# Patient Record
Sex: FEMALE | Race: WHITE | NOT HISPANIC OR LATINO | Employment: OTHER | URBAN - METROPOLITAN AREA
[De-identification: names, ages, dates, MRNs, and addresses within clinical notes are randomized per-mention and may not be internally consistent; named-entity substitution may affect disease eponyms.]

---

## 2017-01-09 ENCOUNTER — ALLSCRIPTS OFFICE VISIT (OUTPATIENT)
Dept: OTHER | Facility: OTHER | Age: 82
End: 2017-01-09

## 2017-01-09 DIAGNOSIS — F03.90 DEMENTIA WITHOUT BEHAVIORAL DISTURBANCE (HCC): ICD-10-CM

## 2017-01-09 DIAGNOSIS — E11.9 TYPE 2 DIABETES MELLITUS WITHOUT COMPLICATIONS (HCC): ICD-10-CM

## 2017-01-09 DIAGNOSIS — E78.00 PURE HYPERCHOLESTEROLEMIA: ICD-10-CM

## 2017-01-09 DIAGNOSIS — I10 ESSENTIAL (PRIMARY) HYPERTENSION: ICD-10-CM

## 2017-01-09 DIAGNOSIS — I70.0 ATHEROSCLEROSIS OF AORTA (HCC): ICD-10-CM

## 2017-01-16 ENCOUNTER — APPOINTMENT (OUTPATIENT)
Dept: LAB | Age: 82
End: 2017-01-16
Payer: MEDICARE

## 2017-01-16 DIAGNOSIS — I70.0 ATHEROSCLEROSIS OF AORTA (HCC): ICD-10-CM

## 2017-01-16 DIAGNOSIS — E11.9 TYPE 2 DIABETES MELLITUS WITHOUT COMPLICATIONS (HCC): ICD-10-CM

## 2017-01-16 DIAGNOSIS — F03.90 DEMENTIA WITHOUT BEHAVIORAL DISTURBANCE (HCC): ICD-10-CM

## 2017-01-16 DIAGNOSIS — I10 ESSENTIAL (PRIMARY) HYPERTENSION: ICD-10-CM

## 2017-01-16 DIAGNOSIS — E78.00 PURE HYPERCHOLESTEROLEMIA: ICD-10-CM

## 2017-01-16 LAB
ALBUMIN SERPL BCP-MCNC: 3.8 G/DL (ref 3.5–5)
ALP SERPL-CCNC: 80 U/L (ref 46–116)
ALT SERPL W P-5'-P-CCNC: 20 U/L (ref 12–78)
ANION GAP SERPL CALCULATED.3IONS-SCNC: 9 MMOL/L (ref 4–13)
AST SERPL W P-5'-P-CCNC: 16 U/L (ref 5–45)
BASOPHILS # BLD AUTO: 0.06 THOUSANDS/ΜL (ref 0–0.1)
BASOPHILS NFR BLD AUTO: 1 % (ref 0–1)
BILIRUB SERPL-MCNC: 0.58 MG/DL (ref 0.2–1)
BUN SERPL-MCNC: 20 MG/DL (ref 5–25)
CALCIUM SERPL-MCNC: 9.3 MG/DL (ref 8.3–10.1)
CHLORIDE SERPL-SCNC: 98 MMOL/L (ref 100–108)
CHOLEST SERPL-MCNC: 162 MG/DL (ref 50–200)
CO2 SERPL-SCNC: 28 MMOL/L (ref 21–32)
CREAT SERPL-MCNC: 1.17 MG/DL (ref 0.6–1.3)
EOSINOPHIL # BLD AUTO: 0.16 THOUSAND/ΜL (ref 0–0.61)
EOSINOPHIL NFR BLD AUTO: 1 % (ref 0–6)
ERYTHROCYTE [DISTWIDTH] IN BLOOD BY AUTOMATED COUNT: 13.1 % (ref 11.6–15.1)
EST. AVERAGE GLUCOSE BLD GHB EST-MCNC: 174 MG/DL
GFR SERPL CREATININE-BSD FRML MDRD: 43.2 ML/MIN/1.73SQ M
GLUCOSE SERPL-MCNC: 187 MG/DL (ref 65–140)
HBA1C MFR BLD: 7.7 % (ref 4.2–6.3)
HCT VFR BLD AUTO: 34.6 % (ref 34.8–46.1)
HDLC SERPL-MCNC: 51 MG/DL (ref 40–60)
HGB BLD-MCNC: 11.9 G/DL (ref 11.5–15.4)
LDLC SERPL CALC-MCNC: 84 MG/DL (ref 0–100)
LYMPHOCYTES # BLD AUTO: 2.17 THOUSANDS/ΜL (ref 0.6–4.47)
LYMPHOCYTES NFR BLD AUTO: 20 % (ref 14–44)
MCH RBC QN AUTO: 30.4 PG (ref 26.8–34.3)
MCHC RBC AUTO-ENTMCNC: 34.4 G/DL (ref 31.4–37.4)
MCV RBC AUTO: 89 FL (ref 82–98)
MONOCYTES # BLD AUTO: 0.91 THOUSAND/ΜL (ref 0.17–1.22)
MONOCYTES NFR BLD AUTO: 8 % (ref 4–12)
NEUTROPHILS # BLD AUTO: 7.76 THOUSANDS/ΜL (ref 1.85–7.62)
NEUTS SEG NFR BLD AUTO: 70 % (ref 43–75)
NRBC BLD AUTO-RTO: 0 /100 WBCS
PLATELET # BLD AUTO: 315 THOUSANDS/UL (ref 149–390)
PMV BLD AUTO: 10.5 FL (ref 8.9–12.7)
POTASSIUM SERPL-SCNC: 3.1 MMOL/L (ref 3.5–5.3)
PROT SERPL-MCNC: 8 G/DL (ref 6.4–8.2)
RBC # BLD AUTO: 3.91 MILLION/UL (ref 3.81–5.12)
SODIUM SERPL-SCNC: 135 MMOL/L (ref 136–145)
TRIGL SERPL-MCNC: 137 MG/DL
WBC # BLD AUTO: 11.12 THOUSAND/UL (ref 4.31–10.16)

## 2017-01-16 PROCEDURE — 36415 COLL VENOUS BLD VENIPUNCTURE: CPT

## 2017-01-16 PROCEDURE — 85025 COMPLETE CBC W/AUTO DIFF WBC: CPT

## 2017-01-16 PROCEDURE — 83036 HEMOGLOBIN GLYCOSYLATED A1C: CPT

## 2017-01-16 PROCEDURE — 80061 LIPID PANEL: CPT

## 2017-01-16 PROCEDURE — 80053 COMPREHEN METABOLIC PANEL: CPT

## 2017-01-23 ENCOUNTER — HOSPITAL ENCOUNTER (OUTPATIENT)
Dept: NON INVASIVE DIAGNOSTICS | Facility: CLINIC | Age: 82
Discharge: HOME | End: 2017-01-23
Payer: MEDICARE

## 2017-01-23 DIAGNOSIS — I70.0 ATHEROSCLEROSIS OF AORTA (HCC): ICD-10-CM

## 2017-01-23 PROCEDURE — 93306 TTE W/DOPPLER COMPLETE: CPT

## 2017-01-30 ENCOUNTER — APPOINTMENT (OUTPATIENT)
Dept: LAB | Age: 82
End: 2017-01-30
Payer: MEDICARE

## 2017-01-30 ENCOUNTER — TRANSCRIBE ORDERS (OUTPATIENT)
Dept: LAB | Age: 82
End: 2017-01-30

## 2017-01-30 DIAGNOSIS — I10 ESSENTIAL (PRIMARY) HYPERTENSION: ICD-10-CM

## 2017-01-30 LAB
ANION GAP SERPL CALCULATED.3IONS-SCNC: 11 MMOL/L (ref 4–13)
BUN SERPL-MCNC: 24 MG/DL (ref 5–25)
CALCIUM SERPL-MCNC: 9.4 MG/DL (ref 8.3–10.1)
CHLORIDE SERPL-SCNC: 96 MMOL/L (ref 100–108)
CO2 SERPL-SCNC: 28 MMOL/L (ref 21–32)
CREAT SERPL-MCNC: 1.37 MG/DL (ref 0.6–1.3)
GFR SERPL CREATININE-BSD FRML MDRD: 36 ML/MIN/1.73SQ M
GLUCOSE SERPL-MCNC: 179 MG/DL (ref 65–140)
POTASSIUM SERPL-SCNC: 3.5 MMOL/L (ref 3.5–5.3)
SODIUM SERPL-SCNC: 135 MMOL/L (ref 136–145)

## 2017-01-30 PROCEDURE — 36415 COLL VENOUS BLD VENIPUNCTURE: CPT

## 2017-01-30 PROCEDURE — 80048 BASIC METABOLIC PNL TOTAL CA: CPT

## 2017-02-20 ENCOUNTER — ALLSCRIPTS OFFICE VISIT (OUTPATIENT)
Dept: OTHER | Facility: OTHER | Age: 82
End: 2017-02-20

## 2017-02-20 DIAGNOSIS — E11.9 TYPE 2 DIABETES MELLITUS WITHOUT COMPLICATIONS (HCC): ICD-10-CM

## 2017-02-20 DIAGNOSIS — N17.9 ACUTE KIDNEY FAILURE (HCC): ICD-10-CM

## 2017-02-20 DIAGNOSIS — E78.00 PURE HYPERCHOLESTEROLEMIA: ICD-10-CM

## 2017-02-20 DIAGNOSIS — I10 ESSENTIAL (PRIMARY) HYPERTENSION: ICD-10-CM

## 2017-06-05 ENCOUNTER — TRANSCRIBE ORDERS (OUTPATIENT)
Dept: LAB | Age: 82
End: 2017-06-05

## 2017-06-05 ENCOUNTER — APPOINTMENT (OUTPATIENT)
Dept: LAB | Age: 82
End: 2017-06-05
Payer: MEDICARE

## 2017-06-05 DIAGNOSIS — E11.9 TYPE 2 DIABETES MELLITUS WITHOUT COMPLICATIONS (HCC): ICD-10-CM

## 2017-06-05 DIAGNOSIS — E78.00 PURE HYPERCHOLESTEROLEMIA: ICD-10-CM

## 2017-06-05 DIAGNOSIS — I10 ESSENTIAL (PRIMARY) HYPERTENSION: ICD-10-CM

## 2017-06-05 DIAGNOSIS — N17.9 ACUTE KIDNEY FAILURE (HCC): ICD-10-CM

## 2017-06-05 LAB
ALBUMIN SERPL BCP-MCNC: 3.8 G/DL (ref 3.5–5)
ALP SERPL-CCNC: 88 U/L (ref 46–116)
ALT SERPL W P-5'-P-CCNC: 22 U/L (ref 12–78)
ANION GAP SERPL CALCULATED.3IONS-SCNC: 9 MMOL/L (ref 4–13)
AST SERPL W P-5'-P-CCNC: 21 U/L (ref 5–45)
BASOPHILS # BLD MANUAL: 0.13 THOUSAND/UL (ref 0–0.1)
BASOPHILS NFR MAR MANUAL: 1 % (ref 0–1)
BILIRUB SERPL-MCNC: 0.73 MG/DL (ref 0.2–1)
BUN SERPL-MCNC: 24 MG/DL (ref 5–25)
CALCIUM SERPL-MCNC: 9.4 MG/DL (ref 8.3–10.1)
CHLORIDE SERPL-SCNC: 96 MMOL/L (ref 100–108)
CO2 SERPL-SCNC: 28 MMOL/L (ref 21–32)
CREAT SERPL-MCNC: 1.39 MG/DL (ref 0.6–1.3)
EOSINOPHIL # BLD MANUAL: 0.13 THOUSAND/UL (ref 0–0.4)
EOSINOPHIL NFR BLD MANUAL: 1 % (ref 0–6)
ERYTHROCYTE [DISTWIDTH] IN BLOOD BY AUTOMATED COUNT: 13.3 % (ref 11.6–15.1)
EST. AVERAGE GLUCOSE BLD GHB EST-MCNC: 194 MG/DL
GFR SERPL CREATININE-BSD FRML MDRD: 35.4 ML/MIN/1.73SQ M
GLUCOSE P FAST SERPL-MCNC: 208 MG/DL (ref 65–99)
HBA1C MFR BLD: 8.4 % (ref 4.2–6.3)
HCT VFR BLD AUTO: 36.8 % (ref 34.8–46.1)
HGB BLD-MCNC: 12.4 G/DL (ref 11.5–15.4)
LYMPHOCYTES # BLD AUTO: 1.82 THOUSAND/UL (ref 0.6–4.47)
LYMPHOCYTES # BLD AUTO: 14 % (ref 14–44)
MCH RBC QN AUTO: 29.7 PG (ref 26.8–34.3)
MCHC RBC AUTO-ENTMCNC: 33.7 G/DL (ref 31.4–37.4)
MCV RBC AUTO: 88 FL (ref 82–98)
MONOCYTES # BLD AUTO: 0.65 THOUSAND/UL (ref 0–1.22)
MONOCYTES NFR BLD: 5 % (ref 4–12)
NEUTROPHILS # BLD MANUAL: 9.77 THOUSAND/UL (ref 1.85–7.62)
NEUTS SEG NFR BLD AUTO: 75 % (ref 43–75)
NRBC BLD AUTO-RTO: 0 /100 WBCS
PLATELET # BLD AUTO: 337 THOUSANDS/UL (ref 149–390)
PLATELET BLD QL SMEAR: ADEQUATE
PMV BLD AUTO: 11 FL (ref 8.9–12.7)
POTASSIUM SERPL-SCNC: 3.7 MMOL/L (ref 3.5–5.3)
PROT SERPL-MCNC: 8.4 G/DL (ref 6.4–8.2)
RBC # BLD AUTO: 4.17 MILLION/UL (ref 3.81–5.12)
SODIUM SERPL-SCNC: 133 MMOL/L (ref 136–145)
VARIANT LYMPHS # BLD AUTO: 4 %
WBC # BLD AUTO: 13.02 THOUSAND/UL (ref 4.31–10.16)

## 2017-06-05 PROCEDURE — 85007 BL SMEAR W/DIFF WBC COUNT: CPT

## 2017-06-05 PROCEDURE — 85027 COMPLETE CBC AUTOMATED: CPT

## 2017-06-05 PROCEDURE — 80053 COMPREHEN METABOLIC PANEL: CPT

## 2017-06-05 PROCEDURE — 83036 HEMOGLOBIN GLYCOSYLATED A1C: CPT

## 2017-06-05 PROCEDURE — 36415 COLL VENOUS BLD VENIPUNCTURE: CPT

## 2017-06-06 ENCOUNTER — GENERIC CONVERSION - ENCOUNTER (OUTPATIENT)
Dept: OTHER | Facility: OTHER | Age: 82
End: 2017-06-06

## 2017-06-12 ENCOUNTER — ALLSCRIPTS OFFICE VISIT (OUTPATIENT)
Dept: OTHER | Facility: OTHER | Age: 82
End: 2017-06-12

## 2017-06-12 DIAGNOSIS — E87.1 HYPO-OSMOLALITY AND HYPONATREMIA: ICD-10-CM

## 2017-06-12 DIAGNOSIS — E87.6 HYPOKALEMIA: ICD-10-CM

## 2017-06-12 DIAGNOSIS — D72.829 ELEVATED WHITE BLOOD CELL COUNT: ICD-10-CM

## 2017-06-19 ENCOUNTER — APPOINTMENT (OUTPATIENT)
Dept: LAB | Age: 82
End: 2017-06-19
Payer: MEDICARE

## 2017-06-19 ENCOUNTER — TRANSCRIBE ORDERS (OUTPATIENT)
Dept: LAB | Age: 82
End: 2017-06-19

## 2017-06-19 DIAGNOSIS — E87.1 HYPOSMOLALITY AND/OR HYPONATREMIA: Primary | ICD-10-CM

## 2017-06-19 DIAGNOSIS — D72.829 LEUKOCYTOSIS, UNSPECIFIED TYPE: ICD-10-CM

## 2017-06-19 LAB
ANION GAP SERPL CALCULATED.3IONS-SCNC: 9 MMOL/L (ref 4–13)
BASOPHILS # BLD AUTO: 0.06 THOUSANDS/ΜL (ref 0–0.1)
BASOPHILS NFR BLD AUTO: 1 % (ref 0–1)
BILIRUB UR QL STRIP: NEGATIVE
BUN SERPL-MCNC: 20 MG/DL (ref 5–25)
CALCIUM SERPL-MCNC: 9.1 MG/DL (ref 8.3–10.1)
CHLORIDE SERPL-SCNC: 96 MMOL/L (ref 100–108)
CLARITY UR: CLEAR
CO2 SERPL-SCNC: 28 MMOL/L (ref 21–32)
COLOR UR: YELLOW
CREAT SERPL-MCNC: 1.25 MG/DL (ref 0.6–1.3)
EOSINOPHIL # BLD AUTO: 0.15 THOUSAND/ΜL (ref 0–0.61)
EOSINOPHIL NFR BLD AUTO: 1 % (ref 0–6)
ERYTHROCYTE [DISTWIDTH] IN BLOOD BY AUTOMATED COUNT: 13.2 % (ref 11.6–15.1)
GFR SERPL CREATININE-BSD FRML MDRD: 40 ML/MIN/1.73SQ M
GLUCOSE SERPL-MCNC: 243 MG/DL (ref 65–140)
GLUCOSE UR STRIP-MCNC: NEGATIVE MG/DL
HCT VFR BLD AUTO: 34.5 % (ref 34.8–46.1)
HGB BLD-MCNC: 11.8 G/DL (ref 11.5–15.4)
HGB UR QL STRIP.AUTO: NEGATIVE
KETONES UR STRIP-MCNC: NEGATIVE MG/DL
LEUKOCYTE ESTERASE UR QL STRIP: NEGATIVE
LYMPHOCYTES # BLD AUTO: 2.55 THOUSANDS/ΜL (ref 0.6–4.47)
LYMPHOCYTES NFR BLD AUTO: 23 % (ref 14–44)
MCH RBC QN AUTO: 30.3 PG (ref 26.8–34.3)
MCHC RBC AUTO-ENTMCNC: 34.2 G/DL (ref 31.4–37.4)
MCV RBC AUTO: 89 FL (ref 82–98)
MONOCYTES # BLD AUTO: 1.05 THOUSAND/ΜL (ref 0.17–1.22)
MONOCYTES NFR BLD AUTO: 9 % (ref 4–12)
NEUTROPHILS # BLD AUTO: 7.37 THOUSANDS/ΜL (ref 1.85–7.62)
NEUTS SEG NFR BLD AUTO: 66 % (ref 43–75)
NITRITE UR QL STRIP: NEGATIVE
NRBC BLD AUTO-RTO: 0 /100 WBCS
PH UR STRIP.AUTO: 7 [PH] (ref 4.5–8)
PLATELET # BLD AUTO: 332 THOUSANDS/UL (ref 149–390)
PMV BLD AUTO: 10.9 FL (ref 8.9–12.7)
POTASSIUM SERPL-SCNC: 3.1 MMOL/L (ref 3.5–5.3)
PROT UR STRIP-MCNC: NEGATIVE MG/DL
RBC # BLD AUTO: 3.9 MILLION/UL (ref 3.81–5.12)
SODIUM SERPL-SCNC: 133 MMOL/L (ref 136–145)
SP GR UR STRIP.AUTO: 1.01 (ref 1–1.03)
UROBILINOGEN UR QL STRIP.AUTO: 0.2 E.U./DL
WBC # BLD AUTO: 11.23 THOUSAND/UL (ref 4.31–10.16)

## 2017-06-19 PROCEDURE — 81003 URINALYSIS AUTO W/O SCOPE: CPT

## 2017-06-19 PROCEDURE — 85025 COMPLETE CBC W/AUTO DIFF WBC: CPT

## 2017-06-19 PROCEDURE — 36415 COLL VENOUS BLD VENIPUNCTURE: CPT

## 2017-06-19 PROCEDURE — 80048 BASIC METABOLIC PNL TOTAL CA: CPT

## 2017-07-03 ENCOUNTER — APPOINTMENT (OUTPATIENT)
Dept: LAB | Age: 82
End: 2017-07-03
Payer: MEDICARE

## 2017-07-03 ENCOUNTER — TRANSCRIBE ORDERS (OUTPATIENT)
Dept: LAB | Age: 82
End: 2017-07-03

## 2017-07-03 DIAGNOSIS — E87.6 HYPOKALEMIA: ICD-10-CM

## 2017-07-03 LAB
ANION GAP SERPL CALCULATED.3IONS-SCNC: 11 MMOL/L (ref 4–13)
BUN SERPL-MCNC: 20 MG/DL (ref 5–25)
CALCIUM SERPL-MCNC: 9.5 MG/DL (ref 8.3–10.1)
CHLORIDE SERPL-SCNC: 97 MMOL/L (ref 100–108)
CO2 SERPL-SCNC: 26 MMOL/L (ref 21–32)
CREAT SERPL-MCNC: 1.15 MG/DL (ref 0.6–1.3)
GFR SERPL CREATININE-BSD FRML MDRD: 44 ML/MIN/1.73SQ M
GLUCOSE SERPL-MCNC: 248 MG/DL (ref 65–140)
MAGNESIUM SERPL-MCNC: 1.8 MG/DL (ref 1.6–2.6)
POTASSIUM SERPL-SCNC: 3.4 MMOL/L (ref 3.5–5.3)
SODIUM SERPL-SCNC: 134 MMOL/L (ref 136–145)

## 2017-07-03 PROCEDURE — 83735 ASSAY OF MAGNESIUM: CPT

## 2017-07-03 PROCEDURE — 36415 COLL VENOUS BLD VENIPUNCTURE: CPT

## 2017-07-03 PROCEDURE — 80048 BASIC METABOLIC PNL TOTAL CA: CPT

## 2017-07-17 ENCOUNTER — ALLSCRIPTS OFFICE VISIT (OUTPATIENT)
Dept: OTHER | Facility: OTHER | Age: 82
End: 2017-07-17

## 2017-07-27 DIAGNOSIS — E87.6 HYPOKALEMIA: ICD-10-CM

## 2017-07-27 DIAGNOSIS — N18.30 CHRONIC KIDNEY DISEASE, STAGE III (MODERATE) (HCC): ICD-10-CM

## 2017-07-27 DIAGNOSIS — I10 ESSENTIAL (PRIMARY) HYPERTENSION: ICD-10-CM

## 2017-07-31 ENCOUNTER — APPOINTMENT (OUTPATIENT)
Dept: LAB | Age: 82
End: 2017-07-31
Payer: MEDICARE

## 2017-07-31 DIAGNOSIS — N18.30 CHRONIC KIDNEY DISEASE, STAGE III (MODERATE) (HCC): ICD-10-CM

## 2017-07-31 DIAGNOSIS — I10 ESSENTIAL (PRIMARY) HYPERTENSION: ICD-10-CM

## 2017-07-31 LAB — POTASSIUM SERPL-SCNC: 3.1 MMOL/L (ref 3.5–5.3)

## 2017-07-31 PROCEDURE — 36415 COLL VENOUS BLD VENIPUNCTURE: CPT

## 2017-07-31 PROCEDURE — 84132 ASSAY OF SERUM POTASSIUM: CPT

## 2017-08-07 ENCOUNTER — TRANSCRIBE ORDERS (OUTPATIENT)
Dept: LAB | Age: 82
End: 2017-08-07

## 2017-08-07 ENCOUNTER — APPOINTMENT (OUTPATIENT)
Dept: LAB | Age: 82
End: 2017-08-07
Payer: MEDICARE

## 2017-08-07 DIAGNOSIS — E87.6 HYPOKALEMIA: ICD-10-CM

## 2017-08-07 LAB — POTASSIUM SERPL-SCNC: 3.2 MMOL/L (ref 3.5–5.3)

## 2017-08-07 PROCEDURE — 36415 COLL VENOUS BLD VENIPUNCTURE: CPT

## 2017-08-07 PROCEDURE — 84132 ASSAY OF SERUM POTASSIUM: CPT

## 2017-10-04 ENCOUNTER — APPOINTMENT (OUTPATIENT)
Dept: LAB | Facility: HOSPITAL | Age: 82
End: 2017-10-04
Payer: MEDICARE

## 2017-10-04 ENCOUNTER — GENERIC CONVERSION - ENCOUNTER (OUTPATIENT)
Dept: OTHER | Facility: OTHER | Age: 82
End: 2017-10-04

## 2017-10-04 DIAGNOSIS — R60.0 LOCALIZED EDEMA: ICD-10-CM

## 2017-10-04 LAB
ANION GAP SERPL CALCULATED.3IONS-SCNC: 9 MMOL/L (ref 4–13)
BUN SERPL-MCNC: 18 MG/DL (ref 5–25)
CALCIUM SERPL-MCNC: 9.5 MG/DL (ref 8.3–10.1)
CHLORIDE SERPL-SCNC: 97 MMOL/L (ref 100–108)
CO2 SERPL-SCNC: 31 MMOL/L (ref 21–32)
CREAT SERPL-MCNC: 1.2 MG/DL (ref 0.6–1.3)
GFR SERPL CREATININE-BSD FRML MDRD: 39 ML/MIN/1.73SQ M
GLUCOSE P FAST SERPL-MCNC: 185 MG/DL (ref 65–99)
MAGNESIUM SERPL-MCNC: 1.9 MG/DL (ref 1.6–2.6)
POTASSIUM SERPL-SCNC: 4.3 MMOL/L (ref 3.5–5.3)
SODIUM SERPL-SCNC: 137 MMOL/L (ref 136–145)

## 2017-10-04 PROCEDURE — 36415 COLL VENOUS BLD VENIPUNCTURE: CPT

## 2017-10-04 PROCEDURE — 83735 ASSAY OF MAGNESIUM: CPT

## 2017-10-04 PROCEDURE — 80048 BASIC METABOLIC PNL TOTAL CA: CPT

## 2017-10-23 ENCOUNTER — TRANSCRIBE ORDERS (OUTPATIENT)
Dept: LAB | Age: 82
End: 2017-10-23

## 2017-10-23 ENCOUNTER — APPOINTMENT (OUTPATIENT)
Dept: LAB | Age: 82
End: 2017-10-23
Payer: MEDICARE

## 2017-10-23 DIAGNOSIS — E11.9 TYPE 2 DIABETES MELLITUS WITHOUT COMPLICATIONS (HCC): ICD-10-CM

## 2017-10-23 DIAGNOSIS — I10 ESSENTIAL (PRIMARY) HYPERTENSION: ICD-10-CM

## 2017-10-23 DIAGNOSIS — D72.829 ELEVATED WHITE BLOOD CELL COUNT: ICD-10-CM

## 2017-10-23 DIAGNOSIS — F03.90 DEMENTIA WITHOUT BEHAVIORAL DISTURBANCE (HCC): ICD-10-CM

## 2017-10-23 DIAGNOSIS — N18.30 CHRONIC KIDNEY DISEASE, STAGE III (MODERATE) (HCC): ICD-10-CM

## 2017-10-23 DIAGNOSIS — E78.00 PURE HYPERCHOLESTEROLEMIA: ICD-10-CM

## 2017-10-23 PROCEDURE — 36415 COLL VENOUS BLD VENIPUNCTURE: CPT

## 2017-10-24 ENCOUNTER — TRANSCRIBE ORDERS (OUTPATIENT)
Dept: LAB | Age: 82
End: 2017-10-24

## 2017-10-24 ENCOUNTER — APPOINTMENT (OUTPATIENT)
Dept: LAB | Age: 82
End: 2017-10-24
Payer: MEDICARE

## 2017-10-24 DIAGNOSIS — D72.829 LEUKOCYTOSIS, UNSPECIFIED TYPE: ICD-10-CM

## 2017-10-24 DIAGNOSIS — N18.30 CHRONIC KIDNEY DISEASE, STAGE III (MODERATE) (HCC): ICD-10-CM

## 2017-10-24 DIAGNOSIS — E11.9 DIABETES MELLITUS WITHOUT COMPLICATION (HCC): ICD-10-CM

## 2017-10-24 DIAGNOSIS — I10 ESSENTIAL HYPERTENSION, MALIGNANT: Primary | ICD-10-CM

## 2017-10-24 LAB
ALBUMIN SERPL BCP-MCNC: 3.5 G/DL (ref 3.5–5)
ALP SERPL-CCNC: 81 U/L (ref 46–116)
ALT SERPL W P-5'-P-CCNC: 25 U/L (ref 12–78)
ANION GAP SERPL CALCULATED.3IONS-SCNC: 7 MMOL/L (ref 4–13)
AST SERPL W P-5'-P-CCNC: 18 U/L (ref 5–45)
BASOPHILS # BLD AUTO: 0.08 THOUSANDS/ΜL (ref 0–0.1)
BASOPHILS NFR BLD AUTO: 1 % (ref 0–1)
BILIRUB SERPL-MCNC: 0.61 MG/DL (ref 0.2–1)
BUN SERPL-MCNC: 14 MG/DL (ref 5–25)
CALCIUM SERPL-MCNC: 8.8 MG/DL (ref 8.3–10.1)
CHLORIDE SERPL-SCNC: 96 MMOL/L (ref 100–108)
CO2 SERPL-SCNC: 28 MMOL/L (ref 21–32)
CREAT SERPL-MCNC: 1.05 MG/DL (ref 0.6–1.3)
EOSINOPHIL # BLD AUTO: 0.23 THOUSAND/ΜL (ref 0–0.61)
EOSINOPHIL NFR BLD AUTO: 2 % (ref 0–6)
ERYTHROCYTE [DISTWIDTH] IN BLOOD BY AUTOMATED COUNT: 13.7 % (ref 11.6–15.1)
GFR SERPL CREATININE-BSD FRML MDRD: 46 ML/MIN/1.73SQ M
GLUCOSE P FAST SERPL-MCNC: 212 MG/DL (ref 65–99)
HCT VFR BLD AUTO: 35.7 % (ref 34.8–46.1)
HGB BLD-MCNC: 12.1 G/DL (ref 11.5–15.4)
LYMPHOCYTES # BLD AUTO: 3.1 THOUSANDS/ΜL (ref 0.6–4.47)
LYMPHOCYTES NFR BLD AUTO: 26 % (ref 14–44)
MCH RBC QN AUTO: 30 PG (ref 26.8–34.3)
MCHC RBC AUTO-ENTMCNC: 33.9 G/DL (ref 31.4–37.4)
MCV RBC AUTO: 88 FL (ref 82–98)
MONOCYTES # BLD AUTO: 0.86 THOUSAND/ΜL (ref 0.17–1.22)
MONOCYTES NFR BLD AUTO: 7 % (ref 4–12)
NEUTROPHILS # BLD AUTO: 7.66 THOUSANDS/ΜL (ref 1.85–7.62)
NEUTS SEG NFR BLD AUTO: 64 % (ref 43–75)
NRBC BLD AUTO-RTO: 0 /100 WBCS
PLATELET # BLD AUTO: 311 THOUSANDS/UL (ref 149–390)
PMV BLD AUTO: 11.1 FL (ref 8.9–12.7)
POTASSIUM SERPL-SCNC: 3.7 MMOL/L (ref 3.5–5.3)
PROT SERPL-MCNC: 7.8 G/DL (ref 6.4–8.2)
RBC # BLD AUTO: 4.04 MILLION/UL (ref 3.81–5.12)
SODIUM SERPL-SCNC: 131 MMOL/L (ref 136–145)
WBC # BLD AUTO: 12 THOUSAND/UL (ref 4.31–10.16)

## 2017-10-24 PROCEDURE — 36415 COLL VENOUS BLD VENIPUNCTURE: CPT

## 2017-10-24 PROCEDURE — 83036 HEMOGLOBIN GLYCOSYLATED A1C: CPT

## 2017-10-24 PROCEDURE — 80053 COMPREHEN METABOLIC PANEL: CPT

## 2017-10-24 PROCEDURE — 85025 COMPLETE CBC W/AUTO DIFF WBC: CPT

## 2017-10-25 LAB
EST. AVERAGE GLUCOSE BLD GHB EST-MCNC: 209 MG/DL
HBA1C MFR BLD: 8.9 % (ref 4.2–6.3)

## 2017-10-30 ENCOUNTER — ALLSCRIPTS OFFICE VISIT (OUTPATIENT)
Dept: OTHER | Facility: OTHER | Age: 82
End: 2017-10-30

## 2017-10-31 NOTE — PROGRESS NOTES
Assessment  1  CKD (chronic kidney disease), stage III (585 3) (N18 3)   2  Type 2 diabetes mellitus (250 00) (E11 9)   3  Benign essential hypertension (401 1) (I10)   4  Hypercholesterolemia (272 0) (E78 00)   5  Senile dementia, uncomplicated (922 1) (Z12 43)   6  Leukocytosis (288 60) (D72 829)    Plan  Benign essential hypertension    · Furosemide 40 MG Oral Tablet; TAKE 1 TABLET DAILY   · Losartan Potassium 25 MG Oral Tablet; TAKE 1 TABLET DAILY   · Simvastatin 40 MG Oral Tablet; TAKE 1 TABLET DAILY AS DIRECTED  Benign essential hypertension, CKD (chronic kidney disease), stage III, Hypercholesterolemia,  Leukocytosis, Senile dementia, uncomplicated, Type 2 diabetes mellitus    · (1) BASIC METABOLIC PROFILE; Status:Active; Requested for:30Oct2017;    · (1) CBC/ PLT (NO DIFF); Status:Active; Requested for:30Oct2017;    · (1) HEMOGLOBIN A1C; Status:Active; Requested for:30Oct2017;    · (1) LIPID PANEL FASTING W DIRECT LDL REFLEX; Status:Active; Requested for:30Oct2017;    · (1) TSH; Status:Active; Requested for:30Oct2017;    · Follow-up visit in 4 Months Evaluation and Treatment  Follow-up  Status: Hold For - Scheduling   Requested for: 28QWR4387  PMH: Varicose veins of leg with edema, unspecified laterality    · MetOLazone 5 MG Oral Tablet; TAKE 1 TABLET EVERY DAY  Senile dementia, uncomplicated    · Donepezil HCl - 10 MG Oral Tablet; TAKE 1 TABLET DAILY  Type 2 diabetes mellitus    · From  GlipiZIDE XL 2 5 MG Oral Tablet Extended Release 24 Hour 1 tablet before breakfast  and dinner To GlipiZIDE XL 5 MG Oral Tablet Extended Release 24 Hour TAKE 1 TABLET BY MOUTH  EVERY AM BEFORE BREAKFAST   · Januvia 50 MG Oral Tablet; Take 1 tablet daily    Discussion/Summary  Discussion Summary:   See med list glipizide 5 mg daily     Counseling Documentation With Imm: The patient was counseled regarding diagnostic results,-- instructions for management,-- risk factor reductions,-- prognosis,-- patient and family education,-- impressions  Chief Complaint  Chief Complaint Free Text Note Form: 3 month F/U for HTN, CKD, hypercholesterolemia, senile dementia, diabetes  lab work 10/24/17refills needed  History of Present Illness  Diabetes Type II (Follow-Up): The patient states she has been doing poorly with her Type II Diabetes control since the last visit  Comorbid Illnesses: hypertension-- and-- hyperlipidemia  She has no known diabetic complications  Symptoms: The patient is currently asymptomatic  Home monitoring: The patient is not checking blood sugars at home  Medications: the patient is adherent with her medication regimen  -- She denies medication side effects  Review of Systems  Complete-Female:   Constitutional: No fever, no chills, feels well, no tiredness, no recent weight gain or weight loss  Eyes: No complaints of eye pain, no red eyes, no eyesight problems, no discharge, no dry eyes, no itching of eyes  ENT: no complaints of earache, no loss of hearing, no nose bleeds, no nasal discharge, no sore throat, no hoarseness  Cardiovascular: lower extremity edema  Respiratory: No complaints of shortness of breath, no wheezing, no cough, no SOB on exertion, no orthopnea, no PND  Musculoskeletal: joint stiffness  Active Problems  1  Advance directive in chart (V49 89) (Z78 9)   2  Anemia (285 9) (D64 9)   3  Benign essential hypertension (401 1) (I10)   4  Bilateral lower extremity edema (782 3) (R60 0)   5  CKD (chronic kidney disease), stage III (585 3) (N18 3)   6  Closed displaced fracture of olecranon process of right ulna with intra-articular extension, initial   encounter (813 01) (S52 031A)   7  Gallstone (574 20) (K80 20)   8  Hypercholesterolemia (272 0) (E78 00)   9  Hypokalemia (276 8) (E87 6)   10  Hyponatremia (276 1) (E87 1)   11  Leukocytosis (288 60) (D72 829)   12  Moderate aortic stenosis (424 1) (I35 0)   13  Osteopenia (733 90) (M85 80)   14  Pancreatic cyst (577 2) (K86 2)   15  Senile dementia, uncomplicated (858 9) (C29 92)   16  Type 2 diabetes mellitus (250 00) (E11 9)    Past Medical History  1  History of Acute bronchitis, unspecified organism (466 0) (J20 9)   2  History of Arm contusion (923 9) (S40 029A)   3  History of Bilateral edema of lower extremity (782 3) (R60 0)   4  History of Gait abnormality (781 2) (R26 9)   5  History of Hip pain, acute, right (719 45) (M25 551)   6  History of acute dermatitis (V13 3) (Z87 2)   7  History of fall (V15 88) (Z91 81)   8  History of hypokalemia (V12 29) (Z86 39)   9  History of low back pain (V13 59) (Z87 39)   10  History of Injury, foot (959 7) (S99 929A)   11  History of Joint pain, knee (719 46) (M25 569)   12  History of Other ascites (789 59) (R18 8)   13  History of Screening for genitourinary condition (V81 6) (Z13 89)   14  History of Varicose veins of leg with edema, unspecified laterality (454 8) (H66 947)  Active Problems And Past Medical History Reviewed: The active problems and past medical history were reviewed and updated today  Surgical History  1  History of Breast Surgery   2  History of Elbow Surgery  Surgical History Reviewed: The surgical history was reviewed and updated today  Family History  Mother    1  No pertinent family history  Father    2  No pertinent family history  Family History Reviewed: The family history was reviewed and updated today  Social History   · Advance directive in chart (V48 89) (Z78 9)   · Marital History - Currently    · Never A Smoker   · No alcohol use   · No illicit drug use   · Occupation:  Social History Reviewed: The social history was reviewed and updated today  Current Meds   1  Donepezil HCl - 10 MG Oral Tablet; TAKE 1 TABLET DAILY  Requested for: 12Jun2017; Last   Rx:12Jun2017 Ordered   2  Furosemide 40 MG Oral Tablet; TAKE 1 TABLET DAILY; Therapy: 78MDC1509 to (Evaluate:16Qii3090)  Requested for: 12Jun2017; Last Rx:12Jun2017   Ordered   3  GlipiZIDE XL 2 5 MG Oral Tablet Extended Release 24 Hour; 1 tablet before breakfast and dinner; Therapy: 52VCB9120 to (Demetrice Young)  Requested for: 12Jun2017; Last Rx:12Jun2017   Ordered   4  Januvia 50 MG Oral Tablet; Take 1 tablet daily; Therapy: 30EFV2125 to (Evaluate:11Aug2017)  Requested for: 21YKB3198; Last Rx:12Jun2017   Ordered   5  Losartan Potassium 25 MG Oral Tablet; TAKE 1 TABLET DAILY; Therapy: 38Zkj3633 to (Evaluate:11Nov2017)  Requested for: 53Pxl3531; Last Rx:12Sep2017   Ordered   6  MetOLazone 5 MG Oral Tablet; TAKE 1 TABLET EVERY DAY; Therapy: 87DIE6357 to (Evaluate:43Zyp7649)  Requested for: 12Jun2017; Last Rx:12Jun2017   Ordered   7  Potassium Chloride ER 20 MEQ Oral Tablet Extended Release; take 2 tablet twice daily; Therapy: 99Vbn4406 to (Evaluate:13Jan2018)  Requested for: 84YPT3562; Last Rx:26Nsv8504   Ordered   8  Simvastatin 40 MG Oral Tablet; TAKE 1 TABLET DAILY AS DIRECTED  Requested for: 12Jun2017;   Last Rx:12Jun2017 Ordered  Medication List Reviewed: The medication list was reviewed and updated today  Allergies  1  No Known Drug Allergies  2  No Known Environmental Allergies   3  No Known Food Allergies    Vitals  Vital Signs    Recorded: 20IBQ6859 11:42AM   Temperature 98 3 F, Tympanic   Heart Rate 82   Systolic 102, LUE, Sitting   Diastolic 60, LUE, Sitting   BP CUFF SIZE Large   Weight 199 lb 9 6 oz   BMI Calculated 37 71   BSA Calculated 1 89   O2 Saturation 95, RA     Physical Exam    Constitutional   General appearance: Abnormal   overweight  Eyes   Conjunctiva and lids: No swelling, erythema or discharge  Ears, Nose, Mouth, and Throat   Otoscopic examination: Tympanic membranes translucent with normal light reflex  Canals patent without erythema  Oropharynx: Normal with no erythema, edema, exudate or lesions  Pulmonary   Auscultation of lungs: Clear to auscultation  Cardiovascular   Palpation of heart: Normal PMI, no thrills      Auscultation of heart: Abnormal   The heart rate was normal  A grade 2 systolic murmur was heard at the LLSB  Examination of extremities for edema and/or varicosities: Abnormal   bilateral ankle 1+ pitting edema  Abdomen   Abdomen: Non-tender, no masses  Liver and spleen: No hepatomegaly or splenomegaly  Musculoskeletal   Gait and station: Normal     Neurologic   Cranial nerves: Cranial nerves 2-12 intact  Psychiatric   Orientation to person, place, and time: Abnormal   Mental Status: disoriented to place,-- disoriented to time,-- decreased attention span-- and-- inadequate knowledge of current events, but-- oriented to person,-- intact visual attention-- and-- no difficulty naming common objects  Health Management  History of Screening for genitourinary condition   *VB - Urinary Incontinence Screen (Dx Z13 89 Screen for UI); every 1 year; Last 60XZJ7011; Next  Due: 58RUC5769; Overdue  Type 2 diabetes mellitus   *VB - Eye Exam; every 2 years; Last 31TDP3771; Next Due: 29RDX8028; Active  *VB - Foot Exam; every 1 year; Last 08UFL9798; Next Due: 01Mxw6225; Active    Signatures   Electronically signed by :  Vladimir Wylie MD; Oct 30 2017 12:14PM EST                       (Author)

## 2018-01-10 NOTE — MISCELLANEOUS
Message   Recorded as Task   Date: 06/05/2017 02:44 PM, Created By: Shawn Francois   Task Name: Follow Up   Assigned To: Osbaldo Leung   Regarding Patient: Jim Ornelas, Status: Active   Comment:    Ramone Can - 05 Jun 2017 2:44 PM     TASK CREATED  Patient with slight increased WBC on recent labs  Could we please contact patient's daughter and see if patient is haing any s/sx of infection, otherwise keep [facility] follow up in 1 week as scheduled  Susannah Reed - 05 Jun 2017 2:46 PM     TASK REASSIGNED: Previously Assigned To [facility],Team   Maricruz Cerda - 06 Jun 2017 12:30 PM     TASK EDITED  Spoke with the daughter  Jannie Crystal does not have any signs of infection  No cough or UTI symptoms  She will keep her appointment to review blood work results on Monday 6/12/17  Active Problems    1  Advance directive in chart (V49 89) (Z78 9)   2  SALTY (acute kidney injury) (584 9) (N17 9)   3  Anemia (285 9) (D64 9)   4  Benign essential hypertension (401 1) (I10)   5  Closed displaced fracture of olecranon process of right ulna with intra-articular extension,   initial encounter (813 01) (S52 031A)   6  Gallstone (574 20) (K80 20)   7  Hypercholesterolemia (272 0) (E78 00)   8  Leukocytosis (288 60) (D72 829)   9  Moderate aortic stenosis (424 1) (I35 0)   10  Osteopenia (733 90) (M85 80)   11  Pancreatic cyst (577 2) (K86 2)   12  Senile dementia, uncomplicated (380 9) (C00 69)   13  Type 2 diabetes mellitus (250 00) (E11 9)    Current Meds   1  Donepezil HCl - 10 MG Oral Tablet; TAKE 1 TABLET DAILY  Requested for: 13Jun2016;   Last Rx:13Jun2016 Ordered   2  Furosemide 40 MG Oral Tablet; TAKE 1 TABLET DAILY; Therapy: 86AOL8895 to (Evaluate:11Jun2017)  Requested for: 18Zur5551; Last   Rx:82Mzu7869 Ordered   3  GlipiZIDE XL 2 5 MG Oral Tablet Extended Release 24 Hour; 1 tablet before breakfast   and dinner;    Therapy: 41IXD6795 to (467 95 767)  Requested for: 06BOZ5977; Last GS:92WKU0730 Ordered   4  Januvia 25 MG Oral Tablet; TAKE 1 TABLET DAILY; Therapy: 01EKC4157 to (Evaluate:18Jun2017)  Requested for: 24Apr2017; Last   Rx:24Apr2017 Ordered   5  Losartan Potassium 100 MG Oral Tablet; TAKE 1 TABLET DAILY; Therapy: 43Hdi2404 to (Evaluate:08Jun2017)  Requested for: 24DPZ1484; Last   EM:70MQF2947; Status: ACTIVE - Renewal Denied Ordered   6  MetOLazone 5 MG Oral Tablet; TAKE 1 TABLET EVERY DAY; Therapy: 93ADF0948 to (Evaluate:18Nov2017)  Requested for: 94GWG5329; Last   Rx:43Egq8160 Ordered   7  Potassium Chloride ER 20 MEQ Oral Tablet Extended Release; take 1 tablet twice a day; Therapy: 64Ioa1555 to (Evaluate:12Nov2017)  Requested for: 43ZWG9177; Last   Rx:17Twz9332 Ordered   8  Simvastatin 40 MG Oral Tablet; TAKE 1 TABLET DAILY AS DIRECTED  Requested for:   30YKD0687; Last Rx:03Apr2017 Ordered    Allergies    1  No Known Drug Allergies    2  No Known Environmental Allergies   3  No Known Food Allergies  Denied    4   ACE Inhibitors    Signatures   Electronically signed by : Rocio Conn RN; Jun 6 2017 12:30PM EST                       (Author)

## 2018-01-13 VITALS
OXYGEN SATURATION: 95 % | DIASTOLIC BLOOD PRESSURE: 60 MMHG | HEART RATE: 82 BPM | TEMPERATURE: 98.3 F | WEIGHT: 199.6 LBS | SYSTOLIC BLOOD PRESSURE: 118 MMHG | BODY MASS INDEX: 37.71 KG/M2

## 2018-01-14 VITALS
HEART RATE: 87 BPM | WEIGHT: 200.38 LBS | TEMPERATURE: 98.1 F | BODY MASS INDEX: 39.34 KG/M2 | DIASTOLIC BLOOD PRESSURE: 60 MMHG | OXYGEN SATURATION: 96 % | SYSTOLIC BLOOD PRESSURE: 132 MMHG | HEIGHT: 60 IN

## 2018-01-14 VITALS
DIASTOLIC BLOOD PRESSURE: 82 MMHG | OXYGEN SATURATION: 98 % | HEART RATE: 84 BPM | SYSTOLIC BLOOD PRESSURE: 142 MMHG | HEIGHT: 60 IN | TEMPERATURE: 98.2 F | WEIGHT: 197.38 LBS | BODY MASS INDEX: 38.75 KG/M2

## 2018-01-14 VITALS
HEIGHT: 60 IN | HEART RATE: 97 BPM | SYSTOLIC BLOOD PRESSURE: 98 MMHG | DIASTOLIC BLOOD PRESSURE: 56 MMHG | OXYGEN SATURATION: 97 % | WEIGHT: 198.38 LBS | BODY MASS INDEX: 38.95 KG/M2 | TEMPERATURE: 98.5 F

## 2018-01-14 VITALS
BODY MASS INDEX: 38.95 KG/M2 | WEIGHT: 198.38 LBS | TEMPERATURE: 97.5 F | OXYGEN SATURATION: 98 % | SYSTOLIC BLOOD PRESSURE: 112 MMHG | HEART RATE: 82 BPM | DIASTOLIC BLOOD PRESSURE: 58 MMHG | HEIGHT: 60 IN

## 2018-01-14 NOTE — PROGRESS NOTES
Assessment    1  Closed displaced fracture of olecranon process of right ulna with intra-articular extension,   initial encounter (813 01) (F87 718A)    Plan  Closed displaced fracture of olecranon process of right ulna with intra-articular extension,  initial encounter    · Follow-up Visit in 4 Weeks Evaluation and Treatment  Follow-up  Status: Hold For -  Scheduling  Requested for: 16JVF5203    Discussion/Summary    Cor is doing well  I reviewed the x-rays which showe well aligned fracture  The fracture line still visible  She has significant stiffness  At this point I discontinued the splint  She may progress to passive range of motion stretching  I will see her back in 4 weeks' time for reevaluation and repeat x-rays of the right elbow  Chief Complaint    1  Elbow Pain  Status post ORIF for right elbow olecranon and lateral condyle fracture on 12/9/15      History of Present Illness  HPI: Jarod Tang returns for evaluation of her right elbow  She denies any pain  She has a significant elbow stiffness  Her hip is much better  She is participating in therapy with active and active-assisted range of motion  She is still wearing a brace and it is set at 20 degrees  of extension  Review of Systems    Constitutional: No fever, no chills, feels well, no tiredness, no recent weight gain or loss  Eyes: No complaints of eyesight problems, no red eyes  ENT: no loss of hearing, no nosebleeds, no sore throat  Cardiovascular: No complaints of chest pain, no palpitations, no leg claudication or lower extremity edema  Respiratory: no compliants of shortness of breath, no wheezing, no cough  Gastrointestinal: no complaints of abdominal pain, no constipation, no nausea or diarrhea, no vomiting, no bloody stools  Genitourinary: no complaints of dysuria, no incontinence  Musculoskeletal: as noted in HPI  Integumentary: no complaints of skin rash or lesion, no itching or dry skin, no skin wounds     Neurological: no complaints of headache, no confusion, no numbness or tingling, no dizziness  Endocrine: No complaints of muscle weakness, no feelings of weakness, no frequent urination, no excessive thirst    Psychiatric: No suicidal thoughts, no anxiety, no feelings of depression  Active Problems    1  Benign essential hypertension (401 1) (I10)   2  Bilateral edema of lower extremity (782 3) (R60 0)   3  Closed displaced fracture of olecranon process of right ulna with intra-articular extension,   initial encounter (813 01) (S52 031A)   4  Gait abnormality (781 2) (R26 9)   5  Hip pain, acute, right (719 45) (M25 551)   6  Hypercholesterolemia (272 0) (E78 0)   7  Hypokalemia (276 8) (E87 6)   8  Osteopenia (733 90) (M85 80)   9  Other ascites (789 59) (R18 8)   10  Senile dementia, uncomplicated (336 3) (C55 03)   11  Type 2 diabetes mellitus (250 00) (E11 9)    Current Meds   1  Donepezil HCl - 10 MG Oral Tablet; TAKE 1 TABLET DAILY  Requested for: 89OKM4887;   Last :84QHD7676 Ordered   2  Furosemide 40 MG Oral Tablet; TAKE 1 TABLET DAILY; Therapy: 42HHN9704 to (Brandon Gallardo)  Requested for: 00KSG7758; Last   Rx:03Nov2015 Ordered   3  GlipiZIDE XL 2 5 MG Oral Tablet Extended Release 24 Hour; 1 tablet before breakfast   and dinner; Therapy: 76OLS6179 to (Kevin Morgan)  Requested for: 64TKN2377; Last   Rx:07Jan2016 Ordered   4  Losartan Potassium-HCTZ 100-25 MG Oral Tablet; TAKE 1 TABLET DAILY  Requested   for: 63VFB0207; Last Rx:65Xbm2650 Ordered   5  Metolazone 5 MG Oral Tablet; TAKE 1 TABLET EVERY DAY; Therapy: 48UIH8737 to (Last Rx:03Nov2015)  Requested for: 13COB9223 Ordered   6  Potassium Chloride ER 20 MEQ Oral Tablet Extended Release; take 1 tablet twice a day; Therapy: 70Qnn2200 to (Mercedes Abdi)  Requested for: 20YDW9846; Last   Rx:20Iyo4503 Ordered   7  Simvastatin 40 MG Oral Tablet; TAKE 1 TABLET DAILY AS DIRECTED  Requested for:   30AAG7398; Last FA:13WAL6912 Ordered   8  Tradjenta 5 MG Oral Tablet; TAKE 1 TABLET DAILY; Therapy: 98JDY4253 to (Eliana Spurr)  Requested for: 44MHV0393; Last   Rx:38Zvd2903 Ordered    Allergies  Denied    1  ACE Inhibitors    Vitals  Signs [Data Includes: Current Encounter]    Heart Rate: 94  Systolic: 296  Diastolic: 68   Height: 5 ft 0 5 in  Weight: 185 lb   BMI Calculated: 35 54  BSA Calculated: 1 82    Physical Exam      General: No acute distress, age-appropriate  Neck: Supple, trachea midline  HEENT: Normocephalic atraumatic, mucous membranes are moist, sclera are nonicteric  Cardiovascular: No discernable arrhythmia  Respiratory: Breathing is even and unlabored, no stridor or audible wheezing  Psychiatric: Awake alert and oriented x3, normal mood and affect  Right elbow: The incision is well-healed  There is no swelling, erythema, ecchymosis  Active elbow extension is to 90 degrees  and active extension is 60 degrees   Active supination is 5 degrees  short of neutral   Active pronation is 45 degrees   There is no crepitus with range of motion  She is some mild edema in the hand and digits  She is able to make a full composite fist   Sensation is intact in all digits  Results/Data  I personally reviewed the films/images/results in the office today  My interpretation follows  X-ray Review X-rays of the right elbow reveal intact hardware without loosening  The olecranon fracture line is still visible  There is mild bridging callus        Future Appointments    Date/Time Provider Specialty Site   03/02/2016 10:30 AM Jose Stewart MD Internal Medicine [facility]     Signatures   Electronically signed by : EVE Chung ; Jan 19 2016  2:02PM EST                       (Author)

## 2018-01-22 ENCOUNTER — APPOINTMENT (OUTPATIENT)
Dept: LAB | Age: 83
End: 2018-01-22
Payer: MEDICARE

## 2018-01-22 ENCOUNTER — TRANSCRIBE ORDERS (OUTPATIENT)
Dept: LAB | Age: 83
End: 2018-01-22

## 2018-01-22 VITALS
TEMPERATURE: 98.3 F | HEIGHT: 61 IN | WEIGHT: 198.25 LBS | HEART RATE: 79 BPM | DIASTOLIC BLOOD PRESSURE: 72 MMHG | SYSTOLIC BLOOD PRESSURE: 122 MMHG | BODY MASS INDEX: 37.43 KG/M2 | OXYGEN SATURATION: 97 %

## 2018-01-22 DIAGNOSIS — N18.30 CHRONIC KIDNEY DISEASE, STAGE III (MODERATE) (HCC): ICD-10-CM

## 2018-01-22 DIAGNOSIS — E78.00 PURE HYPERCHOLESTEROLEMIA: ICD-10-CM

## 2018-01-22 DIAGNOSIS — E11.9 TYPE 2 DIABETES MELLITUS WITHOUT COMPLICATIONS (HCC): ICD-10-CM

## 2018-01-22 DIAGNOSIS — F03.90 DEMENTIA WITHOUT BEHAVIORAL DISTURBANCE (HCC): ICD-10-CM

## 2018-01-22 DIAGNOSIS — I10 ESSENTIAL (PRIMARY) HYPERTENSION: ICD-10-CM

## 2018-01-22 DIAGNOSIS — D72.829 ELEVATED WHITE BLOOD CELL COUNT: ICD-10-CM

## 2018-01-22 LAB
ANION GAP SERPL CALCULATED.3IONS-SCNC: 10 MMOL/L (ref 4–13)
BUN SERPL-MCNC: 16 MG/DL (ref 5–25)
CALCIUM SERPL-MCNC: 9 MG/DL (ref 8.3–10.1)
CHLORIDE SERPL-SCNC: 96 MMOL/L (ref 100–108)
CHOLEST SERPL-MCNC: 154 MG/DL (ref 50–200)
CO2 SERPL-SCNC: 27 MMOL/L (ref 21–32)
CREAT SERPL-MCNC: 1.15 MG/DL (ref 0.6–1.3)
ERYTHROCYTE [DISTWIDTH] IN BLOOD BY AUTOMATED COUNT: 13.3 % (ref 11.6–15.1)
EST. AVERAGE GLUCOSE BLD GHB EST-MCNC: 240 MG/DL
GFR SERPL CREATININE-BSD FRML MDRD: 41 ML/MIN/1.73SQ M
GLUCOSE P FAST SERPL-MCNC: 227 MG/DL (ref 65–99)
HBA1C MFR BLD: 10 % (ref 4.2–6.3)
HCT VFR BLD AUTO: 36.6 % (ref 34.8–46.1)
HDLC SERPL-MCNC: 47 MG/DL (ref 40–60)
HGB BLD-MCNC: 12.8 G/DL (ref 11.5–15.4)
LDLC SERPL CALC-MCNC: 79 MG/DL (ref 0–100)
MCH RBC QN AUTO: 30.6 PG (ref 26.8–34.3)
MCHC RBC AUTO-ENTMCNC: 35 G/DL (ref 31.4–37.4)
MCV RBC AUTO: 88 FL (ref 82–98)
PLATELET # BLD AUTO: 335 THOUSANDS/UL (ref 149–390)
PMV BLD AUTO: 11.1 FL (ref 8.9–12.7)
POTASSIUM SERPL-SCNC: 3.1 MMOL/L (ref 3.5–5.3)
RBC # BLD AUTO: 4.18 MILLION/UL (ref 3.81–5.12)
SODIUM SERPL-SCNC: 133 MMOL/L (ref 136–145)
TRIGL SERPL-MCNC: 142 MG/DL
TSH SERPL DL<=0.05 MIU/L-ACNC: 2.62 UIU/ML (ref 0.36–3.74)
WBC # BLD AUTO: 14.78 THOUSAND/UL (ref 4.31–10.16)

## 2018-01-22 PROCEDURE — 83036 HEMOGLOBIN GLYCOSYLATED A1C: CPT

## 2018-01-22 PROCEDURE — 85027 COMPLETE CBC AUTOMATED: CPT

## 2018-01-22 PROCEDURE — 36415 COLL VENOUS BLD VENIPUNCTURE: CPT

## 2018-01-22 PROCEDURE — 84443 ASSAY THYROID STIM HORMONE: CPT

## 2018-01-22 PROCEDURE — 80048 BASIC METABOLIC PNL TOTAL CA: CPT

## 2018-01-22 PROCEDURE — 80061 LIPID PANEL: CPT

## 2018-01-29 ENCOUNTER — OFFICE VISIT (OUTPATIENT)
Dept: INTERNAL MEDICINE CLINIC | Facility: CLINIC | Age: 83
End: 2018-01-29
Payer: MEDICARE

## 2018-01-29 VITALS
SYSTOLIC BLOOD PRESSURE: 120 MMHG | DIASTOLIC BLOOD PRESSURE: 64 MMHG | HEART RATE: 86 BPM | TEMPERATURE: 97.6 F | OXYGEN SATURATION: 96 % | WEIGHT: 195.6 LBS | BODY MASS INDEX: 36.93 KG/M2 | HEIGHT: 61 IN

## 2018-01-29 DIAGNOSIS — I35.0 MODERATE AORTIC STENOSIS: ICD-10-CM

## 2018-01-29 DIAGNOSIS — F03.90 UNCOMPLICATED SENILE DEMENTIA (HCC): ICD-10-CM

## 2018-01-29 DIAGNOSIS — I10 BENIGN ESSENTIAL HYPERTENSION: ICD-10-CM

## 2018-01-29 DIAGNOSIS — E87.6 HYPOKALEMIA: ICD-10-CM

## 2018-01-29 DIAGNOSIS — E87.1 HYPONATREMIA: ICD-10-CM

## 2018-01-29 DIAGNOSIS — D72.829 LEUKOCYTOSIS, UNSPECIFIED TYPE: ICD-10-CM

## 2018-01-29 DIAGNOSIS — E11.8 TYPE 2 DIABETES MELLITUS WITH COMPLICATION, WITHOUT LONG-TERM CURRENT USE OF INSULIN (HCC): Primary | ICD-10-CM

## 2018-01-29 DIAGNOSIS — N18.30 CKD (CHRONIC KIDNEY DISEASE), STAGE III (HCC): ICD-10-CM

## 2018-01-29 PROBLEM — R60.0 BILATERAL LOWER EXTREMITY EDEMA: Status: ACTIVE | Noted: 2017-10-04

## 2018-01-29 PROCEDURE — 99214 OFFICE O/P EST MOD 30 MIN: CPT | Performed by: PHYSICIAN ASSISTANT

## 2018-01-29 RX ORDER — DONEPEZIL HYDROCHLORIDE 10 MG/1
1 TABLET, FILM COATED ORAL DAILY
COMMUNITY
End: 2018-10-08 | Stop reason: SDUPTHER

## 2018-01-29 RX ORDER — POTASSIUM CHLORIDE 1500 MG/1
2 TABLET, FILM COATED, EXTENDED RELEASE ORAL 2 TIMES DAILY
COMMUNITY
Start: 2014-08-27 | End: 2018-04-17 | Stop reason: SDUPTHER

## 2018-01-29 RX ORDER — METOLAZONE 5 MG/1
1 TABLET ORAL DAILY
COMMUNITY
Start: 2013-12-03 | End: 2018-05-14 | Stop reason: SDUPTHER

## 2018-01-29 RX ORDER — GLIPIZIDE 5 MG/1
1 TABLET, FILM COATED, EXTENDED RELEASE ORAL 2 TIMES DAILY
COMMUNITY
Start: 2015-12-04 | End: 2018-03-12 | Stop reason: SDUPTHER

## 2018-01-29 RX ORDER — FUROSEMIDE 40 MG/1
1 TABLET ORAL DAILY
COMMUNITY
Start: 2013-12-11 | End: 2018-06-06 | Stop reason: SDUPTHER

## 2018-01-29 RX ORDER — SIMVASTATIN 40 MG
1 TABLET ORAL DAILY
COMMUNITY
End: 2018-07-09 | Stop reason: SDUPTHER

## 2018-01-29 RX ORDER — LOSARTAN POTASSIUM 25 MG/1
1 TABLET ORAL DAILY
COMMUNITY
Start: 2017-02-20 | End: 2018-05-14 | Stop reason: SDUPTHER

## 2018-01-29 NOTE — ASSESSMENT & PLAN NOTE
BS noted  A1C increased from last visit  Taking [place] and [place] along with glipizide  Her glipizide was increased in last 6 months  Encouraged DM diet  Discussed metformin which family is not interested in at this time due to risks of side effects  Discussed risks of poorly controleld BS with patient and daughter  Hold on metformin, due to GFR  Will continue to follow  If a1c not at goal at follow up may need to consider oral vs injectable agent  Injectable would be difficult with underlying dementia

## 2018-01-29 NOTE — PROGRESS NOTES
Assessment/Plan:    Leukocytosis  Will follow CBC  Hyponatremia  Advised her to go for repeat BMP in 1 month time  Advised she avoid sugary drinks, juices and teas which are affecting BS  Type 2 diabetes mellitus (HCC)  BS noted  A1C increased from last visit  Taking [place] and [place] along with glipizide  Her glipizide was increased in last 6 months  Encouraged DM diet  Discussed metformin which family is not interested in at this time due to risks of side effects  Discussed risks of poorly controleld BS with patient and daughter  Will continue to follow  If a1c not at goal at follow up may need to consider oral vs injectable agent  Injectable would be difficult with underlying dementia  Hypokalemia  K noted  Likely secondary to diuretics  Increase K supplement to 40mEq (two tablets) by mouth three times daily  Will check BMP with mag  Moderate aortic stenosis  Will continue to follow  Discussed red flag sx which need immediate eval and treat  Discussed follow echo vs cardio  At this time family and patient not interested in any evaluation or intervention  Monitor fluid status  Benign essential hypertension  BP stable at this time  Will follow  Uncomplicated senile dementia  Stable on Aricept, family is supportive  In supportive living situation  Patient is active  Diagnoses and all orders for this visit:    Type 2 diabetes mellitus with complication, without long-term current use of insulin (HCC)    Benign essential hypertension    Moderate aortic stenosis    Hypokalemia    Hyponatremia    Uncomplicated senile dementia    CKD (chronic kidney disease), stage III    Leukocytosis, unspecified type    Other orders  -     furosemide (LASIX) 40 mg tablet; Take 1 tablet by mouth daily  -     glipiZIDE (GLUCOTROL XL) 5 mg 24 hr tablet; Take 1 tablet by mouth 2 (two) times a day  -     donepezil (ARICEPT) 10 mg tablet;  Take 1 tablet by mouth daily  -     sitaGLIPtin (JANUVIA) 50 mg tablet; Take 1 tablet by mouth daily  -     losartan (COZAAR) 25 mg tablet; Take 1 tablet by mouth daily  -     metolazone (ZAROXOLYN) 5 mg tablet; Take 1 tablet by mouth daily  -     Potassium Chloride ER 20 MEQ TBCR; Take 2 tablets by mouth 2 (two) times a day  -     simvastatin (ZOCOR) 40 mg tablet; Take 1 tablet by mouth daily          Subjective:      Patient ID: Ronnell Jerry is a 80 y o  female  79 yo female for follow-up  She gets out of her apartment 2-3 times per week  Very active at her complex  Notes she goes out to eat  Notes memory is her only difficulty  Patient has been acting at her baseline  Does still struggle with memory difficulties  Taking K supplement  On metalazone with lasix  Voids often, leg sweling has been good  BS control is variable  Patient is not always compliant with meds or diet  Active at home  No compalints or concerns per patient  Daughter and family are supportive and check in on patient  Hypertension   This is a chronic problem  The current episode started more than 1 year ago  The problem is unchanged  Pertinent negatives include no anxiety, blurred vision, chest pain, headaches, palpitations or shortness of breath  There are no associated agents to hypertension  Risk factors for coronary artery disease include diabetes mellitus and dyslipidemia  Past treatments include angiotensin blockers  The current treatment provides mild improvement  Compliance problems: memory difficultites  There is no history of angina, kidney disease, CAD/MI or CVA  Diabetes   She presents for her follow-up (has someone who stops by home several times throughout the day ) diabetic visit  She has type 2 diabetes mellitus  Pertinent negatives for hypoglycemia include no confusion, dizziness or headaches  Associated symptoms include fatigue  Pertinent negatives for diabetes include no blurred vision and no chest pain   Pertinent negatives for hypoglycemia complications include no blackouts and no hospitalization  Symptoms are stable  Pertinent negatives for diabetic complications include no CVA  Risk factors for coronary artery disease include diabetes mellitus, dyslipidemia and hypertension  Current diabetic treatment includes diet and oral agent (monotherapy)  Her weight is stable  Diabetic current diet: Does not follow DM diet  She has not had a previous visit with a dietitian  She rarely participates in exercise  There is no change in her home blood glucose trend  An ACE inhibitor/angiotensin II receptor blocker is being taken  She does not see a podiatrist       The following portions of the patient's history were reviewed and updated as appropriate: allergies, current medications, past family history, past medical history, past social history, past surgical history and problem list     Review of Systems   Constitutional: Positive for fatigue  Eyes: Negative for blurred vision  Respiratory: Negative for chest tightness and shortness of breath  Unchanged chronic exertional SOB  Cardiovascular: Negative for chest pain and palpitations  Gastrointestinal: Negative for abdominal pain, diarrhea, nausea and vomiting  Genitourinary: Negative for dysuria  Skin:        No breaks in skin on patient's feet   Neurological: Negative for dizziness, syncope and headaches  Psychiatric/Behavioral: Negative for confusion  Objective:  /64 (BP Location: Left arm, Patient Position: Sitting, Cuff Size: Large)   Pulse 86   Temp 97 6 °F (36 4 °C) (Oral)   Ht 5' 0 5" (1 537 m)   Wt 88 7 kg (195 lb 9 6 oz)   SpO2 96%   BMI 37 57 kg/m²      Physical Exam   Constitutional: She appears well-developed and well-nourished  No distress  HENT:   Head: Normocephalic and atraumatic  Mouth/Throat: No oropharyngeal exudate  Eyes: Pupils are equal, round, and reactive to light  No scleral icterus  Cardiovascular: Normal rate and regular rhythm      Murmur (systolic ejection murmur 4/6 best over RUSB) heard  Pulmonary/Chest: Effort normal and breath sounds normal  No respiratory distress  Abdominal: Soft  Bowel sounds are normal  There is no tenderness  There is no guarding  Musculoskeletal: She exhibits edema (chronic 2+ symmetric LE edema  No calf TTP )  Lymphadenopathy:     She has no cervical adenopathy  Neurological: She is alert  Normal response to simple questions  Poor short term memory  Skin: Skin is warm  No rash (No rash on feet  No ulcers  Monifilament negative   +1 symmetric palpable DP pulses ) noted  She is not diaphoretic  Psychiatric: She has a normal mood and affect  Her behavior is normal  Thought content normal    Nursing note and vitals reviewed

## 2018-01-29 NOTE — ASSESSMENT & PLAN NOTE
Advised her to go for repeat BMP in 1 month time  Advised she avoid sugary drinks, juices and teas which are affecting BS

## 2018-01-29 NOTE — ASSESSMENT & PLAN NOTE
Will continue to follow  Discussed red flag sx which need immediate eval and treat  Discussed follow echo vs cardio  At this time family and patient not interested in any evaluation or intervention  Monitor fluid status

## 2018-01-29 NOTE — ASSESSMENT & PLAN NOTE
BS noted  A1C increased from last visit  Taking [place] along with glipizide  Her glipizide was increased in last 6 months  Encouraged DM diet  Discussed metformin which family is not interested in at this time due to risks of side effects  Discussed risks of poorly controleld BS with patient and daughter  Will continue to follow  If a1c not at goal at follow up may need to consider oral vs injectable agent  Injectable would be difficult with underlying dementia

## 2018-01-29 NOTE — ASSESSMENT & PLAN NOTE
K noted  Likely secondary to diuretics  Increase K supplement to 40mEq (two tablets) by mouth three times daily  Will check BMP with mag

## 2018-01-29 NOTE — PATIENT INSTRUCTIONS
Leukocytosis  Will follow CBC  Hyponatremia  Advised her to go for repeat BMP in 1 month time  Advised she avoid sugary drinks, juices and teas which are affecting BS  Type 2 diabetes mellitus (HCC)  BS noted  A1C increased from last visit  Taking [place] and [place] along with glipizide  Her glipizide was increased in last 6 months  Encouraged DM diet  Discussed metformin which family is not interested in at this time due to risks of side effects  Discussed risks of poorly controleld BS with patient and daughter  Will continue to follow  If a1c not at goal at follow up may need to consider oral vs injectable agent  Injectable would be difficult with underlying dementia  Hypokalemia  K noted  Likely secondary to diuretics  Increase K supplement to 40mEq (two tablets) by mouth three times daily  Will check BMP with mag  Moderate aortic stenosis  Will continue to follow  Discussed red flag sx which need immediate eval and treat  Discussed follow echo vs cardio  At this time family and patient not interested in any evaluation or intervention  Monitor fluid status  Benign essential hypertension  BP stable at this time  Will follow  Uncomplicated senile dementia  Stable on Aricept, family is supportive  In supportive living situation  Patient is active

## 2018-03-12 DIAGNOSIS — E11.8 TYPE 2 DIABETES MELLITUS WITH COMPLICATION, WITHOUT LONG-TERM CURRENT USE OF INSULIN (HCC): Primary | ICD-10-CM

## 2018-03-12 RX ORDER — GLIPIZIDE 10 MG/1
10 TABLET, FILM COATED, EXTENDED RELEASE ORAL DAILY
Qty: 90 TABLET | Refills: 1 | Status: SHIPPED | OUTPATIENT
Start: 2018-03-12 | End: 2018-09-13 | Stop reason: SDUPTHER

## 2018-04-16 ENCOUNTER — LAB (OUTPATIENT)
Dept: LAB | Age: 83
End: 2018-04-16
Payer: MEDICARE

## 2018-04-16 ENCOUNTER — TRANSCRIBE ORDERS (OUTPATIENT)
Dept: LAB | Age: 83
End: 2018-04-16

## 2018-04-16 DIAGNOSIS — E11.8 TYPE 2 DIABETES MELLITUS WITH COMPLICATION, WITHOUT LONG-TERM CURRENT USE OF INSULIN (HCC): ICD-10-CM

## 2018-04-16 DIAGNOSIS — I10 BENIGN ESSENTIAL HYPERTENSION: ICD-10-CM

## 2018-04-16 LAB
ANION GAP SERPL CALCULATED.3IONS-SCNC: 9 MMOL/L (ref 4–13)
BUN SERPL-MCNC: 15 MG/DL (ref 5–25)
CALCIUM SERPL-MCNC: 9.2 MG/DL
CHLORIDE SERPL-SCNC: 95 MMOL/L (ref 100–108)
CO2 SERPL-SCNC: 27 MMOL/L (ref 21–32)
CREAT SERPL-MCNC: 1.08 MG/DL (ref 0.6–1.3)
EST. AVERAGE GLUCOSE BLD GHB EST-MCNC: 249 MG/DL
GFR SERPL CREATININE-BSD FRML MDRD: 44 ML/MIN/1.73SQ M
GLUCOSE P FAST SERPL-MCNC: 244 MG/DL (ref 65–99)
HBA1C MFR BLD: 10.3 % (ref 4.2–6.3)
MAGNESIUM SERPL-MCNC: 1.7 MG/DL (ref 1.6–2.6)
POTASSIUM SERPL-SCNC: 2.8 MMOL/L (ref 3.5–5.3)
SODIUM SERPL-SCNC: 131 MMOL/L (ref 136–145)

## 2018-04-16 PROCEDURE — 80048 BASIC METABOLIC PNL TOTAL CA: CPT

## 2018-04-16 PROCEDURE — 83036 HEMOGLOBIN GLYCOSYLATED A1C: CPT

## 2018-04-16 PROCEDURE — 36415 COLL VENOUS BLD VENIPUNCTURE: CPT

## 2018-04-16 PROCEDURE — 83735 ASSAY OF MAGNESIUM: CPT

## 2018-04-17 ENCOUNTER — TELEPHONE (OUTPATIENT)
Dept: INTERNAL MEDICINE CLINIC | Facility: CLINIC | Age: 83
End: 2018-04-17

## 2018-04-17 DIAGNOSIS — E87.6 HYPOKALEMIA: Primary | ICD-10-CM

## 2018-04-17 DIAGNOSIS — N18.30 CKD (CHRONIC KIDNEY DISEASE), STAGE III (HCC): ICD-10-CM

## 2018-04-17 RX ORDER — POTASSIUM CHLORIDE 1500 MG/1
3 TABLET, FILM COATED, EXTENDED RELEASE ORAL 3 TIMES DAILY
Qty: 90 TABLET | Refills: 0 | Status: SHIPPED | OUTPATIENT
Start: 2018-04-17 | End: 2018-04-23 | Stop reason: SDUPTHER

## 2018-04-17 NOTE — PROGRESS NOTES
D/w Dr Mehul Winters, patient is taking KCl 40 mEq TID, not on current mag supplement  Daughter notes patient is trying to take Kcl as directed but does miss doses (hx of dementia)  Discussed importance of compliance with this medication and risks of low K with patient  Will increase KCl to 60 mEq TID and start mag oxide 400 mg by mouth once daily  Repeat BMP on  4/20/18 to monitor K  Discussed red flag sx and ER precautions which need immediate eval and treat  Discussed changing Kcl to powder or liquid which daughter notes patient will not be able to take  Will s/o on this lab and follow repeat lab as ordered  Will discuss at follow up possible addition of prandin  To meds at [facility] follow up

## 2018-04-23 ENCOUNTER — LAB (OUTPATIENT)
Dept: LAB | Age: 83
End: 2018-04-23
Payer: MEDICARE

## 2018-04-23 ENCOUNTER — OFFICE VISIT (OUTPATIENT)
Dept: INTERNAL MEDICINE CLINIC | Age: 83
End: 2018-04-23

## 2018-04-23 ENCOUNTER — OFFICE VISIT (OUTPATIENT)
Dept: INTERNAL MEDICINE CLINIC | Age: 83
End: 2018-04-23
Payer: MEDICARE

## 2018-04-23 VITALS
DIASTOLIC BLOOD PRESSURE: 72 MMHG | WEIGHT: 190.8 LBS | TEMPERATURE: 98.1 F | HEART RATE: 81 BPM | SYSTOLIC BLOOD PRESSURE: 116 MMHG | HEIGHT: 60 IN | BODY MASS INDEX: 37.46 KG/M2 | OXYGEN SATURATION: 98 %

## 2018-04-23 VITALS
SYSTOLIC BLOOD PRESSURE: 116 MMHG | DIASTOLIC BLOOD PRESSURE: 72 MMHG | HEART RATE: 81 BPM | TEMPERATURE: 98.1 F | OXYGEN SATURATION: 98 %

## 2018-04-23 DIAGNOSIS — Z13.89 SCREENING FOR GENITOURINARY CONDITION: ICD-10-CM

## 2018-04-23 DIAGNOSIS — N18.30 CKD (CHRONIC KIDNEY DISEASE), STAGE III (HCC): ICD-10-CM

## 2018-04-23 DIAGNOSIS — E87.6 HYPOKALEMIA: ICD-10-CM

## 2018-04-23 DIAGNOSIS — Z00.00 MEDICARE ANNUAL WELLNESS VISIT, SUBSEQUENT: Primary | ICD-10-CM

## 2018-04-23 DIAGNOSIS — F03.90 UNCOMPLICATED SENILE DEMENTIA (HCC): ICD-10-CM

## 2018-04-23 DIAGNOSIS — E11.8 TYPE 2 DIABETES MELLITUS WITH COMPLICATION, WITHOUT LONG-TERM CURRENT USE OF INSULIN (HCC): Primary | ICD-10-CM

## 2018-04-23 DIAGNOSIS — Z13.31 SCREENING FOR DEPRESSION: ICD-10-CM

## 2018-04-23 DIAGNOSIS — I10 BENIGN ESSENTIAL HYPERTENSION: ICD-10-CM

## 2018-04-23 DIAGNOSIS — I35.0 MODERATE AORTIC STENOSIS: ICD-10-CM

## 2018-04-23 DIAGNOSIS — Z13.89 SCREENING FOR NEUROLOGICAL CONDITION: ICD-10-CM

## 2018-04-23 LAB
ANION GAP SERPL CALCULATED.3IONS-SCNC: 11 MMOL/L (ref 4–13)
BUN SERPL-MCNC: 15 MG/DL (ref 5–25)
CALCIUM SERPL-MCNC: 9.8 MG/DL (ref 8.3–10.1)
CHLORIDE SERPL-SCNC: 97 MMOL/L (ref 100–108)
CO2 SERPL-SCNC: 28 MMOL/L (ref 21–32)
CREAT SERPL-MCNC: 1.29 MG/DL (ref 0.6–1.3)
GFR SERPL CREATININE-BSD FRML MDRD: 36 ML/MIN/1.73SQ M
GLUCOSE SERPL-MCNC: 267 MG/DL (ref 65–140)
POTASSIUM SERPL-SCNC: 4 MMOL/L (ref 3.5–5.3)
SODIUM SERPL-SCNC: 136 MMOL/L (ref 136–145)

## 2018-04-23 PROCEDURE — 99214 OFFICE O/P EST MOD 30 MIN: CPT | Performed by: PHYSICIAN ASSISTANT

## 2018-04-23 PROCEDURE — 80048 BASIC METABOLIC PNL TOTAL CA: CPT

## 2018-04-23 PROCEDURE — 93000 ELECTROCARDIOGRAM COMPLETE: CPT | Performed by: PHYSICIAN ASSISTANT

## 2018-04-23 PROCEDURE — G0439 PPPS, SUBSEQ VISIT: HCPCS | Performed by: PHYSICIAN ASSISTANT

## 2018-04-23 PROCEDURE — 36415 COLL VENOUS BLD VENIPUNCTURE: CPT

## 2018-04-23 RX ORDER — POTASSIUM CHLORIDE 1500 MG/1
3 TABLET, FILM COATED, EXTENDED RELEASE ORAL 3 TIMES DAILY
Qty: 270 TABLET | Refills: 1 | Status: SHIPPED | OUTPATIENT
Start: 2018-04-23 | End: 2018-04-26 | Stop reason: SDUPTHER

## 2018-04-23 NOTE — ASSESSMENT & PLAN NOTE
Patient here for subsequent AWV  Family present and supportive  Additional same-day visit done today for labs review and EKG  Patient has not had recent falls  Patient does suffer from dementia and family is supportive as well as home nursing who comes to the home  She currently is living in an independent living situation but has caregivers who check in on her regularly and prefer her home foods  She does have a power of  and a living will  Discussed vaccinations which patient refuses  Discussed further evaluation for patient's known aortic stenosis an elevated white count which caregiver and patient are not interested in at this time

## 2018-04-23 NOTE — ASSESSMENT & PLAN NOTE
Patient has somewhat recent echocardiogram   At this time without symptoms  Discussed cardiac evaluation which patient and daughter are not interested in at this time

## 2018-04-23 NOTE — PROGRESS NOTES
[address]   Medicare Annual Wellness Visit  Patient ID: Jose Miguel Balderas    : 1923  Age/Gender: 80 y o  female     DATE: 2018      Assessment/Plan:    Medicare annual wellness visit, subsequent   Patient here for subsequent AWV  Family present and supportive  Additional same-day visit done today for labs review and EKG  Patient has not had recent falls  Patient does suffer from dementia and family is supportive as well as home nursing who comes to the home  She currently is living in an independent living situation but has caregivers who check in on her regularly and prefer her home foods  She does have a power of  and a living will  Discussed vaccinations which patient refuses  Discussed further evaluation for patient's known aortic stenosis an elevated white count which caregiver and patient are not interested in at this time  Diagnoses and all orders for this visit:    Medicare annual wellness visit, subsequent    Screening for depression    Screening for genitourinary condition    Screening for neurological condition    Hypokalemia  -     magnesium oxide (MAG-OX) 400 mg; Take 1 tablet (400 mg total) by mouth daily    CKD (chronic kidney disease), stage III  -     magnesium oxide (MAG-OX) 400 mg; Take 1 tablet (400 mg total) by mouth daily          Subjective:     Jose Miguel Balderas is a 80 y o  female who presents to the office on 2018 for Subsequent Wellness Visit      Last Medicare wellness visit information was reviewed, patient was interviewed , no changes since last AWV: no    Last Medicare wellness visit information was reviewed, patient interviewed and updates made to the record today: yes    AWV Clinical     ISAR:   Previous hospitalizations?:  No       Once in a Lifetime Medicare Screening:   EKG performed?:  No        Medicare Screening Tests and Risk Assessment:   AAA Risk Assessment    Osteoporosis Risk Assessment     Female:   Yes   Age over 48:  Yes    HIV Risk Assessment        Drug and Alcohol Use:   Tobacco use    Cigarettes:  never smoker    Tobacco use duration    Tobacco Cessation Readiness    Alcohol use    Alcohol use:  never    Alcohol Treatment Readiness   Illicit Drug Use    Drug use:  never        Diet & Exercise:   Diet   What is your diet?:  No Added Salt, Limited junk food   How many servings a day of the following:   Fruits and Vegetables:  5 or more Meat:  1-2   Whole Grains:  2 Simple Carbs:  0   Dairy:  1 Soda:  0   Coffee:  1 Tea:  0   Exercise    Do you currently exercise?:  currently not exercising       Cognitive Impairment Screening:   Depression screening preformed:  Yes     PHQ-9 Depression scale score:  5   Depression screening results:  mild to moderate symptoms   Cognitive Impairment Screening    Do you have difficulty learning or retaining new information?:  Yes Do you have difficulty handling new tasks?:  Yes   Do you have difficulty with reasoning?:  No Do you have difficulty with spatial ability and orientation?:  Yes   Do you have difficulty with language?:  No Do you have difficulty with behavior?:  No       Functional Ability/Level of Safety:   Hearing    Hearing difficulties:  No Bilateral:  normal   Hearing aid:  No    Hearing Impairment Assessment    Current Activities    Status:  no driving, unlimited social activities   Help needed with the folllowing:     Transportation:  Yes   Shopping:  Yes Preparing Meals:  Yes   Doing Housework:  Yes Doing Laundry:  Yes   Managing Medications:  Yes Managing Money:  Yes   ADL    Fall Risk   Have you fallen in the last 12 months?:  No    Do you have any chronic conditions that may contribute to a fall?:  Diabetes    Injury History   Polypharmacy:  Yes     Antihypertensive Use:  Yes   Cogitive Impairment:  Yes     Urinary Incontinence:  Yes       Home Safety:   Are there hazards in your environment?:  No   Home Safety Risk Factors Unfamilar with surroundings:  No Uneven floors:  No   Stairs or handrail saftey risk:  No Loose rugs:  No   Household clutter:  No Poor household lighting:  No   No grab bars in bathroom:  No Further evaluation needed:  No       Advanced Directives:   Advanced Directives     Durable POA for healthcare:  Yes   Patient's End of Life Decisions        Urinary Incontinence:   Do you have urinary incontinence?:  Yes Do you have incomplete emptying?:  No   Do you urinate frequently?:  No Do you have urinary urgency?:  No   Do you have urinary hesitancy?:  No Do you have dysuria (painful and/or difficult urination)?:  No   Do you have nocturia (waking up to urinate)?:  Yes Do you strain when urinating (have to push to urinate)?:  No   Do you have a weak stream when urinating?:  No Do you have intermittent streaming when urinating?:  No   Do you dribble urine after finishing?:  No    Do you have vaginal pressure?:  No Do you have vaginal dryness?:  No       Glaucoma:              Patient here for annual Wellness visit  Here with her daughter  No complaints or concerns at this time  Patient has been taking potassium as directed  The following portions of the patient's history were reviewed and updated as appropriate: allergies, current medications, past family history, past medical history, past social history, past surgical history and problem list     Pt reports overall health:  Feels "good"  Last Physical: about 1 year ago    Healthy Diet:  Patient does ear regularly, does not snack often  Routine Exercise:  No formal exercise program, does walk and is active    Goes to family throughout the week  Weight Concerns:  Denies, follows with swelling    Problems with vision:  Hx of cataract removal  Last Eye Exam:  2016    Problems with Hearing:  Denies    Routine Dental Exams:  Has dentures    Smoking History:  Denies  ETOH Use:  Social, some time ago, nothing recent  Illegal Drug Use:  Denies  Caffeine Use:  Coffee 1x day    Reproductive Health:    Vaginal Bleeding/Discharge:  Denies    Last Mammo: Refuses  History of abnormal Mammo: Denies  Self Breast Exams:  No    Last Colonoscopy:  Unknown per daughter  History of abnormal Colonoscopy:  Unknown  Family History of Colon CA:  Denies    Last Dexa:  Refuses  Last Labs:  Last week  EKG: Unsure  AAA Screen:  Unsure    Falls in last year:  No falls  Does not use walker or cane at home  Uses it very seldom  Pt feels she does need it  Hospitalization in last year:  No prior  Living Will:  Has on file per daughter  [place] is POA per Namrata Domingo  All children help with patient and her follow up visits  Depression Screening:  PHQ-9 Depression Screening    PHQ-9:    Frequency of the following problems over the past two weeks:              Review of Systems   Constitutional: Negative for fever  Respiratory: Positive for cough  Negative for shortness of breath and wheezing  Cardiovascular: Negative for chest pain and palpitations  Gastrointestinal: Negative for abdominal pain, diarrhea, nausea and vomiting  Genitourinary: Negative for dysuria  Neurological: Negative for dizziness, syncope, weakness and light-headedness           Patient Active Problem List   Diagnosis    Anemia    Benign essential hypertension    CKD (chronic kidney disease), stage III    Bilateral lower extremity edema    Gallstone    Hypercholesterolemia    Hypokalemia    Hyponatremia    Leukocytosis    Moderate aortic stenosis    Osteopenia    Pancreatic cyst    Uncomplicated senile dementia    Type 2 diabetes mellitus ([facility] [address] )    Medicare annual wellness visit, subsequent    Screening for depression    Screening for genitourinary condition    Screening for neurological condition       Past Medical History:   Diagnosis Date    Bilateral edema of lower extremity     Fall     Gait abnormality     Hypokalemia     Other ascites     Varicose veins of leg with edema        Past Surgical History:   Procedure Laterality Date    BREAST SURGERY      ELBOW SURGERY           Current Outpatient Prescriptions:     donepezil (ARICEPT) 10 mg tablet, Take 1 tablet by mouth daily, Disp: , Rfl:     furosemide (LASIX) 40 mg tablet, Take 1 tablet by mouth daily, Disp: , Rfl:     glipiZIDE (GLUCOTROL XL) 10 mg 24 hr tablet, Take 1 tablet (10 mg total) by mouth daily, Disp: 90 tablet, Rfl: 1    losartan (COZAAR) 25 mg tablet, Take 1 tablet by mouth daily, Disp: , Rfl:     metolazone (ZAROXOLYN) 5 mg tablet, Take 1 tablet by mouth daily, Disp: , Rfl:     simvastatin (ZOCOR) 40 mg tablet, Take 1 tablet by mouth daily, Disp: , Rfl:     sitaGLIPtin (JANUVIA) 50 mg tablet, Take 1 tablet by mouth daily, Disp: , Rfl:     magnesium oxide (MAG-OX) 400 mg, Take 1 tablet (400 mg total) by mouth daily, Disp: 30 each, Rfl: 0    Potassium Chloride ER 20 MEQ TBCR, Take 3 tablets (60 mEq total) by mouth 3 (three) times a day, Disp: 270 tablet, Rfl: 1    No Known Allergies    Social History     Social History    Marital status: /Civil Union     Spouse name: N/A    Number of children: N/A    Years of education: N/A     Occupational History    retired      Social History Main Topics    Smoking status: Never Smoker    Smokeless tobacco: Never Used    Alcohol use No    Drug use: No    Sexual activity: Not Asked     Other Topics Concern    None     Social History Narrative    Advance directive in chart       Family History   Problem Relation Age of Onset    Dementia Sister        Health Maintenance   Topic Date Due    GLAUCOMA SCREENING 67+ YR  11/22/1990    OPHTHALMOLOGY EXAM  05/11/2017    URINE MICROALBUMIN  08/29/2017    INFLUENZA VACCINE  04/23/2019 (Originally 9/1/2017)    PNEUMOCOCCAL POLYSACCHARIDE VACCINE AGE 65 AND OVER  04/23/2019 (Originally 11/22/1988)    Diabetic Foot Exam  06/12/2018    Fall Risk  04/23/2019    Depression Screening PHQ-9  04/23/2019    Urinary Incontinence Screening 04/23/2019    DTaP,Tdap,and Td Vaccines (2 - Td) 11/27/2025     Immunization History   Administered Date(s) Administered    Tdap 11/27/2015        Objective:  Vitals:    04/23/18 1130   BP: 116/72   BP Location: Left arm   Patient Position: Sitting   Cuff Size: Large   Pulse: 81   Temp: 98 1 °F (36 7 °C)   TempSrc: Tympanic   SpO2: 98%   Weight: 86 5 kg (190 lb 12 8 oz)   Height: 4' 11 69" (1 516 m)     Wt Readings from Last 3 Encounters:   04/23/18 86 5 kg (190 lb 12 8 oz)   01/29/18 88 7 kg (195 lb 9 6 oz)   10/30/17 90 5 kg (199 lb 9 6 oz)     Body mass index is 37 66 kg/m²  No exam data present       Physical Exam   Constitutional: She appears well-developed and well-nourished  No distress  Alert pleasant cooperative seated in room in no acute distress  Daughter present  HENT:   Head: Normocephalic and atraumatic  Mouth/Throat: Oropharynx is clear and moist  No oropharyngeal exudate  Eyes: Conjunctivae are normal  Right eye exhibits no discharge  Left eye exhibits no discharge  No scleral icterus  Neck: Neck supple  Cardiovascular: Normal rate and regular rhythm  Murmur (  3/6 right upper sternal border systolic murmur) heard  Pulmonary/Chest: Effort normal and breath sounds normal  No respiratory distress  She has no wheezes  Clear to auscultation throughout  No crackles no rhonchi no wheeze  Bilateral bases clear  Abdominal: Soft  Bowel sounds are normal  There is no tenderness  There is no guarding  Positive bowel sounds  Soft  Nontender   Musculoskeletal: She exhibits edema ( trace lower extremity edema  No calf tenderness to palpation  Symmetric)  Lymphadenopathy:     She has no cervical adenopathy  Neurological: She is alert  Alert to person place  No gross focal neurologic deficits on exam   Impaired memory  Skin: Skin is warm and dry  She is not diaphoretic  Psychiatric: She has a normal mood and affect   Her behavior is normal  Judgment and thought content normal    Nursing note and vitals reviewed            Future Appointments  Date Time Provider Jen Foyi   7/23/2018 10:30 AM Nigel Vincent PA-C [facility] [address] [facility]IMARY [facility]ARE [place]    Patient Care Team:  Baron Estrellita MD as PCP - MD Eh Spears MD Brendia Atta, PA-C

## 2018-04-23 NOTE — PROGRESS NOTES
Diabetic Foot Exam    Patient's shoes and socks removed  Right Foot/Ankle   Right Foot Inspection  Skin Exam: skin normal, skin intact and dry skin no warmth, no callus, no erythema, no maceration, no abnormal color, no pre-ulcer, no ulcer and no callus                            Sensory       Monofilament testing: intact  Vascular    The right DP pulse is 1+  Left Foot/Ankle  Left Foot Inspection  Skin Exam: skin normal, skin intact and dry skinno warmth, no erythema, no maceration, normal color, no pre-ulcer, no ulcer and no callus                                         Sensory       Monofilament: intact  Vascular    The left DP pulse is 1+  Assign Risk Category:  No deformity present; No loss of protective sensation;  No weak pulses       Risk: 0

## 2018-04-23 NOTE — PROGRESS NOTES
[facility]  Standard Office Visit  Patient ID: Barbra Cee    : 1923  Age/Gender: 80 y o  female     DATE: 2018      Assessment/Plan:    No problem-specific Assessment & Plan notes found for this encounter  Diagnoses and all orders for this visit:    Type 2 diabetes mellitus with complication, without long-term current use of insulin (HCC)  -     Comprehensive metabolic panel; Future  -     HEMOGLOBIN A1C W/ EAG ESTIMATION; Future  -     CBC and differential; Future    Hypokalemia  -     Potassium Chloride ER 20 MEQ TBCR; Take 3 tablets (60 mEq total) by mouth 3 (three) times a day  -     POCT ECG  -     Basic metabolic panel; Future    CKD (chronic kidney disease), stage III  -     Potassium Chloride ER 20 MEQ TBCR; Take 3 tablets (60 mEq total) by mouth 3 (three) times a day  -     Comprehensive metabolic panel; Future  -     HEMOGLOBIN A1C W/ EAG ESTIMATION; Future  -     CBC and differential; Future    Benign essential hypertension    Moderate aortic stenosis    Uncomplicated senile dementia          Subjective:   Chief Complaint   Patient presents with    Follow-up     pt  presents for follow up for DM II, HTN    Review Labs 18   pt  would like Med refill         Barbra Cee is a 80 y o  female who presents to the office on 2018 for     HPI    The following portions of the patient's history were reviewed and updated as appropriate: allergies, current medications, past family history, past medical history, past social history, past surgical history and problem list     Review of Systems      Patient Active Problem List   Diagnosis    Anemia    Benign essential hypertension    CKD (chronic kidney disease), stage III    Bilateral lower extremity edema    Gallstone    Hypercholesterolemia    Hypokalemia    Hyponatremia    Leukocytosis    Moderate aortic stenosis    Osteopenia    Pancreatic cyst    Uncomplicated senile dementia    Type 2 diabetes mellitus ([facility] [address] )    Medicare annual wellness visit, subsequent    Screening for depression    Screening for genitourinary condition    Screening for neurological condition       Past Medical History:   Diagnosis Date    Bilateral edema of lower extremity     Fall     Gait abnormality     Hypokalemia     Other ascites     Varicose veins of leg with edema        Past Surgical History:   Procedure Laterality Date    BREAST SURGERY      ELBOW SURGERY           Current Outpatient Prescriptions:     donepezil (ARICEPT) 10 mg tablet, Take 1 tablet by mouth daily, Disp: , Rfl:     furosemide (LASIX) 40 mg tablet, Take 1 tablet by mouth daily, Disp: , Rfl:     glipiZIDE (GLUCOTROL XL) 10 mg 24 hr tablet, Take 1 tablet (10 mg total) by mouth daily, Disp: 90 tablet, Rfl: 1    losartan (COZAAR) 25 mg tablet, Take 1 tablet by mouth daily, Disp: , Rfl:     magnesium oxide (MAG-OX) 400 mg, Take 1 tablet (400 mg total) by mouth daily, Disp: 30 each, Rfl: 0    metolazone (ZAROXOLYN) 5 mg tablet, Take 1 tablet by mouth daily, Disp: , Rfl:     Potassium Chloride ER 20 MEQ TBCR, Take 3 tablets (60 mEq total) by mouth 3 (three) times a day, Disp: 270 tablet, Rfl: 1    simvastatin (ZOCOR) 40 mg tablet, Take 1 tablet by mouth daily, Disp: , Rfl:     sitaGLIPtin (JANUVIA) 50 mg tablet, Take 1 tablet by mouth daily, Disp: , Rfl:     No Known Allergies    Social History     Social History    Marital status: /Civil Union     Spouse name: N/A    Number of children: N/A    Years of education: N/A     Occupational History    retired      Social History Main Topics    Smoking status: Never Smoker    Smokeless tobacco: Never Used    Alcohol use No    Drug use: No    Sexual activity: Not on file     Other Topics Concern    Not on file     Social History Narrative    Advance directive in chart       Family History   Problem Relation Age of Onset    Dementia Sister        PHQ-9 Depression Screening    PHQ-9:    Frequency of the following problems over the past two weeks:       Little interest or pleasure in doing things:  0 - not at all  Feeling down, depressed, or hopeless:  0 - not at all  PHQ-2 Score:  0         Health Maintenance   Topic Date Due    GLAUCOMA SCREENING 67+ YR  11/22/1990    OPHTHALMOLOGY EXAM  05/11/2017    URINE MICROALBUMIN  08/29/2017    INFLUENZA VACCINE  04/23/2019 (Originally 9/1/2017)    PNEUMOCOCCAL POLYSACCHARIDE VACCINE AGE 65 AND OVER  04/23/2019 (Originally 11/22/1988)    Diabetic Foot Exam  06/12/2018    Fall Risk  04/23/2019    Depression Screening PHQ-9  04/23/2019    Urinary Incontinence Screening  04/23/2019    DTaP,Tdap,and Td Vaccines (2 - Td) 11/27/2025       Immunization History   Administered Date(s) Administered    Tdap 11/27/2015        Objective:  Vitals:    04/23/18 1127   BP: 116/72   BP Location: Left arm   Patient Position: Sitting   Cuff Size: Standard   Pulse: 81   Temp: 98 1 °F (36 7 °C)   TempSrc: Tympanic   SpO2: 98%     Wt Readings from Last 3 Encounters:   04/23/18 86 5 kg (190 lb 12 8 oz)   01/29/18 88 7 kg (195 lb 9 6 oz)   10/30/17 90 5 kg (199 lb 9 6 oz)     There is no height or weight on file to calculate BMI  No exam data present       Physical Exam        No future appointments      [facility]    Patient Care Team:  Kurt Coles MD as PCP - MD Antonietta Watkins MD Joyce Righter, PA-C

## 2018-04-23 NOTE — ASSESSMENT & PLAN NOTE
Stable at this time on donepezil  Patient's family is very supportive,  She also has home nursing aides    Continue with close monitoring and report any change

## 2018-04-23 NOTE — ASSESSMENT & PLAN NOTE
Hypokalemia on last lab set  EKG done today in the office  Abnormal showing left bundle-branch block  Discussed with Dr Yary Dennis as he shows little change from November 2015  No further workup at this time  Go for labs today to follow up potassium level now that potassium repletion has been increased  Start magnesium supplement 1 tablet daily    Discussed red flag symptoms and ER precautions which need immediate evaluation and treatment

## 2018-04-23 NOTE — PROGRESS NOTES
[facility] [place]  Standard Office Visit  Patient ID: Lurdes Mike    : 1923  Age/Gender: 80 y o  female     DATE: 2018      Assessment/Plan:    Type 2 diabetes mellitus ([facility] [address] )   A1c not at goal   Patient taking glipizide 10 mg daily along with Januvia 50 mg daily  Each of these are renally dosed  Discussed adding on a 3rd agent with patient's daughter and patient  Both patient and daughter refused at this time  Discussed risk of poorly controlled blood sugar with patient and daughter  Will follow up closely with repeat lab studies  Benign essential hypertension    Blood pressure controlled at this time medications  No dose change  Moderate aortic stenosis    Patient has somewhat recent echocardiogram   At this time without symptoms  Discussed cardiac evaluation which patient and daughter are not interested in at this time  Uncomplicated senile dementia    Stable at this time on donepezil  Patient's family is very supportive,  She also has home nursing aides  Continue with close monitoring and report any change    CKD (chronic kidney disease), stage III   Stable on recent labs    Hypokalemia   Hypokalemia on last lab set  EKG done today in the office  Abnormal showing left bundle-branch block  Discussed with Dr Ludwig Prieto as he shows little change from 2015  No further workup at this time  Go for labs today to follow up potassium level now that potassium repletion has been increased  Start magnesium supplement 1 tablet daily  Discussed red flag symptoms and ER precautions which need immediate evaluation and treatment       Diagnoses and all orders for this visit:    Type 2 diabetes mellitus with complication, without long-term current use of insulin ([facility] [address] )  -     Comprehensive metabolic panel; Future  -     HEMOGLOBIN A1C W/ EAG ESTIMATION;  Future  -     CBC and differential; Future    Hypokalemia  -     Potassium Chloride ER 20 MEQ TBCR; Take 3 tablets (60 mEq total) by mouth 3 (three) times a day  -     POCT ECG  -     Basic metabolic panel; Future    CKD (chronic kidney disease), stage III  -     Potassium Chloride ER 20 MEQ TBCR; Take 3 tablets (60 mEq total) by mouth 3 (three) times a day  -     Comprehensive metabolic panel; Future  -     HEMOGLOBIN A1C W/ EAG ESTIMATION; Future  -     CBC and differential; Future    Benign essential hypertension    Moderate aortic stenosis    Uncomplicated senile dementia          Subjective:   Chief Complaint   Patient presents with    Follow-up     pt  presents for follow up for DM II, HTN  Review Labs 4/17/18   pt  would like Med refill         Amberly Manning is a 80 y o  female who presents to the office on 4/23/2018 for      80year-old male for wellness check  She is also here for follow-up and chronic condition management  She recently had left toes  Her daughter has made adjustments to her potassium schedule  She has not yet started magnesium supplementation  She is taking all other medications as directed  She is not interested in any other additional medications or adjustments to her  Diabetic medications at this time  last EKG was a few years ago  Patient without any chest pain or palpitations  She does have cough no shortness of breath  She had previous echocardiogram   Denies lightheadedness denies dizziness      Diabetes   She presents for her follow-up diabetic visit  She has type 2 diabetes mellitus  Her disease course has been stable  Pertinent negatives for hypoglycemia include no dizziness or headaches  Pertinent negatives for diabetes include no chest pain  Risk factors for coronary artery disease include diabetes mellitus  Current diabetic treatment includes oral agent (dual therapy)  Her weight is stable  Diabetic current diet: Does not follow standard diet  She never participates in exercise   Home blood sugar record trend: Does not monitor her blood sugar   Does drink juice and eat candy per daughter  Family is trying to be more launch full of her diet  An ACE inhibitor/angiotensin II receptor blocker is being taken  She sees a podiatrist   Hypertension   This is a chronic problem  The current episode started more than 1 year ago  The problem is controlled  Associated symptoms include peripheral edema ( unchanged)  Pertinent negatives include no chest pain, headaches or palpitations  The following portions of the patient's history were reviewed and updated as appropriate: allergies, current medications, past family history, past medical history, past social history, past surgical history and problem list     Review of Systems   Respiratory: Negative for cough and wheezing  Cardiovascular: Negative for chest pain and palpitations  Gastrointestinal: Negative for abdominal pain, diarrhea, nausea and vomiting  Genitourinary: Negative for dysuria  Neurological: Negative for dizziness, syncope and headaches           Patient Active Problem List   Diagnosis    Anemia    Benign essential hypertension    CKD (chronic kidney disease), stage III    Bilateral lower extremity edema    Gallstone    Hypercholesterolemia    Hypokalemia    Hyponatremia    Leukocytosis    Moderate aortic stenosis    Osteopenia    Pancreatic cyst    Uncomplicated senile dementia    [facility]    Medicare annual wellness visit, subsequent    Screening for depression    Screening for genitourinary condition    Screening for neurological condition       Past Medical History:   Diagnosis Date    Bilateral edema of lower extremity     Fall     Gait abnormality     Hypokalemia     Other ascites     Varicose veins of leg with edema        Past Surgical History:   Procedure Laterality Date    BREAST SURGERY      ELBOW SURGERY           Current Outpatient Prescriptions:     donepezil (ARICEPT) 10 mg tablet, Take 1 tablet by mouth daily, Disp: , Rfl:     furosemide (LASIX) 40 mg tablet, Take 1 tablet by mouth daily, Disp: , Rfl:     glipiZIDE (GLUCOTROL XL) 10 mg 24 hr tablet, Take 1 tablet (10 mg total) by mouth daily, Disp: 90 tablet, Rfl: 1    losartan (COZAAR) 25 mg tablet, Take 1 tablet by mouth daily, Disp: , Rfl:     magnesium oxide (MAG-OX) 400 mg, Take 1 tablet (400 mg total) by mouth daily, Disp: 30 each, Rfl: 0    metolazone (ZAROXOLYN) 5 mg tablet, Take 1 tablet by mouth daily, Disp: , Rfl:     Potassium Chloride ER 20 MEQ TBCR, Take 3 tablets (60 mEq total) by mouth 3 (three) times a day, Disp: 270 tablet, Rfl: 1    simvastatin (ZOCOR) 40 mg tablet, Take 1 tablet by mouth daily, Disp: , Rfl:     sitaGLIPtin (JANUVIA) 50 mg tablet, Take 1 tablet by mouth daily, Disp: , Rfl:     No Known Allergies    Social History     Social History    Marital status: /Civil Union     Spouse name: N/A    Number of children: N/A    Years of education: N/A     Occupational History    retired      Social History Main Topics    Smoking status: Never Smoker    Smokeless tobacco: Never Used    Alcohol use No    Drug use: No    Sexual activity: Not on file     Other Topics Concern    Not on file     Social History Narrative    Advance directive in chart       Family History   Problem Relation Age of Onset    Dementia Sister        PHQ-9 Depression Screening    PHQ-9:    Frequency of the following problems over the past two weeks:       Little interest or pleasure in doing things:  0 - not at all  Feeling down, depressed, or hopeless:  0 - not at all  PHQ-2 Score:  0         Health Maintenance   Topic Date Due    GLAUCOMA SCREENING 67+ YR  11/22/1990    OPHTHALMOLOGY EXAM  05/11/2017    URINE MICROALBUMIN  08/29/2017    INFLUENZA VACCINE  04/23/2019 (Originally 9/1/2017)    PNEUMOCOCCAL POLYSACCHARIDE VACCINE AGE 65 AND OVER  04/23/2019 (Originally 11/22/1988)    Diabetic Foot Exam  06/12/2018    Fall Risk  04/23/2019    Depression Screening PHQ-9  04/23/2019    Urinary Incontinence Screening  04/23/2019    DTaP,Tdap,and Td Vaccines (2 - Td) 11/27/2025       Immunization History   Administered Date(s) Administered    Tdap 11/27/2015        Objective:  Vitals:    04/23/18 1127   BP: 116/72   BP Location: Left arm   Patient Position: Sitting   Cuff Size: Standard   Pulse: 81   Temp: 98 1 °F (36 7 °C)   TempSrc: Tympanic   SpO2: 98%     Wt Readings from Last 3 Encounters:   04/23/18 86 5 kg (190 lb 12 8 oz)   01/29/18 88 7 kg (195 lb 9 6 oz)   10/30/17 90 5 kg (199 lb 9 6 oz)     There is no height or weight on file to calculate BMI  No exam data present       Physical Exam   Constitutional: She is oriented to person, place, and time  She appears well-developed and well-nourished  No distress  Alert 80year-old female seated in room in no acute distress   HENT:   Head: Normocephalic and atraumatic  Right Ear: External ear normal    Left Ear: External ear normal    Mouth/Throat: Oropharynx is clear and moist  No oropharyngeal exudate  Eyes: Conjunctivae are normal  Right eye exhibits no discharge  Left eye exhibits no discharge  No scleral icterus  Neck: Neck supple  Cardiovascular: Normal rate and regular rhythm  Murmur ( 3/6 Systolic murmur right upper sternal border) heard  Pulmonary/Chest: Effort normal and breath sounds normal  No respiratory distress  She has no wheezes  Clear to auscultation throughout  No crackles no rhonchi no wheeze   Abdominal: Soft  Bowel sounds are normal  There is no tenderness  There is no guarding  Musculoskeletal: She exhibits edema ( trace symmetric lower extremity edema  No calf erythema  No calf tenderness to palpation)  Neurological: She is alert and oriented to person, place, and time  No gross focal neurologic deficits  Impaired memory  Daughter helps with history   Skin: Skin is warm and dry  No rash noted  She is not diaphoretic     Psychiatric: She has a normal mood and affect  Her behavior is normal  Judgment and thought content normal    Nursing note and vitals reviewed            Future Appointments  Date Time Provider Jen Decker   7/23/2018 10:30 AM Casey price PA-C [address] [place] [address] [facility] [place]    Patient Care Team:  Sherlyn Diana MD as PCP - MD Maurice Jarquin MD [place], PA-C

## 2018-04-23 NOTE — ASSESSMENT & PLAN NOTE
A1c not at goal   Patient taking glipizide 10 mg daily along with Januvia 50 mg daily  Each of these are renally dosed  Discussed adding on a 3rd agent with patient's daughter and patient  Both patient and daughter refused at this time  Discussed risk of poorly controlled blood sugar with patient and daughter  Will follow up closely with repeat lab studies

## 2018-04-25 ENCOUNTER — TELEPHONE (OUTPATIENT)
Dept: INTERNAL MEDICINE CLINIC | Facility: CLINIC | Age: 83
End: 2018-04-25

## 2018-04-25 NOTE — TELEPHONE ENCOUNTER
Tried to return call to patient's daughter, however there was no answer me she had to leave message and no one had picked up the phone  Patient's potassium level is normal at 4 0 according to her most recent lab work

## 2018-04-25 NOTE — TELEPHONE ENCOUNTER
Patients daughter calling about the bloodwork she had done on 4/23/18 to check her potassium  She would like to know if the levels are normal or not

## 2018-04-26 ENCOUNTER — TELEPHONE (OUTPATIENT)
Dept: INTERNAL MEDICINE CLINIC | Facility: CLINIC | Age: 83
End: 2018-04-26

## 2018-04-26 DIAGNOSIS — E87.6 HYPOKALEMIA: ICD-10-CM

## 2018-04-26 DIAGNOSIS — N18.30 CKD (CHRONIC KIDNEY DISEASE), STAGE III (HCC): ICD-10-CM

## 2018-04-26 RX ORDER — POTASSIUM CHLORIDE 1500 MG/1
2 TABLET, FILM COATED, EXTENDED RELEASE ORAL 3 TIMES DAILY
Qty: 270 TABLET | Refills: 0 | Status: SHIPPED | OUTPATIENT
Start: 2018-04-26 | End: 2018-07-09 | Stop reason: SDUPTHER

## 2018-04-26 NOTE — PROGRESS NOTES
Called to discuss with ISIAH LOZADA [facility]  D/w Dr Franck Garrett  [facility] requesting return call to [facility]  Will reduce K to 40mEq TID from 60mEq TID as levels improved to prevent hyperkalemia  Will await her return call to discuss these instructions

## 2018-04-26 NOTE — TELEPHONE ENCOUNTER
Spoke with delonte NUNEZ improved  Will Decrease K to 40mEq TID (Down from 60mEq TID)  Will s/o on lab  Daughter aware

## 2018-05-14 DIAGNOSIS — I83.899 VARICOSE VEINS OF LOWER EXTREMITY WITH EDEMA, UNSPECIFIED LATERALITY: ICD-10-CM

## 2018-05-14 DIAGNOSIS — N18.30 CKD (CHRONIC KIDNEY DISEASE), STAGE III (HCC): ICD-10-CM

## 2018-05-14 DIAGNOSIS — E87.6 HYPOKALEMIA: ICD-10-CM

## 2018-05-14 DIAGNOSIS — I10 HYPERTENSION, UNSPECIFIED TYPE: Primary | ICD-10-CM

## 2018-05-14 RX ORDER — METOLAZONE 5 MG/1
5 TABLET ORAL DAILY
Qty: 90 TABLET | Refills: 1 | Status: SHIPPED | OUTPATIENT
Start: 2018-05-14 | End: 2018-11-13 | Stop reason: SDUPTHER

## 2018-05-14 RX ORDER — LOSARTAN POTASSIUM 25 MG/1
25 TABLET ORAL DAILY
Qty: 90 TABLET | Refills: 1 | Status: SHIPPED | OUTPATIENT
Start: 2018-05-14 | End: 2018-11-19 | Stop reason: SDUPTHER

## 2018-06-06 DIAGNOSIS — I10 ESSENTIAL HYPERTENSION, BENIGN: Primary | ICD-10-CM

## 2018-06-06 RX ORDER — FUROSEMIDE 40 MG/1
40 TABLET ORAL DAILY
Qty: 90 TABLET | Refills: 1 | Status: SHIPPED | OUTPATIENT
Start: 2018-06-06 | End: 2018-11-29 | Stop reason: SDUPTHER

## 2018-06-07 ENCOUNTER — HOSPITAL ENCOUNTER (EMERGENCY)
Facility: HOSPITAL | Age: 83
Discharge: HOME | End: 2018-06-08
Attending: EMERGENCY MEDICINE | Admitting: EMERGENCY MEDICINE
Payer: MEDICARE

## 2018-06-07 DIAGNOSIS — R42 DIZZINESS: Primary | ICD-10-CM

## 2018-06-07 DIAGNOSIS — R19.7 DIARRHEA: ICD-10-CM

## 2018-06-07 PROCEDURE — 93005 ELECTROCARDIOGRAM TRACING: CPT

## 2018-06-08 ENCOUNTER — APPOINTMENT (EMERGENCY)
Dept: RADIOLOGY | Facility: HOSPITAL | Age: 83
End: 2018-06-08
Payer: MEDICARE

## 2018-06-08 VITALS
DIASTOLIC BLOOD PRESSURE: 66 MMHG | TEMPERATURE: 97.7 F | RESPIRATION RATE: 20 BRPM | WEIGHT: 190.7 LBS | OXYGEN SATURATION: 98 % | HEART RATE: 81 BPM | SYSTOLIC BLOOD PRESSURE: 132 MMHG | BODY MASS INDEX: 37.64 KG/M2

## 2018-06-08 LAB
ALBUMIN SERPL BCP-MCNC: 3.5 G/DL (ref 3.5–5)
ALP SERPL-CCNC: 94 U/L (ref 46–116)
ALT SERPL W P-5'-P-CCNC: 33 U/L (ref 12–78)
ANION GAP SERPL CALCULATED.3IONS-SCNC: 9 MMOL/L (ref 4–13)
AST SERPL W P-5'-P-CCNC: 28 U/L (ref 5–45)
ATRIAL RATE: 74 BPM
BACTERIA UR QL AUTO: ABNORMAL /HPF
BASOPHILS # BLD AUTO: 0.06 THOUSANDS/ΜL (ref 0–0.1)
BASOPHILS NFR BLD AUTO: 1 % (ref 0–1)
BILIRUB SERPL-MCNC: 0.34 MG/DL (ref 0.2–1)
BILIRUB UR QL STRIP: NEGATIVE
BUN SERPL-MCNC: 19 MG/DL (ref 5–25)
CALCIUM SERPL-MCNC: 9.2 MG/DL (ref 8.3–10.1)
CHLORIDE SERPL-SCNC: 98 MMOL/L (ref 100–108)
CLARITY UR: CLEAR
CO2 SERPL-SCNC: 27 MMOL/L (ref 21–32)
COLOR UR: YELLOW
COLOR, POC: NORMAL
CREAT SERPL-MCNC: 1.17 MG/DL (ref 0.6–1.3)
EOSINOPHIL # BLD AUTO: 0.19 THOUSAND/ΜL (ref 0–0.61)
EOSINOPHIL NFR BLD AUTO: 2 % (ref 0–6)
ERYTHROCYTE [DISTWIDTH] IN BLOOD BY AUTOMATED COUNT: 12.9 % (ref 11.6–15.1)
GFR SERPL CREATININE-BSD FRML MDRD: 40 ML/MIN/1.73SQ M
GLUCOSE SERPL-MCNC: 200 MG/DL (ref 65–140)
GLUCOSE UR STRIP-MCNC: NEGATIVE MG/DL
HCT VFR BLD AUTO: 34.9 % (ref 34.8–46.1)
HGB BLD-MCNC: 11.6 G/DL (ref 11.5–15.4)
HGB UR QL STRIP.AUTO: ABNORMAL
HYALINE CASTS #/AREA URNS LPF: ABNORMAL /LPF
IMM GRANULOCYTES # BLD AUTO: 0.08 THOUSAND/UL (ref 0–0.2)
IMM GRANULOCYTES NFR BLD AUTO: 1 % (ref 0–2)
KETONES UR STRIP-MCNC: NEGATIVE MG/DL
LEUKOCYTE ESTERASE UR QL STRIP: NEGATIVE
LIPASE SERPL-CCNC: 174 U/L (ref 73–393)
LYMPHOCYTES # BLD AUTO: 2.56 THOUSANDS/ΜL (ref 0.6–4.47)
LYMPHOCYTES NFR BLD AUTO: 22 % (ref 14–44)
MCH RBC QN AUTO: 30 PG (ref 26.8–34.3)
MCHC RBC AUTO-ENTMCNC: 33.2 G/DL (ref 31.4–37.4)
MCV RBC AUTO: 90 FL (ref 82–98)
MONOCYTES # BLD AUTO: 1.1 THOUSAND/ΜL (ref 0.17–1.22)
MONOCYTES NFR BLD AUTO: 9 % (ref 4–12)
NEUTROPHILS # BLD AUTO: 7.82 THOUSANDS/ΜL (ref 1.85–7.62)
NEUTS SEG NFR BLD AUTO: 65 % (ref 43–75)
NITRITE UR QL STRIP: NEGATIVE
NON-SQ EPI CELLS URNS QL MICRO: ABNORMAL /HPF
NRBC BLD AUTO-RTO: 0 /100 WBCS
P AXIS: -32 DEGREES
PH UR STRIP.AUTO: 7 [PH] (ref 4.5–8)
PLATELET # BLD AUTO: 275 THOUSANDS/UL (ref 149–390)
PMV BLD AUTO: 10.7 FL (ref 8.9–12.7)
POTASSIUM SERPL-SCNC: 3.7 MMOL/L (ref 3.5–5.3)
PR INTERVAL: 170 MS
PROT SERPL-MCNC: 7.6 G/DL (ref 6.4–8.2)
PROT UR STRIP-MCNC: NEGATIVE MG/DL
QRS AXIS: -15 DEGREES
QRSD INTERVAL: 132 MS
QT INTERVAL: 450 MS
QTC INTERVAL: 499 MS
RBC # BLD AUTO: 3.87 MILLION/UL (ref 3.81–5.12)
RBC #/AREA URNS AUTO: ABNORMAL /HPF
SODIUM SERPL-SCNC: 134 MMOL/L (ref 136–145)
SP GR UR STRIP.AUTO: 1.01 (ref 1–1.03)
T WAVE AXIS: 103 DEGREES
TROPONIN I SERPL-MCNC: <0.02 NG/ML
UROBILINOGEN UR QL STRIP.AUTO: 0.2 E.U./DL
VENTRICULAR RATE: 74 BPM
WBC # BLD AUTO: 11.81 THOUSAND/UL (ref 4.31–10.16)
WBC #/AREA URNS AUTO: ABNORMAL /HPF

## 2018-06-08 PROCEDURE — 71046 X-RAY EXAM CHEST 2 VIEWS: CPT

## 2018-06-08 PROCEDURE — 36415 COLL VENOUS BLD VENIPUNCTURE: CPT | Performed by: EMERGENCY MEDICINE

## 2018-06-08 PROCEDURE — 99285 EMERGENCY DEPT VISIT HI MDM: CPT

## 2018-06-08 PROCEDURE — 81001 URINALYSIS AUTO W/SCOPE: CPT

## 2018-06-08 PROCEDURE — 80053 COMPREHEN METABOLIC PANEL: CPT | Performed by: EMERGENCY MEDICINE

## 2018-06-08 PROCEDURE — 74177 CT ABD & PELVIS W/CONTRAST: CPT

## 2018-06-08 PROCEDURE — 85025 COMPLETE CBC W/AUTO DIFF WBC: CPT | Performed by: EMERGENCY MEDICINE

## 2018-06-08 PROCEDURE — 84484 ASSAY OF TROPONIN QUANT: CPT | Performed by: EMERGENCY MEDICINE

## 2018-06-08 PROCEDURE — 93010 ELECTROCARDIOGRAM REPORT: CPT | Performed by: INTERNAL MEDICINE

## 2018-06-08 PROCEDURE — 83690 ASSAY OF LIPASE: CPT | Performed by: EMERGENCY MEDICINE

## 2018-06-08 RX ADMIN — IOHEXOL 100 ML: 350 INJECTION, SOLUTION INTRAVENOUS at 01:31

## 2018-06-08 NOTE — DISCHARGE INSTRUCTIONS
Acute Diarrhea   WHAT YOU NEED TO KNOW:   Acute diarrhea starts quickly and lasts a short time, usually 1 to 3 days  It can last up to 2 weeks  You may not be able to control your diarrhea  Acute diarrhea usually stops on its own  DISCHARGE INSTRUCTIONS:   Return to the emergency department if:   · You feel confused  · Your heartbeat is faster than normal      · Your eyes look deeply sunken, or you have no tears when you cry  · You urinate less than usual, or your urine is dark yellow  · You have blood or mucus in your stools  · You have severe abdominal pain  · You are unable to drink any liquids  Contact your healthcare provider if:   · Your symptoms do not get better with treatment  · You have a fever higher than 101 3°F (38 5°C)  · You have trouble eating and drinking because you are vomiting  · You are thirsty or have a dry mouth  · Your diarrhea does not get better in 7 days  · You have questions or concerns about your condition or care  Follow up with your healthcare provider as directed:  Write down your questions so you remember to ask them during your visits  Medicines:  · Diarrhea medicine  is an over-the-counter medicine that helps slow or stop your diarrhea  If you take other medicines, talk to your healthcare provider before you take diarrhea medicine  · Antibiotics  may be given to help treat an infection caused by bacteria  · Antiparasitics  may be given to treat an infection caused by parasites  · Take your medicine as directed  Contact your healthcare provider if you think your medicine is not helping or if you have side effects  Tell him of her if you are allergic to any medicine  Keep a list of the medicines, vitamins, and herbs you take  Include the amounts, and when and why you take them  Bring the list or the pill bottles to follow-up visits  Carry your medicine list with you in case of an emergency    Self-care:   · Drink liquids as directed  Liquids will help prevent dehydration caused by diarrhea  Ask your healthcare provider how much liquid to drink each day and which liquids are best for you  You may need to drink an oral rehydration solution (ORS)  An ORS has the right amounts of water, salts, and sugar you need to replace body fluids  You can buy an ORS at most grocery stores and pharmacies  · Eat foods that are easy to digest   Examples include rice, lentils, cereal, bananas, potatoes, and bread  It also includes some fruits (bananas, melon), well-cooked vegetables, and lean meats  Avoid foods high in fiber, fat, and sugar  Also avoid caffeine, alcohol, dairy, and red meat until your diarrhea is gone  Prevent acute diarrhea:   · Wash your hands often  Use soap and water  Wash your hands before you eat or prepare food  Also wash your hands after you use the bathroom  Use an alcohol-based hand gel when soap and water are not available  · Keep bathroom surfaces clean  This helps prevent the spread of germs that cause acute diarrhea  · Wash fruits and vegetables well before you eat them  This can help remove germs that cause diarrhea  If possible, remove the skin from fruits and vegetables, or cook them well before you eat them  · Cook meat as directed  ¨ Cook ground meat  to 160°F      ¨ Cook ground poultry, whole poultry, or cuts of poultry  to at least 165°F  Remove the meat from heat  Let it stand for 3 minutes before you eat it  ¨ Cook whole cuts of meat other than poultry  to at least 145°F  Remove the meat from heat  Let it stand for 3 minutes before you eat it  · Wash dishes that have touched raw meat with hot water and soap  This includes cutting boards, utensils, dishes, and serving containers  · Place raw or cooked meat in the refrigerator as soon as possible  Bacteria can grow in meat that is left at room temperature too long  · Do not eat raw or undercooked oysters, clams, or mussels  These foods may be contaminated and cause infection  · Drink filtered or treated water only when you travel  Do not put ice in your drinks  Drink bottled water whenever possible  © 2017 [employer] Information is for End User's use only and may not be sold, redistributed or otherwise used for commercial purposes  All illustrations and images included in CareNotes® are the copyrighted property of [employer]  or [employer]  The above information is an  only  It is not intended as medical advice for individual conditions or treatments  Talk to your doctor, nurse or pharmacist before following any medical regimen to see if it is safe and effective for you  Dizziness   WHAT YOU NEED TO KNOW:   Dizziness is a feeling of being off balance or unsteady  Common causes of dizziness are an inner ear fluid imbalance or a lack of oxygen in your blood  Dizziness may be acute (lasts 3 days or less) or chronic (lasts longer than 3 days)  You may have dizzy spells that last from seconds to a few hours  DISCHARGE INSTRUCTIONS:   Return to the emergency department if:   · You have a headache and a stiff neck  · You have shaking chills and a fever  · You vomit over and over with no relief  · Your vomit or bowel movements are red or black  · You have pain in your chest, back, or abdomen  · You have numbness, especially in your face, arms, or legs  · You have trouble moving your arms or legs  · You are confused  Contact your healthcare provider if:   · You have a fever  · Your symptoms do not get better with treatment  · You have questions or concerns about your condition or care  Manage your symptoms:   · Do not drive  or operate heavy machinery when you are dizzy  · Get up slowly  from sitting or lying down  · Drink plenty of liquids  Liquids help prevent dehydration   Ask how much liquid to drink each day and which liquids are best for you   Follow up with your healthcare provider as directed:  Write down your questions so you remember to ask them during your visits  © 2017 [employer] Information is for End User's use only and may not be sold, redistributed or otherwise used for commercial purposes  All illustrations and images included in CareNotes® are the copyrighted property of [employer]  or [employer]  The above information is an  only  It is not intended as medical advice for individual conditions or treatments  Talk to your doctor, nurse or pharmacist before following any medical regimen to see if it is safe and effective for you

## 2018-06-08 NOTE — ED ATTENDING ATTESTATION
I, [address], DO, saw and evaluated the patient  I have discussed the patient with the resident/non-physician practitioner and agree with the resident's/non-physician practitioner's findings, Plan of Care, and MDM as documented in the resident's/non-physician practitioner's note, except where noted  All available labs and Radiology studies were reviewed  At this point I agree with the current assessment done in the Emergency Department  I have conducted an independent evaluation of this patient a history and physical is as follows:    80-year-old female presents for evaluation of dizziness  Patient has history of dementia and is a very poor historian  Daughter states patient called her sister tonight and stated she did not feel well  Reportedly stated she was dizzy and unwell but could not describe any other symptoms  Currently patient denies all complaints  On exam-no acute distress, heart regular, lungs clear, abdomen soft with mild tenderness in the right upper quadrant    Plan-CT abdomen, cardiac and abdominal workup and reassess    Critical Care Time  CritCare Time    Procedures

## 2018-06-08 NOTE — ED PROVIDER NOTES
History  Chief Complaint   Patient presents with    Dizziness     pt started with dizziness earlier this evening  denies chest pain, sob  pt also denies dizziness at this time  HPI   77-year-old female with history of dementia, moderate aortic stenosis , HLD, HTN, and hypokalemia presents to the emergency department evaluation of dizziness  Per daughter, patient was with another family member and was given acting well all day  However, around 11:00 p m , she called family and said that she did not feel well that she felt dizzy  History is otherwise limited due to patient's dementia  On presentation, she states I was dizzy, but now I am not   Family states that patient has never complained of similar symptoms in the past  She denies any recent fevers, chills, chest pain, shortness of breath, abdominal pain, nausea, vomiting, change in urinary/bowel function, lower extremity swelling, or complaints other than stated above  Prior to Admission Medications   Prescriptions Last Dose Informant Patient Reported? Taking?    Potassium Chloride ER 20 MEQ TBCR Unknown at Unknown time  No No   Sig: Take 2 tablets (40 mEq total) by mouth 3 (three) times a day   donepezil (ARICEPT) 10 mg tablet Unknown at Unknown time Child Yes No   Sig: Take 1 tablet by mouth daily   furosemide (LASIX) 40 mg tablet Unknown at Unknown time  No No   Sig: Take 1 tablet (40 mg total) by mouth daily   glipiZIDE (GLUCOTROL XL) 10 mg 24 hr tablet Unknown at Unknown time  No No   Sig: Take 1 tablet (10 mg total) by mouth daily   losartan (COZAAR) 25 mg tablet Unknown at Unknown time  No No   Sig: Take 1 tablet (25 mg total) by mouth daily   magnesium oxide (MAG-OX) 400 mg Unknown at Unknown time  No No   Sig: Take 1 tablet (400 mg total) by mouth daily   metolazone (ZAROXOLYN) 5 mg tablet Unknown at Unknown time  No No   Sig: Take 1 tablet (5 mg total) by mouth daily   simvastatin (ZOCOR) 40 mg tablet Unknown at Unknown time Child Yes No Sig: Take 1 tablet by mouth daily   sitaGLIPtin (JANUVIA) 50 mg tablet Unknown at Unknown time Child Yes No   Sig: Take 1 tablet by mouth daily      Facility-Administered Medications: None     Past Medical History:   Diagnosis Date    Aortic stenosis     Bilateral edema of lower extremity     Dementia     Fall     Gait abnormality     Hyperlipidemia     Hypertension     Hypokalemia     Other ascites     Varicose veins of leg with edema        Past Surgical History:   Procedure Laterality Date    BREAST SURGERY      ELBOW SURGERY         Family History   Problem Relation Age of Onset    Dementia Sister      I have reviewed and agree with the history as documented  Social History   Substance Use Topics    Smoking status: Never Smoker    Smokeless tobacco: Never Used    Alcohol use No        Review of Systems   Unable to perform ROS: Dementia       Physical Exam  ED Triage Vitals [06/07/18 2321]   Temperature Pulse Respirations Blood Pressure SpO2   97 7 °F (36 5 °C) 81 20 141/93 98 %      Temp Source Heart Rate Source Patient Position - Orthostatic VS BP Location FiO2 (%)   Tympanic Monitor Sitting Right arm --      Pain Score       No Pain           Orthostatic Vital Signs  Vitals:    06/07/18 2321 06/08/18 0130   BP: 141/93 132/66   Pulse: 81    Patient Position - Orthostatic VS: Sitting Lying       Physical Exam   Constitutional: She is oriented to person, place, and time  She appears well-nourished  No distress  HENT:   Head: Normocephalic and atraumatic  Eyes: EOM are normal    Neck: Normal range of motion  Neck supple  Cardiovascular: Normal rate and regular rhythm  Pulmonary/Chest: Effort normal and breath sounds normal  No respiratory distress  Abdominal: Soft  She exhibits no distension  There is tenderness in the right upper quadrant  Musculoskeletal: Normal range of motion  Neurological: She is alert and oriented to person, place, and time  Skin: Skin is warm and dry  She is not diaphoretic  Psychiatric: She has a normal mood and affect  Her behavior is normal    Nursing note and vitals reviewed         ED Medications  Medications   iohexol (OMNIPAQUE) 350 MG/ML injection (MULTI-DOSE) 100 mL (100 mL Intravenous Given 6/8/18 0131)       Diagnostic Studies  Results Reviewed     Procedure Component Value Units Date/Time    Urine Microscopic [[id number]]  (Abnormal) Collected:  06/08/18 0126    Lab Status:  Final result Specimen:  Urine from Urine, Other Updated:  06/08/18 0133     RBC, UA 2-4 (A) /hpf      WBC, UA None Seen /hpf      Epithelial Cells None Seen /hpf      Bacteria, UA Occasional /hpf      Hyaline Casts, UA None Seen /lpf     POCT urinalysis dipstick [[id number]]  (Normal) Resulted:  06/08/18 0120    Lab Status:  Final result Specimen:  Urine from Urine, Other Updated:  06/08/18 0120     Color, UA see results    ED Urine Macroscopic [[id number]]  (Abnormal) Collected:  06/08/18 0126    Lab Status:  Final result Specimen:  Urine Updated:  06/08/18 0120     Color, UA Yellow     Clarity, UA Clear     pH, UA 7 0     Leukocytes, UA Negative     Nitrite, UA Negative     Protein, UA Negative mg/dl      Glucose, UA Negative mg/dl      Ketones, UA Negative mg/dl      Urobilinogen, UA 0 2 E U /dl      Bilirubin, UA Negative     Blood, UA Trace (A)     Specific Rush, UA 1 015    Narrative:       CLINITEK RESULT    Comprehensive metabolic panel [[id number]]  (Abnormal) Collected:  06/08/18 0001    Lab Status:  Final result Specimen:  Blood from Arm, Left Updated:  06/08/18 0029     Sodium 134 (L) mmol/L      Potassium 3 7 mmol/L      Chloride 98 (L) mmol/L      CO2 27 mmol/L      Anion Gap 9 mmol/L      BUN 19 mg/dL      Creatinine 1 17 mg/dL      Glucose 200 (H) mg/dL      Calcium 9 2 mg/dL      AST 28 U/L      ALT 33 U/L      Alkaline Phosphatase 94 U/L      Total Protein 7 6 g/dL      Albumin 3 5 g/dL      Total Bilirubin 0 34 mg/dL      eGFR 40 ml/min/1 73sq m     Narrative: National Kidney Disease Education Program recommendations are as follows:  GFR calculation is accurate only with a steady state creatinine  Chronic Kidney disease less than 60 ml/min/1 73 sq  meters  Kidney failure less than 15 ml/min/1 73 sq  meters  Lipase [[id number]]  (Normal) Collected:  06/08/18 0001    Lab Status:  Final result Specimen:  Blood from Arm, Left Updated:  06/08/18 0029     Lipase 174 u/L     Troponin I [[id number]]  (Normal) Collected:  06/08/18 0001    Lab Status:  Final result Specimen:  Blood from Arm, Left Updated:  06/08/18 0029     Troponin I <0 02 ng/mL     Narrative:         [employer] Chemistry analyzer 99% cutoff is > 0 04 ng/mL in network labs    o cTnI 99% cutoff is useful only when applied to patients in the clinical setting of myocardial ischemia  o cTnI 99% cutoff should be interpreted in the context of clinical history, ECG findings and possibly cardiac imaging to establish correct diagnosis  o cTnI 99% cutoff may be suggestive but clearly not indicative of a coronary event without the clinical setting of myocardial ischemia      CBC and differential [[id number]]  (Abnormal) Collected:  06/08/18 0001    Lab Status:  Final result Specimen:  Blood from Arm, Left Updated:  06/08/18 0012     WBC 11 81 (H) Thousand/uL      RBC 3 87 Million/uL      Hemoglobin 11 6 g/dL      Hematocrit 34 9 %      MCV 90 fL      MCH 30 0 pg      MCHC 33 2 g/dL      RDW 12 9 %      MPV 10 7 fL      Platelets 559 Thousands/uL      nRBC 0 /100 WBCs      Neutrophils Relative 65 %      Immat GRANS % 1 %      Lymphocytes Relative 22 %      Monocytes Relative 9 %      Eosinophils Relative 2 %      Basophils Relative 1 %      Neutrophils Absolute 7 82 (H) Thousands/µL      Immature Grans Absolute 0 08 Thousand/uL      Lymphocytes Absolute 2 56 Thousands/µL      Monocytes Absolute 1 10 Thousand/µL      Eosinophils Absolute 0 19 Thousand/µL      Basophils Absolute 0 06 Thousands/µL                  XR chest 2 views Final Result by Cristiane Riojas MD (06/08 0831)      Mild bilateral lower lobe subsegmental atelectasis  Workstation performed: [id number]         CT abdomen pelvis w contrast   Final Result by Toney Martinez DO (06/08 0147)         1  Thickening of the descending colon wall with minimal stranding clinical correlation for colitis is recommended  Follow-up with gastroenterology is recommended  2   Cholelithiasis   3  Nodular airspace opacities in the right upper lobe which may represent infectious versus inflammatory causes  Follow-up to resolution is recommended            Workstation performed: [id number]               Procedures  Procedures  [place] attempted  Patient denies dizziness, laughs that the exam is funny  EKG: NSR @ 74 BPM, LBBB    Phone Consults  ED Phone Contact    ED Course  ED Course as of Jun 09 0300 Fri Jun 08, 2018   0157 Patient feeling well, aware of all labs and imaging findings  Discussed dispo with patient and daughter, both agree with plan for discharge with return precautions  0159 Patient did have one episode of diarrhea in the ED, but states that this is not uncommon  Will follow up in ED or with PCP if symptoms worsen  MDM  Number of Diagnoses or Management Options  Diarrhea:   Dizziness:   Diagnosis management comments: 77-year-old female with reported short-lived episode of dizziness, malaise  History limited due to dementia  Patient denies any complaints on exam   Noted to have right upper quadrant tenderness  Will obtain cardiac/belly labs, CT abdomen pelvis, and observe in the emergency department for recurrent symptoms while awaiting results  Disposition pending      CritCare Time    Disposition  Final diagnoses:   Dizziness   Diarrhea     Time reflects when diagnosis was documented in both MDM as applicable and the Disposition within this note     Time User Action Codes Description Comment    6/8/2018  1:59 AM Tigre Friedman Add [R42] Dizziness     6/8/2018  2:04 AM Tigre Friedman Add [R19 7] Diarrhea       ED Disposition     ED Disposition Condition Comment    Discharge  Colleen Harper discharge to home/self care  Condition at discharge: Good        Follow-up Information     Follow up With Specialties Details Why Contact Info Additional Information    Uriah Fuentes MD Internal Medicine In 3 days As needed [address]        [address] [facility] [facility] Emergency Department Emergency Medicine  If symptoms worsen [address]  [phone]  ED, [address] [place] [address], [id number]          Discharge Medication List as of 6/8/2018  2:05 AM      CONTINUE these medications which have NOT CHANGED    Details   donepezil (ARICEPT) 10 mg tablet Take 1 tablet by mouth daily, Historical Med      furosemide (LASIX) 40 mg tablet Take 1 tablet (40 mg total) by mouth daily, Starting Wed 6/6/2018, Normal      glipiZIDE (GLUCOTROL XL) 10 mg 24 hr tablet Take 1 tablet (10 mg total) by mouth daily, Starting Mon 3/12/2018, Normal      losartan (COZAAR) 25 mg tablet Take 1 tablet (25 mg total) by mouth daily, Starting Mon 5/14/2018, Normal      magnesium oxide (MAG-OX) 400 mg Take 1 tablet (400 mg total) by mouth daily, Starting Mon 5/14/2018, Normal      metolazone (ZAROXOLYN) 5 mg tablet Take 1 tablet (5 mg total) by mouth daily, Starting Mon 5/14/2018, Normal      Potassium Chloride ER 20 MEQ TBCR Take 2 tablets (40 mEq total) by mouth 3 (three) times a day, Starting Thu 4/26/2018, Normal      simvastatin (ZOCOR) 40 mg tablet Take 1 tablet by mouth daily, Historical Med      sitaGLIPtin (JANUVIA) 50 mg tablet Take 1 tablet by mouth daily, Starting Mon 6/13/2016, Historical Med           No discharge procedures on file  ED Provider  Attending physically available and evaluated Colleen Harper I managed the patient along with the ED Attending      Electronically Signed by         Richard Del Rosario MD  06/09/18 5215

## 2018-06-15 DIAGNOSIS — E11.9 DIABETES MELLITUS TYPE 2, NONINSULIN DEPENDENT (HCC): Primary | ICD-10-CM

## 2018-07-09 ENCOUNTER — TELEPHONE (OUTPATIENT)
Dept: INTERNAL MEDICINE CLINIC | Facility: CLINIC | Age: 83
End: 2018-07-09

## 2018-07-09 DIAGNOSIS — E78.00 HYPERCHOLESTEROLEMIA: Primary | ICD-10-CM

## 2018-07-09 DIAGNOSIS — N18.30 CKD (CHRONIC KIDNEY DISEASE), STAGE III (HCC): ICD-10-CM

## 2018-07-09 DIAGNOSIS — E87.6 HYPOKALEMIA: ICD-10-CM

## 2018-07-09 RX ORDER — SIMVASTATIN 40 MG
40 TABLET ORAL DAILY
Qty: 90 TABLET | Refills: 1 | Status: SHIPPED | OUTPATIENT
Start: 2018-07-09 | End: 2019-01-03 | Stop reason: SDUPTHER

## 2018-07-09 RX ORDER — POTASSIUM CHLORIDE 1500 MG/1
2 TABLET, FILM COATED, EXTENDED RELEASE ORAL 3 TIMES DAILY
Qty: 540 TABLET | Refills: 0 | Status: SHIPPED | OUTPATIENT
Start: 2018-07-09 | End: 2018-09-10 | Stop reason: SDUPTHER

## 2018-07-16 ENCOUNTER — LAB (OUTPATIENT)
Dept: LAB | Age: 83
End: 2018-07-16
Payer: MEDICARE

## 2018-07-16 DIAGNOSIS — N18.30 CKD (CHRONIC KIDNEY DISEASE), STAGE III (HCC): ICD-10-CM

## 2018-07-16 DIAGNOSIS — E11.8 TYPE 2 DIABETES MELLITUS WITH COMPLICATION, WITHOUT LONG-TERM CURRENT USE OF INSULIN (HCC): ICD-10-CM

## 2018-07-16 LAB
ALBUMIN SERPL BCP-MCNC: 3.6 G/DL (ref 3.5–5)
ALP SERPL-CCNC: 94 U/L (ref 46–116)
ALT SERPL W P-5'-P-CCNC: 30 U/L (ref 12–78)
ANION GAP SERPL CALCULATED.3IONS-SCNC: 7 MMOL/L (ref 4–13)
AST SERPL W P-5'-P-CCNC: 25 U/L (ref 5–45)
BASOPHILS # BLD AUTO: 0.08 THOUSANDS/ΜL (ref 0–0.1)
BASOPHILS NFR BLD AUTO: 1 % (ref 0–1)
BILIRUB SERPL-MCNC: 0.58 MG/DL (ref 0.2–1)
BUN SERPL-MCNC: 15 MG/DL (ref 5–25)
CALCIUM SERPL-MCNC: 9.2 MG/DL (ref 8.3–10.1)
CHLORIDE SERPL-SCNC: 98 MMOL/L (ref 100–108)
CO2 SERPL-SCNC: 27 MMOL/L (ref 21–32)
CREAT SERPL-MCNC: 1.15 MG/DL (ref 0.6–1.3)
EOSINOPHIL # BLD AUTO: 0.17 THOUSAND/ΜL (ref 0–0.61)
EOSINOPHIL NFR BLD AUTO: 1 % (ref 0–6)
ERYTHROCYTE [DISTWIDTH] IN BLOOD BY AUTOMATED COUNT: 12.7 % (ref 11.6–15.1)
EST. AVERAGE GLUCOSE BLD GHB EST-MCNC: 226 MG/DL
GFR SERPL CREATININE-BSD FRML MDRD: 41 ML/MIN/1.73SQ M
GLUCOSE P FAST SERPL-MCNC: 233 MG/DL (ref 65–99)
HBA1C MFR BLD: 9.5 % (ref 4.2–6.3)
HCT VFR BLD AUTO: 37.5 % (ref 34.8–46.1)
HGB BLD-MCNC: 12.4 G/DL (ref 11.5–15.4)
IMM GRANULOCYTES # BLD AUTO: 0.12 THOUSAND/UL (ref 0–0.2)
IMM GRANULOCYTES NFR BLD AUTO: 1 % (ref 0–2)
LYMPHOCYTES # BLD AUTO: 2.74 THOUSANDS/ΜL (ref 0.6–4.47)
LYMPHOCYTES NFR BLD AUTO: 23 % (ref 14–44)
MCH RBC QN AUTO: 29.8 PG (ref 26.8–34.3)
MCHC RBC AUTO-ENTMCNC: 33.1 G/DL (ref 31.4–37.4)
MCV RBC AUTO: 90 FL (ref 82–98)
MONOCYTES # BLD AUTO: 0.98 THOUSAND/ΜL (ref 0.17–1.22)
MONOCYTES NFR BLD AUTO: 8 % (ref 4–12)
NEUTROPHILS # BLD AUTO: 7.71 THOUSANDS/ΜL (ref 1.85–7.62)
NEUTS SEG NFR BLD AUTO: 66 % (ref 43–75)
NRBC BLD AUTO-RTO: 0 /100 WBCS
PLATELET # BLD AUTO: 297 THOUSANDS/UL (ref 149–390)
PMV BLD AUTO: 11.2 FL (ref 8.9–12.7)
POTASSIUM SERPL-SCNC: 4.3 MMOL/L (ref 3.5–5.3)
PROT SERPL-MCNC: 7.8 G/DL (ref 6.4–8.2)
RBC # BLD AUTO: 4.16 MILLION/UL (ref 3.81–5.12)
SODIUM SERPL-SCNC: 132 MMOL/L (ref 136–145)
WBC # BLD AUTO: 11.8 THOUSAND/UL (ref 4.31–10.16)

## 2018-07-16 PROCEDURE — 36415 COLL VENOUS BLD VENIPUNCTURE: CPT

## 2018-07-16 PROCEDURE — 83036 HEMOGLOBIN GLYCOSYLATED A1C: CPT

## 2018-07-16 PROCEDURE — 80053 COMPREHEN METABOLIC PANEL: CPT

## 2018-07-16 PROCEDURE — 85025 COMPLETE CBC W/AUTO DIFF WBC: CPT

## 2018-07-20 NOTE — PROGRESS NOTES
Maninder Lizarraga I wanted to forward these to you as an FYI I see this next appointment is with you  Call me if any questions    Thanks Maryann Snyder

## 2018-07-23 ENCOUNTER — OFFICE VISIT (OUTPATIENT)
Dept: INTERNAL MEDICINE CLINIC | Facility: CLINIC | Age: 83
End: 2018-07-23
Payer: MEDICARE

## 2018-07-23 VITALS
WEIGHT: 190.8 LBS | SYSTOLIC BLOOD PRESSURE: 128 MMHG | DIASTOLIC BLOOD PRESSURE: 60 MMHG | OXYGEN SATURATION: 98 % | BODY MASS INDEX: 36.02 KG/M2 | HEART RATE: 80 BPM | RESPIRATION RATE: 20 BRPM | TEMPERATURE: 98 F | HEIGHT: 61 IN

## 2018-07-23 DIAGNOSIS — E87.1 HYPONATREMIA: Primary | ICD-10-CM

## 2018-07-23 DIAGNOSIS — R60.0 BILATERAL LOWER EXTREMITY EDEMA: ICD-10-CM

## 2018-07-23 DIAGNOSIS — E87.6 HYPOKALEMIA: ICD-10-CM

## 2018-07-23 DIAGNOSIS — F03.90 UNCOMPLICATED SENILE DEMENTIA (HCC): ICD-10-CM

## 2018-07-23 DIAGNOSIS — N18.30 CKD (CHRONIC KIDNEY DISEASE), STAGE III (HCC): ICD-10-CM

## 2018-07-23 DIAGNOSIS — E11.8 TYPE 2 DIABETES MELLITUS WITH COMPLICATION, WITHOUT LONG-TERM CURRENT USE OF INSULIN (HCC): ICD-10-CM

## 2018-07-23 DIAGNOSIS — I10 BENIGN ESSENTIAL HYPERTENSION: ICD-10-CM

## 2018-07-23 PROCEDURE — 99215 OFFICE O/P EST HI 40 MIN: CPT | Performed by: NURSE PRACTITIONER

## 2018-07-23 NOTE — PATIENT INSTRUCTIONS
Start an 1800 ml fluid restriction daily  That is about 3 and a half 16 oz water bottles per day of fluid  This includes juices, milk, etc      Limit fruits throughout day, chocolate milk should be limited, as well  Also discussed having someone present for pill taking to ensure that they are being taken    Discuss with pharmacist about pills being taken all in morning, but it appears that they can be taken together  Limit sweets and buy healthy snacks at grocery store    Get labs rechecked in 2 weeks    Follow up in 3 weeks

## 2018-07-23 NOTE — PROGRESS NOTES
Assessment/Plan:    No problem-specific Assessment & Plan notes found for this encounter  Diagnoses and all orders for this visit:    Hyponatremia  -     Basic metabolic panel; Future    Type 2 diabetes mellitus with complication, without long-term current use of insulin (HCC)    Benign essential hypertension    Uncomplicated senile dementia    CKD (chronic kidney disease), stage III    Hypokalemia    Bilateral lower extremity edema      Discussed with patient and her daughter, at length, about restriction and rechecking BMP in 2 weeks regarding sodium, follow up in 3 weeks for close follow-up  Discussed with daughter about patient's mental limitations due to dementia and how memory works both short and long term  It was explained to the patient's daughter that if the goal is to keep the patient at home, then additional measures to ensure that she is taking her medications, other than just a call in the afternoon, as this is proving to be ineffective, need to be taken  Also, discussed buying healthier snack options for better control of diabetes, given that they have chosen not to add additional DM agents to regimen  Memory care units/assisted living was discussed as a potential option, but the patient's daughter does not want to consider that at this time  Shamar Gaitan, Care Coordinator, was contacted to reach out to the patient/ family to discuss additional resources  Subjective:      Patient ID: Bree Gregory is a 80 y o  female  Patient presents for a 3 week follow-up on diabetes mellitus and hypertension, as well as to review labs  She drinks about 12 cups of water per day, in addition to other beverages  She does not remember to take her pills in the afternoon, per her daughter, who is present with her at this appointment   She has home health aids who come in the morning, who remind her to take her medications in the morning, but she has much difficulty in the afternoons remembering to take her pills, even with phone calls from family daily at that time  Her daughter reports that she often finds the patient's afternoon medications in her robe pockets  The patient's daughter also reports that the patient eats lots of fruits, drinks chocolate milk, and other carbohydrate-rich snacks "all day long"  When asked who does the grocery shopping, the patient's daughter reports that she does for the patient  The patient's daughter also reports that she does not want to have the patient "go into a home" and believes that the patient's memory loss is "selective"  She (the patient's daughter) also made a statement that the patient  Is "    A brat  She always has been  She does what she wants, when she wants to  We all are  But we've told her a million times that if she doesn't take her pills then you're going to put her in a home" (indicating me, the provider)  She indicates that the patient wont "do needles or any new pills", indicating that they have enough trouble getting the patient to take the medications she already has prescribed  The following portions of the patient's history were reviewed and updated as appropriate: allergies, current medications, past family history, past medical history, past social history, past surgical history and problem list     Review of Systems   Constitutional: Negative for chills, fatigue and fever  HENT: Negative for trouble swallowing  Eyes: Negative for pain  Respiratory: Positive for shortness of breath (on exertion only)  Cardiovascular: Positive for leg swelling (per HPI)  Negative for chest pain  Gastrointestinal: Negative for abdominal pain, constipation, diarrhea, nausea and vomiting  Genitourinary: Positive for frequency (due to diuretics)  Musculoskeletal: Positive for gait problem (refuses to use cane or walker, per patient's daughter)  Skin: Negative for rash     Neurological: Positive for dizziness (intermittently per patient and daughter)  Negative for headaches  Psychiatric/Behavioral: Negative for sleep disturbance           Past Medical History:   Diagnosis Date    Aortic stenosis     Bilateral edema of lower extremity     Dementia     Fall     Gait abnormality     Hyperlipidemia     Hypertension     Hypokalemia     Other ascites     Varicose veins of leg with edema          Current Outpatient Prescriptions:     donepezil (ARICEPT) 10 mg tablet, Take 1 tablet by mouth daily, Disp: , Rfl:     furosemide (LASIX) 40 mg tablet, Take 1 tablet (40 mg total) by mouth daily, Disp: 90 tablet, Rfl: 1    glipiZIDE (GLUCOTROL XL) 10 mg 24 hr tablet, Take 1 tablet (10 mg total) by mouth daily, Disp: 90 tablet, Rfl: 1    losartan (COZAAR) 25 mg tablet, Take 1 tablet (25 mg total) by mouth daily, Disp: 90 tablet, Rfl: 1    magnesium oxide (MAG-OX) 400 mg, Take 1 tablet (400 mg total) by mouth daily, Disp: 90 each, Rfl: 0    metolazone (ZAROXOLYN) 5 mg tablet, Take 1 tablet (5 mg total) by mouth daily, Disp: 90 tablet, Rfl: 1    Potassium Chloride ER 20 MEQ TBCR, Take 2 tablets (40 mEq total) by mouth 3 (three) times a day, Disp: 540 tablet, Rfl: 0    simvastatin (ZOCOR) 40 mg tablet, Take 1 tablet (40 mg total) by mouth daily, Disp: 90 tablet, Rfl: 1    sitaGLIPtin (JANUVIA) 50 mg tablet, Take 1 tablet (50 mg total) by mouth daily, Disp: 90 tablet, Rfl: 0    No Known Allergies    Social History   Past Surgical History:   Procedure Laterality Date    BREAST SURGERY      ELBOW SURGERY       Family History   Problem Relation Age of Onset    Dementia Sister        Objective:  /60 (BP Location: Left arm, Patient Position: Sitting, Cuff Size: Large)   Pulse 80   Temp 98 °F (36 7 °C) (Oral)   Resp 20   Ht 5' 1" (1 549 m) Comment: with shoes on  Wt 86 5 kg (190 lb 12 8 oz) Comment: with shoes on  SpO2 98% Comment: room air  BMI 36 05 kg/m²      Recent Results (from the past 504 hour(s))   Comprehensive metabolic panel Collection Time: 07/16/18 10:18 AM   Result Value Ref Range    Sodium 132 (L) 136 - 145 mmol/L    Potassium 4 3 3 5 - 5 3 mmol/L    Chloride 98 (L) 100 - 108 mmol/L    CO2 27 21 - 32 mmol/L    Anion Gap 7 4 - 13 mmol/L    BUN 15 5 - 25 mg/dL    Creatinine 1 15 0 60 - 1 30 mg/dL    Glucose, Fasting 233 (H) 65 - 99 mg/dL    Calcium 9 2 8 3 - 10 1 mg/dL    AST 25 5 - 45 U/L    ALT 30 12 - 78 U/L    Alkaline Phosphatase 94 46 - 116 U/L    Total Protein 7 8 6 4 - 8 2 g/dL    Albumin 3 6 3 5 - 5 0 g/dL    Total Bilirubin 0 58 0 20 - 1 00 mg/dL    eGFR 41 ml/min/1 73sq m   HEMOGLOBIN A1C W/ EAG ESTIMATION    Collection Time: 07/16/18 10:18 AM   Result Value Ref Range    Hemoglobin A1C 9 5 (H) 4 2 - 6 3 %     mg/dl   CBC and differential    Collection Time: 07/16/18 10:18 AM   Result Value Ref Range    WBC 11 80 (H) 4 31 - 10 16 Thousand/uL    RBC 4 16 3 81 - 5 12 Million/uL    Hemoglobin 12 4 11 5 - 15 4 g/dL    Hematocrit 37 5 34 8 - 46 1 %    MCV 90 82 - 98 fL    MCH 29 8 26 8 - 34 3 pg    MCHC 33 1 31 4 - 37 4 g/dL    RDW 12 7 11 6 - 15 1 %    MPV 11 2 8 9 - 12 7 fL    Platelets 597 123 - 621 Thousands/uL    nRBC 0 /100 WBCs    Neutrophils Relative 66 43 - 75 %    Immat GRANS % 1 0 - 2 %    Lymphocytes Relative 23 14 - 44 %    Monocytes Relative 8 4 - 12 %    Eosinophils Relative 1 0 - 6 %    Basophils Relative 1 0 - 1 %    Neutrophils Absolute 7 71 (H) 1 85 - 7 62 Thousands/µL    Immature Grans Absolute 0 12 0 00 - 0 20 Thousand/uL    Lymphocytes Absolute 2 74 0 60 - 4 47 Thousands/µL    Monocytes Absolute 0 98 0 17 - 1 22 Thousand/µL    Eosinophils Absolute 0 17 0 00 - 0 61 Thousand/µL    Basophils Absolute 0 08 0 00 - 0 10 Thousands/µL            Physical Exam   HENT:   Head: Normocephalic and atraumatic  Eyes: Pupils are equal, round, and reactive to light  Scleral icterus is present  Neck: Normal range of motion  Neck supple  No thyromegaly present  Cardiovascular: Normal rate and regular rhythm  Murmur heard  Abdominal: Soft  Bowel sounds are normal  She exhibits no distension  Musculoskeletal: Normal range of motion  She exhibits edema (Trace to +1 gabi LE edema )  Lymphadenopathy:     She has no cervical adenopathy  Neurological: She is alert  GCS eye subscore is 4  GCS verbal subscore is 5  GCS motor subscore is 6  Skin: Skin is warm and dry  Psychiatric: Her speech is delayed  She is withdrawn  Cognition and memory are impaired

## 2018-07-26 ENCOUNTER — PATIENT OUTREACH (OUTPATIENT)
Dept: INTERNAL MEDICINE CLINIC | Age: 83
End: 2018-07-26

## 2018-07-26 NOTE — PROGRESS NOTES
Outpatient Care Management Note: Referred by [facility]  Called both pt and her daughter Hodan Kay @ [phone]  Left messages on both phones regarding outreaching them to possibly recommend help in the home for pt to better manage in home environment

## 2018-07-26 NOTE — PROGRESS NOTES
Outpatient Care Management Note:Spoke with pt's daughter, Delvis Brock  Pt lives in [facility] in [facility]  She has 3 daughters and a son who check on her Monday through Thursday  Each child takes pt for a day to their home from morning until the evening  On the day that son has her, Светлана Boyer comes to get pt ready for the day and makes sure that she takes her meds  On Friday, Sat and Sunday, Geoff comes in again and will assist with morning routine, med taking  Now a neighbor in the [facility], who offered to help, will check in on her Fri, sat and sunday evenings for dinner and med taking  Daughter states that pt does well overall, but does like to eat mindless at times, especially when watching tv  She has a cane and a walker but refuses to use  Daughter discussed meds and intake  Discussed food choices and she had questions regarding HA1C  Offered to send info on Diabetes and food choices and she is agreeable to this  Will follow up with daughter after pt's next appt to be sure there are no new needs  Denies any needs at this time    My contact info given

## 2018-08-06 ENCOUNTER — APPOINTMENT (OUTPATIENT)
Dept: LAB | Age: 83
End: 2018-08-06
Payer: MEDICARE

## 2018-08-06 DIAGNOSIS — E87.1 HYPONATREMIA: ICD-10-CM

## 2018-08-06 LAB
ANION GAP SERPL CALCULATED.3IONS-SCNC: 7 MMOL/L (ref 4–13)
BUN SERPL-MCNC: 16 MG/DL (ref 5–25)
CALCIUM SERPL-MCNC: 9.1 MG/DL (ref 8.3–10.1)
CHLORIDE SERPL-SCNC: 98 MMOL/L (ref 100–108)
CO2 SERPL-SCNC: 29 MMOL/L (ref 21–32)
CREAT SERPL-MCNC: 1.12 MG/DL (ref 0.6–1.3)
GFR SERPL CREATININE-BSD FRML MDRD: 42 ML/MIN/1.73SQ M
GLUCOSE P FAST SERPL-MCNC: 189 MG/DL (ref 65–99)
POTASSIUM SERPL-SCNC: 3.4 MMOL/L (ref 3.5–5.3)
SODIUM SERPL-SCNC: 134 MMOL/L (ref 136–145)

## 2018-08-06 PROCEDURE — 36415 COLL VENOUS BLD VENIPUNCTURE: CPT

## 2018-08-06 PROCEDURE — 80048 BASIC METABOLIC PNL TOTAL CA: CPT

## 2018-08-16 ENCOUNTER — APPOINTMENT (OUTPATIENT)
Dept: LAB | Age: 83
End: 2018-08-16
Payer: MEDICARE

## 2018-08-16 DIAGNOSIS — N18.30 CKD (CHRONIC KIDNEY DISEASE), STAGE III (HCC): ICD-10-CM

## 2018-08-16 DIAGNOSIS — E87.6 HYPOKALEMIA: ICD-10-CM

## 2018-08-16 LAB
ANION GAP SERPL CALCULATED.3IONS-SCNC: 8 MMOL/L (ref 4–13)
BUN SERPL-MCNC: 20 MG/DL (ref 5–25)
CALCIUM SERPL-MCNC: 9.1 MG/DL (ref 8.3–10.1)
CHLORIDE SERPL-SCNC: 101 MMOL/L (ref 100–108)
CO2 SERPL-SCNC: 26 MMOL/L (ref 21–32)
CREAT SERPL-MCNC: 1.18 MG/DL (ref 0.6–1.3)
GFR SERPL CREATININE-BSD FRML MDRD: 40 ML/MIN/1.73SQ M
GLUCOSE P FAST SERPL-MCNC: 193 MG/DL (ref 65–99)
POTASSIUM SERPL-SCNC: 4.1 MMOL/L (ref 3.5–5.3)
SODIUM SERPL-SCNC: 135 MMOL/L (ref 136–145)

## 2018-08-16 PROCEDURE — 80048 BASIC METABOLIC PNL TOTAL CA: CPT

## 2018-08-16 PROCEDURE — 36415 COLL VENOUS BLD VENIPUNCTURE: CPT

## 2018-08-20 ENCOUNTER — OFFICE VISIT (OUTPATIENT)
Dept: INTERNAL MEDICINE CLINIC | Age: 83
End: 2018-08-20
Payer: MEDICARE

## 2018-08-20 VITALS
HEART RATE: 82 BPM | WEIGHT: 188.6 LBS | DIASTOLIC BLOOD PRESSURE: 62 MMHG | OXYGEN SATURATION: 97 % | BODY MASS INDEX: 37.03 KG/M2 | TEMPERATURE: 97.9 F | SYSTOLIC BLOOD PRESSURE: 112 MMHG | HEIGHT: 60 IN

## 2018-08-20 DIAGNOSIS — E11.9 DIABETES MELLITUS TYPE 2, NONINSULIN DEPENDENT (HCC): ICD-10-CM

## 2018-08-20 DIAGNOSIS — N18.30 CKD (CHRONIC KIDNEY DISEASE), STAGE III (HCC): ICD-10-CM

## 2018-08-20 DIAGNOSIS — E11.8 TYPE 2 DIABETES MELLITUS WITH COMPLICATION, WITHOUT LONG-TERM CURRENT USE OF INSULIN (HCC): Primary | ICD-10-CM

## 2018-08-20 DIAGNOSIS — I10 BENIGN ESSENTIAL HYPERTENSION: ICD-10-CM

## 2018-08-20 DIAGNOSIS — E87.6 HYPOKALEMIA: ICD-10-CM

## 2018-08-20 PROCEDURE — 99214 OFFICE O/P EST MOD 30 MIN: CPT | Performed by: INTERNAL MEDICINE

## 2018-08-20 NOTE — PROGRESS NOTES
Assessment/Plan:  1  Type 2 diabetes mellitus   will increase Januvia 100 mg daily  She will continue glipizide XL 10 mg once a day  Again advised to ovoid donuts or any other form of concern treated sweets  2  Essential hypertension   blood pressure is well controlled will continue on losartan 25 mg daily     3  Hypokalemia   recent potassium is 4 1  Advised to take KCl 20 mEq 2 tablets twice a day and will repeat electrolytes in a couple of weeks  Diagnoses and all orders for this visit:    Type 2 diabetes mellitus with complication, without long-term current use of insulin (HCC)    Hypokalemia  -     magnesium oxide (MAG-OX) 400 mg; Take 1 tablet (400 mg total) by mouth daily  -     Basic metabolic panel; Future    CKD (chronic kidney disease), stage III  -     magnesium oxide (MAG-OX) 400 mg; Take 1 tablet (400 mg total) by mouth daily  -     Basic metabolic panel; Future  -     Hemoglobin A1C; Future  -     CBC; Future    Benign essential hypertension    Diabetes mellitus type 2, noninsulin dependent (HCC)  -     sitaGLIPtin (JANUVIA) 100 mg tablet; Take 1 tablet (100 mg total) by mouth daily  -     Lipid Panel with Direct LDL reflex; Future          Subjective:          Patient ID: Kayla Reyes is a 80 y o  female  Diabetes   She presents for her follow-up diabetic visit  She has type 2 diabetes mellitus  Her disease course has been improving  There are no hypoglycemic associated symptoms  Pertinent negatives for hypoglycemia include no dizziness, headaches or nervousness/anxiousness  There are no diabetic associated symptoms  Pertinent negatives for diabetes include no chest pain, no fatigue, no polyuria and no weakness  There are no hypoglycemic complications  Pertinent negatives for diabetic complications include no autonomic neuropathy  Risk factors for coronary artery disease include diabetes mellitus and dyslipidemia  Current diabetic treatment includes oral agent (dual therapy)   She is compliant with treatment most of the time  She never participates in exercise  An ACE inhibitor/angiotensin II receptor blocker is being taken  She does not see a podiatrist Eye exam is current  The following portions of the patient's history were reviewed and updated as appropriate: allergies, current medications, past family history, past medical history, past social history, past surgical history and problem list     Review of Systems   Constitutional: Negative for fatigue and fever  HENT: Negative for congestion, ear discharge, ear pain, postnasal drip, sinus pressure, sore throat, tinnitus and trouble swallowing  Eyes: Negative for discharge, itching and visual disturbance  Respiratory: Negative for cough and shortness of breath  Cardiovascular: Negative for chest pain and palpitations  Gastrointestinal: Negative for abdominal pain, diarrhea, nausea and vomiting  Endocrine: Negative for cold intolerance and polyuria  Genitourinary: Negative for difficulty urinating, dysuria and urgency  Musculoskeletal: Negative for arthralgias and neck pain  Skin: Negative for rash  Allergic/Immunologic: Negative for environmental allergies  Neurological: Negative for dizziness, weakness and headaches  Psychiatric/Behavioral: Negative for agitation  The patient is not nervous/anxious            Past Medical History:   Diagnosis Date    Aortic stenosis     Bilateral edema of lower extremity     Dementia     Fall     Gait abnormality     Hyperlipidemia     Hypertension     Hypokalemia     Other ascites     Varicose veins of leg with edema          Current Outpatient Prescriptions:     donepezil (ARICEPT) 10 mg tablet, Take 1 tablet by mouth daily, Disp: , Rfl:     furosemide (LASIX) 40 mg tablet, Take 1 tablet (40 mg total) by mouth daily, Disp: 90 tablet, Rfl: 1    glipiZIDE (GLUCOTROL XL) 10 mg 24 hr tablet, Take 1 tablet (10 mg total) by mouth daily, Disp: 90 tablet, Rfl: 1   losartan (COZAAR) 25 mg tablet, Take 1 tablet (25 mg total) by mouth daily, Disp: 90 tablet, Rfl: 1    magnesium oxide (MAG-OX) 400 mg, Take 1 tablet (400 mg total) by mouth daily, Disp: 90 tablet, Rfl: 1    metolazone (ZAROXOLYN) 5 mg tablet, Take 1 tablet (5 mg total) by mouth daily, Disp: 90 tablet, Rfl: 1    Potassium Chloride ER 20 MEQ TBCR, Take 2 tablets (40 mEq total) by mouth 3 (three) times a day (Patient taking differently: Take 3 tablets by mouth 3 (three) times a day  ), Disp: 540 tablet, Rfl: 0    simvastatin (ZOCOR) 40 mg tablet, Take 1 tablet (40 mg total) by mouth daily, Disp: 90 tablet, Rfl: 1    sitaGLIPtin (JANUVIA) 100 mg tablet, Take 1 tablet (100 mg total) by mouth daily, Disp: 90 tablet, Rfl: 2    No Known Allergies    Social History   Past Surgical History:   Procedure Laterality Date    BREAST SURGERY      ELBOW SURGERY       Family History   Problem Relation Age of Onset    Dementia Sister        Objective:  /62 (BP Location: Left arm, Patient Position: Sitting, Cuff Size: Standard)   Pulse 82   Temp 97 9 °F (36 6 °C) (Tympanic)   Ht 4' 11 69" (1 516 m)   Wt 85 5 kg (188 lb 9 6 oz)   SpO2 97%   BMI 37 22 kg/m²   Body mass index is 37 22 kg/m²  Physical Exam   Constitutional: She appears well-developed  HENT:   Head: Normocephalic  Right Ear: External ear normal    Left Ear: External ear normal    Mouth/Throat: Oropharynx is clear and moist    Eyes: Pupils are equal, round, and reactive to light  No scleral icterus  Neck: Normal range of motion  Neck supple  No tracheal deviation present  No thyromegaly present  Cardiovascular: Normal rate, regular rhythm and normal heart sounds  No murmur heard  Pulmonary/Chest: Effort normal and breath sounds normal  No respiratory distress  She exhibits no tenderness  Abdominal: Soft  Bowel sounds are normal  She exhibits no mass  There is no tenderness  Musculoskeletal: Normal range of motion  She exhibits edema  Lymphadenopathy:     She has no cervical adenopathy  Neurological: She is alert  No cranial nerve deficit  Skin: Skin is warm  Psychiatric: She has a normal mood and affect

## 2018-09-10 DIAGNOSIS — N18.30 CKD (CHRONIC KIDNEY DISEASE), STAGE III (HCC): ICD-10-CM

## 2018-09-10 DIAGNOSIS — E87.6 HYPOKALEMIA: ICD-10-CM

## 2018-09-10 RX ORDER — POTASSIUM CHLORIDE 1500 MG/1
2 TABLET, FILM COATED, EXTENDED RELEASE ORAL 3 TIMES DAILY
Qty: 540 TABLET | Refills: 1 | Status: SHIPPED | OUTPATIENT
Start: 2018-09-10 | End: 2018-11-20 | Stop reason: SDUPTHER

## 2018-09-13 DIAGNOSIS — E11.8 TYPE 2 DIABETES MELLITUS WITH COMPLICATION, WITHOUT LONG-TERM CURRENT USE OF INSULIN (HCC): ICD-10-CM

## 2018-09-13 RX ORDER — GLIPIZIDE 10 MG/1
10 TABLET, FILM COATED, EXTENDED RELEASE ORAL DAILY
Qty: 90 TABLET | Refills: 0 | Status: SHIPPED | OUTPATIENT
Start: 2018-09-13 | End: 2018-12-10 | Stop reason: SDUPTHER

## 2018-09-17 ENCOUNTER — APPOINTMENT (OUTPATIENT)
Dept: LAB | Age: 83
End: 2018-09-17
Payer: MEDICARE

## 2018-09-17 DIAGNOSIS — N18.30 CKD (CHRONIC KIDNEY DISEASE), STAGE III (HCC): ICD-10-CM

## 2018-09-17 LAB
ANION GAP SERPL CALCULATED.3IONS-SCNC: 9 MMOL/L (ref 4–13)
BUN SERPL-MCNC: 24 MG/DL (ref 5–25)
CALCIUM SERPL-MCNC: 9.2 MG/DL (ref 8.3–10.1)
CHLORIDE SERPL-SCNC: 96 MMOL/L (ref 100–108)
CO2 SERPL-SCNC: 27 MMOL/L (ref 21–32)
CREAT SERPL-MCNC: 1.19 MG/DL (ref 0.6–1.3)
GFR SERPL CREATININE-BSD FRML MDRD: 39 ML/MIN/1.73SQ M
GLUCOSE P FAST SERPL-MCNC: 209 MG/DL (ref 65–99)
POTASSIUM SERPL-SCNC: 3 MMOL/L (ref 3.5–5.3)
SODIUM SERPL-SCNC: 132 MMOL/L (ref 136–145)

## 2018-09-17 PROCEDURE — 36415 COLL VENOUS BLD VENIPUNCTURE: CPT

## 2018-09-17 PROCEDURE — 80048 BASIC METABOLIC PNL TOTAL CA: CPT

## 2018-09-20 ENCOUNTER — PATIENT OUTREACH (OUTPATIENT)
Dept: INTERNAL MEDICINE CLINIC | Age: 83
End: 2018-09-20

## 2018-09-20 NOTE — PROGRESS NOTES
Outpatient Care Management Note: Spoke with pt's daughter and pt is doing well  She has not had any recent health issues and [place] her mom over the weekend days  Daughter has changed some meal choices around for her mom on the days that she has her- to more diabetic friendly ones   She declines the need for future outreach at this time  Has my information saved in her phone  Pt had labs done 3 days ago and Potassium dose has been adjusted accordingly because it had been lowered and now increased again due to low result  Due for labs again before her follow up in December

## 2018-10-08 DIAGNOSIS — F03.90 SENILE DEMENTIA WITHOUT BEHAVIORAL DISTURBANCE (HCC): Primary | ICD-10-CM

## 2018-10-08 RX ORDER — DONEPEZIL HYDROCHLORIDE 10 MG/1
10 TABLET, FILM COATED ORAL DAILY
Qty: 90 TABLET | Refills: 0 | Status: SHIPPED | OUTPATIENT
Start: 2018-10-08 | End: 2018-12-27 | Stop reason: SDUPTHER

## 2018-10-22 LAB
LEFT EYE DIABETIC RETINOPATHY: NORMAL
RIGHT EYE DIABETIC RETINOPATHY: NORMAL

## 2018-11-13 DIAGNOSIS — I83.899 VARICOSE VEINS OF LOWER EXTREMITY WITH EDEMA, UNSPECIFIED LATERALITY: ICD-10-CM

## 2018-11-13 RX ORDER — METOLAZONE 5 MG/1
5 TABLET ORAL DAILY
Qty: 90 TABLET | Refills: 1 | Status: SHIPPED | OUTPATIENT
Start: 2018-11-13 | End: 2019-05-16 | Stop reason: SDUPTHER

## 2018-11-19 ENCOUNTER — TELEPHONE (OUTPATIENT)
Dept: INTERNAL MEDICINE CLINIC | Facility: CLINIC | Age: 83
End: 2018-11-19

## 2018-11-19 DIAGNOSIS — I10 HYPERTENSION, UNSPECIFIED TYPE: ICD-10-CM

## 2018-11-19 RX ORDER — LOSARTAN POTASSIUM 25 MG/1
25 TABLET ORAL DAILY
Qty: 90 TABLET | Refills: 1 | Status: SHIPPED | OUTPATIENT
Start: 2018-11-19 | End: 2019-05-16 | Stop reason: SDUPTHER

## 2018-11-19 NOTE — TELEPHONE ENCOUNTER
Pt has been taking 3 tablets TID  Another corrected updated task has been sent to Dr Alicia Hatfield

## 2018-11-19 NOTE — TELEPHONE ENCOUNTER
Felicitas Tanner called and wanted a refill of Potassium 20 MEQ  Felicitas Tanner stated pt is taking 3 tablets BID, but her chart and your last note documents 2 tablets BID  Please advise which dose patient should be taking

## 2018-11-19 NOTE — TELEPHONE ENCOUNTER
Dr Mac Ulloa,    Pt has been taking three tablets TID and it is actually in the chart 2 tablets TID  Please advise  Is it okay to refill take 3 20 MEQ tablets TID as patient has been taking  Sorry for confusion

## 2018-11-20 DIAGNOSIS — E87.6 HYPOKALEMIA: ICD-10-CM

## 2018-11-20 DIAGNOSIS — N18.30 CKD (CHRONIC KIDNEY DISEASE), STAGE III (HCC): ICD-10-CM

## 2018-11-20 RX ORDER — POTASSIUM CHLORIDE 1500 MG/1
3 TABLET, FILM COATED, EXTENDED RELEASE ORAL 3 TIMES DAILY
Qty: 810 TABLET | Refills: 0 | Status: SHIPPED | OUTPATIENT
Start: 2018-11-20 | End: 2019-02-25 | Stop reason: SDUPTHER

## 2018-11-20 NOTE — TELEPHONE ENCOUNTER
Dr Paulino Connolly,     Please advise  Pt is taking 3 tablets TID  This is what she is requesting to renew

## 2018-11-26 ENCOUNTER — APPOINTMENT (OUTPATIENT)
Dept: LAB | Age: 83
End: 2018-11-26
Payer: MEDICARE

## 2018-11-26 DIAGNOSIS — N18.30 CKD (CHRONIC KIDNEY DISEASE), STAGE III (HCC): ICD-10-CM

## 2018-11-26 DIAGNOSIS — E87.6 HYPOKALEMIA: ICD-10-CM

## 2018-11-26 DIAGNOSIS — E11.9 DIABETES MELLITUS TYPE 2, NONINSULIN DEPENDENT (HCC): ICD-10-CM

## 2018-11-26 LAB
ANION GAP SERPL CALCULATED.3IONS-SCNC: 8 MMOL/L (ref 4–13)
BUN SERPL-MCNC: 21 MG/DL (ref 5–25)
CALCIUM SERPL-MCNC: 10.1 MG/DL (ref 8.3–10.1)
CHLORIDE SERPL-SCNC: 96 MMOL/L (ref 100–108)
CHOLEST SERPL-MCNC: 174 MG/DL (ref 50–200)
CO2 SERPL-SCNC: 27 MMOL/L (ref 21–32)
CREAT SERPL-MCNC: 1.19 MG/DL (ref 0.6–1.3)
ERYTHROCYTE [DISTWIDTH] IN BLOOD BY AUTOMATED COUNT: 12.8 % (ref 11.6–15.1)
EST. AVERAGE GLUCOSE BLD GHB EST-MCNC: 214 MG/DL
GFR SERPL CREATININE-BSD FRML MDRD: 39 ML/MIN/1.73SQ M
GLUCOSE P FAST SERPL-MCNC: 262 MG/DL (ref 65–99)
HBA1C MFR BLD: 9.1 % (ref 4.2–6.3)
HCT VFR BLD AUTO: 38.8 % (ref 34.8–46.1)
HDLC SERPL-MCNC: 47 MG/DL (ref 40–60)
HGB BLD-MCNC: 12.8 G/DL (ref 11.5–15.4)
LDLC SERPL CALC-MCNC: 95 MG/DL (ref 0–100)
MCH RBC QN AUTO: 29.6 PG (ref 26.8–34.3)
MCHC RBC AUTO-ENTMCNC: 33 G/DL (ref 31.4–37.4)
MCV RBC AUTO: 90 FL (ref 82–98)
PLATELET # BLD AUTO: 266 THOUSANDS/UL (ref 149–390)
PMV BLD AUTO: 11.8 FL (ref 8.9–12.7)
POTASSIUM SERPL-SCNC: 3.5 MMOL/L (ref 3.5–5.3)
RBC # BLD AUTO: 4.32 MILLION/UL (ref 3.81–5.12)
SODIUM SERPL-SCNC: 131 MMOL/L (ref 136–145)
TRIGL SERPL-MCNC: 160 MG/DL
WBC # BLD AUTO: 12.71 THOUSAND/UL (ref 4.31–10.16)

## 2018-11-26 PROCEDURE — 83036 HEMOGLOBIN GLYCOSYLATED A1C: CPT

## 2018-11-26 PROCEDURE — 85027 COMPLETE CBC AUTOMATED: CPT

## 2018-11-26 PROCEDURE — 36415 COLL VENOUS BLD VENIPUNCTURE: CPT

## 2018-11-26 PROCEDURE — 80048 BASIC METABOLIC PNL TOTAL CA: CPT

## 2018-11-26 PROCEDURE — 80061 LIPID PANEL: CPT

## 2018-11-29 DIAGNOSIS — I10 ESSENTIAL HYPERTENSION, BENIGN: ICD-10-CM

## 2018-11-29 RX ORDER — FUROSEMIDE 40 MG/1
40 TABLET ORAL DAILY
Qty: 90 TABLET | Refills: 1 | Status: SHIPPED | OUTPATIENT
Start: 2018-11-29 | End: 2019-05-29 | Stop reason: SDUPTHER

## 2018-12-03 ENCOUNTER — OFFICE VISIT (OUTPATIENT)
Dept: INTERNAL MEDICINE CLINIC | Age: 83
End: 2018-12-03
Payer: MEDICARE

## 2018-12-03 VITALS
HEIGHT: 60 IN | BODY MASS INDEX: 37.5 KG/M2 | WEIGHT: 191 LBS | OXYGEN SATURATION: 98 % | TEMPERATURE: 97.5 F | HEART RATE: 79 BPM | DIASTOLIC BLOOD PRESSURE: 58 MMHG | SYSTOLIC BLOOD PRESSURE: 120 MMHG

## 2018-12-03 DIAGNOSIS — E11.8 TYPE 2 DIABETES MELLITUS WITH COMPLICATION, WITHOUT LONG-TERM CURRENT USE OF INSULIN (HCC): Primary | ICD-10-CM

## 2018-12-03 DIAGNOSIS — I10 BENIGN ESSENTIAL HYPERTENSION: ICD-10-CM

## 2018-12-03 DIAGNOSIS — E87.1 HYPONATREMIA: ICD-10-CM

## 2018-12-03 PROCEDURE — 99214 OFFICE O/P EST MOD 30 MIN: CPT | Performed by: INTERNAL MEDICINE

## 2018-12-03 NOTE — PROGRESS NOTES
Assessment/Plan:    2  Uncontrolled type 2 diabetes mellitus  Hemoglobin A1c still higher but better than previous 1  Will add metformin 500 mg p o  B i d  Along with Januvia and glipizide  2  Hypernatremia  Advised to use little bit more salt  Will check serum osmolality along with the electrolytes  3  Essential hypertension  Blood pressure is well controlled on present regimen  Diagnoses and all orders for this visit:    Type 2 diabetes mellitus with complication, without long-term current use of insulin (HCC)  -     metFORMIN (GLUCOPHAGE) 500 mg tablet; Take 1 tablet (500 mg total) by mouth 2 (two) times a day with meals  -     Basic metabolic panel; Future  -     CBC; Future  -     Hemoglobin A1C; Future  -     Lipid Panel with Direct LDL reflex; Future    Benign essential hypertension    Hyponatremia  -     Osmolality; Future          Subjective:          Patient ID: Alfred Spicer is a 80 y o  female  Diabetes   She presents for her follow-up diabetic visit  She has type 2 diabetes mellitus  Hypoglycemia symptoms include confusion  Pertinent negatives for hypoglycemia include no dizziness, headaches or nervousness/anxiousness  Pertinent negatives for diabetes include no blurred vision, no chest pain, no fatigue, no polyuria, no visual change and no weakness  There are no hypoglycemic complications  Symptoms are improving  Diabetic complications include nephropathy  Risk factors for coronary artery disease include diabetes mellitus, dyslipidemia and hypertension  She is following a diabetic diet  When asked about meal planning, she reported none  She has not had a previous visit with a dietitian  She never participates in exercise  Her home blood glucose trend is decreasing steadily  An ACE inhibitor/angiotensin II receptor blocker is being taken  She does not see a podiatrist Eye exam is current         The following portions of the patient's history were reviewed and updated as appropriate: allergies, current medications, past family history, past medical history, past social history, past surgical history and problem list     Review of Systems   Constitutional: Negative for fatigue and fever  HENT: Negative for congestion, ear discharge, ear pain, postnasal drip, sinus pressure, sore throat, tinnitus and trouble swallowing  Eyes: Negative for blurred vision, discharge, itching and visual disturbance  Respiratory: Negative for cough and shortness of breath  Cardiovascular: Negative for chest pain and palpitations  Gastrointestinal: Negative for abdominal pain, diarrhea, nausea and vomiting  Endocrine: Negative for cold intolerance and polyuria  Genitourinary: Negative for difficulty urinating, dysuria and urgency  Musculoskeletal: Negative for arthralgias and neck pain  Skin: Negative for rash  Allergic/Immunologic: Negative for environmental allergies  Neurological: Negative for dizziness, weakness and headaches  Psychiatric/Behavioral: Positive for confusion  The patient is not nervous/anxious            Past Medical History:   Diagnosis Date    Aortic stenosis     Bilateral edema of lower extremity     Dementia     Fall     Gait abnormality     Hyperlipidemia     Hypertension     Hypokalemia     Other ascites     Varicose veins of leg with edema          Current Outpatient Prescriptions:     donepezil (ARICEPT) 10 mg tablet, Take 1 tablet (10 mg total) by mouth daily, Disp: 90 tablet, Rfl: 0    furosemide (LASIX) 40 mg tablet, Take 1 tablet (40 mg total) by mouth daily, Disp: 90 tablet, Rfl: 1    glipiZIDE (GLUCOTROL XL) 10 mg 24 hr tablet, Take 1 tablet (10 mg total) by mouth daily, Disp: 90 tablet, Rfl: 0    losartan (COZAAR) 25 mg tablet, Take 1 tablet (25 mg total) by mouth daily, Disp: 90 tablet, Rfl: 1    magnesium oxide (MAG-OX) 400 mg, Take 1 tablet (400 mg total) by mouth daily, Disp: 90 tablet, Rfl: 1    metolazone (ZAROXOLYN) 5 mg tablet, Take 1 tablet (5 mg total) by mouth daily, Disp: 90 tablet, Rfl: 1    Potassium Chloride ER 20 MEQ TBCR, Take 3 tablets (60 mEq total) by mouth 3 (three) times a day, Disp: 810 tablet, Rfl: 0    simvastatin (ZOCOR) 40 mg tablet, Take 1 tablet (40 mg total) by mouth daily, Disp: 90 tablet, Rfl: 1    sitaGLIPtin (JANUVIA) 100 mg tablet, Take 1 tablet (100 mg total) by mouth daily, Disp: 90 tablet, Rfl: 2    metFORMIN (GLUCOPHAGE) 500 mg tablet, Take 1 tablet (500 mg total) by mouth 2 (two) times a day with meals, Disp: 180 tablet, Rfl: 2    No Known Allergies    Social History   Past Surgical History:   Procedure Laterality Date    BREAST SURGERY      ELBOW SURGERY       Family History   Problem Relation Age of Onset    Dementia Sister        Objective:  /58 (BP Location: Left arm, Patient Position: Sitting, Cuff Size: Large)   Pulse 79   Temp 97 5 °F (36 4 °C) (Tympanic)   Ht 4' 11 69" (1 516 m)   Wt 86 6 kg (191 lb)   SpO2 98%   BMI 37 69 kg/m²   Body mass index is 37 69 kg/m²  Physical Exam   Constitutional: She appears well-developed  HENT:   Head: Normocephalic  Right Ear: External ear normal    Left Ear: External ear normal    Mouth/Throat: Oropharynx is clear and moist  No oropharyngeal exudate  Eyes: Pupils are equal, round, and reactive to light  No scleral icterus  Neck: Normal range of motion  Neck supple  No tracheal deviation present  No thyromegaly present  Cardiovascular: Normal rate, regular rhythm and normal heart sounds  Pulmonary/Chest: Effort normal and breath sounds normal  No respiratory distress  She exhibits no tenderness  Abdominal: Soft  Bowel sounds are normal  She exhibits no mass  There is no tenderness  Musculoskeletal: Normal range of motion  She exhibits edema  Lymphadenopathy:     She has no cervical adenopathy  Neurological: She is alert  No cranial nerve deficit  Skin: Skin is warm  Psychiatric: She has a normal mood and affect

## 2018-12-10 DIAGNOSIS — E11.8 TYPE 2 DIABETES MELLITUS WITH COMPLICATION, WITHOUT LONG-TERM CURRENT USE OF INSULIN (HCC): ICD-10-CM

## 2018-12-10 RX ORDER — GLIPIZIDE 10 MG/1
10 TABLET, FILM COATED, EXTENDED RELEASE ORAL DAILY
Qty: 90 TABLET | Refills: 1 | Status: SHIPPED | OUTPATIENT
Start: 2018-12-10 | End: 2019-06-17 | Stop reason: SDUPTHER

## 2018-12-27 DIAGNOSIS — F03.90 SENILE DEMENTIA WITHOUT BEHAVIORAL DISTURBANCE (HCC): ICD-10-CM

## 2018-12-27 RX ORDER — DONEPEZIL HYDROCHLORIDE 10 MG/1
10 TABLET, FILM COATED ORAL DAILY
Qty: 90 TABLET | Refills: 1 | Status: SHIPPED | OUTPATIENT
Start: 2018-12-27 | End: 2019-07-01 | Stop reason: SDUPTHER

## 2018-12-31 ENCOUNTER — TELEPHONE (OUTPATIENT)
Dept: INTERNAL MEDICINE CLINIC | Age: 83
End: 2018-12-31

## 2018-12-31 DIAGNOSIS — E11.9 TYPE 2 DIABETES MELLITUS WITHOUT COMPLICATION, WITHOUT LONG-TERM CURRENT USE OF INSULIN (HCC): Primary | ICD-10-CM

## 2018-12-31 RX ORDER — REPAGLINIDE 2 MG/1
2 TABLET ORAL
Qty: 270 TABLET | Refills: 1 | Status: SHIPPED | OUTPATIENT
Start: 2018-12-31 | End: 2019-07-01 | Stop reason: SDUPTHER

## 2018-12-31 NOTE — TELEPHONE ENCOUNTER
Patient's daughter Vidal Lee is calling to let you know that the Metformin that she is taking is giving her severe Diarrhea  She can't take the medication because of it  She had to stop it because of it  She gets it when she tried to do the once a day and also twice a day  She stopped it over the weekend and she has been doing good  She has been doing this off and on for over two weeks  Please call her back to let her know what she should be doing now with this   Not sure if you want to give her something else in place of this or hold off on it      Please call her back at the mobile number

## 2019-01-03 DIAGNOSIS — E78.00 HYPERCHOLESTEROLEMIA: ICD-10-CM

## 2019-01-03 RX ORDER — SIMVASTATIN 40 MG
40 TABLET ORAL DAILY
Qty: 90 TABLET | Refills: 1 | Status: SHIPPED | OUTPATIENT
Start: 2019-01-03 | End: 2019-08-01 | Stop reason: SDUPTHER

## 2019-01-28 DIAGNOSIS — N18.30 CKD (CHRONIC KIDNEY DISEASE), STAGE III (HCC): ICD-10-CM

## 2019-01-28 DIAGNOSIS — E87.6 HYPOKALEMIA: ICD-10-CM

## 2019-02-25 DIAGNOSIS — E87.6 HYPOKALEMIA: ICD-10-CM

## 2019-02-25 DIAGNOSIS — N18.30 CKD (CHRONIC KIDNEY DISEASE), STAGE III (HCC): ICD-10-CM

## 2019-02-25 RX ORDER — POTASSIUM CHLORIDE 1500 MG/1
3 TABLET, FILM COATED, EXTENDED RELEASE ORAL 3 TIMES DAILY
Qty: 810 TABLET | Refills: 1 | Status: SHIPPED | OUTPATIENT
Start: 2019-02-25 | End: 2019-09-03 | Stop reason: SDUPTHER

## 2019-02-28 DIAGNOSIS — E11.9 DIABETES MELLITUS TYPE 2, NONINSULIN DEPENDENT (HCC): ICD-10-CM

## 2019-03-25 ENCOUNTER — APPOINTMENT (OUTPATIENT)
Dept: LAB | Age: 84
End: 2019-03-25
Payer: MEDICARE

## 2019-03-25 DIAGNOSIS — E11.8 TYPE 2 DIABETES MELLITUS WITH COMPLICATION, WITHOUT LONG-TERM CURRENT USE OF INSULIN (HCC): ICD-10-CM

## 2019-03-25 DIAGNOSIS — E87.1 HYPONATREMIA: ICD-10-CM

## 2019-03-25 LAB
ANION GAP SERPL CALCULATED.3IONS-SCNC: 7 MMOL/L (ref 4–13)
BUN SERPL-MCNC: 18 MG/DL (ref 5–25)
CALCIUM SERPL-MCNC: 9.2 MG/DL (ref 8.3–10.1)
CHLORIDE SERPL-SCNC: 98 MMOL/L (ref 100–108)
CHOLEST SERPL-MCNC: 167 MG/DL (ref 50–200)
CO2 SERPL-SCNC: 28 MMOL/L (ref 21–32)
CREAT SERPL-MCNC: 1.27 MG/DL (ref 0.6–1.3)
ERYTHROCYTE [DISTWIDTH] IN BLOOD BY AUTOMATED COUNT: 12.8 % (ref 11.6–15.1)
EST. AVERAGE GLUCOSE BLD GHB EST-MCNC: 212 MG/DL
GFR SERPL CREATININE-BSD FRML MDRD: 36 ML/MIN/1.73SQ M
GLUCOSE SERPL-MCNC: 225 MG/DL (ref 65–140)
HBA1C MFR BLD: 9 % (ref 4.2–6.3)
HCT VFR BLD AUTO: 39.2 % (ref 34.8–46.1)
HDLC SERPL-MCNC: 50 MG/DL (ref 40–60)
HGB BLD-MCNC: 12.8 G/DL (ref 11.5–15.4)
LDLC SERPL CALC-MCNC: 87 MG/DL (ref 0–100)
MCH RBC QN AUTO: 29.8 PG (ref 26.8–34.3)
MCHC RBC AUTO-ENTMCNC: 32.7 G/DL (ref 31.4–37.4)
MCV RBC AUTO: 91 FL (ref 82–98)
OSMOLALITY UR/SERPL-RTO: 296 MMOL/KG (ref 282–298)
PLATELET # BLD AUTO: 279 THOUSANDS/UL (ref 149–390)
PMV BLD AUTO: 11.6 FL (ref 8.9–12.7)
POTASSIUM SERPL-SCNC: 3.3 MMOL/L (ref 3.5–5.3)
RBC # BLD AUTO: 4.3 MILLION/UL (ref 3.81–5.12)
SODIUM SERPL-SCNC: 133 MMOL/L (ref 136–145)
TRIGL SERPL-MCNC: 148 MG/DL
WBC # BLD AUTO: 11.53 THOUSAND/UL (ref 4.31–10.16)

## 2019-03-25 PROCEDURE — 36415 COLL VENOUS BLD VENIPUNCTURE: CPT

## 2019-03-25 PROCEDURE — 83036 HEMOGLOBIN GLYCOSYLATED A1C: CPT

## 2019-03-25 PROCEDURE — 80061 LIPID PANEL: CPT

## 2019-03-25 PROCEDURE — 85027 COMPLETE CBC AUTOMATED: CPT

## 2019-03-25 PROCEDURE — 83930 ASSAY OF BLOOD OSMOLALITY: CPT

## 2019-03-25 PROCEDURE — 80048 BASIC METABOLIC PNL TOTAL CA: CPT

## 2019-04-01 ENCOUNTER — OFFICE VISIT (OUTPATIENT)
Dept: INTERNAL MEDICINE CLINIC | Age: 84
End: 2019-04-01
Payer: MEDICARE

## 2019-04-01 VITALS
SYSTOLIC BLOOD PRESSURE: 108 MMHG | BODY MASS INDEX: 37.85 KG/M2 | TEMPERATURE: 97.6 F | OXYGEN SATURATION: 96 % | HEART RATE: 73 BPM | WEIGHT: 191.8 LBS | DIASTOLIC BLOOD PRESSURE: 64 MMHG

## 2019-04-01 DIAGNOSIS — I10 BENIGN ESSENTIAL HYPERTENSION: ICD-10-CM

## 2019-04-01 DIAGNOSIS — I35.0 MODERATE AORTIC STENOSIS: ICD-10-CM

## 2019-04-01 DIAGNOSIS — E11.8 TYPE 2 DIABETES MELLITUS WITH COMPLICATION, WITHOUT LONG-TERM CURRENT USE OF INSULIN (HCC): ICD-10-CM

## 2019-04-01 DIAGNOSIS — E87.6 HYPOKALEMIA: ICD-10-CM

## 2019-04-01 DIAGNOSIS — F03.90 UNCOMPLICATED SENILE DEMENTIA (HCC): ICD-10-CM

## 2019-04-01 DIAGNOSIS — I70.0 ATHEROSCLEROSIS OF AORTA (HCC): ICD-10-CM

## 2019-04-01 DIAGNOSIS — N18.30 CKD (CHRONIC KIDNEY DISEASE), STAGE III (HCC): Primary | ICD-10-CM

## 2019-04-01 PROCEDURE — 99214 OFFICE O/P EST MOD 30 MIN: CPT | Performed by: INTERNAL MEDICINE

## 2019-05-06 ENCOUNTER — OFFICE VISIT (OUTPATIENT)
Dept: INTERNAL MEDICINE CLINIC | Age: 84
End: 2019-05-06
Payer: MEDICARE

## 2019-05-06 VITALS
OXYGEN SATURATION: 96 % | BODY MASS INDEX: 38.12 KG/M2 | SYSTOLIC BLOOD PRESSURE: 110 MMHG | DIASTOLIC BLOOD PRESSURE: 60 MMHG | HEART RATE: 77 BPM | WEIGHT: 193.2 LBS

## 2019-05-06 DIAGNOSIS — F03.90 UNCOMPLICATED SENILE DEMENTIA (HCC): ICD-10-CM

## 2019-05-06 DIAGNOSIS — Z00.00 MEDICARE ANNUAL WELLNESS VISIT, SUBSEQUENT: ICD-10-CM

## 2019-05-06 DIAGNOSIS — E11.8 TYPE 2 DIABETES MELLITUS WITH COMPLICATION, WITHOUT LONG-TERM CURRENT USE OF INSULIN (HCC): Primary | ICD-10-CM

## 2019-05-06 DIAGNOSIS — I35.0 MODERATE AORTIC STENOSIS: ICD-10-CM

## 2019-05-06 DIAGNOSIS — N18.30 CKD (CHRONIC KIDNEY DISEASE), STAGE III (HCC): ICD-10-CM

## 2019-05-06 DIAGNOSIS — I10 BENIGN ESSENTIAL HYPERTENSION: ICD-10-CM

## 2019-05-06 PROCEDURE — 99214 OFFICE O/P EST MOD 30 MIN: CPT | Performed by: INTERNAL MEDICINE

## 2019-05-06 PROCEDURE — G0439 PPPS, SUBSEQ VISIT: HCPCS | Performed by: INTERNAL MEDICINE

## 2019-05-16 DIAGNOSIS — I83.899 VARICOSE VEINS OF LOWER EXTREMITY WITH EDEMA, UNSPECIFIED LATERALITY: ICD-10-CM

## 2019-05-16 DIAGNOSIS — I10 HYPERTENSION, UNSPECIFIED TYPE: ICD-10-CM

## 2019-05-16 RX ORDER — LOSARTAN POTASSIUM 25 MG/1
25 TABLET ORAL DAILY
Qty: 90 TABLET | Refills: 1 | Status: SHIPPED | OUTPATIENT
Start: 2019-05-16 | End: 2019-11-11 | Stop reason: SDUPTHER

## 2019-05-16 RX ORDER — METOLAZONE 5 MG/1
5 TABLET ORAL DAILY
Qty: 90 TABLET | Refills: 1 | Status: SHIPPED | OUTPATIENT
Start: 2019-05-16 | End: 2019-11-06 | Stop reason: SDUPTHER

## 2019-05-29 DIAGNOSIS — I10 ESSENTIAL HYPERTENSION, BENIGN: ICD-10-CM

## 2019-05-29 RX ORDER — FUROSEMIDE 40 MG/1
40 TABLET ORAL DAILY
Qty: 90 TABLET | Refills: 1 | Status: SHIPPED | OUTPATIENT
Start: 2019-05-29 | End: 2019-12-03 | Stop reason: SDUPTHER

## 2019-07-01 DIAGNOSIS — F03.90 SENILE DEMENTIA WITHOUT BEHAVIORAL DISTURBANCE (HCC): ICD-10-CM

## 2019-07-01 DIAGNOSIS — E11.9 TYPE 2 DIABETES MELLITUS WITHOUT COMPLICATION, WITHOUT LONG-TERM CURRENT USE OF INSULIN (HCC): ICD-10-CM

## 2019-07-01 RX ORDER — REPAGLINIDE 2 MG/1
2 TABLET ORAL
Qty: 270 TABLET | Refills: 1 | Status: SHIPPED | OUTPATIENT
Start: 2019-07-01 | End: 2019-12-30 | Stop reason: SDUPTHER

## 2019-07-01 RX ORDER — DONEPEZIL HYDROCHLORIDE 10 MG/1
10 TABLET, FILM COATED ORAL DAILY
Qty: 90 TABLET | Refills: 1 | Status: SHIPPED | OUTPATIENT
Start: 2019-07-01 | End: 2019-12-30 | Stop reason: SDUPTHER

## 2019-07-10 ENCOUNTER — HOSPITAL ENCOUNTER (EMERGENCY)
Facility: HOSPITAL | Age: 84
Discharge: HOME | End: 2019-07-10
Attending: SURGERY | Admitting: SURGERY
Payer: MEDICARE

## 2019-07-10 ENCOUNTER — APPOINTMENT (EMERGENCY)
Dept: RADIOLOGY | Facility: HOSPITAL | Age: 84
End: 2019-07-10
Payer: MEDICARE

## 2019-07-10 VITALS
RESPIRATION RATE: 16 BRPM | OXYGEN SATURATION: 95 % | DIASTOLIC BLOOD PRESSURE: 60 MMHG | SYSTOLIC BLOOD PRESSURE: 128 MMHG | TEMPERATURE: 98 F | WEIGHT: 199.3 LBS | HEART RATE: 77 BPM

## 2019-07-10 LAB
ABO GROUP BLD: NORMAL
ANION GAP SERPL CALCULATED.3IONS-SCNC: 9 MMOL/L (ref 4–13)
BASE EXCESS BLDA CALC-SCNC: 10 MMOL/L (ref -2–3)
BASOPHILS # BLD AUTO: 0.07 THOUSANDS/ΜL (ref 0–0.1)
BASOPHILS NFR BLD AUTO: 1 % (ref 0–1)
BLD GP AB SCN SERPL QL: NEGATIVE
BUN SERPL-MCNC: 21 MG/DL (ref 5–25)
CA-I BLD-SCNC: 1.11 MMOL/L (ref 1.12–1.32)
CALCIUM SERPL-MCNC: 9.8 MG/DL (ref 8.3–10.1)
CHLORIDE SERPL-SCNC: 97 MMOL/L (ref 100–108)
CK SERPL-CCNC: 102 U/L (ref 26–192)
CO2 SERPL-SCNC: 31 MMOL/L (ref 21–32)
CREAT SERPL-MCNC: 1.16 MG/DL (ref 0.6–1.3)
EOSINOPHIL # BLD AUTO: 0.15 THOUSAND/ΜL (ref 0–0.61)
EOSINOPHIL NFR BLD AUTO: 1 % (ref 0–6)
ERYTHROCYTE [DISTWIDTH] IN BLOOD BY AUTOMATED COUNT: 12.9 % (ref 11.6–15.1)
GFR SERPL CREATININE-BSD FRML MDRD: 40 ML/MIN/1.73SQ M
GLUCOSE SERPL-MCNC: 260 MG/DL (ref 65–140)
GLUCOSE SERPL-MCNC: 264 MG/DL (ref 65–140)
HCO3 BLDA-SCNC: 35 MMOL/L (ref 24–30)
HCT VFR BLD AUTO: 38.4 % (ref 34.8–46.1)
HCT VFR BLD CALC: 38 % (ref 34.8–46.1)
HGB BLD-MCNC: 12.9 G/DL (ref 11.5–15.4)
HGB BLDA-MCNC: 12.9 G/DL (ref 11.5–15.4)
IMM GRANULOCYTES # BLD AUTO: 0.08 THOUSAND/UL (ref 0–0.2)
IMM GRANULOCYTES NFR BLD AUTO: 1 % (ref 0–2)
LYMPHOCYTES # BLD AUTO: 3.17 THOUSANDS/ΜL (ref 0.6–4.47)
LYMPHOCYTES NFR BLD AUTO: 29 % (ref 14–44)
MCH RBC QN AUTO: 30.1 PG (ref 26.8–34.3)
MCHC RBC AUTO-ENTMCNC: 33.6 G/DL (ref 31.4–37.4)
MCV RBC AUTO: 90 FL (ref 82–98)
MONOCYTES # BLD AUTO: 0.9 THOUSAND/ΜL (ref 0.17–1.22)
MONOCYTES NFR BLD AUTO: 8 % (ref 4–12)
NEUTROPHILS # BLD AUTO: 6.71 THOUSANDS/ΜL (ref 1.85–7.62)
NEUTS SEG NFR BLD AUTO: 60 % (ref 43–75)
NRBC BLD AUTO-RTO: 0 /100 WBCS
PCO2 BLD: 36 MMOL/L (ref 21–32)
PCO2 BLD: 47.6 MM HG (ref 42–50)
PH BLD: 7.47 [PH] (ref 7.3–7.4)
PLATELET # BLD AUTO: 261 THOUSANDS/UL (ref 149–390)
PMV BLD AUTO: 11 FL (ref 8.9–12.7)
PO2 BLD: 19 MM HG (ref 35–45)
POTASSIUM BLD-SCNC: 4.8 MMOL/L (ref 3.5–5.3)
POTASSIUM SERPL-SCNC: 3 MMOL/L (ref 3.5–5.3)
RBC # BLD AUTO: 4.29 MILLION/UL (ref 3.81–5.12)
RH BLD: POSITIVE
SAO2 % BLD FROM PO2: 32 % (ref 95–98)
SODIUM BLD-SCNC: 136 MMOL/L (ref 136–145)
SODIUM SERPL-SCNC: 137 MMOL/L (ref 136–145)
SPECIMEN EXPIRATION DATE: NORMAL
SPECIMEN SOURCE: ABNORMAL
WBC # BLD AUTO: 11.08 THOUSAND/UL (ref 4.31–10.16)

## 2019-07-10 PROCEDURE — 71260 CT THORAX DX C+: CPT

## 2019-07-10 PROCEDURE — 86900 BLOOD TYPING SEROLOGIC ABO: CPT | Performed by: EMERGENCY MEDICINE

## 2019-07-10 PROCEDURE — 85014 HEMATOCRIT: CPT

## 2019-07-10 PROCEDURE — 84295 ASSAY OF SERUM SODIUM: CPT

## 2019-07-10 PROCEDURE — 74177 CT ABD & PELVIS W/CONTRAST: CPT

## 2019-07-10 PROCEDURE — 93005 ELECTROCARDIOGRAM TRACING: CPT

## 2019-07-10 PROCEDURE — 99282 EMERGENCY DEPT VISIT SF MDM: CPT | Performed by: SURGERY

## 2019-07-10 PROCEDURE — 82550 ASSAY OF CK (CPK): CPT | Performed by: EMERGENCY MEDICINE

## 2019-07-10 PROCEDURE — 86850 RBC ANTIBODY SCREEN: CPT | Performed by: EMERGENCY MEDICINE

## 2019-07-10 PROCEDURE — 80048 BASIC METABOLIC PNL TOTAL CA: CPT | Performed by: EMERGENCY MEDICINE

## 2019-07-10 PROCEDURE — 82947 ASSAY GLUCOSE BLOOD QUANT: CPT

## 2019-07-10 PROCEDURE — 82803 BLOOD GASES ANY COMBINATION: CPT

## 2019-07-10 PROCEDURE — 82330 ASSAY OF CALCIUM: CPT

## 2019-07-10 PROCEDURE — 84132 ASSAY OF SERUM POTASSIUM: CPT

## 2019-07-10 PROCEDURE — 36415 COLL VENOUS BLD VENIPUNCTURE: CPT | Performed by: EMERGENCY MEDICINE

## 2019-07-10 PROCEDURE — 70450 CT HEAD/BRAIN W/O DYE: CPT

## 2019-07-10 PROCEDURE — 86901 BLOOD TYPING SEROLOGIC RH(D): CPT | Performed by: EMERGENCY MEDICINE

## 2019-07-10 PROCEDURE — 99285 EMERGENCY DEPT VISIT HI MDM: CPT

## 2019-07-10 PROCEDURE — 85025 COMPLETE CBC W/AUTO DIFF WBC: CPT | Performed by: EMERGENCY MEDICINE

## 2019-07-10 PROCEDURE — 99283 EMERGENCY DEPT VISIT LOW MDM: CPT | Performed by: EMERGENCY MEDICINE

## 2019-07-10 PROCEDURE — 72125 CT NECK SPINE W/O DYE: CPT

## 2019-07-10 RX ORDER — LOSARTAN POTASSIUM 25 MG/1
25 TABLET ORAL DAILY
COMMUNITY
End: 2019-11-11

## 2019-07-10 RX ORDER — GLIPIZIDE 10 MG/1
10 TABLET, FILM COATED, EXTENDED RELEASE ORAL DAILY
COMMUNITY
End: 2019-12-09 | Stop reason: SDUPTHER

## 2019-07-10 RX ORDER — REPAGLINIDE 2 MG/1
2 TABLET ORAL
COMMUNITY
End: 2019-12-09 | Stop reason: SDUPTHER

## 2019-07-10 RX ORDER — DONEPEZIL HYDROCHLORIDE 10 MG/1
10 TABLET, FILM COATED ORAL
COMMUNITY
End: 2019-12-09 | Stop reason: SDUPTHER

## 2019-07-10 RX ORDER — SIMVASTATIN 40 MG
40 TABLET ORAL
COMMUNITY
End: 2019-12-09 | Stop reason: ALTCHOICE

## 2019-07-10 RX ORDER — POTASSIUM CHLORIDE 20 MEQ/1
20 TABLET, EXTENDED RELEASE ORAL 2 TIMES DAILY
COMMUNITY
End: 2019-08-27 | Stop reason: SDUPTHER

## 2019-07-10 RX ORDER — FUROSEMIDE 40 MG/1
40 TABLET ORAL 2 TIMES DAILY
COMMUNITY
End: 2019-12-09 | Stop reason: DRUGHIGH

## 2019-07-10 RX ORDER — METOLAZONE 5 MG/1
5 TABLET ORAL DAILY
COMMUNITY
End: 2019-12-09 | Stop reason: SDUPTHER

## 2019-07-10 RX ADMIN — IOHEXOL 100 ML: 350 INJECTION, SOLUTION INTRAVENOUS at 18:32

## 2019-07-10 NOTE — INCIDENTAL FINDINGS
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

## 2019-07-10 NOTE — H&P
H&P Exam - Trauma   Paola Rosen 80 y o  female MRN: [id number]  Unit/Bed#: TR 02 Encounter: [id number]    Assessment/Plan    Trauma Alert: Level B  Model of Arrival: Ambulance  Trauma Team: Attending Catheline Babinski, Residents Chelsie Bernardo and Fellow [facility]  Consultants: None    Trauma Active Problems:   S/p fall with unknown down time    Trauma Plan:   [facility] in ED  Discharge home w/ Daughter's      Chief Complaint: Fall    History of Present Illness   HPI:  Paola Rosen is a 80 y o  female who presents as a level B trauma alert  She was at home alone and had an unwitnessed fall  Does not remember the incident and unknown down time  Was found by neighbor and brought in via ambulance  Mechanism:Fall    Review of Systems   Unable to perform ROS: Dementia   Constitutional: Negative  HENT: Negative  Eyes: Negative  Respiratory: Negative  Cardiovascular: Negative  Gastrointestinal: Negative  Endocrine: Negative  Genitourinary: Negative  Musculoskeletal: Negative  Skin: Negative  Allergic/Immunologic: Negative  Neurological: Negative  Hematological: Negative  Psychiatric/Behavioral:        Dementia       Historical Information   History is unobtainable from the patient due to n/a  Efforts to obtain history included the following sources: family member    No past medical history on file  No past surgical history on file  Social History   Social History     Substance and Sexual Activity   Alcohol Use Not on file     Social History     Substance and Sexual Activity   Drug Use Not on file     Social History     Tobacco Use   Smoking Status Not on file       There is no immunization history on file for this patient    Last Tetanus: unknown  Family History: Non-contributory  Unable to obtain/limited by n/a      Meds/Allergies   all current active meds have been reviewed    Allergies not on file      PHYSICAL EXAM    PE limited by: n/a    Objective   Vitals:   First set: Temperature: 98 °F (36 7 °C) (07/10/19 1810)  Pulse: 78 (07/10/19 1810)  Respirations: 16 (07/10/19 1810)  Blood Pressure: 121/68 (07/10/19 1810)    Primary Survey:   (A) Airway: patent  (B) Breathing: breath sounds bilaterally  (C) Circulation: Pulses:   normal, pedal  2/4 and femoral  2/4  (D) Disabliity:  GCS Total:  14, Eye Opening:   Spontaneous = 4, Motor Response: Obeys commands = 6 and Verbal Response:  Confused = 4  (E) Expose:  Completed    Secondary Survey: (Click on Physical Exam tab above)  Physical Exam   Constitutional: She appears well-developed and well-nourished  HENT:   Head: Normocephalic  Left forehead abrasion  Bridge of nose abrasion   Eyes: Pupils are equal, round, and reactive to light  EOM are normal    Neck: Normal range of motion  No tracheal deviation present  Cervical collar cleared in ED   Cardiovascular: Normal rate and regular rhythm  Pulmonary/Chest: Effort normal and breath sounds normal  No respiratory distress  Abdominal: Soft  She exhibits no distension  There is no tenderness  Genitourinary:   Genitourinary Comments: deferred   Musculoskeletal: Normal range of motion  She exhibits no tenderness  Neurological: She is alert  No sensory deficit  Patient oriented to time or place but oriented to self   Skin: Skin is warm  Psychiatric:   demented       Invasive Devices     None                 Lab Results:   Results: I have personally reviewed pertinent reports     and I have personally reviewed pertinent films in PACS, BMP/CMP:   Lab Results   Component Value Date    SODIUM 137 07/10/2019    K 3 0 (L) 07/10/2019    CL 97 (L) 07/10/2019    CO2 31 07/10/2019    CO2 36 (H) 07/10/2019    BUN 21 07/10/2019    CREATININE 1 16 07/10/2019    GLUCOSE 264 (H) 07/10/2019    CALCIUM 9 8 07/10/2019    EGFR 40 07/10/2019    and CBC:   Lab Results   Component Value Date    WBC 11 08 (H) 07/10/2019    HGB 12 9 07/10/2019    HCT 38 4 07/10/2019    MCV 90 07/10/2019     07/10/2019    MCH 30 1 07/10/2019    [facility] 33 6 07/10/2019    RDW 12 9 07/10/2019    MPV 11 0 07/10/2019    NRBC 0 07/10/2019     Imaging/EKG Studies: Results: I have personally reviewed pertinent reports     and I have personally reviewed pertinent films in PACS, FAST: negative, CT Scan Head: negative, CT Scan C-Spine: negative, CT Chest: negative, CT Scan Abdomen/Pelvis: pancreatic cyst  Other Studies:     Code Status: No Order  Advance Directive and Living Will:      Power of :    POLST:

## 2019-07-10 NOTE — PROGRESS NOTES
Cervical Collar Clearance: The patient had a CT scan of the cervical spine demonstrating no acute fractures or traumatic malalignment  On exam, the patient had no sharp midline tenderness nor paresthesias in the upper extremities  The patient had full range of motion (was then able to flex, extend, and rotate head side to side) without pain  There were no distracting injuries and the patient was not intoxicated  The patients cervical collar was cleared radiologically and clinically      Lyn Mejia MD  7/10/2019  7:51 PM

## 2019-07-10 NOTE — ED PROVIDER NOTES
Emergency Department Airway Evaluation and Management Form    History  Obtained from: EMS  Patient has no allergy information on record  No chief complaint on file  HPI     79 yo female BIBA from assisted living(?) s/p unwitnessed fall  Pt not able to recall events  No thinners  Hx DM    No past medical history on file  No past surgical history on file  No family history on file  Social History     Tobacco Use    Smoking status: Not on file   Substance Use Topics    Alcohol use: Not on file    Drug use: Not on file     I have reviewed and agree with the history as documented  Review of Systems     none    Physical Exam  There were no vitals taken for this visit  Physical Exam     No oral trauma  No stridor  Lungs CTAB  [id number]  Abrasion to forehead      ED Medications  Medications - No data to display    Intubation  Procedures    Notes      Final Diagnosis  Final diagnoses:   None       ED Provider  Electronically Signed by     Sherron Shanks DO  07/10/19 9713

## 2019-07-11 LAB
ATRIAL RATE: 72 BPM
P AXIS: 72 DEGREES
QRS AXIS: -52 DEGREES
QRSD INTERVAL: 148 MS
QT INTERVAL: 460 MS
QTC INTERVAL: 517 MS
T WAVE AXIS: 117 DEGREES
VENTRICULAR RATE: 76 BPM

## 2019-07-11 PROCEDURE — 93010 ELECTROCARDIOGRAM REPORT: CPT | Performed by: INTERNAL MEDICINE

## 2019-07-29 ENCOUNTER — APPOINTMENT (OUTPATIENT)
Dept: LAB | Age: 84
End: 2019-07-29
Payer: MEDICARE

## 2019-07-29 DIAGNOSIS — N18.30 CKD (CHRONIC KIDNEY DISEASE), STAGE III (HCC): ICD-10-CM

## 2019-07-29 DIAGNOSIS — E11.8 TYPE 2 DIABETES MELLITUS WITH COMPLICATION, WITHOUT LONG-TERM CURRENT USE OF INSULIN (HCC): ICD-10-CM

## 2019-07-29 LAB
ANION GAP SERPL CALCULATED.3IONS-SCNC: 7 MMOL/L (ref 4–13)
BUN SERPL-MCNC: 19 MG/DL (ref 5–25)
CALCIUM SERPL-MCNC: 9.8 MG/DL (ref 8.3–10.1)
CHLORIDE SERPL-SCNC: 96 MMOL/L (ref 100–108)
CO2 SERPL-SCNC: 31 MMOL/L (ref 21–32)
CREAT SERPL-MCNC: 1.15 MG/DL (ref 0.6–1.3)
ERYTHROCYTE [DISTWIDTH] IN BLOOD BY AUTOMATED COUNT: 12.9 % (ref 11.6–15.1)
EST. AVERAGE GLUCOSE BLD GHB EST-MCNC: 217 MG/DL
GFR SERPL CREATININE-BSD FRML MDRD: 41 ML/MIN/1.73SQ M
GLUCOSE P FAST SERPL-MCNC: 250 MG/DL (ref 65–99)
HBA1C MFR BLD: 9.2 % (ref 4.2–6.3)
HCT VFR BLD AUTO: 39.4 % (ref 34.8–46.1)
HGB BLD-MCNC: 13.2 G/DL (ref 11.5–15.4)
MCH RBC QN AUTO: 29.7 PG (ref 26.8–34.3)
MCHC RBC AUTO-ENTMCNC: 33.5 G/DL (ref 31.4–37.4)
MCV RBC AUTO: 89 FL (ref 82–98)
PLATELET # BLD AUTO: 284 THOUSANDS/UL (ref 149–390)
PMV BLD AUTO: 11.7 FL (ref 8.9–12.7)
POTASSIUM SERPL-SCNC: 3.6 MMOL/L (ref 3.5–5.3)
RBC # BLD AUTO: 4.45 MILLION/UL (ref 3.81–5.12)
SODIUM SERPL-SCNC: 134 MMOL/L (ref 136–145)
WBC # BLD AUTO: 10.67 THOUSAND/UL (ref 4.31–10.16)

## 2019-07-29 PROCEDURE — 36415 COLL VENOUS BLD VENIPUNCTURE: CPT

## 2019-07-29 PROCEDURE — 85027 COMPLETE CBC AUTOMATED: CPT

## 2019-07-29 PROCEDURE — 80048 BASIC METABOLIC PNL TOTAL CA: CPT

## 2019-07-29 PROCEDURE — 83036 HEMOGLOBIN GLYCOSYLATED A1C: CPT

## 2019-08-01 DIAGNOSIS — E78.00 HYPERCHOLESTEROLEMIA: ICD-10-CM

## 2019-08-01 RX ORDER — SIMVASTATIN 40 MG
40 TABLET ORAL DAILY
Qty: 90 TABLET | Refills: 1 | Status: SHIPPED | OUTPATIENT
Start: 2019-08-01 | End: 2019-12-09 | Stop reason: ALTCHOICE

## 2019-08-01 NOTE — TELEPHONE ENCOUNTER
MED:Simvastatin 40mg tablet   SUPPLY: [employer]  PHARMACY: [facility] on [address] PHONE #: [phone]  LAST OV: 5/6/2019  UPCOMING OV: 8/5/2019

## 2019-08-05 ENCOUNTER — OFFICE VISIT (OUTPATIENT)
Dept: INTERNAL MEDICINE CLINIC | Age: 84
End: 2019-08-05
Payer: MEDICARE

## 2019-08-05 VITALS
OXYGEN SATURATION: 96 % | HEART RATE: 81 BPM | DIASTOLIC BLOOD PRESSURE: 58 MMHG | WEIGHT: 188.2 LBS | BODY MASS INDEX: 37.94 KG/M2 | HEIGHT: 59 IN | SYSTOLIC BLOOD PRESSURE: 122 MMHG | TEMPERATURE: 97.8 F

## 2019-08-05 DIAGNOSIS — E11.9 DIABETES MELLITUS TYPE 2, NONINSULIN DEPENDENT (HCC): ICD-10-CM

## 2019-08-05 DIAGNOSIS — N18.30 CKD (CHRONIC KIDNEY DISEASE), STAGE III (HCC): ICD-10-CM

## 2019-08-05 DIAGNOSIS — F03.90 UNCOMPLICATED SENILE DEMENTIA (HCC): ICD-10-CM

## 2019-08-05 DIAGNOSIS — I10 BENIGN ESSENTIAL HYPERTENSION: ICD-10-CM

## 2019-08-05 DIAGNOSIS — E11.8 TYPE 2 DIABETES MELLITUS WITH COMPLICATION, WITHOUT LONG-TERM CURRENT USE OF INSULIN (HCC): Primary | ICD-10-CM

## 2019-08-05 PROCEDURE — 99214 OFFICE O/P EST MOD 30 MIN: CPT | Performed by: INTERNAL MEDICINE

## 2019-08-05 NOTE — PROGRESS NOTES
Assessment/Plan:      1  Uncontrolled type 2 diabetes mellitus  Hemoglobin A1c is 9 2 which is slightly higher than previous 1  She is on 3 oral antidiabetic medicine  He is a next step is insulin but patient and patient's daughters not interested at this time  Will continue with present regimen with better diet control  2  Essential hypertension  Blood pressure is well controlled on present regimen    3  Dementia  Stable  Will continue with present dose of Aricept            Diagnoses and all orders for this visit:    Type 2 diabetes mellitus with complication, without long-term current use of insulin ([facility] [address] )  -     Basic metabolic panel; Future  -     CBC; Future  -     Hemoglobin A1C; Future    Diabetes mellitus type 2, noninsulin dependent (HCC)  -     sitaGLIPtin (JANUVIA) 100 mg tablet; Take 1 tablet (100 mg total) by mouth daily    Benign essential hypertension    CKD (chronic kidney disease), stage III ([facility] [address] )    Uncomplicated senile dementia          Subjective:          Patient ID: Bernice Anderson is a 80 y o  female  HPI    The following portions of the patient's history were reviewed and updated as appropriate: allergies, current medications, past family history, past medical history, past social history, past surgical history and problem list     Review of Systems   Constitutional: Negative for fatigue and fever  HENT: Negative for congestion, ear discharge, ear pain, postnasal drip, sinus pressure, sore throat, tinnitus and trouble swallowing  Eyes: Negative for discharge, itching and visual disturbance  Respiratory: Negative for cough and shortness of breath  Cardiovascular: Negative for chest pain and palpitations  Gastrointestinal: Negative for abdominal pain, diarrhea, nausea and vomiting  Endocrine: Negative for cold intolerance and polyuria  Genitourinary: Negative for difficulty urinating, dysuria and urgency  Musculoskeletal: Positive for arthralgias   Negative for neck pain    Skin: Negative for rash  Allergic/Immunologic: Negative for environmental allergies  Neurological: Negative for dizziness, weakness and headaches  Psychiatric/Behavioral: Negative for agitation  The patient is not nervous/anxious            Past Medical History:   Diagnosis Date    Aortic stenosis     Bilateral edema of lower extremity     Dementia     Fall     Gait abnormality     Hyperlipidemia     Hypertension     Hypokalemia     Other ascites     Varicose veins of leg with edema          Current Outpatient Medications:     donepezil (ARICEPT) 10 mg tablet, Take 1 tablet (10 mg total) by mouth daily, Disp: 90 tablet, Rfl: 1    furosemide (LASIX) 40 mg tablet, Take 1 tablet (40 mg total) by mouth daily, Disp: 90 tablet, Rfl: 1    glipiZIDE (GLUCOTROL XL) 10 mg 24 hr tablet, Take 1 tablet (10 mg total) by mouth daily, Disp: 90 tablet, Rfl: 1    losartan (COZAAR) 25 mg tablet, Take 1 tablet (25 mg total) by mouth daily, Disp: 90 tablet, Rfl: 1    magnesium oxide (MAG-OX) 400 mg, Take 1 tablet (400 mg total) by mouth daily, Disp: 90 tablet, Rfl: 1    metolazone (ZAROXOLYN) 5 mg tablet, Take 1 tablet (5 mg total) by mouth daily, Disp: 90 tablet, Rfl: 1    Potassium Chloride ER 20 MEQ TBCR, Take 3 tablets (60 mEq total) by mouth 3 (three) times a day, Disp: 810 tablet, Rfl: 1    repaglinide (PRANDIN) 2 mg tablet, Take 1 tablet (2 mg total) by mouth 3 (three) times a day before meals, Disp: 270 tablet, Rfl: 1    simvastatin (ZOCOR) 40 mg tablet, Take 40 mg by mouth daily at bedtime, Disp: , Rfl:     sitaGLIPtin (JANUVIA) 100 mg tablet, Take 1 tablet (100 mg total) by mouth daily, Disp: 90 tablet, Rfl: 1    donepezil (ARICEPT) 10 mg tablet, Take 10 mg by mouth daily at bedtime, Disp: , Rfl:     furosemide (LASIX) 40 mg tablet, Take 40 mg by mouth 2 (two) times a day, Disp: , Rfl:     glipiZIDE (GLUCOTROL XL) 10 mg 24 hr tablet, Take 10 mg by mouth daily, Disp: , Rfl:     losartan (COZAAR) 25 mg tablet, Take 25 mg by mouth daily, Disp: , Rfl:     magnesium oxide (MAG-OX) 400 mg, Take 400 mg by mouth 2 (two) times a day, Disp: , Rfl:     metolazone (ZAROXOLYN) 5 mg tablet, Take 5 mg by mouth daily, Disp: , Rfl:     potassium chloride (K-DUR,KLOR-CON) 20 mEq tablet, Take 20 mEq by mouth 2 (two) times a day, Disp: , Rfl:     repaglinide (PRANDIN) 2 mg tablet, Take 2 mg by mouth 3 (three) times a day before meals, Disp: , Rfl:     simvastatin (ZOCOR) 40 mg tablet, Take 1 tablet (40 mg total) by mouth daily (Patient not taking: Reported on 8/5/2019), Disp: 90 tablet, Rfl: 1    sitaGLIPtin (JANUVIA) 100 mg tablet, Take 100 mg by mouth daily, Disp: , Rfl:     Allergies   Allergen Reactions    Acetaminophen        Social History   Past Surgical History:   Procedure Laterality Date    BREAST SURGERY      ELBOW SURGERY       Family History   Problem Relation Age of Onset    Dementia Sister        Objective:  /58 (BP Location: Left arm, Patient Position: Sitting, Cuff Size: Standard)   Pulse 81   Temp 97 8 °F (36 6 °C) (Tympanic)   Ht 4' 11 45" (1 51 m) Comment: shoes off  Wt 85 4 kg (188 lb 3 2 oz) Comment: shoes off  SpO2 96%   BMI 37 44 kg/m²   Body mass index is 37 44 kg/m²  Physical Exam   Constitutional: She appears well-developed  HENT:   Head: Normocephalic  Right Ear: External ear normal    Left Ear: External ear normal    Mouth/Throat: Oropharynx is clear and moist    Eyes: Pupils are equal, round, and reactive to light  No scleral icterus  Neck: Normal range of motion  Neck supple  No tracheal deviation present  No thyromegaly present  Cardiovascular: Normal rate, regular rhythm and normal heart sounds  Pulmonary/Chest: Effort normal and breath sounds normal  No respiratory distress  She exhibits no tenderness  Abdominal: Soft  Bowel sounds are normal  She exhibits no mass  There is no tenderness  Musculoskeletal: Normal range of motion   She exhibits edema    Trace bilateral lower extremity edema present   Lymphadenopathy:     She has no cervical adenopathy  Neurological: She is alert  No cranial nerve deficit  Skin: Skin is warm  Psychiatric: She has a normal mood and affect

## 2019-08-15 DIAGNOSIS — E87.6 HYPOKALEMIA: ICD-10-CM

## 2019-08-15 DIAGNOSIS — N18.30 CKD (CHRONIC KIDNEY DISEASE), STAGE III (HCC): ICD-10-CM

## 2019-08-15 NOTE — TELEPHONE ENCOUNTER
Please confirm dose  Per chart and last OV note it says pt is taking 1 tablet twice a day    Thanks!

## 2019-08-15 NOTE — TELEPHONE ENCOUNTER
MED: Magnesium 400mg  SUPPLY: 90Day  PHARMACY: [id number] PHONE #: [phone]  LAST OV: 8/5/2019  UPCOMING OV: 12/9/2019

## 2019-08-15 NOTE — TELEPHONE ENCOUNTER
I did confirm dose prior to sending refill request  Per daughter, patient was never taking the medication twice a day  Note there are a few medications on her med list that are duplicate as well  This will have to all be updated/confirmed in her next OV  Thank you!

## 2019-08-27 DIAGNOSIS — N18.30 CKD (CHRONIC KIDNEY DISEASE), STAGE III (HCC): ICD-10-CM

## 2019-08-27 DIAGNOSIS — E87.6 HYPOKALEMIA: Primary | ICD-10-CM

## 2019-08-27 NOTE — TELEPHONE ENCOUNTER
MED: Potassium Chloride   SUPPLY: 90Day  PHARMACY: [facility]  PATIENT PHONE #: [phone]  LAST OV: 8/5/2019  UPCOMING OV: 12/9/2019

## 2019-08-28 RX ORDER — POTASSIUM CHLORIDE 20 MEQ/1
20 TABLET, EXTENDED RELEASE ORAL 2 TIMES DAILY
Qty: 90 TABLET | Refills: 1 | Status: SHIPPED | OUTPATIENT
Start: 2019-08-28 | End: 2019-12-09 | Stop reason: DRUGHIGH

## 2019-08-28 NOTE — TELEPHONE ENCOUNTER
Can we clarify dosing    Dr Ladonna Mejias last note 08/2019 K+ is recorded as:    Potassium Chloride ER 20 MEQ TBCR, Take 3 tablets (60 mEq total) by mouth 3 (three) times a day

## 2019-09-03 RX ORDER — POTASSIUM CHLORIDE 1500 MG/1
3 TABLET, FILM COATED, EXTENDED RELEASE ORAL 3 TIMES DAILY
Qty: 810 TABLET | Refills: 1 | Status: SHIPPED | OUTPATIENT
Start: 2019-09-03 | End: 2020-02-24 | Stop reason: SDUPTHER

## 2019-09-03 NOTE — TELEPHONE ENCOUNTER
Please refill with correct directions  Pt should be taking 3 tabs 3 times per day  Dr Dandre Jaime is not in office

## 2019-11-06 DIAGNOSIS — I83.899 VARICOSE VEINS OF LOWER EXTREMITY WITH EDEMA, UNSPECIFIED LATERALITY: ICD-10-CM

## 2019-11-07 RX ORDER — METOLAZONE 5 MG/1
5 TABLET ORAL DAILY
Qty: 90 TABLET | Refills: 1 | Status: SHIPPED | OUTPATIENT
Start: 2019-11-07 | End: 2020-05-15 | Stop reason: SDUPTHER

## 2019-11-11 DIAGNOSIS — I10 HYPERTENSION, UNSPECIFIED TYPE: ICD-10-CM

## 2019-11-11 RX ORDER — LOSARTAN POTASSIUM 25 MG/1
25 TABLET ORAL DAILY
Qty: 90 TABLET | Refills: 1 | Status: SHIPPED | OUTPATIENT
Start: 2019-11-11 | End: 2020-05-11 | Stop reason: SDUPTHER

## 2019-11-25 ENCOUNTER — APPOINTMENT (OUTPATIENT)
Dept: LAB | Age: 84
End: 2019-11-25
Payer: MEDICARE

## 2019-11-25 DIAGNOSIS — E11.8 TYPE 2 DIABETES MELLITUS WITH COMPLICATION, WITHOUT LONG-TERM CURRENT USE OF INSULIN (HCC): ICD-10-CM

## 2019-11-25 LAB
ALBUMIN SERPL BCP-MCNC: 4.1 G/DL (ref 3.5–5)
ANION GAP SERPL CALCULATED.3IONS-SCNC: 7 MMOL/L (ref 4–13)
BUN SERPL-MCNC: 18 MG/DL (ref 5–25)
CALCIUM ALBUM COR SERPL-MCNC: 10.1 MG/DL (ref 8.3–10.1)
CALCIUM SERPL-MCNC: 10.2 MG/DL (ref 8.3–10.1)
CALCIUM SERPL-MCNC: 10.2 MG/DL (ref 8.3–10.1)
CHLORIDE SERPL-SCNC: 95 MMOL/L (ref 100–108)
CO2 SERPL-SCNC: 30 MMOL/L (ref 21–32)
CREAT SERPL-MCNC: 1.19 MG/DL (ref 0.6–1.3)
ERYTHROCYTE [DISTWIDTH] IN BLOOD BY AUTOMATED COUNT: 13.1 % (ref 11.6–15.1)
EST. AVERAGE GLUCOSE BLD GHB EST-MCNC: 217 MG/DL
GFR SERPL CREATININE-BSD FRML MDRD: 39 ML/MIN/1.73SQ M
GLUCOSE P FAST SERPL-MCNC: 231 MG/DL (ref 65–99)
HBA1C MFR BLD: 9.2 % (ref 4.2–6.3)
HCT VFR BLD AUTO: 40 % (ref 34.8–46.1)
HGB BLD-MCNC: 13.1 G/DL (ref 11.5–15.4)
MCH RBC QN AUTO: 29.6 PG (ref 26.8–34.3)
MCHC RBC AUTO-ENTMCNC: 32.8 G/DL (ref 31.4–37.4)
MCV RBC AUTO: 91 FL (ref 82–98)
PLATELET # BLD AUTO: 278 THOUSANDS/UL (ref 149–390)
PMV BLD AUTO: 11.9 FL (ref 8.9–12.7)
POTASSIUM SERPL-SCNC: 3.2 MMOL/L (ref 3.5–5.3)
RBC # BLD AUTO: 4.42 MILLION/UL (ref 3.81–5.12)
SODIUM SERPL-SCNC: 132 MMOL/L (ref 136–145)
WBC # BLD AUTO: 12.15 THOUSAND/UL (ref 4.31–10.16)

## 2019-11-25 PROCEDURE — 80048 BASIC METABOLIC PNL TOTAL CA: CPT

## 2019-11-25 PROCEDURE — 83036 HEMOGLOBIN GLYCOSYLATED A1C: CPT

## 2019-11-25 PROCEDURE — 82040 ASSAY OF SERUM ALBUMIN: CPT

## 2019-11-25 PROCEDURE — 36415 COLL VENOUS BLD VENIPUNCTURE: CPT

## 2019-11-25 PROCEDURE — 85027 COMPLETE CBC AUTOMATED: CPT

## 2019-12-03 DIAGNOSIS — I10 ESSENTIAL HYPERTENSION, BENIGN: ICD-10-CM

## 2019-12-03 DIAGNOSIS — E11.8 TYPE 2 DIABETES MELLITUS WITH COMPLICATION, WITHOUT LONG-TERM CURRENT USE OF INSULIN (HCC): ICD-10-CM

## 2019-12-03 RX ORDER — FUROSEMIDE 40 MG/1
40 TABLET ORAL DAILY
Qty: 90 TABLET | Refills: 1 | Status: SHIPPED | OUTPATIENT
Start: 2019-12-03 | End: 2020-05-26 | Stop reason: SDUPTHER

## 2019-12-03 RX ORDER — GLIPIZIDE 10 MG/1
10 TABLET, FILM COATED, EXTENDED RELEASE ORAL DAILY
Qty: 90 TABLET | Refills: 1 | Status: SHIPPED | OUTPATIENT
Start: 2019-12-03 | End: 2020-06-04 | Stop reason: SDUPTHER

## 2019-12-03 NOTE — TELEPHONE ENCOUNTER
MED: Furosemide 40mg   SUPPLY: 90Day  PHARMACY: [facility] [address]   PATIENT PHONE #: [phone]  LAST OV: 8/5/2019  UPCOMING OV: 12/9/2019

## 2019-12-09 ENCOUNTER — OFFICE VISIT (OUTPATIENT)
Dept: INTERNAL MEDICINE CLINIC | Age: 84
End: 2019-12-09
Payer: MEDICARE

## 2019-12-09 VITALS
SYSTOLIC BLOOD PRESSURE: 112 MMHG | OXYGEN SATURATION: 97 % | TEMPERATURE: 97.5 F | WEIGHT: 187.2 LBS | HEIGHT: 59 IN | HEART RATE: 82 BPM | BODY MASS INDEX: 37.74 KG/M2 | DIASTOLIC BLOOD PRESSURE: 72 MMHG

## 2019-12-09 DIAGNOSIS — N18.30 CKD (CHRONIC KIDNEY DISEASE), STAGE III (HCC): ICD-10-CM

## 2019-12-09 DIAGNOSIS — E11.8 TYPE 2 DIABETES MELLITUS WITH COMPLICATION, WITHOUT LONG-TERM CURRENT USE OF INSULIN (HCC): Primary | ICD-10-CM

## 2019-12-09 DIAGNOSIS — I10 BENIGN ESSENTIAL HYPERTENSION: ICD-10-CM

## 2019-12-09 DIAGNOSIS — E87.6 HYPOKALEMIA: ICD-10-CM

## 2019-12-09 DIAGNOSIS — E78.00 HYPERCHOLESTEROLEMIA: ICD-10-CM

## 2019-12-09 DIAGNOSIS — I35.0 MODERATE AORTIC STENOSIS: ICD-10-CM

## 2019-12-09 DIAGNOSIS — F03.90 UNCOMPLICATED SENILE DEMENTIA (HCC): ICD-10-CM

## 2019-12-09 DIAGNOSIS — E87.1 HYPONATREMIA: ICD-10-CM

## 2019-12-09 DIAGNOSIS — E11.9 TYPE 2 DIABETES MELLITUS WITHOUT COMPLICATION, WITHOUT LONG-TERM CURRENT USE OF INSULIN (HCC): ICD-10-CM

## 2019-12-09 LAB
CREAT UR-MCNC: 16.4 MG/DL
MICROALBUMIN UR-MCNC: 11.4 MG/L (ref 0–20)
MICROALBUMIN/CREAT 24H UR: 70 MG/G CREATININE (ref 0–30)

## 2019-12-09 PROCEDURE — 99214 OFFICE O/P EST MOD 30 MIN: CPT | Performed by: INTERNAL MEDICINE

## 2019-12-09 PROCEDURE — 82570 ASSAY OF URINE CREATININE: CPT | Performed by: INTERNAL MEDICINE

## 2019-12-09 PROCEDURE — 82043 UR ALBUMIN QUANTITATIVE: CPT | Performed by: INTERNAL MEDICINE

## 2019-12-09 NOTE — PROGRESS NOTES
Assessment/Plan:    1  Uncontrolled type 2 diabetes mellitus  Her hemoglobin A1c is still more than 9  Treatment option discussed with the daughter  Both daughters decline to add on any other medicine  Will continue with present regimen    2  Hypokalemia  Recent potassium is 3 2  She is already on 20 mg 3 times a day  Advised her to give her some extra if she can tolerate  3  Essential hypertension  Blood pressure is well controlled on present regimen  4  Senile dementia  Stable  Will continue with present dose of Aricept       Diagnoses and all orders for this visit:    Type 2 diabetes mellitus with complication, without long-term current use of insulin ([address] )  -     Microalbumin / creatinine urine ratio    Type 2 diabetes mellitus without complication, without long-term current use of insulin ([facility])  -     Basic metabolic panel; Future  -     CBC; Future  -     Hemoglobin A1C; Future    Benign essential hypertension    Moderate aortic stenosis    Uncomplicated senile dementia (HCC)    CKD (chronic kidney disease), stage III ([facility])    Hyponatremia    Hypokalemia    Hypercholesterolemia               Subjective:          Patient ID: Trang Pino is a 80 y o  female  Patient is here for regular follow-up  Does have blood work done last week  She is here with her 2 daughters  Patient denied any new complaints  The following portions of the patient's history were reviewed and updated as appropriate: allergies, current medications, past family history, past medical history, past social history, past surgical history and problem list     Review of Systems   Constitutional: Negative for fatigue and fever  HENT: Negative for congestion, ear discharge, ear pain, postnasal drip, sinus pressure, sore throat, tinnitus and trouble swallowing  Eyes: Negative for discharge, itching and visual disturbance  Respiratory: Negative for cough and shortness of breath      Cardiovascular: Negative for chest pain and palpitations  Gastrointestinal: Negative for abdominal pain, diarrhea, nausea and vomiting  Endocrine: Negative for cold intolerance and polyuria  Genitourinary: Negative for difficulty urinating, dysuria and urgency  Musculoskeletal: Negative for arthralgias and neck pain  Skin: Negative for rash  Allergic/Immunologic: Negative for environmental allergies  Neurological: Negative for dizziness, weakness and headaches  Psychiatric/Behavioral: Positive for confusion  Negative for agitation and behavioral problems  The patient is not nervous/anxious            Past Medical History:   Diagnosis Date    Aortic stenosis     Bilateral edema of lower extremity     Dementia ([facility] [address] )     Fall     Gait abnormality     Hyperlipidemia     Hypertension     Hypokalemia     Other ascites     Varicose veins of leg with edema          Current Outpatient Medications:     donepezil (ARICEPT) 10 mg tablet, Take 1 tablet (10 mg total) by mouth daily, Disp: 90 tablet, Rfl: 1    furosemide (LASIX) 40 mg tablet, Take 1 tablet (40 mg total) by mouth daily, Disp: 90 tablet, Rfl: 1    glipiZIDE (GLUCOTROL XL) 10 mg 24 hr tablet, Take 1 tablet (10 mg total) by mouth daily, Disp: 90 tablet, Rfl: 1    losartan (COZAAR) 25 mg tablet, Take 1 tablet (25 mg total) by mouth daily, Disp: 90 tablet, Rfl: 1    magnesium oxide (MAG-OX) 400 mg, Take 1 tablet (400 mg total) by mouth daily, Disp: 90 tablet, Rfl: 1    metolazone (ZAROXOLYN) 5 mg tablet, Take 1 tablet (5 mg total) by mouth daily, Disp: 90 tablet, Rfl: 1    Potassium Chloride ER 20 MEQ TBCR, Take 3 tablets (60 mEq total) by mouth 3 (three) times a day, Disp: 810 tablet, Rfl: 1    repaglinide (PRANDIN) 2 mg tablet, Take 1 tablet (2 mg total) by mouth 3 (three) times a day before meals, Disp: 270 tablet, Rfl: 1    sitaGLIPtin (JANUVIA) 100 mg tablet, Take 1 tablet (100 mg total) by mouth daily, Disp: 90 tablet, Rfl: 1    No Known Allergies    Social History   Past Surgical History:   Procedure Laterality Date    BREAST SURGERY      ELBOW SURGERY       Family History   Problem Relation Age of Onset    Dementia Sister        Objective:  /72 (BP Location: Left arm, Patient Position: Sitting, Cuff Size: Standard)   Pulse 82   Temp 97 5 °F (36 4 °C) (Tympanic)   Ht 4' 11 45" (1 51 m)   Wt 84 9 kg (187 lb 3 2 oz)   SpO2 97%   BMI 37 24 kg/m²   Body mass index is 37 24 kg/m²  Physical Exam   Constitutional: She appears well-developed  HENT:   Head: Normocephalic  Right Ear: External ear normal    Left Ear: External ear normal    Mouth/Throat: Oropharynx is clear and moist    Eyes: Pupils are equal, round, and reactive to light  No scleral icterus  Neck: Normal range of motion  Neck supple  No tracheal deviation present  No thyromegaly present  Cardiovascular: Normal rate and regular rhythm  Murmur heard  Pulmonary/Chest: Effort normal and breath sounds normal  No respiratory distress  She exhibits no tenderness  Abdominal: Soft  Bowel sounds are normal  She exhibits no mass  There is no tenderness  Musculoskeletal: Normal range of motion  She exhibits edema  Lymphadenopathy:     She has no cervical adenopathy  Neurological: She is alert  No cranial nerve deficit  Skin: Skin is warm  Psychiatric: She has a normal mood and affect

## 2019-12-30 DIAGNOSIS — F03.90 SENILE DEMENTIA WITHOUT BEHAVIORAL DISTURBANCE (HCC): ICD-10-CM

## 2019-12-30 DIAGNOSIS — E11.9 TYPE 2 DIABETES MELLITUS WITHOUT COMPLICATION, WITHOUT LONG-TERM CURRENT USE OF INSULIN (HCC): ICD-10-CM

## 2019-12-30 RX ORDER — DONEPEZIL HYDROCHLORIDE 10 MG/1
10 TABLET, FILM COATED ORAL DAILY
Qty: 90 TABLET | Refills: 1 | Status: SHIPPED | OUTPATIENT
Start: 2019-12-30 | End: 2020-06-29 | Stop reason: SDUPTHER

## 2019-12-30 RX ORDER — REPAGLINIDE 2 MG/1
2 TABLET ORAL
Qty: 270 TABLET | Refills: 1 | Status: SHIPPED | OUTPATIENT
Start: 2019-12-30 | End: 2020-06-25 | Stop reason: SDUPTHER

## 2020-02-06 DIAGNOSIS — N18.30 CKD (CHRONIC KIDNEY DISEASE), STAGE III (HCC): ICD-10-CM

## 2020-02-06 DIAGNOSIS — E87.6 HYPOKALEMIA: ICD-10-CM

## 2020-02-20 DIAGNOSIS — E11.9 DIABETES MELLITUS TYPE 2, NONINSULIN DEPENDENT (HCC): ICD-10-CM

## 2020-02-24 DIAGNOSIS — N18.30 CKD (CHRONIC KIDNEY DISEASE), STAGE III (HCC): ICD-10-CM

## 2020-02-24 DIAGNOSIS — E87.6 HYPOKALEMIA: ICD-10-CM

## 2020-02-24 RX ORDER — POTASSIUM CHLORIDE 1500 MG/1
3 TABLET, FILM COATED, EXTENDED RELEASE ORAL 3 TIMES DAILY
Qty: 810 TABLET | Refills: 1 | Status: SHIPPED | OUTPATIENT
Start: 2020-02-24 | End: 2020-08-21 | Stop reason: SDUPTHER

## 2020-03-17 ENCOUNTER — TELEPHONE (OUTPATIENT)
Dept: OTHER | Facility: OTHER | Age: 85
End: 2020-03-17

## 2020-03-17 ENCOUNTER — HOSPITAL ENCOUNTER (EMERGENCY)
Facility: HOSPITAL | Age: 85
Discharge: HOME | End: 2020-03-17
Attending: EMERGENCY MEDICINE | Admitting: EMERGENCY MEDICINE
Payer: MEDICARE

## 2020-03-17 ENCOUNTER — APPOINTMENT (EMERGENCY)
Dept: RADIOLOGY | Facility: HOSPITAL | Age: 85
End: 2020-03-17
Payer: MEDICARE

## 2020-03-17 VITALS
BODY MASS INDEX: 37.2 KG/M2 | DIASTOLIC BLOOD PRESSURE: 56 MMHG | OXYGEN SATURATION: 96 % | HEART RATE: 66 BPM | WEIGHT: 187 LBS | TEMPERATURE: 98.3 F | RESPIRATION RATE: 18 BRPM | SYSTOLIC BLOOD PRESSURE: 115 MMHG

## 2020-03-17 DIAGNOSIS — S51.012A SKIN TEAR OF ELBOW WITHOUT COMPLICATION, LEFT, INITIAL ENCOUNTER: ICD-10-CM
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
## 2020-03-17 DIAGNOSIS — S42.215A UNSPECIFIED NONDISPLACED FRACTURE OF SURGICAL NECK OF LEFT HUMERUS, INITIAL ENCOUNTER FOR CLOSED FRACTURE: Primary | ICD-10-CM

## 2020-03-17 LAB
ANION GAP SERPL CALCULATED.3IONS-SCNC: 6 MMOL/L (ref 4–13)
ATRIAL RATE: 70 BPM
BACTERIA UR QL AUTO: NORMAL /HPF
BASOPHILS # BLD AUTO: 0.08 THOUSANDS/ΜL (ref 0–0.1)
BASOPHILS NFR BLD AUTO: 1 % (ref 0–1)
BILIRUB UR QL STRIP: NEGATIVE
BUN SERPL-MCNC: 17 MG/DL (ref 5–25)
CALCIUM SERPL-MCNC: 8.7 MG/DL (ref 8.3–10.1)
CHLORIDE SERPL-SCNC: 98 MMOL/L (ref 100–108)
CLARITY UR: CLEAR
CO2 SERPL-SCNC: 26 MMOL/L (ref 21–32)
COLOR UR: YELLOW
CREAT SERPL-MCNC: 1.2 MG/DL (ref 0.6–1.3)
EOSINOPHIL # BLD AUTO: 0.05 THOUSAND/ΜL (ref 0–0.61)
EOSINOPHIL NFR BLD AUTO: 0 % (ref 0–6)
ERYTHROCYTE [DISTWIDTH] IN BLOOD BY AUTOMATED COUNT: 12.7 % (ref 11.6–15.1)
GFR SERPL CREATININE-BSD FRML MDRD: 38 ML/MIN/1.73SQ M
GLUCOSE SERPL-MCNC: 298 MG/DL (ref 65–140)
GLUCOSE UR STRIP-MCNC: ABNORMAL MG/DL
HCT VFR BLD AUTO: 35.5 % (ref 34.8–46.1)
HGB BLD-MCNC: 12.2 G/DL (ref 11.5–15.4)
HGB UR QL STRIP.AUTO: ABNORMAL
IMM GRANULOCYTES # BLD AUTO: 0.14 THOUSAND/UL (ref 0–0.2)
IMM GRANULOCYTES NFR BLD AUTO: 1 % (ref 0–2)
KETONES UR STRIP-MCNC: ABNORMAL MG/DL
LEUKOCYTE ESTERASE UR QL STRIP: NEGATIVE
LYMPHOCYTES # BLD AUTO: 1.49 THOUSANDS/ΜL (ref 0.6–4.47)
LYMPHOCYTES NFR BLD AUTO: 9 % (ref 14–44)
MCH RBC QN AUTO: 30.5 PG (ref 26.8–34.3)
MCHC RBC AUTO-ENTMCNC: 34.4 G/DL (ref 31.4–37.4)
MCV RBC AUTO: 89 FL (ref 82–98)
MONOCYTES # BLD AUTO: 1.02 THOUSAND/ΜL (ref 0.17–1.22)
MONOCYTES NFR BLD AUTO: 6 % (ref 4–12)
NEUTROPHILS # BLD AUTO: 13.7 THOUSANDS/ΜL (ref 1.85–7.62)
NEUTS SEG NFR BLD AUTO: 83 % (ref 43–75)
NITRITE UR QL STRIP: NEGATIVE
NON-SQ EPI CELLS URNS QL MICRO: NORMAL /HPF
NRBC BLD AUTO-RTO: 0 /100 WBCS
P AXIS: 44 DEGREES
PH UR STRIP.AUTO: 6.5 [PH]
PLATELET # BLD AUTO: 256 THOUSANDS/UL (ref 149–390)
PMV BLD AUTO: 11.2 FL (ref 8.9–12.7)
POTASSIUM SERPL-SCNC: 4.8 MMOL/L (ref 3.5–5.3)
PR INTERVAL: 216 MS
PROT UR STRIP-MCNC: ABNORMAL MG/DL
QRS AXIS: -23 DEGREES
QRSD INTERVAL: 144 MS
QT INTERVAL: 440 MS
QTC INTERVAL: 475 MS
RBC # BLD AUTO: 4 MILLION/UL (ref 3.81–5.12)
RBC #/AREA URNS AUTO: NORMAL /HPF
SODIUM SERPL-SCNC: 130 MMOL/L (ref 136–145)
SP GR UR STRIP.AUTO: 1.01 (ref 1–1.03)
T WAVE AXIS: 147 DEGREES
TROPONIN I SERPL-MCNC: <0.02 NG/ML
UROBILINOGEN UR QL STRIP.AUTO: 0.2 E.U./DL
VENTRICULAR RATE: 70 BPM
WBC # BLD AUTO: 16.48 THOUSAND/UL (ref 4.31–10.16)
WBC #/AREA URNS AUTO: NORMAL /HPF

## 2020-03-17 PROCEDURE — 80048 BASIC METABOLIC PNL TOTAL CA: CPT | Performed by: INTERNAL MEDICINE

## 2020-03-17 PROCEDURE — 85025 COMPLETE CBC W/AUTO DIFF WBC: CPT | Performed by: INTERNAL MEDICINE

## 2020-03-17 PROCEDURE — 93005 ELECTROCARDIOGRAM TRACING: CPT

## 2020-03-17 PROCEDURE — 99284 EMERGENCY DEPT VISIT MOD MDM: CPT

## 2020-03-17 PROCEDURE — 81001 URINALYSIS AUTO W/SCOPE: CPT | Performed by: INTERNAL MEDICINE

## 2020-03-17 PROCEDURE — 73030 X-RAY EXAM OF SHOULDER: CPT

## 2020-03-17 PROCEDURE — 36415 COLL VENOUS BLD VENIPUNCTURE: CPT | Performed by: INTERNAL MEDICINE

## 2020-03-17 PROCEDURE — 84484 ASSAY OF TROPONIN QUANT: CPT | Performed by: INTERNAL MEDICINE

## 2020-03-17 PROCEDURE — 99285 EMERGENCY DEPT VISIT HI MDM: CPT | Performed by: EMERGENCY MEDICINE

## 2020-03-17 PROCEDURE — 93010 ELECTROCARDIOGRAM REPORT: CPT | Performed by: INTERNAL MEDICINE

## 2020-03-17 RX ORDER — HYDROCODONE BITARTRATE AND ACETAMINOPHEN 5; 325 MG/1; MG/1
1 TABLET ORAL EVERY 6 HOURS PRN
Qty: 11 TABLET | Refills: 0 | Status: SHIPPED | OUTPATIENT
Start: 2020-03-17 | End: 2020-04-28

## 2020-03-17 RX ORDER — LIDOCAINE 50 MG/G
1 PATCH TOPICAL ONCE
Status: DISCONTINUED | OUTPATIENT
Start: 2020-03-17 | End: 2020-03-17 | Stop reason: HOSPADM

## 2020-03-17 RX ORDER — SIMVASTATIN 40 MG
40 TABLET ORAL
COMMUNITY
End: 2020-06-15 | Stop reason: ALTCHOICE

## 2020-03-17 RX ORDER — FENTANYL CITRATE 50 UG/ML
25 INJECTION, SOLUTION INTRAMUSCULAR; INTRAVENOUS ONCE
Status: COMPLETED | OUTPATIENT
Start: 2020-03-17 | End: 2020-03-17

## 2020-03-17 RX ORDER — HYDROCODONE BITARTRATE AND ACETAMINOPHEN 5; 325 MG/1; MG/1
1 TABLET ORAL ONCE
Status: COMPLETED | OUTPATIENT
Start: 2020-03-17 | End: 2020-03-17

## 2020-03-17 RX ADMIN — HYDROCODONE BITARTRATE AND ACETAMINOPHEN 1 TABLET: 5; 325 TABLET ORAL at 11:10

## 2020-03-17 RX ADMIN — LIDOCAINE 1 PATCH: 50 PATCH TOPICAL at 10:53

## 2020-03-17 RX ADMIN — FENTANYL CITRATE 25 MCG: 50 INJECTION, SOLUTION INTRAMUSCULAR; INTRAVENOUS at 08:58

## 2020-03-17 NOTE — TELEPHONE ENCOUNTER
Pt went to ED RE a broken left arm  Erinn Goncalves would like to schedule an appointment in either [place] or [place] for a referral to a place she can stay for possible care

## 2020-03-17 NOTE — SOCIAL WORK
CM received call from ED nurse requesting assistance with scheduling [facility] transport for pt who is medically stable for discharge  CM spoke with pt's daughter who confirmed pt's address as [address] [place] [address] [address] [facility] the [facility]  Pt's daughter aware and agreeable to out of pocket cost associated with [facility] transport  [facility] to transport pt at 1:45 PM  All aware

## 2020-03-17 NOTE — ED NOTES
Mesfin Monday will transport patient back to her apartment via wheelchair at 1:45 PM     King Vargas RN  03/17/20 1125

## 2020-03-17 NOTE — DISCHARGE INSTRUCTIONS
Your seen in the emergency department for  a fall that resulted in a nondisplaced fracture of the left humerus, which is the large bone in your arm  Please wear  your spling  Take Vicodin for pain  Can use ice packs or heating pads  If you find that Lidoderm patches help, please continue to use those  Please follow-up with orthopedics in outpatient office in 5-7 days or sooner

## 2020-03-17 NOTE — ED ATTENDING ATTESTATION
3/17/2020  IZacarias DO, saw and evaluated the patient  I have discussed the patient with the resident/non-physician practitioner and agree with the resident's/non-physician practitioner's findings, Plan of Care, and MDM as documented in the resident's/non-physician practitioner's note, except where noted  All available labs and Radiology studies were reviewed  I was present for key portions of any procedure(s) performed by the resident/non-physician practitioner and I was immediately available to provide assistance  At this point I agree with the current assessment done in the Emergency Department  I have conducted an independent evaluation of this patient a history and physical is as follows:    ED Course       Emergency Department Note- Felicity Velez 80 y o  female MRN: [id number]    Unit/Bed#: ED 29 Encounter: [id number]    Felicity Velez is a 80 y o  female who presents with   Chief Complaint   Patient presents with    Fall     EMS reports that the patient fell at home while going to the bathroom, no LOC reported, no thinners, reports small laceration on left arm and left arm pain  Patient unable to describe what happened  History of Present Illness   HPI:  Felicity Velez is a 80 y o  female who presents with left shoulder pain status post fall  Was getting up to the bathroom and fell  Neighbors heard the fall and called EMS  No loss of consciousness  No evidence of head trauma  ROS is otherwise unremarkable      Historical Information   Past Medical History:   Diagnosis Date    Aortic stenosis     Bilateral edema of lower extremity     Dementia ([address] )     Fall     Gait abnormality     Hyperlipidemia     Hypertension     Hypokalemia     Other ascites     Varicose veins of leg with edema      Past Surgical History:   Procedure Laterality Date    BREAST SURGERY      ELBOW SURGERY       Social History   Social History     Substance and Sexual Activity   Alcohol Use No Social History     Substance and Sexual Activity   Drug Use No     Social History     Tobacco Use   Smoking Status Never Smoker   Smokeless Tobacco Never Used       Family History:   Meds/Allergies     No Known Allergies    Objective   First Vitals:   Blood Pressure: (!) 171/70 (20 0809)  Pulse: 65 (20 0809)  Temperature: 98 3 °F (36 8 °C) (20 0809)  Temp Source: Oral (20 08)  Respirations: 16 (20 08)  Weight - Scale: 84 8 kg (187 lb) (20 0809)  SpO2: 100 % (20 08)    Current Vitals:   Blood Pressure: 116/53 (20 0930)  Pulse: 60 (20)  Temperature: 98 3 °F (36 8 °C) (20 08)  Temp Source: Oral (20 08)  Respirations: 18 (20 09)  Weight - Scale: 84 8 kg (187 lb) (20 08)  SpO2: 95 % (20 0930)    No intake or output data in the 24 hours ending 20 0952    Invasive Devices     Peripheral Intravenous Line            Peripheral IV 20 Right Forearm less than 1 day                      Medical Decision Makin  X-ray shows a left humeral neck fracture, nondisplaced  Placed in a sling  Labs reviewed  Offered admission for pain control but patient states pain is controlled will likely discharge home  Follow up with Orthopedic surgery, primary care physician, return if worse      Recent Results (from the past 36 hour(s))   ECG 12 lead    Collection Time: 20  8:14 AM   Result Value Ref Range    Ventricular Rate 70 BPM    Atrial Rate 70 BPM    RI Interval 216 ms    QRSD Interval 144 ms    QT Interval 440 ms    QTC Interval 475 ms    P [place] 44 degrees    QRS Axis -23 degrees    T Wave Axis 147 degrees   Troponin I    Collection Time: 20  8:59 AM   Result Value Ref Range    Troponin I <0 02 <=0 04 ng/mL   CBC and differential    Collection Time: 20  8:59 AM   Result Value Ref Range    WBC 16 48 (H) 4 31 - 10 16 Thousand/uL    RBC 4 00 3 81 - 5 12 Million/uL    Hemoglobin 12 2 11 5 - 15 4 g/dL Hematocrit 35 5 34 8 - 46 1 %    MCV 89 82 - 98 fL    MCH 30 5 26 8 - 34 3 pg    MCHC 34 4 31 4 - 37 4 g/dL    RDW 12 7 11 6 - 15 1 %    MPV 11 2 8 9 - 12 7 fL    Platelets 969 105 - 641 Thousands/uL    nRBC 0 /100 WBCs    Neutrophils Relative 83 (H) 43 - 75 %    Immat GRANS % 1 0 - 2 %    Lymphocytes Relative 9 (L) 14 - 44 %    Monocytes Relative 6 4 - 12 %    Eosinophils Relative 0 0 - 6 %    Basophils Relative 1 0 - 1 %    Neutrophils Absolute 13 70 (H) 1 85 - 7 62 Thousands/µL    Immature Grans Absolute 0 14 0 00 - 0 20 Thousand/uL    Lymphocytes Absolute 1 49 0 60 - 4 47 Thousands/µL    Monocytes Absolute 1 02 0 17 - 1 22 Thousand/µL    Eosinophils Absolute 0 05 0 00 - 0 61 Thousand/µL    Basophils Absolute 0 08 0 00 - 0 10 Thousands/µL     XR shoulder 2+ views LEFT    (Results Pending)         Portions of the record may have been created with voice recognition software  Occasional wrong word or "sound a like" substitutions may have occurred due to the inherent limitations of voice recognition software            Critical Care Time  Procedures

## 2020-03-17 NOTE — ED PROVIDER NOTES
History  Chief Complaint   Patient presents with    Fall     EMS reports that the patient fell at home while going to the bathroom, no LOC reported, no thinners, reports small laceration on left arm and left arm pain  Patient unable to describe what happened  HPI   80-year-old female presents with left shoulder pain status post post fall  She lives at a senior living facility, when she was up getting to the bathroom today and fell  Her neighbor below her heard the thump and came up stairs  No loss of consciousness or head trauma  The neighbor reports she was alert and had no changes in her alertness or personality  EMS was then called and brought her to the hospital in stable condition  The patient has dementia and therefore a poor historian  Her daughter is in the room, she is a good historian and is up-to-date with her mother there is health conditions and living situation  She states her mother is at her baseline and notices no mental status changes  Patient does answer questions, she is redirectable  She is not alert to the place or time  Prior to Admission Medications   Prescriptions Last Dose Informant Patient Reported? Taking?    Potassium Chloride ER 20 MEQ TBCR 3/16/2020 at Unknown time  No Yes   Sig: Take 3 tablets (60 mEq total) by mouth 3 (three) times a day   donepezil (ARICEPT) 10 mg tablet 3/16/2020 at Unknown time  No Yes   Sig: Take 1 tablet (10 mg total) by mouth daily   furosemide (LASIX) 40 mg tablet 3/16/2020 at Unknown time  No Yes   Sig: Take 1 tablet (40 mg total) by mouth daily   glipiZIDE (GLUCOTROL XL) 10 mg 24 hr tablet 3/16/2020 at Unknown time  No Yes   Sig: Take 1 tablet (10 mg total) by mouth daily   losartan (COZAAR) 25 mg tablet 3/16/2020 at Unknown time  No Yes   Sig: Take 1 tablet (25 mg total) by mouth daily   magnesium oxide (MAG-OX) 400 mg 3/16/2020 at Unknown time  No Yes   Sig: Take 1 tablet (400 mg total) by mouth daily   metolazone (ZAROXOLYN) 5 mg tablet 3/16/2020 at Unknown time  No Yes   Sig: Take 1 tablet (5 mg total) by mouth daily   repaglinide (PRANDIN) 2 mg tablet 3/16/2020 at Unknown time  No Yes   Sig: Take 1 tablet (2 mg total) by mouth 3 (three) times a day before meals   simvastatin (ZOCOR) 40 mg tablet 3/16/2020 at Unknown time  Yes Yes   Sig: Take 40 mg by mouth daily at bedtime   sitaGLIPtin (JANUVIA) 100 mg tablet 3/16/2020 at Unknown time  No Yes   Sig: Take 1 tablet (100 mg total) by mouth daily      Facility-Administered Medications: None       Past Medical History:   Diagnosis Date    Aortic stenosis     Bilateral edema of lower extremity     Dementia ([address] )     Fall     Gait abnormality     Hyperlipidemia     Hypertension     Hypokalemia     Other ascites     Varicose veins of leg with edema        Past Surgical History:   Procedure Laterality Date    BREAST SURGERY      ELBOW SURGERY         Family History   Problem Relation Age of Onset    Dementia Sister      I have reviewed and agree with the history as documented      E-Cigarette/Vaping    E-Cigarette Use Never User      E-Cigarette/Vaping Substances     Social History     Tobacco Use    Smoking status: Never Smoker    Smokeless tobacco: Never Used   Substance Use Topics    Alcohol use: No    Drug use: No        Review of Systems   Unable to perform ROS: Dementia       Physical Exam  ED Triage Vitals   Temperature Pulse Respirations Blood Pressure SpO2   03/17/20 0809 03/17/20 0809 03/17/20 0809 03/17/20 0809 03/17/20 0809   98 3 °F (36 8 °C) 65 16 (!) 171/70 100 %      Temp Source Heart Rate Source Patient Position - Orthostatic VS BP Location FiO2 (%)   03/17/20 0809 03/17/20 0809 03/17/20 0809 03/17/20 0809 --   Oral Monitor Sitting Right arm       Pain Score       03/17/20 0858       6             Orthostatic Vital Signs  Vitals:    03/17/20 0914 03/17/20 0921 03/17/20 0930 03/17/20 1030   BP:  130/60 116/53 115/56   Pulse: 71 65 60 66   Patient Position - Orthostatic VS:   Sitting Lying       Physical Exam   PHYSICAL EXAM  Constitutional:  Well developed, well nourished, no acute distress, non-toxic appearance    HEENT:  Conjunctiva normal  Oropharynx moist  Respiratory:  No respiratory distress, normal breath sounds  Cardiovascular:  Normal rate, normal rhythm, no murmurs  GI:  Soft, nondistended, normal bowel sounds, nontender  :  No costovertebral angle tenderness   Musculoskeletal:  No edema, no tenderness, no deformities  Integument:  Well hydrated, no rash   Lymphatic:  No lymphadenopathy noted   Neurologic:  Alert to person, not place or time,  normal motor function, normal sensory function, no focal deficits noted   Psychiatric:  Speech and behavior appropriate       ED Medications  Medications   lidocaine (LIDODERM) 5 % patch 1 patch (1 patch Topical Medication Applied 3/17/20 1053)   HYDROcodone-acetaminophen (NORCO) 5-325 mg per tablet 1 tablet (has no administration in time range)   fentanyl citrate (PF) 100 MCG/2ML 25 mcg (25 mcg Intravenous Given 3/17/20 0858)       Diagnostic Studies  Results Reviewed     Procedure Component Value Units Date/Time    UA w Reflex to Microscopic w Reflex to Culture [[id number]]  (Abnormal) Collected:  03/17/20 0925    Lab Status:  Final result Specimen:  Urine, Straight Cath Updated:  03/17/20 1002     Color, UA Yellow     Clarity, UA Clear     Specific Gravity, UA 1 014     pH, UA 6 5     Leukocytes, UA Negative     Nitrite, UA Negative     Protein, UA Trace mg/dl      Glucose,  (1/4%) mg/dl      Ketones, UA Trace mg/dl      Urobilinogen, UA 0 2 E U /dl      Bilirubin, UA Negative     Blood, UA Trace    Urine Microscopic [[id number]] Collected:  03/17/20 0925    Lab Status:   In process Specimen:  Urine, Straight Cath Updated:  03/17/20 7712    Basic metabolic panel [[id number]]  (Abnormal) Collected:  03/17/20 0859    Lab Status:  Final result Specimen:  Blood from Arm, Right Updated:  03/17/20 0956     Sodium 130 mmol/L Potassium 4 8 mmol/L      Chloride 98 mmol/L      CO2 26 mmol/L      ANION GAP 6 mmol/L      BUN 17 mg/dL      Creatinine 1 20 mg/dL      Glucose 298 mg/dL      Calcium 8 7 mg/dL      eGFR 38 ml/min/1 73sq m     Narrative:       [place] guidelines for Chronic Kidney Disease (CKD):     Stage 1 with normal or high GFR (GFR > 90 mL/min/1 73 square meters)    Stage 2 Mild CKD (GFR = 60-89 mL/min/1 73 square meters)    Stage 3A Moderate CKD (GFR = 45-59 mL/min/1 73 square meters)    Stage 3B Moderate CKD (GFR = 30-44 mL/min/1 73 square meters)    Stage 4 Severe CKD (GFR = 15-29 mL/min/1 73 square meters)    Stage 5 End Stage CKD (GFR <15 mL/min/1 73 square meters)  Note: GFR calculation is accurate only with a steady state creatinine    Troponin I [[id number]]  (Normal) Collected:  03/17/20 0859    Lab Status:  Final result Specimen:  Blood from Arm, Right Updated:  03/17/20 0929     Troponin I <0 02 ng/mL     CBC and differential [[id number]]  (Abnormal) Collected:  03/17/20 0859    Lab Status:  Final result Specimen:  Blood from Arm, Right Updated:  03/17/20 0917     WBC 16 48 Thousand/uL      RBC 4 00 Million/uL      Hemoglobin 12 2 g/dL      Hematocrit 35 5 %      MCV 89 fL      MCH 30 5 pg      MCHC 34 4 g/dL      RDW 12 7 %      MPV 11 2 fL      Platelets 989 Thousands/uL      nRBC 0 /100 WBCs      Neutrophils Relative 83 %      Immat GRANS % 1 %      Lymphocytes Relative 9 %      Monocytes Relative 6 %      Eosinophils Relative 0 %      Basophils Relative 1 %      Neutrophils Absolute 13 70 Thousands/µL      Immature Grans Absolute 0 14 Thousand/uL      Lymphocytes Absolute 1 49 Thousands/µL      Monocytes Absolute 1 02 Thousand/µL      Eosinophils Absolute 0 05 Thousand/µL      Basophils Absolute 0 08 Thousands/µL                  XR shoulder 2+ views LEFT   Final Result by Chely Ventura MD (03/17 2894)      Acute proximal humeral neck fracture without dislocation        The study was marked in EPIC for immediate notification  Workstation performed: [id number]               Procedures  Procedures      ED Course           Identification of Seniors at Risk      Most Recent Value   (ISAR) Identification of Seniors at Risk   Before the illness or injury that brought you to the Emergency, did you need someone to help you on a regular basis? 0 Filed at: 03/17/2020 5871   In the last 24 hours, have you needed more help than usual?  0 Filed at: 03/17/2020 0141   Have you been hospitalized for one or more nights during the past 6 months? 0 Filed at: 03/17/2020 0460   In general, do you see well?  0 Filed at: 03/17/2020 3826   In general, do you have serious problems with your memory? 1 Filed at: 03/17/2020 1338   Do you take more than three different medications every day? 1 Filed at: 03/17/2020 7818   ISAR Score  2 Filed at: 03/17/2020 6548                            MDM  Number of Diagnoses or Management Options  Fall, initial encounter:   Skin tear of elbow without complication, left, initial encounter:   Unspecified nondisplaced fracture of surgical neck of left humerus, initial encounter for closed fracture:   Diagnosis management comments: 80year-old with left humeral neck fracture, nondisplaced status post fall  She also has a small skin tear on her left elbow  Sling, Vicodin, Lidoderm and ortho follow-up for fracture  Wound cleaning a and wound dressing for skin tear  Patient was also noted to have hyponatremia of 130, previous labs showed hyponatremia of 132  This appears chronic and no treatment is necessary  Follow-up with primary care physician  Diagnosis and treatment was diagnosed with patient's daughter and written instructions were provided         Amount and/or Complexity of Data Reviewed  Clinical lab tests: ordered and reviewed  Tests in the radiology section of CPT®: ordered and reviewed  Discussion of test results with the performing providers: yes  Review and summarize past medical records: yes  Discuss the patient with other providers: yes    Risk of Complications, Morbidity, and/or Mortality  Presenting problems: moderate  Diagnostic procedures: moderate  Management options: low    Patient Progress  Patient progress: improved        Disposition  Final diagnoses:   Unspecified nondisplaced fracture of surgical neck of left humerus, initial encounter for closed fracture   Fall, initial encounter   Skin tear of elbow without complication, left, initial encounter     Time reflects when diagnosis was documented in both MDM as applicable and the Disposition within this note     Time User Action Codes Description Comment    3/17/2020 10:23 AM [facility] [place] Add Eda Males Unspecified nondisplaced fracture of surgical neck of left humerus, initial encounter for closed fracture     3/17/2020 10:42 AM [facility] [place] Add [R73 9] Hyperglycemia     3/17/2020 10:42 AM [facility] [place] Add Ezequiel Renteria  LYLF] Fall, initial encounter     3/17/2020 10:42 AM [facility] [place] Add [O66 576I] Skin tear of elbow without complication, left, initial encounter     3/17/2020 10:42 AM [facility] [place] Remove [R73 9] Hyperglycemia       ED Disposition     ED Disposition Condition Date/Time Comment    Discharge Stable Tue Mar 17, 2020 10:42 AM Angela Armas discharge to home/self care              Follow-up Information     Follow up With Specialties Details Why Contact Info Additional Information    Rosalie Anthony MD Internal Medicine   [address] [place] [address]  [phone]       [address] [place] [address] Orthopedic Surgery Schedule an appointment as soon as possible for a visit  Follow up for humerus neck fracture [address] [id number]  [phone] [address] [place] [address], [address]  [facility]          Patient's Medications   Discharge Prescriptions HYDROCODONE-ACETAMINOPHEN (NORCO) 5-325 MG PER TABLET    Take 1 tablet by mouth every 6 (six) hours as needed for pain for up to 11 dosesMax Daily Amount: 4 tablets       Start Date: 3/17/2020 End Date: --       Order Dose: 1 tablet       Quantity: 11 tablet    Refills: 0     No discharge procedures on file  PDMP Review     None           ED Provider  Attending physically available and evaluated Francisco Brown I managed the patient along with the ED Attending      Electronically Signed by         Shavon León MD  03/17/20 6575

## 2020-03-17 NOTE — ED NOTES
Case management contacted: patient's family confirmed that the patient lives at [facility], not [facility]   Transport to be arranged accordingly     Juan Ni RN  03/17/20 9484

## 2020-03-17 NOTE — ED NOTES
Case management contacted to arrange transport for the patient back to [address] [place] [address], [address] [place] [address]  03/17/20 9196

## 2020-03-18 ENCOUNTER — OFFICE VISIT (OUTPATIENT)
Dept: INTERNAL MEDICINE CLINIC | Age: 85
End: 2020-03-18
Payer: MEDICARE

## 2020-03-18 VITALS
OXYGEN SATURATION: 97 % | HEART RATE: 76 BPM | SYSTOLIC BLOOD PRESSURE: 124 MMHG | DIASTOLIC BLOOD PRESSURE: 66 MMHG | TEMPERATURE: 98.8 F

## 2020-03-18 DIAGNOSIS — F03.90 UNCOMPLICATED SENILE DEMENTIA (HCC): ICD-10-CM

## 2020-03-18 DIAGNOSIS — R26.2 AMBULATORY DYSFUNCTION: ICD-10-CM

## 2020-03-18 DIAGNOSIS — S42.292D OTHER CLOSED DISPLACED FRACTURE OF PROXIMAL END OF LEFT HUMERUS WITH ROUTINE HEALING, SUBSEQUENT ENCOUNTER: Primary | ICD-10-CM

## 2020-03-18 DIAGNOSIS — N18.30 CKD (CHRONIC KIDNEY DISEASE), STAGE III (HCC): ICD-10-CM

## 2020-03-18 PROBLEM — S42.202D CLOSED FRACTURE OF PROXIMAL END OF LEFT HUMERUS WITH ROUTINE HEALING: Status: ACTIVE | Noted: 2020-03-18

## 2020-03-18 PROCEDURE — 99215 OFFICE O/P EST HI 40 MIN: CPT | Performed by: INTERNAL MEDICINE

## 2020-03-18 PROCEDURE — 3078F DIAST BP <80 MM HG: CPT | Performed by: INTERNAL MEDICINE

## 2020-03-18 PROCEDURE — 1036F TOBACCO NON-USER: CPT | Performed by: INTERNAL MEDICINE

## 2020-03-18 PROCEDURE — 1160F RVW MEDS BY RX/DR IN RCRD: CPT | Performed by: INTERNAL MEDICINE

## 2020-03-18 PROCEDURE — 3074F SYST BP LT 130 MM HG: CPT | Performed by: INTERNAL MEDICINE

## 2020-03-18 PROCEDURE — 3066F NEPHROPATHY DOC TX: CPT | Performed by: INTERNAL MEDICINE

## 2020-03-18 NOTE — PROGRESS NOTES
Assessment/Plan:     Diagnoses and all orders for this visit:    Other closed displaced fracture of proximal end of left humerus with routine healing, subsequent encounter  -     Ambulatory Referral to  Place [facility]    Uncomplicated senile dementia ([facility] )  -     Ambulatory Referral to [address] [place] [address] Amilcar Charles    CKD (chronic kidney disease), stage III ([facility] [address] )  -     Ambulatory Referral to [address] [place] [address] Amilcar Charles    Ambulatory dysfunction      Wheelchairs order for the patient for use at home, patient has a ambulatory dysfunction, with fracture of the shoulder and dementia and high risk for the fall       Subjective:   Chief Complaint   Patient presents with    Follow-up     ER f/u- Sharon Robles at home on 3/17/2020 and broke her Left arm and still having hip pain        Patient ID: Obdulia Sommer is a 80 y o  female  HPI  This is a very pleasant 80 years young lady who lives in an apartment helped by the home care help for and also by her 2 daughters was living independently with a very limited ambulation fell yesterday was seen in the emergency room proximal humeral fracture unable to ambulate also incontinent of the urine patient was discharged home the family is very much concerned that she is not able to do anything by herself and the will need more help than what is available right now certainly she will significantly benefit from the short-term rehab but mostly subacute rehab  I discussed with the acute rehab in subacute rehab subacute rehab at [facility] care will review the case and will get back to me patient also has a history of diabetes mellitus    The following portions of the patient's history were reviewed and updated as appropriate: allergies, current medications, past family history, past medical history, past social history, past surgical history and problem list     Review of Systems   Constitutional: Positive for fatigue  Negative for chills     HENT: Negative for congestion, ear pain, hearing loss, postnasal drip, sinus pressure, sore throat and voice change  Eyes: Negative for pain, discharge and visual disturbance  Respiratory: Negative for cough, chest tightness and shortness of breath  Cardiovascular: Negative for chest pain, palpitations and leg swelling  Gastrointestinal: Negative for abdominal pain, blood in stool, diarrhea, nausea and rectal pain  Genitourinary: Negative for difficulty urinating, dysuria and urgency  Musculoskeletal: Negative for arthralgias and joint swelling  Left arm pain after the proximal humeral fracture   Skin: Negative for rash  Allergic/Immunologic: Negative for environmental allergies and food allergies  Neurological: Negative for dizziness, tremors, weakness, numbness and headaches  Difficulty in ambulation unable to stand and walk shuffling gait  Patient is drowsy and sleepy   Hematological: Negative for adenopathy  Psychiatric/Behavioral: Negative for behavioral problems and hallucinations           Past Medical History:   Diagnosis Date    Aortic stenosis     Bilateral edema of lower extremity     Dementia ([facility] )     Fall     Gait abnormality     Hyperlipidemia     Hypertension     Hypokalemia     Other ascites     Varicose veins of leg with edema          Current Outpatient Medications:     donepezil (ARICEPT) 10 mg tablet, Take 1 tablet (10 mg total) by mouth daily, Disp: 90 tablet, Rfl: 1    furosemide (LASIX) 40 mg tablet, Take 1 tablet (40 mg total) by mouth daily, Disp: 90 tablet, Rfl: 1    glipiZIDE (GLUCOTROL XL) 10 mg 24 hr tablet, Take 1 tablet (10 mg total) by mouth daily, Disp: 90 tablet, Rfl: 1    HYDROcodone-acetaminophen (NORCO) 5-325 mg per tablet, Take 1 tablet by mouth every 6 (six) hours as needed for pain for up to 11 dosesMax Daily Amount: 4 tablets, Disp: 11 tablet, Rfl: 0    losartan (COZAAR) 25 mg tablet, Take 1 tablet (25 mg total) by mouth daily, Disp: 90 tablet, Rfl: 1    magnesium oxide (MAG-OX) 400 mg, Take 1 tablet (400 mg total) by mouth daily, Disp: 90 tablet, Rfl: 1    metolazone (ZAROXOLYN) 5 mg tablet, Take 1 tablet (5 mg total) by mouth daily, Disp: 90 tablet, Rfl: 1    Potassium Chloride ER 20 MEQ TBCR, Take 3 tablets (60 mEq total) by mouth 3 (three) times a day, Disp: 810 tablet, Rfl: 1    repaglinide (PRANDIN) 2 mg tablet, Take 1 tablet (2 mg total) by mouth 3 (three) times a day before meals, Disp: 270 tablet, Rfl: 1    simvastatin (ZOCOR) 40 mg tablet, Take 40 mg by mouth daily at bedtime, Disp: , Rfl:     sitaGLIPtin (JANUVIA) 100 mg tablet, Take 1 tablet (100 mg total) by mouth daily, Disp: 90 tablet, Rfl: 0  No current facility-administered medications for this visit  No Known Allergies    Social History   Past Surgical History:   Procedure Laterality Date    BREAST SURGERY      ELBOW SURGERY       Family History   Problem Relation Age of Onset    Dementia Sister        Objective:  /66 (BP Location: Right arm, Patient Position: Sitting, Cuff Size: Large)   Pulse 76   Temp 98 8 °F (37 1 °C) (Tympanic)   SpO2 97%        Physical Exam   Constitutional: She is oriented to person, place, and time  She appears well-developed and well-nourished  Drowsy and sleepy   HENT:   Right Ear: External ear normal    Mouth/Throat: Oropharynx is clear and moist    Eyes: Pupils are equal, round, and reactive to light  Conjunctivae and EOM are normal    Neck: Normal range of motion  No JVD present  No thyromegaly present  Cardiovascular: Normal rate, regular rhythm, normal heart sounds and intact distal pulses  Pulmonary/Chest: Breath sounds normal    Abdominal: Soft  Bowel sounds are normal    Musculoskeletal: Normal range of motion  She exhibits edema  Left arm is in the brace and sling after the fracture was evaluated in the emergency room I reviewed the x-ray which shows nondisplaced proximal humeral fracture   Lymphadenopathy:     She has no cervical adenopathy     Neurological: She is alert and oriented to person, place, and time  She has normal reflexes  Drowsy  Lower extremity weakness shuffling gait unable to ambulate unable to get out of the wheelchair without help unable to ambulate without walker   Skin: Skin is dry  Psychiatric: She has a normal mood and affect   Her behavior is normal  Judgment and thought content normal

## 2020-03-19 ENCOUNTER — TELEPHONE (OUTPATIENT)
Dept: INTERNAL MEDICINE CLINIC | Age: 85
End: 2020-03-19

## 2020-03-19 ENCOUNTER — TELEPHONE (OUTPATIENT)
Dept: INTERNAL MEDICINE CLINIC | Facility: CLINIC | Age: 85
End: 2020-03-19

## 2020-03-19 DIAGNOSIS — S42.292D OTHER CLOSED DISPLACED FRACTURE OF PROXIMAL END OF LEFT HUMERUS WITH ROUTINE HEALING, SUBSEQUENT ENCOUNTER: ICD-10-CM

## 2020-03-19 DIAGNOSIS — R26.2 AMBULATORY DYSFUNCTION: Primary | ICD-10-CM

## 2020-03-19 NOTE — TELEPHONE ENCOUNTER
Home care called to make aware they will be able to physical therapy a couple times a week but not skilled nursing  They do not do ADL's they were questioning [facility]  PT is on a limited basis

## 2020-03-19 NOTE — TELEPHONE ENCOUNTER
Patients daughter asked if we could put in a script for the patient to get a wheelchair  If so, can we please send the script to [address]  [facility]  Fax number is [phone]  They asked if height and weight can be on the script along with it stating they need a Standard wheelchair along with the providers NPI number and signature

## 2020-03-23 NOTE — TELEPHONE ENCOUNTER
Gloria from [address] called again  Seeking verbal order from physician to  for patient  Also, inquiring if patient has weight bearing and range of motion precautions for left shoulder  Lastly, requesting [place] call back to verify patient's medication list   Gloria's contact number is [phone]

## 2020-03-24 ENCOUNTER — OFFICE VISIT (OUTPATIENT)
Dept: OBGYN CLINIC | Facility: CLINIC | Age: 85
End: 2020-03-24
Payer: MEDICARE

## 2020-03-24 DIAGNOSIS — S42.202A CLOSED FRACTURE OF PROXIMAL END OF LEFT HUMERUS, UNSPECIFIED FRACTURE MORPHOLOGY, INITIAL ENCOUNTER: Primary | ICD-10-CM

## 2020-03-24 PROCEDURE — 3066F NEPHROPATHY DOC TX: CPT | Performed by: ORTHOPAEDIC SURGERY

## 2020-03-24 PROCEDURE — 99203 OFFICE O/P NEW LOW 30 MIN: CPT | Performed by: ORTHOPAEDIC SURGERY

## 2020-03-24 PROCEDURE — 1160F RVW MEDS BY RX/DR IN RCRD: CPT | Performed by: ORTHOPAEDIC SURGERY

## 2020-03-24 PROCEDURE — 1036F TOBACCO NON-USER: CPT | Performed by: ORTHOPAEDIC SURGERY

## 2020-03-24 PROCEDURE — 3078F DIAST BP <80 MM HG: CPT | Performed by: ORTHOPAEDIC SURGERY

## 2020-03-24 PROCEDURE — 3074F SYST BP LT 130 MM HG: CPT | Performed by: ORTHOPAEDIC SURGERY

## 2020-03-24 NOTE — PROGRESS NOTES
Patient Name:  Rian Mckenna  MRN:  [id number]    Assessment & Plan     Left proximal humerus fracture 3/17/20  1  Continue nonweightbearing left upper extremity in sling for a total four weeks from injury date  2  Continue home physical therapy  Gentle passive range of motion of the shoulder  Elbow, wrist, digital and  exercises tolerated  3  Follow-up in 4-5 weeks for repeat evaluation with repeat x-rays of the left shoulder  Chief Complaint     Left shoulder injury    History of the Present Illness     iRan Mckenna is a 80 y o  female who reports to the office today for evaluation of her left shoulder  Patient sustained a mechanical fall onto her left shoulder on 3/17/20  She reported to the emergency department that evening where x-rays were performed revealing a proximal wrist fracture  She was placed in a sling  Currently her pain is well controlled  She takes Tylenol for pain  Pain is worse with any movement  She has been performing in-home physical therapy for her hips in states the in-home physical therapist is ready to work on her shoulder  No fevers or chills  Physical Exam     There were no vitals taken for this visit  Left shoulder: Tenderness to palpation proximal humerus  Shoulder range of motion and strength are limited by pain  No gross instability  Eyes: No scleral icterus  Neck: Supple  Lungs: Normal respiratory effort  Cardiovascular: Capillary refill is less than 2 seconds  Skin: Intact with with extensive ecchymosis in the left shoulder and arm  Neurologic: Sensation intact to light touch in the left upper extremity  Psychiatric: Mood and affect are appropriate  Data Review     I have personally reviewed pertinent films in PACS, and my interpretation follows:    X-rays left shoulder 3/17/20 reveals a nondisplaced proximal humerus fracture        Past Medical History:   Diagnosis Date    Aortic stenosis     Bilateral edema of lower extremity     Dementia ([facility] [address] )  Fall     Gait abnormality     Hyperlipidemia     Hypertension     Hypokalemia     Other ascites     Varicose veins of leg with edema        Past Surgical History:   Procedure Laterality Date    BREAST SURGERY      ELBOW SURGERY         No Known Allergies    Current Outpatient Medications on File Prior to Visit   Medication Sig Dispense Refill    donepezil (ARICEPT) 10 mg tablet Take 1 tablet (10 mg total) by mouth daily 90 tablet 1    furosemide (LASIX) 40 mg tablet Take 1 tablet (40 mg total) by mouth daily 90 tablet 1    glipiZIDE (GLUCOTROL XL) 10 mg 24 hr tablet Take 1 tablet (10 mg total) by mouth daily 90 tablet 1    HYDROcodone-acetaminophen (NORCO) 5-325 mg per tablet Take 1 tablet by mouth every 6 (six) hours as needed for pain for up to 11 dosesMax Daily Amount: 4 tablets 11 tablet 0    losartan (COZAAR) 25 mg tablet Take 1 tablet (25 mg total) by mouth daily 90 tablet 1    magnesium oxide (MAG-OX) 400 mg Take 1 tablet (400 mg total) by mouth daily 90 tablet 1    metolazone (ZAROXOLYN) 5 mg tablet Take 1 tablet (5 mg total) by mouth daily 90 tablet 1    Potassium Chloride ER 20 MEQ TBCR Take 3 tablets (60 mEq total) by mouth 3 (three) times a day 810 tablet 1    repaglinide (PRANDIN) 2 mg tablet Take 1 tablet (2 mg total) by mouth 3 (three) times a day before meals 270 tablet 1    simvastatin (ZOCOR) 40 mg tablet Take 40 mg by mouth daily at bedtime      sitaGLIPtin (JANUVIA) 100 mg tablet Take 1 tablet (100 mg total) by mouth daily 90 tablet 0     No current facility-administered medications on file prior to visit  Social History     Tobacco Use    Smoking status: Never Smoker    Smokeless tobacco: Never Used   Substance Use Topics    Alcohol use: No    Drug use: No       Family History   Problem Relation Age of Onset    Dementia Sister        Review of Systems     As stated in the HPI  All other systems were reviewed and are negative        Fracture / Dislocation Treatment  Date/Time: 3/24/2020 11:08 AM  Performed by: Kiley Boston MD  Authorized by: Kiley Boston MD     Injury location:  Shoulder  Location details:  Left shoulder  Injury type:  Fracture        Scribe Attestation    I,:   Yeny Lloyd PA-C am acting as a scribe while in the presence of the attending physician :        I,:   Kiley Boston MD personally performed the services described in this documentation    as scribed in my presence :

## 2020-03-24 NOTE — TELEPHONE ENCOUNTER
Message left for Gloria to call the office  Dr Alton Mckinney is in the [facility] and I will assist him in getting the information that is needed

## 2020-03-30 ENCOUNTER — TELEPHONE (OUTPATIENT)
Dept: INTERNAL MEDICINE CLINIC | Age: 85
End: 2020-03-30

## 2020-03-30 NOTE — TELEPHONE ENCOUNTER
Pt daughter calling concerned that she is not able to get the patients wheel chair from [facility]  I just faxed over the patient order to [phone] medical

## 2020-03-31 ENCOUNTER — TELEPHONE (OUTPATIENT)
Dept: INTERNAL MEDICINE CLINIC | Age: 85
End: 2020-03-31

## 2020-03-31 NOTE — TELEPHONE ENCOUNTER
Dr [facility] called they want the office note's that state that you are ordering a wheel chair and the reasons why you are ordering faxed to [facility] at [phone]  I did look back on your note on 3/18 and see the reason being proximal humeral fracture but I see no mention in the note that you planned to order a wheel chair so I did not want to send that note

## 2020-03-31 NOTE — TELEPHONE ENCOUNTER
Received a call from Cassidy Cai at [facility] and she stated that in order for medicare to pay for the patients wheelchair you ordered, your office visit note must state that the wheelchair has been ordered and it needs to indicate that its going to be used for "home use " If we can please indicate that in your last OV note, and then resend the Note to Cassidy Cai at [phone] Thank you!

## 2020-04-07 ENCOUNTER — DOCUMENTATION (OUTPATIENT)
Dept: INTERNAL MEDICINE CLINIC | Facility: CLINIC | Age: 85
End: 2020-04-07

## 2020-04-07 RX ORDER — ACETAMINOPHEN 325 MG/1
650 TABLET ORAL EVERY 6 HOURS PRN
COMMUNITY

## 2020-04-13 ENCOUNTER — TELEMEDICINE (OUTPATIENT)
Dept: INTERNAL MEDICINE CLINIC | Age: 85
End: 2020-04-13
Payer: MEDICARE

## 2020-04-13 VITALS — BODY MASS INDEX: 39.92 KG/M2 | HEIGHT: 59 IN | WEIGHT: 198 LBS

## 2020-04-13 DIAGNOSIS — E11.9 TYPE 2 DIABETES MELLITUS WITHOUT COMPLICATION, WITHOUT LONG-TERM CURRENT USE OF INSULIN (HCC): Primary | ICD-10-CM

## 2020-04-13 DIAGNOSIS — S42.292D OTHER CLOSED DISPLACED FRACTURE OF PROXIMAL END OF LEFT HUMERUS WITH ROUTINE HEALING, SUBSEQUENT ENCOUNTER: ICD-10-CM

## 2020-04-13 DIAGNOSIS — F03.90 UNCOMPLICATED SENILE DEMENTIA (HCC): ICD-10-CM

## 2020-04-13 DIAGNOSIS — N18.30 CKD (CHRONIC KIDNEY DISEASE), STAGE III (HCC): ICD-10-CM

## 2020-04-13 DIAGNOSIS — I10 BENIGN ESSENTIAL HYPERTENSION: ICD-10-CM

## 2020-04-13 PROCEDURE — 99442 PR PHYS/QHP TELEPHONE EVALUATION 11-20 MIN: CPT | Performed by: INTERNAL MEDICINE

## 2020-04-14 ENCOUNTER — TELEPHONE (OUTPATIENT)
Dept: OBGYN CLINIC | Facility: CLINIC | Age: 85
End: 2020-04-14

## 2020-04-28 ENCOUNTER — OFFICE VISIT (OUTPATIENT)
Dept: OBGYN CLINIC | Facility: CLINIC | Age: 85
End: 2020-04-28
Payer: MEDICARE

## 2020-04-28 ENCOUNTER — APPOINTMENT (OUTPATIENT)
Dept: RADIOLOGY | Facility: AMBULARY SURGERY CENTER | Age: 85
End: 2020-04-28
Attending: ORTHOPAEDIC SURGERY
Payer: MEDICARE

## 2020-04-28 VITALS
HEIGHT: 60 IN | BODY MASS INDEX: 39.32 KG/M2 | DIASTOLIC BLOOD PRESSURE: 85 MMHG | SYSTOLIC BLOOD PRESSURE: 146 MMHG | HEART RATE: 103 BPM

## 2020-04-28 DIAGNOSIS — M25.512 ACUTE PAIN OF LEFT SHOULDER: Primary | ICD-10-CM

## 2020-04-28 DIAGNOSIS — M25.512 ACUTE PAIN OF LEFT SHOULDER: ICD-10-CM

## 2020-04-28 PROCEDURE — 99213 OFFICE O/P EST LOW 20 MIN: CPT | Performed by: ORTHOPAEDIC SURGERY

## 2020-04-28 PROCEDURE — 3077F SYST BP >= 140 MM HG: CPT | Performed by: ORTHOPAEDIC SURGERY

## 2020-04-28 PROCEDURE — 73030 X-RAY EXAM OF SHOULDER: CPT

## 2020-04-28 PROCEDURE — 3079F DIAST BP 80-89 MM HG: CPT | Performed by: ORTHOPAEDIC SURGERY

## 2020-04-28 PROCEDURE — 3066F NEPHROPATHY DOC TX: CPT | Performed by: ORTHOPAEDIC SURGERY

## 2020-04-30 ENCOUNTER — TELEPHONE (OUTPATIENT)
Dept: OBGYN CLINIC | Facility: HOSPITAL | Age: 85
End: 2020-04-30

## 2020-04-30 DIAGNOSIS — S42.202A CLOSED FRACTURE OF PROXIMAL END OF LEFT HUMERUS, UNSPECIFIED FRACTURE MORPHOLOGY, INITIAL ENCOUNTER: Primary | ICD-10-CM

## 2020-05-01 ENCOUNTER — EVALUATION (OUTPATIENT)
Dept: PHYSICAL THERAPY | Age: 85
End: 2020-05-01
Payer: MEDICARE

## 2020-05-01 DIAGNOSIS — S42.202D CLOSED FRACTURE OF PROXIMAL END OF LEFT HUMERUS WITH ROUTINE HEALING, UNSPECIFIED FRACTURE MORPHOLOGY, SUBSEQUENT ENCOUNTER: Primary | ICD-10-CM

## 2020-05-01 PROCEDURE — 97110 THERAPEUTIC EXERCISES: CPT | Performed by: PHYSICAL THERAPIST

## 2020-05-01 PROCEDURE — 97161 PT EVAL LOW COMPLEX 20 MIN: CPT | Performed by: PHYSICAL THERAPIST

## 2020-05-01 PROCEDURE — 97140 MANUAL THERAPY 1/> REGIONS: CPT | Performed by: PHYSICAL THERAPIST

## 2020-05-04 ENCOUNTER — OFFICE VISIT (OUTPATIENT)
Dept: PHYSICAL THERAPY | Age: 85
End: 2020-05-04
Payer: MEDICARE

## 2020-05-04 DIAGNOSIS — S42.202D CLOSED FRACTURE OF PROXIMAL END OF LEFT HUMERUS WITH ROUTINE HEALING, UNSPECIFIED FRACTURE MORPHOLOGY, SUBSEQUENT ENCOUNTER: Primary | ICD-10-CM

## 2020-05-04 PROCEDURE — 97140 MANUAL THERAPY 1/> REGIONS: CPT | Performed by: PHYSICAL THERAPIST

## 2020-05-04 PROCEDURE — 97112 NEUROMUSCULAR REEDUCATION: CPT | Performed by: PHYSICAL THERAPIST

## 2020-05-04 PROCEDURE — 97110 THERAPEUTIC EXERCISES: CPT | Performed by: PHYSICAL THERAPIST

## 2020-05-07 ENCOUNTER — OFFICE VISIT (OUTPATIENT)
Dept: PHYSICAL THERAPY | Age: 85
End: 2020-05-07
Payer: MEDICARE

## 2020-05-07 DIAGNOSIS — S42.202D CLOSED FRACTURE OF PROXIMAL END OF LEFT HUMERUS WITH ROUTINE HEALING, UNSPECIFIED FRACTURE MORPHOLOGY, SUBSEQUENT ENCOUNTER: Primary | ICD-10-CM

## 2020-05-07 PROCEDURE — 97112 NEUROMUSCULAR REEDUCATION: CPT | Performed by: PHYSICAL THERAPIST

## 2020-05-07 PROCEDURE — 97110 THERAPEUTIC EXERCISES: CPT | Performed by: PHYSICAL THERAPIST

## 2020-05-07 PROCEDURE — 97140 MANUAL THERAPY 1/> REGIONS: CPT | Performed by: PHYSICAL THERAPIST

## 2020-05-08 ENCOUNTER — OFFICE VISIT (OUTPATIENT)
Dept: PHYSICAL THERAPY | Age: 85
End: 2020-05-08
Payer: MEDICARE

## 2020-05-08 DIAGNOSIS — S42.202D CLOSED FRACTURE OF PROXIMAL END OF LEFT HUMERUS WITH ROUTINE HEALING, UNSPECIFIED FRACTURE MORPHOLOGY, SUBSEQUENT ENCOUNTER: Primary | ICD-10-CM

## 2020-05-08 PROCEDURE — 97140 MANUAL THERAPY 1/> REGIONS: CPT | Performed by: PHYSICAL THERAPIST

## 2020-05-08 PROCEDURE — 97110 THERAPEUTIC EXERCISES: CPT | Performed by: PHYSICAL THERAPIST

## 2020-05-11 ENCOUNTER — OFFICE VISIT (OUTPATIENT)
Dept: PHYSICAL THERAPY | Age: 85
End: 2020-05-11
Payer: MEDICARE

## 2020-05-11 DIAGNOSIS — I10 HYPERTENSION, UNSPECIFIED TYPE: ICD-10-CM

## 2020-05-11 DIAGNOSIS — S42.202D CLOSED FRACTURE OF PROXIMAL END OF LEFT HUMERUS WITH ROUTINE HEALING, UNSPECIFIED FRACTURE MORPHOLOGY, SUBSEQUENT ENCOUNTER: Primary | ICD-10-CM

## 2020-05-11 PROCEDURE — 97140 MANUAL THERAPY 1/> REGIONS: CPT | Performed by: PHYSICAL THERAPIST

## 2020-05-11 PROCEDURE — 97110 THERAPEUTIC EXERCISES: CPT | Performed by: PHYSICAL THERAPIST

## 2020-05-11 RX ORDER — LOSARTAN POTASSIUM 25 MG/1
25 TABLET ORAL DAILY
Qty: 90 TABLET | Refills: 1 | Status: SHIPPED | OUTPATIENT
Start: 2020-05-11 | End: 2020-06-15 | Stop reason: HOSPADM

## 2020-05-14 ENCOUNTER — OFFICE VISIT (OUTPATIENT)
Dept: PHYSICAL THERAPY | Age: 85
End: 2020-05-14
Payer: MEDICARE

## 2020-05-14 DIAGNOSIS — S42.202D CLOSED FRACTURE OF PROXIMAL END OF LEFT HUMERUS WITH ROUTINE HEALING, UNSPECIFIED FRACTURE MORPHOLOGY, SUBSEQUENT ENCOUNTER: Primary | ICD-10-CM

## 2020-05-14 PROCEDURE — 97140 MANUAL THERAPY 1/> REGIONS: CPT | Performed by: PHYSICAL THERAPIST

## 2020-05-14 PROCEDURE — 97110 THERAPEUTIC EXERCISES: CPT | Performed by: PHYSICAL THERAPIST

## 2020-05-15 ENCOUNTER — TELEPHONE (OUTPATIENT)
Dept: INTERNAL MEDICINE CLINIC | Facility: CLINIC | Age: 85
End: 2020-05-15

## 2020-05-15 ENCOUNTER — OFFICE VISIT (OUTPATIENT)
Dept: PHYSICAL THERAPY | Age: 85
End: 2020-05-15
Payer: MEDICARE

## 2020-05-15 ENCOUNTER — APPOINTMENT (OUTPATIENT)
Dept: PHYSICAL THERAPY | Age: 85
End: 2020-05-15
Payer: MEDICARE

## 2020-05-15 DIAGNOSIS — I83.899 VARICOSE VEINS OF LOWER EXTREMITY WITH EDEMA, UNSPECIFIED LATERALITY: ICD-10-CM

## 2020-05-15 DIAGNOSIS — S42.202D CLOSED FRACTURE OF PROXIMAL END OF LEFT HUMERUS WITH ROUTINE HEALING, UNSPECIFIED FRACTURE MORPHOLOGY, SUBSEQUENT ENCOUNTER: Primary | ICD-10-CM

## 2020-05-15 PROCEDURE — 97110 THERAPEUTIC EXERCISES: CPT | Performed by: PHYSICAL THERAPIST

## 2020-05-15 PROCEDURE — 97140 MANUAL THERAPY 1/> REGIONS: CPT | Performed by: PHYSICAL THERAPIST

## 2020-05-15 RX ORDER — METOLAZONE 5 MG/1
5 TABLET ORAL DAILY
Qty: 90 TABLET | Refills: 1 | Status: CANCELLED | OUTPATIENT
Start: 2020-05-15

## 2020-05-15 RX ORDER — METOLAZONE 5 MG/1
5 TABLET ORAL DAILY
Qty: 90 TABLET | Refills: 1 | Status: SHIPPED | OUTPATIENT
Start: 2020-05-15 | End: 2020-11-13 | Stop reason: SDUPTHER

## 2020-05-15 RX ORDER — METOLAZONE 5 MG/1
TABLET ORAL
Qty: 90 TABLET | Refills: 1 | OUTPATIENT
Start: 2020-05-15

## 2020-05-18 ENCOUNTER — OFFICE VISIT (OUTPATIENT)
Dept: PHYSICAL THERAPY | Age: 85
End: 2020-05-18
Payer: MEDICARE

## 2020-05-18 DIAGNOSIS — S42.202D CLOSED FRACTURE OF PROXIMAL END OF LEFT HUMERUS WITH ROUTINE HEALING, UNSPECIFIED FRACTURE MORPHOLOGY, SUBSEQUENT ENCOUNTER: Primary | ICD-10-CM

## 2020-05-18 PROCEDURE — 97110 THERAPEUTIC EXERCISES: CPT | Performed by: PHYSICAL THERAPIST

## 2020-05-18 PROCEDURE — 97140 MANUAL THERAPY 1/> REGIONS: CPT | Performed by: PHYSICAL THERAPIST

## 2020-05-21 ENCOUNTER — OFFICE VISIT (OUTPATIENT)
Dept: PHYSICAL THERAPY | Age: 85
End: 2020-05-21
Payer: MEDICARE

## 2020-05-21 DIAGNOSIS — S42.202D CLOSED FRACTURE OF PROXIMAL END OF LEFT HUMERUS WITH ROUTINE HEALING, UNSPECIFIED FRACTURE MORPHOLOGY, SUBSEQUENT ENCOUNTER: Primary | ICD-10-CM

## 2020-05-21 PROCEDURE — 97140 MANUAL THERAPY 1/> REGIONS: CPT

## 2020-05-21 PROCEDURE — 97110 THERAPEUTIC EXERCISES: CPT

## 2020-05-22 ENCOUNTER — EVALUATION (OUTPATIENT)
Dept: PHYSICAL THERAPY | Age: 85
End: 2020-05-22
Payer: MEDICARE

## 2020-05-22 DIAGNOSIS — S42.202D CLOSED FRACTURE OF PROXIMAL END OF LEFT HUMERUS WITH ROUTINE HEALING, UNSPECIFIED FRACTURE MORPHOLOGY, SUBSEQUENT ENCOUNTER: Primary | ICD-10-CM

## 2020-05-22 PROCEDURE — 97140 MANUAL THERAPY 1/> REGIONS: CPT | Performed by: PHYSICAL THERAPIST

## 2020-05-22 PROCEDURE — 97110 THERAPEUTIC EXERCISES: CPT | Performed by: PHYSICAL THERAPIST

## 2020-05-26 ENCOUNTER — OFFICE VISIT (OUTPATIENT)
Dept: PHYSICAL THERAPY | Age: 85
End: 2020-05-26
Payer: MEDICARE

## 2020-05-26 DIAGNOSIS — S42.202D CLOSED FRACTURE OF PROXIMAL END OF LEFT HUMERUS WITH ROUTINE HEALING, UNSPECIFIED FRACTURE MORPHOLOGY, SUBSEQUENT ENCOUNTER: Primary | ICD-10-CM

## 2020-05-26 DIAGNOSIS — I10 ESSENTIAL HYPERTENSION, BENIGN: ICD-10-CM

## 2020-05-26 DIAGNOSIS — E11.9 DIABETES MELLITUS TYPE 2, NONINSULIN DEPENDENT (HCC): ICD-10-CM

## 2020-05-26 PROCEDURE — 97140 MANUAL THERAPY 1/> REGIONS: CPT | Performed by: PHYSICAL THERAPIST

## 2020-05-26 PROCEDURE — 97110 THERAPEUTIC EXERCISES: CPT | Performed by: PHYSICAL THERAPIST

## 2020-05-26 RX ORDER — FUROSEMIDE 40 MG/1
40 TABLET ORAL DAILY
Qty: 90 TABLET | Refills: 1 | Status: SHIPPED | OUTPATIENT
Start: 2020-05-26 | End: 2020-11-16 | Stop reason: SDUPTHER

## 2020-05-28 ENCOUNTER — OFFICE VISIT (OUTPATIENT)
Dept: PHYSICAL THERAPY | Age: 85
End: 2020-05-28
Payer: MEDICARE

## 2020-05-28 DIAGNOSIS — S42.202D CLOSED FRACTURE OF PROXIMAL END OF LEFT HUMERUS WITH ROUTINE HEALING, UNSPECIFIED FRACTURE MORPHOLOGY, SUBSEQUENT ENCOUNTER: Primary | ICD-10-CM

## 2020-05-28 PROCEDURE — 97140 MANUAL THERAPY 1/> REGIONS: CPT | Performed by: PHYSICAL THERAPIST

## 2020-05-28 PROCEDURE — 97110 THERAPEUTIC EXERCISES: CPT | Performed by: PHYSICAL THERAPIST

## 2020-06-02 ENCOUNTER — OFFICE VISIT (OUTPATIENT)
Dept: PHYSICAL THERAPY | Age: 85
End: 2020-06-02
Payer: MEDICARE

## 2020-06-02 DIAGNOSIS — S42.202D CLOSED FRACTURE OF PROXIMAL END OF LEFT HUMERUS WITH ROUTINE HEALING, UNSPECIFIED FRACTURE MORPHOLOGY, SUBSEQUENT ENCOUNTER: Primary | ICD-10-CM

## 2020-06-02 PROCEDURE — 97140 MANUAL THERAPY 1/> REGIONS: CPT | Performed by: PHYSICAL THERAPIST

## 2020-06-02 PROCEDURE — 97110 THERAPEUTIC EXERCISES: CPT | Performed by: PHYSICAL THERAPIST

## 2020-06-04 ENCOUNTER — OFFICE VISIT (OUTPATIENT)
Dept: PHYSICAL THERAPY | Age: 85
End: 2020-06-04
Payer: MEDICARE

## 2020-06-04 DIAGNOSIS — S42.202D CLOSED FRACTURE OF PROXIMAL END OF LEFT HUMERUS WITH ROUTINE HEALING, UNSPECIFIED FRACTURE MORPHOLOGY, SUBSEQUENT ENCOUNTER: Primary | ICD-10-CM

## 2020-06-04 DIAGNOSIS — E11.8 TYPE 2 DIABETES MELLITUS WITH COMPLICATION, WITHOUT LONG-TERM CURRENT USE OF INSULIN (HCC): ICD-10-CM

## 2020-06-04 PROCEDURE — 97112 NEUROMUSCULAR REEDUCATION: CPT | Performed by: PHYSICAL THERAPIST

## 2020-06-04 PROCEDURE — 97140 MANUAL THERAPY 1/> REGIONS: CPT | Performed by: PHYSICAL THERAPIST

## 2020-06-04 RX ORDER — GLIPIZIDE 10 MG/1
10 TABLET, FILM COATED, EXTENDED RELEASE ORAL DAILY
Qty: 90 TABLET | Refills: 1 | Status: SHIPPED | OUTPATIENT
Start: 2020-06-04 | End: 2020-12-02 | Stop reason: SDUPTHER

## 2020-06-08 ENCOUNTER — APPOINTMENT (OUTPATIENT)
Dept: LAB | Age: 85
End: 2020-06-08
Payer: MEDICARE

## 2020-06-08 DIAGNOSIS — E11.9 TYPE 2 DIABETES MELLITUS WITHOUT COMPLICATION, WITHOUT LONG-TERM CURRENT USE OF INSULIN (HCC): ICD-10-CM

## 2020-06-08 LAB
ANION GAP SERPL CALCULATED.3IONS-SCNC: 8 MMOL/L (ref 4–13)
BUN SERPL-MCNC: 16 MG/DL (ref 5–25)
CALCIUM SERPL-MCNC: 9.7 MG/DL (ref 8.3–10.1)
CHLORIDE SERPL-SCNC: 100 MMOL/L (ref 100–108)
CO2 SERPL-SCNC: 25 MMOL/L (ref 21–32)
CREAT SERPL-MCNC: 1.02 MG/DL (ref 0.6–1.3)
ERYTHROCYTE [DISTWIDTH] IN BLOOD BY AUTOMATED COUNT: 13.3 % (ref 11.6–15.1)
EST. AVERAGE GLUCOSE BLD GHB EST-MCNC: 186 MG/DL
GFR SERPL CREATININE-BSD FRML MDRD: 47 ML/MIN/1.73SQ M
GLUCOSE P FAST SERPL-MCNC: 189 MG/DL (ref 65–99)
HBA1C MFR BLD: 8.1 %
HCT VFR BLD AUTO: 38.7 % (ref 34.8–46.1)
HGB BLD-MCNC: 13 G/DL (ref 11.5–15.4)
MCH RBC QN AUTO: 30 PG (ref 26.8–34.3)
MCHC RBC AUTO-ENTMCNC: 33.6 G/DL (ref 31.4–37.4)
MCV RBC AUTO: 89 FL (ref 82–98)
PLATELET # BLD AUTO: 264 THOUSANDS/UL (ref 149–390)
PMV BLD AUTO: 12.3 FL (ref 8.9–12.7)
POTASSIUM SERPL-SCNC: 3.3 MMOL/L (ref 3.5–5.3)
RBC # BLD AUTO: 4.34 MILLION/UL (ref 3.81–5.12)
SODIUM SERPL-SCNC: 133 MMOL/L (ref 136–145)
WBC # BLD AUTO: 11.84 THOUSAND/UL (ref 4.31–10.16)

## 2020-06-08 PROCEDURE — 85027 COMPLETE CBC AUTOMATED: CPT

## 2020-06-08 PROCEDURE — 83036 HEMOGLOBIN GLYCOSYLATED A1C: CPT

## 2020-06-08 PROCEDURE — 80048 BASIC METABOLIC PNL TOTAL CA: CPT

## 2020-06-08 PROCEDURE — 36415 COLL VENOUS BLD VENIPUNCTURE: CPT

## 2020-06-09 ENCOUNTER — OFFICE VISIT (OUTPATIENT)
Dept: PHYSICAL THERAPY | Age: 85
End: 2020-06-09
Payer: MEDICARE

## 2020-06-09 ENCOUNTER — OFFICE VISIT (OUTPATIENT)
Dept: OBGYN CLINIC | Facility: CLINIC | Age: 85
End: 2020-06-09
Payer: MEDICARE

## 2020-06-09 ENCOUNTER — APPOINTMENT (OUTPATIENT)
Dept: RADIOLOGY | Facility: AMBULARY SURGERY CENTER | Age: 85
End: 2020-06-09
Attending: ORTHOPAEDIC SURGERY
Payer: MEDICARE

## 2020-06-09 VITALS
HEIGHT: 60 IN | DIASTOLIC BLOOD PRESSURE: 76 MMHG | BODY MASS INDEX: 39.32 KG/M2 | HEART RATE: 94 BPM | SYSTOLIC BLOOD PRESSURE: 124 MMHG

## 2020-06-09 DIAGNOSIS — S42.202D CLOSED FRACTURE OF PROXIMAL END OF LEFT HUMERUS WITH ROUTINE HEALING, UNSPECIFIED FRACTURE MORPHOLOGY, SUBSEQUENT ENCOUNTER: ICD-10-CM

## 2020-06-09 DIAGNOSIS — S42.202D CLOSED FRACTURE OF PROXIMAL END OF LEFT HUMERUS WITH ROUTINE HEALING, UNSPECIFIED FRACTURE MORPHOLOGY, SUBSEQUENT ENCOUNTER: Primary | ICD-10-CM

## 2020-06-09 PROCEDURE — 97112 NEUROMUSCULAR REEDUCATION: CPT | Performed by: PHYSICAL THERAPIST

## 2020-06-09 PROCEDURE — 3074F SYST BP LT 130 MM HG: CPT | Performed by: ORTHOPAEDIC SURGERY

## 2020-06-09 PROCEDURE — 3066F NEPHROPATHY DOC TX: CPT | Performed by: ORTHOPAEDIC SURGERY

## 2020-06-09 PROCEDURE — 3052F HG A1C>EQUAL 8.0%<EQUAL 9.0%: CPT | Performed by: ORTHOPAEDIC SURGERY

## 2020-06-09 PROCEDURE — 1160F RVW MEDS BY RX/DR IN RCRD: CPT | Performed by: ORTHOPAEDIC SURGERY

## 2020-06-09 PROCEDURE — 99213 OFFICE O/P EST LOW 20 MIN: CPT | Performed by: ORTHOPAEDIC SURGERY

## 2020-06-09 PROCEDURE — 1036F TOBACCO NON-USER: CPT | Performed by: ORTHOPAEDIC SURGERY

## 2020-06-09 PROCEDURE — 97140 MANUAL THERAPY 1/> REGIONS: CPT | Performed by: PHYSICAL THERAPIST

## 2020-06-09 PROCEDURE — 73030 X-RAY EXAM OF SHOULDER: CPT

## 2020-06-09 PROCEDURE — 3078F DIAST BP <80 MM HG: CPT | Performed by: ORTHOPAEDIC SURGERY

## 2020-06-11 ENCOUNTER — OFFICE VISIT (OUTPATIENT)
Dept: PHYSICAL THERAPY | Age: 85
End: 2020-06-11
Payer: MEDICARE

## 2020-06-11 DIAGNOSIS — S42.202D CLOSED FRACTURE OF PROXIMAL END OF LEFT HUMERUS WITH ROUTINE HEALING, UNSPECIFIED FRACTURE MORPHOLOGY, SUBSEQUENT ENCOUNTER: Primary | ICD-10-CM

## 2020-06-11 PROCEDURE — 97140 MANUAL THERAPY 1/> REGIONS: CPT | Performed by: PHYSICAL THERAPIST

## 2020-06-11 PROCEDURE — 97110 THERAPEUTIC EXERCISES: CPT | Performed by: PHYSICAL THERAPIST

## 2020-06-15 ENCOUNTER — OFFICE VISIT (OUTPATIENT)
Dept: INTERNAL MEDICINE CLINIC | Facility: CLINIC | Age: 85
End: 2020-06-15
Payer: MEDICARE

## 2020-06-15 VITALS
BODY MASS INDEX: 34.2 KG/M2 | OXYGEN SATURATION: 99 % | HEART RATE: 74 BPM | DIASTOLIC BLOOD PRESSURE: 58 MMHG | HEIGHT: 60 IN | WEIGHT: 174.2 LBS | SYSTOLIC BLOOD PRESSURE: 90 MMHG | TEMPERATURE: 96.9 F

## 2020-06-15 DIAGNOSIS — E78.00 HYPERCHOLESTEROLEMIA: ICD-10-CM

## 2020-06-15 DIAGNOSIS — N18.30 CKD (CHRONIC KIDNEY DISEASE), STAGE III (HCC): ICD-10-CM

## 2020-06-15 DIAGNOSIS — R26.2 AMBULATORY DYSFUNCTION: ICD-10-CM

## 2020-06-15 DIAGNOSIS — E87.1 HYPONATREMIA: ICD-10-CM

## 2020-06-15 DIAGNOSIS — I10 BENIGN ESSENTIAL HYPERTENSION: ICD-10-CM

## 2020-06-15 DIAGNOSIS — Z00.00 MEDICARE ANNUAL WELLNESS VISIT, SUBSEQUENT: ICD-10-CM

## 2020-06-15 DIAGNOSIS — I35.0 MODERATE AORTIC STENOSIS: ICD-10-CM

## 2020-06-15 DIAGNOSIS — E87.6 HYPOKALEMIA: ICD-10-CM

## 2020-06-15 DIAGNOSIS — E11.22 TYPE 2 DIABETES MELLITUS WITH CHRONIC KIDNEY DISEASE, WITHOUT LONG-TERM CURRENT USE OF INSULIN, UNSPECIFIED CKD STAGE (HCC): Primary | ICD-10-CM

## 2020-06-15 PROCEDURE — G0439 PPPS, SUBSEQ VISIT: HCPCS | Performed by: INTERNAL MEDICINE

## 2020-06-15 PROCEDURE — 3008F BODY MASS INDEX DOCD: CPT | Performed by: INTERNAL MEDICINE

## 2020-06-15 PROCEDURE — 99214 OFFICE O/P EST MOD 30 MIN: CPT | Performed by: INTERNAL MEDICINE

## 2020-06-15 PROCEDURE — 1036F TOBACCO NON-USER: CPT | Performed by: INTERNAL MEDICINE

## 2020-06-15 PROCEDURE — 1125F AMNT PAIN NOTED PAIN PRSNT: CPT | Performed by: INTERNAL MEDICINE

## 2020-06-15 PROCEDURE — 1160F RVW MEDS BY RX/DR IN RCRD: CPT | Performed by: INTERNAL MEDICINE

## 2020-06-15 PROCEDURE — 3052F HG A1C>EQUAL 8.0%<EQUAL 9.0%: CPT | Performed by: INTERNAL MEDICINE

## 2020-06-15 PROCEDURE — 3074F SYST BP LT 130 MM HG: CPT | Performed by: INTERNAL MEDICINE

## 2020-06-15 PROCEDURE — 3066F NEPHROPATHY DOC TX: CPT | Performed by: INTERNAL MEDICINE

## 2020-06-15 PROCEDURE — 1170F FXNL STATUS ASSESSED: CPT | Performed by: INTERNAL MEDICINE

## 2020-06-15 PROCEDURE — 3078F DIAST BP <80 MM HG: CPT | Performed by: INTERNAL MEDICINE

## 2020-06-16 ENCOUNTER — OFFICE VISIT (OUTPATIENT)
Dept: PHYSICAL THERAPY | Age: 85
End: 2020-06-16
Payer: MEDICARE

## 2020-06-16 DIAGNOSIS — S42.202D CLOSED FRACTURE OF PROXIMAL END OF LEFT HUMERUS WITH ROUTINE HEALING, UNSPECIFIED FRACTURE MORPHOLOGY, SUBSEQUENT ENCOUNTER: Primary | ICD-10-CM

## 2020-06-16 PROCEDURE — 97110 THERAPEUTIC EXERCISES: CPT | Performed by: PHYSICAL THERAPIST

## 2020-06-16 PROCEDURE — 97140 MANUAL THERAPY 1/> REGIONS: CPT | Performed by: PHYSICAL THERAPIST

## 2020-06-18 ENCOUNTER — OFFICE VISIT (OUTPATIENT)
Dept: PHYSICAL THERAPY | Age: 85
End: 2020-06-18
Payer: MEDICARE

## 2020-06-18 DIAGNOSIS — S42.202D CLOSED FRACTURE OF PROXIMAL END OF LEFT HUMERUS WITH ROUTINE HEALING, UNSPECIFIED FRACTURE MORPHOLOGY, SUBSEQUENT ENCOUNTER: Primary | ICD-10-CM

## 2020-06-18 PROCEDURE — 97110 THERAPEUTIC EXERCISES: CPT | Performed by: PHYSICAL THERAPIST

## 2020-06-18 PROCEDURE — 97140 MANUAL THERAPY 1/> REGIONS: CPT | Performed by: PHYSICAL THERAPIST

## 2020-06-23 ENCOUNTER — OFFICE VISIT (OUTPATIENT)
Dept: PHYSICAL THERAPY | Age: 85
End: 2020-06-23
Payer: MEDICARE

## 2020-06-23 DIAGNOSIS — S42.202D CLOSED FRACTURE OF PROXIMAL END OF LEFT HUMERUS WITH ROUTINE HEALING, UNSPECIFIED FRACTURE MORPHOLOGY, SUBSEQUENT ENCOUNTER: Primary | ICD-10-CM

## 2020-06-23 PROCEDURE — 97110 THERAPEUTIC EXERCISES: CPT | Performed by: PHYSICAL THERAPIST

## 2020-06-23 PROCEDURE — 97140 MANUAL THERAPY 1/> REGIONS: CPT | Performed by: PHYSICAL THERAPIST

## 2020-06-25 ENCOUNTER — OFFICE VISIT (OUTPATIENT)
Dept: PHYSICAL THERAPY | Age: 85
End: 2020-06-25
Payer: MEDICARE

## 2020-06-25 DIAGNOSIS — S42.202D CLOSED FRACTURE OF PROXIMAL END OF LEFT HUMERUS WITH ROUTINE HEALING, UNSPECIFIED FRACTURE MORPHOLOGY, SUBSEQUENT ENCOUNTER: Primary | ICD-10-CM

## 2020-06-25 DIAGNOSIS — E11.9 TYPE 2 DIABETES MELLITUS WITHOUT COMPLICATION, WITHOUT LONG-TERM CURRENT USE OF INSULIN (HCC): ICD-10-CM

## 2020-06-25 PROCEDURE — 97110 THERAPEUTIC EXERCISES: CPT

## 2020-06-25 PROCEDURE — 97140 MANUAL THERAPY 1/> REGIONS: CPT

## 2020-06-25 RX ORDER — REPAGLINIDE 2 MG/1
2 TABLET ORAL
Qty: 270 TABLET | Refills: 1 | Status: SHIPPED | OUTPATIENT
Start: 2020-06-25 | End: 2020-12-21 | Stop reason: SDUPTHER

## 2020-06-29 DIAGNOSIS — F03.90 SENILE DEMENTIA WITHOUT BEHAVIORAL DISTURBANCE (HCC): ICD-10-CM

## 2020-06-29 RX ORDER — DONEPEZIL HYDROCHLORIDE 10 MG/1
10 TABLET, FILM COATED ORAL DAILY
Qty: 90 TABLET | Refills: 1 | Status: SHIPPED | OUTPATIENT
Start: 2020-06-29 | End: 2021-01-15 | Stop reason: SDUPTHER

## 2020-06-30 ENCOUNTER — OFFICE VISIT (OUTPATIENT)
Dept: PHYSICAL THERAPY | Age: 85
End: 2020-06-30
Payer: MEDICARE

## 2020-06-30 DIAGNOSIS — S42.202D CLOSED FRACTURE OF PROXIMAL END OF LEFT HUMERUS WITH ROUTINE HEALING, UNSPECIFIED FRACTURE MORPHOLOGY, SUBSEQUENT ENCOUNTER: Primary | ICD-10-CM

## 2020-06-30 PROCEDURE — 97140 MANUAL THERAPY 1/> REGIONS: CPT | Performed by: PHYSICAL THERAPIST

## 2020-06-30 PROCEDURE — 97110 THERAPEUTIC EXERCISES: CPT | Performed by: PHYSICAL THERAPIST

## 2020-07-02 ENCOUNTER — OFFICE VISIT (OUTPATIENT)
Dept: PHYSICAL THERAPY | Age: 85
End: 2020-07-02
Payer: MEDICARE

## 2020-07-02 DIAGNOSIS — S42.202D CLOSED FRACTURE OF PROXIMAL END OF LEFT HUMERUS WITH ROUTINE HEALING, UNSPECIFIED FRACTURE MORPHOLOGY, SUBSEQUENT ENCOUNTER: Primary | ICD-10-CM

## 2020-07-02 PROCEDURE — 97140 MANUAL THERAPY 1/> REGIONS: CPT

## 2020-07-02 PROCEDURE — 97110 THERAPEUTIC EXERCISES: CPT

## 2020-07-02 NOTE — PROGRESS NOTES
Daily Note     Today's date: 2020  Patient name: Sanya Pablo  : 1923  MRN: [id number]  Referring provider: Amaya May  Dx:   Encounter Diagnosis     ICD-10-CM    1  Closed fracture of proximal end of left humerus with routine healing, unspecified fracture morphology, subsequent encounter S4D                   Subjective:  Pt reports L arm is feeling better, pt reports no sleep interruptions       Objective: See treatment diary below      Assessment: gradually progressing functional ex to work on combing/washing hair/personal hygiene     Plan: Continue per plan of care  Progress treatment as tolerated         Precautions: fx 3/17/2020, Type 2 DM, dementia, fall risk    Manuals 20   Rhythmic stab At 90/90 ER In sitting At 90/90 ER In sitting At 90/90 sitting   VK At 90/90 sitting   FB At 90/90 sitting   VK   GH mobs        MWM upward rotation/scaption 2*10 2*10  2*10    Behind head rhythmic stab 3*10   3*10    ER MFR  3' 3' np    Neuro Re-Ed        Overhead reaching        Ther Ex        Wall slide  3*10- added to HEP      Finger ladder        shld ER 3*10 RTB with assist 3*10 RTB with assist Manual ER isometric 2x10x5" Manual ER isometric 2x10x5" Manual ER isometric 2x10x5"   Stand abd  3*10, 1#       rows 3*10 GTB 3*10 GTB gtb 3x10 3*10 GTB gtb 3x10   UBE 3'/3' 3'/3' 3'/3' 2x3' rev  3'/3'   Seated shld flexion 3*10 1#, scaption 3*10, 1# scap 1# 3x10 scaption 1# 3x10 scaption 1# 3x10   Face pulls     ytb 2x10   Low rows    3*10 RTB rtb 3x10                   Ther Activity

## 2020-07-07 ENCOUNTER — OFFICE VISIT (OUTPATIENT)
Dept: PHYSICAL THERAPY | Age: 85
End: 2020-07-07
Payer: MEDICARE

## 2020-07-07 DIAGNOSIS — S42.202D CLOSED FRACTURE OF PROXIMAL END OF LEFT HUMERUS WITH ROUTINE HEALING, UNSPECIFIED FRACTURE MORPHOLOGY, SUBSEQUENT ENCOUNTER: Primary | ICD-10-CM

## 2020-07-07 PROCEDURE — 97140 MANUAL THERAPY 1/> REGIONS: CPT

## 2020-07-07 PROCEDURE — 97110 THERAPEUTIC EXERCISES: CPT

## 2020-07-07 NOTE — PROGRESS NOTES
Daily Note     Today's date: 2020  Patient name: Palomo Lange  : 1923  MRN: [id number]  Referring provider: Simon Ellis  Dx:   Encounter Diagnosis     ICD-10-CM    1  Closed fracture of proximal end of left humerus with routine healing, unspecified fracture morphology, subsequent encounter S4                   Subjective: pt reports she feels better, starting to use L arm more for ADL's but still sore at times      Objective: See treatment diary below      Assessment: improved ex ramin noted today vs last treatment, manual/verbal cueing at times for ex technique      Plan: Continue per plan of care  Progress treatment as tolerated         Precautions: fx 3/17/2020, Type 2 DM, dementia, fall risk    Manuals 20   Rhythmic stab At 90/90 ER In sitting At 90/90 ER In sitting At 90/90 sitting   VK At 90/90 sitting   FB At 90/90 sitting   VK At 90/90 sitting VK   GH mobs         MWM upward rotation/scaption 2*10 2*10  2*10     Behind head rhythmic stab 3*10   3*10     ER MFR  3' 3' np     Neuro Re-Ed         Overhead reaching         Ther Ex         Wall slide  3*10- added to HEP       Finger ladder         shld ER 3*10 RTB with assist 3*10 RTB with assist Manual ER isometric 2x10x5" Manual ER isometric 2x10x5" Manual ER isometric 2x10x5" 2x10x5"   Stand abd  3*10, 1#        rows 3*10 GTB 3*10 GTB gtb 3x10 3*10 GTB gtb 3x10 rtb 3x10   UBE 3'/3' 3'/3' 3'/3' 2x3' rev  3'/3' 3'/3'   Seated shld flexion 3*10 1#, scaption 3*10, 1# scap 1# 3x10 scaption 1# 3x10 scaption 1# 3x10 1# 3x10   Face pulls     ytb 2x10 ytb 2x10   Low rows    3*10 RTB rtb 3x10 rtb 3x10                     Ther Activity

## 2020-07-09 ENCOUNTER — OFFICE VISIT (OUTPATIENT)
Dept: PHYSICAL THERAPY | Age: 85
End: 2020-07-09
Payer: MEDICARE

## 2020-07-09 DIAGNOSIS — S42.202D CLOSED FRACTURE OF PROXIMAL END OF LEFT HUMERUS WITH ROUTINE HEALING, UNSPECIFIED FRACTURE MORPHOLOGY, SUBSEQUENT ENCOUNTER: Primary | ICD-10-CM

## 2020-07-09 PROCEDURE — 97110 THERAPEUTIC EXERCISES: CPT

## 2020-07-09 PROCEDURE — 97140 MANUAL THERAPY 1/> REGIONS: CPT

## 2020-07-09 NOTE — PROGRESS NOTES
Daily Note     Today's date: 2020  Patient name: iMchael Vazquez  : 1923  MRN: [id number]  Referring provider: Clara July  Dx:   Encounter Diagnosis     ICD-10-CM    1  Closed fracture of proximal end of left humerus with routine healing, unspecified fracture morphology, subsequent encounter S4                   Subjective: pt reports she's improved with her L shoulder since beginning therapy, still having difficulty combing hair with L arm but not as difficult , also reports less difficulty reaching into cabinet for cup      Objective: See treatment diary below      Assessment: Tolerated treatment well, with min-mod fatigue noted, HEP reviewed with pt/daughter      Plan: DC PT to HEP as per PT POC     Precautions: fx 3/17/2020, Type 2 DM, dementia, fall risk    Manuals 20   Rhythmic stab At 90/90 ER In sitting At 90/90 sitting   VK At 90/90 sitting   FB At 90/90 sitting   VK At 90/90 sitting VK At 90/90 sitting VK   GH mobs         MWM upward rotation/scaption 2*10  2*10      Behind head rhythmic stab   3*10      ER MFR 3' 3' np      Neuro Re-Ed         Overhead reaching         Ther Ex         Wall slide 3*10- added to HEP        Finger ladder         shld ER 3*10 RTB with assist Manual ER isometric 2x10x5" Manual ER isometric 2x10x5" Manual ER isometric 2x10x5" 2x10x5" 2x10x5"   Stand abd          rows 3*10 GTB gtb 3x10 3*10 GTB gtb 3x10 rtb 3x10 rtb 3x10   UBE 3'/3' 3'/3' 2x3' rev  3'/3' 3'/3' np   Seated shld flexion 3*10, 1# scap 1# 3x10 scaption 1# 3x10 scaption 1# 3x10 1# 3x10 1# 3x10   Face pulls    ytb 2x10 ytb 2x10 ytb 2x10   Low rows   3*10 RTB rtb 3x10 rtb 3x10 rtb 3x10                     Ther Activity

## 2020-08-03 DIAGNOSIS — N18.30 CKD (CHRONIC KIDNEY DISEASE), STAGE III (HCC): ICD-10-CM

## 2020-08-03 DIAGNOSIS — E87.6 HYPOKALEMIA: ICD-10-CM

## 2020-08-21 DIAGNOSIS — N18.30 CKD (CHRONIC KIDNEY DISEASE), STAGE III (HCC): ICD-10-CM

## 2020-08-21 DIAGNOSIS — E11.9 DIABETES MELLITUS TYPE 2, NONINSULIN DEPENDENT (HCC): ICD-10-CM

## 2020-08-21 DIAGNOSIS — E87.6 HYPOKALEMIA: ICD-10-CM

## 2020-08-21 RX ORDER — POTASSIUM CHLORIDE 1500 MG/1
3 TABLET, FILM COATED, EXTENDED RELEASE ORAL 3 TIMES DAILY
Qty: 810 TABLET | Refills: 1 | Status: SHIPPED | OUTPATIENT
Start: 2020-08-21 | End: 2021-02-23 | Stop reason: SDUPTHER

## 2020-09-28 ENCOUNTER — APPOINTMENT (OUTPATIENT)
Dept: LAB | Age: 85
End: 2020-09-28
Payer: MEDICARE

## 2020-09-28 DIAGNOSIS — E11.22 TYPE 2 DIABETES MELLITUS WITH CHRONIC KIDNEY DISEASE, WITHOUT LONG-TERM CURRENT USE OF INSULIN, UNSPECIFIED CKD STAGE (HCC): ICD-10-CM

## 2020-09-28 DIAGNOSIS — E87.6 HYPOKALEMIA: ICD-10-CM

## 2020-09-28 DIAGNOSIS — N18.30 CKD (CHRONIC KIDNEY DISEASE), STAGE III (HCC): ICD-10-CM

## 2020-09-28 LAB
ANION GAP SERPL CALCULATED.3IONS-SCNC: 6 MMOL/L (ref 4–13)
BUN SERPL-MCNC: 20 MG/DL (ref 5–25)
CALCIUM SERPL-MCNC: 9.3 MG/DL (ref 8.3–10.1)
CHLORIDE SERPL-SCNC: 101 MMOL/L (ref 100–108)
CO2 SERPL-SCNC: 28 MMOL/L (ref 21–32)
CREAT SERPL-MCNC: 1.12 MG/DL (ref 0.6–1.3)
ERYTHROCYTE [DISTWIDTH] IN BLOOD BY AUTOMATED COUNT: 13.2 % (ref 11.6–15.1)
EST. AVERAGE GLUCOSE BLD GHB EST-MCNC: 186 MG/DL
GFR SERPL CREATININE-BSD FRML MDRD: 42 ML/MIN/1.73SQ M
GLUCOSE P FAST SERPL-MCNC: 188 MG/DL (ref 65–99)
HBA1C MFR BLD: 8.1 %
HCT VFR BLD AUTO: 38.5 % (ref 34.8–46.1)
HGB BLD-MCNC: 12.7 G/DL (ref 11.5–15.4)
MCH RBC QN AUTO: 29.7 PG (ref 26.8–34.3)
MCHC RBC AUTO-ENTMCNC: 33 G/DL (ref 31.4–37.4)
MCV RBC AUTO: 90 FL (ref 82–98)
PLATELET # BLD AUTO: 248 THOUSANDS/UL (ref 149–390)
PMV BLD AUTO: 11.9 FL (ref 8.9–12.7)
POTASSIUM SERPL-SCNC: 3.6 MMOL/L (ref 3.5–5.3)
RBC # BLD AUTO: 4.27 MILLION/UL (ref 3.81–5.12)
SODIUM SERPL-SCNC: 135 MMOL/L (ref 136–145)
WBC # BLD AUTO: 9.88 THOUSAND/UL (ref 4.31–10.16)

## 2020-09-28 PROCEDURE — 83036 HEMOGLOBIN GLYCOSYLATED A1C: CPT

## 2020-09-28 PROCEDURE — 80048 BASIC METABOLIC PNL TOTAL CA: CPT

## 2020-09-28 PROCEDURE — 36415 COLL VENOUS BLD VENIPUNCTURE: CPT

## 2020-09-28 PROCEDURE — 85027 COMPLETE CBC AUTOMATED: CPT

## 2020-10-05 ENCOUNTER — OFFICE VISIT (OUTPATIENT)
Dept: INTERNAL MEDICINE CLINIC | Age: 85
End: 2020-10-05
Payer: MEDICARE

## 2020-10-05 VITALS
TEMPERATURE: 97.2 F | BODY MASS INDEX: 32.98 KG/M2 | OXYGEN SATURATION: 98 % | HEIGHT: 60 IN | HEART RATE: 75 BPM | WEIGHT: 168 LBS | SYSTOLIC BLOOD PRESSURE: 120 MMHG | DIASTOLIC BLOOD PRESSURE: 62 MMHG

## 2020-10-05 DIAGNOSIS — I10 BENIGN ESSENTIAL HYPERTENSION: Primary | ICD-10-CM

## 2020-10-05 DIAGNOSIS — F03.90 UNCOMPLICATED SENILE DEMENTIA (HCC): ICD-10-CM

## 2020-10-05 DIAGNOSIS — E11.22 TYPE 2 DIABETES MELLITUS WITH CHRONIC KIDNEY DISEASE, WITHOUT LONG-TERM CURRENT USE OF INSULIN, UNSPECIFIED CKD STAGE (HCC): ICD-10-CM

## 2020-10-05 DIAGNOSIS — I35.0 MODERATE AORTIC STENOSIS: ICD-10-CM

## 2020-10-05 DIAGNOSIS — R26.2 AMBULATORY DYSFUNCTION: ICD-10-CM

## 2020-10-05 DIAGNOSIS — R60.0 BILATERAL LOWER EXTREMITY EDEMA: ICD-10-CM

## 2020-10-05 PROCEDURE — 99214 OFFICE O/P EST MOD 30 MIN: CPT | Performed by: INTERNAL MEDICINE

## 2020-10-19 ENCOUNTER — IMMUNIZATIONS (OUTPATIENT)
Dept: INTERNAL MEDICINE CLINIC | Age: 85
End: 2020-10-19
Payer: MEDICARE

## 2020-10-19 DIAGNOSIS — Z23 NEED FOR INFLUENZA VACCINATION: Primary | ICD-10-CM

## 2020-10-19 PROCEDURE — 90662 IIV NO PRSV INCREASED AG IM: CPT

## 2020-10-19 PROCEDURE — G0008 ADMIN INFLUENZA VIRUS VAC: HCPCS

## 2020-11-13 DIAGNOSIS — I83.899 VARICOSE VEINS OF LOWER EXTREMITY WITH EDEMA, UNSPECIFIED LATERALITY: ICD-10-CM

## 2020-11-13 RX ORDER — METOLAZONE 5 MG/1
5 TABLET ORAL DAILY
Qty: 90 TABLET | Refills: 1 | Status: SHIPPED | OUTPATIENT
Start: 2020-11-13 | End: 2021-04-30 | Stop reason: SDUPTHER

## 2020-11-16 DIAGNOSIS — I10 ESSENTIAL HYPERTENSION, BENIGN: ICD-10-CM

## 2020-11-16 RX ORDER — FUROSEMIDE 40 MG/1
40 TABLET ORAL DAILY
Qty: 90 TABLET | Refills: 1 | Status: SHIPPED | OUTPATIENT
Start: 2020-11-16 | End: 2021-04-30 | Stop reason: SDUPTHER

## 2020-12-02 DIAGNOSIS — E11.8 TYPE 2 DIABETES MELLITUS WITH COMPLICATION, WITHOUT LONG-TERM CURRENT USE OF INSULIN (HCC): ICD-10-CM

## 2020-12-02 RX ORDER — GLIPIZIDE 10 MG/1
10 TABLET, FILM COATED, EXTENDED RELEASE ORAL DAILY
Qty: 90 TABLET | Refills: 1 | Status: SHIPPED | OUTPATIENT
Start: 2020-12-02 | End: 2021-06-03 | Stop reason: SDUPTHER

## 2020-12-21 DIAGNOSIS — E11.9 TYPE 2 DIABETES MELLITUS WITHOUT COMPLICATION, WITHOUT LONG-TERM CURRENT USE OF INSULIN (HCC): ICD-10-CM

## 2020-12-21 RX ORDER — REPAGLINIDE 2 MG/1
2 TABLET ORAL
Qty: 270 TABLET | Refills: 1 | Status: SHIPPED | OUTPATIENT
Start: 2020-12-21 | End: 2021-07-02 | Stop reason: SDUPTHER

## 2021-01-15 DIAGNOSIS — F03.90 SENILE DEMENTIA WITHOUT BEHAVIORAL DISTURBANCE (HCC): ICD-10-CM

## 2021-01-15 RX ORDER — DONEPEZIL HYDROCHLORIDE 10 MG/1
10 TABLET, FILM COATED ORAL DAILY
Qty: 90 TABLET | Refills: 0 | Status: SHIPPED | OUTPATIENT
Start: 2021-01-15 | End: 2021-04-07 | Stop reason: SDUPTHER

## 2021-02-03 DIAGNOSIS — E87.6 HYPOKALEMIA: ICD-10-CM

## 2021-02-03 DIAGNOSIS — N18.30 CKD (CHRONIC KIDNEY DISEASE), STAGE III (HCC): ICD-10-CM

## 2021-02-05 ENCOUNTER — LAB (OUTPATIENT)
Dept: LAB | Age: 86
End: 2021-02-05
Payer: MEDICARE

## 2021-02-05 DIAGNOSIS — E11.22 TYPE 2 DIABETES MELLITUS WITH CHRONIC KIDNEY DISEASE, WITHOUT LONG-TERM CURRENT USE OF INSULIN, UNSPECIFIED CKD STAGE (HCC): ICD-10-CM

## 2021-02-05 DIAGNOSIS — I10 BENIGN ESSENTIAL HYPERTENSION: ICD-10-CM

## 2021-02-05 LAB
ALBUMIN SERPL BCP-MCNC: 3.7 G/DL (ref 3.5–5)
ALP SERPL-CCNC: 93 U/L (ref 46–116)
ALT SERPL W P-5'-P-CCNC: 23 U/L (ref 12–78)
ANION GAP SERPL CALCULATED.3IONS-SCNC: 6 MMOL/L (ref 4–13)
AST SERPL W P-5'-P-CCNC: 30 U/L (ref 5–45)
BILIRUB SERPL-MCNC: 0.73 MG/DL (ref 0.2–1)
BUN SERPL-MCNC: 18 MG/DL (ref 5–25)
CALCIUM SERPL-MCNC: 9.6 MG/DL (ref 8.3–10.1)
CHLORIDE SERPL-SCNC: 99 MMOL/L (ref 100–108)
CO2 SERPL-SCNC: 31 MMOL/L (ref 21–32)
CREAT SERPL-MCNC: 1.16 MG/DL (ref 0.6–1.3)
ERYTHROCYTE [DISTWIDTH] IN BLOOD BY AUTOMATED COUNT: 13.2 % (ref 11.6–15.1)
EST. AVERAGE GLUCOSE BLD GHB EST-MCNC: 192 MG/DL
GFR SERPL CREATININE-BSD FRML MDRD: 40 ML/MIN/1.73SQ M
GLUCOSE P FAST SERPL-MCNC: 183 MG/DL (ref 65–99)
HBA1C MFR BLD: 8.3 %
HCT VFR BLD AUTO: 40.2 % (ref 34.8–46.1)
HGB BLD-MCNC: 13.4 G/DL (ref 11.5–15.4)
MCH RBC QN AUTO: 30.1 PG (ref 26.8–34.3)
MCHC RBC AUTO-ENTMCNC: 33.3 G/DL (ref 31.4–37.4)
MCV RBC AUTO: 90 FL (ref 82–98)
PLATELET # BLD AUTO: 277 THOUSANDS/UL (ref 149–390)
PMV BLD AUTO: 11.6 FL (ref 8.9–12.7)
POTASSIUM SERPL-SCNC: 4 MMOL/L (ref 3.5–5.3)
PROT SERPL-MCNC: 7.5 G/DL (ref 6.4–8.2)
RBC # BLD AUTO: 4.45 MILLION/UL (ref 3.81–5.12)
SODIUM SERPL-SCNC: 136 MMOL/L (ref 136–145)
WBC # BLD AUTO: 11.88 THOUSAND/UL (ref 4.31–10.16)

## 2021-02-05 PROCEDURE — 36415 COLL VENOUS BLD VENIPUNCTURE: CPT

## 2021-02-05 PROCEDURE — 85027 COMPLETE CBC AUTOMATED: CPT

## 2021-02-05 PROCEDURE — 80053 COMPREHEN METABOLIC PANEL: CPT

## 2021-02-05 PROCEDURE — 83036 HEMOGLOBIN GLYCOSYLATED A1C: CPT

## 2021-02-08 ENCOUNTER — OFFICE VISIT (OUTPATIENT)
Dept: INTERNAL MEDICINE CLINIC | Age: 86
End: 2021-02-08
Payer: MEDICARE

## 2021-02-08 VITALS
OXYGEN SATURATION: 98 % | HEIGHT: 60 IN | BODY MASS INDEX: 34 KG/M2 | HEART RATE: 81 BPM | SYSTOLIC BLOOD PRESSURE: 108 MMHG | WEIGHT: 173.2 LBS | TEMPERATURE: 97.2 F | DIASTOLIC BLOOD PRESSURE: 60 MMHG

## 2021-02-08 DIAGNOSIS — E11.22 TYPE 2 DIABETES MELLITUS WITH CHRONIC KIDNEY DISEASE, WITHOUT LONG-TERM CURRENT USE OF INSULIN, UNSPECIFIED CKD STAGE (HCC): Primary | ICD-10-CM

## 2021-02-08 DIAGNOSIS — F03.90 UNCOMPLICATED SENILE DEMENTIA (HCC): ICD-10-CM

## 2021-02-08 DIAGNOSIS — D72.829 LEUKOCYTOSIS, UNSPECIFIED TYPE: ICD-10-CM

## 2021-02-08 DIAGNOSIS — I10 BENIGN ESSENTIAL HYPERTENSION: ICD-10-CM

## 2021-02-08 LAB
CREAT UR-MCNC: 38 MG/DL
MICROALBUMIN UR-MCNC: 18.9 MG/L (ref 0–20)
MICROALBUMIN/CREAT 24H UR: 50 MG/G CREATININE (ref 0–30)

## 2021-02-08 PROCEDURE — 82570 ASSAY OF URINE CREATININE: CPT | Performed by: INTERNAL MEDICINE

## 2021-02-08 PROCEDURE — 82043 UR ALBUMIN QUANTITATIVE: CPT | Performed by: INTERNAL MEDICINE

## 2021-02-08 PROCEDURE — 99214 OFFICE O/P EST MOD 30 MIN: CPT | Performed by: INTERNAL MEDICINE

## 2021-02-08 NOTE — PROGRESS NOTES
Assessment/Plan:  1  Type 2 diabetes mellitus  Hemoglobin A1c is 8 3 which is slightly higher than previous  Will continue with present regimen all in with better diet control and weight loss    2  Alzheimer's disease/dementia  S stable  Will continue with present dose of Aricept 10 mg daily    3  Osteopenia  Continue with calcium and vitamin-D supplementation    4  CKD stage 3  Renal function is stable  Will continue to monitorBMI Counseling: Body mass index is 33 34 kg/m²  The BMI is above normal  Nutrition recommendations include decreasing portion sizes, encouraging healthy choices of fruits and vegetables, decreasing fast food intake, consuming healthier snacks and limiting drinks that contain sugar  Exercise recommendations include vigorous physical activity 75 minutes/week and exercising 3-5 times per week  No pharmacotherapy was ordered  Diagnoses and all orders for this visit:    Type 2 diabetes mellitus with chronic kidney disease, without long-term current use of insulin, unspecified CKD stage (HCC)  -     Microalbumin / creatinine urine ratio  -     Ambulatory referral to Ophthalmology; Future  -     CBC; Future  -     Hemoglobin A1C; Future    Benign essential hypertension  -     CBC; Future  -     Basic metabolic panel; Future    Uncomplicated senile dementia ([facility] [address] )    Leukocytosis, unspecified type          Subjective:      Patient ID: Ailyn Coates is a 80 y o  female  Patient presents for regular visit with follow up of blood work  No new complaints  The following portions of the patient's history were reviewed and updated as appropriate: allergies, current medications, past family history, past medical history, past social history, past surgical history and problem list       Review of Systems   Constitutional: Negative for fatigue and fever  HENT: Negative for congestion, ear discharge, ear pain, postnasal drip, sinus pressure, sore throat, tinnitus and trouble swallowing  Eyes: Negative for discharge, itching and visual disturbance  Respiratory: Negative for cough and shortness of breath  Cardiovascular: Negative for chest pain and palpitations  Gastrointestinal: Positive for constipation  Negative for abdominal pain, diarrhea, nausea and vomiting  Endocrine: Negative for cold intolerance and polyuria  Genitourinary: Negative for difficulty urinating, dysuria and urgency  Musculoskeletal: Negative for arthralgias and neck pain  Skin: Negative for rash  Allergic/Immunologic: Negative for environmental allergies  Neurological: Negative for dizziness, weakness and headaches  Psychiatric/Behavioral: Negative for agitation  The patient is not nervous/anxious  Objective:      /60 (BP Location: Left arm, Patient Position: Sitting, Cuff Size: Standard)   Pulse 81   Temp (!) 97 2 °F (36 2 °C) (Temporal)   Ht 5' 0 43" (1 535 m) Comment: shoes on  Wt 78 6 kg (173 lb 3 2 oz) Comment: shoes on  SpO2 98%   BMI 33 34 kg/m²          Physical Exam  Constitutional:       Appearance: She is well-developed  HENT:      Head: Normocephalic  Right Ear: External ear normal       Left Ear: External ear normal    Eyes:      General: No scleral icterus  Pupils: Pupils are equal, round, and reactive to light  Neck:      Musculoskeletal: Normal range of motion and neck supple  Thyroid: No thyromegaly  Trachea: No tracheal deviation  Cardiovascular:      Rate and Rhythm: Normal rate and regular rhythm  Heart sounds: Normal heart sounds  Pulmonary:      Effort: Pulmonary effort is normal  No respiratory distress  Breath sounds: Normal breath sounds  Chest:      Chest wall: No tenderness  Abdominal:      General: Bowel sounds are normal       Palpations: Abdomen is soft  There is no mass  Tenderness: There is no abdominal tenderness  Musculoskeletal: Normal range of motion  Right lower leg: Edema present        Left lower leg: Edema present  Lymphadenopathy:      Cervical: No cervical adenopathy  Skin:     General: Skin is warm  Neurological:      Mental Status: She is alert and oriented to person, place, and time  Cranial Nerves: No cranial nerve deficit  Psychiatric:         Mood and Affect: Mood normal          Behavior: Behavior normal          Thought Content:  Thought content normal          Judgment: Judgment normal  Adrianne Bergman(Attending)

## 2021-02-10 DIAGNOSIS — E11.9 DIABETES MELLITUS TYPE 2, NONINSULIN DEPENDENT (HCC): ICD-10-CM

## 2021-02-19 DIAGNOSIS — N18.30 CKD (CHRONIC KIDNEY DISEASE), STAGE III (HCC): ICD-10-CM

## 2021-02-19 DIAGNOSIS — E87.6 HYPOKALEMIA: ICD-10-CM

## 2021-02-19 RX ORDER — POTASSIUM CHLORIDE 1500 MG/1
3 TABLET, FILM COATED, EXTENDED RELEASE ORAL 3 TIMES DAILY
Qty: 810 TABLET | Refills: 1 | Status: CANCELLED | OUTPATIENT
Start: 2021-02-19

## 2021-02-22 ENCOUNTER — IMMUNIZATIONS (OUTPATIENT)
Dept: FAMILY MEDICINE CLINIC | Facility: HOSPITAL | Age: 86
End: 2021-02-22

## 2021-02-22 DIAGNOSIS — Z23 ENCOUNTER FOR IMMUNIZATION: Primary | ICD-10-CM

## 2021-02-22 PROCEDURE — 0001A SARS-COV-2 / COVID-19 MRNA VACCINE (PFIZER-BIONTECH) 30 MCG: CPT

## 2021-02-22 PROCEDURE — 91300 SARS-COV-2 / COVID-19 MRNA VACCINE (PFIZER-BIONTECH) 30 MCG: CPT

## 2021-02-22 RX ORDER — POTASSIUM CHLORIDE 1500 MG/1
TABLET, FILM COATED, EXTENDED RELEASE ORAL
Qty: 810 TABLET | Refills: 1 | OUTPATIENT
Start: 2021-02-22

## 2021-02-23 ENCOUNTER — TELEPHONE (OUTPATIENT)
Dept: INTERNAL MEDICINE CLINIC | Age: 86
End: 2021-02-23

## 2021-02-23 DIAGNOSIS — E87.6 HYPOKALEMIA: ICD-10-CM

## 2021-02-23 DIAGNOSIS — N18.30 CKD (CHRONIC KIDNEY DISEASE), STAGE III (HCC): ICD-10-CM

## 2021-02-23 RX ORDER — POTASSIUM CHLORIDE 1500 MG/1
3 TABLET, FILM COATED, EXTENDED RELEASE ORAL 3 TIMES DAILY
Qty: 810 TABLET | Refills: 1 | Status: ON HOLD | OUTPATIENT
Start: 2021-02-23 | End: 2022-02-07 | Stop reason: SDUPTHER

## 2021-02-23 NOTE — TELEPHONE ENCOUNTER
Kolby Enriquez called for [employer]  She needs a refill for the Potassium Chloride ER 20 MEQ TBCR 3 times daily  Patient using [address] on file

## 2021-03-14 ENCOUNTER — IMMUNIZATIONS (OUTPATIENT)
Dept: FAMILY MEDICINE CLINIC | Facility: HOSPITAL | Age: 86
End: 2021-03-14

## 2021-03-14 DIAGNOSIS — Z23 ENCOUNTER FOR IMMUNIZATION: Primary | ICD-10-CM

## 2021-03-14 PROCEDURE — 0002A SARS-COV-2 / COVID-19 MRNA VACCINE (PFIZER-BIONTECH) 30 MCG: CPT

## 2021-03-14 PROCEDURE — 91300 SARS-COV-2 / COVID-19 MRNA VACCINE (PFIZER-BIONTECH) 30 MCG: CPT

## 2021-04-07 DIAGNOSIS — F03.90 SENILE DEMENTIA WITHOUT BEHAVIORAL DISTURBANCE (HCC): ICD-10-CM

## 2021-04-07 RX ORDER — DONEPEZIL HYDROCHLORIDE 10 MG/1
10 TABLET, FILM COATED ORAL DAILY
Qty: 90 TABLET | Refills: 1 | Status: SHIPPED | OUTPATIENT
Start: 2021-04-07 | End: 2021-08-02 | Stop reason: SDUPTHER

## 2021-04-24 ENCOUNTER — HOSPITAL ENCOUNTER (INPATIENT)
Facility: HOSPITAL | Age: 86
LOS: 1 days | Discharge: HOME | DRG: 313 | End: 2021-04-25
Attending: EMERGENCY MEDICINE | Admitting: INTERNAL MEDICINE
Payer: MEDICARE

## 2021-04-24 ENCOUNTER — APPOINTMENT (EMERGENCY)
Dept: RADIOLOGY | Facility: HOSPITAL | Age: 86
DRG: 313 | End: 2021-04-24
Payer: MEDICARE

## 2021-04-24 DIAGNOSIS — M79.18 MUSCULOSKELETAL PAIN: ICD-10-CM

## 2021-04-24 DIAGNOSIS — R07.9 CHEST PAIN: ICD-10-CM

## 2021-04-24 DIAGNOSIS — R09.89 CHOKING EPISODE: Primary | ICD-10-CM

## 2021-04-24 LAB
ALBUMIN SERPL BCP-MCNC: 3.3 G/DL (ref 3.5–5)
ALP SERPL-CCNC: 91 U/L (ref 46–116)
ALT SERPL W P-5'-P-CCNC: 23 U/L (ref 12–78)
ANION GAP SERPL CALCULATED.3IONS-SCNC: 5 MMOL/L (ref 4–13)
AST SERPL W P-5'-P-CCNC: 25 U/L (ref 5–45)
BASOPHILS # BLD AUTO: 0.07 THOUSANDS/ΜL (ref 0–0.1)
BASOPHILS NFR BLD AUTO: 1 % (ref 0–1)
BILIRUB SERPL-MCNC: 0.41 MG/DL (ref 0.2–1)
BUN SERPL-MCNC: 24 MG/DL (ref 5–25)
CALCIUM ALBUM COR SERPL-MCNC: 9.3 MG/DL (ref 8.3–10.1)
CALCIUM SERPL-MCNC: 8.7 MG/DL (ref 8.3–10.1)
CHLORIDE SERPL-SCNC: 100 MMOL/L (ref 100–108)
CO2 SERPL-SCNC: 30 MMOL/L (ref 21–32)
CREAT SERPL-MCNC: 1.43 MG/DL (ref 0.6–1.3)
EOSINOPHIL # BLD AUTO: 0.1 THOUSAND/ΜL (ref 0–0.61)
EOSINOPHIL NFR BLD AUTO: 1 % (ref 0–6)
ERYTHROCYTE [DISTWIDTH] IN BLOOD BY AUTOMATED COUNT: 12.9 % (ref 11.6–15.1)
GFR SERPL CREATININE-BSD FRML MDRD: 31 ML/MIN/1.73SQ M
GLUCOSE SERPL-MCNC: 251 MG/DL (ref 65–140)
HCT VFR BLD AUTO: 37 % (ref 34.8–46.1)
HGB BLD-MCNC: 12.4 G/DL (ref 11.5–15.4)
IMM GRANULOCYTES # BLD AUTO: 0.08 THOUSAND/UL (ref 0–0.2)
IMM GRANULOCYTES NFR BLD AUTO: 1 % (ref 0–2)
LYMPHOCYTES # BLD AUTO: 2.09 THOUSANDS/ΜL (ref 0.6–4.47)
LYMPHOCYTES NFR BLD AUTO: 18 % (ref 14–44)
MCH RBC QN AUTO: 29.7 PG (ref 26.8–34.3)
MCHC RBC AUTO-ENTMCNC: 33.5 G/DL (ref 31.4–37.4)
MCV RBC AUTO: 89 FL (ref 82–98)
MONOCYTES # BLD AUTO: 0.88 THOUSAND/ΜL (ref 0.17–1.22)
MONOCYTES NFR BLD AUTO: 8 % (ref 4–12)
NEUTROPHILS # BLD AUTO: 8.21 THOUSANDS/ΜL (ref 1.85–7.62)
NEUTS SEG NFR BLD AUTO: 71 % (ref 43–75)
NRBC BLD AUTO-RTO: 0 /100 WBCS
PLATELET # BLD AUTO: 259 THOUSANDS/UL (ref 149–390)
PMV BLD AUTO: 10.7 FL (ref 8.9–12.7)
POTASSIUM SERPL-SCNC: 4.1 MMOL/L (ref 3.5–5.3)
PROT SERPL-MCNC: 7.6 G/DL (ref 6.4–8.2)
RBC # BLD AUTO: 4.18 MILLION/UL (ref 3.81–5.12)
SODIUM SERPL-SCNC: 135 MMOL/L (ref 136–145)
TROPONIN I SERPL-MCNC: <0.02 NG/ML
WBC # BLD AUTO: 11.43 THOUSAND/UL (ref 4.31–10.16)

## 2021-04-24 PROCEDURE — 99285 EMERGENCY DEPT VISIT HI MDM: CPT

## 2021-04-24 PROCEDURE — G1004 CDSM NDSC: HCPCS

## 2021-04-24 PROCEDURE — 80053 COMPREHEN METABOLIC PANEL: CPT | Performed by: EMERGENCY MEDICINE

## 2021-04-24 PROCEDURE — 99285 EMERGENCY DEPT VISIT HI MDM: CPT | Performed by: EMERGENCY MEDICINE

## 2021-04-24 PROCEDURE — 85025 COMPLETE CBC W/AUTO DIFF WBC: CPT | Performed by: EMERGENCY MEDICINE

## 2021-04-24 PROCEDURE — 84484 ASSAY OF TROPONIN QUANT: CPT | Performed by: EMERGENCY MEDICINE

## 2021-04-24 PROCEDURE — 93005 ELECTROCARDIOGRAM TRACING: CPT

## 2021-04-24 PROCEDURE — 1123F ACP DISCUSS/DSCN MKR DOCD: CPT | Performed by: EMERGENCY MEDICINE

## 2021-04-24 PROCEDURE — 36415 COLL VENOUS BLD VENIPUNCTURE: CPT | Performed by: EMERGENCY MEDICINE

## 2021-04-24 PROCEDURE — 71250 CT THORAX DX C-: CPT

## 2021-04-24 RX ORDER — NITROGLYCERIN 0.4 MG/1
0.4 TABLET SUBLINGUAL ONCE
Status: DISCONTINUED | OUTPATIENT
Start: 2021-04-24 | End: 2021-04-24

## 2021-04-24 NOTE — ED ATTENDING ATTESTATION
4/24/2021  I, Jon Ramirez MD, saw and evaluated the patient  I have discussed the patient with the resident/non-physician practitioner and agree with the resident's/non-physician practitioner's findings, Plan of Care, and MDM as documented in the resident's/non-physician practitioner's note, except where noted  All available labs and Radiology studies were reviewed  I was present for key portions of any procedure(s) performed by the resident/non-physician practitioner and I was immediately available to provide assistance  At this point I agree with the current assessment done in the Emergency Department  I have conducted an independent evaluation of this patient a history and physical is as follows:  Pt choked on sausage and daughter did heimlich   Sausage came out then pt started with chest pain  Better after asa and nitro Still with pressure no diaphoresis or radiation PE: alert nad heart reg lungs clear abd soft nontender anterior chest wall tender to palpation MDM; will do cardiac bryan and ct of chest   ED Course         Critical Care Time  Procedures

## 2021-04-25 ENCOUNTER — APPOINTMENT (OUTPATIENT)
Dept: NON INVASIVE DIAGNOSTICS | Facility: HOSPITAL | Age: 86
DRG: 313 | End: 2021-04-25
Payer: MEDICARE

## 2021-04-25 VITALS
OXYGEN SATURATION: 95 % | DIASTOLIC BLOOD PRESSURE: 69 MMHG | TEMPERATURE: 97.1 F | HEART RATE: 81 BPM | RESPIRATION RATE: 16 BRPM | SYSTOLIC BLOOD PRESSURE: 148 MMHG

## 2021-04-25 PROBLEM — R55 SYNCOPE: Status: ACTIVE | Noted: 2021-04-25

## 2021-04-25 PROBLEM — N17.9 AKI (ACUTE KIDNEY INJURY) (HCC): Status: ACTIVE | Noted: 2021-04-25

## 2021-04-25 PROBLEM — I10 HTN (HYPERTENSION): Status: ACTIVE | Noted: 2021-04-25

## 2021-04-25 PROBLEM — R07.9 CHEST PAIN: Status: ACTIVE | Noted: 2021-04-25

## 2021-04-25 LAB
ANION GAP SERPL CALCULATED.3IONS-SCNC: 7 MMOL/L (ref 4–13)
ATRIAL RATE: 67 BPM
ATRIAL RATE: 68 BPM
BUN SERPL-MCNC: 24 MG/DL (ref 5–25)
CALCIUM SERPL-MCNC: 9.1 MG/DL (ref 8.3–10.1)
CHLORIDE SERPL-SCNC: 103 MMOL/L (ref 100–108)
CO2 SERPL-SCNC: 25 MMOL/L (ref 21–32)
CREAT SERPL-MCNC: 1.11 MG/DL (ref 0.6–1.3)
ERYTHROCYTE [DISTWIDTH] IN BLOOD BY AUTOMATED COUNT: 13.1 % (ref 11.6–15.1)
GFR SERPL CREATININE-BSD FRML MDRD: 42 ML/MIN/1.73SQ M
GLUCOSE SERPL-MCNC: 179 MG/DL (ref 65–140)
GLUCOSE SERPL-MCNC: 187 MG/DL (ref 65–140)
GLUCOSE SERPL-MCNC: 210 MG/DL (ref 65–140)
GLUCOSE SERPL-MCNC: 241 MG/DL (ref 65–140)
HCT VFR BLD AUTO: 36 % (ref 34.8–46.1)
HGB BLD-MCNC: 12.3 G/DL (ref 11.5–15.4)
MCH RBC QN AUTO: 29.9 PG (ref 26.8–34.3)
MCHC RBC AUTO-ENTMCNC: 34.2 G/DL (ref 31.4–37.4)
MCV RBC AUTO: 88 FL (ref 82–98)
P AXIS: 12 DEGREES
P AXIS: 22 DEGREES
PLATELET # BLD AUTO: 252 THOUSANDS/UL (ref 149–390)
PMV BLD AUTO: 10.6 FL (ref 8.9–12.7)
POTASSIUM SERPL-SCNC: 3 MMOL/L (ref 3.5–5.3)
PR INTERVAL: 194 MS
PR INTERVAL: 240 MS
QRS AXIS: -25 DEGREES
QRS AXIS: -28 DEGREES
QRSD INTERVAL: 138 MS
QRSD INTERVAL: 146 MS
QT INTERVAL: 462 MS
QT INTERVAL: 470 MS
QTC INTERVAL: 491 MS
QTC INTERVAL: 496 MS
RBC # BLD AUTO: 4.11 MILLION/UL (ref 3.81–5.12)
SODIUM SERPL-SCNC: 135 MMOL/L (ref 136–145)
T WAVE AXIS: 140 DEGREES
T WAVE AXIS: 149 DEGREES
TROPONIN I SERPL-MCNC: <0.02 NG/ML
TROPONIN I SERPL-MCNC: <0.02 NG/ML
VENTRICULAR RATE: 67 BPM
VENTRICULAR RATE: 68 BPM
WBC # BLD AUTO: 12.57 THOUSAND/UL (ref 4.31–10.16)

## 2021-04-25 PROCEDURE — 93005 ELECTROCARDIOGRAM TRACING: CPT

## 2021-04-25 PROCEDURE — C8929 TTE W OR WO FOL WCON,DOPPLER: HCPCS

## 2021-04-25 PROCEDURE — 99221 1ST HOSP IP/OBS SF/LOW 40: CPT | Performed by: INTERNAL MEDICINE

## 2021-04-25 PROCEDURE — 93010 ELECTROCARDIOGRAM REPORT: CPT | Performed by: INTERNAL MEDICINE

## 2021-04-25 PROCEDURE — 84484 ASSAY OF TROPONIN QUANT: CPT | Performed by: INTERNAL MEDICINE

## 2021-04-25 PROCEDURE — 82948 REAGENT STRIP/BLOOD GLUCOSE: CPT

## 2021-04-25 PROCEDURE — NC001 PR NO CHARGE: Performed by: INTERNAL MEDICINE

## 2021-04-25 PROCEDURE — 92610 EVALUATE SWALLOWING FUNCTION: CPT

## 2021-04-25 PROCEDURE — 85027 COMPLETE CBC AUTOMATED: CPT | Performed by: INTERNAL MEDICINE

## 2021-04-25 PROCEDURE — 80048 BASIC METABOLIC PNL TOTAL CA: CPT | Performed by: INTERNAL MEDICINE

## 2021-04-25 RX ORDER — HEPARIN SODIUM 5000 [USP'U]/ML
5000 INJECTION, SOLUTION INTRAVENOUS; SUBCUTANEOUS EVERY 8 HOURS SCHEDULED
Status: DISCONTINUED | OUTPATIENT
Start: 2021-04-25 | End: 2021-04-25 | Stop reason: HOSPADM

## 2021-04-25 RX ORDER — METOLAZONE 5 MG/1
5 TABLET ORAL DAILY
Status: DISCONTINUED | OUTPATIENT
Start: 2021-04-25 | End: 2021-04-25 | Stop reason: HOSPADM

## 2021-04-25 RX ORDER — ACETAMINOPHEN 325 MG/1
650 TABLET ORAL EVERY 6 HOURS PRN
Status: DISCONTINUED | OUTPATIENT
Start: 2021-04-25 | End: 2021-04-25 | Stop reason: HOSPADM

## 2021-04-25 RX ORDER — FUROSEMIDE 40 MG/1
40 TABLET ORAL DAILY
Status: DISCONTINUED | OUTPATIENT
Start: 2021-04-25 | End: 2021-04-25 | Stop reason: HOSPADM

## 2021-04-25 RX ORDER — DONEPEZIL HYDROCHLORIDE 10 MG/1
10 TABLET, FILM COATED ORAL DAILY
Status: DISCONTINUED | OUTPATIENT
Start: 2021-04-25 | End: 2021-04-25 | Stop reason: HOSPADM

## 2021-04-25 RX ORDER — POTASSIUM CHLORIDE 20 MEQ/1
60 TABLET, EXTENDED RELEASE ORAL 3 TIMES DAILY
Status: DISCONTINUED | OUTPATIENT
Start: 2021-04-25 | End: 2021-04-25 | Stop reason: HOSPADM

## 2021-04-25 RX ADMIN — ACETAMINOPHEN 650 MG: 325 TABLET ORAL at 03:59

## 2021-04-25 RX ADMIN — POTASSIUM CHLORIDE 60 MEQ: 1500 TABLET, EXTENDED RELEASE ORAL at 09:09

## 2021-04-25 RX ADMIN — HEPARIN SODIUM 5000 UNITS: 5000 INJECTION INTRAVENOUS; SUBCUTANEOUS at 05:47

## 2021-04-25 RX ADMIN — PERFLUTREN 0.6 ML/MIN: 6.52 INJECTION, SUSPENSION INTRAVENOUS at 17:39

## 2021-04-25 RX ADMIN — HEPARIN SODIUM 5000 UNITS: 5000 INJECTION INTRAVENOUS; SUBCUTANEOUS at 14:07

## 2021-04-25 RX ADMIN — DONEPEZIL HYDROCHLORIDE 10 MG: 10 TABLET ORAL at 09:09

## 2021-04-25 RX ADMIN — INSULIN LISPRO 2 UNITS: 100 INJECTION, SOLUTION INTRAVENOUS; SUBCUTANEOUS at 11:35

## 2021-04-25 RX ADMIN — INSULIN LISPRO 1 UNITS: 100 INJECTION, SOLUTION INTRAVENOUS; SUBCUTANEOUS at 09:05

## 2021-04-25 RX ADMIN — FUROSEMIDE 40 MG: 40 TABLET ORAL at 09:09

## 2021-04-25 RX ADMIN — INSULIN LISPRO 3 UNITS: 100 INJECTION, SOLUTION INTRAVENOUS; SUBCUTANEOUS at 17:34

## 2021-04-25 RX ADMIN — POTASSIUM CHLORIDE 60 MEQ: 1500 TABLET, EXTENDED RELEASE ORAL at 17:29

## 2021-04-25 RX ADMIN — METOLAZONE 5 MG: 5 TABLET ORAL at 09:09

## 2021-04-25 NOTE — SPEECH THERAPY NOTE
Speech Language/Pathology  Speech/Language Pathology  Assessment    Patient Name: Simin Davison  PCPKZ'Q Date: 4/25/2021     Problem List  Principal Problem:    Chest pain  Active Problems:    CKD (chronic kidney disease), stage III (HCC)    Uncomplicated senile dementia (HCC)    Type 2 diabetes mellitus with diabetic chronic kidney disease ([facility] [address] )    Syncope    HTN (hypertension)    SALTY (acute kidney injury) ([facility] [address] )    Past Medical History  Past Medical History:   Diagnosis Date    Aortic stenosis     Bilateral edema of lower extremity     Dementia ([facility] [address] )     Fall     Gait abnormality     Hyperlipidemia     Hypertension     Hypokalemia     Other ascites     Varicose veins of leg with edema      Past Surgical History  Past Surgical History:   Procedure Laterality Date    BREAST SURGERY      ELBOW SURGERY       History of Present Illness per chart:    81 yo F with medical history of diabetes, hypertension, dementia who presented to the ED after developing chest pain after receiving the Heimlich maneuver  Patient's daughter Marcial Henry at bedside reports that they were eating sausage sandwich is  Patient started coughing, her eyes appear to roll back, and she became unresponsive, starting to slowly fall to the side of the chair she was sitting in  Susa Crews provided two thrusts from behind, and during the 2nd trust, a piece of sausage and patient's dentures came out of her mouth  Patient became more responsive, but while seated at table in the first few minutes appeared to be dozing off, which is uncharacteristic for her  Marcial Crews reports that approximately 5-7 minutes later, patient had bilateral upper extremity gross tremors, which she additionally does not have a history of  The patient then began to report chest pain and EMS was called  On route to the hospital, patient received aspirin and nitroglycerin which provided some relief    In the ED, vital signs were stable, initial troponin was negative, EKG showed no change from prior and no acute ischemic changes  Patient was admitted to monitor overnight on medicine service      On my evaluation, patient was oriented to person and to setting of hospital but could not name hospital or year  She could not recall the events of today and per her daughter Arlene Aguirre at bedside, patient cannot recall what happened 5 minutes earlier  Patient continues to report some mild midline chest pain, worse with breathing in and tender to palpation  Per Arlene Aguirre, patient may have had a mini heart attack many years ago but she is not sure if she had a cardiac catheterization  Arlene Aguirre reports that she is patient's POA and that patient has a living will  Special Studies:   CT head- 4/24/21  IMPRESSION:  No acute osseous abnormality  Bibasilar dependent atelectasis versus scarring  Small hiatal hernia and partially visualized pancreatic cyst        Bedside Swallow Evaluation:    Summary:  Pt presents w/ functional oral and pharyngeal swallowing stages  Upon SLP arrival for assessment, patient was consuming her lunch which consisted of meatball and penne marinara, broccoli, ice cream, and soda  Pt reported that she had finished the penne/meatballs with no difficulty  Pt was attempting to eat a very large piece of broccoli in which she was having trouble biting off  SLP provided education to cut her food into bite size/smaller pieces  Pt reports that she sometimes gets lazy and does not feel like cutting up her food  SLP presented patient with a regular solid cookie and ice cream  Pt with adequate mastication, manipulation, formation, and transfer  Prompt swallow initiation with no overt s/s of aspiration  She drank thin liquids (soda, water) with adequate control and no overt s/s of aspiration  SLP reviewed and discussed aspiration precautions and compensatory strategies with patient  Pt expressed comprehension  Recommendations of regular and thin liquids   SLP to follow up x1 to ensure tolerance to meal and use of strategies  Recommendations:  Diet: Regular   Liquid: Thin   Meds: whole as tolerated  Supervision: Assist/supervision throughout meal   Positioning:Upright  Strategies: Pt to take PO/Meds only when fully alert and upright  Slow rate, small sips, alternate between liquids and solids, cut food into bite size pieces   Oral care: To be completed 4x per day   Aspiration precautions  Reflux precautions    Therapy Prognosis: Good   Prognosis considerations: medical status, cognitive status  Frequency:  F/u x1 to ensure tolerance of current diet     Goal(s):  Pt will tolerate least restrictive diet w/out s/s aspiration or oral/pharyngeal difficulties  Reason for consult:  R/o aspiration  Determine safest and least restrictive diet  Choking episode     Precautions:  None     Current diet:  Regular and thin     Premorbid diet[de-identified]  Regular and thin     Previous VBS:  None in the records     O2 requirement:  RA    Voice/Speech:  Clear and intelligible     Social:  Pt reported that she lives a lone   Has support from her family     Follows commands:  WFL                         Cognitive Status:  Grossly WFL, pt did report she occasionally forgets things   Oriented to self, place, birthday   Not oriented to the date     Oral [facility] exam:  Dentition: upper and lower dentures   Labial strength and ROM: WFL  Lingual strength and ROM: WFL  Mandibular strength and ROM: WFL  Velum: unable to visualize   Secretion management: WFL  Volitional cough: WFL  Volitional swallow: WFL  Oral care     Items administered:  Puree, hard solid, thin liquids,  Liquids were taken by straw    Oral stage:  WFL  Lip closure: [facility]  Mastication:WFL (pt noted to take larger bites, and reports she gets lazy and doesn't want to cut up the food)  Bolus formation: WFL  Bolus control: WFL  Transfer: Timely   Oral residue: none   Pocketing: none     Pharyngeal stage:  WFL  Swallow promptness: Prompt   Laryngeal rise: palpated and judged to be [facility]   Wet voice: no   Throat clear: no   Cough: no   Secondary swallows: no   Audible swallows: no   No overt s/s aspiration    Esophageal stage:  No s/s reported    Aspiration precautions posted    Results d/w:  Pt, nursing

## 2021-04-25 NOTE — ED PROVIDER NOTES
History  Chief Complaint   Patient presents with    Choking     Pt choked on some sausage,pt given heimlich maneuver  Developed chest pain  26-year-old female with h/o DM, HTN, HLD, AS, and dementia presenting for central chest pain after choking on a piece of sausage today  Patient's daughter stating that she looked like she was struggling to breath, turned "blue" in the face and then she performed the heimlich maneuver with retrieval of a piece of sausage and resolution of the patient's choking  Afterwards, the patient then started to complain of central chest pressure  She doesn't usually complain of chest pain so this concerned her daughter who is concerned that she may have injured her in some way  Patient to get full-dose aspirin and nitro 0 4 which she reports improved her pain  She has no significant SOB currently  Pressure does seem somewhat exertional in nature  She reports no positional changes to it  Has no associated diaphoresis, n/v/d  Initially felt like something was "stuck" in her throat but that feeling has now gone away  ROS otherwise neg as below  History provided by:  Patient   used: No        Prior to Admission Medications   Prescriptions Last Dose Informant Patient Reported? Taking?    Potassium Chloride ER 20 MEQ TBCR   No No   Sig: Take 3 tablets (60 mEq total) by mouth 3 (three) times a day   acetaminophen (TYLENOL) 325 mg tablet  Other (Specify) Yes No   Sig: Take 650 mg by mouth every 6 (six) hours as needed for mild pain   donepezil (ARICEPT) 10 mg tablet 4/25/2021 at Unknown time  No Yes   Sig: Take 1 tablet (10 mg total) by mouth daily   furosemide (LASIX) 40 mg tablet 4/25/2021 at Unknown time  No Yes   Sig: Take 1 tablet (40 mg total) by mouth daily   glipiZIDE (GLUCOTROL XL) 10 mg 24 hr tablet   No No   Sig: Take 1 tablet (10 mg total) by mouth daily   magnesium oxide (MAG-OX) 400 mg   No No   Sig: Take 1 tablet (400 mg total) by mouth daily metolazone (ZAROXOLYN) 5 mg tablet   No No   Sig: Take 1 tablet (5 mg total) by mouth daily   repaglinide (PRANDIN) 2 mg tablet   No No   Sig: Take 1 tablet (2 mg total) by mouth 3 (three) times a day before meals   sitaGLIPtin (JANUVIA) 100 mg tablet   No No   Sig: Take 1 tablet (100 mg total) by mouth daily      Facility-Administered Medications: None       Past Medical History:   Diagnosis Date    Aortic stenosis     Bilateral edema of lower extremity     Dementia ([address] )     Fall     Gait abnormality     Hyperlipidemia     Hypertension     Hypokalemia     Other ascites     Varicose veins of leg with edema        Past Surgical History:   Procedure Laterality Date    BREAST SURGERY      ELBOW SURGERY         Family History   Problem Relation Age of Onset    Dementia Sister      I have reviewed and agree with the history as documented  E-Cigarette/Vaping    E-Cigarette Use Never User      E-Cigarette/Vaping Substances     Social History     Tobacco Use    Smoking status: Never Smoker    Smokeless tobacco: Never Used   Substance Use Topics    Alcohol use: No     Frequency: Never     Drinks per session: Patient refused     Binge frequency: Never    Drug use: No        Review of Systems   Constitutional: Negative for chills, fatigue and fever  HENT: Negative for ear pain and sore throat  Eyes: Negative for pain and visual disturbance  Respiratory: Negative for cough and shortness of breath  Cardiovascular: Positive for chest pain  Negative for palpitations  Gastrointestinal: Negative for abdominal pain, diarrhea, nausea and vomiting  Genitourinary: Negative for dysuria and hematuria  Musculoskeletal: Negative for arthralgias and back pain  Skin: Negative for color change and rash  Neurological: Negative for dizziness, seizures, syncope and light-headedness  Psychiatric/Behavioral: Negative for confusion  The patient is not nervous/anxious      All other systems reviewed and are negative  Physical Exam  ED Triage Vitals   Temperature Pulse Respirations Blood Pressure SpO2   04/24/21 1903 04/24/21 1903 04/24/21 1903 04/24/21 1903 04/24/21 1903   98 4 °F (36 9 °C) 69 20 149/70 99 %      Temp Source Heart Rate Source Patient Position - Orthostatic VS BP Location FiO2 (%)   04/24/21 1903 04/24/21 1903 04/24/21 1903 04/24/21 1903 --   Oral Monitor Lying Right arm       Pain Score       04/25/21 0339       No Pain             Orthostatic Vital Signs  Vitals:    04/25/21 0003 04/25/21 0735 04/25/21 1122 04/25/21 1546   BP: 135/65 122/64 119/64 148/69   Pulse: 71  73 81   Patient Position - Orthostatic VS:    Sitting       Physical Exam  Vitals signs and nursing note reviewed  Constitutional:       General: She is not in acute distress  Appearance: Normal appearance  She is well-developed  She is not ill-appearing or diaphoretic  HENT:      Head: Normocephalic and atraumatic  Right Ear: External ear normal       Left Ear: External ear normal       Nose: Nose normal       Mouth/Throat:      Mouth: Mucous membranes are moist       Pharynx: Oropharynx is clear  Eyes:      Conjunctiva/sclera: Conjunctivae normal    Neck:      Musculoskeletal: Neck supple  Cardiovascular:      Rate and Rhythm: Normal rate and regular rhythm  Heart sounds: No murmur  Pulmonary:      Effort: Pulmonary effort is normal  No respiratory distress  Breath sounds: Normal breath sounds  No wheezing or rales  Abdominal:      General: There is no distension  Palpations: Abdomen is soft  Tenderness: There is no abdominal tenderness  Musculoskeletal: Normal range of motion  Skin:     General: Skin is warm and dry  Neurological:      General: No focal deficit present  Mental Status: She is alert     Psychiatric:         Mood and Affect: Mood normal          ED Medications  Medications   perflutren lipid microsphere (DEFINITY) injection (0 6 mL/min Intravenous Given 4/25/21 1739)       Diagnostic Studies  Results Reviewed     Procedure Component Value Units Date/Time    Comprehensive metabolic panel [[id number]]  (Abnormal) Collected: 04/24/21 2027    Lab Status: Final result Specimen: Blood from Arm, Left Updated: 04/24/21 2054     Sodium 135 mmol/L      Potassium 4 1 mmol/L      Chloride 100 mmol/L      CO2 30 mmol/L      ANION GAP 5 mmol/L      BUN 24 mg/dL      Creatinine 1 43 mg/dL      Glucose 251 mg/dL      Calcium 8 7 mg/dL      Corrected Calcium 9 3 mg/dL      AST 25 U/L      ALT 23 U/L      Alkaline Phosphatase 91 U/L      Total Protein 7 6 g/dL      Albumin 3 3 g/dL      Total Bilirubin 0 41 mg/dL      eGFR 31 ml/min/1 73sq m     Narrative:      National Kidney Disease Foundation guidelines for Chronic Kidney Disease (CKD):     Stage 1 with normal or high GFR (GFR > 90 mL/min/1 73 square meters)    Stage 2 Mild CKD (GFR = 60-89 mL/min/1 73 square meters)    Stage 3A Moderate CKD (GFR = 45-59 mL/min/1 73 square meters)    Stage 3B Moderate CKD (GFR = 30-44 mL/min/1 73 square meters)    Stage 4 Severe CKD (GFR = 15-29 mL/min/1 73 square meters)    Stage 5 End Stage CKD (GFR <15 mL/min/1 73 square meters)  Note: GFR calculation is accurate only with a steady state creatinine    Troponin I [[id number]]  (Normal) Collected: 04/24/21 2027    Lab Status: Final result Specimen: Blood from Arm, Left Updated: 04/24/21 2054     Troponin I <0 02 ng/mL     CBC and differential [[id number]]  (Abnormal) Collected: 04/24/21 2027    Lab Status: Final result Specimen: Blood from Arm, Left Updated: 04/24/21 2035     WBC 11 43 Thousand/uL      RBC 4 18 Million/uL      Hemoglobin 12 4 g/dL      Hematocrit 37 0 %      MCV 89 fL      MCH 29 7 pg      MCHC 33 5 g/dL      RDW 12 9 %      MPV 10 7 fL      Platelets 973 Thousands/uL      nRBC 0 /100 WBCs      Neutrophils Relative 71 %      Immat GRANS % 1 %      Lymphocytes Relative 18 %      Monocytes Relative 8 %      Eosinophils Relative 1 % Basophils Relative 1 %      Neutrophils Absolute 8 21 Thousands/µL      Immature Grans Absolute 0 08 Thousand/uL      Lymphocytes Absolute 2 09 Thousands/µL      Monocytes Absolute 0 88 Thousand/µL      Eosinophils Absolute 0 10 Thousand/µL      Basophils Absolute 0 07 Thousands/µL                  CT chest without contrast   Final Result by Jessica You MD (04/24 2030)      No acute osseous abnormality  Bibasilar dependent atelectasis versus scarring  Small hiatal hernia and partially visualized pancreatic cyst          Workstation performed: [id number]               Procedures  ECG 12 Lead Documentation Only    Date/Time: 4/24/2021 10:29 PM  Performed by: Carroll Gordon MD  Authorized by: Carroll Gordon MD     Indications / Diagnosis:  CP  Patient location:  ED  Previous ECG:     Previous ECG:  Compared to current    Similarity:  No change  Interpretation:     Interpretation: abnormal    Rate:     ECG rate:  68    ECG rate assessment: normal    Rhythm:     Rhythm: sinus rhythm    Ectopy:     Ectopy: none    QRS:     QRS axis:  Left  Conduction:     Conduction: abnormal      Abnormal conduction: complete LBBB    ST segments:     ST segments:  Non-specific    Depression:  V5 and V6  Comments:      NSR, LBBB unchanged from prior          ED Course  ED Course as of Apr 26 1526   Sat Apr 24, 2021   2103 Creatinine(!): 1 43   2225 Texted SOD                HEART Risk Score      Most Recent Value   Heart Score Risk Calculator   History  1 Filed at: 04/24/2021 2233   ECG  1 Filed at: 04/24/2021 2233   Age  2 Filed at: 04/24/2021 2233   Risk Factors  2 Filed at: 04/24/2021 2233   Troponin  0 Filed at: 04/24/2021 2233   HEART Score  6 Filed at: 04/24/2021 2233        Identification of Seniors at Risk      Most Recent Value   (ISAR) Identification of Seniors at Risk   Before the illness or injury that brought you to the Emergency, did you need someone to help you on a regular basis?   0 Filed at: 04/24/2021 1905 In the last 24 hours, have you needed more help than usual?  0 Filed at: 04/24/2021 1905   Have you been hospitalized for one or more nights during the past 6 months?  --   In general, do you see well?  0 Filed at: 04/24/2021 1905   In general, do you have serious problems with your memory? 0 Filed at: 04/24/2021 1905   Do you take more than three different medications every day?  --   ISAR Score  0 Filed at: 04/24/2021 1905                              MDM  Number of Diagnoses or Management Options  Chest pain:   Choking episode:   Diagnosis management comments: 49-year-old female presenting for evaluation of chest pressure after a choking episode today  Patient to receive the SPENSER PORT CICI maneuver  Does have some tenderness to palpation on physical exam the chest wall  CT chest without acute fractures  ECG shows left bundle branch block, troponin negative  Heart score of 6  Will admit for cardiac evaluation given significant stressors today  Patient admitted in stable condition  Disposition  Final diagnoses:   Choking episode   Chest pain     Time reflects when diagnosis was documented in both MDM as applicable and the Disposition within this note     Time User Action Codes Description Comment    4/24/2021 11:04 PM Mercedes Venice Add [R09 89] Choking episode     4/24/2021 11:04 PM Shane Cordero Add [R07 9] Chest pain     4/25/2021  4:23 PM Laurel Herr Add [M79 18] Musculoskeletal pain       ED Disposition     ED Disposition Condition Date/Time Comment    Admit Stable Sat Apr 24, 2021 11:04 PM Case was discussed with admitting resident and the patient's admission status was agreed to be Admission Status: observation status to the service of Dr Carlos Renee   Follow-up Information     Follow up With Specialties Details Why Pat Hinojosa MD Internal Medicine Follow up Someone from the office will call you to schedule a follow-up appointment within 1-2 week after discharge    If you do not receive a phone call please call the above number and make an appointment in 1-2 weeks  [address] [place] [address]  [phone]            Discharge Medication List as of 4/25/2021  4:37 PM      START taking these medications    Details   Diclofenac Sodium (VOLTAREN) 1 % Apply 2 g topically 4 (four) times a day, Starting Sun 4/25/2021, Normal         CONTINUE these medications which have NOT CHANGED    Details   donepezil (ARICEPT) 10 mg tablet Take 1 tablet (10 mg total) by mouth daily, Starting Wed 4/7/2021, Normal      furosemide (LASIX) 40 mg tablet Take 1 tablet (40 mg total) by mouth daily, Starting Mon 11/16/2020, Normal      acetaminophen (TYLENOL) 325 mg tablet Take 650 mg by mouth every 6 (six) hours as needed for mild pain, Historical Med      glipiZIDE (GLUCOTROL XL) 10 mg 24 hr tablet Take 1 tablet (10 mg total) by mouth daily, Starting Wed 12/2/2020, Normal      magnesium oxide (MAG-OX) 400 mg Take 1 tablet (400 mg total) by mouth daily, Starting Wed 2/3/2021, Normal      metolazone (ZAROXOLYN) 5 mg tablet Take 1 tablet (5 mg total) by mouth daily, Starting Fri 11/13/2020, Normal      Potassium Chloride ER 20 MEQ TBCR Take 3 tablets (60 mEq total) by mouth 3 (three) times a day, Starting Tue 2/23/2021, Normal      repaglinide (PRANDIN) 2 mg tablet Take 1 tablet (2 mg total) by mouth 3 (three) times a day before meals, Starting Mon 12/21/2020, Normal      sitaGLIPtin (JANUVIA) 100 mg tablet Take 1 tablet (100 mg total) by mouth daily, Starting Wed 2/10/2021, Normal           Outpatient Discharge Orders   Discharge Diet     Activity as tolerated       PDMP Review     None           ED Provider  Attending physically available and evaluated Elkin Alaniz I managed the patient along with the ED Attending      Electronically Signed by         Daren Tucker MD  04/26/21 4540

## 2021-04-25 NOTE — DISCHARGE SUMMARY
INTERNAL MEDICINE RESIDENCY DISCHARGE SUMMARY     Phoenix Benson   80 y o  female  MRN: [id number]  Room/Bed: /-[address] [place] [address] MED SURG 5   Encounter: [id number]    Principal Problem:    Chest pain  Active Problems:    CKD (chronic kidney disease), stage III (HCC)    Uncomplicated senile dementia (HCC)    Type 2 diabetes mellitus with diabetic chronic kidney disease ([facility] [address] )    Syncope    HTN (hypertension)    SALTY (acute kidney injury) ([facility] [facility] )      [address]     HPI completed by Dr Denita Tai   " 81 yo F with medical history of diabetes, hypertension, dementia who presented to the ED after developing chest pain after receiving the Heimlich maneuver  Patient's daughter Tana Lugo at bedside reports that they were eating sausage sandwich is  Patient started coughing, her eyes appear to roll back, and she became unresponsive, starting to slowly fall to the side of the chair she was sitting in  Tana Dines provided two thrusts from behind, and during the 2nd trust, a piece of sausage and patient's dentures came out of her mouth  Patient became more responsive, but while seated at table in the first few minutes appeared to be dozing off, which is uncharacteristic for her  Tana Dines reports that approximately 5-7 minutes later, patient had bilateral upper extremity gross tremors, which she additionally does not have a history of  The patient then began to report chest pain and EMS was called  On route to the hospital, patient received aspirin and nitroglycerin which provided some relief  In the ED, vital signs were stable, initial troponin was negative, EKG showed no change from prior and no acute ischemic changes  Patient was admitted to monitor overnight on medicine service      On my evaluation, patient was oriented to person and to setting of hospital but could not name hospital or year    She could not recall the events of today and per her daughter Marshall Suarez at bedside, patient cannot recall what happened 5 minutes earlier  Patient continues to report some mild midline chest pain, worse with breathing in and tender to palpation  Per Marshall Suarez, patient may have had a mini heart attack many years ago but she is not sure if she had a cardiac catheterization  Marshall Suarez reports that she is patient's POA and that patient has a living will "    Patient was admitted for monitoring  Troponins were negative x3 and no events were noted on telemetry  She received echocardiogram the read of which was pending at the time of discharge  She had mild SALTY on admission which resolved with oral hydration  Speech/language pathology evaluated the patient and recommended regular diet, taking food with slow rate, small sips, alternate between liquids and solids and to cut food into bite size pieces  This was communicated to the patient's daughter  Patient was deemed medically stable for discharge in the afternoon of 4/25  She will follow-up with PCP in 1-2 weeks  At that time, echo results will be discussed with the patient and her daughter  Daughter was informed and was agreeable, all questions and concerns addressed to satisfaction  Voltaren gel was sent to the patient's pharmacy for symptomatic management  Vitals:    04/25/21 1546   BP: 148/69   Pulse: 81   Resp:    Temp: (!) 97 1 °F (36 2 °C)   SpO2: 95%     Physical Exam  Vitals signs and nursing note reviewed  Constitutional:       General: She is not in acute distress  Appearance: She is well-developed  She is not ill-appearing or toxic-appearing  HENT:      Head: Normocephalic and atraumatic  Eyes:      Conjunctiva/sclera: Conjunctivae normal    Neck:      Musculoskeletal: Neck supple  Cardiovascular:      Rate and Rhythm: Normal rate and regular rhythm  Heart sounds: No murmur  Comments: Tender chest wall  Pulmonary:      Effort: Pulmonary effort is normal  No respiratory distress        Breath sounds: Normal breath sounds  Abdominal:      Palpations: Abdomen is soft  Tenderness: There is no abdominal tenderness  Skin:     General: Skin is warm and dry  Neurological:      Mental Status: She is alert  Comments: Pleasantly confused         DISCHARGE INFORMATION     PCP at Discharge: Elroy Bennett MD    Admitting Provider: Elroy Bennett MD  Admission Date: 4/24/2021    Discharge Provider: Elroy Bennett MD  Discharge Date: 4/25/2021    Discharge Disposition: Home/Self Care  Discharge Condition: good  Discharge with Lines: no    Discharge Diet: regular diet  Activity Restrictions: as tolerated   Test Results Pending at Discharge: Echocardiogram     Discharge Diagnoses:  Principal Problem:    Chest pain  Active Problems:    CKD (chronic kidney disease), stage III ([place] [address] )    Uncomplicated senile dementia ([place] [address] )    Type 2 diabetes mellitus with diabetic chronic kidney disease ([place] [address] )    Syncope    HTN (hypertension)    SALTY (acute kidney injury) ([place] [address] )  Resolved Problems:    * No resolved hospital problems  *      Consulting Providers:  None     Diagnostic & Therapeutic Procedures Performed:  Ct Chest Without Contrast    Result Date: 4/24/2021  Impression: No acute osseous abnormality  Bibasilar dependent atelectasis versus scarring   Small hiatal hernia and partially visualized pancreatic cyst  Workstation performed: [id number]       Code Status: Level 3 - DNAR and DNI  Advance Directive & Living Will: Received  Power of :    POLST:      Medications:  Current Discharge Medication List        Current Discharge Medication List      START taking these medications    Details   Diclofenac Sodium (VOLTAREN) 1 % Apply 2 g topically 4 (four) times a day  Qty: 150 g, Refills: 1    Associated Diagnoses: Musculoskeletal pain           Current Discharge Medication List      CONTINUE these medications which have NOT CHANGED    Details   donepezil (ARICEPT) 10 mg tablet Take 1 tablet (10 mg total) by mouth daily  Qty: 90 tablet, Refills: 1    Associated Diagnoses: Senile dementia without behavioral disturbance ([facility])      furosemide (LASIX) 40 mg tablet Take 1 tablet (40 mg total) by mouth daily  Qty: 90 tablet, Refills: 1    Associated Diagnoses: Essential hypertension, benign      acetaminophen (TYLENOL) 325 mg tablet Take 650 mg by mouth every 6 (six) hours as needed for mild pain      glipiZIDE (GLUCOTROL XL) 10 mg 24 hr tablet Take 1 tablet (10 mg total) by mouth daily  Qty: 90 tablet, Refills: 1    Associated Diagnoses: Type 2 diabetes mellitus with complication, without long-term current use of insulin ([facility])      magnesium oxide (MAG-OX) 400 mg Take 1 tablet (400 mg total) by mouth daily  Qty: 90 tablet, Refills: 1    Associated Diagnoses: Hypokalemia; CKD (chronic kidney disease), stage III ([facility])      metolazone (ZAROXOLYN) 5 mg tablet Take 1 tablet (5 mg total) by mouth daily  Qty: 90 tablet, Refills: 1    Associated Diagnoses: 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, unspecified laterality      Potassium Chloride ER 20 MEQ TBCR Take 3 tablets (60 mEq total) by mouth 3 (three) times a day  Qty: 810 tablet, Refills: 1    Associated Diagnoses: Hypokalemia; CKD (chronic kidney disease), stage III ([facility])      repaglinide (PRANDIN) 2 mg tablet Take 1 tablet (2 mg total) by mouth 3 (three) times a day before meals  Qty: 270 tablet, Refills: 1    Associated Diagnoses: Type 2 diabetes mellitus without complication, without long-term current use of insulin ([facility])      sitaGLIPtin (JANUVIA) 100 mg tablet Take 1 tablet (100 mg total) by mouth daily  Qty: 90 tablet, Refills: 1    Associated Diagnoses: Diabetes mellitus type 2, noninsulin dependent ([facility])             Allergies:  No Known Allergies    FOLLOW-UP     PCP Outpatient Follow-up:  yes      Follow up: Brien Rutledge MD   Location:  [facility] primary care  Follow up within next:  1-2 weeks    Consulting Providers Follow-up:  none required     Active Issues Requiring Follow-up:   Diabetes, hypertension, aortic stenosis, CKD    Discharge Statement:   I spent 30 minutes minutes discharging the patient  This time was spent on the day of discharge  I had direct contact with the patient on the day of discharge  Additional documentation is required if more than 30 minutes were spent on discharge  Portions of the record may have been created with voice recognition software  Occasional wrong word or "sound a like" substitutions may have occurred due to the inherent limitations of voice recognition software    Read the chart carefully and recognize, using context, where substitutions have occurred     ==  Jose M He MD  [address]  Internal Medicine Resident PGY-2

## 2021-04-25 NOTE — ASSESSMENT & PLAN NOTE
Lab Results   Component Value Date    HGBA1C 8 3 (H) 02/05/2021       No results for input(s): POCGLU in the last 72 hours  Blood Sugar Average: Last 72 hrs:      On oral medications at home  Continue with correctional coverage and adjust as needed

## 2021-04-25 NOTE — ASSESSMENT & PLAN NOTE
Suspect secondary to choking episode  Pt's daughter reports after episode, pt continued to appear like she were dosing off and about to lose consciousness  Monitor on telemetry for cardiogenic causes of syncope  Repeat echocardiogram ordered given echo in 2017 showed moderate AS, can consider repeating echo as an outpatient

## 2021-04-25 NOTE — DISCHARGE INSTR - DIET
Regular diet and thin liquids   Strategies: upright positioning, slow rate, small bites/sips, cut food into bite size/small pieces

## 2021-04-25 NOTE — ASSESSMENT & PLAN NOTE
Admitted for ACS rule out  Low suspicion given troponin negative x2  No EKG changes suspicious for ischemia  Worse with deep breath, very tender to palpation  Continue to monitor on tele, will check third troponin

## 2021-04-25 NOTE — H&P
INTERNAL MEDICINE RESIDENCY ADMISSION H&P     Name: Elkin Alaniz   Age & Sex: 80 y o  female   MRN: [id number]  Unit/Bed#: -01   Encounter: [id number]  Primary Care Provider: Elroy Bennett MD    Code Status: Level 3 - DNAR and DNI  Admission Status: OBSERVATION  Disposition: Patient requires Med/Surg with Telemetry    Admit to team: SOD Team C     ASSESSMENT/PLAN     Principal Problem:    Chest pain  Active Problems:    CKD (chronic kidney disease), stage III (HCC)    Uncomplicated senile dementia ([facility] [address] )    Type 2 diabetes mellitus with diabetic chronic kidney disease ([facility] )    Syncope    HTN (hypertension)    SALTY (acute kidney injury) ([facility] )      * Chest pain  Assessment & Plan  Admitted for ACS rule out  Low suspicion given troponin negative x2  No EKG changes suspicious for ischemia  Worse with deep breath, very tender to palpation  Continue to monitor on tele, will check third troponin  SALTY (acute kidney injury) ([facility] )  Assessment & Plan  Noted on admission BMP  Will encourage hydration orally and monitor on AM BMP    HTN (hypertension)  Assessment & Plan  Continue furosemide 40 and metolazone 5mg daily, which pt takes at home  Pt's daughter is unsure if pt has a diagnosis of congestive heart failure  Continue potassium supplementation, reported by pt's daughter as 60meq TID  Pt's daughter had medication list with losartan listed as current med, consider clarifying with PCP or pharmacy in AM     Syncope  Assessment & Plan  Suspect secondary to choking episode  Pt's daughter reports after episode, pt continued to appear like she were dosing off and about to lose consciousness  Monitor on telemetry for cardiogenic causes of syncope  Repeat echocardiogram ordered given echo in 2017 showed moderate AS, can consider repeating echo as an outpatient      Type 2 diabetes mellitus with diabetic chronic kidney disease [facility])  Assessment & Plan  Lab Results   Component Value Date    HGBA1C 8 3 (H) 02/05/2021 No results for input(s): POCGLU in the last 72 hours  Blood Sugar Average: Last 72 hrs: On oral medications at home  Continue with correctional coverage and adjust as needed    Uncomplicated senile dementia [facility])  Assessment & Plan  continue aricept  Oriented x1-2 at baseline  CKD (chronic kidney disease), stage III [facility])  Assessment & Plan  Lab Results   Component Value Date    EGFR 42 04/25/2021    EGFR 31 04/24/2021    EGFR 40 02/05/2021    CREATININE 1 11 04/25/2021    CREATININE 1 43 (H) 04/24/2021    CREATININE 1 16 02/05/2021     Monitor on BMP    VTE Pharmacologic Prophylaxis: Heparin  VTE Mechanical Prophylaxis: sequential compression device    CHIEF COMPLAINT     Chief Complaint   Patient presents with    Choking     Pt choked on some sausage,pt given heimlich maneuver  Developed chest pain  HISTORY OF PRESENT ILLNESS     81 yo F with medical history of diabetes, hypertension, dementia who presented to the ED after developing chest pain after receiving the Heimlich maneuver  Patient's daughter Ariadna Hutson at bedside reports that they were eating sausage sandwich is  Patient started coughing, her eyes appear to roll back, and she became unresponsive, starting to slowly fall to the side of the chair she was sitting in  Ariadna Hutson provided two thrusts from behind, and during the 2nd trust, a piece of sausage and patient's dentures came out of her mouth  Patient became more responsive, but while seated at table in the first few minutes appeared to be dozing off, which is uncharacteristic for her  Ariadna Hutson reports that approximately 5-7 minutes later, patient had bilateral upper extremity gross tremors, which she additionally does not have a history of  The patient then began to report chest pain and EMS was called  On route to the hospital, patient received aspirin and nitroglycerin which provided some relief    In the ED, vital signs were stable, initial troponin was negative, EKG showed no change from prior and no acute ischemic changes  Patient was admitted to monitor overnight on medicine service  On my evaluation, patient was oriented to person and to setting of hospital but could not name hospital or year  She could not recall the events of today and per her daughter Emily Constantino at bedside, patient cannot recall what happened 5 minutes earlier  Patient continues to report some mild midline chest pain, worse with breathing in and tender to palpation  Per Emily Constantino, patient may have had a mini heart attack many years ago but she is not sure if she had a cardiac catheterization  Emily Constantino reports that she is patient's POA and that patient has a living will  REVIEW OF SYSTEMS     Review of Systems   Constitutional: Negative for chills, fatigue and fever  HENT: Negative for rhinorrhea and sore throat  Respiratory: Negative for choking, chest tightness, shortness of breath, wheezing and stridor  Cardiovascular: Positive for chest pain  Negative for palpitations and leg swelling  Gastrointestinal: Negative for abdominal pain, blood in stool, constipation, diarrhea, nausea and vomiting  Genitourinary: Negative for hematuria  Neurological: Negative for dizziness and light-headedness  OBJECTIVE     Vitals:    21 1903 21 2121 21 0003   BP: 149/70 127/59 135/65   BP Location: Right arm Right arm    Pulse: 69 65 71   Resp: 20 16    Temp: 98 4 °F (36 9 °C)  97 5 °F (36 4 °C)   TempSrc: Oral     SpO2: 99% 97% 95%      Temperature:   Temp (24hrs), Av °F (36 7 °C), Min:97 5 °F (36 4 °C), Max:98 4 °F (36 9 °C)    Temperature: 97 5 °F (36 4 °C)  Intake & Output:  I/O       701 -  0700 701 -  0700          Unmeasured Urine Occurrence  1 x        Weights: There is no height or weight on file to calculate BMI  Weight (last 2 days)     None        Physical Exam  Constitutional:       Appearance: She is well-developed     HENT:      Head: Normocephalic and atraumatic  Eyes:      General:         Right eye: No discharge  Left eye: No discharge  Neck:      Musculoskeletal: Neck supple  Cardiovascular:      Rate and Rhythm: Normal rate and regular rhythm  Heart sounds: Murmur present  Systolic murmur present with a grade of 2/6  No friction rub  No gallop  Pulmonary:      Effort: Pulmonary effort is normal  No respiratory distress  Breath sounds: Normal breath sounds  No stridor  No wheezing or rales  Chest:      Chest wall: No tenderness  Abdominal:      General: There is no distension  Palpations: Abdomen is soft  Tenderness: There is no abdominal tenderness  There is no guarding  Musculoskeletal:         General: No tenderness or deformity  Skin:     General: Skin is warm and dry  Neurological:      Mental Status: She is alert and oriented to person, place, and time  Psychiatric:         Behavior: Behavior normal        PAST MEDICAL HISTORY     Past Medical History:   Diagnosis Date    Aortic stenosis     Bilateral edema of lower extremity     Dementia (HCC)     Fall     Gait abnormality     Hyperlipidemia     Hypertension     Hypokalemia     Other ascites     Varicose veins of leg with edema      PAST SURGICAL HISTORY     Past Surgical History:   Procedure Laterality Date    BREAST SURGERY      ELBOW SURGERY       SOCIAL & FAMILY HISTORY     Social History     Substance and Sexual Activity   Alcohol Use No    Frequency: Never    Drinks per session: Patient refused    Binge frequency: Never     Substance and Sexual Activity   Alcohol Use No        Substance and Sexual Activity   Drug Use No     Social History     Tobacco Use   Smoking Status Never Smoker   Smokeless Tobacco Never Used     Family History   Problem Relation Age of Onset    Dementia Sister      LABORATORY DATA     Labs: I have personally reviewed pertinent reports      Results from last 7 days   Lab Units 04/25/21  6816 04/24/21 2027   WBC Thousand/uL 12 57* 11 43*   HEMOGLOBIN g/dL 12 3 12 4   HEMATOCRIT % 36 0 37 0   PLATELETS Thousands/uL 252 259   NEUTROS PCT %  --  71   MONOS PCT %  --  8      Results from last 7 days   Lab Units 04/25/21  0357 04/24/21 2027   POTASSIUM mmol/L 3 0* 4 1   CHLORIDE mmol/L 103 100   CO2 mmol/L 25 30   BUN mg/dL 24 24   CREATININE mg/dL 1 11 1 43*   CALCIUM mg/dL 9 1 8 7   ALK PHOS U/L  --  91   ALT U/L  --  23   AST U/L  --  25                      Results from last 7 days   Lab Units 04/25/21  0357 04/25/21  0051 04/24/21 2027   TROPONIN I ng/mL <0 02 <0 02 <0 02     Micro:  No results found for: Chip Collar, WOUNDCULT, SPUTUMCULTUR  IMAGING & DIAGNOSTIC TESTS     Imaging: I have personally reviewed pertinent reports  Ct Chest Without Contrast    Result Date: 4/24/2021  Impression: No acute osseous abnormality  Bibasilar dependent atelectasis versus scarring  Small hiatal hernia and partially visualized pancreatic cyst  Workstation performed: [id number]     EKG, Pathology, and Other Studies: I have personally reviewed pertinent reports  ALLERGIES   No Known Allergies  MEDICATIONS PRIOR TO ARRIVAL     Prior to Admission medications    Medication Sig Start Date End Date Taking? Authorizing Provider   donepezil (ARICEPT) 10 mg tablet Take 1 tablet (10 mg total) by mouth daily 4/7/21  Yes AME Khan   furosemide (LASIX) 40 mg tablet Take 1 tablet (40 mg total) by mouth daily 11/16/20  Yes AME Khan   acetaminophen (TYLENOL) 325 mg tablet Take 650 mg by mouth every 6 (six) hours as needed for mild pain    Historical Provider, MD   glipiZIDE (GLUCOTROL XL) 10 mg 24 hr tablet Take 1 tablet (10 mg total) by mouth daily 12/2/20   Danilo Oliveira MD   magnesium oxide (MAG-OX) 400 mg Take 1 tablet (400 mg total) by mouth daily 2/3/21   AME Crabtree   metolazone (ZAROXOLYN) 5 mg tablet Take 1 tablet (5 mg total) by mouth daily 11/13/20 AME Chou   Potassium Chloride ER 20 MEQ TBCR Take 3 tablets (60 mEq total) by mouth 3 (three) times a day 2/23/21   AME Huff   repaglinide (PRANDIN) 2 mg tablet Take 1 tablet (2 mg total) by mouth 3 (three) times a day before meals 12/21/20   Kurt Conte MD   sitaGLIPtin (JANUVIA) 100 mg tablet Take 1 tablet (100 mg total) by mouth daily 2/10/21   Pranay López MD     MEDICATIONS ADMINISTERED IN LAST 24 HOURS     Medication Administration - last 24 hours from 04/24/2021 0513 to 04/25/2021 0513       Date/Time Order Dose Route Action Action by     04/24/2021 2032 nitroglycerin (NITROSTAT) SL tablet 0 4 mg 0 4 mg Sublingual Not Given Zan Roblero RN     04/25/2021 0359 acetaminophen (TYLENOL) tablet 650 mg 650 mg Oral Given Dk Blas RN        CURRENT MEDICATIONS     Current Facility-Administered Medications   Medication Dose Route Frequency Provider Last Rate    acetaminophen  650 mg Oral Q6H PRN Yumiko Chamakkala, DO      donepezil  10 mg Oral Daily Yumiko Chamakkala, DO      furosemide  40 mg Oral Daily Yumiko Chamakkala, DO      heparin (porcine)  5,000 Units Subcutaneous Q8H [facility] Yumiko Chamakkala, DO      insulin lispro  1-5 Units Subcutaneous HS Yumiko Chamakkala, DO      insulin lispro  1-6 Units Subcutaneous TID AC Yumiko Chamakkala, DO      metolazone  5 mg Oral Daily Yumiko Chamakkala, DO      potassium chloride  60 mEq Oral TID Yumiko Chamakkala, DO          acetaminophen, 650 mg, Q6H PRN      Admission Time  I spent 30 minutes admitting the patient  This involved direct patient contact where I performed a full history and physical, reviewing previous records, and reviewing laboratory and other diagnostic studies  Portions of the record may have been created with voice recognition software  Occasional wrong word or "sound a like" substitutions may have occurred due to the inherent limitations of voice recognition software    Read the chart carefully and recognize, using context, where substitutions have occurred     ==  Shruthi Peguero DO  [address]  Internal Medicine Residency PGY-3

## 2021-04-25 NOTE — DISCHARGE INSTRUCTIONS
You can continue your regular diet  Assistant/supervision is needed throughout the meal     You should be positioned upright while eating  Eat at slow rate, small sips, alternate between liquids and solids  Food should be cut into bite size pieces  Take your medications only when fully alert and upright

## 2021-04-25 NOTE — ASSESSMENT & PLAN NOTE
Continue furosemide 40 and metolazone 5mg daily, which pt takes at home    Pt's daughter is unsure if pt has a diagnosis of congestive heart failure  Continue potassium supplementation, reported by pt's daughter as 60meq TID  Pt's daughter had medication list with losartan listed as current med, consider clarifying with PCP or pharmacy in AM

## 2021-04-25 NOTE — ASSESSMENT & PLAN NOTE
Lab Results   Component Value Date    EGFR 42 04/25/2021    EGFR 31 04/24/2021    EGFR 40 02/05/2021    CREATININE 1 11 04/25/2021    CREATININE 1 43 (H) 04/24/2021    CREATININE 1 16 02/05/2021     Monitor on BMP

## 2021-04-26 LAB
ATRIAL RATE: 66 BPM
ATRIAL RATE: 76 BPM
P AXIS: -11 DEGREES
PR INTERVAL: 216 MS
PR INTERVAL: 232 MS
QRS AXIS: -26 DEGREES
QRS AXIS: -27 DEGREES
QRSD INTERVAL: 146 MS
QRSD INTERVAL: 146 MS
QT INTERVAL: 444 MS
QT INTERVAL: 492 MS
QTC INTERVAL: 499 MS
QTC INTERVAL: 515 MS
T WAVE AXIS: 150 DEGREES
T WAVE AXIS: 150 DEGREES
VENTRICULAR RATE: 66 BPM
VENTRICULAR RATE: 76 BPM

## 2021-04-26 PROCEDURE — 93306 TTE W/DOPPLER COMPLETE: CPT | Performed by: INTERNAL MEDICINE

## 2021-04-26 PROCEDURE — 93010 ELECTROCARDIOGRAM REPORT: CPT | Performed by: INTERNAL MEDICINE

## 2021-04-27 ENCOUNTER — TRANSITIONAL CARE MANAGEMENT (OUTPATIENT)
Dept: INTERNAL MEDICINE CLINIC | Age: 86
End: 2021-04-27

## 2021-04-27 ENCOUNTER — APPOINTMENT (OUTPATIENT)
Dept: LAB | Age: 86
End: 2021-04-27
Payer: MEDICARE

## 2021-04-27 DIAGNOSIS — E11.22 TYPE 2 DIABETES MELLITUS WITH CHRONIC KIDNEY DISEASE, WITHOUT LONG-TERM CURRENT USE OF INSULIN, UNSPECIFIED CKD STAGE (HCC): ICD-10-CM

## 2021-04-27 DIAGNOSIS — I10 BENIGN ESSENTIAL HYPERTENSION: ICD-10-CM

## 2021-04-27 LAB
ANION GAP SERPL CALCULATED.3IONS-SCNC: 11 MMOL/L (ref 4–13)
BUN SERPL-MCNC: 28 MG/DL (ref 5–25)
CALCIUM SERPL-MCNC: 10 MG/DL (ref 8.3–10.1)
CHLORIDE SERPL-SCNC: 99 MMOL/L (ref 100–108)
CO2 SERPL-SCNC: 25 MMOL/L (ref 21–32)
CREAT SERPL-MCNC: 1.32 MG/DL (ref 0.6–1.3)
ERYTHROCYTE [DISTWIDTH] IN BLOOD BY AUTOMATED COUNT: 13.2 % (ref 11.6–15.1)
EST. AVERAGE GLUCOSE BLD GHB EST-MCNC: 200 MG/DL
GFR SERPL CREATININE-BSD FRML MDRD: 34 ML/MIN/1.73SQ M
GLUCOSE P FAST SERPL-MCNC: 224 MG/DL (ref 65–99)
HBA1C MFR BLD: 8.6 %
HCT VFR BLD AUTO: 40.1 % (ref 34.8–46.1)
HGB BLD-MCNC: 13.3 G/DL (ref 11.5–15.4)
MCH RBC QN AUTO: 30.3 PG (ref 26.8–34.3)
MCHC RBC AUTO-ENTMCNC: 33.2 G/DL (ref 31.4–37.4)
MCV RBC AUTO: 91 FL (ref 82–98)
PLATELET # BLD AUTO: 279 THOUSANDS/UL (ref 149–390)
PMV BLD AUTO: 11.5 FL (ref 8.9–12.7)
POTASSIUM SERPL-SCNC: 3.4 MMOL/L (ref 3.5–5.3)
RBC # BLD AUTO: 4.39 MILLION/UL (ref 3.81–5.12)
SODIUM SERPL-SCNC: 135 MMOL/L (ref 136–145)
WBC # BLD AUTO: 11.51 THOUSAND/UL (ref 4.31–10.16)

## 2021-04-27 PROCEDURE — 85027 COMPLETE CBC AUTOMATED: CPT

## 2021-04-27 PROCEDURE — 80048 BASIC METABOLIC PNL TOTAL CA: CPT

## 2021-04-27 PROCEDURE — 36415 COLL VENOUS BLD VENIPUNCTURE: CPT

## 2021-04-27 PROCEDURE — 83036 HEMOGLOBIN GLYCOSYLATED A1C: CPT

## 2021-04-29 ENCOUNTER — TELEPHONE (OUTPATIENT)
Dept: INTERNAL MEDICINE CLINIC | Facility: CLINIC | Age: 86
End: 2021-04-29

## 2021-04-29 NOTE — TELEPHONE ENCOUNTER
FYI-Patients daughter calling about someone reaching out to them for a TCM  Patient daughter says her mother has an appt with Dr Raina Herring on [phone], and they will be there for this appt

## 2021-04-29 NOTE — PHYSICIAN ADVISOR
Current patient class: Inpatient  The patient is currently on Hospital Day: 2 at [facility]      The patient was admitted to the hospital  on 4/25/21 at 1540 for the following diagnosis:  Chest pain [R07 9]  Choking episode [R09 89]  Choking [T17 308A]     After review of the relevant documentation, labs, vital signs and test results, this is a PROVIDER LIABLE case  In this particular case the patient was admitted to the hospital as an inpatient  The patient however failed to satisfy the 2 midnight benchmark and closer scrutiny of the case was warranted  After review of the patient presentation and relevant labs the patient was most appropriate for observation or outpatient class at the time of admission  Given that this patient has already been discharged prior to this review they become a provider liable case  Rationale is as follows: The patient was hospitalized for 24 hours, one midnight  She is 80years old and had a choking episode and Heimlich maneuver was performed which expelled the food  She was having chest pain and recommendation was made to monitor the patient overnight in the hospital   Anticipated length of stay of more than two midnights was not documented  The patient was monitored as planned and did well, able to be discharged  Her chest pain was attributed to the tenderness over the costochondral junction  Observation class would have been appropriate    The patients vitals on arrival were   ED Triage Vitals   Temperature Pulse Respirations Blood Pressure SpO2   04/24/21 1903 04/24/21 1903 04/24/21 1903 04/24/21 1903 04/24/21 1903   98 4 °F (36 9 °C) 69 20 149/70 99 %      Temp Source Heart Rate Source Patient Position - Orthostatic VS BP Location FiO2 (%)   04/24/21 1903 04/24/21 1903 04/24/21 1903 04/24/21 1903 --   Oral Monitor Lying Right arm       Pain Score       04/25/21 0339       No Pain           Past Medical History:   Diagnosis Date    Aortic stenosis     Bilateral edema of lower extremity     Dementia ([facility] [address] [facility]     Fall     Gait abnormality     Hyperlipidemia     Hypertension     Hypokalemia     Other ascites     Varicose veins of leg with edema      Past Surgical History:   Procedure Laterality Date    BREAST SURGERY      ELBOW SURGERY             Consults have been placed to:   None    Vitals:    04/25/21 0003 04/25/21 0735 04/25/21 1122 04/25/21 1546   BP: 135/65 122/64 119/64 148/69   BP Location:    Right arm   Pulse: 71  73 81   Resp:       Temp: 97 5 °F (36 4 °C) 98 5 °F (36 9 °C)  (!) 97 1 °F (36 2 °C)   TempSrc:    Oral   SpO2: 95%  95% 95%       Most recent labs:    Recent Labs     04/27/21  1042   WBC 11 51*   HGB 13 3   HCT 40 1      K 3 4*   CALCIUM 10 0   BUN 28*   CREATININE 1 32*       Scheduled Meds:  Continuous Infusions:No current facility-administered medications for this encounter  PRN Meds:      Surgical procedures (if appropriate):

## 2021-04-30 DIAGNOSIS — I10 ESSENTIAL HYPERTENSION, BENIGN: ICD-10-CM

## 2021-04-30 DIAGNOSIS — I83.899 VARICOSE VEINS OF LOWER EXTREMITY WITH EDEMA, UNSPECIFIED LATERALITY: ICD-10-CM

## 2021-04-30 RX ORDER — METOLAZONE 5 MG/1
5 TABLET ORAL DAILY
Qty: 90 TABLET | Refills: 1 | Status: SHIPPED | OUTPATIENT
Start: 2021-04-30 | End: 2021-08-02 | Stop reason: SDUPTHER

## 2021-04-30 RX ORDER — FUROSEMIDE 40 MG/1
40 TABLET ORAL DAILY
Qty: 90 TABLET | Refills: 1 | Status: SHIPPED | OUTPATIENT
Start: 2021-04-30 | End: 2021-08-02 | Stop reason: SDUPTHER

## 2021-05-03 ENCOUNTER — OFFICE VISIT (OUTPATIENT)
Dept: INTERNAL MEDICINE CLINIC | Age: 86
End: 2021-05-03
Payer: MEDICARE

## 2021-05-03 VITALS
TEMPERATURE: 97.3 F | OXYGEN SATURATION: 98 % | DIASTOLIC BLOOD PRESSURE: 50 MMHG | HEART RATE: 82 BPM | WEIGHT: 172 LBS | HEIGHT: 60 IN | SYSTOLIC BLOOD PRESSURE: 112 MMHG | BODY MASS INDEX: 33.77 KG/M2

## 2021-05-03 DIAGNOSIS — E11.22 TYPE 2 DIABETES MELLITUS WITH CHRONIC KIDNEY DISEASE, WITHOUT LONG-TERM CURRENT USE OF INSULIN, UNSPECIFIED CKD STAGE (HCC): Primary | ICD-10-CM

## 2021-05-03 DIAGNOSIS — E87.6 HYPOKALEMIA: ICD-10-CM

## 2021-05-03 DIAGNOSIS — I10 BENIGN ESSENTIAL HYPERTENSION: ICD-10-CM

## 2021-05-03 DIAGNOSIS — F03.90 UNCOMPLICATED SENILE DEMENTIA (HCC): ICD-10-CM

## 2021-05-03 DIAGNOSIS — I35.0 MODERATE AORTIC STENOSIS: ICD-10-CM

## 2021-05-03 DIAGNOSIS — N18.32 STAGE 3B CHRONIC KIDNEY DISEASE (HCC): ICD-10-CM

## 2021-05-03 DIAGNOSIS — I10 ESSENTIAL HYPERTENSION: ICD-10-CM

## 2021-05-03 DIAGNOSIS — E87.1 HYPONATREMIA: ICD-10-CM

## 2021-05-03 DIAGNOSIS — I70.0 ATHEROSCLEROSIS OF AORTA (HCC): ICD-10-CM

## 2021-05-03 PROCEDURE — 99495 TRANSJ CARE MGMT MOD F2F 14D: CPT | Performed by: INTERNAL MEDICINE

## 2021-05-03 NOTE — PROGRESS NOTES
Assessment/Plan:    1  Type 2 diabetes mellitus  Hemoglobin A1c is 8 6  Will continue with present regimen  Patient's family is not interested to change to insulin therapy  2  CKD stage 3  Renal function is relatively stable will continue to monitor  3  Hypokalemia  Presently she is taking 3 tablet of KCl 20 mEq 3 times a day  Advised her to take 4 3 times a day for couple of days and then go back to 3 3 times a day    4  Dementia  Will continue with Aricept  5  Moderate to severe aortic stenosis  Treatment option discussed with patient and daughter in the office  Patient's daughter is reluctant to pursue any further treatment  Will continue to monitor  Diagnoses and all orders for this visit:    Type 2 diabetes mellitus with chronic kidney disease, without long-term current use of insulin, unspecified CKD stage (HCC)  -     Hemoglobin A1C; Future    Benign essential hypertension  -     CBC; Future    Moderate aortic stenosis    Essential hypertension    Uncomplicated senile dementia (HCC)    Stage 3b chronic kidney disease ([facility] [address] )  -     Basic metabolic panel; Future    Hyponatremia    Hypokalemia    Atherosclerosis of aorta (HCC)          Subjective:          Patient ID: Sienna Gilmore is a 80 y o  female  TCM Call (since 4/2/2021)     Date and time call was made  4/27/2021 10:04 AM    Hospital care reviewed  Records reviewed    Patient was hospitialized at  [facility]        Date of Admission  04/24/21    Date of discharge  04/26/21    Diagnosis  choking episode, chest pain    Disposition  Home    Were the patients medications reviewed and updated  Yes    Current Symptoms  None      TCM Call (since 4/2/2021)     Post hospital issues  Reduced activity    Should patient be enrolled in anticoag monitoring? No    Scheduled for follow up?   Yes    Did you obtain your prescribed medications  Yes    Do you need help managing your prescriptions or medications  No    Is transportation to your appointment needed  No    I have advised the patient to call PCP with any new or worsening symptoms  Candelario Mascorro RN          Is hospital follow-up  She was admitted to [facility] with near-syncope after eating some sausage  In the hospital she also found to have costochondritis cardiac work workup was negative  She also underwent echocardiogram for aortic stenosis today shows moderate to severe aortic stenosis  The following portions of the patient's history were reviewed and updated as appropriate: allergies, current medications, past family history, past medical history, past social history, past surgical history and problem list   Falls Plan of Care: balance, strength, and gait training instructions were provided       Review of Systems   Constitutional: Positive for fatigue  Negative for fever  HENT: Negative for congestion, ear discharge, ear pain, postnasal drip, sinus pressure, sore throat, tinnitus and trouble swallowing  Eyes: Negative for discharge, itching and visual disturbance  Respiratory: Negative for cough and shortness of breath  Cardiovascular: Negative for chest pain and palpitations  Gastrointestinal: Negative for abdominal pain, diarrhea, nausea and vomiting  Endocrine: Negative for cold intolerance and polyuria  Genitourinary: Negative for difficulty urinating, dysuria and urgency  Musculoskeletal: Positive for gait problem  Negative for arthralgias and neck pain  Skin: Negative for rash  Allergic/Immunologic: Negative for environmental allergies  Neurological: Negative for dizziness, weakness and headaches  Psychiatric/Behavioral: Negative for agitation and behavioral problems  The patient is not nervous/anxious            Past Medical History:   Diagnosis Date    Aortic stenosis     Bilateral edema of lower extremity     Dementia (HCC)     Fall     Gait abnormality     Hyperlipidemia     Hypertension     Hypokalemia     Other ascites     Varicose veins of leg with edema          Current Outpatient Medications:     acetaminophen (TYLENOL) 325 mg tablet, Take 650 mg by mouth every 6 (six) hours as needed for mild pain, Disp: , Rfl:     Diclofenac Sodium (VOLTAREN) 1 %, Apply 2 g topically 4 (four) times a day, Disp: 150 g, Rfl: 1    donepezil (ARICEPT) 10 mg tablet, Take 1 tablet (10 mg total) by mouth daily, Disp: 90 tablet, Rfl: 1    furosemide (LASIX) 40 mg tablet, Take 1 tablet (40 mg total) by mouth daily, Disp: 90 tablet, Rfl: 1    glipiZIDE (GLUCOTROL XL) 10 mg 24 hr tablet, Take 1 tablet (10 mg total) by mouth daily, Disp: 90 tablet, Rfl: 1    magnesium oxide (MAG-OX) 400 mg, Take 1 tablet (400 mg total) by mouth daily, Disp: 90 tablet, Rfl: 1    metolazone (ZAROXOLYN) 5 mg tablet, Take 1 tablet (5 mg total) by mouth daily, Disp: 90 tablet, Rfl: 1    Potassium Chloride ER 20 MEQ TBCR, Take 3 tablets (60 mEq total) by mouth 3 (three) times a day, Disp: 810 tablet, Rfl: 1    repaglinide (PRANDIN) 2 mg tablet, Take 1 tablet (2 mg total) by mouth 3 (three) times a day before meals, Disp: 270 tablet, Rfl: 1    sitaGLIPtin (JANUVIA) 100 mg tablet, Take 1 tablet (100 mg total) by mouth daily, Disp: 90 tablet, Rfl: 1    No Known Allergies    Social History   Past Surgical History:   Procedure Laterality Date    BREAST SURGERY      ELBOW SURGERY       Family History   Problem Relation Age of Onset    Dementia Sister        Objective:  /50 (BP Location: Left arm, Patient Position: Sitting, Cuff Size: Large)   Pulse 82   Temp (!) 97 3 °F (36 3 °C) (Temporal)   Ht 4' 11 5" (1 511 m)   Wt 78 kg (172 lb)   SpO2 98%   BMI 34 16 kg/m²   Body mass index is 34 16 kg/m²  Physical Exam  Constitutional:       Appearance: She is well-developed  HENT:      Head: Normocephalic  Right Ear: External ear normal       Left Ear: External ear normal       Mouth/Throat:      Pharynx: No posterior oropharyngeal erythema  Eyes:      General: No scleral icterus  Pupils: Pupils are equal, round, and reactive to light  Neck:      Musculoskeletal: Normal range of motion and neck supple  Thyroid: No thyromegaly  Trachea: No tracheal deviation  Cardiovascular:      Rate and Rhythm: Normal rate and regular rhythm  Heart sounds: Murmur present  Pulmonary:      Effort: Pulmonary effort is normal  No respiratory distress  Breath sounds: Normal breath sounds  Chest:      Chest wall: No tenderness  Abdominal:      General: Bowel sounds are normal       Palpations: Abdomen is soft  There is no mass  Tenderness: There is no abdominal tenderness  Musculoskeletal: Normal range of motion  Right lower leg: Edema present  Left lower leg: Edema present  Comments: Trace bilateral lower extremity edema present   Lymphadenopathy:      Cervical: No cervical adenopathy  Skin:     General: Skin is warm  Neurological:      Mental Status: She is alert  She is disoriented  Cranial Nerves: No cranial nerve deficit     Psychiatric:         Mood and Affect: Mood normal          Behavior: Behavior normal

## 2021-05-03 NOTE — PROGRESS NOTES
Diabetic Foot Exam    Patient's shoes and socks removed  Right Foot/Ankle   Right Foot Inspection  Skin Exam: skin normal and skin intact no dry skin, no warmth, no callus, no erythema, no maceration, no abnormal color, no pre-ulcer, no ulcer and no callus                            Sensory       Monofilament testing: intact  Vascular    The right DP pulse is 2+  The right PT pulse is 2+  Left Foot/Ankle  Left Foot Inspection  Skin Exam: skin normal and skin intactno dry skin, no warmth, no erythema, no maceration, normal color, no pre-ulcer, no ulcer and no callus                                         Sensory       Monofilament: intact  Vascular    The left DP pulse is 2+  The left PT pulse is 2+  Assign Risk Category:  No deformity present; No loss of protective sensation;  No weak pulses       Risk: 0

## 2021-05-10 ENCOUNTER — TELEPHONE (OUTPATIENT)
Dept: INTERNAL MEDICINE CLINIC | Age: 86
End: 2021-05-10

## 2021-05-10 NOTE — TELEPHONE ENCOUNTER
I gave it to [employer] after signing it to completed  Maritza Cheatham please complete and give a call to daughter

## 2021-05-10 NOTE — TELEPHONE ENCOUNTER
Lyric Saxon called for her mother Anthony Kessler  She stated that on 05/03 she left ppwk with you to complete when she was in the room with you during her visit  She had left it with a sticky note on it  I checked her file and did not see anything scanned in  She just wanted to know if it was complete  Please advise

## 2021-05-17 ENCOUNTER — LAB REQUISITION (OUTPATIENT)
Dept: LAB | Facility: HOSPITAL | Age: 86
End: 2021-05-17
Payer: MEDICARE

## 2021-05-17 DIAGNOSIS — Z11.59 ENCOUNTER FOR SCREENING FOR OTHER VIRAL DISEASES: ICD-10-CM

## 2021-05-17 DIAGNOSIS — Z03.818 ENCOUNTER FOR OBSERVATION FOR SUSPECTED EXPOSURE TO OTHER BIOLOGICAL AGENTS RULED OUT: ICD-10-CM

## 2021-05-17 PROCEDURE — U0003 INFECTIOUS AGENT DETECTION BY NUCLEIC ACID (DNA OR RNA); SEVERE ACUTE RESPIRATORY SYNDROME CORONAVIRUS 2 (SARS-COV-2) (CORONAVIRUS DISEASE [COVID-19]), AMPLIFIED PROBE TECHNIQUE, MAKING USE OF HIGH THROUGHPUT TECHNOLOGIES AS DESCRIBED BY CMS-2020-01-R: HCPCS | Performed by: FAMILY MEDICINE

## 2021-05-17 PROCEDURE — U0005 INFEC AGEN DETEC AMPLI PROBE: HCPCS | Performed by: FAMILY MEDICINE

## 2021-05-18 LAB — SARS-COV-2 RNA RESP QL NAA+PROBE: NEGATIVE

## 2021-06-01 DIAGNOSIS — M79.18 MUSCULOSKELETAL PAIN: ICD-10-CM

## 2021-06-01 NOTE — TELEPHONE ENCOUNTER
Received a request from [address] [place] [address] in regards to this patient and they would need a refill on the following medication    Diclofenac Sodium 1% gel  Qty 100 gm    Apply 2 g top to affected area 4 x a day at   9a-1p-5p-9p    This is how it has to go over to the pharmacy as    Bud Ugarte

## 2021-06-03 DIAGNOSIS — E11.8 TYPE 2 DIABETES MELLITUS WITH COMPLICATION, WITHOUT LONG-TERM CURRENT USE OF INSULIN (HCC): ICD-10-CM

## 2021-06-03 RX ORDER — GLIPIZIDE 10 MG/1
10 TABLET, FILM COATED, EXTENDED RELEASE ORAL DAILY
Qty: 90 TABLET | Refills: 1 | Status: SHIPPED | OUTPATIENT
Start: 2021-06-03 | End: 2021-08-02 | Stop reason: SDUPTHER

## 2021-07-02 DIAGNOSIS — E11.9 TYPE 2 DIABETES MELLITUS WITHOUT COMPLICATION, WITHOUT LONG-TERM CURRENT USE OF INSULIN (HCC): ICD-10-CM

## 2021-07-02 RX ORDER — REPAGLINIDE 2 MG/1
2 TABLET ORAL
Qty: 270 TABLET | Refills: 1 | Status: SHIPPED | OUTPATIENT
Start: 2021-07-02

## 2021-07-02 NOTE — TELEPHONE ENCOUNTER
Patient called and stated that she needs refills of Prandin 2mg tablet  Please send to [address] in [place]

## 2021-08-02 DIAGNOSIS — I10 ESSENTIAL HYPERTENSION, BENIGN: ICD-10-CM

## 2021-08-02 DIAGNOSIS — N18.30 CKD (CHRONIC KIDNEY DISEASE), STAGE III (HCC): ICD-10-CM

## 2021-08-02 DIAGNOSIS — E87.6 HYPOKALEMIA: ICD-10-CM

## 2021-08-02 DIAGNOSIS — I83.899 VARICOSE VEINS OF LOWER EXTREMITY WITH EDEMA, UNSPECIFIED LATERALITY: ICD-10-CM

## 2021-08-02 DIAGNOSIS — E11.9 DIABETES MELLITUS TYPE 2, NONINSULIN DEPENDENT (HCC): ICD-10-CM

## 2021-08-02 DIAGNOSIS — E11.8 TYPE 2 DIABETES MELLITUS WITH COMPLICATION, WITHOUT LONG-TERM CURRENT USE OF INSULIN (HCC): ICD-10-CM

## 2021-08-02 DIAGNOSIS — F03.90 SENILE DEMENTIA WITHOUT BEHAVIORAL DISTURBANCE (HCC): ICD-10-CM

## 2021-08-02 NOTE — TELEPHONE ENCOUNTER
Last O/V: 5/3/2021  Next O/V: 9/20/2021    Medication requested:   Requested Prescriptions     Pending Prescriptions Disp Refills    donepezil (ARICEPT) 10 mg tablet 90 tablet 1     Sig: Take 1 tablet (10 mg total) by mouth daily    glipiZIDE (GLUCOTROL XL) 10 mg 24 hr tablet 90 tablet 1     Sig: Take 1 tablet (10 mg total) by mouth daily    sitaGLIPtin (JANUVIA) 100 mg tablet 90 tablet 1     Sig: Take 1 tablet (100 mg total) by mouth daily    metolazone (ZAROXOLYN) 5 mg tablet 90 tablet 1     Sig: Take 1 tablet (5 mg total) by mouth daily    furosemide (LASIX) 40 mg tablet 90 tablet 1     Sig: Take 1 tablet (40 mg total) by mouth daily    magnesium oxide (MAG-OX) 400 mg 90 tablet 1     Sig: Take 1 tablet (400 mg total) by mouth daily          Pharmacy:   [facility]

## 2021-08-03 RX ORDER — METOLAZONE 5 MG/1
5 TABLET ORAL DAILY
Qty: 90 TABLET | Refills: 1 | Status: SHIPPED | OUTPATIENT
Start: 2021-08-03 | End: 2022-02-07 | Stop reason: HOSPADM

## 2021-08-03 RX ORDER — GLIPIZIDE 10 MG/1
10 TABLET, FILM COATED, EXTENDED RELEASE ORAL DAILY
Qty: 90 TABLET | Refills: 1 | Status: SHIPPED | OUTPATIENT
Start: 2021-08-03 | End: 2022-01-04 | Stop reason: HOSPADM

## 2021-08-03 RX ORDER — DONEPEZIL HYDROCHLORIDE 10 MG/1
10 TABLET, FILM COATED ORAL DAILY
Qty: 90 TABLET | Refills: 1 | Status: SHIPPED | OUTPATIENT
Start: 2021-08-03

## 2021-08-03 RX ORDER — FUROSEMIDE 40 MG/1
40 TABLET ORAL DAILY
Qty: 90 TABLET | Refills: 1 | Status: SHIPPED | OUTPATIENT
Start: 2021-08-03 | End: 2022-02-07 | Stop reason: HOSPADM

## 2021-12-28 ENCOUNTER — HOSPITAL ENCOUNTER (INPATIENT)
Facility: HOSPITAL | Age: 86
LOS: 7 days | Discharge: HOME HEALTH | DRG: 637 | End: 2022-01-04
Attending: EMERGENCY MEDICINE | Admitting: INTERNAL MEDICINE
Payer: MEDICARE

## 2021-12-28 DIAGNOSIS — F03.90 UNCOMPLICATED SENILE DEMENTIA (HCC): ICD-10-CM

## 2021-12-28 DIAGNOSIS — N17.9 AKI (ACUTE KIDNEY INJURY) (HCC): ICD-10-CM

## 2021-12-28 DIAGNOSIS — N18.32 STAGE 3B CHRONIC KIDNEY DISEASE (HCC): ICD-10-CM

## 2021-12-28 DIAGNOSIS — E87.6 HYPOKALEMIA: Primary | ICD-10-CM

## 2021-12-28 DIAGNOSIS — E11.22 TYPE 2 DIABETES MELLITUS WITH CHRONIC KIDNEY DISEASE, WITHOUT LONG-TERM CURRENT USE OF INSULIN, UNSPECIFIED CKD STAGE (HCC): ICD-10-CM

## 2021-12-28 DIAGNOSIS — R73.9 HYPERGLYCEMIA: ICD-10-CM

## 2021-12-28 DIAGNOSIS — R94.31 ABNORMAL FINDING ON EKG: ICD-10-CM

## 2021-12-28 PROBLEM — Z91.89 AT RISK FOR DELIRIUM: Status: ACTIVE | Noted: 2021-12-28

## 2021-12-28 PROBLEM — E11.00 HYPEROSMOLAR HYPERGLYCEMIC STATE (HHS) (HCC): Status: ACTIVE | Noted: 2021-12-28

## 2021-12-28 PROBLEM — E11.65 HYPEROSMOLAR HYPERGLYCEMIC STATE (HHS) (HCC): Status: ACTIVE | Noted: 2021-12-28

## 2021-12-28 PROBLEM — I44.0 FIRST DEGREE AV BLOCK: Status: ACTIVE | Noted: 2021-12-28

## 2021-12-28 PROBLEM — R32 URINARY INCONTINENCE: Status: ACTIVE | Noted: 2021-12-28

## 2021-12-28 PROBLEM — N18.9 ACUTE-ON-CHRONIC KIDNEY INJURY (HCC): Status: ACTIVE | Noted: 2021-12-28

## 2021-12-28 LAB
ALBUMIN SERPL BCP-MCNC: 3.3 G/DL (ref 3.5–5)
ALP SERPL-CCNC: 156 U/L (ref 46–116)
ALT SERPL W P-5'-P-CCNC: 28 U/L (ref 12–78)
ANION GAP SERPL CALCULATED.3IONS-SCNC: 11 MMOL/L (ref 4–13)
ANION GAP SERPL CALCULATED.3IONS-SCNC: 9 MMOL/L (ref 4–13)
AST SERPL W P-5'-P-CCNC: 69 U/L (ref 5–45)
ATRIAL RATE: 125 BPM
ATRIAL RATE: 94 BPM
BACTERIA UR QL AUTO: ABNORMAL /HPF
BASOPHILS # BLD AUTO: 0.08 THOUSANDS/ΜL (ref 0–0.1)
BASOPHILS NFR BLD AUTO: 1 % (ref 0–1)
BETA-HYDROXYBUTYRATE: 0.6 MMOL/L
BILIRUB SERPL-MCNC: 0.73 MG/DL (ref 0.2–1)
BILIRUB UR QL STRIP: NEGATIVE
BILIRUB UR QL STRIP: NEGATIVE
BUN SERPL-MCNC: 29 MG/DL (ref 5–25)
BUN SERPL-MCNC: 33 MG/DL (ref 5–25)
CALCIUM ALBUM COR SERPL-MCNC: 10.7 MG/DL (ref 8.3–10.1)
CALCIUM SERPL-MCNC: 10.1 MG/DL (ref 8.3–10.1)
CALCIUM SERPL-MCNC: 9.2 MG/DL (ref 8.3–10.1)
CHLORIDE SERPL-SCNC: 80 MMOL/L (ref 100–108)
CHLORIDE SERPL-SCNC: 86 MMOL/L (ref 100–108)
CLARITY UR: CLEAR
CLARITY UR: CLEAR
CO2 SERPL-SCNC: 30 MMOL/L (ref 21–32)
CO2 SERPL-SCNC: 32 MMOL/L (ref 21–32)
COLOR UR: YELLOW
COLOR UR: YELLOW
CREAT SERPL-MCNC: 1.62 MG/DL (ref 0.6–1.3)
CREAT SERPL-MCNC: 1.8 MG/DL (ref 0.6–1.3)
EOSINOPHIL # BLD AUTO: 0.03 THOUSAND/ΜL (ref 0–0.61)
EOSINOPHIL NFR BLD AUTO: 0 % (ref 0–6)
ERYTHROCYTE [DISTWIDTH] IN BLOOD BY AUTOMATED COUNT: 12.9 % (ref 11.6–15.1)
EST. AVERAGE GLUCOSE BLD GHB EST-MCNC: 326 MG/DL
GFR SERPL CREATININE-BSD FRML MDRD: 23 ML/MIN/1.73SQ M
GFR SERPL CREATININE-BSD FRML MDRD: 26 ML/MIN/1.73SQ M
GLUCOSE SERPL-MCNC: 583 MG/DL (ref 65–140)
GLUCOSE SERPL-MCNC: 655 MG/DL (ref 65–140)
GLUCOSE SERPL-MCNC: >500 MG/DL (ref 65–140)
GLUCOSE UR STRIP-MCNC: ABNORMAL MG/DL
GLUCOSE UR STRIP-MCNC: ABNORMAL MG/DL
HBA1C MFR BLD: 13 %
HCT VFR BLD AUTO: 38.8 % (ref 34.8–46.1)
HGB BLD-MCNC: 14.5 G/DL (ref 11.5–15.4)
HGB UR QL STRIP.AUTO: ABNORMAL
HGB UR QL STRIP.AUTO: ABNORMAL
HYALINE CASTS #/AREA URNS LPF: ABNORMAL /LPF
IMM GRANULOCYTES # BLD AUTO: 0.15 THOUSAND/UL (ref 0–0.2)
IMM GRANULOCYTES NFR BLD AUTO: 1 % (ref 0–2)
KETONES UR STRIP-MCNC: NEGATIVE MG/DL
KETONES UR STRIP-MCNC: NEGATIVE MG/DL
LEUKOCYTE ESTERASE UR QL STRIP: ABNORMAL
LEUKOCYTE ESTERASE UR QL STRIP: ABNORMAL
LYMPHOCYTES # BLD AUTO: 2.84 THOUSANDS/ΜL (ref 0.6–4.47)
LYMPHOCYTES NFR BLD AUTO: 22 % (ref 14–44)
MAGNESIUM SERPL-MCNC: 2.4 MG/DL (ref 1.6–2.6)
MCH RBC QN AUTO: 30 PG (ref 26.8–34.3)
MCHC RBC AUTO-ENTMCNC: 37.4 G/DL (ref 31.4–37.4)
MCV RBC AUTO: 80 FL (ref 82–98)
MONOCYTES # BLD AUTO: 1.13 THOUSAND/ΜL (ref 0.17–1.22)
MONOCYTES NFR BLD AUTO: 9 % (ref 4–12)
NEUTROPHILS # BLD AUTO: 8.56 THOUSANDS/ΜL (ref 1.85–7.62)
NEUTS SEG NFR BLD AUTO: 67 % (ref 43–75)
NITRITE UR QL STRIP: NEGATIVE
NITRITE UR QL STRIP: NEGATIVE
NON-SQ EPI CELLS URNS QL MICRO: ABNORMAL /HPF
NRBC BLD AUTO-RTO: 0 /100 WBCS
PH UR STRIP.AUTO: 7.5 [PH]
PH UR STRIP.AUTO: 7.5 [PH] (ref 4.5–8)
PLATELET # BLD AUTO: 286 THOUSANDS/UL (ref 149–390)
PMV BLD AUTO: 11.1 FL (ref 8.9–12.7)
POTASSIUM SERPL-SCNC: 2.4 MMOL/L (ref 3.5–5.3)
POTASSIUM SERPL-SCNC: 2.4 MMOL/L (ref 3.5–5.3)
POTASSIUM SERPL-SCNC: 3.5 MMOL/L (ref 3.5–5.3)
PROT SERPL-MCNC: 8.4 G/DL (ref 6.4–8.2)
PROT UR STRIP-MCNC: NEGATIVE MG/DL
PROT UR STRIP-MCNC: NEGATIVE MG/DL
QRS AXIS: 5 DEGREES
QRS AXIS: 9 DEGREES
QRSD INTERVAL: 170 MS
QRSD INTERVAL: 172 MS
QT INTERVAL: 484 MS
QT INTERVAL: 534 MS
QTC INTERVAL: 529 MS
QTC INTERVAL: 580 MS
RBC # BLD AUTO: 4.84 MILLION/UL (ref 3.81–5.12)
RBC #/AREA URNS AUTO: ABNORMAL /HPF
SODIUM SERPL-SCNC: 121 MMOL/L (ref 136–145)
SODIUM SERPL-SCNC: 127 MMOL/L (ref 136–145)
SP GR UR STRIP.AUTO: 1.01 (ref 1–1.03)
SP GR UR STRIP.AUTO: 1.01 (ref 1–1.03)
T WAVE AXIS: 181 DEGREES
T WAVE AXIS: 189 DEGREES
UROBILINOGEN UR QL STRIP.AUTO: 0.2 E.U./DL
UROBILINOGEN UR QL STRIP.AUTO: 0.2 E.U./DL
VENTRICULAR RATE: 71 BPM
VENTRICULAR RATE: 72 BPM
WBC # BLD AUTO: 12.79 THOUSAND/UL (ref 4.31–10.16)
WBC #/AREA URNS AUTO: ABNORMAL /HPF

## 2021-12-28 PROCEDURE — 81001 URINALYSIS AUTO W/SCOPE: CPT | Performed by: STUDENT IN AN ORGANIZED HEALTH CARE EDUCATION/TRAINING PROGRAM

## 2021-12-28 PROCEDURE — 82010 KETONE BODYS QUAN: CPT | Performed by: EMERGENCY MEDICINE

## 2021-12-28 PROCEDURE — 82948 REAGENT STRIP/BLOOD GLUCOSE: CPT

## 2021-12-28 PROCEDURE — 99291 CRITICAL CARE FIRST HOUR: CPT | Performed by: EMERGENCY MEDICINE

## 2021-12-28 PROCEDURE — 83036 HEMOGLOBIN GLYCOSYLATED A1C: CPT | Performed by: STUDENT IN AN ORGANIZED HEALTH CARE EDUCATION/TRAINING PROGRAM

## 2021-12-28 PROCEDURE — 36415 COLL VENOUS BLD VENIPUNCTURE: CPT | Performed by: EMERGENCY MEDICINE

## 2021-12-28 PROCEDURE — 85025 COMPLETE CBC W/AUTO DIFF WBC: CPT | Performed by: EMERGENCY MEDICINE

## 2021-12-28 PROCEDURE — 83735 ASSAY OF MAGNESIUM: CPT | Performed by: EMERGENCY MEDICINE

## 2021-12-28 PROCEDURE — 96367 TX/PROPH/DG ADDL SEQ IV INF: CPT

## 2021-12-28 PROCEDURE — 93010 ELECTROCARDIOGRAM REPORT: CPT | Performed by: INTERNAL MEDICINE

## 2021-12-28 PROCEDURE — 96365 THER/PROPH/DIAG IV INF INIT: CPT

## 2021-12-28 PROCEDURE — 84132 ASSAY OF SERUM POTASSIUM: CPT | Performed by: EMERGENCY MEDICINE

## 2021-12-28 PROCEDURE — 96368 THER/DIAG CONCURRENT INF: CPT

## 2021-12-28 PROCEDURE — 99285 EMERGENCY DEPT VISIT HI MDM: CPT

## 2021-12-28 PROCEDURE — 93005 ELECTROCARDIOGRAM TRACING: CPT

## 2021-12-28 PROCEDURE — 80053 COMPREHEN METABOLIC PANEL: CPT | Performed by: EMERGENCY MEDICINE

## 2021-12-28 PROCEDURE — 80048 BASIC METABOLIC PNL TOTAL CA: CPT | Performed by: STUDENT IN AN ORGANIZED HEALTH CARE EDUCATION/TRAINING PROGRAM

## 2021-12-28 PROCEDURE — NC001 PR NO CHARGE: Performed by: INTERNAL MEDICINE

## 2021-12-28 RX ORDER — POTASSIUM CHLORIDE 20 MEQ/1
60 TABLET, EXTENDED RELEASE ORAL ONCE
Status: DISCONTINUED | OUTPATIENT
Start: 2021-12-28 | End: 2021-12-28

## 2021-12-28 RX ORDER — AMOXICILLIN 250 MG
1 CAPSULE ORAL
Status: DISCONTINUED | OUTPATIENT
Start: 2021-12-28 | End: 2022-01-04 | Stop reason: HOSPADM

## 2021-12-28 RX ORDER — ONDANSETRON 2 MG/ML
4 INJECTION INTRAMUSCULAR; INTRAVENOUS EVERY 6 HOURS PRN
Status: DISCONTINUED | OUTPATIENT
Start: 2021-12-28 | End: 2022-01-04 | Stop reason: HOSPADM

## 2021-12-28 RX ORDER — POTASSIUM CHLORIDE AND SODIUM CHLORIDE 900; 300 MG/100ML; MG/100ML
100 INJECTION, SOLUTION INTRAVENOUS CONTINUOUS
Status: DISCONTINUED | OUTPATIENT
Start: 2021-12-28 | End: 2021-12-29

## 2021-12-28 RX ORDER — POTASSIUM CHLORIDE 20MEQ/15ML
60 LIQUID (ML) ORAL ONCE
Status: COMPLETED | OUTPATIENT
Start: 2021-12-28 | End: 2021-12-28

## 2021-12-28 RX ORDER — POTASSIUM CHLORIDE 14.9 MG/ML
20 INJECTION INTRAVENOUS ONCE
Status: COMPLETED | OUTPATIENT
Start: 2021-12-28 | End: 2021-12-28

## 2021-12-28 RX ORDER — HEPARIN SODIUM 5000 [USP'U]/ML
5000 INJECTION, SOLUTION INTRAVENOUS; SUBCUTANEOUS EVERY 8 HOURS SCHEDULED
Status: DISCONTINUED | OUTPATIENT
Start: 2021-12-28 | End: 2022-01-04 | Stop reason: HOSPADM

## 2021-12-28 RX ORDER — POLYETHYLENE GLYCOL 3350 17 G/17G
17 POWDER, FOR SOLUTION ORAL DAILY PRN
Status: DISCONTINUED | OUTPATIENT
Start: 2021-12-28 | End: 2022-01-04 | Stop reason: HOSPADM

## 2021-12-28 RX ORDER — MAGNESIUM SULFATE 1 G/100ML
1 INJECTION INTRAVENOUS ONCE
Status: COMPLETED | OUTPATIENT
Start: 2021-12-28 | End: 2021-12-28

## 2021-12-28 RX ORDER — SODIUM CHLORIDE, SODIUM GLUCONATE, SODIUM ACETATE, POTASSIUM CHLORIDE, MAGNESIUM CHLORIDE, SODIUM PHOSPHATE, DIBASIC, AND POTASSIUM PHOSPHATE .53; .5; .37; .037; .03; .012; .00082 G/100ML; G/100ML; G/100ML; G/100ML; G/100ML; G/100ML; G/100ML
1000 INJECTION, SOLUTION INTRAVENOUS ONCE
Status: COMPLETED | OUTPATIENT
Start: 2021-12-28 | End: 2021-12-28

## 2021-12-28 RX ADMIN — SODIUM CHLORIDE, SODIUM GLUCONATE, SODIUM ACETATE, POTASSIUM CHLORIDE, MAGNESIUM CHLORIDE, SODIUM PHOSPHATE, DIBASIC, AND POTASSIUM PHOSPHATE 1000 ML: .53; .5; .37; .037; .03; .012; .00082 INJECTION, SOLUTION INTRAVENOUS at 13:52

## 2021-12-28 RX ADMIN — POTASSIUM CHLORIDE 20 MEQ: 14.9 INJECTION, SOLUTION INTRAVENOUS at 19:38

## 2021-12-28 RX ADMIN — POTASSIUM CHLORIDE 20 MEQ: 14.9 INJECTION, SOLUTION INTRAVENOUS at 16:06

## 2021-12-28 RX ADMIN — POTASSIUM CHLORIDE 60 MEQ: 20 SOLUTION ORAL at 17:12

## 2021-12-28 RX ADMIN — MAGNESIUM SULFATE HEPTAHYDRATE 1 G: 1 INJECTION, SOLUTION INTRAVENOUS at 14:10

## 2021-12-28 RX ADMIN — POTASSIUM CHLORIDE AND SODIUM CHLORIDE 100 ML/HR: 900; 300 INJECTION, SOLUTION INTRAVENOUS at 21:36

## 2021-12-28 RX ADMIN — HEPARIN SODIUM 5000 UNITS: 5000 INJECTION INTRAVENOUS; SUBCUTANEOUS at 21:36

## 2021-12-28 NOTE — PROGRESS NOTES
Critical care evaluation note    History obtained from review of chart and discussion with hospitalist and ER physician  Patient is a 55-year-old woman presented to the emergency room after undergoing routine outpatient lab work  Labs were significant for blood sugar of 584 and potassium of 2 4  Further workup in the emergency room showed   Creatinine 1 8, blood glucose 655, anion gap 9, beta hydroxybutyrate 0 6, wbc 12 7  EKG showed first-degree AV block with PVCs  And left bundle-branch block  Vital signs were normal in the emergency room  On exam patient appears comfortable  She is sitting up in bed  She is oriented to person  She knows that she is in the hospital but was unable to name the correct emergency room  She was not able to supply any other medical history  She had no complaints  She appeared somewhat lethargic and would fall asleep but  No significant distress  Cardiac examination is regular rate and rhythm lungs were clear to auscultation abdomen is soft and nontender lower extremities showed significant edema with some erythema    Discussed plan of care with on-call hospitalist   Patient without current critical care needs at this time  She will be admitted to the hospital for further evaluation of altered mental status, hypokalemia and hyperglycemia  Critical care team will be available if the patient requires further ICU intervention      Ailyn Coleman MD

## 2021-12-28 NOTE — ED ATTENDING ATTESTATION
Final Diagnosis:  1  Hypokalemia    2  Hyperglycemia    3  Abnormal finding on EKG    4  Type 2 diabetes mellitus with chronic kidney disease, without long-term current use of insulin, unspecified CKD stage (HCC)    5  Stage 3b chronic kidney disease ([facility] )    6  Uncomplicated senile dementia [facility])      ED Course as of 12/29/21 0926   Tue Dec 28, 2021   1341 POC Glucose(!!): >500   1554 Glucose, Random(!!): 655   1554 Potassium: 3 5  Moderately hemolyzed so discussed with resident to begin the K+ since I have high suspicion that she is far lower  She does have B-HB (likely mild DKA component) but I think her K+ is too low for me to start insulin  1555 Sodium(!): 121  Hilier Corrected to 134 which is likely okay  Hermelinda Ortega MD, saw and evaluated the patient  All available labs and X-rays were ordered by me or the resident and have been reviewed by myself  I discussed the patient with the resident / non-physician and agree with the resident's / non-physician practitioner's findings and plan as documented in the resident's / non-physician practicitioner's note, except where noted  At this point, I agree with the current assessment done in the ED  I was present during key portions of all procedures performed unless otherwise stated  Chief Complaint   Patient presents with    Hyperglycemia - no symptoms     pt is known diabetic, had blood work done today and BS was 584, [employer] called EMS  pt has no complaints per EMS "was found next to a bowl of candy in her room"    Low Potassium     blood work done today potassium of 2 4     This is a 80 y o  female presenting for evaluation of abnormal blood work as an outpatient  She has no complaints  No f/ch/n/v/cp/sob  +dementia, found with a bowl of candy by EMS  This is her baseline behavior  +diabetes       PMH:   has a past medical history of Aortic stenosis, Bilateral edema of lower extremity, Dementia ([facility] ), Diabetes ([facility] ), Fall, Gait abnormality, Hyperlipidemia, Hypertension, Hypokalemia, Other ascites, and Varicose veins of leg with edema  PSH:   has a past surgical history that includes Breast surgery and Elbow surgery  Social:  Social History     Substance and Sexual Activity   Alcohol Use No     Social History     Tobacco Use   Smoking Status Never Smoker   Smokeless Tobacco Never Used     Social History     Substance and Sexual Activity   Drug Use No     PE:  Vitals:    12/29/21 0100 12/29/21 0300 12/29/21 0500 12/29/21 0700   BP: 121/54 113/68 119/61 116/60   Pulse: 66 78 70 66   Resp: 18  18    Temp:       TempSrc:       SpO2: 96% 95% 96%    General: VS reviewed  Appears like elderly, cachectic female  Sleeping until I woke her up  Speaking normally in full sentences  Head: Normocephalic, atraumatic, nontender  Eyes: PERRL, EOM-I  No diplopia  No hyphema  No subconjunctival hemorrhages  Symmetrical lids  ENT: Atraumatic external nose and ears  Mildly dry MM  No malocclusion  No stridor  Normal phonation  No drooling  Normal swallowing  Neck: Symmetric, trachea midline  No JVD  CV: RRR  +S1/S2  No murmurs or gallops  Peripheral pulses +2 throughout  No chest wall tenderness  Lungs:   Unlabored No retractions  CTAB, lungs sounds equal bilateral    No tachypnea  Abd: +BS, soft, NT/ND    MSK:   FROM   Back:   No rashes  Skin: Dry, intact  Neuro: AAOx3, GCS 15, CN II-XII grossly intact  Motor grossly intact  Psychiatric/Behavioral: Appropriate mood and affect   Exam: deferred  A:  - Hyperglycemia  P:  - Labs  - Fluids  - DKA r/o  - I did a series of EKGs in the room but all of them have artifact  There's some weird rhythm going on  It looks like sinus in some areas but then will have an irregular component, with no definite P-waves  Non of it is tachycardic  - 13 point ROS was performed and all are normal unless stated in the history above  - Nursing note reviewed  Vitals reviewed     - Orders placed by myself and/or advanced practitioner / resident     - Previous chart was reviewed  - No language barrier    - History obtained from patient  - There are no limitations to the history obtained  - Critical care time: 33 minutes  - Critical care time was exclusive of seperately bilable procedures and treating other patients as well as teaching time     - Critical care was necessary to treat or prevent imminent or life-threatening deterioration of the following condition: hypokalemia / EKG abnormality   - Critical care time was spent personally by me on the following activities as well as the above as per the ED course and rest of chart: blood draw for specimens, obtaining history from patient / surrogate, developement of a treatment plan, discussions with consultants, evaluation of patient's response to the treatment, examination of the patient, ordering/performing treatements and interventions, re-evaluation of the patient's condition, review of old charts, ordering/reviewing laboratory studies, ordering/reviewing of radiographic studies    Code Status: Level 3 - DNAR and DNI  Advance Directive and Living Will: Yes    Power of :    POLST:      Medications   ondansetron (ZOFRAN) injection 4 mg (has no administration in time range)   heparin (porcine) subcutaneous injection 5,000 Units (5,000 Units Subcutaneous Given 12/29/21 0530)   sodium chloride 0 9 % with KCl 40 mEq/L infusion (premix) (100 mL/hr Intravenous New Bag 12/28/21 2136)   senna-docusate sodium (SENOKOT S) 8 6-50 mg per tablet 1 tablet (0 tablets Oral Hold 12/28/21 2125)   polyethylene glycol (MIRALAX) packet 17 g (has no administration in time range)   potassium chloride 20 mEq IVPB (premix) (has no administration in time range)   multi-electrolyte (ISOLYTE-S PH 7 4) bolus 1,000 mL (0 mL Intravenous Stopped 12/28/21 1514)   magnesium sulfate IVPB (premix) SOLN 1 g (0 g Intravenous Stopped 12/28/21 1510)   potassium chloride 20 mEq IVPB (premix) (0 mEq Intravenous Stopped 12/28/21 2138)     Followed by   potassium chloride 20 mEq IVPB (premix) (0 mEq Intravenous Stopped 12/28/21 1806)   potassium chloride oral solution 60 mEq (60 mEq Oral Given 12/28/21 1712)   potassium chloride oral solution 40 mEq (40 mEq Oral Given 12/29/21 0205)     And   potassium chloride oral solution 40 mEq (40 mEq Oral Given 12/29/21 0530)     No orders to display     Orders Placed This Encounter   Procedures    COVID/FLU/RSV    CBC and differential    Comprehensive metabolic panel    Beta Hydroxybutyrate    Magnesium    Potassium    Basic metabolic panel    Platelet count    Urinalysis with microscopic    Urine Microscopic    BMP Q8 hours X 3 (Hyponatremia monitoring)    Hemoglobin A1C w/ EAG Estimation    Urinalysis with microscopic    Sodium, urine, random    Creatinine, urine, random    Chloride, urine, random    Potassium, urine, random    Osmolality, urine    Vitamin B12    CBC and differential    Diet NPO; Sips with meds    Insert peripheral IV    Urine dip analyzer    Nursing communication Continue IV as ordered     [facility] [place]ero Notify admitting physician    Notify admitting physician on arrival    24 Hour Telemetry Monitoring    Vital Signs per unit routine    Daily weights    I/O    Insert peripheral IV    Maintain IV access    Apply SCD or Foot pumps    Level 3 - DNAR (Do Not Attempt Resuscitation) and DNI (Do Not Intubate)    Inpatient consult to Endocrinology    Inpatient consult to Nephrology    Inpatient consult to Case Management    Inpatient consult to Gerontology for BE/AL Campuses    OT eval and treat    PT eval and treat    EKG RESULTS    ECG 12 lead    ECG 12 lead    Inpatient Admission    Update level of care     Labs Reviewed   CBC AND DIFFERENTIAL - Abnormal       Result Value Ref Range Status    WBC 12 79 (*) 4 31 - 10 16 Thousand/uL Final    RBC 4 84  3 81 - 5 12 Million/uL Final    Hemoglobin 14 5  11 5 - 15 4 g/dL Final    Hematocrit 38 8  34 8 - 46 1 % Final    MCV 80 (*) 82 - 98 fL Final    MCH 30 0  26 8 - 34 3 pg Final    MCHC 37 4  31 4 - 37 4 g/dL Final    RDW 12 9  11 6 - 15 1 % Final    MPV 11 1  8 9 - 12 7 fL Final    Platelets 717  302 - 390 Thousands/uL Final    nRBC 0  /100 WBCs Final    Neutrophils Relative 67  43 - 75 % Final    Immat GRANS % 1  0 - 2 % Final    Lymphocytes Relative 22  14 - 44 % Final    Monocytes Relative 9  4 - 12 % Final    Eosinophils Relative 0  0 - 6 % Final    Basophils Relative 1  0 - 1 % Final    Neutrophils Absolute 8 56 (*) 1 85 - 7 62 Thousands/µL Final    Immature Grans Absolute 0 15  0 00 - 0 20 Thousand/uL Final    Lymphocytes Absolute 2 84  0 60 - 4 47 Thousands/µL Final    Monocytes Absolute 1 13  0 17 - 1 22 Thousand/µL Final    Eosinophils Absolute 0 03  0 00 - 0 61 Thousand/µL Final    Basophils Absolute 0 08  0 00 - 0 10 Thousands/µL Final   COMPREHENSIVE METABOLIC PANEL - Abnormal    Sodium 121 (*) 136 - 145 mmol/L Final    Potassium 3 5  3 5 - 5 3 mmol/L Final    Comment: Moderately Hemolyzed; Results May be Affected    Chloride 80 (*) 100 - 108 mmol/L Final    CO2 32  21 - 32 mmol/L Final    ANION GAP 9  4 - 13 mmol/L Final    BUN 33 (*) 5 - 25 mg/dL Final    Creatinine 1 80 (*) 0 60 - 1 30 mg/dL Final    Comment: Standardized to IDMS reference method    Glucose 655 (*) 65 - 140 mg/dL Final    Comment: If the patient is fasting, the ADA then defines impaired fasting glucose as > 100 mg/dL and diabetes as > or equal to 123 mg/dL  Specimen collection should occur prior to Sulfasalazine administration due to the potential for falsely depressed results  Specimen collection should occur prior to Sulfapyridine administration due to the potential for falsely elevated results  Calcium 10 1  8 3 - 10 1 mg/dL Final    Corrected Calcium 10 7 (*) 8 3 - 10 1 mg/dL Final    AST 69 (*) 5 - 45 U/L Final    Comment: Moderately Hemolyzed;  Results May be Affected  Specimen collection should occur prior to Sulfasalazine administration due to the potential for falsely depressed results  ALT 28  12 - 78 U/L Final    Comment: Specimen collection should occur prior to Sulfasalazine and/or Sulfapyridine administration due to the potential for falsely depressed results  Alkaline Phosphatase 156 (*) 46 - 116 U/L Final    Total Protein 8 4 (*) 6 4 - 8 2 g/dL Final    Albumin 3 3 (*) 3 5 - 5 0 g/dL Final    Total Bilirubin 0 73  0 20 - 1 00 mg/dL Final    Comment: Use of this assay is not recommended for patients undergoing treatment with eltrombopag due to the potential for falsely elevated results  eGFR 23  ml/min/1 73sq m Final    Narrative:     National [employer] guidelines for Chronic Kidney Disease (CKD):     Stage 1 with normal or high GFR (GFR > 90 mL/min/1 73 square meters)    Stage 2 Mild CKD (GFR = 60-89 mL/min/1 73 square meters)    Stage 3A Moderate CKD (GFR = 45-59 mL/min/1 73 square meters)    Stage 3B Moderate CKD (GFR = 30-44 mL/min/1 73 square meters)    Stage 4 Severe CKD (GFR = 15-29 mL/min/1 73 square meters)    Stage 5 End Stage CKD (GFR <15 mL/min/1 73 square meters)  Note: GFR calculation is accurate only with a steady state creatinine   BETA HYDROXYBUTYRATE - Abnormal    BETA-HYDROXYBUTYRATE 0 6 (*) <0 6 mmol/L Final   POTASSIUM - Abnormal    Potassium 2 4 (*) 3 5 - 5 3 mmol/L Final   BASIC METABOLIC PANEL - Abnormal    Sodium 127 (*) 136 - 145 mmol/L Final    Potassium 2 4 (*) 3 5 - 5 3 mmol/L Final    Chloride 86 (*) 100 - 108 mmol/L Final    CO2 30  21 - 32 mmol/L Final    ANION GAP 11  4 - 13 mmol/L Final    BUN 29 (*) 5 - 25 mg/dL Final    Creatinine 1 62 (*) 0 60 - 1 30 mg/dL Final    Comment: Standardized to IDMS reference method    Glucose 583 (*) 65 - 140 mg/dL Final    Comment: If the patient is fasting, the ADA then defines impaired fasting glucose as > 100 mg/dL and diabetes as > or equal to 123 mg/dL    Specimen collection should occur prior to Sulfasalazine administration due to the potential for falsely depressed results  Specimen collection should occur prior to Sulfapyridine administration due to the potential for falsely elevated results  Calcium 9 2  8 3 - 10 1 mg/dL Final    eGFR 26  ml/min/1 73sq m Final    Narrative:     [place] guidelines for Chronic Kidney Disease (CKD):     Stage 1 with normal or high GFR (GFR > 90 mL/min/1 73 square meters)    Stage 2 Mild CKD (GFR = 60-89 mL/min/1 73 square meters)    Stage 3A Moderate CKD (GFR = 45-59 mL/min/1 73 square meters)    Stage 3B Moderate CKD (GFR = 30-44 mL/min/1 73 square meters)    Stage 4 Severe CKD (GFR = 15-29 mL/min/1 73 square meters)    Stage 5 End Stage CKD (GFR <15 mL/min/1 73 square meters)  Note: GFR calculation is accurate only with a steady state creatinine   URINALYSIS WITH MICROSCOPIC - Abnormal    Clarity, UA Clear   Final    Color, UA Yellow   Final    Specific [place], UA 1 014  1 003 - 1 030 Final    pH, UA 7 5  4 5, 5 0, 5 5, 6 0, 6 5, 7 0, 7 5, 8 0 Final    Glucose, UA >=1000 (1%) (*) Negative mg/dl Final    Ketones, UA Negative  Negative mg/dl Final    Blood, UA Trace (*) Negative Final    Protein, UA Negative  Negative mg/dl Final    Nitrite, UA Negative  Negative Final    Bilirubin, UA Negative  Negative Final    Urobilinogen, UA 0 2  0 2, 1 0 E U /dl E U /dl Final    Leukocytes, UA Trace (*) Negative Final    WBC, UA 2-4  None Seen, 2-4, 5-60 /hpf Final    RBC, UA None Seen  None Seen, 2-4 /hpf Final    Hyaline Casts, UA None Seen  None Seen /lpf Final    Bacteria, UA Occasional  None Seen, Occasional /hpf Final    Epithelial Cells None Seen  None Seen, Occasional /hpf Final   HEMOGLOBIN A1C - Abnormal    Hemoglobin A1C 13 0 (*) Normal 3 8-5 6%; PreDiabetic 5 7-6 4%;  Diabetic >=6 5%; Glycemic control for adults with diabetes <7 0% % Final      mg/dl Final   BASIC METABOLIC PANEL - Abnormal    Sodium 130 (*) 136 - 145 mmol/L Final    Potassium 2 1 (*) 3 5 - 5 3 mmol/L Final    Chloride 90 (*) 100 - 108 mmol/L Final    CO2 32  21 - 32 mmol/L Final    ANION GAP 8  4 - 13 mmol/L Final    BUN 25  5 - 25 mg/dL Final    Creatinine 1 47 (*) 0 60 - 1 30 mg/dL Final    Comment: Standardized to IDMS reference method    Glucose 424 (*) 65 - 140 mg/dL Final    Comment: If the patient is fasting, the ADA then defines impaired fasting glucose as > 100 mg/dL and diabetes as > or equal to 123 mg/dL  Specimen collection should occur prior to Sulfasalazine administration due to the potential for falsely depressed results  Specimen collection should occur prior to Sulfapyridine administration due to the potential for falsely elevated results  Calcium 9 3  8 3 - 10 1 mg/dL Final    eGFR 29  ml/min/1 73sq m Final    Narrative:     [place] guidelines for Chronic Kidney Disease (CKD):     Stage 1 with normal or high GFR (GFR > 90 mL/min/1 73 square meters)    Stage 2 Mild CKD (GFR = 60-89 mL/min/1 73 square meters)    Stage 3A Moderate CKD (GFR = 45-59 mL/min/1 73 square meters)    Stage 3B Moderate CKD (GFR = 30-44 mL/min/1 73 square meters)    Stage 4 Severe CKD (GFR = 15-29 mL/min/1 73 square meters)    Stage 5 End Stage CKD (GFR <15 mL/min/1 73 square meters)  Note: GFR calculation is accurate only with a steady state creatinine   BASIC METABOLIC PANEL - Abnormal    Sodium 135 (*) 136 - 145 mmol/L Final    Potassium 2 6 (*) 3 5 - 5 3 mmol/L Final    Chloride 99 (*) 100 - 108 mmol/L Final    CO2 28  21 - 32 mmol/L Final    ANION GAP 8  4 - 13 mmol/L Final    BUN 21  5 - 25 mg/dL Final    Creatinine 1 19  0 60 - 1 30 mg/dL Final    Comment: Standardized to IDMS reference method    Glucose 318 (*) 65 - 140 mg/dL Final    Comment: If the patient is fasting, the ADA then defines impaired fasting glucose as > 100 mg/dL and diabetes as > or equal to 123 mg/dL    Specimen collection should occur prior to Sulfasalazine administration due to the potential for falsely depressed results  Specimen collection should occur prior to Sulfapyridine administration due to the potential for falsely elevated results      Calcium 8 9  8 3 - 10 1 mg/dL Final    eGFR 38  ml/min/1 73sq m Final    Narrative:     [place] guidelines for Chronic Kidney Disease (CKD):     Stage 1 with normal or high GFR (GFR > 90 mL/min/1 73 square meters)    Stage 2 Mild CKD (GFR = 60-89 mL/min/1 73 square meters)    Stage 3A Moderate CKD (GFR = 45-59 mL/min/1 73 square meters)    Stage 3B Moderate CKD (GFR = 30-44 mL/min/1 73 square meters)    Stage 4 Severe CKD (GFR = 15-29 mL/min/1 73 square meters)    Stage 5 End Stage CKD (GFR <15 mL/min/1 73 square meters)  Note: GFR calculation is accurate only with a steady state creatinine   CBC AND DIFFERENTIAL - Abnormal    WBC 10 50 (*) 4 31 - 10 16 Thousand/uL Final    RBC 4 62  3 81 - 5 12 Million/uL Final    Hemoglobin 13 7  11 5 - 15 4 g/dL Final    Hematocrit 39 1  34 8 - 46 1 % Final    MCV 85  82 - 98 fL Final    MCH 29 7  26 8 - 34 3 pg Final    MCHC 35 0  31 4 - 37 4 g/dL Final    RDW 13 0  11 6 - 15 1 % Final    MPV 10 8  8 9 - 12 7 fL Final    Platelets 158  263 - 390 Thousands/uL Final    nRBC 0  /100 WBCs Final    Neutrophils Relative 65  43 - 75 % Final    Immat GRANS % 1  0 - 2 % Final    Lymphocytes Relative 23  14 - 44 % Final    Monocytes Relative 9  4 - 12 % Final    Eosinophils Relative 1  0 - 6 % Final    Basophils Relative 1  0 - 1 % Final    Neutrophils Absolute 6 91  1 85 - 7 62 Thousands/µL Final    Immature Grans Absolute 0 09  0 00 - 0 20 Thousand/uL Final    Lymphocytes Absolute 2 46  0 60 - 4 47 Thousands/µL Final    Monocytes Absolute 0 90  0 17 - 1 22 Thousand/µL Final    Eosinophils Absolute 0 08  0 00 - 0 61 Thousand/µL Final    Basophils Absolute 0 06  0 00 - 0 10 Thousands/µL Final   POCT GLUCOSE - Abnormal    POC Glucose >500 (*) 65 - 140 mg/dl Final    Comment: CRITICAL VALUE NOTED   URINE MACROSCOPIC, POC - Abnormal    Color, UA Yellow   Final    Clarity, UA Clear   Final    pH, UA 7 5  4 5 - 8 0 Final    Leukocytes, UA Trace (*) Negative Final    Nitrite, UA Negative  Negative Final    Protein, UA Negative  Negative mg/dl Final    Glucose,  (1/2%) (*) Negative mg/dl Final    Ketones, UA Negative  Negative mg/dl Final    Urobilinogen, UA 0 2  0 2, 1 0 E U /dl E U /dl Final    Bilirubin, UA Negative  Negative Final    Blood, UA Trace (*) Negative Final    Specific [place], UA 1 015  1 003 - 1 030 Final    Narrative:     CLINITEK RESULT   POCT GLUCOSE - Abnormal    POC Glucose 366 (*) 65 - 140 mg/dl Final   MAGNESIUM - Normal    Magnesium 2 4  1 6 - 2 6 mg/dL Final    Comment: Moderately Hemolyzed;  Results May be Affected   COVID19, INFLUENZA A/B, RSV PCR, [facility]   URINE MICROSCOPIC   PLATELET COUNT   URINALYSIS WITH MICROSCOPIC   SODIUM, URINE, RANDOM   CREATININE, URINE, RANDOM   CHLORIDE, URINE, RANDOM   POTASSIUM, URINE, RANDOM   OSMOLALITY, URINE   VITAMIN B12     Time reflects when diagnosis was documented in both MDM as applicable and the Disposition within this note     Time User Action Codes Description Comment    12/28/2021  4:19 PM Johnna Donis Add [E87 6] Hypokalemia     12/28/2021  4:19 PM Johnna Donis Add [R73 9] Hyperglycemia     12/28/2021  4:19 PM Johnna Donis Add [R94 31] Abnormal finding on EKG     12/28/2021  5:47 PM Katharina Showers Add [E11 22] Type 2 diabetes mellitus with chronic kidney disease, without long-term current use of insulin, unspecified CKD stage ([facility] [address] )     12/28/2021  5:47 PM Katharina Showers Add [N18 32] Stage 3b chronic kidney disease ([facility] [address] )     12/28/2021  7:15 PM Katharina Showers Add [Z55 22] Uncomplicated senile dementia [facility])       ED Disposition     ED Disposition Condition Date/Time Comment    Admit Stable Tue Dec 28, 2021  5:27 PM Case was discussed with SOD and the patient's admission status was agreed to be Admission Status: inpatient status to the service of Dr Kami Vega  Follow-up Information    None       Patient's Medications   Discharge Prescriptions    No medications on file     No discharge procedures on file  Prior to Admission Medications   Prescriptions Last Dose Informant Patient Reported? Taking? Diclofenac Sodium (VOLTAREN) 1 % More than a month at Unknown time  No No   Sig: Apply 2 g topically to affected area 4 times daily at 9a-1p-5p-9p   Potassium Chloride ER 20 MEQ TBCR 12/28/2021 at Unknown time  No Yes   Sig: Take 3 tablets (60 mEq total) by mouth 3 (three) times a day   acetaminophen (TYLENOL) 325 mg tablet More than a month at Unknown time Other (Specify) Yes No   Sig: Take 650 mg by mouth every 6 (six) hours as needed for mild pain   donepezil (ARICEPT) 10 mg tablet 12/28/2021 at Unknown time  No Yes   Sig: Take 1 tablet (10 mg total) by mouth daily   furosemide (LASIX) 40 mg tablet 12/28/2021 at Unknown time  No Yes   Sig: Take 1 tablet (40 mg total) by mouth daily   glipiZIDE (GLUCOTROL XL) 10 mg 24 hr tablet 12/28/2021 at Unknown time  No Yes   Sig: Take 1 tablet (10 mg total) by mouth daily   magnesium oxide (MAG-OX) 400 mg 12/28/2021 at Unknown time  No Yes   Sig: Take 1 tablet (400 mg total) by mouth daily   metolazone (ZAROXOLYN) 5 mg tablet 12/28/2021 at Unknown time  No Yes   Sig: Take 1 tablet (5 mg total) by mouth daily   repaglinide (PRANDIN) 2 mg tablet 12/28/2021 at Unknown time  No Yes   Sig: Take 1 tablet (2 mg total) by mouth 3 (three) times a day before meals   sitaGLIPtin (JANUVIA) 100 mg tablet 12/28/2021 at Unknown time  No Yes   Sig: Take 1 tablet (100 mg total) by mouth daily      Facility-Administered Medications: None       Portions of the record may have been created with voice recognition software  Occasional wrong word or "sound a like" substitutions may have occurred due to the inherent limitations of voice recognition software   Read the chart carefully and recognize, using context, where substitutions have occurred      Electronically signed by:  Cayetano Connolly

## 2021-12-28 NOTE — ED PROVIDER NOTES
History  Chief Complaint   Patient presents with    Hyperglycemia - no symptoms     pt is known diabetic, had blood work done today and BS was 584, [facility] called EMS  pt has no complaints per EMS "was found next to a bowl of candy in her room"    Low Potassium     blood work done today potassium of 2 4     Pt is a 81yo F who presents for hyperglycemia and hypokalemia  Per EMS, patient had routine blood work done today that found her to be hyperglycemic and hypokalemic  Patient was found by EMS with large bowl of candy  Patient has no complaints at this time  Patient denies any pain, nausea, vomiting, shortness of breath  Patient states she feels at her baseline  Patient does have history of dementia but is oriented to person and place and appears to be answering questions appropriately  Patient states she takes her medications regularly  Prior to Admission Medications   Prescriptions Last Dose Informant Patient Reported? Taking?    Diclofenac Sodium (VOLTAREN) 1 % More than a month at Unknown time  No No   Sig: Apply 2 g topically to affected area 4 times daily at 9a-1p-5p-9p   Potassium Chloride ER 20 MEQ TBCR 12/28/2021 at Unknown time  No Yes   Sig: Take 3 tablets (60 mEq total) by mouth 3 (three) times a day   acetaminophen (TYLENOL) 325 mg tablet More than a month at Unknown time Other (Specify) Yes No   Sig: Take 650 mg by mouth every 6 (six) hours as needed for mild pain   donepezil (ARICEPT) 10 mg tablet 12/28/2021 at Unknown time  No Yes   Sig: Take 1 tablet (10 mg total) by mouth daily   furosemide (LASIX) 40 mg tablet 12/28/2021 at Unknown time  No Yes   Sig: Take 1 tablet (40 mg total) by mouth daily   glipiZIDE (GLUCOTROL XL) 10 mg 24 hr tablet 12/28/2021 at Unknown time  No Yes   Sig: Take 1 tablet (10 mg total) by mouth daily   magnesium oxide (MAG-OX) 400 mg 12/28/2021 at Unknown time  No Yes   Sig: Take 1 tablet (400 mg total) by mouth daily   metolazone (ZAROXOLYN) 5 mg tablet 12/28/2021 at Unknown time  No Yes   Sig: Take 1 tablet (5 mg total) by mouth daily   repaglinide (PRANDIN) 2 mg tablet 12/28/2021 at Unknown time  No Yes   Sig: Take 1 tablet (2 mg total) by mouth 3 (three) times a day before meals   sitaGLIPtin (JANUVIA) 100 mg tablet 12/28/2021 at Unknown time  No Yes   Sig: Take 1 tablet (100 mg total) by mouth daily      Facility-Administered Medications: None       Past Medical History:   Diagnosis Date    Aortic stenosis     Bilateral edema of lower extremity     D[facility]     Diabetes [facility] [facility]     Fall     Gait abnormality     Hyperlipidemia     Hypertension     Hypokalemia     Other ascites     Varicose veins of leg with edema        Past Surgical History:   Procedure Laterality Date    BREAST SURGERY      ELBOW SURGERY         Family History   Problem Relation Age of Onset    Dementia Sister      I have reviewed and agree with the history as documented  E-Cigarette/Vaping    E-Cigarette Use Never User      E-Cigarette/Vaping Substances     Social History     Tobacco Use    Smoking status: Never Smoker    Smokeless tobacco: Never Used   Vaping Use    Vaping Use: Never used   Substance Use Topics    Alcohol use: No    Drug use: No        Review of Systems   Twelve point review of systems negative, per patient  Physical Exam  ED Triage Vitals [12/28/21 1332]   Temperature Pulse Respirations Blood Pressure SpO2   97 8 °F (36 6 °C) 68 18 (!) 136/47 98 %      Temp Source Heart Rate Source Patient Position - Orthostatic VS BP Location FiO2 (%)   Oral Monitor Lying Left arm --      Pain Score       --             Orthostatic Vital Signs  Vitals:    12/28/21 1332 12/28/21 1645 12/28/21 1752   BP: (!) 136/47 106/60 113/71   Pulse: 68 60 77   Patient Position - Orthostatic VS: Lying  Lying       Physical Exam  Vitals reviewed  Constitutional:       General: She is not in acute distress  Appearance: Normal appearance  She is well-developed   She is not toxic-appearing or diaphoretic  HENT:      Head: Normocephalic and atraumatic  Right Ear: External ear normal       Left Ear: External ear normal       Nose: Nose normal       Mouth/Throat:      Mouth: Mucous membranes are dry  Pharynx: Oropharynx is clear  Eyes:      Extraocular Movements: Extraocular movements intact  Pupils: Pupils are equal, round, and reactive to light  Cardiovascular:      Rate and Rhythm: Normal rate  Heart sounds: Normal heart sounds  No murmur heard  Pulmonary:      Effort: Pulmonary effort is normal  No respiratory distress  Breath sounds: Normal breath sounds  Abdominal:      General: There is no distension  Palpations: Abdomen is soft  Tenderness: There is no abdominal tenderness  Musculoskeletal:         General: Normal range of motion  Cervical back: Neck supple  Right lower leg: Edema (1+ pitting to mid-calf; symmetric) present  Left lower leg: Edema (1+ pitting to mid-calf; symmetric) present  Skin:     General: Skin is warm and dry  Capillary Refill: Capillary refill takes less than 2 seconds  Coloration: Skin is not pale  Neurological:      Mental Status: She is alert  Comments: Oriented to person and place but not to time  Pt following commands and moving all extremities  No focal deficits noted  Psychiatric:         Speech: Speech normal          Behavior: Behavior is cooperative           ED Medications  Medications   potassium chloride 20 mEq IVPB (premix) (20 mEq Intravenous New Bag 12/28/21 1938)     Followed by   potassium chloride 20 mEq IVPB (premix) (0 mEq Intravenous Stopped 12/28/21 1806)   ondansetron (ZOFRAN) injection 4 mg (has no administration in time range)   heparin (porcine) subcutaneous injection 5,000 Units (has no administration in time range)   sodium chloride 0 9 % with KCl 40 mEq/L infusion (premix) (has no administration in time range)   multi-electrolyte (ISOLYTE-S PH 7 4) bolus 1,000 mL (0 mL Intravenous Stopped 12/28/21 1514)   magnesium sulfate IVPB (premix) SOLN 1 g (0 g Intravenous Stopped 12/28/21 1510)   potassium chloride oral solution 60 mEq (60 mEq Oral Given 12/28/21 1712)       Diagnostic Studies  Results Reviewed     Procedure Component Value Units Date/Time    Basic metabolic panel [[id number]]  (Abnormal) Collected: 12/28/21 1759    Lab Status: Final result Specimen: Blood from Arm, Left Updated: 12/28/21 1916     Sodium 127 mmol/L      Potassium 2 4 mmol/L      Chloride 86 mmol/L      CO2 30 mmol/L      ANION GAP 11 mmol/L      BUN 29 mg/dL      Creatinine 1 62 mg/dL      Glucose 583 mg/dL      Calcium 9 2 mg/dL      eGFR 26 ml/min/1 73sq m     Narrative:      [place] guidelines for Chronic Kidney Disease (CKD):     Stage 1 with normal or high GFR (GFR > 90 mL/min/1 73 square meters)    Stage 2 Mild CKD (GFR = 60-89 mL/min/1 73 square meters)    Stage 3A Moderate CKD (GFR = 45-59 mL/min/1 73 square meters)    Stage 3B Moderate CKD (GFR = 30-44 mL/min/1 73 square meters)    Stage 4 Severe CKD (GFR = 15-29 mL/min/1 73 square meters)    Stage 5 End Stage CKD (GFR <15 mL/min/1 73 square meters)  Note: GFR calculation is accurate only with a steady state creatinine    Hemoglobin A1C w/ EAG Estimation [[id number]]     Lab Status: No result Specimen: Blood     Platelet count [[id number]]     Lab Status: No result Specimen: Blood     Urinalysis with microscopic [[id number]]  (Abnormal) Collected: 12/28/21 1800    Lab Status: Final result Specimen: Urine, Clean Catch Updated: 12/28/21 1837     Clarity, UA Clear     Color, UA Yellow     Specific Gravity, UA 1 014     pH, UA 7 5     Glucose, UA >=1000 (1%) mg/dl      Ketones, UA Negative mg/dl      Blood, UA Trace     Protein, UA Negative mg/dl      Nitrite, UA Negative     Bilirubin, UA Negative     Urobilinogen, UA 0 2 E U /dl      Leukocytes, UA Trace     WBC, UA 2-4 /hpf RBC, UA None Seen /hpf      Hyaline Casts, UA None Seen /lpf      Bacteria, UA Occasional /hpf      Epithelial Cells None Seen /hpf     Urine Macroscopic, POC [[id number]]  (Abnormal) Collected: 12/28/21 1804    Lab Status: Final result Specimen: Urine Updated: 12/28/21 1806     Color, UA Yellow     Clarity, UA Clear     pH, UA 7 5     Leukocytes, UA Trace     Nitrite, UA Negative     Protein, UA Negative mg/dl      Glucose,  (1/2%) mg/dl      Ketones, UA Negative mg/dl      Urobilinogen, UA 0 2 E U /dl      Bilirubin, UA Negative     Blood, UA Trace     Specific [place], UA 1 015    Narrative:      CLINITEK RESULT    Urine Microscopic [[id number]] Collected: 12/28/21 1804    Lab Status: No result Specimen: Urine, Clean Catch     Potassium [[id number]]  (Abnormal) Collected: 12/28/21 1527    Lab Status: Final result Specimen: Blood from Arm, Left Updated: 12/28/21 1600     Potassium 2 4 mmol/L     Comprehensive metabolic panel [[id number]]  (Abnormal) Collected: 12/28/21 1347    Lab Status: Final result Specimen: Blood from Arm, Right Updated: 12/28/21 1502     Sodium 121 mmol/L      Potassium 3 5 mmol/L      Chloride 80 mmol/L      CO2 32 mmol/L      ANION GAP 9 mmol/L      BUN 33 mg/dL      Creatinine 1 80 mg/dL      Glucose 655 mg/dL      Calcium 10 1 mg/dL      Corrected Calcium 10 7 mg/dL      AST 69 U/L      ALT 28 U/L      Alkaline Phosphatase 156 U/L      Total Protein 8 4 g/dL      Albumin 3 3 g/dL      Total Bilirubin 0 73 mg/dL      eGFR 23 ml/min/1 73sq m     Narrative:      National Kid[employer] Disease Foundation guidelines for Chronic Kidney Disease (CKD):     Stage 1 with normal or high GFR (GFR > 90 mL/min/1 73 square meters)    Stage 2 Mild CKD (GFR = 60-89 mL/min/1 73 square meters)    Stage 3A Moderate CKD (GFR = 45-59 mL/min/1 73 square meters)    Stage 3B Moderate CKD (GFR = 30-44 mL/min/1 73 square meters)    Stage 4 Severe CKD (GFR = 15-29 mL/min/1 73 square meters)    Stage 5 End Stage CKD (GFR <15 mL/min/1 73 square meters)  Note: GFR calculation is accurate only with a steady state creatinine    Magnesium [[id number]]  (Normal) Collected: 12/28/21 1347    Lab Status: Final result Specimen: Blood from Arm, Right Updated: 12/28/21 1458     Magnesium 2 4 mg/dL     CBC and differential [[id number]]  (Abnormal) Collected: 12/28/21 1347    Lab Status: Final result Specimen: Blood from Arm, Right Updated: 12/28/21 1410     WBC 12 79 Thousand/uL      RBC 4 84 Million/uL      Hemoglobin 14 5 g/dL      Hematocrit 38 8 %      MCV 80 fL      MCH 30 0 pg      MCHC 37 4 g/dL      RDW 12 9 %      MPV 11 1 fL      Platelets 830 Thousands/uL      nRBC 0 /100 WBCs      Neutrophils Relative 67 %      Immat GRANS % 1 %      Lymphocytes Relative 22 %      Monocytes Relative 9 %      Eosinophils Relative 0 %      Basophils Relative 1 %      Neutrophils Absolute 8 56 Thousands/µL      Immature Grans Absolute 0 15 Thousand/uL      Lymphocytes Absolute 2 84 Thousands/µL      Monocytes Absolute 1 13 Thousand/µL      Eosinophils Absolute 0 03 Thousand/µL      Basophils Absolute 0 08 Thousands/µL     Beta Hydroxybutyrate [[id number]]  (Abnormal) Collected: 12/28/21 1347    Lab Status: Final result Specimen: Blood from Arm, Right Updated: 12/28/21 1409     BETA-HYDROXYBUTYRATE 0 6 mmol/L     Fingerstick Glucose (POCT) [[id number]]  (Abnormal) Collected: 12/28/21 1336    Lab Status: Final result Updated: 12/28/21 1338     POC Glucose >500 mg/dl                  No orders to display         Procedures  Procedures      ED Course  ED Course as of 12/28/21 1947   Tue Dec 28, 2021   1340 POC Glucose(!!): >500  Elevated   Consistent with CC     1404 Procedure Note: EKG  Date/Time: 12/28/21 2:04 PM   Interpreted by: Cheryl Pisano MD  Indications / Diagnosis: Electrolyte abnormalities  ECG reviewed by me, the ED Physician: yes   The EKG demonstrates:  Rhythm: irregular rhythm  Intervals: prolonged QTc  Axis: normal axis  QRS/Blocks: widened QRS with LBBB  ST Changes: No acute ST Changes, no STD/YOANA  Difficult to interpret due to baseline  Afib possibly replacing normal sinus rhythm  Will plan for repeat following repletion  1411 WBC(!): 12 79  Slight leukocytosis  Non-diagnostic  1411 CBC and differential(!)  Reviewed and without actionable derangement  1411 BETA-HYDROXYBUTYRATE(!): 0 6  Slight elevation  Awaiting CMP  1458 Magnesium: 2 4  WNL   1503 Sodium(!): 121  Corrects to 130     1504 Potassium: 3 5  Moderately hemolyzed  Will plan for initial repletion but will repeat lab    1504 Creatinine(!): 1 80  Elevated from baseline  Fluids already in progress  1504 Anion Gap: 9  No evidence of gap making DKA less likely  1601 Potassium(!!): 2 4  Correction in process  1612 Pt updated on results and plan for admission  Pt agreeable  56 SOD contacted for admission  1704 SOD requesting pt run past CC due to hyperglycemia with ketones present  CC contacted  26 CC stated okay for medicine admission as hemodynamically stable  SBIRT 20yo+      Most Recent Value   SBIRT (22 yo +)    In order to provide better care to our patients, we are screening all of our patients for alcohol and drug use  Would it be okay to ask you these screening questions? Unable to answer at this time Filed at: 12/28/2021 1355                MDM  Number of Diagnoses or Management Options  Abnormal finding on EKG  Hyperglycemia  Hypokalemia  Diagnosis management comments: Pt is a 81yo F who presents with hyperglycemia and hypokalemia without complaints  Exam pertinent for dry mucous membranes and symmetric bilateral lower extremity edema  Differential diagnosis to include but not limited to hyperglycemia, DKA, HHS, electrolyte abnormality  Will plan for repeat labs prior to repletion  Patient's fingerstick greater than 500  Will plan for DKA labs as well including beta hydroxybutyrate and UA    Patient asymptomatic at this time and not requiring any symptomatic treatment  Labwork showed slight elevation in beta hydroxybutyrate with hyperglycemia of 655 without anion gap  Sodium low however corrects secondary to hyperglycemia  Initial potassium 3 5, however moderately hemolyzed making this artificially elevated  Will plan for repeat but will begin IV repletion at this time  Repeat 2 4 and thus oral repletion also added  Due to patient's prolonged EKG intervals as well as significant hypokalemia, will plan for admission  Discussed with patient was agreeable to plan  Plan to admit pt to SOD  Pt discussed with admitting team and admission orders placed  Pt admitted without incident  Critical care also contacted and agreeable for admission to Medicine             Amount and/or Complexity of Data Reviewed  Clinical lab tests: ordered and reviewed  Discussion of test results with the performing providers: yes        Disposition  Final diagnoses:   Hypokalemia   Hyperglycemia   Abnormal finding on EKG     Time reflects when diagnosis was documented in both MDM as applicable and the Disposition within this note     Time User Action Codes Description Comment    12/28/2021  4:19 PM Aldona Bullion Add [E87 6] Hypokalemia     12/28/2021  4:19 PM Rohit Bontodd [R73 9] Hyperglycemia     12/28/2021  4:19 PM Aldona Bullion Add [R94 31] Abnormal finding on EKG     12/28/2021  5:47 PM Megan Allis Add [E11 22] Type 2 diabetes mellitus with chronic kidney disease, without long-term current use of insulin, unspecified CKD [facility]     12/28/2021  5:47 PM Megan Allis Add [N18 32] Stage 3b chronic kidney disease ([facility] [address] )     12/28/2021  7:15 PM Megan Allis Add [C67 52] Uncomplicated senile dementia [facility])       ED Disposition     ED Disposition Condition Date/Time Comment    Admit Stable Tue Dec 28, 2021  5:27 PM Case was discussed with SOD and the patient's admission status was agreed to be Admission Status: inpatient status to the service of Dr Zeus Naylor  Follow-up Information    None         Patient's Medications   Discharge Prescriptions    No medications on file     No discharge procedures on file  PDMP Review     None           ED Provider  Attending physically available and evaluated Wicho Bahena  CHRISTI managed the patient along with the ED Attending      Electronically Signed by         Essie Jorge MD  12/28/21 0209

## 2021-12-29 LAB
ANION GAP SERPL CALCULATED.3IONS-SCNC: 5 MMOL/L (ref 4–13)
ANION GAP SERPL CALCULATED.3IONS-SCNC: 7 MMOL/L (ref 4–13)
ANION GAP SERPL CALCULATED.3IONS-SCNC: 8 MMOL/L (ref 4–13)
ANION GAP SERPL CALCULATED.3IONS-SCNC: 8 MMOL/L (ref 4–13)
BACTERIA UR QL AUTO: ABNORMAL /HPF
BASOPHILS # BLD AUTO: 0.06 THOUSANDS/ΜL (ref 0–0.1)
BASOPHILS NFR BLD AUTO: 1 % (ref 0–1)
BILIRUB UR QL STRIP: NEGATIVE
BUN SERPL-MCNC: 18 MG/DL (ref 5–25)
BUN SERPL-MCNC: 19 MG/DL (ref 5–25)
BUN SERPL-MCNC: 21 MG/DL (ref 5–25)
BUN SERPL-MCNC: 25 MG/DL (ref 5–25)
CALCIUM SERPL-MCNC: 8.9 MG/DL (ref 8.3–10.1)
CALCIUM SERPL-MCNC: 9 MG/DL (ref 8.3–10.1)
CALCIUM SERPL-MCNC: 9.1 MG/DL (ref 8.3–10.1)
CALCIUM SERPL-MCNC: 9.3 MG/DL (ref 8.3–10.1)
CHLORIDE SERPL-SCNC: 100 MMOL/L (ref 100–108)
CHLORIDE SERPL-SCNC: 100 MMOL/L (ref 100–108)
CHLORIDE SERPL-SCNC: 90 MMOL/L (ref 100–108)
CHLORIDE SERPL-SCNC: 99 MMOL/L (ref 100–108)
CHLORIDE UR-SCNC: 84 MMOL/L
CLARITY UR: ABNORMAL
CO2 SERPL-SCNC: 27 MMOL/L (ref 21–32)
CO2 SERPL-SCNC: 28 MMOL/L (ref 21–32)
CO2 SERPL-SCNC: 29 MMOL/L (ref 21–32)
CO2 SERPL-SCNC: 32 MMOL/L (ref 21–32)
COLOR UR: YELLOW
CREAT SERPL-MCNC: 1.12 MG/DL (ref 0.6–1.3)
CREAT SERPL-MCNC: 1.18 MG/DL (ref 0.6–1.3)
CREAT SERPL-MCNC: 1.19 MG/DL (ref 0.6–1.3)
CREAT SERPL-MCNC: 1.47 MG/DL (ref 0.6–1.3)
CREAT UR-MCNC: 19.2 MG/DL
EOSINOPHIL # BLD AUTO: 0.08 THOUSAND/ΜL (ref 0–0.61)
EOSINOPHIL NFR BLD AUTO: 1 % (ref 0–6)
ERYTHROCYTE [DISTWIDTH] IN BLOOD BY AUTOMATED COUNT: 13 % (ref 11.6–15.1)
FLUAV RNA RESP QL NAA+PROBE: NEGATIVE
FLUBV RNA RESP QL NAA+PROBE: NEGATIVE
GFR SERPL CREATININE-BSD FRML MDRD: 29 ML/MIN/1.73SQ M
GFR SERPL CREATININE-BSD FRML MDRD: 38 ML/MIN/1.73SQ M
GFR SERPL CREATININE-BSD FRML MDRD: 38 ML/MIN/1.73SQ M
GFR SERPL CREATININE-BSD FRML MDRD: 40 ML/MIN/1.73SQ M
GLUCOSE SERPL-MCNC: 318 MG/DL (ref 65–140)
GLUCOSE SERPL-MCNC: 329 MG/DL (ref 65–140)
GLUCOSE SERPL-MCNC: 359 MG/DL (ref 65–140)
GLUCOSE SERPL-MCNC: 364 MG/DL (ref 65–140)
GLUCOSE SERPL-MCNC: 366 MG/DL (ref 65–140)
GLUCOSE SERPL-MCNC: 382 MG/DL (ref 65–140)
GLUCOSE SERPL-MCNC: 424 MG/DL (ref 65–140)
GLUCOSE SERPL-MCNC: >500 MG/DL (ref 65–140)
GLUCOSE UR STRIP-MCNC: ABNORMAL MG/DL
HCT VFR BLD AUTO: 39.1 % (ref 34.8–46.1)
HGB BLD-MCNC: 13.7 G/DL (ref 11.5–15.4)
HGB UR QL STRIP.AUTO: NEGATIVE
HYALINE CASTS #/AREA URNS LPF: ABNORMAL /LPF
IMM GRANULOCYTES # BLD AUTO: 0.09 THOUSAND/UL (ref 0–0.2)
IMM GRANULOCYTES NFR BLD AUTO: 1 % (ref 0–2)
KETONES UR STRIP-MCNC: NEGATIVE MG/DL
LEUKOCYTE ESTERASE UR QL STRIP: ABNORMAL
LYMPHOCYTES # BLD AUTO: 2.46 THOUSANDS/ΜL (ref 0.6–4.47)
LYMPHOCYTES NFR BLD AUTO: 23 % (ref 14–44)
MAGNESIUM SERPL-MCNC: 2.3 MG/DL (ref 1.6–2.6)
MCH RBC QN AUTO: 29.7 PG (ref 26.8–34.3)
MCHC RBC AUTO-ENTMCNC: 35 G/DL (ref 31.4–37.4)
MCV RBC AUTO: 85 FL (ref 82–98)
MONOCYTES # BLD AUTO: 0.9 THOUSAND/ΜL (ref 0.17–1.22)
MONOCYTES NFR BLD AUTO: 9 % (ref 4–12)
NEUTROPHILS # BLD AUTO: 6.91 THOUSANDS/ΜL (ref 1.85–7.62)
NEUTS SEG NFR BLD AUTO: 65 % (ref 43–75)
NITRITE UR QL STRIP: NEGATIVE
NON-SQ EPI CELLS URNS QL MICRO: ABNORMAL /HPF
NRBC BLD AUTO-RTO: 0 /100 WBCS
OSMOLALITY UR: 403 MMOL/KG
PH UR STRIP.AUTO: 7.5 [PH]
PLATELET # BLD AUTO: 248 THOUSANDS/UL (ref 149–390)
PMV BLD AUTO: 10.8 FL (ref 8.9–12.7)
POTASSIUM SERPL-SCNC: 2.1 MMOL/L (ref 3.5–5.3)
POTASSIUM SERPL-SCNC: 2.6 MMOL/L (ref 3.5–5.3)
POTASSIUM SERPL-SCNC: 2.8 MMOL/L (ref 3.5–5.3)
POTASSIUM SERPL-SCNC: 3 MMOL/L (ref 3.5–5.3)
POTASSIUM UR-SCNC: 45.8 MMOL/L
PROT UR STRIP-MCNC: ABNORMAL MG/DL
RBC # BLD AUTO: 4.62 MILLION/UL (ref 3.81–5.12)
RBC #/AREA URNS AUTO: ABNORMAL /HPF
RSV RNA RESP QL NAA+PROBE: NEGATIVE
SARS-COV-2 RNA RESP QL NAA+PROBE: NEGATIVE
SODIUM 24H UR-SCNC: 58 MOL/L
SODIUM SERPL-SCNC: 130 MMOL/L (ref 136–145)
SODIUM SERPL-SCNC: 134 MMOL/L (ref 136–145)
SODIUM SERPL-SCNC: 134 MMOL/L (ref 136–145)
SODIUM SERPL-SCNC: 135 MMOL/L (ref 136–145)
SP GR UR STRIP.AUTO: 1.01 (ref 1–1.03)
UROBILINOGEN UR QL STRIP.AUTO: 0.2 E.U./DL
VIT B12 SERPL-MCNC: 573 PG/ML (ref 100–900)
WBC # BLD AUTO: 10.5 THOUSAND/UL (ref 4.31–10.16)
WBC #/AREA URNS AUTO: ABNORMAL /HPF

## 2021-12-29 PROCEDURE — 85025 COMPLETE CBC W/AUTO DIFF WBC: CPT | Performed by: STUDENT IN AN ORGANIZED HEALTH CARE EDUCATION/TRAINING PROGRAM

## 2021-12-29 PROCEDURE — 82607 VITAMIN B-12: CPT | Performed by: STUDENT IN AN ORGANIZED HEALTH CARE EDUCATION/TRAINING PROGRAM

## 2021-12-29 PROCEDURE — 82948 REAGENT STRIP/BLOOD GLUCOSE: CPT

## 2021-12-29 PROCEDURE — 0241U HB NFCT DS VIR RESP RNA 4 TRGT: CPT | Performed by: STUDENT IN AN ORGANIZED HEALTH CARE EDUCATION/TRAINING PROGRAM

## 2021-12-29 PROCEDURE — 82570 ASSAY OF URINE CREATININE: CPT | Performed by: NURSE PRACTITIONER

## 2021-12-29 PROCEDURE — NC001 PR NO CHARGE: Performed by: INTERNAL MEDICINE

## 2021-12-29 PROCEDURE — 83735 ASSAY OF MAGNESIUM: CPT | Performed by: STUDENT IN AN ORGANIZED HEALTH CARE EDUCATION/TRAINING PROGRAM

## 2021-12-29 PROCEDURE — 84133 ASSAY OF URINE POTASSIUM: CPT | Performed by: NURSE PRACTITIONER

## 2021-12-29 PROCEDURE — 80048 BASIC METABOLIC PNL TOTAL CA: CPT | Performed by: STUDENT IN AN ORGANIZED HEALTH CARE EDUCATION/TRAINING PROGRAM

## 2021-12-29 PROCEDURE — 83935 ASSAY OF URINE OSMOLALITY: CPT | Performed by: NURSE PRACTITIONER

## 2021-12-29 PROCEDURE — 99223 1ST HOSP IP/OBS HIGH 75: CPT | Performed by: INTERNAL MEDICINE

## 2021-12-29 PROCEDURE — 84300 ASSAY OF URINE SODIUM: CPT | Performed by: NURSE PRACTITIONER

## 2021-12-29 PROCEDURE — 99222 1ST HOSP IP/OBS MODERATE 55: CPT | Performed by: STUDENT IN AN ORGANIZED HEALTH CARE EDUCATION/TRAINING PROGRAM

## 2021-12-29 PROCEDURE — 36415 COLL VENOUS BLD VENIPUNCTURE: CPT | Performed by: STUDENT IN AN ORGANIZED HEALTH CARE EDUCATION/TRAINING PROGRAM

## 2021-12-29 PROCEDURE — 81001 URINALYSIS AUTO W/SCOPE: CPT | Performed by: NURSE PRACTITIONER

## 2021-12-29 PROCEDURE — 82436 ASSAY OF URINE CHLORIDE: CPT | Performed by: NURSE PRACTITIONER

## 2021-12-29 PROCEDURE — 80048 BASIC METABOLIC PNL TOTAL CA: CPT

## 2021-12-29 RX ORDER — POTASSIUM CHLORIDE 20MEQ/15ML
40 LIQUID (ML) ORAL ONCE
Status: COMPLETED | OUTPATIENT
Start: 2021-12-29 | End: 2021-12-29

## 2021-12-29 RX ORDER — POTASSIUM CHLORIDE 14.9 MG/ML
20 INJECTION INTRAVENOUS ONCE
Status: COMPLETED | OUTPATIENT
Start: 2021-12-29 | End: 2021-12-29

## 2021-12-29 RX ORDER — POTASSIUM CHLORIDE 20MEQ/15ML
20 LIQUID (ML) ORAL ONCE
Status: COMPLETED | OUTPATIENT
Start: 2021-12-29 | End: 2021-12-29

## 2021-12-29 RX ORDER — POTASSIUM CHLORIDE AND SODIUM CHLORIDE 900; 300 MG/100ML; MG/100ML
60 INJECTION, SOLUTION INTRAVENOUS CONTINUOUS
Status: DISCONTINUED | OUTPATIENT
Start: 2021-12-29 | End: 2021-12-30

## 2021-12-29 RX ORDER — DONEPEZIL HYDROCHLORIDE 10 MG/1
10 TABLET, FILM COATED ORAL
Status: DISCONTINUED | OUTPATIENT
Start: 2021-12-29 | End: 2022-01-04 | Stop reason: HOSPADM

## 2021-12-29 RX ORDER — SODIUM CHLORIDE, SODIUM GLUCONATE, SODIUM ACETATE, POTASSIUM CHLORIDE, MAGNESIUM CHLORIDE, SODIUM PHOSPHATE, DIBASIC, AND POTASSIUM PHOSPHATE .53; .5; .37; .037; .03; .012; .00082 G/100ML; G/100ML; G/100ML; G/100ML; G/100ML; G/100ML; G/100ML
100 INJECTION, SOLUTION INTRAVENOUS CONTINUOUS
Status: DISCONTINUED | OUTPATIENT
Start: 2021-12-29 | End: 2021-12-29

## 2021-12-29 RX ORDER — POTASSIUM CHLORIDE 20MEQ/15ML
20 LIQUID (ML) ORAL ONCE
Status: COMPLETED | OUTPATIENT
Start: 2021-12-29 | End: 2021-12-30

## 2021-12-29 RX ADMIN — HEPARIN SODIUM 5000 UNITS: 5000 INJECTION INTRAVENOUS; SUBCUTANEOUS at 14:11

## 2021-12-29 RX ADMIN — POTASSIUM CHLORIDE AND SODIUM CHLORIDE 60 ML/HR: 900; 300 INJECTION, SOLUTION INTRAVENOUS at 11:13

## 2021-12-29 RX ADMIN — HEPARIN SODIUM 5000 UNITS: 5000 INJECTION INTRAVENOUS; SUBCUTANEOUS at 05:30

## 2021-12-29 RX ADMIN — POTASSIUM CHLORIDE 20 MEQ: 14.9 INJECTION, SOLUTION INTRAVENOUS at 09:28

## 2021-12-29 RX ADMIN — Medication 40 MEQ: at 02:05

## 2021-12-29 RX ADMIN — POTASSIUM CHLORIDE 40 MEQ: 20 SOLUTION ORAL at 22:24

## 2021-12-29 RX ADMIN — POTASSIUM CHLORIDE 40 MEQ: 20 SOLUTION ORAL at 10:04

## 2021-12-29 RX ADMIN — POTASSIUM CHLORIDE 20 MEQ: 20 SOLUTION ORAL at 14:14

## 2021-12-29 RX ADMIN — HEPARIN SODIUM 5000 UNITS: 5000 INJECTION INTRAVENOUS; SUBCUTANEOUS at 22:24

## 2021-12-29 RX ADMIN — POTASSIUM CHLORIDE 40 MEQ: 20 SOLUTION ORAL at 05:30

## 2021-12-29 RX ADMIN — SENNOSIDES AND DOCUSATE SODIUM 1 TABLET: 50; 8.6 TABLET ORAL at 22:24

## 2021-12-29 NOTE — ASSESSMENT & PLAN NOTE
Patient with history of known aortic stenosis, moderate to severe in severity, seen on echocardiogram obtained April 2021   Patient currently with grade 3/6 murmur on exam     Plan  · Will continue to monitor fluid status

## 2021-12-29 NOTE — ASSESSMENT & PLAN NOTE
Given baseline history of dementia, patient is at significant risk for hospital associated delirium    Patient is currently A&Ox1    Plan  Geriatrics consult, recommendations greatly appreciated  -Patient is high risk of delirium due to her underlying dementia and admission to unfamiliar environment  -Initiate delirium precautions

## 2021-12-29 NOTE — CONSULTS
Consultation - [address] 80 y o  female MRN: [id number]  Unit/Bed#: ED 26 Encounter: [id number]      Assessment/Plan     Acute metabolic encephalopathy  -evidence by somnolence and confusion beyond baseline, difficult to arouse and minimally responsive to verbal stimuli on initial presentation requiring frequent neuro checks and aggressive treatment of hyperosmolar hyperglycemic state and repletion of depleted electrolytes now with some improvement in mentation and appears to be much closer to baseline  -leukocytosis present however patient is afebrile with no localizing infectious symptoms - follow temperature and WBC curves closely in the chance that she is currently in early stage of infection not yet clinically evident  -recommend checking COVID-19 - patient vaccinated about 6 months ago however, given very high frequency of breakthrough infections despite being vaccinated she does have multiple comorbidities and resides in a congregate setting all of which significantly increase her risk of infection with COVID- strongly recommend testing if not already done on current admission  -recommend frequent neuro checks  -maintain common quiet environment reduce risk overstimulation  -maintain normal circadian rhythm  -reorient and redirect frequently as appropriate    Hyperosmolar hyperglycemic state  -blood sugar on initial presentation 655, [K] 2 4 and hyponatremia with sodium of 130 when corrected for hyperglycemia  -continue electrolyte repletion and treatment of hyperglycemia as outlined by primary medical team  -monitor mental status closely and avoid rapid and over-correction of sodium    DM-II  -hemoglobin A1c 13  -home regimen includes glipizide, Januvia, and Prandin  -patient is not ordered Accu-Cheks at the care facility and her blood sugar is not monitored except for when obtained with lab data routine or for evaluation of illness  -per outpatient documentation patient had previously refused treatment with insulin however given her A1c of 13 and hyperosmolar hyperglycemic state she will require initiation of insulin with frequent Accu-Cheks on return to facility - regimen will be determined based on insulin requirements during hospitalization once current hyperglycemic episode has been stabilized  -continue close outpatient follow-up with PCP for routine diabetic screenings and cares    Dementia without behavioral disturbance  -moderate and progressive likely largely vascular type  -continue good control chronic conditions including blood pressure and blood sugars due to affect on vasculature and intern cognition  -at baseline patient oriented to self and requires assistance with all ADLs and IADLs  -no history of sundowning or agitation  -no prior cognitive testing on record for review however clinical evaluations supports diagnosis  -maintained on Aricept with no notable adverse, no bradycardia, tolerating well continue home regimen  -[facility] 7/2019 personally reviewed, moderate chronic micro path changes appreciated most densely consolidated in left temporal area  -TSH 1 41, recommend checking B12  -encourage patient remain physically, socially, and cognitively active engaged to maintain cognitive acuity  -minimize transitions between rooms units, facilities especially in late afternoon early evening to reduce risk of precipitating sundowning symptoms in patient who is at very high risk at baseline  -reorient and redirect frequently as appropriate    SALTY on CKD-III  -baseline creatinine appears to be approximately 1 1-1 3  -peak creatinine on current admission 1 8, down to 1 47  -continue hydration and fluid resuscitation, monitor creatinine/GFR  -avoid nephrotoxic agents and renally dose all medications  -optimize hemodynamics    Urinary incontinence  -patient maintained on Q2H toileting schedule at facility - patient must be prompted as does not typically recognize the urge  -recommend continuation of timed toileting during hospitalization  -continue good hygiene    Left heel pressure injury, POA  -wound care following at care facility  -continue frequent turning, repositioning, offloading to reduce risk of progression of current lesion as well as development of additional wounds  -continue local wound care  -encourage well-balanced nutritional intake to optimize wound healing  -consider wound care consult    Ambulatory dysfunction with recurrent falls  -requires use of walker for ambulation at baseline   -due to impulsivity and confusion frequent recurrent falls at care facility  -very high risk of fall during hospitalization - strict fall precautions should be implemented   -would benefit from use of bed/chair alarm, room near nursing station for more ready visibility allowing faster intervention if attempts to mobilize without assistance and frequent checks/cares to reduce impulsivity to get up without assistance  -PT/OT pending    Impaired mastication  -Requires use of dentures-encourage use of appropriate times  -ensure meal consistency appropriate for abilities  -continue aspiration precautions    Deconditioning/debility/frailty  -clinical frailty scale stage 6, moderately frail  -multifactorial including age, moderate dementia and multiple chronic comorbidities now with uncontrolled diabetes resulting in hyperosmolar hyperglycemic state requiring critical interventions  -albumin low at 3 3, encourage well-balanced nutrition, consider nutritional supplements between meals if oral intake is poor  -optimize chronic medical conditions and continue to address acute metabolic derangements as arise    Home medication review    Personally confirmed with list obtained from [facility]:    Aricept 10 mg daily  Glucotrol XL 10 mg daily  Januvia 100 mg daily  Lasix 40 mg daily (no dx CHF - per facility used for chronic LE edema, EF 75% 4/2021)  Mag-Ox 400 mg daily  Zaroxolyn 5 mg daily  KCL 60 mEq TID  Prandin 2 mg TIDWM  Voltaren arthritis gel topically PRN - site not specified in facility documentation    Care coordination:  Rounded with Hakan (RN) in ED    History of Present Illness   Physician Requesting Consult: Linda Ugarte MD  Reason for Consult / Principal Problem:  Dementia  Hx and PE limited by:  Dementia  Additional history obtained from:  Chart review and patient evaluation, spoke with patient's primary care staff at [facility]     HPI: Bjorn Márquez is a 80y o  year old female with hypertension, moderate aortic stenosis, dementia without behavior disturbance, ambulatory dysfunction, atherosclerosis of aorta, CKD-III, DM-II with diabetic chronic kidney disease, and history of syncope who is admitted with hyperosmolar hyperglycemic state, hyponatremia, hypokalemia, and multiple additional chronic acute and medical issues, she is being seen in consultation by Geriatrics for dementia and high risk of developing delirium  She seen evaluated at the bedside in the emergency department where she is lying resting comfortably, she is pleasantly confused and unable to provide reliable HPI and loss was obtained from personally speaking with her care staff at [facility]  They explained that she was in her usual state health when significant electrolyte derangements and hyperglycemia were found on routine labs and she was sent to the emergency department for further evaluation  On admission she was found to have profound hyperglycemia, hypokalemia, and hyponatremia with hyperosmolar hyperglycemic state  She is diabetic and per outpatient documentation has continuously refused insulin and is maintained solely on oral agents, her usual glucose levels are unknown as they are not monitored with Accu-Cheks  Prior to admission Dianna Rich residing at [facility] where she receives assistance with ADLs and IADLs    At baseline she is pleasantly confused and cooperative with a relatively reserved/shy personality, has no history of sundowning or behavioral disturbances  She requires use of walker for ambulation at baseline and has frequent falls usually when attempting to get out of bed without assistance  She requires use of dentures but does not use hearing aids or glasses  Inpatient consult to Gerontology  Consult performed by: Negra Stephens DO  Consult ordered by: Josephine Carmen DO        Review of Systems   Constitutional: Negative for chills and fever  HENT: Negative  Eyes: Negative  Respiratory: Negative  Cardiovascular: Negative  Gastrointestinal: Negative  Endocrine: Negative  Genitourinary: Negative  Musculoskeletal: Negative  Skin: Negative  Allergic/Immunologic: Negative  Neurological: Negative  Negative for dizziness, light-headedness and headaches  Hematological: Negative  Psychiatric/Behavioral: Negative  All other systems reviewed and are negative       Historical Information   Past Medical History:   Diagnosis Date    Aortic stenosis     Bilateral edema of lower extremity     Dementia (HCC)     Diabetes ([address] )     Fall     Gait abnormality     Hyperlipidemia     Hypertension     Hypokalemia     Other ascites     Varicose veins of leg with edema      Past Surgical History:   Procedure Laterality Date    BREAST SURGERY      ELBOW SURGERY       Social History   Social History     Substance and Sexual Activity   Alcohol Use No     Social History     Substance and Sexual Activity   Drug Use No     Social History     Tobacco Use   Smoking Status Never Smoker   Smokeless Tobacco Never Used     Family History:   Family History   Problem Relation Age of Onset    Dementia Sister      Meds/Allergies   all current active meds have been reviewed    No Known Allergies    Objective     Intake/Output Summary (Last 24 hours) at 12/29/2021 0624  Last data filed at 12/29/2021 0554  Gross per 24 hour   Intake 1100 ml   Output 1000 ml Net 100 ml     Invasive Devices  Report    Peripheral Intravenous Line            Peripheral IV 12/28/21 Right Antecubital <1 day              Physical Exam  Vitals and nursing note reviewed  Constitutional:       Appearance: Normal appearance  She is not ill-appearing, toxic-appearing or diaphoretic  Comments: Appears stated age, no acute distress   HENT:      Head: Normocephalic and atraumatic  Nose: Nose normal       Mouth/Throat:      Mouth: Mucous membranes are dry  Comments: Dentures in place  Eyes:      General: No scleral icterus  Right eye: No discharge  Left eye: No discharge  Conjunctiva/sclera: Conjunctivae normal       Comments: Pupils equal and round   Neck:      Comments: Trachea midline, phonation normal  Cardiovascular:      Rate and Rhythm: Normal rate and regular rhythm  Heart sounds: Murmur heard  Pulmonary:      Effort: Pulmonary effort is normal  No respiratory distress  Abdominal:      General: Bowel sounds are normal  There is no distension  Palpations: Abdomen is soft  Tenderness: There is no abdominal tenderness  Musculoskeletal:      Cervical back: Neck supple  Right lower leg: Edema present  Left lower leg: Edema present  Comments: Reduced overall muscle mass for age and activity level   Skin:     General: Skin is warm and dry  Neurological:      Mental Status: She is alert        Comments: Awake and alert, oriented to self, follows commands with repetition, very forgetful   Psychiatric:      Comments: Impulsive but pleasantly confused and cooperative       Lab Results:   I have personally reviewed pertinent lab results including the following:  -sodium 130, potassium 2 1, chloride 90, CO2 32, BUN 25, creatinine 1 47, glucose 424, calcium 9 3, GFR 29  -UA negative for nitrites  -hemoglobin 14 5, hematocrit 30 8, WBC 12 79, platelets 180    I have personally reviewed the following imaging study reports in PACS:    CT head without contrast 7/10/19:  No acute intracranial abnormality    Therapies:   PT:  Pending  OT:  Pending    VTE Prophylaxis: Heparin    Code Status: Level 3 - DNAR and DNI  Advance Directive and Living Will: Yes    Power of :    POLST:      Family and Social Support:  Care facility staff    Goals of Care:  Patient cannot state due to dementia

## 2021-12-29 NOTE — ASSESSMENT & PLAN NOTE
Lab Results   Component Value Date    EGFR 50 01/02/2022    EGFR 35 01/01/2022    EGFR 50 01/01/2022    CREATININE 0 95 01/02/2022    CREATININE 1 26 01/01/2022    CREATININE 0 94 01/01/2022     Patient presents with serum creatinine 1 8 with apparent baseline of roughly 1 0-1 3  Patient has known history of CKD stage 3  Plan  · SALTY resolved  Renal function back to baseline    · SALTY on CKD likely prerenal in setting of dehydration secondary to HHS  · 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, arteriolar sclerosis, diabetic kidney disease  · Trend renal indices and urine output closely  · Avoid hypotension and nephrotoxins where able  · Nephrology consult, recommendations greatly appreciated  · Patient to be discharged on SOD SALTY Pathway

## 2021-12-29 NOTE — CONSULTS
Consultation - Palomo Lange 80 y o  female MRN: [id number]    Unit/Bed#: ED 26 Encounter: [id number]    Assessment/Plan     Assessment: This is a 80y o -year-old female with Type 2 DM    Plan:  Type 2 DM with severe hyperglycemia  Home regimen per chart review includes repaglinide 2 mg t i d  before meals, sitagliptin 100 mg daily, glipizide extended release 10mg daily  Previous a1c 8 6 in 4/2021, most recent a1c 13 0 on this admission  Serum osm 290 on admission, beta hydroxybutyrate minimally elevated at 0 6, bicarbonate normal on admission  Recommend continuing NPO and IVF hydration with potassium repletion  If potassium improves >3 5 can start 10 units Lantus at bedtime and correctional coverage at algorithm 1 with adjustments as needed  Hypokalemia  Management as per primary team and nephrology    CC: Diabetes Consult    History of Present Illness     HPI: Palomo Lange is a 80y o  year old female with type 2 DM, dementia, HTN, who presented to the hospital with altered mental status, pre-hospital lab work showing hyperglycemia with glucose 584, hypokalemia with potassium of 2 4  She was admitted to the hospital for these lab abnormalities, endocrinology and nephrology were consulted  Home regimen includes repaglinide 2 mg t i d  before meals, sitagliptin 100 mg daily, glipizide extended release 10mg daily  Pt was admitted to the hospital for further evaluation  Pt is unable to provide PMHx secondary to AMS/dementia      Inpatient consult to Endocrinology  Consult performed by: Thania Cobos DO  Consult ordered by: Laci Patten DO        Review of Systems   Unable to perform ROS: Dementia       Historical Information   Past Medical History:   Diagnosis Date    Aortic stenosis     Bilateral edema of lower extremity     Dementia [facility] [facility]     Diabetes [facility] [address] [facility]     Fall     Gait abnormality     Hyperlipidemia     Hypertension     Hypokalemia     Other ascites     Varicose veins of leg with edema Past Surgical History:   Procedure Laterality Date    BREAST SURGERY      ELBOW SURGERY       Social History   Social History     Substance and Sexual Activity   Alcohol Use No     Social History     Substance and Sexual Activity   Drug Use No     Social History     Tobacco Use   Smoking Status Never Smoker   Smokeless Tobacco Never Used     Family History:   Family History   Problem Relation Age of Onset    Dementia Sister        Meds/Allergies   Current Facility-Administered Medications   Medication Dose Route Frequency Provider Last Rate Last Admin    heparin (porcine) subcutaneous injection 5,000 Units  5,000 Units Subcutaneous Q8H [facility], DO   5,000 Units at 12/29/21 0530    ondansetron (ZOFRAN) injection 4 mg  4 mg Intravenous Q6H PRN Harris Larios, DO        polyethylene glycol (MIRALAX) packet 17 g  17 g Oral Daily PRN Al Wood, DO        potassium chloride 20 mEq IVPB (premix)  20 mEq Intravenous Once Lizz Sevilla DO        senna-docusate sodium (SENOKOT S) 8 6-50 mg per tablet 1 tablet  1 tablet Oral HS Harris Larios, DO        sodium chloride 0 9 % with KCl 40 mEq/L infusion (premix)  100 mL/hr Intravenous Continuous Harris Larios,  mL/hr at 12/28/21 2136 100 mL/hr at 12/28/21 2136     Current Outpatient Medications   Medication Sig Dispense Refill    donepezil (ARICEPT) 10 mg tablet Take 1 tablet (10 mg total) by mouth daily 90 tablet 1    furosemide (LASIX) 40 mg tablet Take 1 tablet (40 mg total) by mouth daily 90 tablet 1    glipiZIDE (GLUCOTROL XL) 10 mg 24 hr tablet Take 1 tablet (10 mg total) by mouth daily 90 tablet 1    magnesium oxide (MAG-OX) 400 mg Take 1 tablet (400 mg total) by mouth daily 90 tablet 1    metolazone (ZAROXOLYN) 5 mg tablet Take 1 tablet (5 mg total) by mouth daily 90 tablet 1    Potassium Chloride ER 20 MEQ TBCR Take 3 tablets (60 mEq total) by mouth 3 (three) times a day 810 tablet 1    repaglinide (PRANDIN) 2 mg tablet Take 1 tablet (2 mg total) by mouth 3 (three) times a day before meals 270 tablet 1    sitaGLIPtin (JANUVIA) 100 mg tablet Take 1 tablet (100 mg total) by mouth daily 90 tablet 1    acetaminophen (TYLENOL) 325 mg tablet Take 650 mg by mouth every 6 (six) hours as needed for mild pain      Diclofenac Sodium (VOLTAREN) 1 % Apply 2 g topically to affected area 4 times daily at 9a-1p-5p-9p 100 g 5     No Known Allergies    Objective   Vitals: Blood pressure 116/60, pulse 66, temperature 97 8 °F (36 6 °C), temperature source Oral, resp  rate 18, SpO2 96 %  Intake/Output Summary (Last 24 hours) at 12/29/2021 0831  Last data filed at 12/29/2021 0554  Gross per 24 hour   Intake 1100 ml   Output 1000 ml   Net 100 ml     Invasive Devices  Report    Peripheral Intravenous Line            Peripheral IV 12/28/21 Right Antecubital <1 day                Physical Exam  Constitutional:       Appearance: She is well-developed  HENT:      Head: Normocephalic and atraumatic  Eyes:      General:         Right eye: No discharge  Left eye: No discharge  Cardiovascular:      Rate and Rhythm: Normal rate and regular rhythm  Heart sounds: Normal heart sounds  No murmur heard  No friction rub  No gallop  Pulmonary:      Effort: Pulmonary effort is normal  No respiratory distress  Breath sounds: Normal breath sounds  No wheezing or rales  Chest:      Chest wall: No tenderness  Abdominal:      General: Bowel sounds are normal  There is no distension  Palpations: Abdomen is soft  There is no mass  Tenderness: There is no abdominal tenderness  Musculoskeletal:         General: Normal range of motion  Cervical back: Normal range of motion and neck supple  Skin:     General: Skin is warm and dry  Neurological:      Mental Status: She is alert and oriented to person, place, and time  Comments: Oriented to person, place   Not oriented to year or situation   Psychiatric:         Behavior: Behavior normal  The history was obtained from the review of the chart, patient  Lab Results:   Results from last 7 days   Lab Units 12/28/21  1347   HEMOGLOBIN A1C % 13 0*     Lab Results   Component Value Date    WBC 12 79 (H) 12/28/2021    HGB 14 5 12/28/2021    HCT 38 8 12/28/2021    MCV 80 (L) 12/28/2021     12/28/2021     Lab Results   Component Value Date/Time    BUN 25 12/29/2021 12:44 AM    BUN 32 (H) 12/07/2015 01:32 PM     12/07/2015 01:32 PM    K 2 1 (LL) 12/29/2021 12:44 AM    K 4 2 12/07/2015 01:32 PM    CL 90 (L) 12/29/2021 12:44 AM    CL 98 (L) 12/07/2015 01:32 PM    CO2 32 12/29/2021 12:44 AM    CO2 36 (H) 07/10/2019 06:17 PM    CREATININE 1 47 (H) 12/29/2021 12:44 AM    CREATININE 1 29 12/07/2015 01:32 PM    AST 69 (H) 12/28/2021 01:47 PM    AST 21 12/02/2015 09:52 AM    ALT 28 12/28/2021 01:47 PM    ALT 24 12/02/2015 09:52 AM    ALB 3 3 (L) 12/28/2021 01:47 PM    ALB 3 6 12/02/2015 09:52 AM     No results for input(s): CHOL, HDL, LDL, TRIG, VLDL in the last 72 hours  No results found for: Christopher King  POC Glucose (mg/dl)   Date Value   12/29/2021 366 (H)   12/28/2021 >500 (HH)   04/25/2021 241 (H)   04/25/2021 210 (H)   04/25/2021 187 (H)       Imaging Studies: I have personally reviewed pertinent reports  Portions of the record may have been created with voice recognition software

## 2021-12-29 NOTE — ASSESSMENT & PLAN NOTE
Patient with noted history of hypertension however her only relevant antihypertensive medications as an outpatient are directed at her chronic venous stasis  Patient initially presented with mild hypertension that has improved without intervention in the ED      Plan  · Holding home diuretics in setting of HHS  · Monitor pressures closely

## 2021-12-29 NOTE — ASSESSMENT & PLAN NOTE
Serum potassium 2 4 on arrival in setting of HHS  Patient received 60 mEq oral and 20 mEq IV in the ED with a further 20 mEq IV pending at time of admission  Per chart review, patient does have prior history of hypokalemia that does not appear to have been worked up previously and she does take 60 mEq of potassium 3 times daily per facility documentation  Plan  · 12/29 potassium at 2 6 after repletion with 150 mEq of potassium  · 12/30 potassium at 3 1 after repletion with 320 mEq of K since admission  Will Give K 20mg IV BID+ K 40 mg PO once (80 total this am)  · 12/31 potassium 3 5 from 4 1 yesterday  Insulin started yesterday evening  DC IV fluid    We will replete potassium with home dose K 60 mEq use p o  t i d  Repleted Mag 2    · 1/1 potassium 4 0   · 1/2 potassium 4 2, Mg 1 6, replete Magnesium 2 g,  continue home dose K, check BMP at 4pm  · 1/3 K 4 1  · Monitor and replete as necessary aggressively  · As it appears this has not been worked up previously, we will consult Nephrology this time, recommendations greatly appreciated  · Will check aldosterone/renin ratio

## 2021-12-29 NOTE — ASSESSMENT & PLAN NOTE
EKG on arrival shows first-degree AV block with PVCs and left bundle-branch block  Review of previous EKGs show similar findings      Plan  · No urgent indication to involve Cardiology  · Recommend avoiding QTC prolonging agents if able

## 2021-12-29 NOTE — ASSESSMENT & PLAN NOTE
On exam, patient has bilateral lower extremity edema that appears chronic in nature  Reflecting this, patient takes Lasix 40 mg and Zaroxolyn 5 mg daily at her assisted living facility      Plan  · Holding home diuretics in setting of HHS   · Elevate legs  · Routine skin care

## 2021-12-29 NOTE — ASSESSMENT & PLAN NOTE
Per discussion with POA, patient has been dealing with urinary incontinence at her assisted living facility for most of at least the last year  The patient is unable to provide any information regarding the details of her urinary incontinence      Plan  · Monitor patient closely, would recommend considering PureWick catheter and avoiding implantation of Mascorro catheter during this admission given likelihood of exacerbation of delirium

## 2021-12-29 NOTE — PROGRESS NOTES
INTERNAL MEDICINE RESIDENCY PROGRESS NOTE     Name: Collette Southern   Age & Sex: 80 y o  female   MRN: [id number]  Unit/Bed#: ED 32   Encounter: [id number]  Team: SOD Team C     PATIENT INFORMATION     Name: Collette Southern   Age & Sex: 80 y o  female   MRN: [id number]  Hospital Stay Days: 1    ASSESSMENT/PLAN     Principal Problem:    Hyperosmolar hyperglycemic state (HHS) ([facility] )  Active Problems:    Benign essential hypertension    Bilateral lower extremity edema    Hypokalemia    Hyponatremia    Moderate aortic stenosis    Uncomplicated senile dementia (HCC)    Acute-on-chronic kidney injury ([facility] )    Urinary incontinence    First degree AV block    At risk for delirium      At risk for delirium  Assessment & Plan  Given baseline history of dementia, patient is at significant risk for hospital associated delirium  Patient is currently A&O times 2      Plan  Geriatrics consult, recommendations greatly appreciated    -Patient is high risk of delirium due to her underlying dementia and admission to unfamiliar environment  -Initiate delirium precautions  -maintain normal sleep/wake cycle  -minimize overnight interruptions, group overnight vitals/labs/nursing checks as much as possible  -dim lights, close blinds and turn off tv to minimize stimulation and encourage sleep environment in evenings  -ensure that pain is well controlled, consider Tylenol 975mg Q8H scheduled   -monitor closely for fecal and urinary retention which may precipitate delirium  -encourage early mobilization and ambulation with PT and OT assistance  -provide frequent reorientation and redirection as required  -encourage family and friends at the bedside to help help calm patient if anxious  -avoid medications which may precipitate or worsen delirium such as tramadol, benzodiazepine, anticholinergics, and benadryl  -encourage good hydration and nutrition   -redirect unwanted behaviors as first line, avoid physical restraints, use chemical restraint only if all other attempts have been unsuccessful, would consider Zyprexa 2 5mg IM Q daily, monitor for orthostatic hypotension and QTc prolongation with repeat dosing, recommend lowest dose possible for shortest duration possible         First degree AV block  Assessment & Plan  EKG on arrival shows first-degree AV block with PVCs and left bundle-branch block  Review of previous EKGs show similar findings  Plan  · No urgent indication to involve Cardiology  · Recommend avoiding QTC prolonging agents if able    Urinary incontinence  Assessment & Plan  Per discussion with POA, patient has been dealing with urinary incontinence at her assisted living facility for most of at least the last year  The patient is unable to provide any information regarding the details of her urinary incontinence  Plan  · Monitor patient closely, would recommend considering PureWick catheter and avoiding implantation of Mascorro catheter during this admission given likelihood of exacerbation of delirium    Acute-on-chronic kidney injury [facility])  Assessment & Plan  Lab Results   Component Value Date    EGFR 38 12/29/2021    EGFR 29 12/29/2021    EGFR 26 12/28/2021    CREATININE 1 19 12/29/2021    CREATININE 1 47 (H) 12/29/2021    CREATININE 1 62 (H) 12/28/2021     Patient presents with serum creatinine 1 8 with apparent baseline of roughly 1 0-1 3  Patient has known history of CKD stage 3  Plan  · SALTY resolved  Renal function back to baseline with creatinine at 1 19  · SALTY on CKD likely prerenal in setting of dehydration secondary to HHS  · 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)  Assessment & Plan  Patient with a known history of senile dementia  Patient currently resides in an assisted living facility, [facility]  Per conversation this evening with POA, patient's daughter Ariadna Hutson, patient is typically only alert and oriented to herself  Her daughter tells me that she is typically able to walk independently with a walker  She requires assistance with management of her medications at her assisted living facility  In light of discussion with daughter, patient appears to be more disoriented than baseline  Plan  · Patient currently alert and oriented only to her name, not her birthday, location, or year  · Monitor for progression back to baseline with treatment of acute conditions as indicated elsewhere in note  · Geriatrics consultation, recommendations greatly appreciated  · Continue home Aricept    Moderate aortic stenosis  Assessment & Plan  Patient with history of known aortic stenosis, moderate to severe in severity, seen on echocardiogram obtained April 2021  Patient currently with grade 3/6 murmur on exam     Plan  · Monitor fluid status extremely closely  · Consider repeat echocardiogram this admission      Hyponatremia  Assessment & Plan  Patient with true hyponatremia complicated by pseudohyponatremia in the setting of HHS  Serum sodium 121 on arrival, 130 with correction in setting of hyperglycemia  Sodium levels improving at time of admission  Plan  · Normal now  · Maintenance fluids with normal saline with 40 mEq of K  · Nephrology consult, recommendations greatly appreciated    Hypokalemia  Assessment & Plan  Serum potassium 2 4 on arrival in setting of HHS  Patient received 60 mEq oral and 20 mEq IV in the ED with a further 20 mEq IV pending at time of admission    Per chart review, patient does have prior history of hypokalemia that does not appear to have been worked up previously and she does take 60 mEq of potassium 3 times daily per facility documentation  Plan  · Last potassium at 2 6 after repletion with 150 mEq of potassium  · Magnesium normal at 2 3  · Monitor and replete as necessary aggressively  · Maintenance fluids:  Normal saline with 40 mEq K  · As it appears this has not been worked up previously, we will consult Nephrology this time, recommendations greatly appreciated  · Urinalysis with microscopy    Bilateral lower extremity edema  Assessment & Plan  On exam, patient has bilateral lower extremity edema that appears chronic in nature  Reflecting this, patient takes Lasix 40 mg and Zaroxolyn 5 mg daily at her assisted living facility  Plan  · Holding home diuretics in setting of HHS  · Elevate legs  · Routine skin care    Benign essential hypertension  Assessment & Plan  Patient with noted history of hypertension however her only relevant antihypertensive medications as an outpatient are directed at her chronic venous stasis  Patient initially presented with mild hypertension that has improved without intervention in the ED  Plan  · Holding home diuretics in setting of HHS  · Monitor pressures closely    * Hyperosmolar hyperglycemic state (HHS) [facility])  Assessment & Plan  Lab Results   Component Value Date    HGBA1C 13 0 (H) 12/28/2021       Recent Labs     12/28/21  1336 12/28/21  2231 12/29/21  0702 12/29/21  1134   POCGLU >500* >500* 366* 382*       Blood Sugar Average: Last 72 hrs:     Patient is a known diabetic who has previously refused insulin therapy in the outpatient setting  Most recent A1c 8 6% per chart review in April of 2021  She presents from her assisted living facility today for evaluation of multiple lab abnormalities, particularly glucose 584 and potassium of 2 4  Per review of available facility paperwork, patient has been compliant with her oral hypoglycemic regimen, which includes Glucotrol, Januvia, and Prandin    On arrival to the ED, patient's blood sugar off of BNP was 655 with associated hypokalemia to 2 4 and pseudo hyponatremia  Patient had a minimally elevated beta hydroxybutyrate with no anion gap  In the ED, patient has been fortunately hemodynamically and symptomatically stable  At time of admission, patient has thus far been unable to receive any subcutaneous or IV insulin given the degree of her hypokalemia  Repeat BMP obtained at time of admission shows improvement overall yet persistent hypokalemia to 2 4, though patient is currently actively receiving repletion  MICU attending evaluated patient at the bedside in the ED, deemed patient stable for admission as Step Down Level 2 under general medicine  Plan:  ·  if potassium levels improve >3, will dose with subcutaneous insulin   · Start diabetic diet + SSI once K improves  · Recheck BMP at 2pm   · Normal saline with 40 mEq potassium maintenance fluids  · Trend blood sugars closely  · Endocrine consult in the morning, recommendations greatly appreciated  · Holding home oral antihyperglycemics      Disposition: Not stable for dc  SUBJECTIVE     Patient seen and examined  Patient is doing well this morning and has no active complaints  She is alert and oriented to self including her name and birthday and able to say she is in [place]  She does not know the date  She denies any abdominal pain, diarrhea, constipation, confusion, fevers or chills       OBJECTIVE     Vitals:    21 0300 21 0500 21 0700 21 1000   BP: 113/68 119/61 116/60 125/63   BP Location:    Left arm   Pulse: 78 70 66 70   Resp:  18  18   Temp:       TempSrc:       SpO2: 95% 96%  96%      Temperature:   Temp (24hrs), Av 8 °F (36 6 °C), Min:97 8 °F (36 6 °C), Max:97 8 °F (36 6 °C)    Temperature: 97 8 °F (36 6 °C)  Intake & Output:  I/O        07 07 07 07 07    I V   1000 1360    IV Piggyback  100     Total Intake  1100 1360 Urine  1000     Total Output  1000     Net  +100 +1360               Weights: There is no height or weight on file to calculate BMI  Weight (last 2 days)     None        Physical Exam  Vitals and nursing note reviewed  Constitutional:       General: She is not in acute distress  Appearance: She is well-developed  HENT:      Head: Normocephalic and atraumatic  Eyes:      Conjunctiva/sclera: Conjunctivae normal    Cardiovascular:      Rate and Rhythm: Normal rate and regular rhythm  Heart sounds: No murmur heard  Pulmonary:      Effort: Pulmonary effort is normal  No respiratory distress  Breath sounds: Normal breath sounds  No wheezing or rales  Abdominal:      General: There is no distension  Palpations: Abdomen is soft  Tenderness: There is no abdominal tenderness  There is no guarding  Musculoskeletal:      Cervical back: Neck supple  Right lower leg: No edema  Left lower leg: No edema  Skin:     General: Skin is warm and dry  Neurological:      Mental Status: She is alert  Mental status is at baseline  LABORATORY DATA     Labs: I have personally reviewed pertinent reports  Results from last 7 days   Lab Units 12/29/21  0907 12/28/21  1347   WBC Thousand/uL 10 50* 12 79*   HEMOGLOBIN g/dL 13 7 14 5   HEMATOCRIT % 39 1 38 8   PLATELETS Thousands/uL 248 286   NEUTROS PCT % 65 67   MONOS PCT % 9 9      Results from last 7 days   Lab Units 12/29/21  0742 12/29/21  0044 12/28/21  1759 12/28/21  1527 12/28/21  1347   POTASSIUM mmol/L 2 6* 2 1* 2 4*   < > 3 5   CHLORIDE mmol/L 99* 90* 86*   < > 80*   CO2 mmol/L 28 32 30   < > 32   BUN mg/dL 21 25 29*   < > 33*   CREATININE mg/dL 1 19 1 47* 1 62*   < > 1 80*   CALCIUM mg/dL 8 9 9 3 9 2   < > 10 1   ALK PHOS U/L  --   --   --   --  156*   ALT U/L  --   --   --   --  28   AST U/L  --   --   --   --  69*    < > = values in this interval not displayed       Results from last 7 days   Lab Units 12/29/21  0742 12/28/21  1347   MAGNESIUM mg/dL 2 3 2 4                        IMAGING & DIAGNOSTIC TESTING     Radiology Results: I have personally reviewed pertinent reports  No results found  Other Diagnostic Testing: I have personally reviewed pertinent reports  ACTIVE MEDICATIONS     Current Facility-Administered Medications   Medication Dose Route Frequency    heparin (porcine) subcutaneous injection 5,000 Units  5,000 Units Subcutaneous Q8H [address]    ondansetron (ZOFRAN) injection 4 mg  4 mg Intravenous Q6H PRN    polyethylene glycol (MIRALAX) packet 17 g  17 g Oral Daily PRN    potassium chloride oral solution 20 mEq  20 mEq Oral Once    senna-docusate sodium (SENOKOT S) 8 6-50 mg per tablet 1 tablet  1 tablet Oral HS    sodium chloride 0 9 % with KCl 40 mEq/L infusion (premix)  60 mL/hr Intravenous Continuous       VTE Pharmacologic Prophylaxis: Heparin  VTE Mechanical Prophylaxis: sequential compression device    Portions of the record may have been created with voice recognition software  Occasional wrong word or "sound a like" substitutions may have occurred due to the inherent limitations of voice recognition software    Read the chart carefully and recognize, using context, where substitutions have occurred   ==  [address], [place]  [address]  Internal Medicine Residency PGY-1

## 2021-12-29 NOTE — ASSESSMENT & PLAN NOTE
Patient with true hyponatremia complicated by pseudohyponatremia in the setting of HHS  Serum sodium 121 on arrival, 130 with correction in setting of hyperglycemia  Sodium levels improving at time of admission  Plan  · Resolved   Na 138 today  · Nephrology consult, recommendations greatly appreciated

## 2021-12-29 NOTE — CONSULTS
Mundo 80 y o  female MRN: [id number]  Unit/Bed#: ED 26 Encounter: [id number]    ASSESSMENT and PLAN:  1  Hypokalemia:  · Acute on chronic:  Outpatient medications -on potassium chloride 60 mg t i d   · Outpatient diuretic dose Lasix 40 mg daily, Zaroxolyn 5 mg daily  · 12/28:  Potassium initially 3 5 with repeat value 2 4  Repleted with IV and oral potassium chloride  Also had a magnesium infusion  Current potassium 2 6  · Suspect intracellular potassium shift due to endogenous insulin effect  · Moving forward would recommend change in diuretic regime with the addition of a potassium-sparing diuretic  · No evidence of GI losses, acid-base imbalance  · Will check urine creatinine, urine potassium, urine sodium and urine chloride to assess for renal potassium wasting  · Currently on potassium containing IV fluids:  Normal saline with 40 mEq of potassium chloride per L at 100 mL/hour  · Agree with potassium chloride 60 mEq p o  And 20 IV  · Continue to closely monitor potassium levels  2  Acute kidney injury on CKD:  · Creatinine initially 1 8 improving with volume administration and currently down 1 19 which is within baseline  · Likely related to volume depletion/osmotic diuresis with hyperglycemia, improving with IV fluids  3  Chronic kidney disease, stage III:    · Baseline creatinine 1 1-1 3  No history of significant proteinuria with last microalbumin creatinine ratio 50 on 02/08/2021  · No prior history of microscopic hematuria  · Etiology:  Likely age-related nephron loss with nephrosclerosis  4  Hyperosmolar hyperglycemic state:    · Glucose 655 on admission   · Glucose declining  · Unable to administer insulin due to severe hypokalemia  5  Diabetes mellitus type 2:  Outpatient medications:  Glucotrol, Januvia, Prandin  6  Hyponatremia:  Pseudohyponatremia due to hyperglycemia    With initial sodium level of 121 and glucose 655 sodium corrects to almost normal, 134  Current sodium level normal  7  Other:  · Dementia  · Aortic stenosis        HISTORY OF PRESENT ILLNESS:  Requesting Physician: Sayra Hernández MD  Reason for Consult:  Hypokalemia, SALTY, CKD    Shahid Montague is a 80 y o  female with a PMH of CKD, hypertension, moderate aortic stenosis, dementia, diabetes mellitus type 2, syncope who was admitted to [facility] after presenting hyperosmolar hyperglycemic state, acute kidney injury and hypokalemia  Patient was seen and examined in emergency room  She awakened easily to voice  She states she feels fine and has no pain, asking for a drink of water  States that she is thirsty otherwise had no complaints  Blood sugar has been improving slowly  Unable to administer insulin due to severe hypokalemia  History of hypokalemia on review of prior records on robust diuretic dose with daily Zaroxolyn and Lasix  Localized edema to the lower extremities  Patient lying flat comfortably on room air oxygen  A renal consultation is requested today for assistance in the management of acute kidney injury, CKD, hypokalemia      PAST MEDICAL HISTORY:  Past Medical History:   Diagnosis Date    Aortic stenosis     Bilateral edema of lower extremity     Dementia (HCC)     Diabetes ([address] )     Fall     Gait abnormality     Hyperlipidemia     Hypertension     Hypokalemia     Other ascites     Varicose veins of leg with edema        PAST SURGICAL HISTORY:  Past Surgical History:   Procedure Laterality Date    BREAST SURGERY      ELBOW SURGERY         ALLERGIES:  No Known Allergies    SOCIAL HISTORY:  Social History     Substance and Sexual Activity   Alcohol Use No     Social History     Substance and Sexual Activity   Drug Use No     Social History     Tobacco Use   Smoking Status Never Smoker   Smokeless Tobacco Never Used       FAMILY HISTORY:  Family History   Problem Relation Age of Onset    Dementia Sister MEDICATIONS:    Current Facility-Administered Medications:     heparin (porcine) subcutaneous injection 5,000 Units, 5,000 Units, Subcutaneous, Q8H [address], 5,000 Units at 12/29/21 0530 **AND** Platelet count, , , Once, Harris Larios DO    ondansetron (ZOFRAN) injection 4 mg, 4 mg, Intravenous, Q6H PRN, Rafy Caprice, DO    polyethylene glycol (MIRALAX) packet 17 g, 17 g, Oral, Daily PRN, Rafy Caprice, DO    potassium chloride 20 mEq IVPB (premix), 20 mEq, Intravenous, Once, Lizz Sevilla DO    senna-docusate sodium (SENOKOT S) 8 6-50 mg per tablet 1 tablet, 1 tablet, Oral, HS, Harris Larios,     sodium chloride 0 9 % with KCl 40 mEq/L infusion (premix), 100 mL/hr, Intravenous, Continuous, Harris Larios DO, Last Rate: 100 mL/hr at 12/28/21 2136, 100 mL/hr at 12/28/21 2136    Current Outpatient Medications:     donepezil (ARICEPT) 10 mg tablet, Take 1 tablet (10 mg total) by mouth daily, Disp: 90 tablet, Rfl: 1    furosemide (LASIX) 40 mg tablet, Take 1 tablet (40 mg total) by mouth daily, Disp: 90 tablet, Rfl: 1    glipiZIDE (GLUCOTROL XL) 10 mg 24 hr tablet, Take 1 tablet (10 mg total) by mouth daily, Disp: 90 tablet, Rfl: 1    magnesium oxide (MAG-OX) 400 mg, Take 1 tablet (400 mg total) by mouth daily, Disp: 90 tablet, Rfl: 1    metolazone (ZAROXOLYN) 5 mg tablet, Take 1 tablet (5 mg total) by mouth daily, Disp: 90 tablet, Rfl: 1    Potassium Chloride ER 20 MEQ TBCR, Take 3 tablets (60 mEq total) by mouth 3 (three) times a day, Disp: 810 tablet, Rfl: 1    repaglinide (PRANDIN) 2 mg tablet, Take 1 tablet (2 mg total) by mouth 3 (three) times a day before meals, Disp: 270 tablet, Rfl: 1    sitaGLIPtin (JANUVIA) 100 mg tablet, Take 1 tablet (100 mg total) by mouth daily, Disp: 90 tablet, Rfl: 1    acetaminophen (TYLENOL) 325 mg tablet, Take 650 mg by mouth every 6 (six) hours as needed for mild pain, Disp: , Rfl:     Diclofenac Sodium (VOLTAREN) 1 %, Apply 2 g topically to affected area 4 times daily at 9a-1p-5p-9p, Disp: 100 g, Rfl: 5    REVIEW OF SYSTEMS:  Review of systems limited by underlying dementia  Constitutional:  Denies unusual fatigue at this time  Eyes: Negative for visual disturbance  Respiratory: Negative for cough, shortness of breath and wheezing  Cardiovascular: Negative for chest pain  Gastrointestinal: Negative for abdominal pain, constipation, diarrhea, nausea and vomiting  Genitourinary: No dysuria, hematuria  Musculoskeletal:  Denies unusual arthralgia or myalgia  Skin: Negative for rash  Neurological:  Denies dizziness  Hematological:  Denies bleeding  All the systems were reviewed and were negative except as documented on the HPI  PHYSICAL EXAM:  Current Weight:    First Weight:    Vitals:    12/29/21 0100 12/29/21 0300 12/29/21 0500 12/29/21 0700   BP: 121/54 113/68 119/61 116/60   Pulse: 66 78 70 66   Resp: 18  18    Temp:       TempSrc:       SpO2: 96% 95% 96%        Intake/Output Summary (Last 24 hours) at 12/29/2021 0920  Last data filed at 12/29/2021 0554  Gross per 24 hour   Intake 1100 ml   Output 1000 ml   Net 100 ml     Physical Exam  Vitals reviewed  Constitutional:       General: She is not in acute distress  Appearance: She is well-developed  She is not toxic-appearing  HENT:      Head: Normocephalic and atraumatic  Nose: Congestion present  Mouth/Throat:      Mouth: Mucous membranes are dry  Pharynx: No oropharyngeal exudate  Eyes:      General: No scleral icterus  Right eye: No discharge  Left eye: No discharge  Extraocular Movements: Extraocular movements intact  Conjunctiva/sclera: Conjunctivae normal    Neck:      Vascular: No carotid bruit or JVD  Trachea: No tracheal deviation  Cardiovascular:      Rate and Rhythm: Normal rate  Rhythm irregular  Heart sounds: Murmur heard  Systolic murmur is present with a grade of 3/6  No friction rub  No gallop      Pulmonary:      Effort: Pulmonary effort is normal  No respiratory distress  Breath sounds: Normal breath sounds  No stridor  No wheezing, rhonchi or rales  Abdominal:      General: Bowel sounds are normal  There is no distension  Palpations: Abdomen is soft  There is no mass  Tenderness: There is no abdominal tenderness  There is no guarding or rebound  Musculoskeletal:         General: Normal range of motion  Cervical back: Normal range of motion and neck supple  No rigidity or tenderness  Right lower le+ Pitting Edema present  Left lower le+ Pitting Edema present  Skin:     General: Skin is warm and dry  Coloration: Skin is not pale  Findings: No erythema or rash  Neurological:      General: No focal deficit present  Mental Status: She is alert  Psychiatric:         Mood and Affect: Mood normal            Invasive Devices:      Lab Results:   Results from last 7 days   Lab Units 21  0907 21  0742 21  0044 21  1759 21  1527 21  1347   WBC Thousand/uL 10 50*  --   --   --   --  12 79*   HEMOGLOBIN g/dL 13 7  --   --   --   --  14 5   HEMATOCRIT % 39 1  --   --   --   --  38 8   PLATELETS Thousands/uL 248  --   --   --   --  286   POTASSIUM mmol/L  --  2 6* 2 1* 2 4*   < > 3 5   CHLORIDE mmol/L  --  99* 90* 86*   < > 80*   CO2 mmol/L  --  28 32 30   < > 32   BUN mg/dL  --  21 25 29*   < > 33*   CREATININE mg/dL  --  1 19 1 47* 1 62*   < > 1 80*   CALCIUM mg/dL  --  8 9 9 3 9 2   < > 10 1   MAGNESIUM mg/dL  --   --   --   --   --  2 4   ALK PHOS U/L  --   --   --   --   --  156*   ALT U/L  --   --   --   --   --  28   AST U/L  --   --   --   --   --  69*    < > = values in this interval not displayed       Other Studies:

## 2021-12-29 NOTE — CASE MANAGEMENT
Case Management Assessment & Discharge Planning Note    Patient name Mathieu Livingston  Location ED 26/ED 26 MRN [id number]  : 1923 Date 2021       Current Admission Date: 2021  Current Admission Diagnosis:Hyperosmolar hyperglycemic state (HHS) [facility])   Patient Active Problem List    Diagnosis Date Noted    Acute-on-chronic kidney injury ([facility] ) 2021    Hyperosmolar hyperglycemic state (HHS) ([facility] ) 2021    Urinary incontinence 2021    First degree AV block 2021    At risk for delirium 2021    Chest pain 2021    Syncope 2021    HTN (hypertension) 2021    SALTY (acute kidney injury) ([facility] ) 2021    Closed fracture of left proximal humerus 2020    Ambulatory dysfunction 2020    Atherosclerosis of aorta ([facility] ) 2019    Medicare annual wellness visit, subsequent 2018    Screening for depression 2018    Screening for genitourinary condition 2018    Screening for neurological condition 2018    Bilateral lower extremity edema 10/04/2017    Hypokalemia 2017    CKD (chronic kidney disease), stage III ([facility]) 2017    Hyponatremia 2017    Leukocytosis 2017    Moderate aortic stenosis 2017    Gallstone 2016    Anemia 2016    Pancreatic cyst     Uncomplicated senile dementia ([facility] ) 2015    Benign essential hypertension 2015    Hypercholesterolemia 2013    Osteopenia 2013    Type 2 diabetes mellitus with diabetic chronic kidney disease ([facility] ) 2013      LOS (days): 1  Geometric Mean LOS (GMLOS) (days):   Days to GMLOS:     OBJECTIVE:    Risk of Unplanned Readmission Score: 12     Current admission status: Inpatient       Preferred Pharmacy:   [facility] [address] [place] [address]  Phone: [phone] Fax: [phone]    Primary Care Provider: No primary care provider on file  Primary Insurance: MEDICARE  Secondary Insurance: AARP    ASSESSMENT:  Active Health Care Agents    There are no active Health Care Agents on file         Advance Directives  Does patient have a Health Care POA?: Yes (Kelechi Gloria)  Does patient have Advance Directives?: Yes  Advance Directives: Living will      Patient Information  Admitted from[de-identified] Facility  Mental Status: Alert  During Assessment patient was accompanied by: Not accompanied during assessment  Assessment information provided by[de-identified] Daughter  Primary Caregiver: Self  Support Systems: [address] of Residence: [facility]  Type of Current Residence: Facility  In the last 12 months, was there a time when you were not able to pay the mortgage or rent on time?: No  In the last 12 months, was there a time when you did not have a steady place to sleep or slept in a shelter (including now)?: No  Homeless/housing insecurity resource given?: No  Living Arrangements: Lives Alone    Activities of Daily Living Prior to Admission  Functional Status: Assistance  Completes ADLs independently?: Yes  Ambulates independently?: Yes  Does patient use assisted devices?: Yes  Assisted Devices (DME) used: [employer] Chair  Does patient currently own DME?: Yes  What DME does the patient currently own?: [employer]  Does patient have a history of Outpatient Therapy (PT/OT)?: No  Does the patient have a history of Short-Term Rehab?: No  Does patient have a history of HHC?: Yes  Does patient currently have [facility]?: Yes (SN and ST)    Current [employer]  Type of Current Home Care Services: Home ST,Nurse visit  [address][de-identified] Other (please enter name in comment) (VNA through [facility])  [address] Provider[de-identified] PCP    Patient Information Continued  Income Source: Pension/nursing home  Does patient have prescription coverage?: Yes  Within the past 12 months, you worried that your food would run out before you got the money to buy more : Never true  Within the past 12 months, the food you bought just didnt last and you didnt have money to get more : Never true  Food insecurity resource given?: No  Does patient receive dialysis treatments?: No  Does patient have a history of substance abuse?: No  Does patient have a history of Mental Health Diagnosis?: Yes (Dementia)  Is patient receiving treatment for mental health?: Yes  Has patient received inpatient treatment related to mental health in the last 2 years?: No    PHQ 2/9 Screening   Reviewed PHQ 2/9 Depression Screening Score?: No    Means of Transportation  Means of Transport to Appts[de-identified] Family transport  In the past 12 months, has lack of transportation kept you from medical appointments or from getting medications?: No  In the past 12 months, has lack of transportation kept you from meetings, work, or from getting things needed for daily living?: No  Was application for public transport provided?: No        DISCHARGE DETAILS:    Discharge planning discussed with[de-identified] Dtr Vikas Aguirre of Choice: Yes    CM contacted family/caregiver?: Yes    Contacts  Patient Contacts: Fabricio Beaulieu  Relationship to Patient[de-identified] Family  Contact Method: Phone  Phone Number: [phone]  Reason/Outcome: Emergency [address]         Is the patient interested in [facility] at discharge?: No      Other Referral/Resources/Interventions Provided:  Interventions: Short Term Rehab  Referral Comments: Consult for STR   Pt to be moved upstairs, awaiting therapy recs

## 2021-12-29 NOTE — ASSESSMENT & PLAN NOTE
Patient with a known history of senile dementia  Patient currently resides in an assisted living facility, [facility]  Per conversation this evening with POA, patient's daughter Rock Gómez, patient is typically only alert and oriented to herself  Her daughter tells me that she is typically able to walk independently with a walker  She requires assistance with management of her medications at her assisted living facility      Plan  · AOx1  · Monitor for progression back to baseline with treatment of acute conditions as indicated elsewhere in note  · Geriatrics consultation, recommendations greatly appreciated  · Continue home Arice[facility]

## 2021-12-29 NOTE — H&P
INTERNAL MEDICINE RESIDENCY ADMISSION H&P     Name: Brittni Antony   Age & Sex: 80 y o  female   MRN: [id number]  Unit/Bed#: ED 32   Encounter: [id number]  Primary Care Provider: No primary care provider on file  Code Status: Level 3 - DNAR and DNI  Admission Status: INPATIENT   Disposition: Patient requires Level 2 Step Down     Admit to team: SOD Team C     ASSESSMENT/PLAN     Principal Problem:    Hyperosmolar hyperglycemic state (HHS) ([facility] )  Active Problems:    Benign essential hypertension    Bilateral lower extremity edema    Hypokalemia    Hyponatremia    Moderate aortic stenosis    Uncomplicated senile dementia (HCC)    Acute-on-chronic kidney injury ([facility] )    Urinary incontinence    First degree AV block    At risk for delirium      * Hyperosmolar hyperglycemic state (HHS) ([facility] )  Assessment & Plan  Lab Results   Component Value Date    HGBA1C 8 6 (H) 04/27/2021       Recent Labs     12/28/21  1336   POCGLU >500*       Blood Sugar Average: Last 72 hrs:     Patient is a known diabetic who has previously refused insulin therapy in the outpatient setting  Most recent A1c 8 6% per chart review in April of 2021  She presents from her assisted living facility today for evaluation of multiple lab abnormalities, particularly glucose 584 and potassium of 2 4  Per review of available facility paperwork, patient has been compliant with her oral hypoglycemic regimen, which includes Glucotrol, Januvia, and Prandin  On arrival to the ED, patient's blood sugar off of BNP was 655 with associated hypokalemia to 2 4 and pseudo hyponatremia  Patient had a minimally elevated beta hydroxybutyrate with no anion gap  In the ED, patient has been fortunately hemodynamically and symptomatically stable  At time of admission, patient has thus far been unable to receive any subcutaneous or IV insulin given the degree of her hypokalemia    Repeat BMP obtained at time of admission shows improvement overall yet persistent hypokalemia to 2 4, though patient is currently actively receiving repletion  MICU attending evaluated patient at the bedside in the ED, deemed patient stable for admission as Step Down Level 2 under general medicine  Plan:  · Recheck BMP overnight once IV repletion has completed; if potassium levels improve, will dose with subcutaneous or IV insulin at that time  · Once hypokalemia is under better control, will consider standing subcu insulin regimen  · Normal saline with 40 mEq potassium maintenance fluids  · Trend blood sugars closely  · Endocrine consult in the morning, recommendations greatly appreciated  · Holding home oral antihyperglycemics  · Recheck A1c    At risk for delirium  Assessment & Plan  Given baseline history of dementia, patient is at significant risk for hospital associated delirium      Plan  Geriatrics consult, recommendations greatly appreciated    -Patient is high risk of delirium due to her underlying dementia and admission to unfamiliar environment  -Initiate delirium precautions  -maintain normal sleep/wake cycle  -minimize overnight interruptions, group overnight vitals/labs/nursing checks as much as possible  -dim lights, close blinds and turn off tv to minimize stimulation and encourage sleep environment in evenings  -ensure that pain is well controlled, consider Tylenol 975mg Q8H scheduled   -monitor closely for fecal and urinary retention which may precipitate delirium  -encourage early mobilization and ambulation with PT and OT assistance  -provide frequent reorientation and redirection as required  -encourage family and friends at the bedside to help help calm patient if anxious  -avoid medications which may precipitate or worsen delirium such as tramadol, benzodiazepine, anticholinergics, and benadryl  -encourage good hydration and nutrition   -redirect unwanted behaviors as first line, avoid physical restraints, use chemical restraint only if all other attempts have been unsuccessful, would consider Zyprexa 2 5mg IM Q daily, monitor for orthostatic hypotension and QTc prolongation with repeat dosing, recommend lowest dose possible for shortest duration possible         First degree AV block  Assessment & Plan  EKG on arrival shows first-degree AV block with PVCs and left bundle-branch block  Review of previous EKGs show similar findings  Plan  · No urgent indication to involve Cardiology  · Recommend avoiding QTC prolonging agents if able    Urinary incontinence  Assessment & Plan  Per discussion with POA, patient has been dealing with urinary incontinence at her assisted living facility for most of at least the last year  The patient is unable to provide any information regarding the details of her urinary incontinence  Plan  · Monitor patient closely, would recommend considering PureWick catheter and avoiding implantation of Mascorro catheter during this admission given likelihood of exacerbation of delirium    Acute-on-chronic kidney injury [facility])  Assessment & Plan  Lab Results   Component Value Date    EGFR 26 12/28/2021    EGFR 23 12/28/2021    EGFR 34 04/27/2021    CREATININE 1 62 (H) 12/28/2021    CREATININE 1 80 (H) 12/28/2021    CREATININE 1 32 (H) 04/27/2021     Patient presents with serum creatinine 1 8 with apparent baseline of roughly 1 0-1 3  Patient has known history of CKD stage 3  Plan  · SALTY on CKD likely prerenal in setting of dehydration secondary to HHS  · IV fluids for hydration  · Trend renal indices and urine output closely  · Avoid hypotension and nephrotoxins where able  · Nephrology consult, recommendations greatly appreciated  · Follow-up UA  · To consider renal ultrasound, defer to Nephrology    Uncomplicated senile dementia [facility])  Assessment & Plan  Patient with a known history of senile dementia  Patient currently resides in an assisted living facility, [facility]    Per conversation this evening with POA, patient's daughter Vesta Garcia, patient is typically only alert and oriented to herself  Her daughter tells me that she is typically able to walk independently with a walker  She requires assistance with management of her medications at her assisted living facility  In light of discussion with daughter, patient appears to be more disoriented than baseline  Plan  · Patient currently alert and oriented only to her name, not her birthday, location, or year  · Monitor for progression back to baseline with treatment of acute conditions as indicated elsewhere in note  · Geriatrics consultation, recommendations greatly appreciated  · Holding home donepezil on arrival, defer to Geriatrics regarding restarting    Moderate aortic stenosis  Assessment & Plan  Patient with history of known aortic stenosis, moderate to severe in severity, seen on echocardiogram obtained April 2021  Patient currently with grade 3/6 murmur on exam     Plan  · Monitor fluid status extremely closely  · Consider repeat echocardiogram this admission      Hyponatremia  Assessment & Plan  Patient with true hyponatremia complicated by pseudohyponatremia in the setting of HHS  Serum sodium 121 on arrival, 130 with correction in setting of hyperglycemia  Sodium levels improving at time of admission  Plan  · Trend BMP Q 8  · Maintenance fluids with normal saline with 40 mEq of K  · Nephrology consult, recommendations greatly appreciated    Hypokalemia  Assessment & Plan  Serum potassium 2 4 on arrival in setting of HHS  Patient received 60 mEq oral and 20 mEq IV in the ED with a further 20 mEq IV pending at time of admission  Per chart review, patient does have prior history of hypokalemia that does not appear to have been worked up previously and she does take 60 mEq of potassium 3 times daily per facility documentation      Plan  · Recheck BMP at midnight  · Monitor and replete as necessary aggressively  · Maintenance fluids:  Normal saline with 40 mEq K  · As it appears this has not been worked up previously, we will consult Nephrology this time, recommendations greatly appreciated  · Urinalysis with microscopy    Bilateral lower extremity edema  Assessment & Plan  On exam, patient has bilateral lower extremity edema that appears chronic in nature  Reflecting this, patient takes Lasix 40 mg and Zaroxolyn 5 mg daily at her assisted living facility  Plan  · Holding home diuretics in setting of HHS  · Elevate legs  · Routine skin care    Benign essential hypertension  Assessment & Plan  Patient with noted history of hypertension however her only relevant antihypertensive medications as an outpatient are directed at her chronic venous stasis  Patient initially presented with mild hypertension that has improved without intervention in the ED  Plan  · Holding home diuretics in setting of HHS  · Monitor pressures closely      VTE Pharmacologic Prophylaxis: Heparin  VTE Mechanical Prophylaxis: sequential compression device    CHIEF COMPLAINT     Chief Complaint   Patient presents with    Hyperglycemia - no symptoms     pt is known diabetic, had blood work done today and BS was 584, [facility] called EMS  pt has no complaints per EMS "was found next to a bowl of candy in her room"    Low Potassium     blood work done today potassium of 2 4      HISTORY OF PRESENT ILLNESS     Will Corbin is a 80 y o  female with past medical history significant for diabetes, CKD 3, dementia, hypokalemia, aortic stenosis, and hypertension who presents on recommendation from her assisted living facility after she was found to have multiple laboratory abnormalities  Patient has a baseline of significant dementia making history taking and review of systems effectively impossible to complete    Per chart review, discussion with the ED providers, and discussion with patient's POA, her daughter Emma Crabtree, patient was at her assisted living facility, [facility], earlier today when she received routine blood work that showed significant hyperglycemia and hypokalemia  The patient's daughter called her facility and recommended they activated EMS to bring the patient into the ED  On arrival to the ED, patient was without complaints  Her vital signs on arrival were within normal limits  She was noted to have several significant laboratory abnormalities, including hyponatremia complicated by pseudohyponatremia in the setting of hyperglycemia, hypokalemia, and minimal leukocytosis  Patient was found have a borderline elevated beta hydroxybutyrate without lamar acidosis or elevated anion gap  While in the ED, patient was given a bolus of IV fluids and ordered repletion of her potassium with both intravenous and oral formulations  At time of admission, patient had not received any IV or subcutaneous insulin for her hyperglycemia given her profound hypokalemia  At time of my exam, patient denied any acute complaints  She has poor memory/recall and is unaware of the situation that led to her being in the hospital   After admission, I was able to contact the patient's POA, her daughter Arlene Aguirre, for more detailed discussion regarding her baseline  According to the daughter, the patient lives at her assisted living facility who are supposed to assist her with all of her medications  The patient's daughter states that the patient is usually oriented to herself only  Her daughter tells me that recently the patient has been noted to have worsening appetite and oral intake  She states she is being worked up for concerns for dysphagia at her facility  She states she has been experiencing urinary incontinence chronically  She states she ambulates with a walker and is able to participate in some of her ADLs with assistance  She states that the patient is unable to perform any of her IADLs      Patient is to be admitted under Step Down Level 2 under the general medicine service of [facility] for management of HHS with associated electrolyte abnormalities  Review of living will located in the electronic health record indicates patient desires to be a Level 3 DNAR/DNI code status  REVIEW OF SYSTEMS     Review of Systems   Constitutional: Negative for chills, fatigue and fever  HENT: Negative for congestion, postnasal drip, rhinorrhea, sinus pressure, sinus pain and sore throat  Respiratory: Negative for cough, shortness of breath and wheezing  Cardiovascular: Negative for chest pain and palpitations  Gastrointestinal: Negative for abdominal pain, constipation, diarrhea, nausea and vomiting  Genitourinary: Negative for difficulty urinating, dysuria, frequency, hematuria and urgency  Musculoskeletal: Negative for arthralgias, back pain, gait problem and myalgias  Skin: Negative for pallor, rash and wound  Neurological: Negative for dizziness, weakness, light-headedness, numbness and headaches  OBJECTIVE     Vitals:    21 1332 21 1645 21 1752 21   BP: (!) 136/47 106/60 113/71 113/66   BP Location: Left arm  Left arm    Pulse: 68 60 77 64   Resp: 18 20 20 18   Temp: 97 8 °F (36 6 °C)      TempSrc: Oral      SpO2: 98% 96% 97% 96%      Temperature:   Temp (24hrs), Av 8 °F (36 6 °C), Min:97 8 °F (36 6 °C), Max:97 8 °F (36 6 °C)    Temperature: 97 8 °F (36 6 °C)  Intake & Output:  I/O        0701   0700  0701   0700    I V   1000    IV Piggyback  100    Total Intake  1100    Net  +1100              Weights: There is no height or weight on file to calculate BMI  Weight (last 2 days)     None        Physical Exam  Vitals and nursing note reviewed  Constitutional:       General: She is not in acute distress  Appearance: Normal appearance  She is normal weight  She is not ill-appearing, toxic-appearing or diaphoretic  Comments: Patient is lethargic but easily arousable  She is pleasant, calm, and cooperative    She is resting comfortably on a hospital stretcher in no acute distress  HENT:      Head: Normocephalic and atraumatic  Mouth/Throat:      Mouth: Mucous membranes are dry  Eyes:      General: No scleral icterus  Extraocular Movements: Extraocular movements intact  Conjunctiva/sclera: Conjunctivae normal       Pupils: Pupils are equal, round, and reactive to light  Cardiovascular:      Rate and Rhythm: Normal rate and regular rhythm  Pulses: Normal pulses  Heart sounds: Murmur heard  Systolic murmur is present with a grade of 3/6  No friction rub  No gallop  Pulmonary:      Effort: Pulmonary effort is normal  No respiratory distress  Breath sounds: Normal breath sounds  No stridor  No wheezing or rhonchi  Abdominal:      General: Abdomen is flat  Bowel sounds are normal  There is no distension  Palpations: Abdomen is soft  There is no mass  Tenderness: There is no abdominal tenderness  There is no right CVA tenderness, left CVA tenderness, guarding or rebound  Hernia: No hernia is present  Musculoskeletal:         General: No swelling, tenderness, deformity or signs of injury  Right lower leg: Edema present  Left lower leg: Edema present  Comments: 2+ to 3+ pitting edema noted over bilateral lower extremities to the level of the knees with associated xerosis of the overlying skin      Skin:     General: Skin is warm and dry  Capillary Refill: Capillary refill takes less than 2 seconds  Coloration: Skin is not pale  Findings: No erythema or rash  Comments: Xerotic skin changes noted on bilateral lower extremities  Neurological:      Mental Status: She is alert  She is disoriented  Cranial Nerves: No cranial nerve deficit  Sensory: No sensory deficit  Motor: No weakness  Comments: Orientation:  Patient is oriented only to her name    She is unable to provide her full birthday (she is aware of the day and month but not the year)  She is unable to provide her location or the current year  Cranial nerves 2-12 intact bilaterally  Motor:  5/5 muscle strength grossly in all 4 extremities  Sensory:  Sensation grossly intact throughout  PAST MEDICAL HISTORY     Past Medical History:   Diagnosis Date    Aortic stenosis     Bilateral edema of lower extremity     Dementia [facility]     Diabetes ([facility] )     Fall     Gait abnormality     Hyperlipidemia     Hypertension     Hypokalemia     Other ascites     Varicose veins of leg with edema      PAST SURGICAL HISTORY     Past Surgical History:   Procedure Laterality Date    BREAST SURGERY      ELBOW SURGERY       SOCIAL & FAMILY HISTORY     Social History     Substance and Sexual Activity   Alcohol Use No       Social History     Substance and Sexual Activity   Drug Use No     Social History     Tobacco Use   Smoking Status Never Smoker   Smokeless Tobacco Never Used     Family History   Problem Relation Age of Onset    Dementia Sister      LABORATORY DATA     Labs: I have personally reviewed pertinent reports  Results from last 7 days   Lab Units 12/28/21  1347   WBC Thousand/uL 12 79*   HEMOGLOBIN g/dL 14 5   HEMATOCRIT % 38 8   PLATELETS Thousands/uL 286   NEUTROS PCT % 67   MONOS PCT % 9      Results from last 7 days   Lab Units 12/28/21  1759 12/28/21  1527 12/28/21  1347   POTASSIUM mmol/L 2 4* 2 4* 3 5   CHLORIDE mmol/L 86*  --  80*   CO2 mmol/L 30  --  32   BUN mg/dL 29*  --  33*   CREATININE mg/dL 1 62*  --  1 80*   CALCIUM mg/dL 9 2  --  10 1   ALK PHOS U/L  --   --  156*   ALT U/L  --   --  28   AST U/L  --   --  69*     Results from last 7 days   Lab Units 12/28/21  1347   MAGNESIUM mg/dL 2 4                      Micro:  No results found for: BLOODCX, Diannah Rouge, WOUNDCULT, SPUTUMCULTUR  IMAGING & DIAGNOSTIC TESTS     Imaging: I have personally reviewed pertinent reports  No results found    EKG, Pathology, and Other Studies: I have personally reviewed pertinent reports  ALLERGIES   No Known Allergies  MEDICATIONS PRIOR TO ARRIVAL     Prior to Admission medications    Medication Sig Start Date End Date Taking?  Authorizing Provider   donepezil (ARICEPT) 10 mg tablet Take 1 tablet (10 mg total) by mouth daily 8/3/21  Yes David Mckenna MD   furosemide (LASIX) 40 mg tablet Take 1 tablet (40 mg total) by mouth daily 8/3/21  Yes AME Turner   glipiZIDE (GLUCOTROL XL) 10 mg 24 hr tablet Take 1 tablet (10 mg total) by mouth daily 8/3/21  Yes AME Turner   magnesium oxide (MAG-OX) 400 mg Take 1 tablet (400 mg total) by mouth daily 8/3/21  Yes AME Turner   metolazone (ZAROXOLYN) 5 mg tablet Take 1 tablet (5 mg total) by mouth daily 8/3/21  Yes AME Turner   Potassium Chloride ER 20 MEQ TBCR Take 3 tablets (60 mEq total) by mouth 3 (three) times a day 2/23/21  Yes AME Turner   repaglinide (PRANDIN) 2 mg tablet Take 1 tablet (2 mg total) by mouth 3 (three) times a day before meals 7/2/21  Yes AME Barrientos   sitaGLIPtin (JANUVIA) 100 mg tablet Take 1 tablet (100 mg total) by mouth daily 8/3/21  Yes AME Turner   acetaminophen (TYLENOL) 325 mg tablet Take 650 mg by mouth every 6 (six) hours as needed for mild pain    Historical Provider, MD   Diclofenac Sodium (VOLTAREN) 1 % Apply 2 g topically to affected area 4 times daily at 9a-1p-5p-9p 6/1/21   David Mckenna MD     MEDICATIONS ADMINISTERED IN LAST 24 HOURS     Medication Administration - last 24 hours from 12/27/2021 2108 to 12/28/2021 2108       Date/Time Order Dose Route Action Action by     12/28/2021 1514 multi-electrolyte (ISOLYTE-S PH 7 4) bolus 1,000 mL 0 mL Intravenous Stopped Nusrat Dalal RN     12/28/2021 1352 multi-electrolyte (ISOLYTE-S PH 7 4) bolus 1,000 mL 1,000 mL Intravenous New Fartun Hardin RN     12/28/2021 1510 magnesium sulfate IVPB (premix) SOLN 1 g 0 g Intravenous Stopped Nusrat Dalal RN     12/28/2021 1410 magnesium sulfate IVPB (premix) SOLN 1 g 1 g Intravenous [address] Pepper Reed RN     12/28/2021 1938 potassium chloride 20 mEq IVPB (premix) 20 mEq Intravenous New Bag Lesley Brown RN     12/28/2021 1806 potassium chloride 20 mEq IVPB (premix) 0 mEq Intravenous Stopped Lesley Brown RN     12/28/2021 1606 potassium chloride 20 mEq IVPB (premix) 20 mEq Intravenous [address] Pepperreyes Reed RN     12/28/2021 1712 potassium chloride oral solution 60 mEq 60 mEq Oral Given Isaias Punches, RN        CURRENT MEDICATIONS     Current Facility-Administered Medications   Medication Dose Route Frequency Provider Last Rate    heparin (porcine)  5,000 Units Subcutaneous Q8H [address] Harris Larios, DO      ondansetron  4 mg Intravenous Q6H PRN Praveenda Bibiana, DO      polyethylene glycol  17 g Oral Daily PRN Heri Mccarty, DO      potassium chloride  20 mEq Intravenous Once Praveenda Bibiana, DO 20 mEq (12/28/21 1938)    senna-docusate sodium  1 tablet Oral HS Harris Larios, DO      sodium chloride 0 9 % with KCl 40 mEq/L  100 mL/hr Intravenous Continuous Harris Larios, DO       sodium chloride 0 9 % with KCl 40 mEq/L, 100 mL/hr      ondansetron, 4 mg, Q6H PRN  polyethylene glycol, 17 g, Daily PRN        Admission Time  I spent 30 minutes admitting the patient  This involved direct patient contact where I performed a full history and physical, reviewing previous records, and reviewing laboratory and other diagnostic studies  Portions of the record may have been created with voice recognition software  Occasional wrong word or "sound a like" substitutions may have occurred due to the inherent limitations of voice recognition software    Read the chart carefully and recognize, using context, where substitutions have occurred     ==  Heri Mccarty, [facility]  Internal Medicine Residency PGY-3

## 2021-12-29 NOTE — ED NOTES
Spoke with Maury Lyle from [facility] for patient update        Sana Thomas, RN  12/28/21 3063

## 2021-12-29 NOTE — ASSESSMENT & PLAN NOTE
Lab Results   Component Value Date    HGBA1C 13 0 (H) 12/28/2021       Recent Labs     01/01/22  1552 01/01/22  2055 01/02/22  0601 01/02/22  1105   POCGLU 304* 305* 161* 180*       Blood Sugar Average: Last 72 hrs:     Patient is a known diabetic who has previously refused insulin therapy in the outpatient setting  Most recent A1c 8 6% per chart review in April of 2021  She presents from her assisted living facility today for evaluation of multiple lab abnormalities, particularly glucose 584 and potassium of 2 4  Per review of available facility paperwork, patient has been compliant with her oral hypoglycemic regimen, which includes Glucotrol, Januvia, and Prandin  On arrival to the ED, patient's blood sugar off of BNP was 655 with associated hypokalemia to 2 4 and pseudo hyponatremia  Patient had a minimally elevated beta hydroxybutyrate with no anion gap     Plan  · Glucose continued between 150s-300s  · DC IV fluid  · Trend blood sugars closely  · Endocrine consult, recommendations greatly appreciated  · Continue 10 units Lantus at bedtime and correctional SSI algorithm while inpatient   · Continue diabetic diet  · Patient found to have endogenous insulin production  C-peptide 4ng/ml  · Endocrinology discussed with patient's family, who prefer to discharge pt off insulin  Instead of suggested daily injection of basal insulin + prandin, patient will be discharged on  Januvia 100 mg and home prandin 2 mg TID    · Holding home oral antihyperglycemics

## 2021-12-30 LAB
ANION GAP SERPL CALCULATED.3IONS-SCNC: 4 MMOL/L (ref 4–13)
ANION GAP SERPL CALCULATED.3IONS-SCNC: 5 MMOL/L (ref 4–13)
ANION GAP SERPL CALCULATED.3IONS-SCNC: 7 MMOL/L (ref 4–13)
BASOPHILS # BLD AUTO: 0.07 THOUSANDS/ΜL (ref 0–0.1)
BASOPHILS NFR BLD AUTO: 1 % (ref 0–1)
BUN SERPL-MCNC: 15 MG/DL (ref 5–25)
BUN SERPL-MCNC: 15 MG/DL (ref 5–25)
BUN SERPL-MCNC: 16 MG/DL (ref 5–25)
CALCIUM SERPL-MCNC: 10 MG/DL (ref 8.3–10.1)
CALCIUM SERPL-MCNC: 9.5 MG/DL (ref 8.3–10.1)
CALCIUM SERPL-MCNC: 9.5 MG/DL (ref 8.3–10.1)
CHLORIDE SERPL-SCNC: 101 MMOL/L (ref 100–108)
CHLORIDE SERPL-SCNC: 106 MMOL/L (ref 100–108)
CHLORIDE SERPL-SCNC: 99 MMOL/L (ref 100–108)
CO2 SERPL-SCNC: 24 MMOL/L (ref 21–32)
CO2 SERPL-SCNC: 29 MMOL/L (ref 21–32)
CO2 SERPL-SCNC: 29 MMOL/L (ref 21–32)
CREAT SERPL-MCNC: 1.09 MG/DL (ref 0.6–1.3)
CREAT SERPL-MCNC: 1.16 MG/DL (ref 0.6–1.3)
CREAT SERPL-MCNC: 1.18 MG/DL (ref 0.6–1.3)
EOSINOPHIL # BLD AUTO: 0.08 THOUSAND/ΜL (ref 0–0.61)
EOSINOPHIL NFR BLD AUTO: 1 % (ref 0–6)
ERYTHROCYTE [DISTWIDTH] IN BLOOD BY AUTOMATED COUNT: 13.2 % (ref 11.6–15.1)
GFR SERPL CREATININE-BSD FRML MDRD: 38 ML/MIN/1.73SQ M
GFR SERPL CREATININE-BSD FRML MDRD: 39 ML/MIN/1.73SQ M
GFR SERPL CREATININE-BSD FRML MDRD: 42 ML/MIN/1.73SQ M
GLUCOSE SERPL-MCNC: 273 MG/DL (ref 65–140)
GLUCOSE SERPL-MCNC: 289 MG/DL (ref 65–140)
GLUCOSE SERPL-MCNC: 291 MG/DL (ref 65–140)
GLUCOSE SERPL-MCNC: 367 MG/DL (ref 65–140)
GLUCOSE SERPL-MCNC: 371 MG/DL (ref 65–140)
GLUCOSE SERPL-MCNC: 382 MG/DL (ref 65–140)
GLUCOSE SERPL-MCNC: 390 MG/DL (ref 65–140)
HCT VFR BLD AUTO: 39.8 % (ref 34.8–46.1)
HGB BLD-MCNC: 13.7 G/DL (ref 11.5–15.4)
IMM GRANULOCYTES # BLD AUTO: 0.14 THOUSAND/UL (ref 0–0.2)
IMM GRANULOCYTES NFR BLD AUTO: 1 % (ref 0–2)
LYMPHOCYTES # BLD AUTO: 3.58 THOUSANDS/ΜL (ref 0.6–4.47)
LYMPHOCYTES NFR BLD AUTO: 31 % (ref 14–44)
MCH RBC QN AUTO: 29.9 PG (ref 26.8–34.3)
MCHC RBC AUTO-ENTMCNC: 34.4 G/DL (ref 31.4–37.4)
MCV RBC AUTO: 87 FL (ref 82–98)
MONOCYTES # BLD AUTO: 1.09 THOUSAND/ΜL (ref 0.17–1.22)
MONOCYTES NFR BLD AUTO: 9 % (ref 4–12)
NEUTROPHILS # BLD AUTO: 6.73 THOUSANDS/ΜL (ref 1.85–7.62)
NEUTS SEG NFR BLD AUTO: 57 % (ref 43–75)
NRBC BLD AUTO-RTO: 0 /100 WBCS
PLATELET # BLD AUTO: 267 THOUSANDS/UL (ref 149–390)
PMV BLD AUTO: 11.1 FL (ref 8.9–12.7)
POTASSIUM SERPL-SCNC: 3.1 MMOL/L (ref 3.5–5.3)
POTASSIUM SERPL-SCNC: 3.2 MMOL/L (ref 3.5–5.3)
POTASSIUM SERPL-SCNC: 4.1 MMOL/L (ref 3.5–5.3)
RBC # BLD AUTO: 4.58 MILLION/UL (ref 3.81–5.12)
SODIUM SERPL-SCNC: 134 MMOL/L (ref 136–145)
SODIUM SERPL-SCNC: 135 MMOL/L (ref 136–145)
SODIUM SERPL-SCNC: 135 MMOL/L (ref 136–145)
WBC # BLD AUTO: 11.69 THOUSAND/UL (ref 4.31–10.16)

## 2021-12-30 PROCEDURE — 97166 OT EVAL MOD COMPLEX 45 MIN: CPT

## 2021-12-30 PROCEDURE — 99232 SBSQ HOSP IP/OBS MODERATE 35: CPT | Performed by: INTERNAL MEDICINE

## 2021-12-30 PROCEDURE — 80048 BASIC METABOLIC PNL TOTAL CA: CPT | Performed by: INTERNAL MEDICINE

## 2021-12-30 PROCEDURE — 85025 COMPLETE CBC W/AUTO DIFF WBC: CPT

## 2021-12-30 PROCEDURE — 82948 REAGENT STRIP/BLOOD GLUCOSE: CPT

## 2021-12-30 PROCEDURE — 80048 BASIC METABOLIC PNL TOTAL CA: CPT | Performed by: STUDENT IN AN ORGANIZED HEALTH CARE EDUCATION/TRAINING PROGRAM

## 2021-12-30 PROCEDURE — 97163 PT EVAL HIGH COMPLEX 45 MIN: CPT

## 2021-12-30 PROCEDURE — 84681 ASSAY OF C-PEPTIDE: CPT | Performed by: INTERNAL MEDICINE

## 2021-12-30 RX ORDER — POTASSIUM CHLORIDE 20MEQ/15ML
60 LIQUID (ML) ORAL 3 TIMES DAILY
Status: DISCONTINUED | OUTPATIENT
Start: 2021-12-30 | End: 2021-12-30

## 2021-12-30 RX ORDER — POTASSIUM CHLORIDE 20MEQ/15ML
40 LIQUID (ML) ORAL ONCE
Status: COMPLETED | OUTPATIENT
Start: 2021-12-30 | End: 2021-12-30

## 2021-12-30 RX ORDER — MICONAZOLE NITRATE 20 MG/G
CREAM TOPICAL 2 TIMES DAILY
Status: DISCONTINUED | OUTPATIENT
Start: 2021-12-30 | End: 2022-01-04 | Stop reason: HOSPADM

## 2021-12-30 RX ORDER — POTASSIUM CHLORIDE 14.9 MG/ML
20 INJECTION INTRAVENOUS 2 TIMES DAILY
Status: COMPLETED | OUTPATIENT
Start: 2021-12-30 | End: 2021-12-30

## 2021-12-30 RX ORDER — INSULIN GLARGINE 100 [IU]/ML
10 INJECTION, SOLUTION SUBCUTANEOUS
Status: DISCONTINUED | OUTPATIENT
Start: 2021-12-30 | End: 2021-12-31

## 2021-12-30 RX ORDER — POTASSIUM CHLORIDE 20MEQ/15ML
60 LIQUID (ML) ORAL 3 TIMES DAILY
Status: DISCONTINUED | OUTPATIENT
Start: 2021-12-30 | End: 2022-01-02

## 2021-12-30 RX ORDER — POTASSIUM CHLORIDE 20MEQ/15ML
60 LIQUID (ML) ORAL 3 TIMES DAILY
Status: DISCONTINUED | OUTPATIENT
Start: 2021-12-31 | End: 2021-12-30

## 2021-12-30 RX ADMIN — HEPARIN SODIUM 5000 UNITS: 5000 INJECTION INTRAVENOUS; SUBCUTANEOUS at 05:14

## 2021-12-30 RX ADMIN — POTASSIUM CHLORIDE 40 MEQ: 20 SOLUTION ORAL at 09:33

## 2021-12-30 RX ADMIN — POTASSIUM CHLORIDE 20 MEQ: 14.9 INJECTION, SOLUTION INTRAVENOUS at 09:38

## 2021-12-30 RX ADMIN — DONEPEZIL HYDROCHLORIDE 10 MG: 10 TABLET ORAL at 23:18

## 2021-12-30 RX ADMIN — POTASSIUM CHLORIDE 60 MEQ: 20 SOLUTION ORAL at 21:09

## 2021-12-30 RX ADMIN — DONEPEZIL HYDROCHLORIDE 10 MG: 10 TABLET ORAL at 00:30

## 2021-12-30 RX ADMIN — INSULIN GLARGINE 10 UNITS: 100 INJECTION, SOLUTION SUBCUTANEOUS at 21:09

## 2021-12-30 RX ADMIN — POTASSIUM CHLORIDE 20 MEQ: 20 SOLUTION ORAL at 00:29

## 2021-12-30 RX ADMIN — HEPARIN SODIUM 5000 UNITS: 5000 INJECTION INTRAVENOUS; SUBCUTANEOUS at 21:09

## 2021-12-30 RX ADMIN — POTASSIUM CHLORIDE 40 MEQ: 20 SOLUTION ORAL at 06:05

## 2021-12-30 RX ADMIN — POTASSIUM CHLORIDE 20 MEQ: 14.9 INJECTION, SOLUTION INTRAVENOUS at 21:10

## 2021-12-30 RX ADMIN — POTASSIUM CHLORIDE 40 MEQ: 20 SOLUTION ORAL at 03:57

## 2021-12-30 NOTE — PROGRESS NOTES
INTERNAL MEDICINE RESIDENCY PROGRESS NOTE     Name: Raj Appiah   Age & Sex: 80 y o  female   MRN: [id number]  Unit/Bed#: [facility] 406-01   Encounter: [id number]  Team: SOD Team C     PATIENT INFORMATION     Name: Raj Appiah   Age & Sex: 80 y o  female   MRN: [id number]  Hospital Stay Days: 2    ASSESSMENT/PLAN     Principal Problem:    Hyperosmolar hyperglycemic state (HHS) ([facility] [address] )  Active Problems:    Benign essential hypertension    Bilateral lower extremity edema    Hypokalemia    Hyponatremia    Moderate aortic stenosis    Uncomplicated senile dementia (HCC)    Acute-on-chronic kidney injury ([facility] [address] )    Urinary incontinence    First degree AV block    At risk for delirium      At risk for delirium  Assessment & Plan  Given baseline history of dementia, patient is at significant risk for hospital associated delirium  Patient is currently A&O times 2      Plan  Geriatrics consult, recommendations greatly appreciated    -Patient is high risk of delirium due to her underlying dementia and admission to unfamiliar environment  -Initiate delirium precautions  -maintain normal sleep/wake cycle  -minimize overnight interruptions, group overnight vitals/labs/nursing checks as much as possible  -dim lights, close blinds and turn off tv to minimize stimulation and encourage sleep environment in evenings  -ensure that pain is well controlled, consider Tylenol 975mg Q8H scheduled   -monitor closely for fecal and urinary retention which may precipitate delirium  -encourage early mobilization and ambulation with PT and OT assistance  -provide frequent reorientation and redirection as required  -encourage family and friends at the bedside to help help calm patient if anxious  -avoid medications which may precipitate or worsen delirium such as tramadol, benzodiazepine, anticholinergics, and benadryl  -encourage good hydration and nutrition   -redirect unwanted behaviors as first line, avoid physical restraints, use chemical restraint only if all other attempts have been unsuccessful, would consider Zyprexa 2 5mg IM Q daily, monitor for orthostatic hypotension and QTc prolongation with repeat dosing, recommend lowest dose possible for shortest duration possible         First degree AV block  Assessment & Plan  EKG on arrival shows first-degree AV block with PVCs and left bundle-branch block  Review of previous EKGs show similar findings  Plan  · No urgent indication to involve Cardiology  · Recommend avoiding QTC prolonging agents if able    Urinary incontinence  Assessment & Plan  Per discussion with POA, patient has been dealing with urinary incontinence at her assisted living facility for most of at least the last year  The patient is unable to provide any information regarding the details of her urinary incontinence  Plan  · Monitor patient closely, would recommend considering PureWick catheter and avoiding implantation of Mascorro catheter during this admission given likelihood of exacerbation of delirium    Acute-on-chronic kidney injury [facility])  Assessment & Plan  Lab Results   Component Value Date    EGFR 42 12/30/2021    EGFR 39 12/30/2021    EGFR 40 12/29/2021    CREATININE 1 09 12/30/2021    CREATININE 1 16 12/30/2021    CREATININE 1 12 12/29/2021     Patient presents with serum creatinine 1 8 with apparent baseline of roughly 1 0-1 3  Patient has known history of CKD stage 3  Plan  · SALTY resolved  Renal function back to baseline    · SALTY on CKD likely prerenal in setting of dehydration secondary to HHS  · 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, arteriolar sclerosis, diabetic kidney disease  · IV fluids for hydration  · Trend renal indices and urine output closely  · Avoid hypotension and nephrotoxins where able  · Nephrology consult, recommendations greatly appreciated  · Will discontinue IV fluid, on diabetic diet  · SC Insulin started  · Follow-up UA  · To consider renal ultrasound, defer to Nephrology    Uncomplicated senile dementia [facility])  Assessment & Plan  Patient with a known history of senile dementia  Patient currently resides in an assisted living facility, [facility]  Per conversation this evening with POA, patient's daughter Vesta Garcia, patient is typically only alert and oriented to herself  Her daughter tells me that she is typically able to walk independently with a walker  She requires assistance with management of her medications at her assisted living facility  In light of discussion with daughter, patient appears to be more disoriented than baseline  Plan  · Patient currently alert and oriented only to her name, not her birthday, location, or year  · Monitor for progression back to baseline with treatment of acute conditions as indicated elsewhere in note  · Geriatrics consultation, recommendations greatly appreciated  · Continue home Aricept    Moderate aortic stenosis  Assessment & Plan  Patient with history of known aortic stenosis, moderate to severe in severity, seen on echocardiogram obtained April 2021  Patient currently with grade 3/6 murmur on exam     Plan  · Monitor fluid status extremely closely  · Consider repeat echocardiogram this admission      Hyponatremia  Assessment & Plan  Patient with true hyponatremia complicated by pseudohyponatremia in the setting of HHS  Serum sodium 121 on arrival, 130 with correction in setting of hyperglycemia  Sodium levels improving at time of admission  Plan  · Improved with IVF  · Maintenance fluids with normal saline with 40 mEq of K  · Nephrology consult, recommendations greatly appreciated    Hypokalemia  Assessment & Plan  Serum potassium 2 4 on arrival in setting of HHS  Patient received 60 mEq oral and 20 mEq IV in the ED with a further 20 mEq IV pending at time of admission    Per chart review, patient does have prior history of hypokalemia that does not appear to have been worked up previously and she does take 60 mEq of potassium 3 times daily per facility documentation  Plan  · 12/29 potassium at 2 6 after repletion with 150 mEq of potassium  · 12/30 potassium at 3 1 after repletion with 320 mEq of K since admission  Will Give K 20mg IV BID+ K 40 mg PO once (80 total this am)  · K 4 0 today afternoon, Will start home dose K 60 mg TID starting with two doses today  · Magnesium normal at 2 3  · Monitor and replete as necessary aggressively  · Maintenance fluids:  Normal saline with 40 mEq K  · As it appears this has not been worked up previously, we will consult Nephrology this time, recommendations greatly appreciated  · Urinalysis with microscopy    Bilateral lower extremity edema  Assessment & Plan  On exam, patient has bilateral lower extremity edema that appears chronic in nature  Reflecting this, patient takes Lasix 40 mg and Zaroxolyn 5 mg daily at her assisted living facility  Plan  · Holding home diuretics in setting of HHS  · Elevate legs  · Routine skin care    Benign essential hypertension  Assessment & Plan  Patient with noted history of hypertension however her only relevant antihypertensive medications as an outpatient are directed at her chronic venous stasis  Patient initially presented with mild hypertension that has improved without intervention in the ED  Plan  · Holding home diuretics in setting of HHS  · Monitor pressures closely    * Hyperosmolar hyperglycemic state (HHS) [facility])  Assessment & Plan  Lab Results   Component Value Date    HGBA1C 13 0 (H) 12/28/2021       Recent Labs     12/29/21  2237 12/30/21  0536 12/30/21  1058 12/30/21  1606   POCGLU 329* 291* 390* 382*       Blood Sugar Average: Last 72 hrs:     Patient is a known diabetic who has previously refused insulin therapy in the outpatient setting  Most recent A1c 8 6% per chart review in April of 2021  She presents from her assisted living facility today for evaluation of multiple lab abnormalities, particularly glucose 584 and potassium of 2 4    Per review of available facility paperwork, patient has been compliant with her oral hypoglycemic regimen, which includes Glucotrol, Januvia, and Prandin  On arrival to the ED, patient's blood sugar off of BNP was 655 with associated hypokalemia to 2 4 and pseudo hyponatremia  Patient had a minimally elevated beta hydroxybutyrate with no anion gap  In the ED, patient has been fortunately hemodynamically and symptomatically stable  At time of admission, patient has thus far been unable to receive any subcutaneous or IV insulin given the degree of her hypokalemia  Repeat BMP obtained at time of admission shows improvement overall yet persistent hypokalemia to 2 4, though patient is currently actively receiving repletion  MICU attending evaluated patient at the bedside in the ED, deemed patient stable for admission as Step Down Level 2 under general medicine  Plan  · Glucose improved to 300s  · Normal saline with 40 mEq potassium maintenance fluids  · Trend blood sugars closely  · Endocrine consult, recommendations greatly appreciated  · Will start 10 units Lantus at bedtime and correctional SSI algorithm 1   · Started diabetic diet  · Holding home oral antihyperglycemics      Disposition:  Not stable for discharge    SUBJECTIVE     Patient seen and examined  No acute events overnight  Patient has no complaints overnight  Denies any confusion, abdominal pain, nausea, vomiting, chest pain, palpitations, fevers, or chills      OBJECTIVE     Vitals:    21 2344 21 2355 21 0242 21 0543   BP: 146/72 140/91 120/67    BP Location: Left arm Left arm Left arm    Pulse: 80 88 80    Resp: 20 20 18    Temp:  98 1 °F (36 7 °C) 98 2 °F (36 8 °C)    TempSrc:  Oral Oral    SpO2:  99% 99%    Weight:    69 5 kg (153 lb 3 5 oz)   Height:          Temperature:   Temp (24hrs), Av 1 °F (36 7 °C), Min:97 7 °F (36 5 °C), Max:98 3 °F (36 8 °C)    Temperature: 98 2 °F (36 8 °C)  Intake & Output:  I/O 12/28 0701  12/29 0700 12/29 0701  12/30 0700 12/30 0701  12/31 0700    I V  (mL/kg) 1000 1360 (19 6)     IV Piggyback 100      Total Intake(mL/kg) 1100 1360 (19 6)     Urine (mL/kg/hr) 1000 718 (0 4)     Stool  0     Total Output 1000 718     Net +100 +642            Unmeasured Stool Occurrence  1 x         Weights:        Body mass index is 30 43 kg/m²  Weight (last 2 days)     Date/Time Weight    12/30/21 0543 69 5 (153 22)    12/29/21 2100 78 (172)        Physical Exam  Vitals and nursing note reviewed  Constitutional:       General: She is not in acute distress  Appearance: She is well-developed  HENT:      Head: Normocephalic and atraumatic  Mouth/Throat:      Mouth: Mucous membranes are dry  Eyes:      Conjunctiva/sclera: Conjunctivae normal    Cardiovascular:      Rate and Rhythm: Normal rate and regular rhythm  Heart sounds: No murmur heard  Pulmonary:      Effort: Pulmonary effort is normal  No respiratory distress  Breath sounds: Normal breath sounds  No wheezing or rales  Abdominal:      Palpations: Abdomen is soft  Tenderness: There is no abdominal tenderness  Musculoskeletal:      Cervical back: Neck supple  Comments: Trace lower extremity edema bilaterally   Skin:     General: Skin is warm and dry  Neurological:      Mental Status: She is alert  Mental status is at baseline  Sensory: No sensory deficit  Motor: No weakness  LABORATORY DATA     Labs: I have personally reviewed pertinent reports    Results from last 7 days   Lab Units 12/30/21  0444 12/29/21  0907 12/28/21  1347   WBC Thousand/uL 11 69* 10 50* 12 79*   HEMOGLOBIN g/dL 13 7 13 7 14 5   HEMATOCRIT % 39 8 39 1 38 8   PLATELETS Thousands/uL 267 248 286   NEUTROS PCT % 57 65 67   MONOS PCT % 9 9 9      Results from last 7 days   Lab Units 12/30/21  0444 12/30/21  0028 12/29/21  1611 12/28/21  1527 12/28/21  1347   POTASSIUM mmol/L 3 1* 3 2* 3 0*   < > 3 5   CHLORIDE mmol/L 101 99* 100   < > 80*   CO2 mmol/L 29 29 29   < > 32   BUN mg/dL 15 16 18   < > 33*   CREATININE mg/dL 1 09 1 16 1 12   < > 1 80*   CALCIUM mg/dL 9 5 10 0 9 1   < > 10 1   ALK PHOS U/L  --   --   --   --  156*   ALT U/L  --   --   --   --  28   AST U/L  --   --   --   --  69*    < > = values in this interval not displayed  Results from last 7 days   Lab Units 12/29/21  0742 12/28/21  1347   MAGNESIUM mg/dL 2 3 2 4                        IMAGING & DIAGNOSTIC TESTING     Radiology Results: I have personally reviewed pertinent reports  No results found  Other Diagnostic Testing: I have personally reviewed pertinent reports  ACTIVE MEDICATIONS     Current Facility-Administered Medications   Medication Dose Route Frequency    donepezil (ARICEPT) tablet 10 mg  10 mg Oral HS    heparin (porcine) subcutaneous injection 5,000 Units  5,000 Units Subcutaneous Q8H [facility]    ondansetron (ZOFRAN) injection 4 mg  4 mg Intravenous Q6H PRN    polyethylene glycol (MIRALAX) packet 17 g  17 g Oral Daily PRN    potassium chloride 20 mEq IVPB (premix)  20 mEq Intravenous BID    potassium chloride oral solution 40 mEq  40 mEq Oral Once    senna-docusate sodium (SENOKOT S) 8 6-50 mg per tablet 1 tablet  1 tablet Oral HS    sodium chloride 0 9 % with KCl 40 mEq/L infusion (premix)  60 mL/hr Intravenous Continuous       VTE Pharmacologic Prophylaxis: Heparin  VTE Mechanical Prophylaxis: sequential compression device    Portions of the record may have been created with voice recognition software  Occasional wrong word or "sound a like" substitutions may have occurred due to the inherent limitations of voice recognition software    Read the chart carefully and recognize, using context, where substitutions have occurred   ==  [address], [place]  [address]  Internal Medicine Residency PGY-1

## 2021-12-30 NOTE — OCCUPATIONAL THERAPY NOTE
Occupational Therapy Evaluation     Patient Name: Konstantin Rocha  JAOJY'O Date: 12/30/2021  Problem List  Principal Problem:    Hyperosmolar hyperglycemic state (HHS) ([facility] [address] )  Active Problems:    Benign essential hypertension    Bilateral lower extremity edema    Hypokalemia    Hyponatremia    Moderate aortic stenosis    Uncomplicated senile dementia (HCC)    Acute-on-chronic kidney injury ([facility] [address] )    Urinary incontinence    First degree AV block    At risk for delirium    Past Medical History  Past Medical History:   Diagnosis Date    Aortic stenosis     Bilateral edema of lower extremity     Dementia ([facility] [address] )     Diabetes ([facility] [address] )     Fall     Gait abnormality     Hyperlipidemia     Hypertension     Hypokalemia     Other ascites     Varicose veins of leg with edema      Past Surgical History  Past Surgical History:   Procedure Laterality Date    BREAST SURGERY      ELBOW SURGERY                 12/30/21 0923   OT Last Visit   OT Visit Date 12/30/21   Note Type   Note type Evaluation   Restrictions/Precautions   Other Precautions Cognitive; Chair Alarm   Pain Assessment   Pain Assessment Tool 0-10   Pain Score No Pain   Home Living   Type of Home Assisted living   Home Layout One level   Additional Comments Pt is a poor historian  Per EMR, pt is from [facility]  Prior Function   Level of Door Needs assistance with IADLs; Needs assistance with ADLs and functional mobility   Lives With Facility staff   Receives Help From   (Facility staff)   ADL Assistance Needs assistance   IADLs Needs assistance   Falls in the last 6 months   (unable to report)   Vocational Retired   Lifestyle   Autonomy Per EMR, pt receives assistance with ADLS, IADLS and ambulates with RW PTA      Reciprocal Relationships Receives assistance from facility staff   Service to Others Retired   [address] Enjoys spending time with her sister   ADL   Where Assessed Edge of bed   Eating Assistance 5  Supervision/Setup Grooming Assistance 5  Supervision/Setup   UB Bathing Assistance 4  Minimal Assistance   LB Bathing Assistance 3  Moderate Assistance   UB Dressing Assistance 4  Minimal Assistance   LB Dressing Assistance 3  Moderate Assistance   Toileting Assistance  3  Moderate Assistance   Bed Mobility   Supine to Sit 3  Moderate assistance   Additional items Assist x 1; Increased time required;Verbal cues;LE management   Additional Comments After OT session pt in chair with alarm on and all needs within reach  Transfers   Sit to Stand 4  Minimal assistance   Additional items Assist x 1; Increased time required;Verbal cues   Stand to Sit 4  Minimal assistance   Additional items Assist x 1; Increased time required;Verbal cues   Functional Mobility   Functional Mobility 4  Minimal assistance   Additional Comments Pt demonstrated short household mobility with RW  Additional items Rolling walker   Balance   Static Sitting Fair   Dynamic Sitting Fair -   Static Standing Poor +   Dynamic Standing Poor +   Ambulatory Poor +   Activity Tolerance   Activity Tolerance Patient tolerated treatment well   Medical Staff Made Aware Seen with PT 2* medical complexity/ multiple comorbidities   Nurse Made Aware RN confirmed okay to see pt   RUE Assessment   RUE Assessment WFL   LUE Assessment   LUE Assessment WFL   Cognition   Overall Cognitive Status Impaired   Arousal/Participation Alert; Cooperative   Attention Attends with cues to redirect   Orientation Level Oriented to person;Disoriented to place; Disoriented to time;Disoriented to situation   Memory Decreased recall of precautions;Decreased recall of recent events;Decreased short term memory   Following Commands Follows one step commands with increased time or repetition   Comments Pt is pleasantly confused and cooperative  Assessment   Assessment Pt is a 80 y o  female admitted to [facility] on 12/28/2021 w/ hyperosmolar hyperglycemic state   Pt  has a past medical history of Aortic stenosis, Bilateral edema of lower extremity, Dementia ([facility] [address] ), Diabetes ([facility] [address] ), Fall, Gait abnormality, Hyperlipidemia, Hypertension, Hypokalemia, Other ascites, and Varicose veins of leg with edema  Pt with active OT orders and okay to mobilize per RN  Pt is a poor historian  Per EMR pt lives at [facility] and requires A with ADLS, IADLs and uses RW for mobility  Currently pt is min A for functional tranfers and functional mobility, min A for UB ADLS and mod A for LB ADLS  Based on the aforementioned OT evaluation, functional performance deficits, and assessments, pt has been identified as a moderate complexity evaluation  From OT standpoint, anticipate d/c return to facility with increased support as needed  Recommend continued participation in [facility] and functional mobility with staff  No further acute OT needs, d/c OT      Goals   Patient Goals To rest   Recommendation   OT Discharge Recommendation No rehabilitation needs  (Return to facility with increased assist as needed)   OT - OK to Discharge Yes  (When medically appropriate)   AM-PAC Daily Activity Inpatient   Lower Body Dressing 2   Bathing 2   Toileting 2   Upper Body Dressing 3   Grooming 4   Eating 4   Daily Activity Raw Score 17   Daily Activity Standardized Score (Calc for Raw Score >=11) 37 26   AM-PAC Applied Cognition Inpatient   Following a Speech/Presentation 2   Understanding Ordinary Conversation 3   Taking Medications 2   Remembering Where Things Are Placed or Put Away 2   Remembering List of 4-5 Errands 2   Taking Care of Complicated Tasks 2   Applied Cognition Raw Score 13   Applied Cognition Standardized Score 30 46   Modified Tate Scale   Modified Tate Scale 4       Cristiane Taylor, MOT, OTR/L

## 2021-12-30 NOTE — PHYSICAL THERAPY NOTE
Physical Therapy Evaluation     Patient's Name: Laney Nicholson    Admitting Diagnosis  Hypokalemia [E87 6]  Hyperglycemia [Y26 1]  Uncomplicated senile dementia ([facility] ) [F03 90]  [facility] ) [E11 01]  Abnormal finding on EKG [R94 31]  Type 2 diabetes mellitus with chronic kidney disease, without long-term current use of insulin, unspecified CKD stage ([facility] ) [E11 22]  Stage 3b chronic kidney disease ([facility] ) [N18 32]    Problem List  Patient Active Problem List   Diagnosis    Anemia    Benign essential hypertension    CKD (chronic kidney disease), stage III ([facility] )    Bilateral lower extremity edema    Gallstone    Hypercholesterolemia    Hypokalemia    Hyponatremia    Leukocytosis    Moderate aortic stenosis    Osteopenia    Pancreatic cyst    Uncomplicated senile dementia ([facility] )    Type 2 diabetes mellitus with diabetic chronic kidney disease ([facility] )    Medicare annual wellness visit, subsequent    Screening for depression    Screening for genitourinary condition    Screening for neurological condition    Atherosclerosis of aorta ([facility]    Closed fracture of left proximal humerus    Ambulatory dysfunction    Chest pain    Syncope    HTN (hypertension)    SALTY (acute kidney injury) ([facility] )    Acute-on-chronic kidney injury ([facility] )    Hyperosmolar hyperglycemic state (HHS) ([facility] )    Urinary incontinence    First degree AV block    At risk for delirium       Past Medical History  Past Medical History:   Diagnosis Date    Aortic stenosis     Bilateral edema of lower extremity     Dementia ([facility] )     Diabetes ([facility] )     Fall     Gait abnormality     Hyperlipidemia     Hypertension     Hypokalemia     Other ascites     Varicose veins of leg with edema        Past Surgical History  Past Surgical History:   Procedure Laterality Date    BREAST SURGERY      ELBOW SURGERY          12/30/21 0924   PT Last Visit   PT Visit Date 12/30/21   Note Type   Note type Evaluation   Pain Assessment Pain Assessment Tool FLACC   Pain Score No Pain   Pain Rating: FLACC (Rest) - Face 0   Pain Rating: FLACC (Rest) - Legs 0   Pain Rating: FLACC (Rest) - Activity 0   Pain Rating: FLACC (Rest) - Cry 0   Pain Rating: FLACC (Rest) - Consolability 0   Score: FLACC (Rest) 0   Pain Rating: FLACC (Activity) - Face 0   Pain Rating: FLACC (Activity) - Legs 0   Pain Rating: FLACC (Activity) - Activity 0   Pain Rating: FLACC (Activity) - Cry 0   Pain Rating: FLACC (Activity) - Consolability 0   Score: FLACC (Activity) 0   Restrictions/Precautions   Other Precautions Telemetry;Cognitive; Bed Alarm  (chair alarm on at the end of session)   Home Living   Type of Home Assisted living   Home Layout One level   [address] [place] [address],[address]   Prior Function   Level of [place] Other (Comment)  (reports amb (I) and w/o AD or w/ rw as needed)   General   Additional Pertinent History cleared for assessment (spoke to nsg)   Cognition   Overall Cognitive Status Impaired   Arousal/Participation Alert   Orientation Level Oriented to person   Memory Decreased recall of biographical information;Decreased recall of recent events;Decreased recall of precautions   Following Commands Follows one step commands with increased time or repetition   Subjective   Subjective Alert; in bed; pleasant and generally disoriented; agreeable to mobilize   RUE Assessment   RUE Assessment WFL  (AROM)   LUE Assessment   LUE Assessment WFL  (AROM)   RLE Assessment   RLE Assessment WFL  (AROM)   Strength RLE   RLE Overall Strength   (fair/ fair + (grossly))   LLE Assessment   LLE Assessment WFL  (AROM)   Strength LLE   LLE Overall Strength   (fair/ fair + (grossly))   Bed Mobility   Supine to Sit 3  Moderate assistance   Additional items Assist x 1; Increased time required;Verbal cues;LE management   Transfers   Sit to Stand 4  Minimal assistance   Additional items Assist x 1;Verbal cues   Stand to Sit 4  Minimal assistance   Additional items Assist x 1;Verbal cues Ambulation/Elevation   Gait pattern Excessively slow; Short stride; Inconsistent carloz   Gait Assistance 4  Minimal assist   Additional items Assist x 1;Verbal cues; Tactile cues  (chair follow)   Assistive Device Rolling walker   Distance 8 ft total distance   Balance   Static Sitting Fair   Dynamic Sitting Fair -   Static Standing Poor +   Dynamic Standing Poor +   Ambulatory Poor +   Activity Tolerance   Activity Tolerance Patient limited by fatigue   Medical Staff Made Aware Co-eval performed w/ OTR due to complexity of medical status and multiple comorbidities   Nurse Made Aware spoke to [address]   Assessment   Prognosis Good   Problem List Decreased strength;Decreased endurance; Impaired balance;Decreased mobility; Decreased cognition   Assessment Pt is 80 y o  female admitted with hx of multiple lab abnormalities and Dx of Hyperosmolar hyperglycemic state; undergoing w/u  Pt 's comorbidities affecting POC include: Aortic stenosis, Bilateral edema of lower extremity, Dementia (HCC), Diabetes ([facility] [address] ), Fall, Gait abnormality, and Hypertension and personal factors of: advanced age and living at the [facility]  Pt's clinical presentation is currently unstable/unpredictable which is evident in ongoing telem monitoring, abn lab values  Pt presents w/ overall weakness, incl generally decreased LE strength, decreased functional endurance and activity tolerance, impaired cognition, impaired balance, gait deviations (utilized rw at this time) and fall risk  Will cont to follow pt in PT for progressive mobilization to address above functional deficits and to max level of (I), endurance, and safety  Otherwise, anticipate pt will return to premorbid living environment provided that current level of support/(A) is available at the [facility] (for all mobility skills) and when medically cleared; home PT follow up is recommended; otherwise, rehab may need to be considered     Goals   Patient Goals pt did not express   STG Expiration Date 01/09/22   Short Term Goal #1 7-10 days  Pt will amb 2 x 50 ft w/ rw <--> SPC, mod (I) in order to facilitate safe return to premorbid environment and at least household amb status  Pt will achieve (I) level w/ bed mob in order to facilitate safety with OOB and back to bed transitions in prior living environment  Pt will perform transfers w/ mod (I) to assure (I) and safety w/ functional mobility/transitions w/ all aspects of mobility/locomotion  Pt will participate in LE therex and balance activities to max progression w/ mobility skills  PT Treatment Day 0   Plan   Treatment/Interventions Functional transfer training;LE strengthening/ROM; Therapeutic exercise; Endurance training;Patient/family training;Bed mobility;Gait training;Equipment eval/education;Spoke to nursing;OT   PT Frequency 3-5x/wk   Recommendation   PT Discharge Recommendation Return to facility with rehabilitation services  (home PT)   Equipment Recommended Naga Pineda  (w/ (A))   Walker Package Recommended Wheeled walker   Marjorie Rinaldi 435   Turning in Bed Without Bedrails 3   Lying on Back to Sitting on Edge of Flat Bed 2   Moving Bed to Chair 3   Standing Up From Chair 3   Walk in Room 3   Climb 3-5 Stairs 2   Basic Mobility Inpatient Raw Score 16   Basic Mobility Standardized Score 38 32   Highest Level Of Mobility   JH-HLM Goal 5: Stand one or more mins   JH-HLM Highest Level of Mobility 5: Stand (1 or more minutes)   JH-HLM Goal Achieved Yes   End of Consult   Patient Position at End of Consult Bedside chair;Bed/Chair alarm activated; All needs within reach  (reclined)         Alexandria Fortune, PT

## 2021-12-30 NOTE — PLAN OF CARE
Problem: PHYSICAL THERAPY ADULT  Goal: Performs mobility at highest level of function for planned discharge setting  See evaluation for individualized goals  Description: Treatment/Interventions: Functional transfer training,LE strengthening/ROM,Therapeutic exercise,Endurance training,Patient/family training,Bed mobility,Gait training,Equipment eval/education,Spoke to nursing,OT  Equipment Recommended: Walker (w/ (A))       See flowsheet documentation for full assessment, interventions and recommendations  Note: Prognosis: Good  Problem List: Decreased strength,Decreased endurance,Impaired balance,Decreased mobility,Decreased cognition  Assessment: Pt is 80 y o  female admitted with hx of multiple lab abnormalities and Dx of Hyperosmolar hyperglycemic state; undergoing w/u  Pt 's comorbidities affecting POC include: Aortic stenosis, Bilateral edema of lower extremity, Dementia (HCC), Diabetes ([facility] ), Fall, Gait abnormality, and Hypertension and personal factors of: advanced age and living at the [facility]  Pt's clinical presentation is currently unstable/unpredictable which is evident in ongoing telem monitoring, abn lab values  Pt presents w/ overall weakness, incl generally decreased LE strength, decreased functional endurance and activity tolerance, impaired cognition, impaired balance, gait deviations (utilized rw at this time) and fall risk  Will cont to follow pt in PT for progressive mobilization to address above functional deficits and to max level of (I), endurance, and safety  Otherwise, anticipate pt will return to premorbid living environment provided that current level of support/(A) is available at the [facility] (for all mobility skills) and when medically cleared; home PT follow up is recommended; otherwise, rehab may need to be considered  PT Discharge Recommendation: Return to facility with rehabilitation services (home PT)          See flowsheet documentation for full assessment

## 2021-12-30 NOTE — PROGRESS NOTES
NEPHROLOGY PROGRESS NOTE   Kevin Fields 80 y o  female MRN: [id number]  Unit/Bed#: [facility] 406-01 Encounter: [id number]  Reason for Consult:  Acute kidney injury    ASSESSMENT/PLAN:  1  Acute kidney injury,, likely secondary to volume depletion given severe hyperglycemia and osmotic diuresis  2  CKD stage 3, baseline creatinine point-1 3  3  Hypokalemia, continue with replacement as needed  4  Hyponatremia likely secondary hyperglycemia, overall has significantly improved with blood sugar control   5  Diastolic heart failure, diuretics currently on hold    PLAN:  · Overall renal function has returned to previous baseline  · Still hypokalemia, receiving oral and IV replacement  · Would repeat BMP later this afternoon post replacement  · Blood pressure appears stable  · Continue with IV fluids    SUBJECTIVE:  Seen and examined  Patient is awake although appears confused  No reports of chest pain or shortness of breath  Review of Systems    OBJECTIVE:  Current Weight: Weight - Scale: 69 5 kg (153 lb 3 5 oz)  Vitals:    12/29/21 2355 12/30/21 0242 12/30/21 0543 12/30/21 0700   BP: 140/91 120/67  111/87   BP Location: Left arm Left arm     Pulse: 88 80  86   Resp: 20 18     Temp: 98 1 °F (36 7 °C) 98 2 °F (36 8 °C)  98 6 °F (37 °C)   TempSrc: Oral Oral  Oral   SpO2: 99% 99%  97%   Weight:   69 5 kg (153 lb 3 5 oz)    Height:           Intake/Output Summary (Last 24 hours) at 12/30/2021 1027  Last data filed at 12/30/2021 0030  Gross per 24 hour   Intake 1360 ml   Output 718 ml   Net 642 ml       Physical Exam  HENT:      Head: Normocephalic and atraumatic  Eyes:      General: No scleral icterus  Cardiovascular:      Rate and Rhythm: Normal rate and regular rhythm  Pulmonary:      Effort: Pulmonary effort is normal       Breath sounds: Normal breath sounds  Abdominal:      General: There is no distension  Palpations: Abdomen is soft  Musculoskeletal:      Right lower leg: No edema        Left lower leg: No edema    Skin:     General: Skin is warm and dry  Neurological:      Mental Status: She is alert  She is disoriented           Medications:    Current Facility-Administered Medications:     donepezil (ARICEPT) tablet 10 mg, 10 mg, Oral, HS, Emilie Dewaine Mcardle, MD, 10 mg at 12/30/21 0030    heparin (porcine) subcutaneous injection 5,000 Units, 5,000 Units, Subcutaneous, Q8H [address], 5,000 Units at 12/30/21 0514 **AND** Platelet count, , , Once, Harris Larios DO    ondansetron (ZOFRAN) injection 4 mg, 4 mg, Intravenous, Q6H PRN, Rollene Luann, DO    polyethylene glycol (MIRALAX) packet 17 g, 17 g, Oral, Daily PRN, Rollene Luann, DO    potassium chloride 20 mEq IVPB (premix), 20 mEq, Intravenous, BID, Lois Liu MD, Last Rate: 50 mL/hr at 12/30/21 0938, 20 mEq at 12/30/21 0938    senna-docusate sodium (SENOKOT S) 8 6-50 mg per tablet 1 tablet, 1 tablet, Oral, HS, Harris Larios DO, 1 tablet at 12/29/21 2224    sodium chloride 0 9 % with KCl 40 mEq/L infusion (premix), 60 mL/hr, Intravenous, Continuous, Cielo Pennington MD, Last Rate: 60 mL/hr at 12/29/21 1113, 60 mL/hr at 12/29/21 1113    Laboratory Results:  Results from last 7 days   Lab Units 12/30/21  0444 12/30/21  0028 12/29/21  1611 12/29/21  1406 12/29/21  0907 12/29/21  0742 12/29/21  0044 12/28/21  1759 12/28/21  1527 12/28/21  1347   WBC Thousand/uL 11 69*  --   --   --  10 50*  --   --   --   --  12 79*   HEMOGLOBIN g/dL 13 7  --   --   --  13 7  --   --   --   --  14 5   HEMATOCRIT % 39 8  --   --   --  39 1  --   --   --   --  38 8   PLATELETS Thousands/uL 267  --   --   --  248  --   --   --   --  286   POTASSIUM mmol/L 3 1* 3 2* 3 0* 2 8*  --  2 6* 2 1* 2 4*   < > 3 5   CHLORIDE mmol/L 101 99* 100 100  --  99* 90* 86*   < > 80*   CO2 mmol/L 29 29 29 27  --  28 32 30   < > 32   BUN mg/dL 15 16 18 19  --  21 25 29*   < > 33*   CREATININE mg/dL 1 09 1 16 1 12 1 18  --  1 19 1 47* 1 62*   < > 1 80*   CALCIUM mg/dL 9 5 10 0 9 1 9 0  --  8 9 9 3 9 2   < > 10 1 MAGNESIUM mg/dL  --   --   --   --   --  2 3  --   --   --  2 4    < > = values in this interval not displayed

## 2021-12-30 NOTE — WOUND OSTOMY CARE
Consult Note - Wound   Merry Hill Hammmacarena 80 y o  female MRN: [id number]  Unit/Bed#: [facility] 406-01 Encounter: [id number]      History and Present Illness:  80year old female presented to the hospital with hyperglycemia and hypokalemia  Patient's history significant for dementia, DM, HTN  Assessment Findings:   Patient agreeable to assessment  She is sitting up in chair  Able to stand with assist x 2 and walker  She is incontinent of bowel and bladder  Right heel intact  1   Present on admission unstageable pressure injury to left lateral heel--dry, brown, well adhered eschar  Pat-wound within normal limits  2   Present on admission unstageable pressure injury to medial buttocks/sacrum (along gluteal cleft)--wound likely due to pressure and moisture damage  Wound bed with yellow slough and small amount of pink tissue  Pat-wound with blanchable erythema and hyperpigmentation  Small serous drainage  See flowsheet for wound details  Wound Care Plan:   1-Turn/reposition every 2 hours while in bed and weight shift frequently while in chair for pressure re-distribution on skin  2-Elevate heels off of bed/chair surface to offload pressure  3-Offloading air cushion in chair when out of bed  4-Moisturize skin daily with skin nourishing cream   5-Buttocks/sacrum--cleanse with soap and water, pat dry  Dust open areas with stomahesive powder then apply Erick cream to buttocks and sacrum twice daily and as needed with incontinence care  6-Heels--cleanse with soap and water, pat dry  Apply allevyn life foam dressings (left for treatment and right for prevention)  Change dressings every 3 days  Wound care team to follow  Plan of care reviewed with primary RN      Dr Soriano Cancer made aware of assessment findings--verbal order received for Erick cream     Adalberto OVERTON, RN, Joy Aditya

## 2021-12-30 NOTE — DISCHARGE INSTR - OTHER ORDERS
Wound Care Plan:   1-Turn/reposition every 2 hours while in bed and weight shift frequently while in chair for pressure re-distribution on skin  2-Elevate heels off of bed/chair surface to offload pressure  3-Offloading air cushion in chair when out of bed  4-Moisturize skin daily with lotion  5-Buttocks/sacrum--cleanse with soap and water, pat dry  Dust open areas with stomahesive powder then apply Erick cream to buttocks and sacrum twice daily and as needed with incontinence care  6-Heels--cleanse with soap and water, pat dry  Apply allevyn life foam dressing to left heel  Change dressings every 3 days

## 2021-12-31 LAB
ALBUMIN SERPL BCP-MCNC: 2.7 G/DL (ref 3.5–5)
ALP SERPL-CCNC: 96 U/L (ref 46–116)
ALT SERPL W P-5'-P-CCNC: 21 U/L (ref 12–78)
ANION GAP SERPL CALCULATED.3IONS-SCNC: 5 MMOL/L (ref 4–13)
ANION GAP SERPL CALCULATED.3IONS-SCNC: 6 MMOL/L (ref 4–13)
AST SERPL W P-5'-P-CCNC: 21 U/L (ref 5–45)
BASOPHILS # BLD AUTO: 0.09 THOUSANDS/ΜL (ref 0–0.1)
BASOPHILS NFR BLD AUTO: 1 % (ref 0–1)
BILIRUB SERPL-MCNC: 0.79 MG/DL (ref 0.2–1)
BUN SERPL-MCNC: 15 MG/DL (ref 5–25)
BUN SERPL-MCNC: 19 MG/DL (ref 5–25)
C PEPTIDE SERPL-MCNC: 3.8 NG/ML (ref 1.1–4.4)
CALCIUM ALBUM COR SERPL-MCNC: 10.1 MG/DL (ref 8.3–10.1)
CALCIUM SERPL-MCNC: 10 MG/DL (ref 8.3–10.1)
CALCIUM SERPL-MCNC: 9.1 MG/DL (ref 8.3–10.1)
CHLORIDE SERPL-SCNC: 101 MMOL/L (ref 100–108)
CHLORIDE SERPL-SCNC: 109 MMOL/L (ref 100–108)
CO2 SERPL-SCNC: 25 MMOL/L (ref 21–32)
CO2 SERPL-SCNC: 26 MMOL/L (ref 21–32)
CREAT SERPL-MCNC: 1 MG/DL (ref 0.6–1.3)
CREAT SERPL-MCNC: 1.34 MG/DL (ref 0.6–1.3)
EOSINOPHIL # BLD AUTO: 0.14 THOUSAND/ΜL (ref 0–0.61)
EOSINOPHIL NFR BLD AUTO: 2 % (ref 0–6)
ERYTHROCYTE [DISTWIDTH] IN BLOOD BY AUTOMATED COUNT: 13.8 % (ref 11.6–15.1)
GFR SERPL CREATININE-BSD FRML MDRD: 32 ML/MIN/1.73SQ M
GFR SERPL CREATININE-BSD FRML MDRD: 47 ML/MIN/1.73SQ M
GLUCOSE SERPL-MCNC: 280 MG/DL (ref 65–140)
GLUCOSE SERPL-MCNC: 283 MG/DL (ref 65–140)
GLUCOSE SERPL-MCNC: 308 MG/DL (ref 65–140)
GLUCOSE SERPL-MCNC: 340 MG/DL (ref 65–140)
GLUCOSE SERPL-MCNC: 359 MG/DL (ref 65–140)
GLUCOSE SERPL-MCNC: 419 MG/DL (ref 65–140)
HCT VFR BLD AUTO: 35.7 % (ref 34.8–46.1)
HGB BLD-MCNC: 12.2 G/DL (ref 11.5–15.4)
IMM GRANULOCYTES # BLD AUTO: 0.18 THOUSAND/UL (ref 0–0.2)
IMM GRANULOCYTES NFR BLD AUTO: 2 % (ref 0–2)
LYMPHOCYTES # BLD AUTO: 3.36 THOUSANDS/ΜL (ref 0.6–4.47)
LYMPHOCYTES NFR BLD AUTO: 36 % (ref 14–44)
MAGNESIUM SERPL-MCNC: 1.9 MG/DL (ref 1.6–2.6)
MCH RBC QN AUTO: 29.9 PG (ref 26.8–34.3)
MCHC RBC AUTO-ENTMCNC: 34.2 G/DL (ref 31.4–37.4)
MCV RBC AUTO: 88 FL (ref 82–98)
MONOCYTES # BLD AUTO: 0.87 THOUSAND/ΜL (ref 0.17–1.22)
MONOCYTES NFR BLD AUTO: 9 % (ref 4–12)
NEUTROPHILS # BLD AUTO: 4.73 THOUSANDS/ΜL (ref 1.85–7.62)
NEUTS SEG NFR BLD AUTO: 50 % (ref 43–75)
NRBC BLD AUTO-RTO: 0 /100 WBCS
NT-PROBNP SERPL-MCNC: 1677 PG/ML
PLATELET # BLD AUTO: 257 THOUSANDS/UL (ref 149–390)
PMV BLD AUTO: 11 FL (ref 8.9–12.7)
POTASSIUM SERPL-SCNC: 3.5 MMOL/L (ref 3.5–5.3)
POTASSIUM SERPL-SCNC: 4 MMOL/L (ref 3.5–5.3)
PROT SERPL-MCNC: 6.2 G/DL (ref 6.4–8.2)
RBC # BLD AUTO: 4.08 MILLION/UL (ref 3.81–5.12)
SODIUM SERPL-SCNC: 133 MMOL/L (ref 136–145)
SODIUM SERPL-SCNC: 139 MMOL/L (ref 136–145)
WBC # BLD AUTO: 9.37 THOUSAND/UL (ref 4.31–10.16)

## 2021-12-31 PROCEDURE — 99232 SBSQ HOSP IP/OBS MODERATE 35: CPT | Performed by: INTERNAL MEDICINE

## 2021-12-31 PROCEDURE — 80053 COMPREHEN METABOLIC PANEL: CPT | Performed by: INTERNAL MEDICINE

## 2021-12-31 PROCEDURE — 80048 BASIC METABOLIC PNL TOTAL CA: CPT | Performed by: STUDENT IN AN ORGANIZED HEALTH CARE EDUCATION/TRAINING PROGRAM

## 2021-12-31 PROCEDURE — 97530 THERAPEUTIC ACTIVITIES: CPT

## 2021-12-31 PROCEDURE — 83880 ASSAY OF NATRIURETIC PEPTIDE: CPT | Performed by: STUDENT IN AN ORGANIZED HEALTH CARE EDUCATION/TRAINING PROGRAM

## 2021-12-31 PROCEDURE — 84681 ASSAY OF C-PEPTIDE: CPT | Performed by: INTERNAL MEDICINE

## 2021-12-31 PROCEDURE — NC001 PR NO CHARGE: Performed by: INTERNAL MEDICINE

## 2021-12-31 PROCEDURE — 83735 ASSAY OF MAGNESIUM: CPT

## 2021-12-31 PROCEDURE — 85025 COMPLETE CBC W/AUTO DIFF WBC: CPT

## 2021-12-31 PROCEDURE — 82948 REAGENT STRIP/BLOOD GLUCOSE: CPT

## 2021-12-31 RX ORDER — INSULIN GLARGINE 100 [IU]/ML
10 INJECTION, SOLUTION SUBCUTANEOUS
Status: DISCONTINUED | OUTPATIENT
Start: 2021-12-31 | End: 2022-01-04 | Stop reason: HOSPADM

## 2021-12-31 RX ORDER — INSULIN GLARGINE 100 [IU]/ML
4 INJECTION, SOLUTION SUBCUTANEOUS ONCE
Status: DISCONTINUED | OUTPATIENT
Start: 2021-12-31 | End: 2021-12-31

## 2021-12-31 RX ORDER — MAGNESIUM SULFATE HEPTAHYDRATE 40 MG/ML
2 INJECTION, SOLUTION INTRAVENOUS ONCE
Status: COMPLETED | OUTPATIENT
Start: 2021-12-31 | End: 2021-12-31

## 2021-12-31 RX ORDER — INSULIN GLARGINE 100 [IU]/ML
14 INJECTION, SOLUTION SUBCUTANEOUS
Status: DISCONTINUED | OUTPATIENT
Start: 2021-12-31 | End: 2021-12-31

## 2021-12-31 RX ORDER — POTASSIUM CHLORIDE 14.9 MG/ML
20 INJECTION INTRAVENOUS ONCE
Status: DISCONTINUED | OUTPATIENT
Start: 2021-12-31 | End: 2021-12-31

## 2021-12-31 RX ORDER — POTASSIUM CHLORIDE AND SODIUM CHLORIDE 900; 300 MG/100ML; MG/100ML
50 INJECTION, SOLUTION INTRAVENOUS CONTINUOUS
Status: DISCONTINUED | OUTPATIENT
Start: 2021-12-31 | End: 2021-12-31

## 2021-12-31 RX ADMIN — MICONAZOLE NITRATE: 20 CREAM TOPICAL at 18:32

## 2021-12-31 RX ADMIN — DONEPEZIL HYDROCHLORIDE 10 MG: 10 TABLET ORAL at 22:57

## 2021-12-31 RX ADMIN — INSULIN LISPRO 3 UNITS: 100 INJECTION, SOLUTION INTRAVENOUS; SUBCUTANEOUS at 18:27

## 2021-12-31 RX ADMIN — INSULIN LISPRO 2 UNITS: 100 INJECTION, SOLUTION INTRAVENOUS; SUBCUTANEOUS at 08:13

## 2021-12-31 RX ADMIN — INSULIN LISPRO 4 UNITS: 100 INJECTION, SOLUTION INTRAVENOUS; SUBCUTANEOUS at 10:50

## 2021-12-31 RX ADMIN — POTASSIUM CHLORIDE 60 MEQ: 20 SOLUTION ORAL at 08:14

## 2021-12-31 RX ADMIN — MAGNESIUM SULFATE HEPTAHYDRATE 2 G: 40 INJECTION, SOLUTION INTRAVENOUS at 08:14

## 2021-12-31 RX ADMIN — INSULIN LISPRO 4 UNITS: 100 INJECTION, SOLUTION INTRAVENOUS; SUBCUTANEOUS at 18:23

## 2021-12-31 RX ADMIN — POTASSIUM CHLORIDE 60 MEQ: 20 SOLUTION ORAL at 21:14

## 2021-12-31 RX ADMIN — MICONAZOLE NITRATE: 20 CREAM TOPICAL at 08:14

## 2021-12-31 RX ADMIN — POTASSIUM CHLORIDE 60 MEQ: 20 SOLUTION ORAL at 18:21

## 2021-12-31 RX ADMIN — HEPARIN SODIUM 5000 UNITS: 5000 INJECTION INTRAVENOUS; SUBCUTANEOUS at 14:59

## 2021-12-31 RX ADMIN — INSULIN LISPRO 4 UNITS: 100 INJECTION, SOLUTION INTRAVENOUS; SUBCUTANEOUS at 21:14

## 2021-12-31 RX ADMIN — HEPARIN SODIUM 5000 UNITS: 5000 INJECTION INTRAVENOUS; SUBCUTANEOUS at 05:01

## 2021-12-31 RX ADMIN — INSULIN GLARGINE 10 UNITS: 100 INJECTION, SOLUTION SUBCUTANEOUS at 21:14

## 2021-12-31 RX ADMIN — HEPARIN SODIUM 5000 UNITS: 5000 INJECTION INTRAVENOUS; SUBCUTANEOUS at 21:14

## 2021-12-31 RX ADMIN — POTASSIUM CHLORIDE AND SODIUM CHLORIDE 50 ML/HR: 900; 300 INJECTION, SOLUTION INTRAVENOUS at 09:56

## 2021-12-31 NOTE — PROGRESS NOTES
Progress Note - Kasey Krishnamurthy 80 y o  female MRN: [id number]    [facility] Encounter: [id number]    CC: diabetes f/u    Subjective:   Kasey Krishnamurthy is a 80y o  year old female with type 2 DM and dementia who presented with AMS, hypokalemia, hyperglycemia  She has no complaints today    Objective:     Vitals: Blood pressure 126/60, pulse 90, temperature 97 5 °F (36 4 °C), temperature source Axillary, resp  rate 18, height 4' 11 5" (1 511 m), weight 67 7 kg (149 lb 4 oz), SpO2 97 %  ,Body mass index is 29 64 kg/m²  Intake/Output Summary (Last 24 hours) at 12/31/2021 1278  Last data filed at 12/31/2021 0600  Gross per 24 hour   Intake 2009 ml   Output 300 ml   Net 1709 ml       Physical Exam:  General Appearance: awake, appears stated age and cooperative  Head: Normocephalic, without obvious abnormality, atraumatic  Extremities: moves all extremities  Skin: Skin color and temperature normal    Pulm: no labored breathing, no crackles or rales    Lab, Imaging and other studies: I have personally reviewed pertinent reports  POC Glucose (mg/dl)   Date Value   12/31/2021 280 (H)   12/30/2021 371 (H)   12/30/2021 382 (H)   12/30/2021 390 (H)   12/30/2021 291 (H)   12/29/2021 329 (H)   12/29/2021 382 (H)   12/29/2021 366 (H)   12/28/2021 >500 (HH)   12/28/2021 >500 (HH)       Assessment and plan:  Type 2 DM  Home regimen per chart review includes repaglinide 2 mg t i d  before meals, sitagliptin 100 mg daily, glipizide extended release 10mg daily  Previous a1c 8 6 in 4/2021, most recent a1c 13 0 on this admission  Serum osm 290 on admission, beta hydroxybutyrate minimally elevated at 0 6, bicarbonate normal on admission  Continue 10 units Lantus  Start 3 units mealtime insulin with meals while inpatient  c-peptide pending  Plan for discharge on basal insulin and prandin with meals  Would not restart sitagliptin or glipizide given renal dysfunction    Goal a1c <8 for this elderly patient, with BG <200      Hypokalemia  Improving  Management as per primary team and nephrology       Portions of the record may have been created with voice recognition software

## 2021-12-31 NOTE — PROGRESS NOTES
Laboratory studies reviewed  Overall potassium has normalized at 3 5 would continue with supplementation as needed  Renal function overall has significantly improved at 1 0  Diuretics currently on hold, may benefit from potassium-sparing diuretic if needed given persistent hypokalemia  Further management as per primary service  Will sign off this time, please call questions or concerns

## 2021-12-31 NOTE — QUICK NOTE
Patient's primary  was called today   was updated regarding patient's current clinical progress and plans of care  All questions were answered and concerns were addressed to satisfaction  Discussed with family need to assess whether assisted living facility can administer insulin for patient  Daughter would like us to notify assisted living facility to stress that patient take potassium

## 2021-12-31 NOTE — PHYSICAL THERAPY NOTE
PHYSICAL THERAPY NOTE          Patient Name: Jacqueline Randhawa  ILZHF'C Date: 1/3/2022     12/31/21 1032   PT Last Visit   PT Visit Date 12/31/21   Note Type   Note Type Treatment   Pain Assessment   Pain Assessment Tool 0-10   Pain Score No Pain   Restrictions/Precautions   Other Precautions Cognitive; Chair Alarm;Multiple lines;Telemetry  (chair alarm re-activated at the end of session)   General   Chart Reviewed Yes   Additional Pertinent History cleared for Tx session (spoke to nsvon)   Response to Previous Treatment Patient with no complaints from previous session  ;Patient unable to report, no changes reported from family or staff   Cognition   Overall Cognitive Status Impaired   Arousal/Participation Alert; Cooperative   Attention Attends with cues to redirect   Orientation Level Oriented to person   Memory Decreased recall of recent events;Decreased recall of precautions   Following Commands Follows one step commands with increased time or repetition   Subjective   Subjective Alert; in the chair; pleasant and generally disoriented; agreeable to amb   Transfers   Sit to Stand 5  Supervision  (2 trials)   Additional items Increased time required   Stand to Sit 5  Supervision  (2 trials)   Additional items Increased time required   Ambulation/Elevation   Gait pattern Excessively slow; Short stride   Gait Assistance 5  Supervision   Additional items Verbal cues   Assistive Device Rolling walker   Distance 70 ft and 80 ft w/ seated rest period in between   Balance   Static Sitting Fair +   Dynamic Sitting Fair   Static Standing Fair -   Dynamic Standing [address] -   Activity Tolerance   Activity Tolerance Patient tolerated treatment well   Nurse Made Aware spoke to [address]   Assessment   Prognosis Good   Problem List Decreased strength;Decreased endurance; Impaired balance;Decreased mobility; Decreased cognition;Decreased safety awareness   Assessment Pt demonstrated (S) level w/ transfers and amb w/ overall improved balance and tolerance to mobilization today; no overt uncorrected LOB, gross knee buckling, or excessive swaying during amb; pt remained in NAD w/ rest periods provided as needed; overall, cont to anticipate pt will return to prior living environment (PCF) when medically cleared; home PT follow up is recommended; will follow  Goals   Patient Goals pt did not express   STG Expiration Date 01/09/22   PT Treatment Day 1   Plan   Treatment/Interventions Functional transfer training;LE strengthening/ROM; Therapeutic exercise; Endurance training;Cognitive reorientation; Bed mobility;Gait training;Spoke to nursing;Spoke to case management   Progress Progressing toward goals   PT Frequency 3-5x/wk   Recommendation   PT Discharge Recommendation Return to facility with rehabilitation services  (home PT)   Equipment Recommended Janice Cutting  (w/ stand by (A)/(S))   [address] walker   Marjorie Rinaldi 435   Turning in Bed Without Bedrails 3   Lying on Back to Sitting on Edge of Flat Bed 3   Moving Bed to Chair 3   Standing Up From Chair 3   Walk in Room 3   Climb 3-5 Stairs 2   Basic Mobility Inpatient Raw Score 17   Basic Mobility Standardized Score 39 67   Highest Level Of Mobility   JH-HLM Goal 5: Stand one or more mins   JH-HLM Highest Level of Mobility 7: Walk 25 feet or more   JH-HLM Goal Achieved Yes   Education   Education Provided Assistive device; Mobility training   Patient Reinforcement needed   End of Consult   Patient Position at End of Consult Bedside chair;Bed/Chair alarm activated; All needs within reach  (reclined)     Deon Bishop, PT

## 2021-12-31 NOTE — PLAN OF CARE
Problem: PHYSICAL THERAPY ADULT  Goal: Performs mobility at highest level of function for planned discharge setting  See evaluation for individualized goals  Description: Treatment/Interventions: Functional transfer training,LE strengthening/ROM,Therapeutic exercise,Endurance training,Patient/family training,Bed mobility,Gait training,Equipment eval/education,Spoke to nursing,OT  Equipment Recommended: Walker (w/ (A))       See flowsheet documentation for full assessment, interventions and recommendations  Outcome: Progressing  Note: Prognosis: Good  Problem List: Decreased strength,Decreased endurance,Impaired balance,Decreased mobility,Decreased cognition,Decreased safety awareness  Assessment: Pt demonstrated (S) level w/ transfers and amb w/ overall improved balance and tolerance to mobilization today; no overt uncorrected LOB, gross knee buckling, or excessive swaying during amb; pt remained in NAD w/ rest periods provided as needed; overall, cont to anticipate pt will return to prior living environment (PCF) when medically cleared; home PT follow up is recommended; will follow  PT Discharge Recommendation: Return to facility with rehabilitation services (home PT)          See flowsheet documentation for full assessment

## 2021-12-31 NOTE — PROGRESS NOTES
INTERNAL MEDICINE RESIDENCY PROGRESS NOTE     Name: Fernie Wren   Age & Sex: 80 y o  female   MRN: [id number]  Unit/Bed#: [facility] 406-01   Encounter: [id number]  Team: SOD Team C     PATIENT INFORMATION     Name: Fernie Wren   Age & Sex: 80 y o  female   MRN: [id number]  Hospital Stay Days: 3    ASSESSMENT/PLAN     Principal Problem:    Hyperosmolar hyperglycemic state (HHS) ([address] )  Active Problems:    Benign essential hypertension    Bilateral lower extremity edema    Hypokalemia    Hyponatremia    Moderate aortic stenosis    Uncomplicated senile dementia (HCC)    Acute-on-chronic kidney injury ([address] )    Urinary incontinence    First degree AV block    At risk for delirium      At risk for delirium  Assessment & Plan  Given baseline history of dementia, patient is at significant risk for hospital associated delirium  Patient is currently A&O times 2      Plan  Geriatrics consult, recommendations greatly appreciated    -Patient is high risk of delirium due to her underlying dementia and admission to unfamiliar environment  -Initiate delirium precautions  -maintain normal sleep/wake cycle  -minimize overnight interruptions, group overnight vitals/labs/nursing checks as much as possible  -dim lights, close blinds and turn off tv to minimize stimulation and encourage sleep environment in evenings  -ensure that pain is well controlled, consider Tylenol 975mg Q8H scheduled   -monitor closely for fecal and urinary retention which may precipitate delirium  -encourage early mobilization and ambulation with PT and OT assistance  -provide frequent reorientation and redirection as required  -encourage family and friends at the bedside to help help calm patient if anxious  -avoid medications which may precipitate or worsen delirium such as tramadol, benzodiazepine, anticholinergics, and benadryl  -encourage good hydration and nutrition   -redirect unwanted behaviors as first line, avoid physical restraints, use chemical restraint only if all other attempts have been unsuccessful, would consider Zyprexa 2 5mg IM Q daily, monitor for orthostatic hypotension and QTc prolongation with repeat dosing, recommend lowest dose possible for shortest duration possible         First degree AV block  Assessment & Plan  EKG on arrival shows first-degree AV block with PVCs and left bundle-branch block  Review of previous EKGs show similar findings  Plan  · No urgent indication to involve Cardiology  · Recommend avoiding QTC prolonging agents if able    Urinary incontinence  Assessment & Plan  Per discussion with POA, patient has been dealing with urinary incontinence at her assisted living facility for most of at least the last year  The patient is unable to provide any information regarding the details of her urinary incontinence  Plan  · Monitor patient closely, would recommend considering PureWick catheter and avoiding implantation of Mascorro catheter during this admission given likelihood of exacerbation of delirium    Acute-on-chronic kidney injury [facility])  Assessment & Plan  Lab Results   Component Value Date    EGFR 42 12/30/2021    EGFR 39 12/30/2021    EGFR 40 12/29/2021    CREATININE 1 09 12/30/2021    CREATININE 1 16 12/30/2021    CREATININE 1 12 12/29/2021     Patient presents with serum creatinine 1 8 with apparent baseline of roughly 1 0-1 3  Patient has known history of CKD stage 3  Plan  · SALTY resolved  Renal function back to baseline  · SALTY on CKD likely prerenal in setting of dehydration secondary to HHS  · 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, arteriolar sclerosis, diabetic kidney disease  · IV fluids for hydration  · Trend renal indices and urine output closely  · Avoid hypotension and nephrotoxins where able  · Nephrology consult, recommendations greatly appreciated  · As per nephrology, pt may benefit from potassium-sparing diuretic (diuretics currently on hold)  -- on Lasix 40 p o  Daily at home     · DC IV fluids  · SC Insulin started  · Follow-up UA  · To consider renal ultrasound, defer to Nephrology    Uncomplicated senile dementia [facility])  Assessment & Plan  Patient with a known history of senile dementia  Patient currently resides in an assisted living facility, [facility]  Per conversation this evening with POA, patient's daughter Angela Dangelo, patient is typically only alert and oriented to herself  Her daughter tells me that she is typically able to walk independently with a walker  She requires assistance with management of her medications at her assisted living facility  In light of discussion with daughter, patient appears to be more disoriented than baseline  Plan  · Patient currently alert and oriented only to her name, not her birthday, location, or year  · Monitor for progression back to baseline with treatment of acute conditions as indicated elsewhere in note  · Geriatrics consultation, recommendations greatly appreciated  · Continue home Aricept    Moderate aortic stenosis  Assessment & Plan  Patient with history of known aortic stenosis, moderate to severe in severity, seen on echocardiogram obtained April 2021  Patient currently with grade 3/6 murmur on exam     Plan  · Monitor fluid status extremely closely  · Consider repeat echocardiogram this admission      Hyponatremia  Assessment & Plan  Patient with true hyponatremia complicated by pseudohyponatremia in the setting of HHS  Serum sodium 121 on arrival, 130 with correction in setting of hyperglycemia  Sodium levels improving at time of admission  Plan  · Improved with IVF  · Restart gentle Maintenance fluids with normal saline with 40 mEq of K  · Nephrology consult, recommendations greatly appreciated    Hypokalemia  Assessment & Plan  Serum potassium 2 4 on arrival in setting of HHS  Patient received 60 mEq oral and 20 mEq IV in the ED with a further 20 mEq IV pending at time of admission    Per chart review, patient does have prior history of hypokalemia that does not appear to have been worked up previously and she does take 60 mEq of potassium 3 times daily per facility documentation  Plan  · 12/29 potassium at 2 6 after repletion with 150 mEq of potassium  · 12/30 potassium at 3 1 after repletion with 320 mEq of K since admission  Will Give K 20mg IV BID+ K 40 mg PO once (80 total this am)  · 12/31 potassium 3 5 from 4 1 yesterday  Insulin started yesterday evening  DC IV fluid  We will replete potassium with home dose K 60 mEq use p o  t i d  Repleted Mag 2    · Magnesium normal at 2 3  · Monitor and replete as necessary aggressively  · Maintenance fluids:  Normal saline with 40 mEq K  · As it appears this has not been worked up previously, we will consult Nephrology this time, recommendations greatly appreciated  · Urinalysis with microscopy    Bilateral lower extremity edema  Assessment & Plan  On exam, patient has bilateral lower extremity edema that appears chronic in nature  Reflecting this, patient takes Lasix 40 mg and Zaroxolyn 5 mg daily at her assisted living facility  Plan  · Holding home diuretics in setting of HHS  · Elevate legs  · Routine skin care    Benign essential hypertension  Assessment & Plan  Patient with noted history of hypertension however her only relevant antihypertensive medications as an outpatient are directed at her chronic venous stasis  Patient initially presented with mild hypertension that has improved without intervention in the ED  Plan  · Holding home diuretics in setting of HHS  · Monitor pressures closely    * Hyperosmolar hyperglycemic state (HHS) [facility])  Assessment & Plan  Lab Results   Component Value Date    HGBA1C 13 0 (H) 12/28/2021       Recent Labs     12/29/21  2237 12/30/21  0536 12/30/21  1058 12/30/21  1606   POCGLU 329* 291* 390* 382*       Blood Sugar Average: Last 72 hrs:     Patient is a known diabetic who has previously refused insulin therapy in the outpatient setting    Most recent A1c 8 6% per chart review in April of 2021  She presents from her assisted living facility today for evaluation of multiple lab abnormalities, particularly glucose 584 and potassium of 2 4  Per review of available facility paperwork, patient has been compliant with her oral hypoglycemic regimen, which includes Glucotrol, Januvia, and Prandin  On arrival to the ED, patient's blood sugar off of BNP was 655 with associated hypokalemia to 2 4 and pseudo hyponatremia  Patient had a minimally elevated beta hydroxybutyrate with no anion gap  In the ED, patient has been fortunately hemodynamically and symptomatically stable  At time of admission, patient has thus far been unable to receive any subcutaneous or IV insulin given the degree of her hypokalemia  Repeat BMP obtained at time of admission shows improvement overall yet persistent hypokalemia to 2 4, though patient is currently actively receiving repletion  MICU attending evaluated patient at the bedside in the ED, deemed patient stable for admission as Step Down Level 2 under general medicine  Plan  · Glucose improved to 300s  · DC IV fluid  · Trend blood sugars closely  · Endocrine consult, recommendations greatly appreciated  · Will start 10 units Lantus at bedtime and correctional SSI algorithm 1   · Started diabetic diet  · Holding home oral antihyperglycemics      Disposition:  Not stable for discharge  SUBJECTIVE     Patient seen and examined  No acute events overnight  Patient is doing well she has no complaints of med  She has no abdominal pain, chest pain, palpitation, headaches, fevers, or chills  She is glad to be eating       OBJECTIVE     Vitals:    12/30/21 2300 12/31/21 0248 12/31/21 0600 12/31/21 0712   BP: 130/68 106/65  126/60   BP Location: Left arm Right arm  Right arm   Pulse: 74 85  90   Resp: 18 18  18   Temp: (!) 97 4 °F (36 3 °C) 97 7 °F (36 5 °C)  97 5 °F (36 4 °C)   TempSrc: Oral Axillary  Axillary   SpO2: 98% 98% 97%   Weight:   67 7 kg (149 lb 4 oz)    Height:          Temperature:   Temp (24hrs), Av 8 °F (36 6 °C), Min:97 4 °F (36 3 °C), Max:98 3 °F (36 8 °C)    Temperature: 97 5 °F (36 4 °C)  Intake & Output:  I/O        0701   0700  0701   0700  07 0700    I V  (mL/kg) 1360 (19 6) 1809 (26 7)     IV Piggyback  200     Total Intake(mL/kg) 1360 (19 6) 2009 (29 7)     Urine (mL/kg/hr) 718 (0 4) 300 (0 2)     Stool 0 0     Total Output 718 300     Net +642 +1709            Unmeasured Stool Occurrence 1 x 2 x         Weights:        Body mass index is 29 64 kg/m²  Weight (last 2 days)     Date/Time Weight    21 0600 67 7 (149 25)    21 0543 69 5 (153 22)    21 2100 78 (172)        Physical Exam  Vitals and nursing note reviewed  Constitutional:       General: She is not in acute distress  Appearance: She is well-developed  HENT:      Head: Normocephalic and atraumatic  Eyes:      Conjunctiva/sclera: Conjunctivae normal    Cardiovascular:      Rate and Rhythm: Normal rate and regular rhythm  Heart sounds: No murmur heard  Pulmonary:      Effort: Pulmonary effort is normal  No respiratory distress  Breath sounds: Normal breath sounds  No wheezing  Abdominal:      Palpations: Abdomen is soft  Tenderness: There is no abdominal tenderness  Musculoskeletal:      Cervical back: Neck supple  Comments: Trace edema bilaterally  Skin:     General: Skin is warm and dry  Neurological:      Mental Status: She is alert  LABORATORY DATA     Labs: I have personally reviewed pertinent reports    Results from last 7 days   Lab Units 21  0440 21  0444 21  0907   WBC Thousand/uL 9 37 11 69* 10 50*   HEMOGLOBIN g/dL 12 2 13 7 13 7   HEMATOCRIT % 35 7 39 8 39 1   PLATELETS Thousands/uL 257 267 248   NEUTROS PCT % 50 57 65   MONOS PCT % 9 9 9      Results from last 7 days   Lab Units 21  0440 21  1543 21  0444 12/28/21  1527 12/28/21  1347   POTASSIUM mmol/L 3 5 4 1 3 1*   < > 3 5   CHLORIDE mmol/L 109* 106 101   < > 80*   CO2 mmol/L 25 24 29   < > 32   BUN mg/dL 15 15 15   < > 33*   CREATININE mg/dL 1 00 1 18 1 09   < > 1 80*   CALCIUM mg/dL 9 1 9 5 9 5   < > 10 1   ALK PHOS U/L 96  --   --   --  156*   ALT U/L 21  --   --   --  28   AST U/L 21  --   --   --  69*    < > = values in this interval not displayed  Results from last 7 days   Lab Units 12/31/21  0440 12/29/21  0742 12/28/21  1347   MAGNESIUM mg/dL 1 9 2 3 2 4                        IMAGING & DIAGNOSTIC TESTING     Radiology Results: I have personally reviewed pertinent reports  No results found  Other Diagnostic Testing: I have personally reviewed pertinent reports  ACTIVE MEDICATIONS     Current Facility-Administered Medications   Medication Dose Route Frequency    donepezil (ARICEPT) tablet 10 mg  10 mg Oral HS    heparin (porcine) subcutaneous injection 5,000 Units  5,000 Units Subcutaneous Q8H [address]    insulin glargine (LANTUS) subcutaneous injection 10 Units 0 1 mL  10 Units Subcutaneous HS    insulin lispro (HumaLOG) 100 units/mL subcutaneous injection 1-5 Units  1-5 Units Subcutaneous TID AC    magnesium sulfate 2 g/50 mL IVPB (premix) 2 g  2 g Intravenous Once    moisture barrier miconazole 2% cream (aka EDGARD MOISTURE BARRIER ANTIFUNGAL CREAM)   Topical BID    ondansetron (ZOFRAN) injection 4 mg  4 mg Intravenous Q6H PRN    polyethylene glycol (MIRALAX) packet 17 g  17 g Oral Daily PRN    potassium chloride oral solution 60 mEq  60 mEq Oral TID    senna-docusate sodium (SENOKOT S) 8 6-50 mg per tablet 1 tablet  1 tablet Oral HS    sodium chloride 0 9 % with KCl 40 mEq/L infusion (premix)  50 mL/hr Intravenous Continuous       VTE Pharmacologic Prophylaxis: Heparin  VTE Mechanical Prophylaxis: sequential compression device    Portions of the record may have been created with voice recognition software    Occasional wrong word or "sound a like" substitutions may have occurred due to the inherent limitations of voice recognition software    Read the chart carefully and recognize, using context, where substitutions have occurred   ==  [address], [place]  [address]  Internal Medicine Residency PGY-1

## 2021-12-31 NOTE — PROGRESS NOTES
Progress Note - Raj Appiah 80 y o  female MRN: [id number]    Unit/Bed#: [facility] 406-01 Encounter: [id number]    CC: diabetes f/u    Subjective:   Raj Appiah is a 80y o  year old female with type 2 DM and dementia who presented with AMS, hypokalemia, hyperglycemia  She was evaluated at bedside today and had no complaints besides hunger and thirst     Objective:     Vitals: Blood pressure 119/71, pulse 98, temperature 98 1 °F (36 7 °C), temperature source Oral, resp  rate 20, height 4' 11 5" (1 511 m), weight 69 5 kg (153 lb 3 5 oz), SpO2 98 %  ,Body mass index is 30 43 kg/m²  Intake/Output Summary (Last 24 hours) at 12/30/2021 1917  Last data filed at 12/30/2021 1722  Gross per 24 hour   Intake 1909 ml   Output 718 ml   Net 1191 ml       Physical Exam:  General Appearance: awake, appears stated age and cooperative  Head: Normocephalic, without obvious abnormality, atraumatic  Extremities: moves all extremities  Skin: Skin color and temperature normal    Pulm: no labored breathing    Lab, Imaging and other studies: I have personally reviewed pertinent reports  POC Glucose (mg/dl)   Date Value   12/30/2021 382 (H)   12/30/2021 390 (H)   12/30/2021 291 (H)   12/29/2021 329 (H)   12/29/2021 382 (H)   12/29/2021 366 (H)   12/28/2021 >500 (HH)   12/28/2021 >500 (HH)   04/25/2021 241 (H)   04/25/2021 210 (H)     Assessment and plan:    Type 2 DM with severe hyperglycemia  Home regimen per chart review includes repaglinide 2 mg t i d  before meals, sitagliptin 100 mg daily, glipizide extended release 10mg daily  Previous a1c 8 6 in 4/2021, most recent a1c 13 0 on this admission  Serum osm 290 on admission, beta hydroxybutyrate minimally elevated at 0 6, bicarbonate normal on admission  Pt has been started on 10 units Lantus at bedtime by primary team with resolution of hypokalemia    Recommend continuing with IV hydration as hyperglycemia may be secondary to volume depletion vs glucose toxicity vs progression of type 2 DM  Check c-peptide, pt may require insulin on discharge  Hypokalemia  Improving  Management as per primary team and nephrology    Portions of the record may have been created with voice recognition software

## 2022-01-01 LAB
ANION GAP SERPL CALCULATED.3IONS-SCNC: 11 MMOL/L (ref 4–13)
ANION GAP SERPL CALCULATED.3IONS-SCNC: 7 MMOL/L (ref 4–13)
ANION GAP SERPL CALCULATED.3IONS-SCNC: 7 MMOL/L (ref 4–13)
ARTIFACT: PRESENT
BASOPHILS # BLD MANUAL: 0 THOUSAND/UL (ref 0–0.1)
BASOPHILS NFR MAR MANUAL: 0 % (ref 0–1)
BUN SERPL-MCNC: 15 MG/DL (ref 5–25)
BUN SERPL-MCNC: 19 MG/DL (ref 5–25)
BUN SERPL-MCNC: 19 MG/DL (ref 5–25)
C PEPTIDE SERPL-MCNC: 4 NG/ML (ref 1.1–4.4)
CALCIUM SERPL-MCNC: 9 MG/DL (ref 8.3–10.1)
CALCIUM SERPL-MCNC: 9.9 MG/DL (ref 8.3–10.1)
CALCIUM SERPL-MCNC: 9.9 MG/DL (ref 8.3–10.1)
CHLORIDE SERPL-SCNC: 102 MMOL/L (ref 100–108)
CHLORIDE SERPL-SCNC: 96 MMOL/L (ref 100–108)
CHLORIDE SERPL-SCNC: 99 MMOL/L (ref 100–108)
CO2 SERPL-SCNC: 20 MMOL/L (ref 21–32)
CO2 SERPL-SCNC: 24 MMOL/L (ref 21–32)
CO2 SERPL-SCNC: 25 MMOL/L (ref 21–32)
CREAT SERPL-MCNC: 0.94 MG/DL (ref 0.6–1.3)
CREAT SERPL-MCNC: 1.08 MG/DL (ref 0.6–1.3)
CREAT SERPL-MCNC: 1.26 MG/DL (ref 0.6–1.3)
EOSINOPHIL # BLD MANUAL: 0.21 THOUSAND/UL (ref 0–0.4)
EOSINOPHIL NFR BLD MANUAL: 2 % (ref 0–6)
ERYTHROCYTE [DISTWIDTH] IN BLOOD BY AUTOMATED COUNT: 13.6 % (ref 11.6–15.1)
GFR SERPL CREATININE-BSD FRML MDRD: 35 ML/MIN/1.73SQ M
GFR SERPL CREATININE-BSD FRML MDRD: 42 ML/MIN/1.73SQ M
GFR SERPL CREATININE-BSD FRML MDRD: 50 ML/MIN/1.73SQ M
GLUCOSE SERPL-MCNC: 138 MG/DL (ref 65–140)
GLUCOSE SERPL-MCNC: 144 MG/DL (ref 65–140)
GLUCOSE SERPL-MCNC: 154 MG/DL (ref 65–140)
GLUCOSE SERPL-MCNC: 162 MG/DL (ref 65–140)
GLUCOSE SERPL-MCNC: 304 MG/DL (ref 65–140)
GLUCOSE SERPL-MCNC: 305 MG/DL (ref 65–140)
GLUCOSE SERPL-MCNC: 330 MG/DL (ref 65–140)
HCT VFR BLD AUTO: 38.4 % (ref 34.8–46.1)
HGB BLD-MCNC: 12.6 G/DL (ref 11.5–15.4)
LYMPHOCYTES # BLD AUTO: 3.14 THOUSAND/UL (ref 0.6–4.47)
LYMPHOCYTES # BLD AUTO: 30 % (ref 14–44)
MCH RBC QN AUTO: 29.8 PG (ref 26.8–34.3)
MCHC RBC AUTO-ENTMCNC: 32.8 G/DL (ref 31.4–37.4)
MCV RBC AUTO: 91 FL (ref 82–98)
METAMYELOCYTES NFR BLD MANUAL: 2 % (ref 0–1)
MONOCYTES # BLD AUTO: 0.84 THOUSAND/UL (ref 0–1.22)
MONOCYTES NFR BLD: 8 % (ref 4–12)
NEUTROPHILS # BLD MANUAL: 6.08 THOUSAND/UL (ref 1.85–7.62)
NEUTS SEG NFR BLD AUTO: 58 % (ref 43–75)
PLATELET # BLD AUTO: 230 THOUSANDS/UL (ref 149–390)
PLATELET BLD QL SMEAR: ADEQUATE
PMV BLD AUTO: 11.6 FL (ref 8.9–12.7)
POTASSIUM SERPL-SCNC: 4 MMOL/L (ref 3.5–5.3)
POTASSIUM SERPL-SCNC: 4.7 MMOL/L (ref 3.5–5.3)
POTASSIUM SERPL-SCNC: 4.9 MMOL/L (ref 3.5–5.3)
RBC # BLD AUTO: 4.23 MILLION/UL (ref 3.81–5.12)
RBC MORPH BLD: NORMAL
SODIUM SERPL-SCNC: 127 MMOL/L (ref 136–145)
SODIUM SERPL-SCNC: 130 MMOL/L (ref 136–145)
SODIUM SERPL-SCNC: 134 MMOL/L (ref 136–145)
WBC # BLD AUTO: 10.48 THOUSAND/UL (ref 4.31–10.16)

## 2022-01-01 PROCEDURE — 80048 BASIC METABOLIC PNL TOTAL CA: CPT | Performed by: INTERNAL MEDICINE

## 2022-01-01 PROCEDURE — 82948 REAGENT STRIP/BLOOD GLUCOSE: CPT

## 2022-01-01 PROCEDURE — 80048 BASIC METABOLIC PNL TOTAL CA: CPT

## 2022-01-01 PROCEDURE — 85027 COMPLETE CBC AUTOMATED: CPT

## 2022-01-01 PROCEDURE — 85007 BL SMEAR W/DIFF WBC COUNT: CPT

## 2022-01-01 PROCEDURE — 80048 BASIC METABOLIC PNL TOTAL CA: CPT | Performed by: STUDENT IN AN ORGANIZED HEALTH CARE EDUCATION/TRAINING PROGRAM

## 2022-01-01 PROCEDURE — 99232 SBSQ HOSP IP/OBS MODERATE 35: CPT | Performed by: INTERNAL MEDICINE

## 2022-01-01 RX ADMIN — INSULIN LISPRO 1 UNITS: 100 INJECTION, SOLUTION INTRAVENOUS; SUBCUTANEOUS at 06:02

## 2022-01-01 RX ADMIN — HEPARIN SODIUM 5000 UNITS: 5000 INJECTION INTRAVENOUS; SUBCUTANEOUS at 05:05

## 2022-01-01 RX ADMIN — INSULIN LISPRO 3 UNITS: 100 INJECTION, SOLUTION INTRAVENOUS; SUBCUTANEOUS at 21:12

## 2022-01-01 RX ADMIN — SODIUM CHLORIDE 1000 ML: 0.9 INJECTION, SOLUTION INTRAVENOUS at 20:57

## 2022-01-01 RX ADMIN — HEPARIN SODIUM 5000 UNITS: 5000 INJECTION INTRAVENOUS; SUBCUTANEOUS at 21:12

## 2022-01-01 RX ADMIN — INSULIN LISPRO 3 UNITS: 100 INJECTION, SOLUTION INTRAVENOUS; SUBCUTANEOUS at 08:19

## 2022-01-01 RX ADMIN — HEPARIN SODIUM 5000 UNITS: 5000 INJECTION INTRAVENOUS; SUBCUTANEOUS at 14:22

## 2022-01-01 RX ADMIN — POTASSIUM CHLORIDE 60 MEQ: 20 SOLUTION ORAL at 21:15

## 2022-01-01 RX ADMIN — SENNOSIDES AND DOCUSATE SODIUM 1 TABLET: 50; 8.6 TABLET ORAL at 21:14

## 2022-01-01 RX ADMIN — INSULIN GLARGINE 10 UNITS: 100 INJECTION, SOLUTION SUBCUTANEOUS at 21:11

## 2022-01-01 RX ADMIN — POTASSIUM CHLORIDE 60 MEQ: 20 SOLUTION ORAL at 17:50

## 2022-01-01 RX ADMIN — INSULIN LISPRO 3 UNITS: 100 INJECTION, SOLUTION INTRAVENOUS; SUBCUTANEOUS at 17:51

## 2022-01-01 RX ADMIN — MICONAZOLE NITRATE 1 APPLICATION: 20 CREAM TOPICAL at 17:51

## 2022-01-01 RX ADMIN — POTASSIUM CHLORIDE 60 MEQ: 20 SOLUTION ORAL at 08:19

## 2022-01-01 RX ADMIN — INSULIN LISPRO 3 UNITS: 100 INJECTION, SOLUTION INTRAVENOUS; SUBCUTANEOUS at 17:50

## 2022-01-01 RX ADMIN — MICONAZOLE NITRATE 1 APPLICATION: 20 CREAM TOPICAL at 08:20

## 2022-01-01 NOTE — TREATMENT PLAN
Endocrinology quick note:    Called pt's daughter Niurka Rice to discuss discharge medication regimen options for her mother Ted Mcdanielsgabireyes Espinalnljenna reported that she had discussed with other family members that they did not want pt to be discharged on insulin  We discussed that labwork was done which showed that pt still has endogenous insulin production and does not necessarily require insulin  We discussed that though our recommendation originally would have been once daily injection of basal insulin with use of prandin with meals, it would be acceptable to switch to januvia 100mg and home prandin 2mg TID on discharge if insulin is to be avoided  Recommend discontinuation of sulfonylurea in this elderly patient who presented with SALTY  Niurka Rice is interested in diet modifications at nursing home to improve blood sugars as well and to avoid insulin  Per brenda Bernardo to continue basal bolus insulin while hospitalized  On followup as outpatient additional medication options can be discussed if blood sugars are elevated    Ze Capellan DO  Endocrinology Fellow, PGY4

## 2022-01-01 NOTE — PROGRESS NOTES
INTERNAL MEDICINE RESIDENCY PROGRESS NOTE     Name: Mathieu Livingston   Age & Sex: 80 y o  female   MRN: [id number]  Unit/Bed#: [facility] 406-01   Encounter: [id number]  Team: SOD Team C     PATIENT INFORMATION     Name: Mathieu Livingston   Age & Sex: 80 y o  female   MRN: [id number]  Hospital Stay Days: 4    ASSESSMENT/PLAN     Principal Problem:    Hyperosmolar hyperglycemic state (HHS) ([facility] [address] )  Active Problems:    Benign essential hypertension    Bilateral lower extremity edema    Hypokalemia    Hyponatremia    Moderate aortic stenosis    Uncomplicated senile dementia ([facility] [address] )    Acute-on-chronic kidney injury ([facility] [address] )    Urinary incontinence    First degree AV block    At risk for delirium      * Hyperosmolar hyperglycemic state (HHS) [facility])  Assessment & Plan  Lab Results   Component Value Date    HGBA1C 13 0 (H) 12/28/2021       Recent Labs     12/29/21  2237 12/30/21  0536 12/30/21  1058 12/30/21  1606   POCGLU 329* 291* 390* 382*       Blood Sugar Average: Last 72 hrs:     Patient is a known diabetic who has previously refused insulin therapy in the outpatient setting  Most recent A1c 8 6% per chart review in April of 2021  She presents from her assisted living facility today for evaluation of multiple lab abnormalities, particularly glucose 584 and potassium of 2 4  Per review of available facility paperwork, patient has been compliant with her oral hypoglycemic regimen, which includes Glucotrol, Januvia, and Prandin  On arrival to the ED, patient's blood sugar off of BNP was 655 with associated hypokalemia to 2 4 and pseudo hyponatremia  Patient had a minimally elevated beta hydroxybutyrate with no anion gap  In the ED, patient has been fortunately hemodynamically and symptomatically stable  At time of admission, patient has thus far been unable to receive any subcutaneous or IV insulin given the degree of her hypokalemia    Repeat BMP obtained at time of admission shows improvement overall yet persistent hypokalemia to 2 4, though patient is currently actively receiving repletion  MICU attending evaluated patient at the bedside in the ED, deemed patient stable for admission as Step Down Level 2 under general medicine  Plan  · Glucose improved to 300s  · DC IV fluid  · Trend blood sugars closely  · Endocrine consult, recommendations greatly appreciated  · Will start 10 units Lantus at bedtime and correctional SSI algorithm 1   · Started diabetic diet  · Holding home oral antihyperglycemics    At risk for delirium  Assessment & Plan  Given baseline history of dementia, patient is at significant risk for hospital associated delirium  Patient is currently A&O times 2      Plan  Geriatrics consult, recommendations greatly appreciated    -Patient is high risk of delirium due to her underlying dementia and admission to unfamiliar environment  -Initiate delirium precautions  -maintain normal sleep/wake cycle  -minimize overnight interruptions, group overnight vitals/labs/nursing checks as much as possible  -dim lights, close blinds and turn off tv to minimize stimulation and encourage sleep environment in evenings  -ensure that pain is well controlled, consider Tylenol 975mg Q8H scheduled   -monitor closely for fecal and urinary retention which may precipitate delirium  -encourage early mobilization and ambulation with PT and OT assistance  -provide frequent reorientation and redirection as required  -encourage family and friends at the bedside to help help calm patient if anxious  -avoid medications which may precipitate or worsen delirium such as tramadol, benzodiazepine, anticholinergics, and benadryl  -encourage good hydration and nutrition   -redirect unwanted behaviors as first line, avoid physical restraints, use chemical restraint only if all other attempts have been unsuccessful, would consider Zyprexa 2 5mg IM Q daily, monitor for orthostatic hypotension and QTc prolongation with repeat dosing, recommend lowest dose possible for shortest duration possible         First degree AV block  Assessment & Plan  EKG on arrival shows first-degree AV block with PVCs and left bundle-branch block  Review of previous EKGs show similar findings  Plan  · No urgent indication to involve Cardiology  · Recommend avoiding QTC prolonging agents if able    Urinary incontinence  Assessment & Plan  Per discussion with POA, patient has been dealing with urinary incontinence at her assisted living facility for most of at least the last year  The patient is unable to provide any information regarding the details of her urinary incontinence  Plan  · Monitor patient closely, would recommend considering PureWick catheter and avoiding implantation of Mascorro catheter during this admission given likelihood of exacerbation of delirium     Acute-on-chronic kidney injury [facility])  Assessment & Plan  Lab Results   Component Value Date    EGFR 50 01/01/2022    EGFR 42 01/01/2022    EGFR 32 12/31/2021    CREATININE 0 94 01/01/2022    CREATININE 1 08 01/01/2022    CREATININE 1 34 (H) 12/31/2021     Patient presents with serum creatinine 1 8 with apparent baseline of roughly 1 0-1 3  Patient has known history of CKD stage 3  Plan  · SALTY resolved  Renal function back to baseline  · SALTY on CKD likely prerenal in setting of dehydration secondary to HHS  · 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, arteriolar sclerosis, diabetic kidney disease  · IV fluids for hydration  · Trend renal indices and urine output closely  · Avoid hypotension and nephrotoxins where able  · Nephrology consult, recommendations greatly appreciated  · As per nephrology, pt may benefit from potassium-sparing diuretic (diuretics currently on hold)  -- on Lasix 40 p o  Daily at home  · DC IV fluids  · SC Insulin started  · Follow-up UA  · To consider renal ultrasound, defer to Nephrology    Uncomplicated senile dementia [facility])  Assessment & Plan  Patient with a known history of senile dementia  Patient currently resides in an assisted living facility, [facility]  Per conversation this evening with POA, patient's daughter Rona Quesada, patient is typically only alert and oriented to herself  Her daughter tells me that she is typically able to walk independently with a walker  She requires assistance with management of her medications at her assisted living facility  In light of discussion with daughter, patient appears to be more disoriented than baseline  Plan  · Patient currently alert and oriented only to her name, not her birthday, location, or year  · Monitor for progression back to baseline with treatment of acute conditions as indicated elsewhere in note  · Geriatrics consultation, recommendations greatly appreciated  · Continue home Aricept     Moderate aortic stenosis  Assessment & Plan  Patient with history of known aortic stenosis, moderate to severe in severity, seen on echocardiogram obtained April 2021  Patient currently with grade 3/6 murmur on exam     Plan  · Monitor fluid status extremely closely   · Consider repeat echocardiogram this admission      Hyponatremia  Assessment & Plan  Patient with true hyponatremia complicated by pseudohyponatremia in the setting of HHS  Serum sodium 121 on arrival, 130 with correction in setting of hyperglycemia  Sodium levels improving at time of admission  Plan  · Improved with IVF   · Restart gentle Maintenance fluids with normal saline with 40 mEq of K  · Nephrology consult, recommendations greatly appreciated    Hypokalemia  Assessment & Plan  Serum potassium 2 4 on arrival in setting of HHS  Patient received 60 mEq oral and 20 mEq IV in the ED with a further 20 mEq IV pending at time of admission  Per chart review, patient does have prior history of hypokalemia that does not appear to have been worked up previously and she does take 60 mEq of potassium 3 times daily per facility documentation      Plan  · 12/29 potassium at 2 6 after repletion with 150 mEq of potassium  ·  potassium at 3 1 after repletion with 320 mEq of K since admission  Will Give K 20mg IV BID+ K 40 mg PO once (80 total this am)  ·  potassium 3 5 from 4 1 yesterday  Insulin started yesterday evening  DC IV fluid  We will replete potassium with home dose K 60 mEq use p o  t i d  Repleted Mag 2    ·  potassium 4 0 - continue to monitor Q12 BMP  · Monitor and replete as necessary aggressively  · As it appears this has not been worked up previously, we will consult Nephrology this time, recommendations greatly appreciated    Bilateral lower extremity edema  Assessment & Plan  On exam, patient has bilateral lower extremity edema that appears chronic in nature  Reflecting this, patient takes Lasix 40 mg and Zaroxolyn 5 mg daily at her assisted living facility  Plan  · Holding home diuretics in setting of HHS   · Elevate legs  · Routine skin care    Benign essential hypertension  Assessment & Plan  Patient with noted history of hypertension however her only relevant antihypertensive medications as an outpatient are directed at her chronic venous stasis  Patient initially presented with mild hypertension that has improved without intervention in the ED  Plan  · Holding home diuretics in setting of HHS  · Monitor pressures closely      Disposition: pending placement    SUBJECTIVE     Patient seen and examined  No acute events overnight  Patient sleeping in bedside chair at time of evaluation  Denies any acute complaints at this time  States eating and drinking well  No issues with urination      OBJECTIVE     Vitals:    22 0552 22 0726 22 0800 22 1156   BP:  123/79  123/85   BP Location:  Right arm  Left arm   Pulse:  86  93   Resp:  18  16   Temp:  (!) 97 4 °F (36 3 °C)  98 2 °F (36 8 °C)   TempSrc:  Oral  Oral   SpO2:  97% 97% 99%   Weight: 70 2 kg (154 lb 12 2 oz)      Height:          Temperature:   Temp (24hrs), Av 8 °F (36 6 °C), Min:97 4 °F (36 3 °C), Max:98 2 °F (36 8 °C)    Temperature: 98 2 °F (36 8 °C)  Intake & Output:  I/O       12/30 0701 12/31 0700 12/31 0701 01/01 0700 01/01 0701 01/02 0700    P  O   780     I V  (mL/kg) 1809 (26 7)      IV Piggyback 200 50     Total Intake(mL/kg) 2009 (29 7) 830 (11 8)     Urine (mL/kg/hr) 300 (0 2) 1200 (0 7)     Stool 0 0     Total Output 300 1200     Net +1709 -370            Unmeasured Urine Occurrence  2 x     Unmeasured Stool Occurrence 2 x 2 x         Weights:        Body mass index is 30 74 kg/m²  Weight (last 2 days)     Date/Time Weight    01/01/22 0552 70 2 (154 76)    12/31/21 0600 67 7 (149 25)    12/30/21 0543 69 5 (153 22)        Physical Exam  Vitals reviewed  Constitutional:       General: She is not in acute distress  Appearance: Normal appearance  She is well-developed  She is not ill-appearing, toxic-appearing or diaphoretic  HENT:      Head: Normocephalic and atraumatic  Right Ear: External ear normal       Left Ear: External ear normal       Nose: Nose normal       Mouth/Throat:      Mouth: Mucous membranes are moist       Pharynx: Oropharynx is clear  Eyes:      General:         Right eye: No discharge  Left eye: No discharge  Conjunctiva/sclera: Conjunctivae normal    Cardiovascular:      Rate and Rhythm: Normal rate and regular rhythm  Pulmonary:      Effort: Pulmonary effort is normal  No respiratory distress  Breath sounds: Normal breath sounds  Abdominal:      General: Bowel sounds are normal  There is no distension  Palpations: Abdomen is soft  Tenderness: There is no abdominal tenderness  Musculoskeletal:         General: No swelling or tenderness  Normal range of motion  Cervical back: Normal range of motion and neck supple  Right lower leg: No edema  Left lower leg: No edema  Skin:     General: Skin is warm and dry  Neurological:      General: No focal deficit present        Mental Status: She is alert  Motor: No weakness  Comments: AAO x1-2   Psychiatric:         Mood and Affect: Mood normal          Behavior: Behavior normal          Thought Content: Thought content normal        LABORATORY DATA     Labs: I have personally reviewed pertinent reports  Results from last 7 days   Lab Units 01/01/22  0524 12/31/21  0440 12/30/21  0444 12/29/21  0907 12/29/21  0907   WBC Thousand/uL 10 48* 9 37 11 69*   < > 10 50*   HEMOGLOBIN g/dL 12 6 12 2 13 7   < > 13 7   HEMATOCRIT % 38 4 35 7 39 8   < > 39 1   PLATELETS Thousands/uL 230 257 267   < > 248   NEUTROS PCT %  --  50 57  --  65   MONOS PCT %  --  9 9   < > 9   MONO PCT % 8  --   --   --   --     < > = values in this interval not displayed  Results from last 7 days   Lab Units 01/01/22  0524 01/01/22  0030 12/31/21  1233 12/31/21  0440 12/31/21  0440 12/28/21  1527 12/28/21  1347   POTASSIUM mmol/L 4 0 4 7 4 0   < > 3 5   < > 3 5   CHLORIDE mmol/L 102 99* 101   < > 109*   < > 80*   CO2 mmol/L 25 20* 26   < > 25   < > 32   BUN mg/dL 15 19 19   < > 15   < > 33*   CREATININE mg/dL 0 94 1 08 1 34*   < > 1 00   < > 1 80*   CALCIUM mg/dL 9 0 9 9 10 0   < > 9 1   < > 10 1   ALK PHOS U/L  --   --   --   --  96  --  156*   ALT U/L  --   --   --   --  21  --  28   AST U/L  --   --   --   --  21  --  69*    < > = values in this interval not displayed  Results from last 7 days   Lab Units 12/31/21  0440 12/29/21  0742 12/28/21  1347   MAGNESIUM mg/dL 1 9 2 3 2 4                        IMAGING & DIAGNOSTIC TESTING     Radiology Results: I have personally reviewed pertinent reports  No results found  Other Diagnostic Testing: I have personally reviewed pertinent reports      ACTIVE MEDICATIONS     Current Facility-Administered Medications   Medication Dose Route Frequency    donepezil (ARICEPT) tablet 10 mg  10 mg Oral HS    heparin (porcine) subcutaneous injection 5,000 Units  5,000 Units Subcutaneous Q8H [facility]    insulin glargine (LANTUS) subcutaneous injection 10 Units 0 1 mL  10 Units Subcutaneous HS    insulin lispro (HumaLOG) 100 units/mL subcutaneous injection 1-5 Units  1-5 Units Subcutaneous 4x Daily (AC & HS)    insulin lispro (HumaLOG) 100 units/mL subcutaneous injection 3 Units  3 Units Subcutaneous TID With Meals    moisture barrier miconazole 2% cream (aka EDGARD MOISTURE BARRIER ANTIFUNGAL CREAM)   Topical BID    ondansetron (ZOFRAN) injection 4 mg  4 mg Intravenous Q6H PRN    polyethylene glycol (MIRALAX) packet 17 g  17 g Oral Daily PRN    potassium chloride oral solution 60 mEq  60 mEq Oral TID    senna-docusate sodium (SENOKOT S) 8 6-50 mg per tablet 1 tablet  1 tablet Oral HS       VTE Pharmacologic Prophylaxis: Heparin  VTE Mechanical Prophylaxis: sequential compression device    Portions of the record may have been created with voice recognition software  Occasional wrong word or "sound a like" substitutions may have occurred due to the inherent limitations of voice recognition software    Read the chart carefully and recognize, using context, where substitutions have occurred   ==  Eliana Steen, [address] [facility]  Internal Medicine Residency PGY-2

## 2022-01-01 NOTE — PLAN OF CARE
Problem: MOBILITY - ADULT  Goal: Maintain or return to baseline ADL function  Description: INTERVENTIONS:  -  Assess patient's ability to carry out ADLs; assess patient's baseline for ADL function and identify physical deficits which impact ability to perform ADLs (bathing, care of mouth/teeth, toileting, grooming, dressing, etc )  - Assess/evaluate cause of self-care deficits   - Assess range of motion  - Assess patient's mobility; develop plan if impaired  - Assess patient's need for assistive devices and provide as appropriate  - Encourage maximum independence but intervene and supervise when necessary  - Involve family in performance of ADLs  - Assess for home care needs following discharge   - Consider OT consult to assist with ADL evaluation and planning for discharge  - Provide patient education as appropriate  Outcome: Progressing  Goal: Maintains/Returns to pre admission functional level  Description: INTERVENTIONS:  - Perform BMAT or MOVE assessment daily    - Set and communicate daily mobility goal to care team and patient/family/caregiver  - Collaborate with rehabilitation services on mobility goals if consulted  - Perform Range of Motion  times a day  - Reposition patient every  hours    - Dangle patient  times a day  - Stand patient  times a day  - Ambulate patient  times a day  - Out of bed to chair  times a day   - Out of bed for meals  times a day  - Out of bed for toileting  - Record patient progress and toleration of activity level   Outcome: Progressing

## 2022-01-02 LAB
ANION GAP SERPL CALCULATED.3IONS-SCNC: 6 MMOL/L (ref 4–13)
ANION GAP SERPL CALCULATED.3IONS-SCNC: 7 MMOL/L (ref 4–13)
ANION GAP SERPL CALCULATED.3IONS-SCNC: 8 MMOL/L (ref 4–13)
BUN SERPL-MCNC: 15 MG/DL (ref 5–25)
BUN SERPL-MCNC: 16 MG/DL (ref 5–25)
BUN SERPL-MCNC: 16 MG/DL (ref 5–25)
CALCIUM SERPL-MCNC: 8.8 MG/DL (ref 8.3–10.1)
CALCIUM SERPL-MCNC: 8.9 MG/DL (ref 8.3–10.1)
CALCIUM SERPL-MCNC: 9.3 MG/DL (ref 8.3–10.1)
CHLORIDE SERPL-SCNC: 103 MMOL/L (ref 100–108)
CHLORIDE SERPL-SCNC: 104 MMOL/L (ref 100–108)
CHLORIDE SERPL-SCNC: 104 MMOL/L (ref 100–108)
CO2 SERPL-SCNC: 20 MMOL/L (ref 21–32)
CO2 SERPL-SCNC: 22 MMOL/L (ref 21–32)
CO2 SERPL-SCNC: 23 MMOL/L (ref 21–32)
CREAT SERPL-MCNC: 0.95 MG/DL (ref 0.6–1.3)
CREAT SERPL-MCNC: 1.08 MG/DL (ref 0.6–1.3)
CREAT SERPL-MCNC: 1.09 MG/DL (ref 0.6–1.3)
ERYTHROCYTE [DISTWIDTH] IN BLOOD BY AUTOMATED COUNT: 13.8 % (ref 11.6–15.1)
GFR SERPL CREATININE-BSD FRML MDRD: 42 ML/MIN/1.73SQ M
GFR SERPL CREATININE-BSD FRML MDRD: 42 ML/MIN/1.73SQ M
GFR SERPL CREATININE-BSD FRML MDRD: 50 ML/MIN/1.73SQ M
GLUCOSE SERPL-MCNC: 149 MG/DL (ref 65–140)
GLUCOSE SERPL-MCNC: 161 MG/DL (ref 65–140)
GLUCOSE SERPL-MCNC: 164 MG/DL (ref 65–140)
GLUCOSE SERPL-MCNC: 180 MG/DL (ref 65–140)
GLUCOSE SERPL-MCNC: 196 MG/DL (ref 65–140)
GLUCOSE SERPL-MCNC: 217 MG/DL (ref 65–140)
GLUCOSE SERPL-MCNC: 233 MG/DL (ref 65–140)
HCT VFR BLD AUTO: 38.8 % (ref 34.8–46.1)
HGB BLD-MCNC: 12.8 G/DL (ref 11.5–15.4)
MAGNESIUM SERPL-MCNC: 1.6 MG/DL (ref 1.6–2.6)
MCH RBC QN AUTO: 29.4 PG (ref 26.8–34.3)
MCHC RBC AUTO-ENTMCNC: 33 G/DL (ref 31.4–37.4)
MCV RBC AUTO: 89 FL (ref 82–98)
PLATELET # BLD AUTO: 227 THOUSANDS/UL (ref 149–390)
PMV BLD AUTO: 12.2 FL (ref 8.9–12.7)
POTASSIUM SERPL-SCNC: 4.2 MMOL/L (ref 3.5–5.3)
POTASSIUM SERPL-SCNC: 5.2 MMOL/L (ref 3.5–5.3)
POTASSIUM SERPL-SCNC: 5.8 MMOL/L (ref 3.5–5.3)
RBC # BLD AUTO: 4.36 MILLION/UL (ref 3.81–5.12)
SODIUM SERPL-SCNC: 131 MMOL/L (ref 136–145)
SODIUM SERPL-SCNC: 133 MMOL/L (ref 136–145)
SODIUM SERPL-SCNC: 133 MMOL/L (ref 136–145)
WBC # BLD AUTO: 11.76 THOUSAND/UL (ref 4.31–10.16)

## 2022-01-02 PROCEDURE — 99232 SBSQ HOSP IP/OBS MODERATE 35: CPT | Performed by: INTERNAL MEDICINE

## 2022-01-02 PROCEDURE — 80048 BASIC METABOLIC PNL TOTAL CA: CPT

## 2022-01-02 PROCEDURE — 85027 COMPLETE CBC AUTOMATED: CPT | Performed by: STUDENT IN AN ORGANIZED HEALTH CARE EDUCATION/TRAINING PROGRAM

## 2022-01-02 PROCEDURE — 83735 ASSAY OF MAGNESIUM: CPT | Performed by: STUDENT IN AN ORGANIZED HEALTH CARE EDUCATION/TRAINING PROGRAM

## 2022-01-02 PROCEDURE — 80048 BASIC METABOLIC PNL TOTAL CA: CPT | Performed by: STUDENT IN AN ORGANIZED HEALTH CARE EDUCATION/TRAINING PROGRAM

## 2022-01-02 PROCEDURE — 82948 REAGENT STRIP/BLOOD GLUCOSE: CPT

## 2022-01-02 PROCEDURE — 87086 URINE CULTURE/COLONY COUNT: CPT | Performed by: STUDENT IN AN ORGANIZED HEALTH CARE EDUCATION/TRAINING PROGRAM

## 2022-01-02 PROCEDURE — 93005 ELECTROCARDIOGRAM TRACING: CPT

## 2022-01-02 RX ORDER — MAGNESIUM SULFATE HEPTAHYDRATE 40 MG/ML
2 INJECTION, SOLUTION INTRAVENOUS ONCE
Status: COMPLETED | OUTPATIENT
Start: 2022-01-02 | End: 2022-01-02

## 2022-01-02 RX ORDER — CALCIUM GLUCONATE 20 MG/ML
1 INJECTION, SOLUTION INTRAVENOUS ONCE
Status: COMPLETED | OUTPATIENT
Start: 2022-01-02 | End: 2022-01-02

## 2022-01-02 RX ADMIN — MAGNESIUM SULFATE HEPTAHYDRATE 2 G: 40 INJECTION, SOLUTION INTRAVENOUS at 09:43

## 2022-01-02 RX ADMIN — INSULIN LISPRO 3 UNITS: 100 INJECTION, SOLUTION INTRAVENOUS; SUBCUTANEOUS at 09:36

## 2022-01-02 RX ADMIN — INSULIN GLARGINE 10 UNITS: 100 INJECTION, SOLUTION SUBCUTANEOUS at 21:37

## 2022-01-02 RX ADMIN — DONEPEZIL HYDROCHLORIDE 10 MG: 10 TABLET ORAL at 00:07

## 2022-01-02 RX ADMIN — DONEPEZIL HYDROCHLORIDE 10 MG: 10 TABLET ORAL at 22:39

## 2022-01-02 RX ADMIN — INSULIN LISPRO 2 UNITS: 100 INJECTION, SOLUTION INTRAVENOUS; SUBCUTANEOUS at 21:35

## 2022-01-02 RX ADMIN — POTASSIUM CHLORIDE 60 MEQ: 20 SOLUTION ORAL at 15:00

## 2022-01-02 RX ADMIN — INSULIN LISPRO 2 UNITS: 100 INJECTION, SOLUTION INTRAVENOUS; SUBCUTANEOUS at 17:22

## 2022-01-02 RX ADMIN — CALCIUM GLUCONATE 1 G: 20 INJECTION, SOLUTION INTRAVENOUS at 21:26

## 2022-01-02 RX ADMIN — POTASSIUM CHLORIDE 60 MEQ: 20 SOLUTION ORAL at 09:35

## 2022-01-02 RX ADMIN — INSULIN LISPRO 1 UNITS: 100 INJECTION, SOLUTION INTRAVENOUS; SUBCUTANEOUS at 06:02

## 2022-01-02 RX ADMIN — INSULIN LISPRO 1 UNITS: 100 INJECTION, SOLUTION INTRAVENOUS; SUBCUTANEOUS at 12:30

## 2022-01-02 RX ADMIN — MICONAZOLE NITRATE: 20 CREAM TOPICAL at 17:20

## 2022-01-02 RX ADMIN — MICONAZOLE NITRATE: 20 CREAM TOPICAL at 09:36

## 2022-01-02 RX ADMIN — HEPARIN SODIUM 5000 UNITS: 5000 INJECTION INTRAVENOUS; SUBCUTANEOUS at 05:20

## 2022-01-02 RX ADMIN — INSULIN LISPRO 3 UNITS: 100 INJECTION, SOLUTION INTRAVENOUS; SUBCUTANEOUS at 12:30

## 2022-01-02 RX ADMIN — HEPARIN SODIUM 5000 UNITS: 5000 INJECTION INTRAVENOUS; SUBCUTANEOUS at 21:33

## 2022-01-02 RX ADMIN — INSULIN LISPRO 3 UNITS: 100 INJECTION, SOLUTION INTRAVENOUS; SUBCUTANEOUS at 17:22

## 2022-01-02 NOTE — PLAN OF CARE
Problem: Potential for Falls  Goal: Patient will remain free of falls  Description: INTERVENTIONS:  - Educate patient/family on patient safety including physical limitations  - Instruct patient to call for assistance with activity   - Consult OT/PT to assist with strengthening/mobility   - Keep Call bell within reach  - Keep bed low and locked with side rails adjusted as appropriate  - Keep care items and personal belongings within reach  - Initiate and maintain comfort rounds  - Make Fall Risk Sign visible to staff  - Offer Toileting every 2 Hours, in advance of need  - Initiate/Maintain chair/bed alarm  - Obtain necessary fall risk management equipment:   - Apply yellow socks and bracelet for high fall risk patients  - Consider moving patient to room near nurses station  Outcome: Progressing     Problem: MOBILITY - ADULT  Goal: Maintain or return to baseline ADL function  Description: INTERVENTIONS:  -  Assess patient's ability to carry out ADLs; assess patient's baseline for ADL function and identify physical deficits which impact ability to perform ADLs (bathing, care of mouth/teeth, toileting, grooming, dressing, etc )  - Assess/evaluate cause of self-care deficits   - Assess range of motion  - Assess patient's mobility; develop plan if impaired  - Assess patient's need for assistive devices and provide as appropriate  - Encourage maximum independence but intervene and supervise when necessary  - Involve family in performance of ADLs  - Assess for home care needs following discharge   - Consider OT consult to assist with ADL evaluation and planning for discharge  - Provide patient education as appropriate  Outcome: Progressing  Goal: Maintains/Returns to pre admission functional level  Description: INTERVENTIONS:  - Perform BMAT or MOVE assessment daily    - Set and communicate daily mobility goal to care team and patient/family/caregiver     - Collaborate with rehabilitation services on mobility goals if consulted  - Reposition patient every 2 hours    - Ambulate patient 3 times a day  - Out of bed to chair 3 times a day   - Out of bed for meals 3 times a day  - Out of bed for toileting  - Record patient progress and toleration of activity level   Outcome: Progressing     Problem: Prexisting or High Potential for Compromised Skin Integrity  Goal: Skin integrity is maintained or improved  Description: INTERVENTIONS:  - Identify patients at risk for skin breakdown  - Assess and monitor skin integrity  - Assess and monitor nutrition and hydration status  - Monitor labs   - Assess for incontinence   - Turn and reposition patient  - Assist with mobility/ambulation  - Relieve pressure over bony prominences  - Avoid friction and shearing  - Provide appropriate hygiene as needed including keeping skin clean and dry  - Evaluate need for skin moisturizer/barrier cream  - Collaborate with interdisciplinary team   - Patient/family teaching  - Consider wound care consult   Outcome: Progressing

## 2022-01-02 NOTE — QUICK NOTE
Patient's primary  was called today   was updated regarding patient's current clinical progress and plans of care  All questions were answered and concerns were addressed to satisfaction

## 2022-01-02 NOTE — PROGRESS NOTES
INTERNAL MEDICINE RESIDENCY PROGRESS NOTE     Name: Lito Merrill   Age & Sex: 80 y o  female   MRN: [id number]  Unit/Bed#: [facility] 406-01   Encounter: [id number]  Team: SOD Team C     PATIENT INFORMATION     Name: Lito Merrill   Age & Sex: 80 y o  female   MRN: [id number]  Hospital Stay Days: 5    ASSESSMENT/PLAN     Principal Problem:    Hyperosmolar hyperglycemic state (HHS) ([facility] )  Active Problems:    Benign essential hypertension    Bilateral lower extremity edema    Hypokalemia    Hyponatremia    Moderate aortic stenosis    Uncomplicated senile dementia (HCC)    Acute-on-chronic kidney injury ([facility] )    Urinary incontinence    First degree AV block    At risk for delirium      At risk for delirium  Assessment & Plan  Given baseline history of dementia, patient is at significant risk for hospital associated delirium  Patient is currently A&O times 2      Plan  Geriatrics consult, recommendations greatly appreciated    -Patient is high risk of delirium due to her underlying dementia and admission to unfamiliar environment  -Initiate delirium precautions  -maintain normal sleep/wake cycle  -minimize overnight interruptions, group overnight vitals/labs/nursing checks as much as possible  -dim lights, close blinds and turn off tv to minimize stimulation and encourage sleep environment in evenings  -ensure that pain is well controlled, consider Tylenol 975mg Q8H scheduled   -monitor closely for fecal and urinary retention which may precipitate delirium  -encourage early mobilization and ambulation with PT and OT assistance  -provide frequent reorientation and redirection as required  -encourage family and friends at the bedside to help help calm patient if anxious  -avoid medications which may precipitate or worsen delirium such as tramadol, benzodiazepine, anticholinergics, and benadryl  -encourage good hydration and nutrition   -redirect unwanted behaviors as first line, avoid physical restraints, use chemical restraint only if all other attempts have been unsuccessful, would consider Zyprexa 2 5mg IM Q daily, monitor for orthostatic hypotension and QTc prolongation with repeat dosing, recommend lowest dose possible for shortest duration possible         First degree AV block  Assessment & Plan  EKG on arrival shows first-degree AV block with PVCs and left bundle-branch block  Review of previous EKGs show similar findings  Plan  · No urgent indication to involve Cardiology  · Recommend avoiding QTC prolonging agents if able    Urinary incontinence  Assessment & Plan  Per discussion with POA, patient has been dealing with urinary incontinence at her assisted living facility for most of at least the last year  The patient is unable to provide any information regarding the details of her urinary incontinence  Plan  · Monitor patient closely, would recommend considering PureWick catheter and avoiding implantation of Mascorro catheter during this admission given likelihood of exacerbation of delirium     Acute-on-chronic kidney injury [facility])  Assessment & Plan  Lab Results   Component Value Date    EGFR 50 01/02/2022    EGFR 35 01/01/2022    EGFR 50 01/01/2022    CREATININE 0 95 01/02/2022    CREATININE 1 26 01/01/2022    CREATININE 0 94 01/01/2022     Patient presents with serum creatinine 1 8 with apparent baseline of roughly 1 0-1 3  Patient has known history of CKD stage 3  Plan  · SALTY resolved  Renal function back to baseline  · SALTY on CKD likely prerenal in setting of dehydration secondary to HHS  · 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, arteriolar sclerosis, diabetic kidney disease  · IV fluids for hydration  · Trend renal indices and urine output closely  · Avoid hypotension and nephrotoxins where able  · Nephrology consult, recommendations greatly appreciated  · As per nephrology, pt may benefit from potassium-sparing diuretic (diuretics currently on hold)  -- on Lasix 40 p o  Daily at home     · DC IV fluids  · SC Insulin started  · Follow-up UA  · To consider renal ultrasound, defer to Nephrology    Uncomplicated senile dementia [facility])  Assessment & Plan  Patient with a known history of senile dementia  Patient currently resides in an assisted living facility, [facility]  Per conversation this evening with POA, patient's daughter Luke Carr, patient is typically only alert and oriented to herself  Her daughter tells me that she is typically able to walk independently with a walker  She requires assistance with management of her medications at her assisted living facility  In light of discussion with daughter, patient appears to be more disoriented than baseline  Plan  · Patient currently alert and oriented only to her name, not her birthday, location, or year  · Monitor for progression back to baseline with treatment of acute conditions as indicated elsewhere in note  · Geriatrics consultation, recommendations greatly appreciated  · Continue home Aricept     Moderate aortic stenosis  Assessment & Plan  Patient with history of known aortic stenosis, moderate to severe in severity, seen on echocardiogram obtained April 2021  Patient currently with grade 3/6 murmur on exam     Plan  · Monitor fluid status extremely closely   · Consider repeat echocardiogram this admission      Hyponatremia  Assessment & Plan  Patient with true hyponatremia complicated by pseudohyponatremia in the setting of HHS  Serum sodium 121 on arrival, 130 with correction in setting of hyperglycemia  Sodium levels improving at time of admission  Plan  · 1/1 Na 127  · 1/2 Na 133 s/p 1L bolus NS  · Nephrology consult, recommendations greatly appreciated    Hypokalemia  Assessment & Plan  Serum potassium 2 4 on arrival in setting of HHS  Patient received 60 mEq oral and 20 mEq IV in the ED with a further 20 mEq IV pending at time of admission    Per chart review, patient does have prior history of hypokalemia that does not appear to have been worked up previously and she does take 60 mEq of potassium 3 times daily per facility documentation  Plan  · 12/29 potassium at 2 6 after repletion with 150 mEq of potassium  · 12/30 potassium at 3 1 after repletion with 320 mEq of K since admission  Will Give K 20mg IV BID+ K 40 mg PO once (80 total this am)  · 12/31 potassium 3 5 from 4 1 yesterday  Insulin started yesterday evening  DC IV fluid  We will replete potassium with home dose K 60 mEq use p o  t i d  Repleted Mag 2    · 1/1 potassium 4 0   · 1/2 potassium 4 2, Mg 1 6, replete Magnesium 2 g,  continue home dose K, check BMP at 4pm  · Monitor and replete as necessary aggressively  · As it appears this has not been worked up previously, we will consult Nephrology this time, recommendations greatly appreciated    Bilateral lower extremity edema  Assessment & Plan  On exam, patient has bilateral lower extremity edema that appears chronic in nature  Reflecting this, patient takes Lasix 40 mg and Zaroxolyn 5 mg daily at her assisted living facility  Plan  · Holding home diuretics in setting of HHS   · Elevate legs  · Routine skin care    Benign essential hypertension  Assessment & Plan  Patient with noted history of hypertension however her only relevant antihypertensive medications as an outpatient are directed at her chronic venous stasis  Patient initially presented with mild hypertension that has improved without intervention in the ED  Plan  · Holding home diuretics in setting of HHS  · Monitor pressures closely    * Hyperosmolar hyperglycemic state (HHS) [facility])  Assessment & Plan  Lab Results   Component Value Date    HGBA1C 13 0 (H) 12/28/2021       Recent Labs     01/01/22  1552 01/01/22  2055 01/02/22  0601 01/02/22  1105   POCGLU 304* 305* 161* 180*       Blood Sugar Average: Last 72 hrs:     Patient is a known diabetic who has previously refused insulin therapy in the outpatient setting    Most recent A1c 8 6% per chart review in April of 2021  She presents from her assisted living facility today for evaluation of multiple lab abnormalities, particularly glucose 584 and potassium of 2 4  Per review of available facility paperwork, patient has been compliant with her oral hypoglycemic regimen, which includes Glucotrol, Januvia, and Prandin  On arrival to the ED, patient's blood sugar off of BNP was 655 with associated hypokalemia to 2 4 and pseudo hyponatremia  Patient had a minimally elevated beta hydroxybutyrate with no anion gap  In the ED, patient has been fortunately hemodynamically and symptomatically stable  At time of admission, patient has thus far been unable to receive any subcutaneous or IV insulin given the degree of her hypokalemia  Repeat BMP obtained at time of admission shows improvement overall yet persistent hypokalemia to 2 4, though patient is currently actively receiving repletion  MICU attending evaluated patient at the bedside in the ED, deemed patient stable for admission as Step Down Level 2 under general medicine  Plan  · Glucose continued between 150s-300s  · DC IV fluid  · Trend blood sugars closely  · Endocrine consult, recommendations greatly appreciated  · Continue 10 units Lantus at bedtime and correctional SSI algorithm while inpatient   · Started diabetic diet  · Patient found to have endogenous insulin production  C-peptide 4ng/ml  · Endocrinology discussed with patient's family, who prefer to discharge pt off insulin  Instead of suggested daily injection of basal insulin + prandin, patient will be discharged on  Januvia 100 mg and home prandin 2 mg TID  · Holding home oral antihyperglycemics      Disposition: insulin titration as per endocrinology  SUBJECTIVE     Patient seen and examined  No acute events overnight  No complaints overnight  Eating well  Denies abdominal pain, nausea, or vomiting       OBJECTIVE     Vitals:    01/01/22 1949 01/01/22 2232 01/02/22 0300 01/02/22 0533   BP: 132/62 126/60 127/65    BP Location: Left arm  Left arm    Pulse: 82 83 82    Resp: 20 (!) 23 20    Temp: 98 2 °F (36 8 °C) 98 2 °F (36 8 °C) 98 6 °F (37 °C)    TempSrc: Oral Oral Oral    SpO2: 97% 100% 99%    Weight:    70 1 kg (154 lb 8 7 oz)   Height:          Temperature:   Temp (24hrs), Av 2 °F (36 8 °C), Min:97 4 °F (36 3 °C), Max:98 6 °F (37 °C)    Temperature: 98 6 °F (37 °C)  Intake & Output:  I/O        0701   0700  0701   0700  0701   0700    P  O  780      I V  (mL/kg)       IV Piggyback 50 1000     Total Intake(mL/kg) 830 (11 8) 1000 (14 3)     Urine (mL/kg/hr) 1200 (0 7)      Stool 0      Total Output 1200      Net -370 +1000            Unmeasured Urine Occurrence 2 x 4 x     Unmeasured Stool Occurrence 2 x 2 x         Weights:        Body mass index is 30 69 kg/m²  Weight (last 2 days)     Date/Time Weight    22 0533 70 1 (154 54)    22 0552 70 2 (154 76)    21 0600 67 7 (149 25)        Physical Exam  Vitals and nursing note reviewed  Constitutional:       General: She is not in acute distress  Appearance: She is well-developed  HENT:      Head: Normocephalic and atraumatic  Eyes:      Conjunctiva/sclera: Conjunctivae normal    Cardiovascular:      Rate and Rhythm: Normal rate and regular rhythm  Heart sounds: No murmur heard  Pulmonary:      Effort: Pulmonary effort is normal  No respiratory distress  Breath sounds: Normal breath sounds  No wheezing or rales  Abdominal:      General: There is no distension  Palpations: Abdomen is soft  Tenderness: There is no abdominal tenderness  There is no guarding  Musculoskeletal:      Cervical back: Neck supple  Right lower leg: No edema  Left lower leg: No edema  Skin:     General: Skin is warm and dry  Neurological:      Mental Status: She is alert  Mental status is at baseline  LABORATORY DATA     Labs:  I have personally reviewed pertinent reports  Results from last 7 days   Lab Units 01/02/22 0448 01/01/22  0524 12/31/21  0440 12/30/21  0444 12/30/21  0444 12/29/21  0907 12/29/21  0907   WBC Thousand/uL 11 76* 10 48* 9 37   < > 11 69*   < > 10 50*   HEMOGLOBIN g/dL 12 8 12 6 12 2   < > 13 7   < > 13 7   HEMATOCRIT % 38 8 38 4 35 7   < > 39 8   < > 39 1   PLATELETS Thousands/uL 227 230 257   < > 267   < > 248   NEUTROS PCT %  --   --  50  --  57  --  65   MONOS PCT %  --   --  9  --  9   < > 9   MONO PCT %  --  8  --   --   --   --   --     < > = values in this interval not displayed  Results from last 7 days   Lab Units 01/02/22 0448 01/01/22  1755 01/01/22  0524 12/31/21  1233 12/31/21  0440 12/28/21  1527 12/28/21  1347   POTASSIUM mmol/L 4 2 4 9 4 0   < > 3 5   < > 3 5   CHLORIDE mmol/L 103 96* 102   < > 109*   < > 80*   CO2 mmol/L 22 24 25   < > 25   < > 32   BUN mg/dL 15 19 15   < > 15   < > 33*   CREATININE mg/dL 0 95 1 26 0 94   < > 1 00   < > 1 80*   CALCIUM mg/dL 9 3 9 9 9 0   < > 9 1   < > 10 1   ALK PHOS U/L  --   --   --   --  96  --  156*   ALT U/L  --   --   --   --  21  --  28   AST U/L  --   --   --   --  21  --  69*    < > = values in this interval not displayed  Results from last 7 days   Lab Units 01/02/22 0448 12/31/21  0440 12/29/21  0742   MAGNESIUM mg/dL 1 6 1 9 2 3                        IMAGING & DIAGNOSTIC TESTING     Radiology Results: I have personally reviewed pertinent reports  No results found  Other Diagnostic Testing: I have personally reviewed pertinent reports      ACTIVE MEDICATIONS     Current Facility-Administered Medications   Medication Dose Route Frequency    donepezil (ARICEPT) tablet 10 mg  10 mg Oral HS    heparin (porcine) subcutaneous injection 5,000 Units  5,000 Units [facility]cutaneous [facility]H [facility]    insulin glargine (LANTUS) subcutaneous injection 10 Units 0 1 mL  10 Units Subcutaneous HS    insulin lispro (HumaLOG) 100 units/mL subcutaneous injection 1-5 Units  1-5 Units Subcutaneous 4x Daily (AC & HS)    insulin lispro (HumaLOG) 100 units/mL subcutaneous injection 3 Units  3 Units Subcutaneous TID With Meals    moisture barrier miconazole 2% cream (aka EDGARD MOISTURE BARRIER ANTIFUNGAL CREAM)   Topical BID    ondansetron (ZOFRAN) injection 4 mg  4 mg Intravenous Q6H PRN    polyethylene glycol (MIRALAX) packet 17 g  17 g Oral Daily PRN    potassium chloride oral solution 60 mEq  60 mEq Oral TID    senna-docusate sodium (SENOKOT S) 8 6-50 mg per tablet 1 tablet  1 tablet Oral HS       VTE Pharmacologic Prophylaxis: Heparin  VTE Mechanical Prophylaxis: sequential compression device    Portions of the record may have been created with voice recognition software  Occasional wrong word or "sound a like" substitutions may have occurred due to the inherent limitations of voice recognition software    Read the chart carefully and recognize, using context, where substitutions have occurred   ==  Ana John MD  [facility]  Internal Medicine Residency PGY-1

## 2022-01-03 PROBLEM — R13.10 DYSPHAGIA: Status: ACTIVE | Noted: 2022-01-03

## 2022-01-03 LAB
ANION GAP SERPL CALCULATED.3IONS-SCNC: 8 MMOL/L (ref 4–13)
ATRIAL RATE: 104 BPM
ATRIAL RATE: 83 BPM
BACTERIA UR CULT: NORMAL
BASOPHILS # BLD AUTO: 0.09 THOUSANDS/ΜL (ref 0–0.1)
BASOPHILS NFR BLD AUTO: 1 % (ref 0–1)
BUN SERPL-MCNC: 11 MG/DL (ref 5–25)
CALCIUM SERPL-MCNC: 9.1 MG/DL (ref 8.3–10.1)
CHLORIDE SERPL-SCNC: 107 MMOL/L (ref 100–108)
CO2 SERPL-SCNC: 21 MMOL/L (ref 21–32)
CREAT SERPL-MCNC: 0.9 MG/DL (ref 0.6–1.3)
EOSINOPHIL # BLD AUTO: 0.13 THOUSAND/ΜL (ref 0–0.61)
EOSINOPHIL NFR BLD AUTO: 1 % (ref 0–6)
ERYTHROCYTE [DISTWIDTH] IN BLOOD BY AUTOMATED COUNT: 14.1 % (ref 11.6–15.1)
GFR SERPL CREATININE-BSD FRML MDRD: 53 ML/MIN/1.73SQ M
GLUCOSE SERPL-MCNC: 156 MG/DL (ref 65–140)
GLUCOSE SERPL-MCNC: 217 MG/DL (ref 65–140)
GLUCOSE SERPL-MCNC: 237 MG/DL (ref 65–140)
GLUCOSE SERPL-MCNC: 272 MG/DL (ref 65–140)
GLUCOSE SERPL-MCNC: 293 MG/DL (ref 65–140)
HCT VFR BLD AUTO: 35.6 % (ref 34.8–46.1)
HGB BLD-MCNC: 12 G/DL (ref 11.5–15.4)
IMM GRANULOCYTES # BLD AUTO: 0.26 THOUSAND/UL (ref 0–0.2)
IMM GRANULOCYTES NFR BLD AUTO: 2 % (ref 0–2)
LYMPHOCYTES # BLD AUTO: 3.56 THOUSANDS/ΜL (ref 0.6–4.47)
LYMPHOCYTES NFR BLD AUTO: 33 % (ref 14–44)
MAGNESIUM SERPL-MCNC: 2 MG/DL (ref 1.6–2.6)
MCH RBC QN AUTO: 30.2 PG (ref 26.8–34.3)
MCHC RBC AUTO-ENTMCNC: 33.7 G/DL (ref 31.4–37.4)
MCV RBC AUTO: 90 FL (ref 82–98)
MONOCYTES # BLD AUTO: 0.93 THOUSAND/ΜL (ref 0.17–1.22)
MONOCYTES NFR BLD AUTO: 9 % (ref 4–12)
NEUTROPHILS # BLD AUTO: 5.97 THOUSANDS/ΜL (ref 1.85–7.62)
NEUTS SEG NFR BLD AUTO: 54 % (ref 43–75)
NRBC BLD AUTO-RTO: 0 /100 WBCS
P AXIS: 16 DEGREES
P AXIS: 46 DEGREES
PHOSPHATE SERPL-MCNC: 2.9 MG/DL (ref 2.3–4.1)
PLATELET # BLD AUTO: 240 THOUSANDS/UL (ref 149–390)
PMV BLD AUTO: 12.1 FL (ref 8.9–12.7)
POTASSIUM SERPL-SCNC: 4.1 MMOL/L (ref 3.5–5.3)
PR INTERVAL: 232 MS
PR INTERVAL: 248 MS
QRS AXIS: 13 DEGREES
QRS AXIS: 16 DEGREES
QRSD INTERVAL: 130 MS
QRSD INTERVAL: 132 MS
QT INTERVAL: 364 MS
QT INTERVAL: 422 MS
QTC INTERVAL: 478 MS
QTC INTERVAL: 495 MS
RBC # BLD AUTO: 3.97 MILLION/UL (ref 3.81–5.12)
SODIUM SERPL-SCNC: 136 MMOL/L (ref 136–145)
T WAVE AXIS: 189 DEGREES
T WAVE AXIS: 192 DEGREES
VENTRICULAR RATE: 104 BPM
VENTRICULAR RATE: 83 BPM
WBC # BLD AUTO: 10.94 THOUSAND/UL (ref 4.31–10.16)

## 2022-01-03 PROCEDURE — 84100 ASSAY OF PHOSPHORUS: CPT

## 2022-01-03 PROCEDURE — 83735 ASSAY OF MAGNESIUM: CPT

## 2022-01-03 PROCEDURE — 93010 ELECTROCARDIOGRAM REPORT: CPT | Performed by: INTERNAL MEDICINE

## 2022-01-03 PROCEDURE — 80048 BASIC METABOLIC PNL TOTAL CA: CPT

## 2022-01-03 PROCEDURE — 82088 ASSAY OF ALDOSTERONE: CPT | Performed by: STUDENT IN AN ORGANIZED HEALTH CARE EDUCATION/TRAINING PROGRAM

## 2022-01-03 PROCEDURE — 92610 EVALUATE SWALLOWING FUNCTION: CPT

## 2022-01-03 PROCEDURE — 84244 ASSAY OF RENIN: CPT | Performed by: STUDENT IN AN ORGANIZED HEALTH CARE EDUCATION/TRAINING PROGRAM

## 2022-01-03 PROCEDURE — 97530 THERAPEUTIC ACTIVITIES: CPT

## 2022-01-03 PROCEDURE — 93005 ELECTROCARDIOGRAM TRACING: CPT

## 2022-01-03 PROCEDURE — 99232 SBSQ HOSP IP/OBS MODERATE 35: CPT | Performed by: INTERNAL MEDICINE

## 2022-01-03 PROCEDURE — 82948 REAGENT STRIP/BLOOD GLUCOSE: CPT

## 2022-01-03 PROCEDURE — 85025 COMPLETE CBC W/AUTO DIFF WBC: CPT

## 2022-01-03 RX ORDER — ACETAMINOPHEN 325 MG/1
650 TABLET ORAL EVERY 6 HOURS PRN
Status: DISCONTINUED | OUTPATIENT
Start: 2022-01-03 | End: 2022-01-04 | Stop reason: HOSPADM

## 2022-01-03 RX ADMIN — HEPARIN SODIUM 5000 UNITS: 5000 INJECTION INTRAVENOUS; SUBCUTANEOUS at 21:36

## 2022-01-03 RX ADMIN — DONEPEZIL HYDROCHLORIDE 10 MG: 10 TABLET ORAL at 22:23

## 2022-01-03 RX ADMIN — INSULIN LISPRO 3 UNITS: 100 INJECTION, SOLUTION INTRAVENOUS; SUBCUTANEOUS at 16:12

## 2022-01-03 RX ADMIN — INSULIN LISPRO 3 UNITS: 100 INJECTION, SOLUTION INTRAVENOUS; SUBCUTANEOUS at 21:36

## 2022-01-03 RX ADMIN — MICONAZOLE NITRATE: 20 CREAM TOPICAL at 10:12

## 2022-01-03 RX ADMIN — HEPARIN SODIUM 5000 UNITS: 5000 INJECTION INTRAVENOUS; SUBCUTANEOUS at 13:33

## 2022-01-03 RX ADMIN — INSULIN LISPRO 2 UNITS: 100 INJECTION, SOLUTION INTRAVENOUS; SUBCUTANEOUS at 16:11

## 2022-01-03 RX ADMIN — INSULIN GLARGINE 10 UNITS: 100 INJECTION, SOLUTION SUBCUTANEOUS at 21:37

## 2022-01-03 RX ADMIN — MICONAZOLE NITRATE: 20 CREAM TOPICAL at 19:10

## 2022-01-03 RX ADMIN — INSULIN LISPRO 3 UNITS: 100 INJECTION, SOLUTION INTRAVENOUS; SUBCUTANEOUS at 07:47

## 2022-01-03 RX ADMIN — INSULIN LISPRO 3 UNITS: 100 INJECTION, SOLUTION INTRAVENOUS; SUBCUTANEOUS at 12:33

## 2022-01-03 RX ADMIN — HEPARIN SODIUM 5000 UNITS: 5000 INJECTION INTRAVENOUS; SUBCUTANEOUS at 05:37

## 2022-01-03 RX ADMIN — ACETAMINOPHEN 650 MG: 325 TABLET, FILM COATED ORAL at 14:22

## 2022-01-03 RX ADMIN — INSULIN LISPRO 2 UNITS: 100 INJECTION, SOLUTION INTRAVENOUS; SUBCUTANEOUS at 06:21

## 2022-01-03 NOTE — PLAN OF CARE
Problem: PHYSICAL THERAPY ADULT  Goal: Performs mobility at highest level of function for planned discharge setting  See evaluation for individualized goals  Description: Treatment/Interventions: Functional transfer training,LE strengthening/ROM,Therapeutic exercise,Endurance training,Patient/family training,Bed mobility,Gait training,Equipment eval/education,Spoke to nursing,OT  Equipment Recommended: Walker (w/ (A))       See flowsheet documentation for full assessment, interventions and recommendations  Outcome: Progressing  Note: Prognosis: Good  Problem List: Impaired balance,Decreased cognition,Decreased safety awareness  Assessment: Pt cont improving in functional endurance and mobility skills ambulating further distances and progressing to mod (I) level w/ transfers; no overt uncorrected LOB or knee buckling observed; overall mobility status currently appears to be at or near premorbid level --> anticipate pt will return to prior living environment when medically cleared; home PT follow up is recommended; will follow  PT Discharge Recommendation: Return to facility with rehabilitation services (home PT)          See flowsheet documentation for full assessment

## 2022-01-03 NOTE — RESTORATIVE TECHNICIAN NOTE
Restorative Technician Note      Patient Name: Danielle Vogt     Restorative Tech Visit Date: 01/03/22  Note Type: Mobility  Patient Position Upon Consult: Bedside chair  Activity Performed: Ambulated  Assistive Device: Roller walker  Patient Position at End of Consult: All needs within reach; Bed/Chair alarm activated;  Bedside chair

## 2022-01-03 NOTE — SPEECH THERAPY NOTE
Speech Language/Pathology  Speech-Language Pathology Bedside Swallow Evaluation      Patient Name: Sascah Smith    YREKW'Q Date: 1/3/2022     Problem List  Principal Problem:    Hyperosmolar hyperglycemic state (HHS) ([facility] )  Active Problems:    Benign essential hypertension    Bilateral lower extremity edema    Hypokalemia    Hyponatremia    Moderate aortic stenosis    Uncomplicated senile dementia (HCC)    Acute-on-chronic kidney injury ([facility] )    Urinary incontinence    First degree AV block    At risk for delirium    Dysphagia      Past Medical History  Past Medical History:   Diagnosis Date    Aortic stenosis     Bilateral edema of lower extremity     Dementia ([facility]     Diabetes ([facility]     Fall     Gait abnormality     Hyperlipidemia     Hypertension     Hypokalemia     Other ascites     Varicose veins of leg with edema        Past Surgical History  Past Surgical History:   Procedure Laterality Date    BREAST SURGERY      ELBOW SURGERY         Summary   Pt presented with functional appearing oral and pharyngeal stage swallowing skills with materials administered today  She often requires liquids to clear hard solids but is advanced age w/ possible early satiety  There is no imaging to identify any pna at this time  Risk/s for Aspiration: min/none      Recommended Diet: regular diet and thin liquids   Recommended Form of Meds: as desired   Aspiration precautions and swallowing strategies: upright posture, only feed when fully alert, slow rate of feeding and small bites/sips  Other Recommendations: Continue frequent oral care, no f/u needed        Current Medical Status  Pt is a 80 y o  female who [facility] with for hyperglycemia and hypokalemia  Per EMS, patient had routine blood work done today that found her to be hyperglycemic and hypokalemic  Patient was found by EMS with large bowl of candy  Patient has no complaints at this time    Patient denies any pain, nausea, vomiting, shortness of breath  Patient states she feels at her baseline  Patient does have history of dementia but is oriented to person and place and appears to be answering questions appropriately  Patient states she takes her medications regularly  Current Precautions:  Fall     Allergies:  No known food allergies    Past medical history:  Please see H&P for details    Special Studies:  No images    Social/Education/Vocational Hx:  Pt lives in [facility]     Swallow Information   Current Risks for Dysphagia & Aspiration: ?able h/o dysphagia   Current Symptoms/Concerns: advanced age  Current Diet: regular diet and thin liquids   Baseline Diet: regular diet and thin liquids      Baseline Assessment   Behavior/Cognition: alert  Speech/Language Status: able to participate in conversation  Patient Positioning: upright in chair  Pain Status/Interventions/Response to Interventions:   No report of or nonverbal indications of pain  Swallow Mechanism Exam  Facial: symmetrical  Labial: WFL  Lingual: WFL  Velum: unable to visualize  Mandible: adequate ROM  Dentition: upper dentures and lower dentures  Vocal quality:clear/adequate   Volitional Cough: strong/productive   Respiratory Status: on RA       Consistencies Assessed and Performance   Consistencies Administered: thin liquids, nectar thick, puree, mechanical soft solids and hard solids    Oral Stage: WFL  Mastication was adequate with the materials administered today  Bolus formation and transfer were functional with no significant oral residue noted  No overt s/s reduced oral control  Pharyngeal Stage: WFL, suspected and minimal  Swallow Mechanics:  Swallowing initiation appeared prompt  Laryngeal rise was palpated and judged to be within functional limits  No coughing, throat clearing, change in vocal quality or respiratory status noted today  Some mult swallows & independently uses liquids throughout following solids  Esophageal Concerns: mild belching    Strategies and Efficacy: -    Summary and Recommendations (see above)    Results Reviewed with: patient and RN     Treatment Recommended: no

## 2022-01-03 NOTE — QUICK NOTE
Endocrinology quick note:  Discussed outpatient medication regimen with patient  Discussed that with most recent elevated A1c, pt will likely have elevated blood sugars as an outpatient if she is discharged with only Januvia and Prandin  We continue to recommend basal insulin (1 injection daily total) with Prandin and Januvia  Suspect that pt will return to the hospital with hyperglycemia if she is not discharged on insulin and this is not in her best interest  Bud Carvajal reports that pt moved to [facility] in July and that prior to this she was living with her  Bud Alena reports that pt's seems to be eating worse diet including pie at [facility] but she is not sure of her exact day-to-day diet  Bud Carvajal will discuss further with her siblings and finalize a plan on whether or not to discharge on basal insulin in addition to prandin and [place] [facility] [place] or just discharge on prandin and januvia  All questions were answered  Glipizide is not recommended to be restarted at time of discharge for this patient due to her age and kidney function   Will follow up with Bud Carvajal tomorrow AM as per her request   Discussed this plan with primary team   Concha Wu DO  Endocrinology Fellow, PGY4

## 2022-01-03 NOTE — PLAN OF CARE
Problem: Potential for Falls  Goal: Patient will remain free of falls  Description: INTERVENTIONS:  - Educate patient/family on patient safety including physical limitations  - Instruct patient to call for assistance with activity   - Consult OT/PT to assist with strengthening/mobility   - Keep Call bell within reach  - Keep bed low and locked with side rails adjusted as appropriate  - Keep care items and personal belongings within reach  - Initiate and maintain comfort rounds  - Make Fall Risk Sign visible to staff  - Offer Toileting every 3 Hours, in advance of need  - Initiate/Maintain bed and chair alarm  - Obtain necessary fall risk management equipment: walker, nonskid socks  - Apply yellow socks and bracelet for high fall risk patients  - Consider moving patient to room near nurses station  Outcome: Progressing     Problem: MOBILITY - ADULT  Goal: Maintain or return to baseline ADL function  Description: INTERVENTIONS:  -  Assess patient's ability to carry out ADLs; assess patient's baseline for ADL function and identify physical deficits which impact ability to perform ADLs (bathing, care of mouth/teeth, toileting, grooming, dressing, etc )  - Assess/evaluate cause of self-care deficits   - Assess range of motion  - Assess patient's mobility; develop plan if impaired  - Assess patient's need for assistive devices and provide as appropriate  - Encourage maximum independence but intervene and supervise when necessary  - Involve family in performance of ADLs  - Assess for home care needs following discharge   - Consider OT consult to assist with ADL evaluation and planning for discharge  - Provide patient education as appropriate  Outcome: Progressing  Goal: Maintains/Returns to pre admission functional level  Description: INTERVENTIONS:  - Perform BMAT or MOVE assessment daily    - Set and communicate daily mobility goal to care team and patient/family/caregiver     - Collaborate with rehabilitation services on mobility goals if consulted  - Ambulate patient 4 times a day  - Out of bed to chair 3 times a day   - Out of bed for meals 3 times a day  - Out of bed for toileting  - Record patient progress and toleration of activity level   Outcome: Progressing     Problem: Prexisting or High Potential for Compromised Skin Integrity  Goal: Skin integrity is maintained or improved  Description: INTERVENTIONS:  - Identify patients at risk for skin breakdown  - Assess and monitor skin integrity  - Assess and monitor nutrition and hydration status  - Monitor labs   - Assess for incontinence   - Turn and reposition patient  - Assist with mobility/ambulation  - Relieve pressure over bony prominences  - Avoid friction and shearing  - Provide appropriate hygiene as needed including keeping skin clean and dry  - Evaluate need for skin moisturizer/barrier cream  - Collaborate with interdisciplinary team   - Patient/family teaching  - Consider wound care consult   Outcome: Progressing

## 2022-01-03 NOTE — PROGRESS NOTES
INTERNAL MEDICINE RESIDENCY PROGRESS NOTE     Name: Laney Nicholson   Age & Sex: 80 y o  female   MRN: [id number]  Unit/Bed#: [facility] 406-01   Encounter: [id number]  Team: SOD Team C     PATIENT INFORMATION     Name: Laney Nicholson   Age & Sex: 80 y o  female   MRN: [id number]  Hospital Stay Days: 6    ASSESSMENT/PLAN     Principal Problem:    Hyperosmolar hyperglycemic state (HHS) ([facility] )  Active Problems:    Hypokalemia    Hyponatremia    [facility]on-chronic [facility] ([facility] )    Benign essential hypertension    Bilateral lower extremity edema    Moderate aortic stenosis    Uncomplicated senile dementia ([facility] )    Urinary incontinence    First degree AV block    At risk for delirium    Dysphagia      * Hyperosmolar hyperglycemic state (HHS) [facility])  Assessment & Plan  Lab Results   Component Value Date    HGBA1C 13 0 (H) 12/28/2021       Recent Labs     01/01/22  1552 01/01/22  2055 01/02/22  0601 01/02/22  1105   POCGLU 304* 305* 161* 180*       Blood Sugar Average: Last 72 hrs:     Patient is a known diabetic who has previously refused insulin therapy in the outpatient setting  Most recent A1c 8 6% per chart review in April of 2021  She presents from her assisted living facility today for evaluation of multiple lab abnormalities, particularly glucose 584 and potassium of 2 4  Per review of available facility paperwork, patient has been compliant with her oral hypoglycemic regimen, which includes Glucotrol, Januvia, and Prandin  On arrival to the ED, patient's blood sugar off of BNP was 655 with associated hypokalemia to 2 4 and pseudo hyponatremia  Patient had a minimally elevated beta hydroxybutyrate with no anion gap  In the ED, patient has been fortunately hemodynamically and symptomatically stable  At time of admission, patient has thus far been unable to receive any subcutaneous or IV insulin given the degree of her hypokalemia    Repeat BMP obtained at time of admission shows improvement overall yet persistent hypokalemia to 2 4, though patient is currently actively receiving repletion  MICU attending evaluated patient at the bedside in the ED, deemed patient stable for admission as Step Down Level 2 under general medicine  Plan  · Glucose continued between 150s-300s  · DC IV fluid  · Trend blood sugars closely  · Endocrine consult, recommendations greatly appreciated  · Continue 10 units Lantus at bedtime and correctional SSI algorithm while inpatient   · Started diabetic diet  · Patient found to have endogenous insulin production  C-peptide 4ng/ml  · Endocrinology discussed with patient's family, who prefer to discharge pt off insulin  Instead of suggested daily injection of basal insulin + prandin, patient will be discharged on  Januvia 100 mg and home prandin 2 mg TID  · Holding home oral antihyperglycemics    Hypokalemia  Assessment & Plan  Serum potassium 2 4 on arrival in setting of [facility]  Patient received 60 mEq oral and 20 mEq IV in the ED with a further 20 mEq IV pending at time of admission  Per chart review, patient does have prior history of hypokalemia that does not appear to have been worked up previously and she does take 60 mEq of potassium 3 times daily per facility documentation  Plan  · 12/29 potassium at 2 6 after repletion with 150 mEq of potassium  · 12/30 potassium at 3 1 after repletion with 320 mEq of K since admission  Will Give K 20mg IV BID+ K 40 mg PO once (80 total this am)  · 12/31 potassium 3 5 from 4 1 yesterday  Insulin started yesterday evening  DC IV fluid    We will replete potassium with home dose K 60 mEq use p o  t i d  Repleted Mag 2    · 1/1 potassium 4 0   · 1/2 potassium 4 2, Mg 1 6, replete Magnesium 2 g,  continue home dose K, check BMP at 4pm  · 1/3 K 4 1  · Monitor and replete as necessary aggressively  · As it appears this has not been worked up previously, we will consult Nephrology this time, recommendations greatly appreciated  · Will check aldosterone/renin ratio     Hyponatremia  Assessment & Plan  Patient with true hyponatremia complicated by pseudohyponatremia in the setting of HHS  Serum sodium 121 on arrival, 130 with correction in setting of hyperglycemia  Sodium levels improving at time of admission  Plan  · 1/1 Na 127  · 1/2 Na 133 s/p 1L bolus NS  · Nephrology consult, recommendations greatly appreciated    Acute-on-chronic kidney injury [facility])  Assessment & Plan  Lab Results   Component Value Date    EGFR 50 01/02/2022    EGFR 35 01/01/2022    EGFR 50 01/01/2022    CREATININE 0 95 01/02/2022    CREATININE 1 26 01/01/2022    CREATININE 0 94 01/01/2022     Patient presents with serum creatinine 1 8 with apparent baseline of roughly 1 0-1 3  Patient has known history of CKD stage 3  Plan  · SALTY resolved  Renal function back to baseline  · SALTY on CKD likely prerenal in setting of dehydration secondary to HHS  · 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, arteriolar sclerosis, diabetic kidney disease  · IV fluids for hydration  · Trend renal indices and urine output closely  · Avoid hypotension and nephrotoxins where able  · Nephrology consult, recommendations greatly appreciated  · As per nephrology, pt may benefit from potassium-sparing diuretic (diuretics currently on hold)  -- on Lasix 40 p o  Daily at home  · DC IV fluids  · SC Insulin started  · Follow-up UA  · To consider renal ultrasound, defer to Nephrology    Dysphagia  Assessment & Plan  Per patient's daughter, patient has been having worsening appetite and oral intake  Patient was being worked up for concerns for dysphagia in her facility  · Will get speech eval while inpatient    At risk for delirium  Assessment & Plan  Given baseline history of dementia, patient is at significant risk for hospital associated delirium  Patient is currently A&O times 2      Plan  Geriatrics consult, recommendations greatly appreciated    -Patient is high risk of delirium due to her underlying dementia and admission to unfamiliar environment  -Initiate delirium precautions  -maintain normal sleep/wake cycle  -minimize overnight interruptions, group overnight vitals/labs/nursing checks as much as possible  -dim lights, close blinds and turn off tv to minimize stimulation and encourage sleep environment in evenings  -ensure that pain is well controlled, consider Tylenol 975mg Q8H scheduled   -monitor closely for fecal and urinary retention which may precipitate delirium  -encourage early mobilization and ambulation with PT and OT assistance  -provide frequent reorientation and redirection as required  -encourage family and friends at the bedside to help help calm patient if anxious  -avoid medications which may precipitate or worsen delirium such as tramadol, benzodiazepine, anticholinergics, and benadryl  -encourage good hydration and nutrition   -redirect unwanted behaviors as first line, avoid physical restraints, use chemical restraint only if all other attempts have been unsuccessful, would consider Zyprexa 2 5mg IM Q daily, monitor for orthostatic hypotension and QTc prolongation with repeat dosing, recommend lowest dose possible for shortest duration possible         First degree AV block  Assessment & Plan  EKG on arrival shows first-degree AV block with PVCs and left bundle-branch block  Review of previous EKGs show similar findings  Plan  · No urgent indication to involve Cardiology  · Recommend avoiding QTC prolonging agents if able    Urinary incontinence  Assessment & Plan  Per discussion with POA, patient has been dealing with urinary incontinence at her assisted living facility for most of at least the last year  The patient is unable to provide any information regarding the details of her urinary incontinence      Plan  · Monitor patient closely, would recommend considering PureWick catheter and avoiding implantation of Mascorro catheter during this admission given likelihood of exacerbation of delirium     Uncomplicated senile dementia [facility])  Assessment & Plan  Patient with a known history of senile dementia  Patient currently resides in an assisted living facility, [facility]  Per conversation this evening with POA, patient's daughter Loc Pete, patient is typically only alert and oriented to herself  Her daughter tells me that she is typically able to walk independently with a walker  She requires assistance with management of her medications at her assisted living facility  In light of discussion with daughter, patient appears to be more disoriented than baseline  Plan  · Patient currently alert and oriented only to her name, not her birthday, location, or year  · Monitor for progression back to baseline with treatment of acute conditions as indicated elsewhere in note  · Geriatrics consultation, recommendations greatly appreciated  · Continue home Aricept     Moderate aortic stenosis  Assessment & Plan  Patient with history of known aortic stenosis, moderate to severe in severity, seen on echocardiogram obtained April 2021  Patient currently with grade 3/6 murmur on exam     Plan  · Monitor fluid status extremely closely   · Consider repeat echocardiogram this admission      Bilateral lower extremity edema  Assessment & Plan  On exam, patient has bilateral lower extremity edema that appears chronic in nature  Reflecting this, patient takes Lasix 40 mg and Zaroxolyn 5 mg daily at her assisted living facility  Plan  · Holding home diuretics in setting of HHS   · Elevate legs  · Routine skin care    Benign essential hypertension  Assessment & Plan  Patient with noted history of hypertension however her only relevant antihypertensive medications as an outpatient are directed at her chronic venous stasis  Patient initially presented with mild hypertension that has improved without intervention in the ED      Plan  · Holding home diuretics in setting of HHS  · Monitor pressures closely      Disposition: Pending Placement     SUBJECTIVE     Patient seen and examined  No acute events overnight  Patient states she feels well and has no complaints today  Upon my encounter she was seated comfortably in her chair and eating her lunch  She presently denies any fever, chills, nausea, vomiting, diarrhea, constipation, chest pain, and shortness of breath  She otherwise denies any new or worsening complaints  OBJECTIVE     Vitals:    22 0208 22 0534 22 0800 22 1138   BP: 110/74  98/56 131/62   BP Location: Right arm   Left arm   Pulse: 105  90 85   Resp: 18  18 16   Temp: 97 6 °F (36 4 °C)  97 7 °F (36 5 °C) 98 2 °F (36 8 °C)   TempSrc: Oral  Oral Oral   SpO2: 97%  99% 95%   Weight:  71 3 kg (157 lb 1 6 oz)     Height:          Temperature:   Temp (24hrs), Av 7 °F (36 5 °C), Min:97 4 °F (36 3 °C), Max:98 2 °F (36 8 °C)    Temperature: 98 2 °F (36 8 °C)  Intake & Output:  I/O        0701   0700  0701   0700    P  O   500    IV Piggyback 1000     Total Intake(mL/kg) 1000 (14 3) 500 (7)    Urine (mL/kg/hr)  1423 (0 8)    Stool  0    Total Output  1423    Net +1000 -923          Unmeasured Urine Occurrence 4 x 3 x    Unmeasured Stool Occurrence 2 x 1 x        Weights:        Body mass index is 31 2 kg/m²  Weight (last 2 days)     Date/Time Weight    22 0534 71 3 (157 1)    22 0533 70 1 (154 54)    22 0552 70 2 (154 76)        Physical Exam  Constitutional:       General: She is not in acute distress  Appearance: Normal appearance  HENT:      Head: Normocephalic and atraumatic  Mouth/Throat:      Mouth: Mucous membranes are moist       Pharynx: Oropharynx is clear  Eyes:      Extraocular Movements: Extraocular movements intact  Conjunctiva/sclera: Conjunctivae normal       Pupils: Pupils are equal, round, and reactive to light  Cardiovascular:      Rate and Rhythm: Normal rate and regular rhythm  Pulses: Normal pulses  Heart sounds: Normal heart sounds  Pulmonary:      Effort: Pulmonary effort is normal  No respiratory distress  Breath sounds: Normal breath sounds  Abdominal:      General: Abdomen is flat  Bowel sounds are normal       Palpations: Abdomen is soft  Musculoskeletal:      Right lower leg: No edema  Left lower leg: No edema  Skin:     General: Skin is warm and dry  Neurological:      General: No focal deficit present  Mental Status: She is alert and oriented to person, place, and time  Motor: No weakness  Psychiatric:         Mood and Affect: Mood normal          Behavior: Behavior normal          Thought Content: Thought content normal        LABORATORY DATA     Labs: I have personally reviewed pertinent reports  Results from last 7 days   Lab Units 01/03/22 0443 01/02/22 0448 01/01/22 0524 12/31/21 0440 12/31/21 0440 12/30/21 0444 12/30/21 0444   WBC Thousand/uL 10 94* 11 76* 10 48*   < > 9 37   < > 11 69*   HEMOGLOBIN g/dL 12 0 12 8 12 6   < > 12 2   < > 13 7   HEMATOCRIT % 35 6 38 8 38 4   < > 35 7   < > 39 8   PLATELETS Thousands/uL 240 227 230   < > 257   < > 267   NEUTROS PCT % 54  --   --   --  50  --  57   MONOS PCT % 9  --   --   --  9   < > 9   MONO PCT %  --   --  8  --   --   --   --     < > = values in this interval not displayed  Results from last 7 days   Lab Units 01/03/22 0443 01/02/22 2228 01/02/22 2014 12/31/21  1233 12/31/21  0440 12/28/21  1527 12/28/21  1347   POTASSIUM mmol/L 4 1 5 2 5 8*   < > 3 5   < > 3 5   CHLORIDE mmol/L 107 104 104   < > 109*   < > 80*   CO2 mmol/L 21 23 20*   < > 25   < > 32   BUN mg/dL 11 16 16   < > 15   < > 33*   CREATININE mg/dL 0 90 1 08 1 09   < > 1 00   < > 1 80*   CALCIUM mg/dL 9 1 8 9 8 8   < > 9 1   < > 10 1   ALK PHOS U/L  --   --   --   --  96  --  156*   ALT U/L  --   --   --   --  21  --  28   AST U/L  --   --   --   --  21  --  69*    < > = values in this interval not displayed  Results from last 7 days   Lab Units 01/03/22  0443 01/02/22  0448 12/31/21  0440   MAGNESIUM mg/dL 2 0 1 6 1 9     Results from last 7 days   Lab Units 01/03/22  0443   PHOSPHORUS mg/dL 2 9                    IMAGING & DIAGNOSTIC TESTING     Radiology Results: I have personally reviewed pertinent reports  No results found  Other Diagnostic Testing: I have personally reviewed pertinent reports  ACTIVE MEDICATIONS     Current Facility-Administered Medications   Medication Dose Route Frequency    donepezil (ARICEPT) tablet 10 mg  10 mg Oral HS    heparin (porcine) subcutaneous injection 5,000 Units  5,000 Units Subcutaneous Q8H [address]    insulin glargine (LANTUS) subcutaneous injection 10 Units 0 1 mL  10 Units Subcutaneous HS    insulin lispro (HumaLOG) 100 units/mL subcutaneous injection 1-5 Units  1-5 Units Subcutaneous 4x Daily (AC & HS)    insulin lispro (HumaLOG) 100 units/mL subcutaneous injection 3 Units  3 Units Subcutaneous TID With Meals    moisture barrier miconazole 2% cream (aka EDGARD MOISTURE BARRIER ANTIFUNGAL CREAM)   Topical BID    ondansetron (ZOFRAN) injection 4 mg  4 mg Intravenous Q6H PRN    polyethylene glycol (MIRALAX) packet 17 g  17 g Oral Daily PRN    senna-docusate sodium (SENOKOT S) 8 6-50 mg per tablet 1 tablet  1 tablet Oral HS       VTE Pharmacologic Prophylaxis: Heparin  VTE Mechanical Prophylaxis: sequential compression device    Portions of the record may have been created with voice recognition software  Occasional wrong word or "sound a like" substitutions may have occurred due to the inherent limitations of voice recognition software    Read the chart carefully and recognize, using context, where substitutions have occurred   ==  Jak Cosme,   [address]  Internal Medicine Residency PGY-1

## 2022-01-03 NOTE — PHYSICAL THERAPY NOTE
PHYSICAL THERAPY NOTE          Patient Name: Brandi Puente  VMRIJ'N Date: 1/3/2022     01/03/22 1447   PT Last Visit   PT Visit Date 01/03/22   Note Type   Note Type Treatment   End of Consult   Patient Position at End of Consult Bedside chair;Bed/Chair alarm activated; All needs within reach  (reclined; SCDs on)   Pain Assessment   Pain Assessment Tool 0-10   Pain Score No Pain   Restrictions/Precautions   Other Precautions Chair Alarm;Cognitive;Telemetry;Hard of hearing  (chair alarm re-activated at the end of session)   General   Chart Reviewed Yes   Additional Pertinent History cleared for Tx session by dasha   Response to Previous Treatment Patient unable to report, no changes reported from family or staff   Cognition   Overall Cognitive Status Impaired   Arousal/Participation Alert; Cooperative   Attention Attends with cues to redirect   Orientation Level Oriented to person   Memory Decreased recall of recent events;Decreased recall of precautions   Following Commands Follows one step commands with increased time or repetition   Subjective   Subjective Alert; in the chair; pleasant and generally disoriented; agreeable to mobilize   Transfers   Sit to Stand 6  Modified independent   Stand to Sit 6  Modified independent   Ambulation/Elevation   Gait pattern Excessively slow; Short stride   Gait Assistance 5  Supervision  (mainly for navigation in the environment)   Additional items Verbal cues  (for directions and obstacles negotiation)   Assistive Device Rolling walker   Distance 70 ft + 120 ft +60 ft w/ seated rest periods in between   Balance   Static Sitting Fair +   Dynamic Sitting Fair   Static Standing Fair   Dynamic Standing [address], & 5 -   Activity Tolerance   Activity Tolerance Patient tolerated treatment well   Nurse Made Aware spoke to Elysia, RN   Exercises   Balance training  standing balance/tolerance training x1 min during pericare by staff   Assessment   Prognosis Good   Problem List Impaired balance;Decreased cognition;Decreased safety awareness   Assessment Pt cont improving in functional endurance and mobility skills ambulating further distances and progressing to mod (I) level w/ transfers; no overt uncorrected LOB or knee buckling observed; overall mobility status currently appears to be at or near premorbid level --> anticipate pt will return to prior living environment when medically cleared; home PT follow up is recommended; will follow  Goals   Patient Goals pt did not state   STG Expiration Date 01/09/22   PT Treatment Day 2   Plan   Treatment/Interventions LE strengthening/ROM; Therapeutic exercise; Endurance training;Cognitive reorientation; Bed mobility;Gait training;Spoke to nursing;Spoke to case management   Progress Progressing toward goals   PT Frequency 3-5x/wk   Recommendation   PT Discharge Recommendation Return to facility with rehabilitation services  (home PT)   Equipment Recommended [place] walker   AM-PAC Basic Mobility Inpatient   Turning in Bed Without Bedrails 4   Lying on Back to Sitting on Edge of Flat Bed 4   Moving Bed to Chair 4   Standing Up From Chair 4   Walk in Room 3   Climb 3-5 Stairs 2   Basic Mobility Inpatient Raw Score 21   Basic Mobility Standardized Score 45 55   Highest Level Of Mobility   JH-HLM Goal 6: Walk 10 steps or more   JH-HLM Highest Level of Mobility 8: Walk 250 feet ot more   JH-HLM Goal Achieved Yes   Education   Education Provided Assistive device; Mobility training   Patient Demonstrates verbal understanding;Reinforcement needed   End of Consult   Patient Position at End of Consult Bedside chair;Bed/Chair alarm activated; All needs within reach       [facility], PT

## 2022-01-03 NOTE — ASSESSMENT & PLAN NOTE
Per patient's daughter, patient has been having worsening appetite and oral intake  Patient was being worked up for concerns for dysphagia in her facility     · Per speech therapy, patient is able to tolerate regular diet and thin liquids

## 2022-01-04 VITALS
HEART RATE: 101 BPM | WEIGHT: 156.7 LBS | OXYGEN SATURATION: 97 % | SYSTOLIC BLOOD PRESSURE: 132 MMHG | BODY MASS INDEX: 30.77 KG/M2 | RESPIRATION RATE: 18 BRPM | DIASTOLIC BLOOD PRESSURE: 68 MMHG | HEIGHT: 60 IN | TEMPERATURE: 98 F

## 2022-01-04 LAB
ANION GAP SERPL CALCULATED.3IONS-SCNC: 7 MMOL/L (ref 4–13)
BUN SERPL-MCNC: 11 MG/DL (ref 5–25)
CALCIUM SERPL-MCNC: 9.7 MG/DL (ref 8.3–10.1)
CHLORIDE SERPL-SCNC: 108 MMOL/L (ref 100–108)
CO2 SERPL-SCNC: 23 MMOL/L (ref 21–32)
CREAT SERPL-MCNC: 1.01 MG/DL (ref 0.6–1.3)
ERYTHROCYTE [DISTWIDTH] IN BLOOD BY AUTOMATED COUNT: 14 % (ref 11.6–15.1)
GFR SERPL CREATININE-BSD FRML MDRD: 46 ML/MIN/1.73SQ M
GLUCOSE SERPL-MCNC: 134 MG/DL (ref 65–140)
GLUCOSE SERPL-MCNC: 149 MG/DL (ref 65–140)
GLUCOSE SERPL-MCNC: 187 MG/DL (ref 65–140)
HCT VFR BLD AUTO: 37.6 % (ref 34.8–46.1)
HGB BLD-MCNC: 12.6 G/DL (ref 11.5–15.4)
MCH RBC QN AUTO: 30.1 PG (ref 26.8–34.3)
MCHC RBC AUTO-ENTMCNC: 33.5 G/DL (ref 31.4–37.4)
MCV RBC AUTO: 90 FL (ref 82–98)
PLATELET # BLD AUTO: 249 THOUSANDS/UL (ref 149–390)
PMV BLD AUTO: 11.2 FL (ref 8.9–12.7)
POTASSIUM SERPL-SCNC: 3.4 MMOL/L (ref 3.5–5.3)
RBC # BLD AUTO: 4.19 MILLION/UL (ref 3.81–5.12)
SODIUM SERPL-SCNC: 138 MMOL/L (ref 136–145)
WBC # BLD AUTO: 10.75 THOUSAND/UL (ref 4.31–10.16)

## 2022-01-04 PROCEDURE — 82948 REAGENT STRIP/BLOOD GLUCOSE: CPT

## 2022-01-04 PROCEDURE — 99239 HOSP IP/OBS DSCHRG MGMT >30: CPT | Performed by: INTERNAL MEDICINE

## 2022-01-04 PROCEDURE — 85027 COMPLETE CBC AUTOMATED: CPT | Performed by: STUDENT IN AN ORGANIZED HEALTH CARE EDUCATION/TRAINING PROGRAM

## 2022-01-04 PROCEDURE — 80048 BASIC METABOLIC PNL TOTAL CA: CPT | Performed by: STUDENT IN AN ORGANIZED HEALTH CARE EDUCATION/TRAINING PROGRAM

## 2022-01-04 RX ORDER — POTASSIUM CHLORIDE 20MEQ/15ML
60 LIQUID (ML) ORAL 3 TIMES DAILY
Status: DISCONTINUED | OUTPATIENT
Start: 2022-01-04 | End: 2022-01-04

## 2022-01-04 RX ORDER — POTASSIUM CHLORIDE 20 MEQ/1
60 TABLET, EXTENDED RELEASE ORAL ONCE
Status: DISCONTINUED | OUTPATIENT
Start: 2022-01-04 | End: 2022-01-04

## 2022-01-04 RX ORDER — POTASSIUM CHLORIDE 20MEQ/15ML
60 LIQUID (ML) ORAL 3 TIMES DAILY
Status: DISCONTINUED | OUTPATIENT
Start: 2022-01-04 | End: 2022-01-04 | Stop reason: HOSPADM

## 2022-01-04 RX ADMIN — POTASSIUM CHLORIDE 60 MEQ: 20 SOLUTION ORAL at 09:34

## 2022-01-04 RX ADMIN — INSULIN LISPRO 3 UNITS: 100 INJECTION, SOLUTION INTRAVENOUS; SUBCUTANEOUS at 09:34

## 2022-01-04 RX ADMIN — INSULIN LISPRO 3 UNITS: 100 INJECTION, SOLUTION INTRAVENOUS; SUBCUTANEOUS at 11:59

## 2022-01-04 RX ADMIN — HEPARIN SODIUM 5000 UNITS: 5000 INJECTION INTRAVENOUS; SUBCUTANEOUS at 05:11

## 2022-01-04 RX ADMIN — MICONAZOLE NITRATE: 20 CREAM TOPICAL at 09:34

## 2022-01-04 RX ADMIN — INSULIN LISPRO 1 UNITS: 100 INJECTION, SOLUTION INTRAVENOUS; SUBCUTANEOUS at 11:59

## 2022-01-04 NOTE — DISCHARGE INSTRUCTIONS
1  Please follow-up with your outpatient provider within 1-2 weeks of discharge  If you need to establish with a new outpatient provider, the [employer] telephone number can assist you with that  2  A blood work test has been ordered for you to check your kidney function  Please complete this in one week and follow-up with your outpatient provider for it  3  As discussed with the endocrinology team in the hospital, please continue taking your Januvia and Prandin     Acute Kidney Injury   WHAT YOU NEED TO KNOW:   What is acute kidney injury? Acute kidney injury (SALTY) is also called acute kidney failure, or acute renal failure  SALTY happens when your kidneys suddenly stop working correctly  Normally, the kidneys remove fluid, chemicals, and waste from your blood  These wastes are turned into urine by your kidneys  SALTY usually happens over hours or days  When you have SALTY, your kidneys do not remove the waste, chemicals, or extra fluid from your body  A normal amount of urine is not produced  SALTY is usually temporary, but it may become a chronic kidney condition  What causes SALTY? · Decreased blood flow to the kidney, such as from hypercalcemia (high blood calcium level) or severe heart disease     · A disease or condition that affects the kidneys, such as hypertension (high blood pressure) or diabetes     · A blockage in the kidney or ureter, such as a kidney or bladder stone, enlarged prostate, or tumor    What increases my risk for SALTY? · Being hospitalized with a serious illness, such as sepsis or severe burns    · Peripheral artery disease    · Older age in adults    · Kidney or liver diseases    · Medical conditions such as dehydration, hypertension, diabetes, or heart failure    · Certain medicines such as NSAIDs    What are the signs and symptoms of SALTY? You may not have any symptoms with early or mild SALTY   As SALTY progresses, you may have any of the following:  · Decrease in the amount of urine or no urination    · Swelling in your arms, legs, or feet     · Weakness, drowsiness, or no appetite    · Nausea, flank pain, muscle twitching or muscle cramps    · Itchy skin, or your breath or body smells like urine    · Behavior changes, confusion, disorientation, or seizures    How is SALTY diagnosed? There are many causes of SALTY  To find the cause and to treat your SALTY correctly, your healthcare provider may do any of the following:  · Blood and urine tests  show how well your kidneys are working  They may also show the cause of your SALTY  · An x-ray or ultrasound  may show problems with your kidneys  Your healthcare provider may see a blockage in your kidneys  He or she may see narrowing of the artery that sends blood to your kidneys  You may be given contrast liquid to help your kidneys show up better in the pictures  Tell the healthcare provider if you have ever had an allergic reaction to contrast liquid  How is SALTY treated? Treatment depends upon the cause of your acute kidney injury and how severe it is  Usually, SALTY will be monitored in the hospital  If you have mild SALTY, you may be able to go home to recover  Your healthcare providers will treat the cause of your SALTY  You may need IV fluids if your SALTY was caused by little or no fluid in your body  You may need dialysis to remove waste and extra fluid from your body  Your healthcare provider may tell you to eat food low in sodium (salt), potassium, phosphorus, or protein  You may need to see a dietitian before you are discharged to get help with planning your meals  How can I prevent SALTY? · Manage other health conditions  such as diabetes, high blood pressure, or heart disease  These conditions increase your risk for acute kidney injury  Take your medicines for these conditions as directed  Also, monitor your blood sugar and blood pressure levels as directed   Contact your healthcare provider if your levels are not in the range he or she says it should be  · Talk to your healthcare provider before you take over-the-counter-medicine  NSAIDs, stomach medicine, or laxatives may harm your kidneys and increase your risk for acute kidney injury  If it is okay to take the medicine, follow the directions on the package  Do not take more than directed  · Tell healthcare providers if you have had SALTY  before you get contrast liquid for an x-ray or CT scan  Your healthcare provider may give you medicine to prevent kidney problems caused by the liquid  CARE AGREEMENT:   You have the right to help plan your care  Learn about your health condition and how it may be treated  Discuss treatment options with your healthcare providers to decide what care you want to receive  You always have the right to refuse treatment  The above information is an  only  It is not intended as medical advice for individual conditions or treatments  Talk to your doctor, nurse or pharmacist before following any medical regimen to see if it is safe and effective for you  © Copyright [employer] 2021 Information is for End User's use only and may not be sold, redistributed or otherwise used for commercial purposes  All illustrations and images included in CareNotes® are the copyrighted property of [employer]  or [facility]     Diabetic Hyperglycemia, Ambulatory Care   GENERAL INFORMATION:   Diabetic hyperglycemia  is a blood glucose (sugar) level that is higher than your healthcare provider recommends  You may not have any signs and symptoms  You may have increased thirst and urinate more often than usual   Seek immediate care for the following symptoms:   · Shortness of breath    · Breath that smells fruity    · Nausea and vomiting    · Symptoms of dehydration, such as dark yellow urine, dry mouth and lips, and dry skin  Manage diabetic hyperglycemia:   · If you take diabetes medicine or insulin, take it as directed    Missed or wrong doses can cause your blood sugar to go up  · Tell your healthcare provider if you continue to have trouble managing your blood sugar  He may change the type, amount, or timing of your diabetes medicine or insulin  If you do not take diabetes medicine or insulin, you may need to start  · Work with your healthcare provider to develop a sick day plan  Illness can cause your blood sugar to rise  A sick day plan helps you control your blood sugar level when you are sick  Prevent diabetic hyperglycemia:   · Check your blood sugar levels regularly  Ask your healthcare provider how often to check your blood sugar and what your levels should be  · Follow your meal plan  Your blood sugar can go up if you eat a large meal or you eat more carbohydrates than recommended  Work with a dietitian to develop a meal plan that is right for you  · Exercise  can help lower your blood sugar when it is high  It can also keep your blood sugar levels steady over time  Exercise for at least 30 minutes, 5 days a week  Include muscle strengthening activities 2 days each week  Work with your healthcare provider to create an exercise plan  Children should get at least 60 minutes of physical activity each day  · Check your ketones before exercise  if your blood sugar level is above 240 mg/dl  Do not exercise if you have ketones in your urine, because your blood sugar level may rise even more  Ask your healthcare provider how to lower your blood sugar when you have ketones  Follow up with your healthcare provider as directed:  Write down your questions so you remember to ask them during your visits  CARE AGREEMENT:   You have the right to help plan your care  Learn about your health condition and how it may be treated  Discuss treatment options with your caregivers to decide what care you want to receive  You always have the right to refuse treatment  The above information is an  only   It is not intended as medical advice for individual conditions or treatments  Talk to your doctor, nurse or pharmacist before following any medical regimen to see if it is safe and effective for you  © 2014 [address] is for End User's use only and may not be sold, redistributed or otherwise used for commercial purposes  All illustrations and images included in CareNotes® are the copyrighted property of [employer]  or [employer]

## 2022-01-04 NOTE — PROGRESS NOTES
[address] [place] [address] to give discharge report  Spoke with Judge Hui  Confirmed that paperwork had been faxed, no further report requested

## 2022-01-04 NOTE — QUICK NOTE
Endocrinology quick note:    Discussed with daughter Nel Martinez by phone regarding outpatient discharge medication regimen for diabetes  Nel Martinez had discussed with her siblings and they are of consensus that mother would not have wanted insulin and they would like to remain off insulin for her comfort  We discussed that glipizide cannot be recommended because of pt's age and renal function and additionally not in conjunction with another similar medication/secretagogue Prandin  Other medications could be considered as an outpatient discussion to see if pt is a good candidate - like Ozempic or Rybelsus  Recommend discharge on prandin 2mg TID and januvia 100mg daily given that family does not want basal insulin and follow up as outpatient to discuss any other medications if pt continues to have hyperglycemia with dietary changes on this regimen      Thania Cobos DO  Endocrinology Fellow, PGY4

## 2022-01-04 NOTE — PLAN OF CARE
Problem: Potential for Falls  Goal: Patient will remain free of falls  Description: INTERVENTIONS:  - Educate patient/family on patient safety including physical limitations  - Instruct patient to call for assistance with activity   - Consult OT/PT to assist with strengthening/mobility   - Keep Call bell within reach  - Keep bed low and locked with side rails adjusted as appropriate  - Keep care items and personal belongings within reach  - Initiate and maintain comfort rounds  - Make Fall Risk Sign visible to staff  - Offer Toileting every 3 Hours, in advance of need  - Initiate/Maintain bed and chair alarm  - Obtain necessary fall risk management equipment: nonskid socks, walker  - Apply yellow socks and bracelet for high fall risk patients  - Consider moving patient to room near nurses station  Outcome: Adequate for Discharge     Problem: MOBILITY - ADULT  Goal: Maintain or return to baseline ADL function  Description: INTERVENTIONS:  -  Assess patient's ability to carry out ADLs; assess patient's baseline for ADL function and identify physical deficits which impact ability to perform ADLs (bathing, care of mouth/teeth, toileting, grooming, dressing, etc )  - Assess/evaluate cause of self-care deficits   - Assess range of motion  - Assess patient's mobility; develop plan if impaired  - Assess patient's need for assistive devices and provide as appropriate  - Encourage maximum independence but intervene and supervise when necessary  - Involve family in performance of ADLs  - Assess for home care needs following discharge   - Consider OT consult to assist with ADL evaluation and planning for discharge  - Provide patient education as appropriate  Outcome: Adequate for Discharge  Goal: Maintains/Returns to pre admission functional level  Description: INTERVENTIONS:  - Perform BMAT or MOVE assessment daily    - Set and communicate daily mobility goal to care team and patient/family/caregiver     - Collaborate with rehabilitation services on mobility goals if consulted  - Ambulate patient 4 times a day  - Out of bed to chair 3 times a day   - Out of bed for meals 3 times a day  - Out of bed for toileting  - Record patient progress and toleration of activity level   Outcome: Adequate for Discharge     Problem: Prexisting or High Potential for Compromised Skin Integrity  Goal: Skin integrity is maintained or improved  Description: INTERVENTIONS:  - Identify patients at risk for skin breakdown  - Assess and monitor skin integrity  - Assess and monitor nutrition and hydration status  - Monitor labs   - Assess for incontinence   - Turn and reposition patient  - Assist with mobility/ambulation  - Relieve pressure over bony prominences  - Avoid friction and shearing  - Provide appropriate hygiene as needed including keeping skin clean and dry  - Evaluate need for skin moisturizer/barrier cream  - Collaborate with interdisciplinary team   - Patient/family teaching  - Consider wound care consult   Outcome: Adequate for Discharge

## 2022-01-04 NOTE — DISCHARGE SUMMARY
INTERNAL MEDICINE RESIDENCY DISCHARGE SUMMARY     Katelin Jerry   80 y o  female  MRN: [id number]  [facility]     [facility]    Encounter: [id number]    Principal Problem:    Hyperosmolar hyperglycemic state (HHS) [facility])  Active Problems:    Hypokalemia    Hyponatremia    Acute-on-chronic kidney injury ([address] )    Benign essential hypertension    Bilateral lower extremity edema    Moderate aortic stenosis    Uncomplicated senile dementia (HCC)    Urinary incontinence    First degree AV block    At risk for delirium    Dysphagia      * Hyperosmolar hyperglycemic state (HHS) [facility])  Assessment & Plan  Lab Results   Component Value Date    HGBA1C 13 0 (H) 12/28/2021       Recent Labs     01/01/22  1552 01/01/22  2055 01/02/22  0601 01/02/22  1105   POCGLU 304* 305* 161* 180*       Blood Sugar Average: Last 72 hrs:     Patient is a known diabetic who has previously refused insulin therapy in the outpatient setting  Most recent A1c 8 6% per chart review in April of 2021  She presents from her assisted living facility today for evaluation of multiple lab abnormalities, particularly glucose 584 and potassium of 2 4  Per review of available facility paperwork, patient has been compliant with her oral hypoglycemic regimen, which includes Glucotrol, Januvia, and Prandin  On arrival to the ED, patient's blood sugar off of BNP was 655 with associated hypokalemia to 2 4 and pseudo hyponatremia  Patient had a minimally elevated beta hydroxybutyrate with no anion gap     Plan  · Glucose continued between 150s-300s  · DC IV fluid  · Trend blood sugars closely  · Endocrine consult, recommendations greatly appreciated  · Continue 10 units Lantus at bedtime and correctional SSI algorithm while inpatient   · Continue diabetic diet  · Patient found to have endogenous insulin production  C-peptide 4ng/ml    · Endocrinology discussed with patient's family, who prefer to discharge pt off insulin  Instead of suggested daily injection of basal insulin + prandin, patient will be discharged on  Januvia 100 mg and home prandin 2 mg TID  · Holding home oral antihyperglycemics    Hypokalemia  Assessment & Plan  Serum potassium 2 4 on arrival in setting of HHS  Patient received 60 mEq oral and 20 mEq IV in the ED with a further 20 mEq IV pending at time of admission  Per chart review, patient does have prior history of hypokalemia that does not appear to have been worked up previously and she does take 60 mEq of potassium 3 times daily per facility documentation  Plan  · 12/29 potassium at 2 6 after repletion with 150 mEq of potassium  · 12/30 potassium at 3 1 after repletion with 320 mEq of K since admission  Will Give K 20mg IV BID+ K 40 mg PO once (80 total this am)  · 12/31 potassium 3 5 from 4 1 yesterday  Insulin started yesterday evening  DC IV fluid  We will replete potassium with home dose K 60 mEq use p o  t i d  Repleted Mag 2    · 1/1 potassium 4 0   · 1/2 potassium 4 2, Mg 1 6, replete Magnesium 2 g,  continue home dose K, check BMP at 4pm  · 1/3 K 4 1  · Monitor and replete as necessary aggressively  · As it appears this has not been worked up previously, we will consult Nephrology this time, recommendations greatly appreciated  · Will check aldosterone/renin ratio     Hyponatremia  Assessment & Plan  Patient with true hyponatremia complicated by pseudohyponatremia in the setting of [facility]  Serum sodium 121 on arrival, 130 with correction in setting of hyperglycemia  Sodium levels improving at time of admission  Plan  · Resolved   Na 138 today  · Nephrology consult, recommendations greatly appreciated    Acute-on-chronic kidney injury [facility])  Assessment & Plan  Lab Results   Component Value Date    EGFR 50 01/02/2022    EGFR 35 01/01/2022    EGFR 50 01/01/2022    CREATININE 0 95 01/02/2022    CREATININE 1 26 01/01/2022    CREATININE 0 94 01/01/2022     Patient presents with serum creatinine 1 8 with apparent baseline of roughly 1 0-1 3  Patient has known history of CKD stage 3  Plan  · SALTY resolved  Renal function back to baseline  · SALTY on CKD likely prerenal in setting of dehydration secondary to HHS  · 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, arteriolar sclerosis, diabetic kidney disease  · Trend renal indices and urine output closely  · Avoid hypotension and nephrotoxins where able  · Nephrology consult, recommendations greatly appreciated  · Patient to be discharged on SOD SALTY Pathway      Dysphagia  Assessment & Plan  Per patient's daughter, patient has been having worsening appetite and oral intake  Patient was being worked up for concerns for dysphagia in her facility  · Per speech therapy, patient is able to tolerate regular diet and thin liquids    At risk for delirium  Assessment & Plan  Given baseline history of dementia, patient is at significant risk for hospital associated delirium  Patient is currently A&Ox1    Plan  Geriatrics consult, recommendations greatly appreciated  -Patient is high risk of delirium due to her underlying dementia and admission to unfamiliar environment  -Initiate delirium precautions          First degree AV block  Assessment & Plan  EKG on arrival shows first-degree AV block with PVCs and left bundle-branch block  Review of previous EKGs show similar findings  Plan  · No urgent indication to involve Cardiology  · Recommend avoiding QTC prolonging agents if able    Urinary incontinence  Assessment & Plan  Per discussion with POA, patient has been dealing with urinary incontinence at her assisted living facility for most of at least the last year  The patient is unable to provide any information regarding the details of her urinary incontinence      Plan  · Monitor patient closely, would recommend considering PureWick catheter and avoiding implantation of Mascorro catheter during this admission given likelihood of exacerbation of delirium     Uncomplicated senile dementia [facility])  Assessment & Plan  Patient with a known history of senile dementia  Patient currently resides in an assisted living facility, [facility]  Per conversation this evening with POA, patient's daughter Ander Sloan, patient is typically only alert and oriented to herself  Her daughter tells me that she is typically able to walk independently with a walker  She requires assistance with management of her medications at her assisted living facility  Plan  · AOx1  · Monitor for progression back to baseline with treatment of acute conditions as indicated elsewhere in note  · Geriatrics consultation, recommendations greatly appreciated  · Continue home Aricept     Moderate aortic stenosis  Assessment & Plan  Patient with history of known aortic stenosis, moderate to severe in severity, seen on echocardiogram obtained April 2021  Patient currently with grade 3/6 murmur on exam     Plan  · Will continue to monitor fluid status    Bilateral lower extremity edema  Assessment & Plan  On exam, patient has bilateral lower extremity edema that appears chronic in nature  Reflecting this, patient takes Lasix 40 mg and Zaroxolyn 5 mg daily at her assisted living facility  Plan  · Holding home diuretics in setting of HHS   · Elevate legs  · Routine skin care    Benign essential hypertension  Assessment & Plan  Patient with noted history of hypertension however her only relevant antihypertensive medications as an outpatient are directed at her chronic venous stasis  Patient initially presented with mild hypertension that has improved without intervention in the ED  Plan  · Holding home diuretics in setting of HHS  · Monitor pressures closely      [address]     Ms Aaron Dave is a 80-year-old female with a past medical history of diabetes, CKD 3, dimension, hyperkalemia, aortic stenosis, and hypertension    She presented to the ED on recommendation of her assisted living facility after she was found to have multiple laboratory abnormalities  Of note, patient has a history of significant dementia which makes history and review of systems very difficult to complete the time of admission  Per chart review, and discussion with patient's POA, her daughter Dino Gloria, patient was at her assisted living facility, [facility], earlier today when she received routine blood work that showed significant hyperglycemia and hypokalemia  Dino Gloria called her facility and recommended that they activated EMS to bring the patient to the emergency department  On arrival to the emergency department, the patient was without complaints her vital signs were within normal limits  The patient had poor memory and recall and was unaware of situation that led her to being brought to the hospital   The patient's daughter states that the patient was usually oriented to herself only  The daughter told the admitting provider that the patient was recently noted to have worsening appetite and oral intake  She states that she was being worked up for concerns for dysphagia at the facility  Daughter also states that patient has been experiencing urinary incontinence chronically  Daughter states that the patient usually ambulates with a walker and is able to preserve in some of her ADLs with assistance  She states that the patient is unable to perform any of her IADLs  In the emergency department, serum sodium was 121 on arrival, and 130 with correction setting of hyperglycemia  She is hypokalemic with potassium of 2 4, and had minimal leukocytosis  Patient was found to have a borderline elevated beta hydroxybutyrate without lamar acidosis or elevated anion gap  Blood sugar obtained through a BMP was 655  She was admitted under the general medicine service for management of hyperosmolar hyperglycemic state with associated electrolyte abnormalities      During her admission, endocrine service was consulted  Patient was continued on 10 units of Lantus at bedtime and correctional sliding scale insulin algorithm on the patient  Patient was found to have endogenous insulin production  Patient, and patient's family, or adamantly against the use of basal insulin at home  Per endocrine recommendations, patient was discharged on Januvia 100 mg in home Prandin 2 mg t i d  Glipizide diuretic was discontinued  Patient's hyperkalemia corrected with supplementation  Patient only to continue 60 miliequivalents t i d  at home  Patient also had an SALTY on CKD likely prerenal in setting of dehydration secondary to HHS  SALTY resolved after IV fluid  Nephrology was consulted during admission  Patient was evaluated by speech services for dysphagia  It was determined the patient was able to tolerate a regular diet and thin liquids  Patient was also evaluated by gerontology while inpatient for delirium  Patient is to be discharged today with instructions to follow-up as an outpatient for diabetes, SALTY, and hypertension  Vitals:    01/04/22 0509 01/04/22 0600 01/04/22 0700 01/04/22 1100   BP: 113/59  119/75 132/68   BP Location:       Pulse: 87  87 101   Resp: 18      Temp: 99 1 °F (37 3 °C)  98 3 °F (36 8 °C) 98 °F (36 7 °C)   TempSrc: Oral  Oral Oral   SpO2: 98%  98% 97%   Weight:  71 1 kg (156 lb 11 2 oz)     Height:         Physical Exam  Constitutional:       General: She is not in acute distress  Appearance: Normal appearance  HENT:      Head: Normocephalic and atraumatic  Mouth/Throat:      Mouth: Mucous membranes are moist       Pharynx: Oropharynx is clear  Eyes:      Extraocular Movements: Extraocular movements intact  Conjunctiva/sclera: Conjunctivae normal       Pupils: Pupils are equal, round, and reactive to light  Cardiovascular:      Rate and Rhythm: Normal rate and regular rhythm  Pulses: Normal pulses  Heart sounds: Murmur heard         Pulmonary:      Effort: Pulmonary effort is normal  No respiratory distress  Breath sounds: Normal breath sounds  No wheezing, rhonchi or rales  Abdominal:      General: Abdomen is flat  Bowel sounds are normal  There is no distension  Tenderness: There is no abdominal tenderness  Musculoskeletal:      Right lower leg: No edema  Left lower leg: No edema  Skin:     Capillary Refill: Capillary refill takes less than 2 seconds  Neurological:      General: No focal deficit present  Mental Status: She is alert  She is disoriented  Motor: No weakness  Comments: Patient is oriented to person and not to place or time         DISCHARGE INFORMATION     PCP at Discharge: None  Information given to patient to use InfoLink in order to establish with PCP in her area  Admitting Provider: Meredith Starr MD  Admission Date: 12/28/2021    Discharge Provider: Samina Benavidez MD  Discharge Date: 01/04/2022    Discharge Disposition: Non [facility] SNF/TCU/SNU  Discharge Condition: stable  Discharge with Lines: no    Discharge Diet: regular diet  Activity Restrictions: none  Test Results Pending at Discharge: Aldosterone/Renin Ratio    Discharge Diagnoses:  Principal Problem:    Hyperosmolar hyperglycemic state (HHS) ([facility] [address] )  Active Problems:    Hypokalemia    Hyponatremia    Acute-on-chronic kidney injury ([facility] [address] )    Benign essential hypertension    Bilateral lower extremity edema    Moderate aortic stenosis    Uncomplicated senile dementia (HCC)    Urinary incontinence    First degree AV block    At risk for delirium    Dysphagia  Resolved Problems:    * No resolved hospital problems  *      Consulting Providers: Endocrinology, Nephrology, Gerontology      Diagnostic & Therapeutic Procedures Performed:  No results found      Code Status: Level 3 - DNAR and DNI  Advance Directive & Living Will: Received  Power of :    POLST:      Medications:  Current Discharge Medication List        Current Discharge Medication List Current Discharge Medication List      CONTINUE these medications which have NOT CHANGED    Details   donepezil (ARICEPT) 10 mg tablet Take 1 tablet (10 mg total) by mouth daily  Qty: 90 tablet, Refills: 1    Associated Diagnoses: Senile dementia without behavioral disturbance ([facility])      furosemide (LASIX) 40 mg tablet Take 1 tablet (40 mg total) by mouth daily  Qty: 90 tablet, Refills: 1    Associated Diagnoses: Essential hypertension, benign      glipiZIDE (GLUCOTROL XL) 10 mg 24 hr tablet Take 1 tablet (10 mg total) by mouth daily  Qty: 90 tablet, Refills: 1    Associated Diagnoses: Type 2 diabetes mellitus with complication, without long-term current use of insulin ([facility])      magnesium oxide (MAG-OX) 400 mg Take 1 tablet (400 mg total) by mouth daily  Qty: 90 tablet, Refills: 1    Associated Diagnoses: Hypokalemia; CKD (chronic kidney disease), stage III ([facility])      metolazone (ZAROXOLYN) 5 mg tablet Take 1 tablet (5 mg total) by mouth daily  Qty: 90 tablet, Refills: 1    Associated Diagnoses: 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, unspecified laterality      Potassium Chloride ER 20 MEQ TBCR Take 3 tablets (60 mEq total) by mouth 3 (three) times a day  Qty: 810 tablet, Refills: 1    Associated Diagnoses: Hypokalemia; CKD (chronic kidney disease), stage III ([facility])      repaglinide (PRANDIN) 2 mg tablet Take 1 tablet (2 mg total) by mouth 3 (three) times a day before meals  Qty: 270 tablet, Refills: 1    Associated Diagnoses: Type 2 diabetes mellitus without complication, without long-term current use of insulin ([facility])      sitaGLIPtin (JANUVIA) 100 mg tablet Take 1 tablet (100 mg total) by mouth daily  Qty: 90 tablet, Refills: 1    Associated Diagnoses: Diabetes mellitus type 2, noninsulin dependent ([facility])      acetaminophen (TYLENOL) 325 mg tablet Take 650 mg by mouth every 6 (six) hours as needed for mild pain      Diclofenac Sodium (VOLTAREN) 1 % Apply 2 g topically to affected area 4 times daily at 9a-1p-5p-9p  Qty: 100 g, Refills: 5    Associated Diagnoses: Musculoskeletal pain             Allergies:  No Known Allergies    FOLLOW-UP     PCP Outpatient Follow-up:  yes      Follow up: Patient instructed to follow-up with outpatient provider  Information given to patient to use InfoLink to find PCP if needed  Follow up within next: 1-2 weeks    Consulting Providers Follow-up:  none required     Active Issues Requiring Follow-up:   yes     Issue: HHS, SALTY, HTN  Responsible Individual: PCP  Follow-up Appointments Arranged: Information given to patient for PCP visit  Discharge Statement:   I spent 45 minutes minutes discharging the patient  This time was spent on the day of discharge  I had direct contact with the patient on the day of discharge  Additional documentation is required if more than 30 minutes were spent on discharge  Portions of the record may have been created with voice recognition software  Occasional wrong word or "sound a like" substitutions may have occurred due to the inherent limitations of voice recognition software    Read the chart carefully and recognize, using context, where substitutions have occurred     ==  Bertha Guzman, [address] [facility]  Internal Medicine Resident PGY-1

## 2022-01-09 LAB
ALDOST SERPL-MCNC: 8.2 NG/DL (ref 0–30)
ALDOST/RENIN PLAS-RTO: 2.4 {RATIO} (ref 0–30)
RENIN PLAS-CCNC: 3.46 NG/ML/HR (ref 0.17–5.38)

## 2022-01-12 ENCOUNTER — VBI (OUTPATIENT)
Dept: ADMINISTRATIVE | Facility: OTHER | Age: 87
End: 2022-01-12

## 2022-01-27 ENCOUNTER — VBI (OUTPATIENT)
Dept: ADMINISTRATIVE | Facility: OTHER | Age: 87
End: 2022-01-27

## 2022-02-01 ENCOUNTER — APPOINTMENT (EMERGENCY)
Dept: CT IMAGING | Facility: HOSPITAL | Age: 87
DRG: 872 | End: 2022-02-01
Payer: MEDICARE

## 2022-02-01 ENCOUNTER — APPOINTMENT (EMERGENCY)
Dept: RADIOLOGY | Facility: HOSPITAL | Age: 87
DRG: 872 | End: 2022-02-01
Payer: MEDICARE

## 2022-02-01 ENCOUNTER — HOSPITAL ENCOUNTER (INPATIENT)
Facility: HOSPITAL | Age: 87
LOS: 6 days | Discharge: NURSING, REHAB OR LONG-TERM CARE | DRG: 872 | End: 2022-02-07
Attending: EMERGENCY MEDICINE | Admitting: INTERNAL MEDICINE
Payer: MEDICARE

## 2022-02-01 DIAGNOSIS — I48.91 NEW ONSET ATRIAL FIBRILLATION (HCC): ICD-10-CM

## 2022-02-01 DIAGNOSIS — N18.30 CKD (CHRONIC KIDNEY DISEASE), STAGE III (HCC): ICD-10-CM

## 2022-02-01 DIAGNOSIS — E87.6 HYPOKALEMIA: ICD-10-CM

## 2022-02-01 DIAGNOSIS — E87.1 HYPONATREMIA: Primary | ICD-10-CM

## 2022-02-01 DIAGNOSIS — I10 ESSENTIAL HYPERTENSION, BENIGN: ICD-10-CM

## 2022-02-01 DIAGNOSIS — N39.0 UTI (URINARY TRACT INFECTION): ICD-10-CM

## 2022-02-01 DIAGNOSIS — Z87.828 H/O LACERATION OF SKIN: ICD-10-CM

## 2022-02-01 PROBLEM — I50.9 CHF (CONGESTIVE HEART FAILURE) (HCC): Status: ACTIVE | Noted: 2022-02-01

## 2022-02-01 PROBLEM — I35.0 AORTIC STENOSIS: Status: ACTIVE | Noted: 2022-02-01

## 2022-02-01 PROBLEM — R65.20 SEVERE SEPSIS (HCC): Status: ACTIVE | Noted: 2022-02-01

## 2022-02-01 PROBLEM — A41.9 SEVERE SEPSIS (HCC): Status: ACTIVE | Noted: 2022-02-01

## 2022-02-01 LAB
4HR DELTA HS TROPONIN: 5 NG/L
ALBUMIN SERPL BCP-MCNC: 3.4 G/DL (ref 3.5–5)
ALP SERPL-CCNC: 133 U/L (ref 46–116)
ALT SERPL W P-5'-P-CCNC: 29 U/L (ref 12–78)
ANION GAP SERPL CALCULATED.3IONS-SCNC: 12 MMOL/L (ref 4–13)
ANION GAP SERPL CALCULATED.3IONS-SCNC: 16 MMOL/L (ref 4–13)
ANION GAP SERPL CALCULATED.3IONS-SCNC: 8 MMOL/L (ref 4–13)
APTT PPP: 28 SECONDS (ref 23–37)
AST SERPL W P-5'-P-CCNC: 59 U/L (ref 5–45)
ATRIAL RATE: 101 BPM
BACTERIA UR QL AUTO: ABNORMAL /HPF
BASOPHILS # BLD MANUAL: 0 THOUSAND/UL (ref 0–0.1)
BASOPHILS NFR MAR MANUAL: 0 % (ref 0–1)
BILIRUB SERPL-MCNC: 1.04 MG/DL (ref 0.2–1)
BILIRUB UR QL STRIP: NEGATIVE
BUN SERPL-MCNC: 20 MG/DL (ref 5–25)
BUN SERPL-MCNC: 23 MG/DL (ref 5–25)
BUN SERPL-MCNC: 23 MG/DL (ref 5–25)
CALCIUM ALBUM COR SERPL-MCNC: 10.2 MG/DL (ref 8.3–10.1)
CALCIUM SERPL-MCNC: 8.6 MG/DL (ref 8.3–10.1)
CALCIUM SERPL-MCNC: 9.3 MG/DL (ref 8.3–10.1)
CALCIUM SERPL-MCNC: 9.7 MG/DL (ref 8.3–10.1)
CARDIAC TROPONIN I PNL SERPL HS: 31 NG/L
CARDIAC TROPONIN I PNL SERPL HS: 36 NG/L
CHLORIDE SERPL-SCNC: 84 MMOL/L (ref 100–108)
CHLORIDE SERPL-SCNC: 87 MMOL/L (ref 100–108)
CHLORIDE SERPL-SCNC: 93 MMOL/L (ref 100–108)
CLARITY UR: CLEAR
CO2 SERPL-SCNC: 24 MMOL/L (ref 21–32)
COLOR UR: YELLOW
CREAT SERPL-MCNC: 1.22 MG/DL (ref 0.6–1.3)
CREAT SERPL-MCNC: 1.37 MG/DL (ref 0.6–1.3)
CREAT SERPL-MCNC: 1.38 MG/DL (ref 0.6–1.3)
EOSINOPHIL # BLD MANUAL: 0 THOUSAND/UL (ref 0–0.4)
EOSINOPHIL NFR BLD MANUAL: 0 % (ref 0–6)
ERYTHROCYTE [DISTWIDTH] IN BLOOD BY AUTOMATED COUNT: 13.2 % (ref 11.6–15.1)
FLUAV RNA RESP QL NAA+PROBE: NEGATIVE
FLUBV RNA RESP QL NAA+PROBE: NEGATIVE
GFR SERPL CREATININE-BSD FRML MDRD: 31 ML/MIN/1.73SQ M
GFR SERPL CREATININE-BSD FRML MDRD: 32 ML/MIN/1.73SQ M
GFR SERPL CREATININE-BSD FRML MDRD: 36 ML/MIN/1.73SQ M
GLUCOSE SERPL-MCNC: 258 MG/DL (ref 65–140)
GLUCOSE SERPL-MCNC: 284 MG/DL (ref 65–140)
GLUCOSE SERPL-MCNC: 304 MG/DL (ref 65–140)
GLUCOSE SERPL-MCNC: 319 MG/DL (ref 65–140)
GLUCOSE SERPL-MCNC: 390 MG/DL (ref 65–140)
GLUCOSE SERPL-MCNC: 404 MG/DL (ref 65–140)
GLUCOSE UR STRIP-MCNC: ABNORMAL MG/DL
HCT VFR BLD AUTO: 42.3 % (ref 34.8–46.1)
HGB BLD-MCNC: 15 G/DL (ref 11.5–15.4)
HGB UR QL STRIP.AUTO: ABNORMAL
INR PPP: 1 (ref 0.84–1.19)
KETONES UR STRIP-MCNC: NEGATIVE MG/DL
LACTATE SERPL-SCNC: 2.1 MMOL/L (ref 0.5–2)
LEUKOCYTE ESTERASE UR QL STRIP: ABNORMAL
LYMPHOCYTES # BLD AUTO: 23 % (ref 14–44)
LYMPHOCYTES # BLD AUTO: 4.12 THOUSAND/UL (ref 0.6–4.47)
MAGNESIUM SERPL-MCNC: 1.8 MG/DL (ref 1.6–2.6)
MCH RBC QN AUTO: 30.4 PG (ref 26.8–34.3)
MCHC RBC AUTO-ENTMCNC: 35.5 G/DL (ref 31.4–37.4)
MCV RBC AUTO: 86 FL (ref 82–98)
MONOCYTES # BLD AUTO: 1.97 THOUSAND/UL (ref 0–1.22)
MONOCYTES NFR BLD: 11 % (ref 4–12)
NEUTROPHILS # BLD MANUAL: 11.82 THOUSAND/UL (ref 1.85–7.62)
NEUTS BAND NFR BLD MANUAL: 2 % (ref 0–8)
NEUTS SEG NFR BLD AUTO: 64 % (ref 43–75)
NITRITE UR QL STRIP: NEGATIVE
NON-SQ EPI CELLS URNS QL MICRO: ABNORMAL /HPF
OTHER STN SPEC: ABNORMAL
P AXIS: 149 DEGREES
PH UR STRIP.AUTO: 7 [PH]
PLATELET # BLD AUTO: 324 THOUSANDS/UL (ref 149–390)
PLATELET BLD QL SMEAR: ADEQUATE
PMV BLD AUTO: 11.1 FL (ref 8.9–12.7)
POTASSIUM SERPL-SCNC: 2.5 MMOL/L (ref 3.5–5.3)
POTASSIUM SERPL-SCNC: 2.6 MMOL/L (ref 3.5–5.3)
POTASSIUM SERPL-SCNC: 3.8 MMOL/L (ref 3.5–5.3)
PR INTERVAL: 240 MS
PROT SERPL-MCNC: 8.7 G/DL (ref 6.4–8.2)
PROT UR STRIP-MCNC: ABNORMAL MG/DL
PROTHROMBIN TIME: 13.2 SECONDS (ref 11.6–14.5)
QRS AXIS: 9 DEGREES
QRSD INTERVAL: 146 MS
QT INTERVAL: 384 MS
QTC INTERVAL: 497 MS
RBC # BLD AUTO: 4.93 MILLION/UL (ref 3.81–5.12)
RBC #/AREA URNS AUTO: ABNORMAL /HPF
RBC MORPH BLD: NORMAL
RSV RNA RESP QL NAA+PROBE: NEGATIVE
SARS-COV-2 RNA RESP QL NAA+PROBE: NEGATIVE
SODIUM SERPL-SCNC: 123 MMOL/L (ref 136–145)
SODIUM SERPL-SCNC: 124 MMOL/L (ref 136–145)
SODIUM SERPL-SCNC: 125 MMOL/L (ref 136–145)
SP GR UR STRIP.AUTO: 1.02 (ref 1–1.03)
T WAVE AXIS: 153 DEGREES
UROBILINOGEN UR QL STRIP.AUTO: 0.2 E.U./DL
VENTRICULAR RATE: 101 BPM
WBC # BLD AUTO: 17.91 THOUSAND/UL (ref 4.31–10.16)
WBC #/AREA URNS AUTO: ABNORMAL /HPF

## 2022-02-01 PROCEDURE — 85730 THROMBOPLASTIN TIME PARTIAL: CPT | Performed by: EMERGENCY MEDICINE

## 2022-02-01 PROCEDURE — 83735 ASSAY OF MAGNESIUM: CPT | Performed by: NURSE PRACTITIONER

## 2022-02-01 PROCEDURE — 87040 BLOOD CULTURE FOR BACTERIA: CPT | Performed by: EMERGENCY MEDICINE

## 2022-02-01 PROCEDURE — 84484 ASSAY OF TROPONIN QUANT: CPT | Performed by: EMERGENCY MEDICINE

## 2022-02-01 PROCEDURE — 80053 COMPREHEN METABOLIC PANEL: CPT | Performed by: EMERGENCY MEDICINE

## 2022-02-01 PROCEDURE — 93010 ELECTROCARDIOGRAM REPORT: CPT | Performed by: INTERNAL MEDICINE

## 2022-02-01 PROCEDURE — 99223 1ST HOSP IP/OBS HIGH 75: CPT | Performed by: INTERNAL MEDICINE

## 2022-02-01 PROCEDURE — 81001 URINALYSIS AUTO W/SCOPE: CPT | Performed by: EMERGENCY MEDICINE

## 2022-02-01 PROCEDURE — 85007 BL SMEAR W/DIFF WBC COUNT: CPT | Performed by: EMERGENCY MEDICINE

## 2022-02-01 PROCEDURE — 87186 SC STD MICRODIL/AGAR DIL: CPT | Performed by: PHYSICIAN ASSISTANT

## 2022-02-01 PROCEDURE — 99285 EMERGENCY DEPT VISIT HI MDM: CPT

## 2022-02-01 PROCEDURE — 70450 CT HEAD/BRAIN W/O DYE: CPT

## 2022-02-01 PROCEDURE — 87086 URINE CULTURE/COLONY COUNT: CPT | Performed by: PHYSICIAN ASSISTANT

## 2022-02-01 PROCEDURE — 80048 BASIC METABOLIC PNL TOTAL CA: CPT | Performed by: PHYSICIAN ASSISTANT

## 2022-02-01 PROCEDURE — 87106 FUNGI IDENTIFICATION YEAST: CPT | Performed by: PHYSICIAN ASSISTANT

## 2022-02-01 PROCEDURE — 93005 ELECTROCARDIOGRAM TRACING: CPT

## 2022-02-01 PROCEDURE — 99285 EMERGENCY DEPT VISIT HI MDM: CPT | Performed by: EMERGENCY MEDICINE

## 2022-02-01 PROCEDURE — 83605 ASSAY OF LACTIC ACID: CPT | Performed by: EMERGENCY MEDICINE

## 2022-02-01 PROCEDURE — 82948 REAGENT STRIP/BLOOD GLUCOSE: CPT

## 2022-02-01 PROCEDURE — 36415 COLL VENOUS BLD VENIPUNCTURE: CPT | Performed by: EMERGENCY MEDICINE

## 2022-02-01 PROCEDURE — 71045 X-RAY EXAM CHEST 1 VIEW: CPT

## 2022-02-01 PROCEDURE — 87077 CULTURE AEROBIC IDENTIFY: CPT | Performed by: PHYSICIAN ASSISTANT

## 2022-02-01 PROCEDURE — 85610 PROTHROMBIN TIME: CPT | Performed by: EMERGENCY MEDICINE

## 2022-02-01 PROCEDURE — 87081 CULTURE SCREEN ONLY: CPT | Performed by: INTERNAL MEDICINE

## 2022-02-01 PROCEDURE — 80048 BASIC METABOLIC PNL TOTAL CA: CPT | Performed by: INTERNAL MEDICINE

## 2022-02-01 PROCEDURE — 0241U HB NFCT DS VIR RESP RNA 4 TRGT: CPT | Performed by: EMERGENCY MEDICINE

## 2022-02-01 PROCEDURE — 85027 COMPLETE CBC AUTOMATED: CPT | Performed by: EMERGENCY MEDICINE

## 2022-02-01 PROCEDURE — 99222 1ST HOSP IP/OBS MODERATE 55: CPT | Performed by: INTERNAL MEDICINE

## 2022-02-01 RX ORDER — INSULIN GLARGINE 100 [IU]/ML
10 INJECTION, SOLUTION SUBCUTANEOUS
Status: DISCONTINUED | OUTPATIENT
Start: 2022-02-01 | End: 2022-02-07 | Stop reason: HOSPADM

## 2022-02-01 RX ORDER — POTASSIUM CHLORIDE 20MEQ/15ML
45 LIQUID (ML) ORAL 3 TIMES DAILY
COMMUNITY
End: 2022-02-07 | Stop reason: HOSPADM

## 2022-02-01 RX ORDER — GLIPIZIDE 10 MG/1
10 TABLET ORAL DAILY
COMMUNITY

## 2022-02-01 RX ORDER — FUROSEMIDE 20 MG/1
20 TABLET ORAL DAILY
COMMUNITY

## 2022-02-01 RX ORDER — SODIUM CHLORIDE 9 MG/ML
75 INJECTION, SOLUTION INTRAVENOUS CONTINUOUS
Status: DISCONTINUED | OUTPATIENT
Start: 2022-02-01 | End: 2022-02-01

## 2022-02-01 RX ORDER — ACETAMINOPHEN 325 MG/1
650 TABLET ORAL EVERY 6 HOURS PRN
Status: DISCONTINUED | OUTPATIENT
Start: 2022-02-01 | End: 2022-02-07 | Stop reason: HOSPADM

## 2022-02-01 RX ORDER — POTASSIUM CHLORIDE 14.9 MG/ML
20 INJECTION INTRAVENOUS ONCE
Status: COMPLETED | OUTPATIENT
Start: 2022-02-01 | End: 2022-02-01

## 2022-02-01 RX ORDER — HEPARIN SODIUM 5000 [USP'U]/ML
5000 INJECTION, SOLUTION INTRAVENOUS; SUBCUTANEOUS EVERY 8 HOURS SCHEDULED
Status: DISCONTINUED | OUTPATIENT
Start: 2022-02-01 | End: 2022-02-03

## 2022-02-01 RX ORDER — SODIUM CHLORIDE, SODIUM GLUCONATE, SODIUM ACETATE, POTASSIUM CHLORIDE, MAGNESIUM CHLORIDE, SODIUM PHOSPHATE, DIBASIC, AND POTASSIUM PHOSPHATE .53; .5; .37; .037; .03; .012; .00082 G/100ML; G/100ML; G/100ML; G/100ML; G/100ML; G/100ML; G/100ML
75 INJECTION, SOLUTION INTRAVENOUS CONTINUOUS
Status: DISCONTINUED | OUTPATIENT
Start: 2022-02-01 | End: 2022-02-02

## 2022-02-01 RX ORDER — ONDANSETRON 2 MG/ML
4 INJECTION INTRAMUSCULAR; INTRAVENOUS EVERY 6 HOURS PRN
Status: DISCONTINUED | OUTPATIENT
Start: 2022-02-01 | End: 2022-02-07 | Stop reason: HOSPADM

## 2022-02-01 RX ORDER — POTASSIUM CHLORIDE 20 MEQ/1
40 TABLET, EXTENDED RELEASE ORAL ONCE
Status: COMPLETED | OUTPATIENT
Start: 2022-02-01 | End: 2022-02-01

## 2022-02-01 RX ORDER — DONEPEZIL HYDROCHLORIDE 5 MG/1
10 TABLET, FILM COATED ORAL DAILY
Status: DISCONTINUED | OUTPATIENT
Start: 2022-02-01 | End: 2022-02-07 | Stop reason: HOSPADM

## 2022-02-01 RX ORDER — POTASSIUM CHLORIDE 20 MEQ/1
40 TABLET, EXTENDED RELEASE ORAL DAILY
Status: DISCONTINUED | OUTPATIENT
Start: 2022-02-02 | End: 2022-02-05

## 2022-02-01 RX ADMIN — CEFTRIAXONE SODIUM 1000 MG: 10 INJECTION, POWDER, FOR SOLUTION INTRAVENOUS at 08:03

## 2022-02-01 RX ADMIN — HEPARIN SODIUM 5000 UNITS: 5000 INJECTION INTRAVENOUS; SUBCUTANEOUS at 21:44

## 2022-02-01 RX ADMIN — MAGNESIUM OXIDE TAB 400 MG (241.3 MG ELEMENTAL MG) 400 MG: 400 (241.3 MG) TAB at 11:59

## 2022-02-01 RX ADMIN — POTASSIUM CHLORIDE 20 MEQ: 200 INJECTION, SOLUTION INTRAVENOUS at 13:08

## 2022-02-01 RX ADMIN — INSULIN GLARGINE 10 UNITS: 100 INJECTION, SOLUTION SUBCUTANEOUS at 21:45

## 2022-02-01 RX ADMIN — HEPARIN SODIUM 5000 UNITS: 5000 INJECTION INTRAVENOUS; SUBCUTANEOUS at 13:09

## 2022-02-01 RX ADMIN — INSULIN LISPRO 3 UNITS: 100 INJECTION, SOLUTION INTRAVENOUS; SUBCUTANEOUS at 21:45

## 2022-02-01 RX ADMIN — DONEPEZIL HYDROCHLORIDE 10 MG: 5 TABLET, FILM COATED ORAL at 11:59

## 2022-02-01 RX ADMIN — SODIUM CHLORIDE, SODIUM GLUCONATE, SODIUM ACETATE, POTASSIUM CHLORIDE, MAGNESIUM CHLORIDE, SODIUM PHOSPHATE, DIBASIC, AND POTASSIUM PHOSPHATE 75 ML/HR: .53; .5; .37; .037; .03; .012; .00082 INJECTION, SOLUTION INTRAVENOUS at 21:45

## 2022-02-01 RX ADMIN — SODIUM CHLORIDE 500 ML: 0.9 INJECTION, SOLUTION INTRAVENOUS at 08:03

## 2022-02-01 RX ADMIN — POTASSIUM CHLORIDE 40 MEQ: 1500 TABLET, EXTENDED RELEASE ORAL at 13:08

## 2022-02-01 RX ADMIN — INSULIN LISPRO 5 UNITS: 100 INJECTION, SOLUTION INTRAVENOUS; SUBCUTANEOUS at 11:59

## 2022-02-01 RX ADMIN — POTASSIUM CHLORIDE 20 MEQ: 200 INJECTION, SOLUTION INTRAVENOUS at 15:48

## 2022-02-01 RX ADMIN — INSULIN LISPRO 3 UNITS: 100 INJECTION, SOLUTION INTRAVENOUS; SUBCUTANEOUS at 17:01

## 2022-02-01 RX ADMIN — SODIUM CHLORIDE 75 ML/HR: 0.9 INJECTION, SOLUTION INTRAVENOUS at 11:46

## 2022-02-01 NOTE — PLAN OF CARE
Problem: Potential for Falls  Goal: Patient will remain free of falls  Description: INTERVENTIONS:  - Educate patient/family on patient safety including physical limitations  - Instruct patient to call for assistance with activity   - Consult OT/PT to assist with strengthening/mobility   - Keep Call bell within reach  - Keep bed low and locked with side rails adjusted as appropriate  - Keep care items and personal belongings within reach  - Initiate and maintain comfort rounds  - Apply yellow socks and bracelet for high fall risk patients  - Consider moving patient to room near nurses station  Outcome: Progressing     Problem: PAIN - ADULT  Goal: Verbalizes/displays adequate comfort level or baseline comfort level  Description: Interventions:  - Encourage patient to monitor pain and request assistance  - Assess pain using appropriate pain scale  - Administer analgesics based on type and severity of pain and evaluate response  - Implement non-pharmacological measures as appropriate and evaluate response  - Consider cultural and social influences on pain and pain management  - Notify physician/advanced practitioner if interventions unsuccessful or patient reports new pain  Outcome: Progressing     Problem: INFECTION - ADULT  Goal: Absence or prevention of progression during hospitalization  Description: INTERVENTIONS:  - Assess and monitor for signs and symptoms of infection  - Monitor lab/diagnostic results  - Monitor all insertion sites, i e  indwelling lines, tubes, and drains  - Monitor endotracheal if appropriate and nasal secretions for changes in amount and color  - [place] appropriate cooling/warming therapies per order  - Administer medications as ordered  - Instruct and encourage patient and family to use good hand hygiene technique  - Identify and instruct in appropriate isolation precautions for identified infection/condition  Outcome: Progressing     Problem: SAFETY ADULT  Goal: Patient will remain free of falls  Description: INTERVENTIONS:  - Educate patient/family on patient safety including physical limitations  - Instruct patient to call for assistance with activity   - Consult OT/PT to assist with strengthening/mobility   - Keep Call bell within reach  - Keep bed low and locked with side rails adjusted as appropriate  - Keep care items and personal belongings within reach  - Initiate and maintain comfort rounds  - Apply yellow socks and bracelet for high fall risk patients  - Consider moving patient to room near nurses station  Outcome: Progressing  Goal: Maintain or return to baseline ADL function  Description: INTERVENTIONS:  -  Assess patient's ability to carry out ADLs; assess patient's baseline for ADL function and identify physical deficits which impact ability to perform ADLs (bathing, care of mouth/teeth, toileting, grooming, dressing, etc )  - Assess/evaluate cause of self-care deficits   - Assess range of motion  - Assess patient's mobility; develop plan if impaired  - Assess patient's need for assistive devices and provide as appropriate  - Encourage maximum independence but intervene and supervise when necessary  - Involve family in performance of ADLs  - Assess for home care needs following discharge   - Consider OT consult to assist with ADL evaluation and planning for discharge  - Provide patient education as appropriate  Outcome: Progressing  Goal: Maintains/Returns to pre admission functional level  Description: INTERVENTIONS:  - Perform BMAT or MOVE assessment daily    - Set and communicate daily mobility goal to care team and patient/family/caregiver     - Out of bed for toileting  - Record patient progress and toleration of activity level   Outcome: Progressing     Problem: DISCHARGE PLANNING  Goal: Discharge to home or other facility with appropriate resources  Description: INTERVENTIONS:  - Identify barriers to discharge w/patient and caregiver  - Arrange for needed discharge resources and transportation as appropriate  - Identify discharge learning needs (meds, wound care, etc )  - Arrange for interpretive services to assist at discharge as needed  - Refer to Case Management Department for coordinating discharge planning if the patient needs post-hospital services based on physician/advanced practitioner order or complex needs related to functional status, cognitive ability, or social support system  Outcome: Progressing     Problem: Knowledge Deficit  Goal: Patient/family/caregiver demonstrates understanding of disease process, treatment plan, medications, and discharge instructions  Description: Complete learning assessment and assess knowledge base    Interventions:  - Provide teaching at level of understanding  - Provide teaching via preferred learning methods  Outcome: Progressing

## 2022-02-01 NOTE — ASSESSMENT & PLAN NOTE
· Sodium upon admission was 124, corrected sodium 128 in the setting of hyperglycemia  · Start 0 9% NS at 75mL/hr, monitor for volume overload due to CHF    · Trend BMP Q 6hrs

## 2022-02-01 NOTE — ASSESSMENT & PLAN NOTE
· On admission WBC 17 91, tachycardia, lactic acid 2 1, and source of infection- suspected UTI  · Repeat lactic  · CXR: no acute cardiopulmonary abnormalities  · COVID/FLU/RSV: negative   · Blood cultures pending  · Follow-up on urine culture

## 2022-02-01 NOTE — PLAN OF CARE
Problem: Potential for Falls  Goal: Patient will remain free of falls  Description: INTERVENTIONS:  - Educate patient/family on patient safety including physical limitations  - Instruct patient to call for assistance with activity   - Consult OT/PT to assist with strengthening/mobility   - Keep Call bell within reach  - Keep bed low and locked with side rails adjusted as appropriate  - Keep care items and personal belongings within reach  - Initiate and maintain comfort rounds  - Make Fall Risk Sign visible to staff  - Apply yellow socks and bracelet for high fall risk patients  - Consider moving patient to room near nurses station  Outcome: Progressing     Problem: PAIN - ADULT  Goal: Verbalizes/displays adequate comfort level or baseline comfort level  Description: Interventions:  - Encourage patient to monitor pain and request assistance  - Assess pain using appropriate pain scale  - Administer analgesics based on type and severity of pain and evaluate response  - Implement non-pharmacological measures as appropriate and evaluate response  - Consider cultural and social influences on pain and pain management  - Notify physician/advanced practitioner if interventions unsuccessful or patient reports new pain  Outcome: Progressing     Problem: INFECTION - ADULT  Goal: Absence or prevention of progression during hospitalization  Description: INTERVENTIONS:  - Assess and monitor for signs and symptoms of infection  - Monitor lab/diagnostic results  - Monitor all insertion sites, i e  indwelling lines, tubes, and drains  - Monitor endotracheal if appropriate and nasal secretions for changes in amount and color  - Seale appropriate cooling/warming therapies per order  - Administer medications as ordered  - Instruct and encourage patient and family to use good hand hygiene technique  - Identify and instruct in appropriate isolation precautions for identified infection/condition  Outcome: Progressing     Problem: SAFETY ADULT  Goal: Patient will remain free of falls  Description: INTERVENTIONS:  - Educate patient/family on patient safety including physical limitations  - Instruct patient to call for assistance with activity   - Consult OT/PT to assist with strengthening/mobility   - Keep Call bell within reach  - Keep bed low and locked with side rails adjusted as appropriate  - Keep care items and personal belongings within reach  - Initiate and maintain comfort rounds  - Make Fall Risk Sign visible to staff  - Apply yellow socks and bracelet for high fall risk patients  - Consider moving patient to room near nurses station  Outcome: Progressing  Goal: Maintain or return to baseline ADL function  Description: INTERVENTIONS:  -  Assess patient's ability to carry out ADLs; assess patient's baseline for ADL function and identify physical deficits which impact ability to perform ADLs (bathing, care of mouth/teeth, toileting, grooming, dressing, etc )  - Assess/evaluate cause of self-care deficits   - Assess range of motion  - Assess patient's mobility; develop plan if impaired  - Assess patient's need for assistive devices and provide as appropriate  - Encourage maximum independence but intervene and supervise when necessary  - Involve family in performance of ADLs  - Assess for home care needs following discharge   - Consider OT consult to assist with ADL evaluation and planning for discharge  - Provide patient education as appropriate  Outcome: Progressing  Goal: Maintains/Returns to pre admission functional level  Description: INTERVENTIONS:  - Perform BMAT or MOVE assessment daily    - Set and communicate daily mobility goal to care team and patient/family/caregiver     - Collaborate with rehabilitation services on mobility goals if consulted    - Out of bed for toileting  - Record patient progress and toleration of activity level   Outcome: Progressing     Problem: DISCHARGE PLANNING  Goal: Discharge to home or other facility with appropriate resources  Description: INTERVENTIONS:  - Identify barriers to discharge w/patient and caregiver  - Arrange for needed discharge resources and transportation as appropriate  - Identify discharge learning needs (meds, wound care, etc )  - Arrange for interpretive services to assist at discharge as needed  - Refer to Case Management Department for coordinating discharge planning if the patient needs post-hospital services based on physician/advanced practitioner order or complex needs related to functional status, cognitive ability, or social support system  Outcome: Progressing     Problem: Knowledge Deficit  Goal: Patient/family/caregiver demonstrates understanding of disease process, treatment plan, medications, and discharge instructions  Description: Complete learning assessment and assess knowledge base    Interventions:  - Provide teaching at level of understanding  - Provide teaching via preferred learning methods  Outcome: Progressing     Problem: MOBILITY - ADULT  Goal: Maintain or return to baseline ADL function  Description: INTERVENTIONS:  -  Assess patient's ability to carry out ADLs; assess patient's baseline for ADL function and identify physical deficits which impact ability to perform ADLs (bathing, care of mouth/teeth, toileting, grooming, dressing, etc )  - Assess/evaluate cause of self-care deficits   - Assess range of motion  - Assess patient's mobility; develop plan if impaired  - Assess patient's need for assistive devices and provide as appropriate  - Encourage maximum independence but intervene and supervise when necessary  - Involve family in performance of ADLs  - Assess for home care needs following discharge   - Consider OT consult to assist with ADL evaluation and planning for discharge  - Provide patient education as appropriate  Outcome: Progressing  Goal: Maintains/Returns to pre admission functional level  Description: INTERVENTIONS:  - Perform BMAT or MOVE assessment daily    - Set and communicate daily mobility goal to care team and patient/family/caregiver     - Collaborate with rehabilitation services on mobility goals if consulted  - Out of bed for toileting  - Record patient progress and toleration of activity level   Outcome: Progressing     Problem: Prexisting or High Potential for Compromised Skin Integrity  Goal: Skin integrity is maintained or improved  Description: INTERVENTIONS:  - Identify patients at risk for skin breakdown  - Assess and monitor skin integrity  - Assess and monitor nutrition and hydration status  - Monitor labs   - Assess for incontinence   - Turn and reposition patient  - Assist with mobility/ambulation  - Relieve pressure over bony prominences  - Avoid friction and shearing  - Provide appropriate hygiene as needed including keeping skin clean and dry  - Evaluate need for skin moisturizer/barrier cream  - Collaborate with interdisciplinary team   - Patient/family teaching  - Consider wound care consult   Outcome: Progressing

## 2022-02-01 NOTE — ASSESSMENT & PLAN NOTE
Lab Results   Component Value Date    HGBA1C 13 0 (H) 12/28/2021       No results for input(s): POCGLU in the last 72 hours  Blood Sugar Average: Last 72 hrs:  · Glucose on BMP elevated on admission at 319  · Hold home medications, sitagliptin 100 mg, repaglinide 2mg, and glipizide 10mg  · Start on Lantus 10 U QHS with sliding scale as per last Endocrinology note  · Sliding scale coverage    · Monitor sugars via accu-check

## 2022-02-01 NOTE — CONSULTS
Kyheike 80 y o  female MRN: [id number]  Unit/Bed#: W -01 Encounter: [id number]    ASSESSMENT and PLAN:  1  Hyponatremia:  · Etiology:  Possibly multifactorial  · In the setting of significant hyperglycemia  · Thiazide diuretic   · Decreased ability to excrete free water in the setting of renal impairment  · Hypokalemia contributing    · Corrected sodium initially 129, repeat level 130  · Plan/recommendations:  · Conservative management  · At this time will continue isotonic fluids, switch from normal saline to Plasmalyte which will provide balanced solution/potassium  · Replete potassium  2  Dyskalemia:  · Primarily issues with recurrent hypokalemia although early this month had an episode of hyperkalemia  · Outpatient medications: On loop diuretic (furosemide) and metolazone 5 mg daily with 60 mEq of potassium chloride daily  · Suspect total body depletion  Significant hyperglycemia, possibly effective endogenous insulin  · So far received 60 mEq of potassium chloride p o  And is due for 20 mEq IV  Also received a dose of Mag-Ox 400 mg  · Plan/recommendations:   · Check magnesium level  · Recommend at least discontinuing metolazone moving forward since a problem is recurrent  · Also consider utilizing potassium-sparing diuretic such as amiloride moving forward  · Discontinue normal saline  · Start Plasmalyte 75 mL/hour  3  CKD, stage III without proteinuria:  · Baseline creatinine 1 1-1 3 mg/dL  · Current creatinine 1 38  · May be related to volume depletion in the setting of diuretic therapy, hyperglycemia causing osmotic diuresis, mild hypercalcemia  4  Diabetes mellitus type 2:  Management per primary team  5  Likely related to volume depletion hypercalcemia:    · Mild with corrected calcium 10 2 likely related to volume depletion  · Continue IV fluids  6   Other  · Dementia  · Diastolic CHF:  On furosemide and metolazone  · Aortic stenosis      HISTORY OF PRESENT ILLNESS:  Requesting Physician: Kate Zavala MD  Reason for Consult:  Hyponatremia    Simón Stanley is a 80 y o  female with a past medical history of CKD, hypertension, syncope, chronic hypokalemia, CKD, moderate aortic stenosis, diabetes mellitus type 2, dementia who was admitted to [facility]  after presenting following a fall at the facility where she resides  Recurrent hospitalizations for hyperosmolar hyperglycemia  Maintained on potassium chloride 60 mEq daily in the setting of loop diuretic and thiazide diuretic/metolazone  She was seen and examined  She is at baseline disoriented  She was alert and verbally responsive  No acute complaints  Mild peripheral edema  All labs on admission reveal sodium level 124 with glucose 319  Initial potassium level within normal limits decreasing to 2 5  Creatinine mildly elevated  A renal consultation is requested today for assistance in the management of hyponatremia      PAST MEDICAL HISTORY:  Past Medical History:   Diagnosis Date    Aortic stenosis     Bilateral edema of lower extremity     Dementia (HCC)     Diabetes ([address] )     Fall     Gait abnormality     Hyperlipidemia     Hypertension     Hypokalemia     Other ascites     Varicose veins of leg with edema        PAST SURGICAL HISTORY:  Past Surgical History:   Procedure Laterality Date    BREAST SURGERY      ELBOW SURGERY         ALLERGIES:  No Known Allergies    SOCIAL HISTORY:  Social History     Substance and Sexual Activity   Alcohol Use Not Currently     Social History     Substance and Sexual Activity   Drug Use No     Social History     Tobacco Use   Smoking Status Never Smoker   Smokeless Tobacco Never Used       FAMILY HISTORY:  Family History   Problem Relation Age of Onset    Dementia Sister        MEDICATIONS:    Current Facility-Administered Medications:     acetaminophen (TYLENOL) tablet 650 mg, 650 mg, Oral, Q6H PRN, Yanet Nash PA-C    [START ON 2/2/2022] cefTRIAXone (ROCEPHIN) 1,000 mg in dextrose 5 % 50 mL IVPB, 1,000 mg, Intravenous, Q24H, Stephanie Carrington PA-C    donepezil (ARICEPT) tablet 10 mg, 10 mg, Oral, Daily, Stephanie Carrington PA-C, 10 mg at 02/01/22 1159    heparin (porcine) subcutaneous injection 5,000 Units, 5,000 Units, Subcutaneous, Q8H [facility], 5,000 Units at 02/01/22 1309 **AND** Platelet count, , , Once, Stephanie Carrington PA-C    insulin glargine (LANTUS) subcutaneous injection 10 Units 0 1 mL, 10 Units, Subcutaneous, HS, Stephanie Carrington PA-C    insulin lispro (HumaLOG) 100 units/mL subcutaneous injection 1-5 Units, 1-5 Units, Subcutaneous, TID AC, 5 Units at 02/01/22 1159 **AND** Fingerstick Glucose (POCT), , , TID AC, Stephanie Carrington PA-C    insulin lispro (HumaLOG) 100 units/mL subcutaneous injection 1-5 Units, 1-5 Units, Subcutaneous, HS, Stephanie Carrington PA-C    magnesium oxide (MAG-OX) tablet 400 mg, 400 mg, Oral, Daily, Stephanie Carrington PA-C, 400 mg at 02/01/22 1159    ondansetron (ZOFRAN) injection 4 mg, 4 mg, Intravenous, Q6H PRN, Stephanie Carrington PA-C    [START ON 2/2/2022] potassium chloride (K-DUR,KLOR-CON) CR tablet 40 mEq, 40 mEq, Oral, Daily, Stephanie Carrington PA-C    potassium chloride 20 mEq IVPB (premix), 20 mEq, Intravenous, Once, Stephanie Carrington PA-C, Last Rate: 50 mL/hr at 02/01/22 1308, 20 mEq at 02/01/22 1308    potassium chloride 20 mEq IVPB (premix), 20 mEq, Intravenous, Once, Stephanie Carrington PA-C    sodium chloride 0 9 % infusion, 75 mL/hr, Intravenous, Continuous, Stephanie Carrington PA-C, Last Rate: 75 mL/hr at 02/01/22 1146, 75 mL/hr at 02/01/22 1146    REVIEW OF SYSTEMS:  Cognitive impairment, disoriented  Unable to perform review systems with patient  See HPI  All the systems were reviewed and were negative except as documented on the HPI      PHYSICAL EXAM:  Current Weight: Weight - Scale: 70 8 kg (156 lb)  First Weight: Weight - Scale: 70 8 kg (156 lb)  Vitals:    02/01/22 0605 02/01/22 0849 02/01/22 0900   BP: 131/66 129/88    BP Location: Left arm Left arm    Pulse: 101 91    Resp: 18 18    Temp: 97 9 °F (36 6 °C)     TempSrc: Oral     SpO2: 91% 93%    Weight:   70 8 kg (156 lb)   Height:   4' 11 5" (1 511 m)       Intake/Output Summary (Last 24 hours) at 2022 1429  Last data filed at 2022 0843  Gross per 24 hour   Intake 50 ml   Output --   Net 50 ml     Physical Exam  Constitutional:       General: She is not in acute distress  Appearance: She is ill-appearing  She is not diaphoretic  HENT:      Head: Normocephalic  Nose: Nose normal  No congestion  Eyes:      General: No scleral icterus  Right eye: No discharge  Left eye: No discharge  Extraocular Movements: Extraocular movements intact  Conjunctiva/sclera: Conjunctivae normal    Neck:      Vascular: No carotid bruit or JVD  Trachea: No tracheal deviation  Cardiovascular:      Rate and Rhythm: Normal rate and regular rhythm  Heart sounds: Murmur heard  No friction rub  No gallop  Pulmonary:      Effort: Pulmonary effort is normal       Breath sounds: Examination of the right-upper field reveals rhonchi  Examination of the right-middle field reveals rhonchi  Examination of the right-lower field reveals decreased breath sounds  Examination of the left-lower field reveals decreased breath sounds  Decreased breath sounds and rhonchi present  Abdominal:      General: Bowel sounds are normal  There is no distension  Palpations: Abdomen is soft  There is no mass  Tenderness: There is no abdominal tenderness  There is no guarding or rebound  Musculoskeletal:         General: No tenderness  Cervical back: No rigidity or tenderness  Right lower le+ Edema present  Left lower le+ Edema present  Skin:     General: Skin is warm  Capillary Refill: Capillary refill takes less than 2 seconds  Coloration: Skin is not jaundiced or pale  Findings: No erythema     Neurological: Mental Status: She is alert  Mental status is at baseline  She is disoriented  Psychiatric:         Mood and Affect: Affect normal          Behavior: Behavior is slowed  Cognition and Memory: Cognition is impaired             Invasive Devices:      Lab Results:   Results from last 7 days   Lab Units 02/01/22  1212 02/01/22  0615   WBC Thousand/uL  --  17 91*   HEMOGLOBIN g/dL  --  15 0   HEMATOCRIT %  --  42 3   PLATELETS Thousands/uL  --  324   POTASSIUM mmol/L 2 5* 3 8   CHLORIDE mmol/L 87* 84*   CO2 mmol/L 24 24   BUN mg/dL 23 23   CREATININE mg/dL 1 38* 1 37*   CALCIUM mg/dL 9 3 9 7   ALK PHOS U/L  --  133*   ALT U/L  --  29   AST U/L  --  59*     Other Studies: *

## 2022-02-01 NOTE — ED NOTES
Patient transported to [address] [place] [address], [address] [place] [address]  02/01/22 6298

## 2022-02-01 NOTE — ASSESSMENT & PLAN NOTE
Lab Results   Component Value Date    EGFR 32 02/01/2022    EGFR 46 01/04/2022    EGFR 53 01/03/2022    CREATININE 1 37 (H) 02/01/2022    CREATININE 1 01 01/04/2022    CREATININE 0 90 01/03/2022   · Baseline Cr 0 9   · Elevated Cr on admission to 1 37  · 75mL/hr of 0 9% NS, monitor for volume overload  · Trend BMP

## 2022-02-01 NOTE — ASSESSMENT & PLAN NOTE
· UA: small amount of leukocytes and blood, 2+ protein and 250 glucose  · Urine microscopy: WBC 4-10 and clumped white blood cells   · Pending Urine culture   · Started on IV ceftriaxone, will continue  · Awaiting sensitivity and specificity

## 2022-02-01 NOTE — ASSESSMENT & PLAN NOTE
· Patient was found down between her bed and nightstand at her assisted living this AM  Likely mechanical in nature  · She lives at assisted living  · No focal neurologic deficits    · Patient has history of falls while at this facility due to dementia  · CT head: no acute intracranial abnormalities  · PT/OT consult   · Fall Risk Precautions

## 2022-02-01 NOTE — H&P
[facility]  H&P- Phoenix Benson 11/22/1923, 80 y o  female MRN: [id number]  Unit/Bed#: W -01 Encounter: [id number]  Primary Care Provider: No primary care provider on file  Date and time admitted to hospital: 2/1/2022  5:59 AM    * Ambulatory dysfunction  Assessment & Plan  · Patient was found down between her bed and nightstand at her assisted living this AM  Likely mechanical in nature  · She lives at assisted living  · No focal neurologic deficits  · Patient has history of falls while at this facility due to dementia  · CT head: no acute intracranial abnormalities  · PT/OT consult   · Fall Risk Precautions    Acute-on-chronic kidney injury [facility])  Assessment & Plan  Lab Results   Component Value Date    EGFR 32 02/01/2022    EGFR 46 01/04/2022    EGFR 53 01/03/2022    CREATININE 1 37 (H) 02/01/2022    CREATININE 1 01 01/04/2022    CREATININE 0 90 01/03/2022   · Baseline Cr 0 9   · Elevated Cr on admission to 1 37  · 75mL/hr of 0 9% NS, monitor for volume overload  · Trend BMP    Uncomplicated senile dementia (HCC)  Assessment & Plan  · Continue home medication: donepezil 10 mg     Aortic stenosis  Assessment & Plan  Wt Readings from Last 3 Encounters:   02/01/22 70 8 kg (156 lb)   01/04/22 71 1 kg (156 lb 11 2 oz)   05/03/21 78 kg (172 lb)     · Patient with history of severe aortic stenosis  · Monitor for volume overload with fluids  · Diuretics on hold      UTI (urinary tract infection)  Assessment & Plan  · UA: small amount of leukocytes and blood, 2+ protein and 250 glucose  · Urine microscopy: WBC 4-10 and clumped white blood cells   · Pending Urine culture   · Started on IV ceftriaxone, will continue  · Awaiting sensitivity and specificity     Severe sepsis (HCC)  Assessment & Plan  · On admission WBC 17 91, tachycardia, lactic acid 2 1, and source of infection- suspected UTI  · Repeat lactic    · CXR: no acute cardiopulmonary abnormalities  · COVID/FLU/RSV: negative · Blood cultures pending  · Follow-up on urine culture  Type 2 diabetes mellitus with diabetic chronic kidney disease ([facility] )  Assessment & Plan  Lab Results   Component Value Date    HGBA1C 13 0 (H) 12/28/2021       No results for input(s): POCGLU in the last 72 hours  Blood Sugar Average: Last 72 hrs:  · Glucose on BMP elevated on admission at 319  · Hold home medications, sitagliptin 100 mg, repaglinide 2mg, and glipizide 10mg  · Start on Lantus 10 U QHS with sliding scale as per last Endocrinology note  · Sliding scale coverage  · Monitor sugars via accu-check    Hyponatremia  Assessment & Plan  · Sodium upon admission was 124, corrected sodium 128 in the setting of hyperglycemia  · Start 0 9% NS at 75mL/hr, monitor for volume overload due to CHF  · Trend BMP Q 6hrs    VTE Prophylaxis: Heparin  / sequential compression device   Code Status: DNR DNI  POLST: POLST form is not discussed and not completed at this time  Discussion with family: daughter, Adi Call, at bedside    Anticipated Length of Stay:  Patient will be admitted on an Inpatient basis with an anticipated length of stay of  > 2 midnights  Justification for Hospital Stay: fall, sepsis    Total Time for Visit, including Counseling / Coordination of Care: 70 minutes  Greater than 50% of this total time spent on direct patient counseling and coordination of care  Chief Complaint:   Fall     History of Present Illness:    Tito Bazzi is a 80 y o  female who presents with fall and hyponatremia  Per patient's daughter, she was found down in between the bed and her nightstand this morning at her assisted living  Unable to assess events leading up to the fall  Daughter states when she got to the ED her mom was at her baseline, but was maybe a little off yesterday when talking to her on the phone  Patient was recently hospitalized for HHS and discharged on 1/4   The daughter remembers her mom was "not right" in the days leading up to admission and was much better post discharge  Patient uses walker at assisted living and is not steady on her feet  Daughter says that the facility is understaffed and sometimes the patient doesn't always take her medications like her potassium  Review of Systems:    Review of Systems   Unable to perform ROS: Dementia       Past Medical and Surgical History:     Past Medical History:   Diagnosis Date    Aortic stenosis     Bilateral edema of lower extremity     Dementia ([facility] )     Diabetes ([facility] )     Fall     Gait abnormality     Hyperlipidemia     Hypertension     Hypokalemia     Other ascites     Varicose veins of leg with edema        Past Surgical History:   Procedure Laterality Date    BREAST SURGERY      ELBOW SURGERY         Meds/Allergies:    Prior to Admission medications    Medication Sig Start Date End Date Taking?  Authorizing Provider   acetaminophen (TYLENOL) 325 mg tablet Take 650 mg by mouth every 6 (six) hours as needed for mild pain   Yes Historical Provider, MD   Diclofenac Sodium (VOLTAREN) 1 % Apply 2 g topically to affected area 4 times daily at 9a-1p-5p-9p 21  Yes Lucille Saldana MD   donepezil (ARICEPT) 10 mg tablet Take 1 tablet (10 mg total) by mouth daily 8/3/21  Yes Lucille Saldana MD   furosemide (LASIX) 20 mg tablet Take 20 mg by mouth daily   Yes Historical Provider, MD   furosemide (LASIX) 40 mg tablet Take 1 tablet (40 mg total) by mouth daily 8/3/21  Yes AME Turner   glipiZIDE (GLUCOTROL) 10 mg tablet Take 10 mg by mouth in the morning   Yes Historical Provider, MD   magnesium oxide (MAG-OX) 400 mg Take 1 tablet (400 mg total) by mouth daily 8/3/21  Yes AME Turner   metolazone (ZAROXOLYN) 5 mg tablet Take 1 tablet (5 mg total) by mouth daily 8/3/21  Yes AME Alas   potassium chloride 10% oral solution Take 45 mL by mouth 3 (three) times a day   Yes Historical Provider, MD   repaglinide (PRANDIN) 2 mg tablet Take 1 tablet (2 mg total) by mouth 3 (three) times a day before meals 7/2/21  Yes AME Melendez   Potassium Chloride ER 20 MEQ TBCR Take 3 tablets (60 mEq total) by mouth 3 (three) times a day  Patient not taking: Reported on 2/1/2022 2/23/21   AME Cartwright   sitaGLIPtin (JANUVIA) 100 mg tablet Take 1 tablet (100 mg total) by mouth daily  Patient not taking: Reported on 2/1/2022  8/3/21   AME Cartwright     I have reviewed home medications with patient family member  Allergies: No Known Allergies    Social History:     Marital Status:    Occupation: retired  Patient Pre-hospital Living Situation: [facility] [address]  Patient Pre-hospital Level of Mobility: not assessed  Patient Pre-hospital Diet Restrictions: cardiac, low carb  Substance Use History:   Social History     Substance and Sexual Activity   Alcohol Use Not Currently     Social History     Tobacco Use   Smoking Status Never Smoker   Smokeless Tobacco Never Used     Social History     Substance and Sexual Activity   Drug Use No       Family History:    non-contributory    Physical Exam:     Vitals:   Blood Pressure: 129/88 (02/01/22 0849)  Pulse: 91 (02/01/22 0849)  Temperature: 97 9 °F (36 6 °C) (02/01/22 0605)  Temp Source: Oral (02/01/22 0605)  Respirations: 18 (02/01/22 0849)  Height: 4' 11 5" (151 1 cm) (02/01/22 0900)  Weight - Scale: 70 8 kg (156 lb) (02/01/22 0900)  SpO2: 93 % (02/01/22 0849)    Physical Exam  Vitals and nursing note reviewed  Constitutional:       General: She is not in acute distress  Appearance: Normal appearance  HENT:      Head: Normocephalic and atraumatic  Mouth/Throat:      Mouth: Mucous membranes are moist    Eyes:      General: No scleral icterus  Extraocular Movements: Extraocular movements intact  Pupils: Pupils are equal, round, and reactive to light  Cardiovascular:      Rate and Rhythm: Normal rate and regular rhythm  Heart sounds: Murmur heard     No friction rub  Pulmonary:      Effort: Pulmonary effort is normal       Breath sounds: Normal breath sounds  No wheezing or rhonchi  Abdominal:      General: Bowel sounds are normal  There is no distension  Palpations: Abdomen is soft  Tenderness: There is no abdominal tenderness  There is no guarding  Musculoskeletal:         General: No swelling  Cervical back: Neck supple  Right lower leg: No edema  Left lower leg: No edema  Skin:     General: Skin is warm and dry  Capillary Refill: Capillary refill takes less than 2 seconds  Coloration: Skin is not jaundiced or pale  Neurological:      General: No focal deficit present  Mental Status: She is alert  Mental status is at baseline  She is disoriented  Comments: Patient is alert and oriented x2  She is aware of her name and where that she is in the hospital   Unsure of the current year  Additional Data:     Lab Results: I have personally reviewed pertinent reports  Results from last 7 days   Lab Units 02/01/22  0615   WBC Thousand/uL 17 91*   HEMOGLOBIN g/dL 15 0   HEMATOCRIT % 42 3   PLATELETS Thousands/uL 324   BANDS PCT % 2   LYMPHO PCT % 23   MONO PCT % 11   EOS PCT % 0     Results from last 7 days   Lab Units 02/01/22  0615   SODIUM mmol/L 124*   POTASSIUM mmol/L 3 8   CHLORIDE mmol/L 84*   CO2 mmol/L 24   BUN mg/dL 23   CREATININE mg/dL 1 37*   ANION GAP mmol/L 16*   CALCIUM mg/dL 9 7   ALBUMIN g/dL 3 4*   TOTAL BILIRUBIN mg/dL 1 04*   ALK PHOS U/L 133*   ALT U/L 29   AST U/L 59*   GLUCOSE RANDOM mg/dL 319*     Results from last 7 days   Lab Units 02/01/22  0615   INR  1 00             Results from last 7 days   Lab Units 02/01/22  0801   LACTIC ACID mmol/L 2 1*       Imaging: I have personally reviewed pertinent reports  CT head without contrast   Final Result by Gabino Whitman MD (02/01 2507)      No acute intracranial abnormality                    Workstation performed: [id number] XR chest 1 view portable   Final Result by Alejandro Casanova MD (02/01 5584)      No acute cardiopulmonary disease  Workstation performed: [id number]             EKG, Pathology, and Other Studies Reviewed on Admission:   · Prior pertinent studies and records reviewed in [employer] Records Reviewed: Yes     ** Please Note: This note has been constructed using a voice recognition system   **

## 2022-02-01 NOTE — ASSESSMENT & PLAN NOTE
Wt Readings from Last 3 Encounters:   02/01/22 70 8 kg (156 lb)   01/04/22 71 1 kg (156 lb 11 2 oz)   05/03/21 78 kg (172 lb)     · Patient with history of severe aortic stenosis  · Monitor for volume overload with fluids  · Diuretics on hold

## 2022-02-02 LAB
ALBUMIN SERPL BCP-MCNC: 2.5 G/DL (ref 3.5–5)
ALP SERPL-CCNC: 93 U/L (ref 46–116)
ALT SERPL W P-5'-P-CCNC: 17 U/L (ref 12–78)
ANION GAP SERPL CALCULATED.3IONS-SCNC: 10 MMOL/L (ref 4–13)
AST SERPL W P-5'-P-CCNC: 23 U/L (ref 5–45)
BASOPHILS # BLD AUTO: 0.07 THOUSANDS/ΜL (ref 0–0.1)
BASOPHILS NFR BLD AUTO: 1 % (ref 0–1)
BILIRUB SERPL-MCNC: 0.67 MG/DL (ref 0.2–1)
BUN SERPL-MCNC: 19 MG/DL (ref 5–25)
CALCIUM ALBUM COR SERPL-MCNC: 10.1 MG/DL (ref 8.3–10.1)
CALCIUM SERPL-MCNC: 8.9 MG/DL (ref 8.3–10.1)
CHLORIDE SERPL-SCNC: 94 MMOL/L (ref 100–108)
CO2 SERPL-SCNC: 23 MMOL/L (ref 21–32)
CREAT SERPL-MCNC: 1.14 MG/DL (ref 0.6–1.3)
EOSINOPHIL # BLD AUTO: 0.01 THOUSAND/ΜL (ref 0–0.61)
EOSINOPHIL NFR BLD AUTO: 0 % (ref 0–6)
ERYTHROCYTE [DISTWIDTH] IN BLOOD BY AUTOMATED COUNT: 13.4 % (ref 11.6–15.1)
GFR SERPL CREATININE-BSD FRML MDRD: 40 ML/MIN/1.73SQ M
GLUCOSE SERPL-MCNC: 211 MG/DL (ref 65–140)
GLUCOSE SERPL-MCNC: 230 MG/DL (ref 65–140)
GLUCOSE SERPL-MCNC: 248 MG/DL (ref 65–140)
GLUCOSE SERPL-MCNC: 424 MG/DL (ref 65–140)
GLUCOSE SERPL-MCNC: 467 MG/DL (ref 65–140)
HCT VFR BLD AUTO: 34.6 % (ref 34.8–46.1)
HGB BLD-MCNC: 12.1 G/DL (ref 11.5–15.4)
IMM GRANULOCYTES # BLD AUTO: 0.19 THOUSAND/UL (ref 0–0.2)
IMM GRANULOCYTES NFR BLD AUTO: 1 % (ref 0–2)
LYMPHOCYTES # BLD AUTO: 3.88 THOUSANDS/ΜL (ref 0.6–4.47)
LYMPHOCYTES NFR BLD AUTO: 26 % (ref 14–44)
MAGNESIUM SERPL-MCNC: 2.3 MG/DL (ref 1.6–2.6)
MCH RBC QN AUTO: 30.3 PG (ref 26.8–34.3)
MCHC RBC AUTO-ENTMCNC: 35 G/DL (ref 31.4–37.4)
MCV RBC AUTO: 87 FL (ref 82–98)
MONOCYTES # BLD AUTO: 1.34 THOUSAND/ΜL (ref 0.17–1.22)
MONOCYTES NFR BLD AUTO: 9 % (ref 4–12)
NEUTROPHILS # BLD AUTO: 9.37 THOUSANDS/ΜL (ref 1.85–7.62)
NEUTS SEG NFR BLD AUTO: 63 % (ref 43–75)
NRBC BLD AUTO-RTO: 0 /100 WBCS
PLATELET # BLD AUTO: 247 THOUSANDS/UL (ref 149–390)
PMV BLD AUTO: 11.8 FL (ref 8.9–12.7)
POTASSIUM SERPL-SCNC: 2.9 MMOL/L (ref 3.5–5.3)
PROT SERPL-MCNC: 6.4 G/DL (ref 6.4–8.2)
RBC # BLD AUTO: 4 MILLION/UL (ref 3.81–5.12)
SODIUM SERPL-SCNC: 127 MMOL/L (ref 136–145)
WBC # BLD AUTO: 14.86 THOUSAND/UL (ref 4.31–10.16)

## 2022-02-02 PROCEDURE — 97163 PT EVAL HIGH COMPLEX 45 MIN: CPT

## 2022-02-02 PROCEDURE — 80053 COMPREHEN METABOLIC PANEL: CPT | Performed by: PHYSICIAN ASSISTANT

## 2022-02-02 PROCEDURE — 97167 OT EVAL HIGH COMPLEX 60 MIN: CPT

## 2022-02-02 PROCEDURE — 85025 COMPLETE CBC W/AUTO DIFF WBC: CPT | Performed by: PHYSICIAN ASSISTANT

## 2022-02-02 PROCEDURE — 97116 GAIT TRAINING THERAPY: CPT

## 2022-02-02 PROCEDURE — 82948 REAGENT STRIP/BLOOD GLUCOSE: CPT

## 2022-02-02 PROCEDURE — 83735 ASSAY OF MAGNESIUM: CPT | Performed by: PHYSICIAN ASSISTANT

## 2022-02-02 PROCEDURE — 99232 SBSQ HOSP IP/OBS MODERATE 35: CPT | Performed by: INTERNAL MEDICINE

## 2022-02-02 PROCEDURE — 99232 SBSQ HOSP IP/OBS MODERATE 35: CPT | Performed by: PHYSICIAN ASSISTANT

## 2022-02-02 RX ORDER — SODIUM CHLORIDE 9 MG/ML
70 INJECTION, SOLUTION INTRAVENOUS CONTINUOUS
Status: DISCONTINUED | OUTPATIENT
Start: 2022-02-02 | End: 2022-02-03

## 2022-02-02 RX ORDER — POTASSIUM CHLORIDE 20 MEQ/1
40 TABLET, EXTENDED RELEASE ORAL ONCE
Status: COMPLETED | OUTPATIENT
Start: 2022-02-02 | End: 2022-02-02

## 2022-02-02 RX ORDER — POTASSIUM CHLORIDE 29.8 MG/ML
40 INJECTION INTRAVENOUS ONCE
Status: DISCONTINUED | OUTPATIENT
Start: 2022-02-02 | End: 2022-02-02

## 2022-02-02 RX ORDER — NYSTATIN 100000 [USP'U]/G
POWDER TOPICAL 2 TIMES DAILY
Status: DISCONTINUED | OUTPATIENT
Start: 2022-02-02 | End: 2022-02-07 | Stop reason: HOSPADM

## 2022-02-02 RX ORDER — MAGNESIUM SULFATE 1 G/100ML
1 INJECTION INTRAVENOUS ONCE
Status: COMPLETED | OUTPATIENT
Start: 2022-02-02 | End: 2022-02-02

## 2022-02-02 RX ORDER — POTASSIUM CHLORIDE 14.9 MG/ML
20 INJECTION INTRAVENOUS
Status: COMPLETED | OUTPATIENT
Start: 2022-02-02 | End: 2022-02-02

## 2022-02-02 RX ORDER — POTASSIUM CHLORIDE 14.9 MG/ML
20 INJECTION INTRAVENOUS ONCE
Status: COMPLETED | OUTPATIENT
Start: 2022-02-02 | End: 2022-02-02

## 2022-02-02 RX ADMIN — POTASSIUM CHLORIDE 40 MEQ: 1500 TABLET, EXTENDED RELEASE ORAL at 17:08

## 2022-02-02 RX ADMIN — INSULIN LISPRO 5 UNITS: 100 INJECTION, SOLUTION INTRAVENOUS; SUBCUTANEOUS at 17:08

## 2022-02-02 RX ADMIN — POTASSIUM CHLORIDE 40 MEQ: 1500 TABLET, EXTENDED RELEASE ORAL at 08:48

## 2022-02-02 RX ADMIN — POTASSIUM CHLORIDE 20 MEQ: 200 INJECTION, SOLUTION INTRAVENOUS at 08:47

## 2022-02-02 RX ADMIN — NYSTATIN: 100000 POWDER TOPICAL at 22:15

## 2022-02-02 RX ADMIN — INSULIN GLARGINE 10 UNITS: 100 INJECTION, SOLUTION SUBCUTANEOUS at 22:14

## 2022-02-02 RX ADMIN — DONEPEZIL HYDROCHLORIDE 10 MG: 5 TABLET, FILM COATED ORAL at 08:49

## 2022-02-02 RX ADMIN — SODIUM CHLORIDE 70 ML/HR: 0.9 INJECTION, SOLUTION INTRAVENOUS at 16:19

## 2022-02-02 RX ADMIN — POTASSIUM CHLORIDE 20 MEQ: 200 INJECTION, SOLUTION INTRAVENOUS at 04:27

## 2022-02-02 RX ADMIN — HEPARIN SODIUM 5000 UNITS: 5000 INJECTION INTRAVENOUS; SUBCUTANEOUS at 22:15

## 2022-02-02 RX ADMIN — MAGNESIUM OXIDE TAB 400 MG (241.3 MG ELEMENTAL MG) 400 MG: 400 (241.3 MG) TAB at 08:48

## 2022-02-02 RX ADMIN — POTASSIUM CHLORIDE 40 MEQ: 1500 TABLET, EXTENDED RELEASE ORAL at 00:56

## 2022-02-02 RX ADMIN — HEPARIN SODIUM 5000 UNITS: 5000 INJECTION INTRAVENOUS; SUBCUTANEOUS at 05:21

## 2022-02-02 RX ADMIN — INSULIN LISPRO 2 UNITS: 100 INJECTION, SOLUTION INTRAVENOUS; SUBCUTANEOUS at 09:01

## 2022-02-02 RX ADMIN — POTASSIUM CHLORIDE 20 MEQ: 200 INJECTION, SOLUTION INTRAVENOUS at 10:57

## 2022-02-02 RX ADMIN — CEFTRIAXONE SODIUM 1000 MG: 10 INJECTION, POWDER, FOR SOLUTION INTRAVENOUS at 08:50

## 2022-02-02 RX ADMIN — MAGNESIUM SULFATE HEPTAHYDRATE 1 G: 1 INJECTION, SOLUTION INTRAVENOUS at 01:08

## 2022-02-02 RX ADMIN — INSULIN LISPRO 2 UNITS: 100 INJECTION, SOLUTION INTRAVENOUS; SUBCUTANEOUS at 22:15

## 2022-02-02 RX ADMIN — HEPARIN SODIUM 5000 UNITS: 5000 INJECTION INTRAVENOUS; SUBCUTANEOUS at 13:36

## 2022-02-02 RX ADMIN — INSULIN LISPRO 5 UNITS: 100 INJECTION, SOLUTION INTRAVENOUS; SUBCUTANEOUS at 12:33

## 2022-02-02 NOTE — ASSESSMENT & PLAN NOTE
· Patient was found down between her bed and nightstand at her assisted living this AM  Likely mechanical in nature  · She lives at assisted living  · No focal neurologic deficits    · Patient has history of falls while at this facility due to dementia  · CT head: no acute intracranial abnormalities  · PT/OT consult   · Recommending STR unless assisted living can provide increased assistance   · Fall Risk Precautions

## 2022-02-02 NOTE — OCCUPATIONAL THERAPY NOTE
Occupational Therapy Evaluation      Will Palin    2/2/2022    Patient Active Problem List   Diagnosis    Anemia    Benign essential hypertension    CKD (chronic kidney disease), stage III (HCC)    Bilateral lower extremity edema    Gallstone    Hypercholesterolemia    Hypokalemia    Hyponatremia    Leukocytosis    Moderate aortic stenosis    Osteopenia    Pancreatic cyst    [facility]complicated [facility]ile dementia [facility]    Type 2 diabetes mellitus with diabetic chronic kidney disease ([facility] )    Medicare annual wellness visit, subsequent    Screening for depression    Screening for genitourinary condition    Screening for neurological condition    Atherosclerosis of aorta ([facility] )    Closed fracture of left proximal humerus    Ambulatory dysfunction    Chest pain    Syncope    HTN (hypertension)    SALTY (acute kidney injury) ([facility] )    Acute-on-chronic kidney injury ([facility] )    Hyperosmolar hyperglycemic state (HHS) ([place] [address] )    Urinary incontinence    First degree AV block    At risk for delirium    Dysphagia    Severe sepsis (HCC)    UTI (urinary tract infection)    Aortic stenosis       Past Medical History:   Diagnosis Date    Aortic stenosis     Bilateral edema of lower extremity     Dementia ([facility]     Diabetes ([facility]     Fall     Gait abnormality     Hyperlipidemia     Hypertension     Hypokalemia     Other ascites     Varicose veins of leg with edema        Past Surgical History:   Procedure Laterality Date    BREAST SURGERY      ELBOW SURGERY          02/02/22 0958   OT Last Visit   OT Visit Date 02/02/22   Note Type   Note type Evaluation   Restrictions/Precautions   Weight Bearing Precautions Per Order No   Other Precautions Cognitive; Chair Alarm; Bed Alarm;Multiple lines;Telemetry; Fall Risk   Pain Assessment   Pain Assessment Tool 0-10   Pain Score No Pain   Home Living   Type of Home Assisted living   Home Layout One level   Additional Comments Pt poor historian due to cognitive impairment  Per chart review, pt from [address]  Prior Function   Level of [place] Independent with ADLs and functional mobility   Lives With Facility staff   Receives Help From Personal care attendant   ADL Assistance Independent   IADLs Needs assistance   Falls in the last 6 months 1 to 4  (1 fall for current admission)   Comments Pt poor historian but reporting I with ADL's PTA and no use of AD for functional mobility  Per chart review nad recent admission 12/28 pt was mod I for transfers and was ambulating with RW with supervision  Lifestyle   Autonomy Pt from [facility]    Reciprocal Relationships facility staff   Intrinsic Gratification Bingo    Subjective   Subjective "Oh, I don't know"   ADL   Eating Assistance 5  Supervision/Setup   Eating Deficit Setup;Supervision/safety;Verbal cueing; Increased time to complete; Beverage management  (pt eating upon arrival )   Grooming Assistance 4  Minimal Assistance   Grooming Deficit Setup;Verbal cueing;Supervision/safety; Increased time to complete;Wash/dry face;Brushing hair  (pt unable to reach back of head)   LB Dressing Assistance 2  Maximal Assistance   LB Dressing Deficit Setup;Steadying;Verbal cueing;Supervision/safety; Increased time to complete; Don/doff R sock; Don/doff L sock; Thread RLE into underwear; Thread LLE into underwear;Pull up over hips  (pt unable to thread over toes)   Toileting Assistance  2  Maximal Assistance   Toileting Deficit Setup;Steadying;Supervison/safety;Verbal cueing; Increased time to complete; Bedside commode;Perineal hygiene  (unable to complete perineal hygiene )   Bed Mobility   Supine to Sit 3  Moderate assistance   Additional items Assist x 1; Increased time required;Verbal cues;LE management;HOB elevated   Additional Comments Pt OOB to recliner at end of session   Transfers   Sit to Stand 3  Moderate assistance  (vs min A x 1)   Additional items Assist x 1; Increased time required;Verbal cues   Stand to Sit 3  Moderate assistance   Additional items Assist x 1; Increased time required;Verbal cues;Armrests  (vs min A x 1)   Toilet transfer 3  Moderate assistance   Additional items Assist x 1; Increased time required;Commode;Verbal cues;Armrests   Additional Comments Pt required + verbal cueing for safe hand placement during transfers   Functional Mobility   Functional Mobility 4  Minimal assistance   Additional Comments Pt took few steps from EOB > commode >recliner with use of RW and overall min A x 1 for balance and safety  Pt requiring increased directional cues for mobility  Additional items Rolling walker   Balance   Static Sitting Fair +   Dynamic Sitting Fair   Static Standing Fair -   Dynamic Standing Poor +   Activity Tolerance   Activity Tolerance Patient limited by fatigue   Medical Staff Made Aware Care coordinated with PT [place]    Nurse Made Aware yes, RN Matias gutierrez to see pt and updated on outcomes    RUE Assessment   RUE Assessment WFL  (grossly 3/5 MMT)   LUE Assessment   LUE Assessment WFL  (grossly 3/5 MMT)   Hand Function   Gross Motor Coordination Functional   Fine Motor Coordination Functional   Sensation   Light Touch No apparent deficits   Cognition   Overall Cognitive Status Impaired   Arousal/Participation Alert; Cooperative   Attention Attends with cues to redirect   Orientation Level Oriented to person;Disoriented to time;Disoriented to place; Disoriented to situation  (stating unable to report birth month/day; year [birth year])   Memory Decreased recall of precautions;Decreased recall of recent events;Decreased short term memory;Decreased recall of biographical information   Following Commands Follows one step commands with increased time or repetition   Comments Pt pleasantly confused throughout session  Pt oriented to name only  Pt benefits from + verbal cueing for safety with activities due to limited insight  Assessment   Limitation Decreased ADL status; Decreased UE strength;Decreased Safe judgement during ADL;Decreased cognition;Decreased endurance;Decreased self-care trans;Decreased high-level ADLs   Prognosis Good   Assessment Patient is a 80 y o  female seen for OT evaluation s/p admit to [address] [place] [address] on 2/1/2022 w/Ambulatory dysfunction  Comorbidities affecting patient's functional performance at time of assessment include: Aortic stenosis, SALTY, dementia, diabetes and UTI  Orders received for OT evaluation and treatment  Patient identified during session through name and wristband  PTA, patient was independent with functional mobility with RW, independent with ADLS, requiring assist for IADLS and an assisted living resident  Personal factors affecting patient at time of initial evaluation include: limited insight into deficits, decreased initiation and engagement, difficulty performing ADLs and difficulty performing IADLs  Patient is alert and oriented to name, and presents with impaired judgement, inability to make safe decisions  The evaluation identifies the following performance deficits: weakness, impaired balance, decreased endurance, decreased coordination, increased fall risk, new onset of impairment of functional mobility, decreased ADLS, decreased IADLS, decreased activity tolerance, decreased safety awareness, impaired judgement, decreased cognition and decreased strength, that result in activity limitations  Based on the OT evaluation outcomes, functional performance deficits, and assessment findings, pt has been identified as high complexity, because the patient presents with comorbidites that affect occupational performance and required significant modification of tasks or assistance with consideration of multiple treatment options  The patient's raw score on the AM-PAC Daily Activity inpatient short form is 15, standardized score is 34 69, less than 39 4   Patient to benefit from continued Occupational Therapy treatment to address above deficits and maximize level of independence with ADLs and functional mobility  Occupational Performance areas to address include: grooming , bathing/ shower, dressing, toilet hygiene, transfer to all surfaces and functional ambulation  From OT standpoint, recommendation at time of d/c would be return to long-term vs Post acute rehab pending level of support facility can provide  Goals   Patient Goals none stated: will continue to assess   LTG Time Frame 10-14   Long Term Goal #1 see goals below    Plan   Treatment Interventions ADL retraining;Functional transfer training;UE strengthening/ROM; Endurance training;Patient/family training;Equipment evaluation/education;Continued evaluation; Energy conservation; Activityengagement   Goal Expiration Date 02/12/22   OT Treatment Day 0   OT Frequency 3-5x/wk   Recommendation   OT Discharge Recommendation   (return to [facility] vs PAR pending support long-term can provide)   OT - OK to Discharge Yes  (Once medically cleared)   Additional Comments  Pt seen as a co-eval with PT due to the patient's co-morbidities, clinically unstable presentation, and present impairments which are a regression from the patient's baseline  Additional Comments 2 At end of session, pt OOB to recliner with all needs met and call bell within reach   LATOYA Castillo present    AM-PAC Daily Activity Inpatient   Lower Body Dressing 2   Bathing 2   Toileting 2   Upper Body Dressing 3   Grooming 3   Eating 3   Daily Activity Raw Score 15   Daily Activity Standardized Score (Calc for Raw Score >=11) 34 69   AM-PAC Applied Cognition Inpatient   Following a Speech/Presentation 1   Understanding Ordinary Conversation 3   Taking Medications 1   Remembering Where Things Are Placed or Put Away 1   Remembering List of 4-5 Errands 1   Taking Care of Complicated Tasks 1   Applied Cognition Raw Score 8   Applied Cognition Standardized Score 19 32     GOALS:    *ADL transfers with Min (A) for inc'd independence with ADLs/purposeful tasks    *LB ADL with Mod (A) using AE prn for inc'd independence with self cares    *Toileting with Mod (A) for clothing management and hygiene for return to PLOF with personal care    *Increase stand tolerance x2 m for inc'd tolerance with standing purposeful tasks    *Participate in 10m UE therex to increase overall stamina/activity tolerance for purposeful tasks    *Bed mobility- Min (A) for inc'd independence to manage own comfort and initiate EOB & OOB purposeful tasks    *Patient will verbalize 3 safety awareness/ principles to prevent falls in the home setting  *Patient will increase OOB/sitting tolerance to 2-4 hours per day to increase participation in self-care and leisure tasks with no s/s of exertion  *Patient will increase functional mobility to and from bathroom with rolling walker with supervision to increase performance with ADLS and to use a toilet  *Patient will improve functional activity tolerance to 10 minutes of sustained functional tasks to increase participation in basic self-care and decrease assistance level       Jumana Wise OTR/L

## 2022-02-02 NOTE — ASSESSMENT & PLAN NOTE
Wt Readings from Last 3 Encounters:   02/02/22 71 4 kg (157 lb 6 5 oz)   01/04/22 71 1 kg (156 lb 11 2 oz)   05/03/21 78 kg (172 lb)     · Patient with history of severe aortic stenosis  · Monitor for volume overload with fluids  · Diuretics on hold

## 2022-02-02 NOTE — ASSESSMENT & PLAN NOTE
· On admission WBC 17 91, tachycardia, lactic acid 2 1, and source of infection - suspected UTI  · Lactic acid was never repeated  · Leukocytosis improved   · Tachycardia resolved   · Blood cultures negative at 24 hours   · Continue Rocephin IV  · Follow up Urine Culture

## 2022-02-02 NOTE — ASSESSMENT & PLAN NOTE
· Noted at 2 5 on admission  · Increased to 2 9 today   · 40 mEq PO, 20 mEq IV today  · BMP in AM   · Continue telemetry for another 24 hours

## 2022-02-02 NOTE — ASSESSMENT & PLAN NOTE
· UA: small amount of leukocytes and blood, 2+ protein and 250 glucose  · Urine microscopy: WBC 4-10 and clumped white blood cells  · Follow up Urine Culture   · Continue Rocephin

## 2022-02-02 NOTE — ASSESSMENT & PLAN NOTE
Lab Results   Component Value Date    EGFR 40 02/02/2022    EGFR 36 02/01/2022    EGFR 31 02/01/2022    CREATININE 1 14 02/02/2022    CREATININE 1 22 02/01/2022    CREATININE 1 38 (H) 02/01/2022   · Baseline Cr 0 9   Elevated Cr on admission to 1 37, improved to 1 1  · Nephrology consult appreciated   · Continue IVF  · Monitor I/O  · BMP in AM

## 2022-02-02 NOTE — PLAN OF CARE
Problem: PHYSICAL THERAPY ADULT  Goal: Performs mobility at highest level of function for planned discharge setting  See evaluation for individualized goals  Description: Treatment/Interventions: Functional transfer training,LE strengthening/ROM,Therapeutic exercise,Endurance training,Cognitive reorientation,Patient/family training,Equipment eval/education,Bed mobility,Gait training,Spoke to nursing,Spoke to case management,OT  Equipment Recommended: Jose Armando Foster       See flowsheet documentation for full assessment, interventions and recommendations  Note: Prognosis: Fair  Problem List: Decreased strength,Decreased range of motion,Obesity,Impaired hearing,Decreased safety awareness,Impaired judgement,Decreased cognition,Decreased coordination,Decreased mobility,Impaired balance,Decreased endurance (gait deviations)  Assessment: Pt is a 80 y o  female seen for PT evaluation s/p admit to [facility] on 2/1/2022 w/ Ambulatory dysfunction  Order placed for PT  Prior to admission: Pt lived at [facility] assisted living, was reportedly ambulating with a rolling walker  (patient inconsistent with historical information and reports that she did not use 1 initially)  Upon evaluation: Pt required mod assist for bed mobility, transfers, and steppage transfer to target surfaces  Pt's clinical presentation is currently unstable/unpredictable given the functional mobility deficits above, especially (but not limited to) weakness, gait deviations and decreased functional mobility tolerance, coupled with fall risks including hx of falls, impaired balance and decreased cognition, and combined with medical complications of abnormal WBCs, abnormal sodium values and abnormal potassium values     Recommend continue mobility trials with short rolling walker over next 1-3 sessions, therapeutic exercise, higher level balance activities  Barriers to Discharge: Decreased caregiver support,Inaccessible home environment        PT Discharge Recommendation: Post acute rehabilitation services (unless facility can provide increased A + HHPT )          See flowsheet documentation for full assessment

## 2022-02-02 NOTE — WOUND OSTOMY CARE
Progress Note - Wound   Ludlow Td 80 y o  female MRN: [id number]  Unit/Bed#: S -01 Encounter: [id number]      Assessment: This is a 80year old female patient admitted on 2/1/22 with ambulatory dysfunction s/p fall at [facility]  She has a history of dementia, UTI/sepsis, diabetes mellitus type 2, and hyponatremia  She was found sleeping in reclining chair and was transferred back to bed with assist of 2  Once awake, she is alert & responsive to verbal & tactile stimuli with periods of confusion  She is on a cardiovascular diet with restricted carbohydrates & ate about half of her lunch tray today  Would recommend that patient only sit out of bed for 2 hours at a time for meals and then back to bed for at least one hour, turning & reposition  This is to minimize pressure from unstagable sacral wound measuring 1 2 x 0 7 x 0 2  It has pink granulation tissue & yellow slough with moderate serous drainage  Periwound has maceration and blanchable erythema  Plan:   1  Cleanse sacral wound with NSS, pat dry then apply Triad paste & Allevyn bordered foam sacral dressing  2  Apply hydraguard to b/l heels, buttocks and sacrum BID and PRN for prevention and protection  3  Apply skin nourishing cream to the entire skin daily for moisture  4  Turn and reposition patient every  2 hours   5  Elevate heels off of bed with pillows to offload pressure   6  Apply EHOB waffle cushion to chair when OOB  Must limit sitting to 2 hours per day for each meal, then back to bed for at least one hour, turning & repositioning  This is to minimize pressure to sacral wound         Discussed assessment findings, and plan of care/recommendations with Altagracia Holcomb RN  Wound care will follow along with patient weekly, please call or tiger text with questions and concerns          Wound 12/29/21 Pressure Injury Buttocks Mid (Active)   Wound Image   02/02/22 1607   Wound Description Granulation tissue;Pink;Yellow;Slough 02/02/22 7307 Pressure Injury Stage Unstageable 02/02/22 1608   Pat-wound Assessment Maceration; blanchable erythema 02/02/22 1608   Wound Length (cm) 1 2 cm 02/02/22 1608   Wound Width (cm) 0 7 cm 02/02/22 1608   Wound Depth (cm) 0 2 cm 02/02/22 1608   Wound Surface Area (cm^2) 0 84 cm^2 02/02/22 1608   Wound Volume (cm^3) 0 168 cm^3 02/02/22 1608   Calculated Wound Volume (cm^3) 0 17 cm^3 02/02/22 1608   Change in Wound Size % 60 47 02/02/22 1608   Drainage Amount Moderate 02/02/22 1608   Drainage Description Clear;Serous 02/02/22 1608   Treatments Cleansed 02/02/22 1608   Dressing Foam, Silicon (eg  Allevyn, etc) 02/02/22 1608   Wound packed? No 02/02/22 1608   Dressing Changed New 02/02/22 1608   Dressing Status Clean;Dry; Intact 02/02/22 Marjorie LAIRD [id number]

## 2022-02-02 NOTE — DISCHARGE INSTR - OTHER ORDERS
1  Cleanse sacral wound with NSS, pat dry then apply Triad paste & Allevyn bordered foam sacral dressing  2  Apply hydraguard to b/l heels, buttocks and sacrum BID and PRN for prevention and protection  3  Apply skin nourishing cream to the entire skin daily for moisture  4  Turn and reposition patient every  2 hours   5  Elevate heels off of bed with pillows to offload pressure   6  Apply EHOB waffle cushion to chair when OOB  Must limit sitting to 2 hours per day for each meal, then back to bed for at least one hour, turning & repositioning  This is to minimize pressure to sacral wound

## 2022-02-02 NOTE — ASSESSMENT & PLAN NOTE
· Sodium upon admission was 124, corrected sodium 128 in the setting of hyperglycemia      · Improved to 127, correcting to 130 given hyperglycemia   · Continue IVF  · Monitor BMP

## 2022-02-02 NOTE — PLAN OF CARE
Problem: Potential for Falls  Goal: Patient will remain free of falls  Description: INTERVENTIONS:  - Educate patient/family on patient safety including physical limitations  - Instruct patient to call for assistance with activity   - Consult OT/PT to assist with strengthening/mobility   - Keep Call bell within reach  - Keep bed low and locked with side rails adjusted as appropriate  - Keep care items and personal belongings within reach  - Initiate and maintain comfort rounds  - Make Fall Risk Sign visible to staff  - Offer Toileting every 2 Hours, in advance of need  - Initiate/Maintain bed alarm  - Obtain necessary fall risk management equipment: alarms  - Apply yellow socks and bracelet for high fall risk patients  - Consider moving patient to room near nurses station  Outcome: Progressing     Problem: PAIN - ADULT  Goal: Verbalizes/displays adequate comfort level or baseline comfort level  Description: Interventions:  - Encourage patient to monitor pain and request assistance  - Assess pain using appropriate pain scale  - Administer analgesics based on type and severity of pain and evaluate response  - Implement non-pharmacological measures as appropriate and evaluate response  - Consider cultural and social influences on pain and pain management  - Notify physician/advanced practitioner if interventions unsuccessful or patient reports new pain  Outcome: Progressing     Problem: INFECTION - ADULT  Goal: Absence or prevention of progression during hospitalization  Description: INTERVENTIONS:  - Assess and monitor for signs and symptoms of infection  - Monitor lab/diagnostic results  - Monitor all insertion sites, i e  indwelling lines, tubes, and drains  - Monitor endotracheal if appropriate and nasal secretions for changes in amount and color  - [place] appropriate cooling/warming therapies per order  - Administer medications as ordered  - Instruct and encourage patient and family to use good hand hygiene technique  - Identify and instruct in appropriate isolation precautions for identified infection/condition  Outcome: Progressing     Problem: SAFETY ADULT  Goal: Patient will remain free of falls  Description: INTERVENTIONS:  - Educate patient/family on patient safety including physical limitations  - Instruct patient to call for assistance with activity   - Consult OT/PT to assist with strengthening/mobility   - Keep Call bell within reach  - Keep bed low and locked with side rails adjusted as appropriate  - Keep care items and personal belongings within reach  - Initiate and maintain comfort rounds  - Make Fall Risk Sign visible to staff  - Offer Toileting every 2 Hours, in advance of need  - Initiate/Maintain bed alarm  - Obtain necessary fall risk management equipment: alarms  - Apply yellow socks and bracelet for high fall risk patients  - Consider moving patient to room near nurses station  Outcome: Progressing  Goal: Maintain or return to baseline ADL function  Description: INTERVENTIONS:  -  Assess patient's ability to carry out ADLs; assess patient's baseline for ADL function and identify physical deficits which impact ability to perform ADLs (bathing, care of mouth/teeth, toileting, grooming, dressing, etc )  - Assess/evaluate cause of self-care deficits   - Assess range of motion  - Assess patient's mobility; develop plan if impaired  - Assess patient's need for assistive devices and provide as appropriate  - Encourage maximum independence but intervene and supervise when necessary  - Involve family in performance of ADLs  - Assess for home care needs following discharge   - Consider OT consult to assist with ADL evaluation and planning for discharge  - Provide patient education as appropriate  Outcome: Progressing  Goal: Maintains/Returns to pre admission functional level  Description: INTERVENTIONS:  - Perform BMAT or MOVE assessment daily    - Set and communicate daily mobility goal to care team and patient/family/caregiver  - Collaborate with rehabilitation services on mobility goals if consulted  - Perform Range of Motion 4 times a day  - Reposition patient every 4 hours  - Dangle patient 4 times a day  - Stand patient 4 times a day  - Ambulate patient 4 times a day  - Out of bed to chair 3 times a day   - Out of bed for meals 3 times a day  - Out of bed for toileting  - Record patient progress and toleration of activity level   Outcome: Progressing     Problem: DISCHARGE PLANNING  Goal: Discharge to home or other facility with appropriate resources  Description: INTERVENTIONS:  - Identify barriers to discharge w/patient and caregiver  - Arrange for needed discharge resources and transportation as appropriate  - Identify discharge learning needs (meds, wound care, etc )  - Arrange for interpretive services to assist at discharge as needed  - Refer to Case Management Department for coordinating discharge planning if the patient needs post-hospital services based on physician/advanced practitioner order or complex needs related to functional status, cognitive ability, or social support system  Outcome: Progressing     Problem: Knowledge Deficit  Goal: Patient/family/caregiver demonstrates understanding of disease process, treatment plan, medications, and discharge instructions  Description: Complete learning assessment and assess knowledge base    Interventions:  - Provide teaching at level of understanding  - Provide teaching via preferred learning methods  Outcome: Progressing     Problem: MOBILITY - ADULT  Goal: Maintain or return to baseline ADL function  Description: INTERVENTIONS:  -  Assess patient's ability to carry out ADLs; assess patient's baseline for ADL function and identify physical deficits which impact ability to perform ADLs (bathing, care of mouth/teeth, toileting, grooming, dressing, etc )  - Assess/evaluate cause of self-care deficits   - Assess range of motion  - Assess patient's mobility; develop plan if impaired  - Assess patient's need for assistive devices and provide as appropriate  - Encourage maximum independence but intervene and supervise when necessary  - Involve family in performance of ADLs  - Assess for home care needs following discharge   - Consider OT consult to assist with ADL evaluation and planning for discharge  - Provide patient education as appropriate  Outcome: Progressing  Goal: Maintains/Returns to pre admission functional level  Description: INTERVENTIONS:  - Perform BMAT or MOVE assessment daily    - Set and communicate daily mobility goal to care team and patient/family/caregiver  - Collaborate with rehabilitation services on mobility goals if consulted  - Perform Range of Motion 4 times a day  - Reposition patient every 4 hours    - Dangle patient 4 times a day  - Stand patient 4 times a day  - Ambulate patient 4 times a day  - Out of bed to chair 3 times a day   - Out of bed for meals 3 times a day  - Out of bed for toileting  - Record patient progress and toleration of activity level   Outcome: Progressing     Problem: Prexisting or High Potential for Compromised Skin Integrity  Goal: Skin integrity is maintained or improved  Description: INTERVENTIONS:  - Identify patients at risk for skin breakdown  - Assess and monitor skin integrity  - Assess and monitor nutrition and hydration status  - Monitor labs   - Assess for incontinence   - Turn and reposition patient  - Assist with mobility/ambulation  - Relieve pressure over bony prominences  - Avoid friction and shearing  - Provide appropriate hygiene as needed including keeping skin clean and dry  - Evaluate need for skin moisturizer/barrier cream  - Collaborate with interdisciplinary team   - Patient/family teaching  - Consider wound care consult   Outcome: Progressing

## 2022-02-02 NOTE — PHYSICAL THERAPY NOTE
PHYSICAL THERAPY EVALUATION  NAME:  Mary Kay Antoine: 02/02/22    AGE:   80 y o  Mrn:   [id number]  ADMIT DX:  Hyponatremia [E87 1]  Unwitnessed fall [R29 6]  Problem List:   Patient Active Problem List   Diagnosis    Anemia    Benign essential hypertension    CKD (chronic kidney disease), stage III (HCC)    Bilateral lower extremity edema    Gallstone    Hypercholesterolemia    Hypokalemia    Hyponatremia    Leukocytosis    Moderate aortic stenosis    Osteopenia    Pancreatic cyst    Uncomplicated senile dementia ([place] [address] )    Type 2 diabetes mellitus with diabetic chronic kidney disease ([place] [address] )    Medicare annual wellness visit, subsequent    Screening for depression    Screening for genitourinary condition    Screening for neurological condition    Atherosclerosis of aorta ([place] [address] )    Closed fracture of left proximal humerus    Ambulatory dysfunction    Chest pain    Syncope    HTN (hypertension)    SALTY (acute kidney injury) ([place] [address] )    Acute-on-chronic kidney injury ([place] [address] )    Hyperosmolar hyperglycemic state (HHS) ([place] [address] )    Urinary incontinence    First degree AV block    At risk for delirium    Dysphagia    Severe sepsis ([facility])    UTI (urinary tract infection)    Aortic stenosis         Length Of Stay: 1  Performed at least 2 patient identifiers during session: Name and ID bracelet  PHYSICAL THERAPY EVALUATION :    02/02/22 0949   PT Last Visit   PT Visit Date 02/02/22   Note Type   Note type Evaluation   Pain Assessment   Pain Assessment Tool 0-10   Pain Score No Pain   Restrictions/Precautions   Weight Bearing Precautions Per Order No   Other Precautions Cognitive; Chair Alarm; Bed Alarm;Multiple lines;Telemetry; Fall Risk   Home Living   Type of [address] [place] [address] level   [address] [place] [address]   Prior Function   Level of [place] Independent with ADLs and functional mobility   Lives With Facility staff   Receives Help From Personal care attendant   ADL Assistance Independent   IADLs Needs assistance   Falls in the last 6 months 1 to 4  (1 fall for current admission)   Comments Patient inconsistent historian  Per H&P: Patient uses walker at assisted living and is not steady on her feet  Per chart review and recent admission 12/28/2021 pt was mod I for transfers and was ambulating with RW with supervision  General   Family/Caregiver Present No   Cognition   Overall Cognitive Status Impaired   Attention Attends with cues to redirect   Orientation Level Oriented to person;Disoriented to time;Disoriented to place; Disoriented to situation  (stating unable to report birth month/day; year [birth year])   Memory Decreased recall of precautions;Decreased recall of recent events;Decreased short term memory;Decreased recall of biographical information   Following Commands Follows one step commands with increased time or repetition   RUE Assessment   RUE Assessment WFL  (grossly 3/5 MMT)   LUE Assessment   LUE Assessment WFL  (grossly 3/5 MMT)   RLE Overall PROM   R Ankle Dorsiflexion limited from neutral   Strength RLE   RLE Overall Strength   (submaximal effort/command following)   R Hip Flexion 3/5   R Knee Extension 3/5   R Ankle Dorsiflexion 3/5   LLE Overall PROM   L Ankle Dorsiflexion limited from neutral   Strength LLE   L Hip Flexion 3/5   L Knee Extension 3/5   L Ankle Dorsiflexion   (<3)   Coordination   Movements are Fluid and Coordinated 0   Coordination and Movement Description rigid   Light Touch   RLE Light Touch Not tested  (Inconsistent responses)   LLE Light Touch   (Inconsistent responses)   Bed Mobility   Supine to Sit 3  Moderate assistance   Additional items Assist x 1; Increased time required;Verbal cues; Bedrails   Transfers   Sit to Stand 3  Moderate assistance   Additional items Assist x 1; Increased time required;Verbal cues;Armrests  (Instruction for hand placement, LE placement)   Stand to Sit 3  Moderate assistance Additional items Assist x 1; Increased time required;Verbal cues;Armrests  (Instruction for hand placement)   Ambulation/Elevation   Gait pattern Excessively slow  (reaching for BUE support)   Gait Assistance 3  Moderate assist   Additional items Assist x 1;Verbal cues; Tactile cues   Assistive Device None   Distance 2' bed to commode, incontinent of uring x 2   Stair Management Assistance Not tested   Balance   Static Sitting Good   Static Standing Poor +  (1 minute with BUE support)   Ambulatory Poor +   Endurance Deficit   Endurance Deficit Yes   Endurance Deficit Description Self reported fatigue, decreased standing tolerance time  Activity Tolerance   Activity Tolerance Patient limited by fatigue   Medical Staff Made Aware Care coordination with Donnie OLIVEIRA, spoke to [facility] from case management   Nurse Made Aware Spoke to Jose Cortes RN   Assessment   Prognosis Fair   Problem List Decreased strength;Decreased range of motion;Obesity; Impaired hearing;Decreased safety awareness; Impaired judgement;Decreased cognition;Decreased coordination;Decreased mobility; Impaired balance;Decreased endurance  (gait deviations)   Assessment Pt is a 80 y o  female seen for PT evaluation s/p admit to [facility] on 2/1/2022 w/ Ambulatory dysfunction  Order placed for PT  Prior to admission: Pt lived at [address], was reportedly ambulating with a rolling walker  (patient inconsistent with historical information and reports that she did not use 1 initially)  Upon evaluation: Pt required mod assist for bed mobility, transfers, and steppage transfer to target surfaces    Pt's clinical presentation is currently unstable/unpredictable given the functional mobility deficits above, especially (but not limited to) weakness, gait deviations and decreased functional mobility tolerance, coupled with fall risks including hx of falls, impaired balance and decreased cognition, and combined with medical complications of abnormal WBCs, abnormal sodium values and abnormal potassium values  Recommend continue mobility trials with short rolling walker over next 1-3 sessions, therapeutic exercise, higher level balance activities   Barriers to Discharge Decreased caregiver support; Inaccessible home environment   Goals   Patient Goals none stated, but agreeable to get to chair   STG Expiration Date 02/12/22   Short Term Goal #1 Pt will: Perform bed mobility tasks with consistent supervision to prepare for transfers and reposition in bed  Perform transfers with consistent supervision to decrease risk for falls and Proper hand placement  Perform ambulation with L RAD for 50 ft with supervision to increase Indep in prior living environment and promote proper use of assistive device  Perform timed up and go with rolling walker and improved baseline score to demonstrate less risk for falls  PT Treatment Day 1   Plan   Treatment/Interventions Functional transfer training;LE strengthening/ROM; Therapeutic exercise; Endurance training;Cognitive reorientation;Patient/family training;Equipment eval/education; Bed mobility;Gait training;Spoke to nursing;Spoke to case management;OT   PT Frequency 3-5x/wk   Recommendation   PT Discharge Recommendation Post acute rehabilitation services  (unless facility can provide increased A + HHPT )   Equipment Recommended [facility] Recommended Wheeled walker   Change/add to [employer]ar[employer]?  Yes, Change Size   Walker Size Andrea (Ht <5'1")   AM-PAC Basic Mobility Inpatient   Turning in Bed Without Bedrails 2   Lying on Back to Sitting on Edge of Flat Bed 2   Moving Bed to Chair 2   Standing Up From Chair 2   Walk in Room 1   Climb 3-5 Stairs 1   Basic Mobility Inpatient Raw Score 10   Turning Head Towards Sound 3   Follow Simple Instructions 3   Low Function Basic Mobility Raw Score 16   Low Function Basic Mobility Standardized Score 25 72   Highest Level Of Mobility   [facility] Goal 4: Move to chair/commode   -HL Highest Level of Mobility 4: Move to chair/commode   -HL Goal Achieved Yes   Additional Treatment Session   Start Time 8259   End Time 4042   Treatment Assessment Pt was able to ambulate with rolling walker with minimal assistance within room, and needed less consistent support for performing transfers  Patient still requires verbal instruction for proper technique with gait and transfers  Equipment Use Rolling walker   Additional Treatment Day 1   Exercises   Gait training · Transfers fluctuate between Min and mod assist with verbal instruction for hand placement  · Gait training with rolling walker for 16 ft with primarily Min assist for balance and walker management  Verbal instruction for big steps, upright posture, and for completion of approach to chair  End of Consult   Patient Position at End of Consult Bedside chair;Bed/Chair alarm activated; All needs within reach   Pt requires PT /OT co-treat and co-eval due to potential signficant assistance with mobility, nursing staff reports of assist of 2 to roll, and cognitive-behavioral impairments  PT and OT goals addressed separately  (Please find full objective findings from PT assessment regarding body systems outlined above)  Pt to benefit from continued skilled PT tx while in hospital and upon DC to address deficits as defined above and maximize level of functional mobility  Co-morbidities affecting pt's physical performance at time of assessment include: Aortic stenosis, Bilateral edema of lower extremity, Dementia, Diabetes ([address] ), Fall, Gait abnormality, Hypertension, ascites  Personal factors affecting pt at time of IE include: advanced age, behavioral pattern, inability to perform IADLs, inability to ambulate household distances, inability to navigate community distances, limited insight into impairments and recent fall(s)      The patient's AM-PAC Basic Mobility Inpatient Short Form Raw Score is 10, Standardized Score is    A standardized score less than 40 78 or Raw Score of 16 suggests the patient may benefit from discharge to post-acute rehabilitation services, which  does  coincide with CURRENT above PT recommendations  However please refer to therapist recommendation for discharge planning given other factors that may influence destination  Adapted from [employer] of AM-PAC 6-Clicks Basic Mobility and Daily Activity Scores With Discharge Destination  Physical Therapy, 2021;101:1-9  DOI: 10 [email or web address]    Portions of the record may have been created with voice recognition software  Occasional wrong word or "sound a like" substitutions may have occurred due to the inherent limitations of voice recognition software    Read the chart carefully and recognize, using context, where substitutions have occurred

## 2022-02-02 NOTE — ASSESSMENT & PLAN NOTE
Lab Results   Component Value Date    HGBA1C 13 0 (H) 12/28/2021       Recent Labs     02/01/22  1630 02/01/22  2043 02/02/22  0714 02/02/22  1123   POCGLU 304* 284* 248* 467*       Blood Sugar Average: Last 72 hrs:  · (P) 338 6   · Glucose on BMP elevated on admission at 319  BG drastically above goal   · Hold home medications, sitagliptin 100 mg, repaglinide 2mg, and glipizide 10mg  · Start on Lantus 10 U QHS with sliding scale as per last Endocrinology note  · Sliding scale coverage    · Monitor sugars via accu-check  · Ultimately, given advanced age and dementia no indication for tightly controlled blood sugars

## 2022-02-02 NOTE — PLAN OF CARE
Problem: OCCUPATIONAL THERAPY ADULT  Goal: Performs self-care activities at highest level of function for planned discharge setting  See evaluation for individualized goals  Description: Treatment Interventions: ADL retraining,Functional transfer training,UE strengthening/ROM,Endurance training,Patient/family training,Equipment evaluation/education,Continued evaluation,Energy conservation,Activityengagement          See flowsheet documentation for full assessment, interventions and recommendations  Note: Limitation: Decreased ADL status,Decreased UE strength,Decreased Safe judgement during ADL,Decreased cognition,Decreased endurance,Decreased self-care trans,Decreased high-level ADLs  Prognosis: Good  Assessment: Patient is a 80 y o  female seen for OT evaluation s/p admit to [address] [place] [address] on 2/1/2022 w/Ambulatory dysfunction  Comorbidities affecting patient's functional performance at time of assessment include: Aortic stenosis, SALTY, dementia, diabetes and UTI  Orders received for OT evaluation and treatment  Patient identified during session through name and wristband  PTA, patient was independent with functional mobility with RW, independent with ADLS, requiring assist for IADLS and an assisted living resident  Personal factors affecting patient at time of initial evaluation include: limited insight into deficits, decreased initiation and engagement, difficulty performing ADLs and difficulty performing IADLs  Patient is alert and oriented to name, and presents with impaired judgement, inability to make safe decisions   The evaluation identifies the following performance deficits: weakness, impaired balance, decreased endurance, decreased coordination, increased fall risk, new onset of impairment of functional mobility, decreased ADLS, decreased IADLS, decreased activity tolerance, decreased safety awareness, impaired judgement, decreased cognition and decreased strength, that result in activity limitations  Based on the OT evaluation outcomes, functional performance deficits, and assessment findings, pt has been identified as high complexity, because the patient presents with comorbidites that affect occupational performance and required significant modification of tasks or assistance with consideration of multiple treatment options  The patient's raw score on the AM-PAC Daily Activity inpatient short form is 15, standardized score is 34 69, less than 39 4  Patient to benefit from continued Occupational Therapy treatment to address above deficits and maximize level of independence with ADLs and functional mobility  Occupational Performance areas to address include: grooming , bathing/ shower, dressing, toilet hygiene, transfer to all surfaces and functional ambulation  From OT standpoint, recommendation at time of d/c would be return to REMI vs Post acute rehab pending level of support facility can provide        OT Discharge Recommendation:  (return to snf vs PAR pending support snf can provide)  OT - OK to Discharge: Yes (Once medically cleared)    Blank Mathew, OT

## 2022-02-02 NOTE — CASE MANAGEMENT
Case Management Assessment & Discharge Planning Note    Patient name Michael JORGENSEN Lumarnie Hernandez 87 [id number] -[id number] MRN [id number]  : 1923 Date 2022       Current Admission Date: 2022  Current Admission Diagnosis:Ambulatory dysfunction   Patient Active Problem List    Diagnosis Date Noted    Severe sepsis ([facility] ) 2022    UTI (urinary tract infection) 2022    Aortic stenosis 2022    Dysphagia 2022    Acute-on-chronic kidney injury ([facility] ) 2021    Hyperosmolar hyperglycemic state (HHS) ([place] [address] ) 2021    Urinary incontinence 2021    First degree AV block 2021    At risk for delirium 2021    Chest pain 2021    Syncope 2021    HTN (hypertension) 2021    SALTY (acute kidney injury) ([facility] ) 2021    Closed fracture of left proximal humerus 2020    Ambulatory dysfunction 2020    Atherosclerosis of aorta ([facility] ) 2019    Medicare annual wellness visit, subsequent 2018    Screening for depression 2018    Screening for genitourinary condition 2018    Screening for neurological condition 2018    Bilateral lower extremity edema 10/04/2017    Hypokalemia 2017    CKD (chronic kidney disease), stage III ([facility]) 2017    Hyponatremia 2017    Leukocytosis 2017    Moderate aortic stenosis 2017    Gallstone 2016    Anemia 2016    Pancreatic cyst     Uncomplicated senile dementia ([facility] ) 2015    Benign essential hypertension 2015    Hypercholesterolemia 2013    Osteopenia 2013    Type 2 diabetes mellitus with diabetic chronic kidney disease ([facility] ) 2013      LOS (days): 1  Geometric Mean LOS (GMLOS) (days): 3 50  Days to GMLOS:2 1     OBJECTIVE:  PATIENT READMITTED TO HOSPITAL  Risk of Unplanned Readmission Score: 17         Current admission status: Inpatient  Referral Reason: Rehab    Preferred Pharmacy: [address]  [address]  [facility] [address]  Phone: [phone] Fax: [phone]    Primary Care Provider: No primary care provider on file  Primary Insurance: MEDICARE  Secondary Insurance: AARP    ASSESSMENT:  [address], [address] - Daughter   Primary Phone: [phone] (Home)  Mobile Phone: [phone]               Advance Directives  Does patient have a [address]?: Yes  Does patient have Advance Directives?: Yes  Advance Directives: Living will,Power of  for health care,Power of  for finance  Primary Contact: [facility]         Readmission Root Cause  30 Day Readmission: Yes  Who directed you to return to the hospital?: Other (comment) (facility - [facility])  Did you understand whom to contact if you had questions or problems?: Yes  Did you get your prescriptions before you left the hospital?: No  Reason[de-identified] Preference for own pharmacy  Were you able to get your prescriptions filled when you left the hospital?: Yes  Did you take your medications as prescribed?: Yes  Were you able to get to your follow-up appointments?: Yes  During previous admission, was a post-acute recommendation made?: Yes  What post-acute resources were offered?: Spencer Ames  Patient was readmitted due to: fall at facility  Action Plan: DC to rehab prior to returning to [facility]    Patient Information  Admitted from[de-identified] Facility  Mental Status: Alert  During Assessment patient was accompanied by: Not accompanied during assessment  Assessment information provided by[de-identified] Daughter  Primary Caregiver: Other (Comment)  Caregiver's Name[de-identified] [facility] Relationship to Patient[de-identified] Other (Specify)  Support Systems: Daughter,Home care staff,Family members  [place] of Residence: [address] [facility] do you live in?: [address] [facility] entry access options  Select all that apply  Sudeep Rosen [address]amp  Type of Current Residence: Facility [facility])  Upon entering residence, is there a bedroom on the main floor (no further steps)?: Yes  Upon entering residence, is there a bathroom on the main floor (no further steps)?: Yes  In the last 12 months, was there a time when you were not able to pay the mortgage or rent on time?: No  In the last 12 months, was there a time when you did not have a steady place to sleep or slept in a shelter (including now)?: No  Homeless/housing insecurity resource given?: N/A  Living Arrangements: Other (Comment) (assisted living)  Is patient a ?: No    Activities of Daily Living Prior to Admission  Functional Status: Assistance  Completes ADLs independently?: No  Level of ADL dependence: Assistance  Ambulates independently?: Yes  Does patient use assisted devices?: Yes  Assisted Devices (DME) used: [employer] Chair  Does patient currently own DME?: Yes  What DME does the patient currently own?: Mireya Innocent  Does patient have a history of Outpatient Therapy (PT/OT)?: Yes  Does the patient have a history of Short-Term Rehab?: No  Does patient have a history of HHC?: Yes  Does patient currently have [facility]?: No         Patient Information Continued  Income Source: Pension/[facility]  Does patient have prescription coverage?: Yes  Within the past 12 months, you worried that your food would run out before you got the money to buy more : Never true  Within the past 12 months, the food you bought just didnt last and you didnt have money to get more : Never true  Food insecurity resource given?: N/A  Does patient receive dialysis treatments?: No  Does patient have a history of substance abuse?: No  Does patient have a history of Mental Health Diagnosis?: No         Means of Transportation  Means of Transport to Appts[de-identified] Family transport  In the past 12 months, has lack of transportation kept you from medical appointments or from getting medications?: No  In the past 12 months, has lack of transportation kept you from meetings, work, or from getting things needed for daily living?: No  Was application for public transport provided?: N/A        DISCHARGE DETAILS:    Discharge planning discussed with[de-identified] daughter and Chey Howe at facility  Freedom of Choice: Yes  Comments - Freedom of Choice: CM spoke with daughter after speaking with Chey Howe at [facility] about rehab  After reviewing pt's PT and OT evaluation with her, she would recommend pt go to rehab prior to returning  She explained they work closely with [facility] and they would recommend a referral be made there  CM epxlained this to pt's daughter as well and she is in agreement with this referral being made  CM contacted family/caregiver?: Yes  Were Treatment Team discharge recommendations reviewed with patient/caregiver?: Yes  Did patient/caregiver verbalize understanding of patient care needs?: Yes  Were patient/caregiver advised of the risks associated with not following Treatment Team discharge recommendations?: Yes    Contacts  Patient Contacts: daughter Mary Jo Ross  Relationship to Patient[de-identified] Family  Contact Method: Phone  Reason/Outcome: Continuity of [address]         Is the patient interested in [place] [address] at discharge?: No    DME Referral Provided  Referral made for DME?: No    Other Referral/Resources/Interventions Provided:  Interventions: Short Term Rehab  Referral Comments: Referral has been made to [facility] as requested by pt's daughter  She is in agreement with additional blanket referrals in the event [facility] is not able to accept her      Would you like to participate in our [address] service program?  : No - Declined    Treatment Team Recommendation: Short Term Rehab  Discharge Destination Plan[de-identified] Short Term Rehab

## 2022-02-02 NOTE — PROGRESS NOTES
[facility]  Progress Note - Simin Lobe 11/22/1923, 80 y o  female MRN: [id number]  Unit/Bed#: S -01 Encounter: [id number]  Primary Care Provider: No primary care provider on file  Date and time admitted to hospital: 2/1/2022  5:59 AM    * Ambulatory dysfunction  Assessment & Plan  · Patient was found down between her bed and nightstand at her assisted living this AM  Likely mechanical in nature  · She lives at assisted living  · No focal neurologic deficits  · Patient has history of falls while at this facility due to dementia  · CT head: no acute intracranial abnormalities  · PT/OT consult   · Recommending STR unless assisted living can provide increased assistance   · Fall Risk Precautions    Acute-on-chronic kidney injury [facility])  Assessment & Plan  Lab Results   Component Value Date    EGFR 40 02/02/2022    EGFR 36 02/01/2022    EGFR 31 02/01/2022    CREATININE 1 14 02/02/2022    CREATININE 1 22 02/01/2022    CREATININE 1 38 (H) 02/01/2022   · Baseline Cr 0 9  Elevated Cr on admission to 1 37, improved to 1 1  · Nephrology consult appreciated   · Continue IVF  · Monitor I/O  · BMP in AM    Hyponatremia  Assessment & Plan  · Sodium upon admission was 124, corrected sodium 128 in the setting of hyperglycemia      · Improved to 127, correcting to 130 given hyperglycemia   · Continue IVF  · Monitor BMP    Hypokalemia  Assessment & Plan  · Noted at 2 5 on admission  · Increased to 2 9 today   · 40 mEq PO, 20 mEq IV today  · BMP in AM   · Continue telemetry for another 24 hours     Severe sepsis (HCC)  Assessment & Plan  · On admission WBC 17 91, tachycardia, lactic acid 2 1, and source of infection - suspected UTI  · Lactic acid was never repeated  · Leukocytosis improved   · Tachycardia resolved   · Blood cultures negative at 24 hours   · Continue Rocephin IV  · Follow up Urine Culture      UTI (urinary tract infection)  Assessment & Plan  · UA: small amount of leukocytes and blood, 2+ protein and 250 glucose  · Urine microscopy: WBC 4-10 and clumped white blood cells  · Follow up Urine Culture   · Continue Rocephin     Uncomplicated senile dementia (HCC)  Assessment & Plan  · Stable   · Continue Donepezil 10 mg     Type 2 diabetes mellitus with diabetic chronic kidney disease [facility])  Assessment & Plan  Lab Results   Component Value Date    HGBA1C 13 0 (H) 12/28/2021       Recent Labs     02/01/22  1630 02/01/22  2043 02/02/22  0714 02/02/22  1123   POCGLU 304* 284* 248* 467*       Blood Sugar Average: Last 72 hrs:  · (P) 338 6   · Glucose on BMP elevated on admission at 319  BG drastically above goal   · Hold home medications, sitagliptin 100 mg, repaglinide 2mg, and glipizide 10mg  · Start on Lantus 10 U QHS with sliding scale as per last Endocrinology note  · Sliding scale coverage  · Monitor sugars via accu-check  · Ultimately, given advanced age and dementia no indication for tightly controlled blood sugars     Aortic stenosis  Assessment & Plan  Wt Readings from Last 3 Encounters:   02/02/22 71 4 kg (157 lb 6 5 oz)   01/04/22 71 1 kg (156 lb 11 2 oz)   05/03/21 78 kg (172 lb)     · Patient with history of severe aortic stenosis  · Monitor for volume overload with fluids  · Diuretics on hold          VTE Pharmacologic Prophylaxis: VTE Score: 5 High Risk (Score >/= 5) - Pharmacological DVT Prophylaxis Ordered: heparin  Sequential Compression Devices Ordered  Patient Centered Rounds: I performed bedside rounds with nursing staff today  Discussions with Specialists or Other Care Team Provider: Discussed with RN, DARWIN    Education and Discussions with Family / Patient: Updated  (daughter) via phone  Time Spent for Care: 30 minutes  More than 50% of total time spent on counseling and coordination of care as described above      Current Length of Stay: 1 day(s)  Current Patient Status: Inpatient   Certification Statement: The patient will continue to require additional inpatient hospital stay due to pending electrolyte improvement   Discharge Plan: Anticipate discharge in 24-48 hrs to prior assisted or independent living facility  Code Status: Level 3 - DNAR and DNI    Subjective:   Patient denies complaints today  Reports feeling stronger  Denies pain  Objective:     Vitals:   Temp (24hrs), Av 2 °F (36 8 °C), Min:98 °F (36 7 °C), Max:98 5 °F (36 9 °C)    Temp:  [98 °F (36 7 °C)-98 5 °F (36 9 °C)] 98 5 °F (36 9 °C)  HR:  [] 87  Resp:  [18] 18  BP: (113-136)/(56-62) 113/62  SpO2:  [91 %-93 %] 91 %  Body mass index is 31 26 kg/m²  Input and Output Summary (last 24 hours): Intake/Output Summary (Last 24 hours) at 2022 1218  Last data filed at 2022 2145  Gross per 24 hour   Intake 0 ml   Output --   Net 0 ml       Physical Exam:   Physical Exam  Constitutional:       General: She is not in acute distress  Appearance: Normal appearance  She is normal weight  She is not ill-appearing or diaphoretic  HENT:      Head: Normocephalic and atraumatic  Mouth/Throat:      Mouth: Mucous membranes are dry  Eyes:      General: No scleral icterus  Pupils: Pupils are equal, round, and reactive to light  Cardiovascular:      Rate and Rhythm: Normal rate and regular rhythm  Pulses: Normal pulses  Heart sounds: Normal heart sounds, S1 normal and S2 normal  No murmur heard  No systolic murmur is present  No diastolic murmur is present  No gallop  No S3 or S4 sounds  Pulmonary:      Effort: Pulmonary effort is normal  No accessory muscle usage or respiratory distress  Breath sounds: Normal breath sounds  No stridor  No decreased breath sounds, wheezing, rhonchi or rales  Chest:      Chest wall: No tenderness  Abdominal:      General: Bowel sounds are normal  There is no distension  Palpations: Abdomen is soft  Tenderness: There is no abdominal tenderness  There is no guarding     Musculoskeletal:      Right lower leg: No edema  Left lower leg: No edema  Skin:     General: Skin is warm and dry  Coloration: Skin is not jaundiced  Neurological:      General: No focal deficit present  Mental Status: She is alert  Mental status is at baseline  Motor: No tremor or seizure activity  Psychiatric:         Behavior: Behavior is cooperative  Additional Data:     Labs:  Results from last 7 days   Lab Units 02/02/22  0539 02/01/22  0615 02/01/22  0615   WBC Thousand/uL 14 86*   < > 17 91*   HEMOGLOBIN g/dL 12 1   < > 15 0   HEMATOCRIT % 34 6*   < > 42 3   PLATELETS Thousands/uL 247   < > 324   BANDS PCT %  --   --  2   NEUTROS PCT % 63  --   --    LYMPHS PCT % 26  --   --    LYMPHO PCT %  --   --  23   MONOS PCT % 9  --   --    MONO PCT %  --   --  11   EOS PCT % 0  --  0    < > = values in this interval not displayed       Results from last 7 days   Lab Units 02/02/22  0539   SODIUM mmol/L 127*   POTASSIUM mmol/L 2 9*   CHLORIDE mmol/L 94*   CO2 mmol/L 23   BUN mg/dL 19   CREATININE mg/dL 1 14   ANION GAP mmol/L 10   CALCIUM mg/dL 8 9   ALBUMIN g/dL 2 5*   TOTAL BILIRUBIN mg/dL 0 67   ALK PHOS U/L 93   ALT U/L 17   AST U/L 23   GLUCOSE RANDOM mg/dL 211*     Results from last 7 days   Lab Units 02/01/22  0615   INR  1 00     Results from last 7 days   Lab Units 02/02/22  1123 02/02/22  0714 02/01/22  2043 02/01/22  1630 02/01/22  1158   POC GLUCOSE mg/dl 467* 248* 284* 304* 390*         Results from last 7 days   Lab Units 02/01/22  0801   LACTIC ACID mmol/L 2 1*       Lines/Drains:  Invasive Devices  Report    Peripheral Intravenous Line            Peripheral IV 02/02/22 Left Hand <1 day                  Telemetry:  Telemetry Orders (From admission, onward)             24 Hour Telemetry Monitoring  Continuous x 24 Hours (Telem)        References:    Telemetry Guidelines   Question:  Reason for 24 Hour Telemetry  Answer:  Metabolic/Electrolyte Disturbance with High Probability of Dysrhythmia (K level <3 or >6, or KCL infusion >=10mEq/hr)                 Telemetry Reviewed: Normal Sinus Rhythm  Indication for Continued Telemetry Use: Metabolic/electrolyte disturbance with high probability of dysrhythmia             Imaging: No pertinent imaging reviewed  Recent Cultures (last 7 days):   Results from last 7 days   Lab Units 02/01/22  0801 02/01/22  0706   BLOOD CULTURE  No Growth at 24 hrs  No Growth at 24 hrs   --    URINE CULTURE   --  Culture results to follow  Last 24 Hours Medication List:   Current Facility-Administered Medications   Medication Dose Route Frequency Provider Last Rate    acetaminophen  650 mg Oral Q6H PRN Shae Pellet, PA-C      cefTRIAXone  1,000 mg Intravenous Q24H Shae Pellet, PA-C 1,000 mg (02/02/22 0850)    donepezil  10 mg Oral Daily Shae Pellet, PA-C      heparin (porcine)  5,000 Units Subcutaneous [facility] Shae Pellet, PA-C      insulin glargine  10 Units Subcutaneous HS Shae Pellet, PA-C      insulin lispro  1-5 Units Subcutaneous TID [facility] Shae Pellet, PA-C      insulin lispro  1-5 Units Subcutaneous HS Shae Pellet, PA-C      magnesium oxide  400 mg Oral Daily Shae Pellet, PA-C      multi-electrolyte  75 mL/hr Intravenous Continuous Sherrine Saundra [place], [address] 75 mL/hr (02/01/22 2145)    ondansetron  4 mg Intravenous Q6H PRN Shae Pellet, PA-C      potassium chloride  40 mEq Oral Daily Shae Pellet, PA-C      potassium chloride  20 mEq Intravenous Q2H Hossein Andino PA-C 20 mEq (02/02/22 8308)        Today, Patient Was Seen By: Hossein Andino PA-C    **Please Note: This note may have been constructed using a voice recognition system  **

## 2022-02-02 NOTE — PROGRESS NOTES
Jen Deleon 80 y o  female MRN: [id number]  Unit/Bed#: S -01 Encounter: [id number]  Reason for Consult:  Hyponatremia    Summary:  Alfred Spicer is a 80 y o  female with a past medical history of CKD, hypertension, syncope, chronic hypokalemia, CKD, moderate aortic stenosis, diabetes mellitus type 2, dementia who was admitted to [facility] after presenting following a fall at the facility where she resides  Recurrent hospitalizations for hyperosmolar hyperglycemia  She was previously seen by our service for management of hypokalemia, hyponatremia, SALTY/CKD  ASSESSMENT and PLAN:  Hyponatremia:  · Primarily related to hyperglycemia, thiazide diuretic may be contributing along with hypokalemia  · Etiology:  Likely total body depletion in the setting of loop diuretics/thiazide diuretic  Potassium shift with treatment of hyperglycemia    Dyskalemia:  · Home medications furosemide, metolazone, potassium chloride 60 mEq daily  · Generally potassium low although earlier this month had an episode of hyperkalemia  · Potassium remains low, continue to replete  Received 80 mEq of potassium with 40 p o  And 40 IV  · Also given magnesium replacement  · Plan:  · Give another dose of K Dur 40 mEq later today  · Check labs in the a m  · Continue Mag-Ox  · Moving forward recommend the addition of a potassium sparing diuretic such as amiloride    CKD, stage III without proteinuria:    · Baseline creatinine 1 11 3 mg/dL  · Creatinine slightly above baseline on admission, 3 7  Improving with volume repletion  · Creatinine currently down to 1 14  · Will discontinue IV fluids    Hypercalcemia:  Likely related to volume depletion  Resolved    Other:  · Dementia  · Diastolic CHF:  Furosemide and metolazone    Ejection fraction 75% on last echocardiogram  · Aortic stenosis:  Moderate to severe  · Diabetes mellitus type 2:  Blood sugar improving      DISPOSITION:  Continue to replete potassium  Discontinue IV fluids    SUBJECTIVE / 24H INTERVAL HISTORY:  No acute complaints  Taking oral nourishment  Sitting out of bed in the chair visiting with family  Eating lunch  OBJECTIVE:  Current Weight: Weight - Scale: 71 4 kg (157 lb 6 5 oz)  Vitals:    02/01/22 2257 02/02/22 0600 02/02/22 0711 02/02/22 1342   BP: 124/56  113/62 118/72   BP Location: Left arm  Left arm Right arm   Pulse: 80  87 73   Resp: 18  18 20   Temp: 98 2 °F (36 8 °C)  98 5 °F (36 9 °C) 97 5 °F (36 4 °C)   TempSrc: Oral  Oral Oral   SpO2: 93%  91% 96%   Weight:  71 4 kg (157 lb 6 5 oz)     Height:           Intake/Output Summary (Last 24 hours) at 2/2/2022 1501  Last data filed at 2/1/2022 2145  Gross per 24 hour   Intake 0 ml   Output --   Net 0 ml     General: NAD, sitting out of bed in the chair  Skin: no rash, slightly pale-appearing  Skin warm and dry  Eyes: anicteric sclera  ENT: moist mucous membrane  Oropharynx clear and moist  Neck: supple, normal range of motion, no JVD noted  Chest:  Coarse breath sounds, rales right base  Normal effort  CVS: S6D0, systolic murmur, no gallop, no rub  Normal rate regular rhythm  Abdomen: soft, nontender, nl sounds, nondistended  Extremities:  Mild ankle edema LE b/l    No mottling or cyanosis  : no carrero  Neuro:  No acute deficits  Psych: normal affect  Medications:    Current Facility-Administered Medications:     acetaminophen (TYLENOL) tablet 650 mg, 650 mg, Oral, Q6H PRN, Rohini Hunter PA-C    cefTRIAXone (ROCEPHIN) 1,000 mg in dextrose 5 % 50 mL IVPB, 1,000 mg, Intravenous, Q24H, Rohini Hunter PA-C, Last Rate: 100 mL/hr at 02/02/22 0850, 1,000 mg at 02/02/22 0850    donepezil (ARICEPT) tablet 10 mg, 10 mg, Oral, Daily, Rohini Hunter PA-C, 10 mg at 02/02/22 0849    heparin (porcine) subcutaneous injection 5,000 Units, 5,000 Units, Subcutaneous, Q8H [address], 5,000 Units at 02/02/22 1336 **AND** [CANCELED] Platelet count, , , Once, Rohini Hunter PA-C    insulin glargine (LANTUS) subcutaneous injection 10 Units 0 1 mL, 10 Units, Subcutaneous, HS, Divina Silver, PA-C, 10 Units at 02/01/22 2145    insulin lispro (HumaLOG) 100 units/mL subcutaneous injection 1-5 Units, 1-5 Units, Subcutaneous, TID AC, 5 Units at 02/02/22 1233 **AND** Fingerstick Glucose (POCT), , , TID AC, Divina Silver, PA-C    insulin lispro (HumaLOG) 100 units/mL subcutaneous injection 1-5 Units, 1-5 Units, Subcutaneous, HS, Divina Silver, PA-C, 3 Units at 02/01/22 2145    magnesium oxide (MAG-OX) tablet 400 mg, 400 mg, Oral, Daily, Divina Silver, PA-C, 400 mg at 02/02/22 0848    ondansetron (ZOFRAN) injection 4 mg, 4 mg, Intravenous, Q6H PRN, Divina Silver, PA-C    potassium chloride (K-DUR,KLOR-CON) CR tablet 40 mEq, 40 mEq, Oral, Daily, Divina Silver, PA-C, 40 mEq at 02/02/22 0848    potassium chloride (K-DUR,KLOR-CON) CR tablet 40 mEq, 40 mEq, Oral, Once, AME Lopez    Laboratory Results:  Results from last 7 days   Lab Units 02/02/22  0539 02/01/22  2247 02/01/22  1212 02/01/22  0615   WBC Thousand/uL 14 86*  --   --  17 91*   HEMOGLOBIN g/dL 12 1  --   --  15 0   HEMATOCRIT % 34 6*  --   --  42 3   PLATELETS Thousands/uL 247  --   --  324   POTASSIUM mmol/L 2 9* 2 6* 2 5* 3 8   CHLORIDE mmol/L 94* 93* 87* 84*   CO2 mmol/L 23 24 24 24   BUN mg/dL 19 20 23 23   CREATININE mg/dL 1 14 1 22 1 38* 1 37*   CALCIUM mg/dL 8 9 8 6 9 3 9 7   MAGNESIUM mg/dL 2 3 1 8  --   --

## 2022-02-03 PROBLEM — I48.91 NEW ONSET ATRIAL FIBRILLATION (HCC): Status: ACTIVE | Noted: 2022-02-03

## 2022-02-03 LAB
ALBUMIN SERPL BCP-MCNC: 2.3 G/DL (ref 3.5–5)
ALP SERPL-CCNC: 87 U/L (ref 46–116)
ALT SERPL W P-5'-P-CCNC: 18 U/L (ref 12–78)
ANION GAP SERPL CALCULATED.3IONS-SCNC: 10 MMOL/L (ref 4–13)
AST SERPL W P-5'-P-CCNC: 24 U/L (ref 5–45)
ATRIAL RATE: 63 BPM
BILIRUB SERPL-MCNC: 0.45 MG/DL (ref 0.2–1)
BUN SERPL-MCNC: 16 MG/DL (ref 5–25)
CALCIUM ALBUM COR SERPL-MCNC: 9.7 MG/DL (ref 8.3–10.1)
CALCIUM SERPL-MCNC: 8.3 MG/DL (ref 8.3–10.1)
CHLORIDE SERPL-SCNC: 98 MMOL/L (ref 100–108)
CO2 SERPL-SCNC: 22 MMOL/L (ref 21–32)
CREAT SERPL-MCNC: 0.9 MG/DL (ref 0.6–1.3)
ERYTHROCYTE [DISTWIDTH] IN BLOOD BY AUTOMATED COUNT: 13.4 % (ref 11.6–15.1)
GFR SERPL CREATININE-BSD FRML MDRD: 53 ML/MIN/1.73SQ M
GLUCOSE SERPL-MCNC: 104 MG/DL (ref 65–140)
GLUCOSE SERPL-MCNC: 126 MG/DL (ref 65–140)
GLUCOSE SERPL-MCNC: 180 MG/DL (ref 65–140)
GLUCOSE SERPL-MCNC: 201 MG/DL (ref 65–140)
GLUCOSE SERPL-MCNC: 245 MG/DL (ref 65–140)
HCT VFR BLD AUTO: 34.6 % (ref 34.8–46.1)
HGB BLD-MCNC: 12.1 G/DL (ref 11.5–15.4)
MAGNESIUM SERPL-MCNC: 2 MG/DL (ref 1.6–2.6)
MCH RBC QN AUTO: 30.4 PG (ref 26.8–34.3)
MCHC RBC AUTO-ENTMCNC: 35 G/DL (ref 31.4–37.4)
MCV RBC AUTO: 87 FL (ref 82–98)
MRSA NOSE QL CULT: NORMAL
PLATELET # BLD AUTO: 260 THOUSANDS/UL (ref 149–390)
PMV BLD AUTO: 11 FL (ref 8.9–12.7)
POTASSIUM SERPL-SCNC: 2.4 MMOL/L (ref 3.5–5.3)
POTASSIUM SERPL-SCNC: 3.3 MMOL/L (ref 3.5–5.3)
PROT SERPL-MCNC: 6.3 G/DL (ref 6.4–8.2)
QRS AXIS: 23 DEGREES
QRSD INTERVAL: 150 MS
QT INTERVAL: 464 MS
QTC INTERVAL: 464 MS
RBC # BLD AUTO: 3.98 MILLION/UL (ref 3.81–5.12)
SODIUM SERPL-SCNC: 130 MMOL/L (ref 136–145)
T WAVE AXIS: 207 DEGREES
VENTRICULAR RATE: 60 BPM
WBC # BLD AUTO: 15.28 THOUSAND/UL (ref 4.31–10.16)

## 2022-02-03 PROCEDURE — 85027 COMPLETE CBC AUTOMATED: CPT | Performed by: PHYSICIAN ASSISTANT

## 2022-02-03 PROCEDURE — 93005 ELECTROCARDIOGRAM TRACING: CPT

## 2022-02-03 PROCEDURE — 99232 SBSQ HOSP IP/OBS MODERATE 35: CPT | Performed by: PHYSICIAN ASSISTANT

## 2022-02-03 PROCEDURE — 82948 REAGENT STRIP/BLOOD GLUCOSE: CPT

## 2022-02-03 PROCEDURE — 80053 COMPREHEN METABOLIC PANEL: CPT | Performed by: PHYSICIAN ASSISTANT

## 2022-02-03 PROCEDURE — 83735 ASSAY OF MAGNESIUM: CPT | Performed by: NURSE PRACTITIONER

## 2022-02-03 PROCEDURE — 84132 ASSAY OF SERUM POTASSIUM: CPT | Performed by: PHYSICIAN ASSISTANT

## 2022-02-03 PROCEDURE — 93010 ELECTROCARDIOGRAM REPORT: CPT | Performed by: INTERNAL MEDICINE

## 2022-02-03 PROCEDURE — 99232 SBSQ HOSP IP/OBS MODERATE 35: CPT | Performed by: INTERNAL MEDICINE

## 2022-02-03 RX ORDER — POTASSIUM CHLORIDE 14.9 MG/ML
20 INJECTION INTRAVENOUS
Status: DISCONTINUED | OUTPATIENT
Start: 2022-02-03 | End: 2022-02-03

## 2022-02-03 RX ORDER — CEFDINIR 300 MG/1
300 CAPSULE ORAL EVERY 12 HOURS SCHEDULED
Status: DISCONTINUED | OUTPATIENT
Start: 2022-02-04 | End: 2022-02-04

## 2022-02-03 RX ORDER — POTASSIUM CHLORIDE 20 MEQ/1
40 TABLET, EXTENDED RELEASE ORAL 2 TIMES DAILY
Status: DISPENSED | OUTPATIENT
Start: 2022-02-03 | End: 2022-02-04

## 2022-02-03 RX ORDER — AMILORIDE HYDROCHLORIDE 5 MG/1
5 TABLET ORAL DAILY
Status: DISCONTINUED | OUTPATIENT
Start: 2022-02-03 | End: 2022-02-04

## 2022-02-03 RX ORDER — POTASSIUM CHLORIDE 14.9 MG/ML
20 INJECTION INTRAVENOUS
Status: COMPLETED | OUTPATIENT
Start: 2022-02-03 | End: 2022-02-03

## 2022-02-03 RX ORDER — SODIUM CHLORIDE AND POTASSIUM CHLORIDE .9; .15 G/100ML; G/100ML
70 SOLUTION INTRAVENOUS CONTINUOUS
Status: DISCONTINUED | OUTPATIENT
Start: 2022-02-03 | End: 2022-02-05

## 2022-02-03 RX ADMIN — APIXABAN 5 MG: 5 TABLET, FILM COATED ORAL at 08:19

## 2022-02-03 RX ADMIN — SODIUM CHLORIDE 70 ML/HR: 0.9 INJECTION, SOLUTION INTRAVENOUS at 06:56

## 2022-02-03 RX ADMIN — CEFTRIAXONE SODIUM 1000 MG: 10 INJECTION, POWDER, FOR SOLUTION INTRAVENOUS at 08:17

## 2022-02-03 RX ADMIN — POTASSIUM CHLORIDE 20 MEQ: 200 INJECTION, SOLUTION INTRAVENOUS at 12:40

## 2022-02-03 RX ADMIN — NYSTATIN 1 APPLICATION: 100000 POWDER TOPICAL at 17:46

## 2022-02-03 RX ADMIN — INSULIN LISPRO 2 UNITS: 100 INJECTION, SOLUTION INTRAVENOUS; SUBCUTANEOUS at 12:00

## 2022-02-03 RX ADMIN — NYSTATIN: 100000 POWDER TOPICAL at 10:02

## 2022-02-03 RX ADMIN — INSULIN GLARGINE 10 UNITS: 100 INJECTION, SOLUTION SUBCUTANEOUS at 22:23

## 2022-02-03 RX ADMIN — AMILORIDE HYDROCLORIDE 5 MG: 5 TABLET ORAL at 14:44

## 2022-02-03 RX ADMIN — POTASSIUM CHLORIDE 40 MEQ: 1500 TABLET, EXTENDED RELEASE ORAL at 08:19

## 2022-02-03 RX ADMIN — DONEPEZIL HYDROCHLORIDE 10 MG: 5 TABLET, FILM COATED ORAL at 08:19

## 2022-02-03 RX ADMIN — POTASSIUM CHLORIDE 20 MEQ: 200 INJECTION, SOLUTION INTRAVENOUS at 10:02

## 2022-02-03 RX ADMIN — INSULIN LISPRO 1 UNITS: 100 INJECTION, SOLUTION INTRAVENOUS; SUBCUTANEOUS at 22:23

## 2022-02-03 RX ADMIN — APIXABAN 5 MG: 5 TABLET, FILM COATED ORAL at 17:45

## 2022-02-03 RX ADMIN — POTASSIUM CHLORIDE 40 MEQ: 1500 TABLET, EXTENDED RELEASE ORAL at 17:45

## 2022-02-03 RX ADMIN — MAGNESIUM OXIDE TAB 400 MG (241.3 MG ELEMENTAL MG) 400 MG: 400 (241.3 MG) TAB at 08:19

## 2022-02-03 RX ADMIN — HEPARIN SODIUM 5000 UNITS: 5000 INJECTION INTRAVENOUS; SUBCUTANEOUS at 05:17

## 2022-02-03 RX ADMIN — METOPROLOL TARTRATE 25 MG: 25 TABLET, FILM COATED ORAL at 22:23

## 2022-02-03 RX ADMIN — METOPROLOL TARTRATE 25 MG: 25 TABLET, FILM COATED ORAL at 08:33

## 2022-02-03 RX ADMIN — SODIUM CHLORIDE AND POTASSIUM CHLORIDE 70 ML/HR: .9; .15 SOLUTION INTRAVENOUS at 15:16

## 2022-02-03 RX ADMIN — INSULIN LISPRO 1 UNITS: 100 INJECTION, SOLUTION INTRAVENOUS; SUBCUTANEOUS at 17:45

## 2022-02-03 NOTE — PLAN OF CARE
Problem: Potential for Falls  Goal: Patient will remain free of falls  Description: INTERVENTIONS:  - Educate patient/family on patient safety including physical limitations  - Instruct patient to call for assistance with activity   - Consult OT/PT to assist with strengthening/mobility   - Keep Call bell within reach  - Keep bed low and locked with side rails adjusted as appropriate  - Keep care items and personal belongings within reach  - Initiate and maintain comfort rounds  - Make Fall Risk Sign visible to staff  - Apply yellow socks and bracelet for high fall risk patients  - Consider moving patient to room near nurses station  Outcome: Progressing     Problem: PAIN - ADULT  Goal: Verbalizes/displays adequate comfort level or baseline comfort level  Description: Interventions:  - Encourage patient to monitor pain and request assistance  - Assess pain using appropriate pain scale  - Administer analgesics based on type and severity of pain and evaluate response  - Implement non-pharmacological measures as appropriate and evaluate response  - Consider cultural and social influences on pain and pain management  - Notify physician/advanced practitioner if interventions unsuccessful or patient reports new pain  Outcome: Progressing     Problem: INFECTION - ADULT  Goal: Absence or prevention of progression during hospitalization  Description: INTERVENTIONS:  - Assess and monitor for signs and symptoms of infection  - Monitor lab/diagnostic results  - Monitor all insertion sites, i e  indwelling lines, tubes, and drains  - Monitor endotracheal if appropriate and nasal secretions for changes in amount and color  - [place] appropriate cooling/warming therapies per order  - Administer medications as ordered  - Instruct and encourage patient and family to use good hand hygiene technique  - Identify and instruct in appropriate isolation precautions for identified infection/condition  Outcome: Progressing     Problem: SAFETY ADULT  Goal: Patient will remain free of falls  Description: INTERVENTIONS:  - Educate patient/family on patient safety including physical limitations  - Instruct patient to call for assistance with activity   - Consult OT/PT to assist with strengthening/mobility   - Keep Call bell within reach  - Keep bed low and locked with side rails adjusted as appropriate  - Keep care items and personal belongings within reach  - Initiate and maintain comfort rounds  - Make Fall Risk Sign visible to staff    - Apply yellow socks and bracelet for high fall risk patients  - Consider moving patient to room near nurses station  Outcome: Progressing  Goal: Maintain or return to baseline ADL function  Description: INTERVENTIONS:  -  Assess patient's ability to carry out ADLs; assess patient's baseline for ADL function and identify physical deficits which impact ability to perform ADLs (bathing, care of mouth/teeth, toileting, grooming, dressing, etc )  - Assess/evaluate cause of self-care deficits   - Assess range of motion  - Assess patient's mobility; develop plan if impaired  - Assess patient's need for assistive devices and provide as appropriate  - Encourage maximum independence but intervene and supervise when necessary  - Involve family in performance of ADLs  - Assess for home care needs following discharge   - Consider OT consult to assist with ADL evaluation and planning for discharge  - Provide patient education as appropriate  Outcome: Progressing  Goal: Maintains/Returns to pre admission functional level  Description: INTERVENTIONS:  - Perform BMAT or MOVE assessment daily    - Set and communicate daily mobility goal to care team and patient/family/caregiver     - Collaborate with rehabilitation services on mobility goals if consulted  - Out of bed for toileting  - Record patient progress and toleration of activity level   Outcome: Progressing     Problem: DISCHARGE PLANNING  Goal: Discharge to home or other facility with appropriate resources  Description: INTERVENTIONS:  - Identify barriers to discharge w/patient and caregiver  - Arrange for needed discharge resources and transportation as appropriate  - Identify discharge learning needs (meds, wound care, etc )  - Arrange for interpretive services to assist at discharge as needed  - Refer to Case Management Department for coordinating discharge planning if the patient needs post-hospital services based on physician/advanced practitioner order or complex needs related to functional status, cognitive ability, or social support system  Outcome: Progressing     Problem: Knowledge Deficit  Goal: Patient/family/caregiver demonstrates understanding of disease process, treatment plan, medications, and discharge instructions  Description: Complete learning assessment and assess knowledge base    Interventions:  - Provide teaching at level of understanding  - Provide teaching via preferred learning methods  Outcome: Progressing     Problem: MOBILITY - ADULT  Goal: Maintain or return to baseline ADL function  Description: INTERVENTIONS:  -  Assess patient's ability to carry out ADLs; assess patient's baseline for ADL function and identify physical deficits which impact ability to perform ADLs (bathing, care of mouth/teeth, toileting, grooming, dressing, etc )  - Assess/evaluate cause of self-care deficits   - Assess range of motion  - Assess patient's mobility; develop plan if impaired  - Assess patient's need for assistive devices and provide as appropriate  - Encourage maximum independence but intervene and supervise when necessary  - Involve family in performance of ADLs  - Assess for home care needs following discharge   - Consider OT consult to assist with ADL evaluation and planning for discharge  - Provide patient education as appropriate  Outcome: Progressing  Goal: Maintains/Returns to pre admission functional level  Description: INTERVENTIONS:  - Perform BMAT or MOVE assessment daily    - Set and communicate daily mobility goal to care team and patient/family/caregiver     - Collaborate with rehabilitation services on mobility goals if consulted  - Out of bed for toileting  - Record patient progress and toleration of activity level   Outcome: Progressing     Problem: Prexisting or High Potential for Compromised Skin Integrity  Goal: Skin integrity is maintained or improved  Description: INTERVENTIONS:  - Identify patients at risk for skin breakdown  - Assess and monitor skin integrity  - Assess and monitor nutrition and hydration status  - Monitor labs   - Assess for incontinence   - Turn and reposition patient  - Assist with mobility/ambulation  - Relieve pressure over bony prominences  - Avoid friction and shearing  - Provide appropriate hygiene as needed including keeping skin clean and dry  - Evaluate need for skin moisturizer/barrier cream  - Collaborate with interdisciplinary team   - Patient/family teaching  - Consider wound care consult   Outcome: Progressing

## 2022-02-03 NOTE — CASE MANAGEMENT
Case Management Progress Note    Patient name Faraz JORGENSEN /S -01 MRN [id number]  : 1923 Date 2/3/2022       LOS (days): 2  Geometric Mean LOS (GMLOS) (days): 3 50  Days to GMLOS:1 3        OBJECTIVE:        Current admission status: Inpatient  Preferred Pharmacy:   [address] [place] [address] [address]  [facility] [place] [address]  Phone: [phone] Fax: Michael [address] [place] [address] 63  [address] [facility] [address]  Phone: [phone] Fax: [phone]    Primary Care Provider: No primary care provider on file  Primary Insurance: MEDICARE  Secondary Insurance: AAR    PROGRESS NOTE:    CM called [address] for a price check of new medication for pt, Eliquis  Pt's co-pay is $97 18/mo  CM received a message from S: Maria Arroyo can take her tomorrow or monday  Not saturday or   I have in house residents that are out and will come and staffing is a challenge  CM passed the message to medical, per medical pt may not be stable enough to go on Friday

## 2022-02-03 NOTE — ASSESSMENT & PLAN NOTE
· Early morning 2/3 patient went into A  Fib on Telemetry  New onset  She is rate controlled   [id number] is 4   · Likely secondary to hypokalemia and age  · Discussed with daughter she wants to pursue anticoagulation   · Metoprolol 25 mg BID started  · Monitor response   · Continue to correct electrolytes   · Eliquis 5 mg BID started   · Follow up echo

## 2022-02-03 NOTE — ASSESSMENT & PLAN NOTE
Lab Results   Component Value Date    HGBA1C 13 0 (H) 12/28/2021       Recent Labs     02/02/22  1600 02/02/22  2140 02/03/22  0726 02/03/22  1053   POCGLU 424* 230* 126 245*       Blood Sugar Average: Last 72 hrs:  · (P) 302   · Glucose on BMP elevated on admission at 319  BG drastically above goal and labile   · Hold home medications, sitagliptin 100 mg, repaglinide 2mg, and glipizide 10mg  · Start on Lantus 10 U QHS with sliding scale as per last Endocrinology note  · Sliding scale coverage    · Monitor sugars via accu-check  · Ultimately, given advanced age and dementia no indication for tightly controlled blood sugars

## 2022-02-03 NOTE — ASSESSMENT & PLAN NOTE
· Patient was found down between her bed and nightstand at her assisted living this AM  Likely mechanical in nature  · She lives at assisted living  · No focal neurologic deficits    · Patient has history of falls while at this facility due to dementia  · CT head: no acute intracranial abnormalities  · PT/OT consult   · Recommending STR unless assisted living can provide increased assistance   · Per [facility], patient needs rehab - pending [facility] referral   · Fall Risk Precautions

## 2022-02-03 NOTE — ASSESSMENT & PLAN NOTE
· Sodium upon admission was 124, corrected sodium 128 in the setting of hyperglycemia      · Stable at 130 without hyperglycemia   · IVF stopped per Nephrology recs  · Monitor BMP

## 2022-02-03 NOTE — ASSESSMENT & PLAN NOTE
· On admission WBC 17 91, tachycardia, lactic acid 2 1, and source of infection - suspected UTI  · Lactic acid was never repeated  · Leukocytosis improved   · Tachycardia resolved   · Blood cultures negative at 48 hours   · Urine Culture growing Morganella Morganii sensitive to Omnicef   · Change to Omnicef 300 mg PO BID to complete 7 days total

## 2022-02-03 NOTE — ASSESSMENT & PLAN NOTE
· UA: small amount of leukocytes and blood, 2+ protein and 250 glucose  · Urine microscopy: WBC 4-10 and clumped white blood cells  · Continue Omnicef as above

## 2022-02-03 NOTE — ASSESSMENT & PLAN NOTE
· Noted at 2 5 on admission  · Worse at 2 4 today   · 40 mEq PO twice today, 40 mEq IV today  · BMP in AM   · Continue telemetry for another 24 hours

## 2022-02-03 NOTE — ASSESSMENT & PLAN NOTE
Lab Results   Component Value Date    EGFR 53 02/03/2022    EGFR 40 02/02/2022    EGFR 36 02/01/2022    CREATININE 0 90 02/03/2022    CREATININE 1 14 02/02/2022    CREATININE 1 22 02/01/2022   · Baseline Cr 0 9   Elevated Cr on admission to  37, improved to 0 9, at baseline  · Nephrology consult appreciated   · No further need for IVF given 1 L completed  · Monitor I/O  · BMP in AM

## 2022-02-03 NOTE — PROGRESS NOTES
[facility]  Progress Note - Hollie Zamora 11/22/1923, 80 y o  female MRN: [id number]  Unit/Bed#: S -01 Encounter: [id number]  Primary Care Provider: No primary care provider on file  Date and time admitted to hospital: 2/1/2022  5:59 AM    Addendum: Patient's initial EKG when suspected to be in A  Fib was with a rate of 60  I then personally saw heart rates between 100-110 on telemetry labelled in A  Fib  Plan as below  * Ambulatory dysfunction  Assessment & Plan  · Patient was found down between her bed and nightstand at her assisted living this AM  Likely mechanical in nature  · She lives at assisted living  · No focal neurologic deficits  · Patient has history of falls while at this facility due to dementia  · CT head: no acute intracranial abnormalities  · PT/OT consult   · Recommending STR unless assisted living can provide increased assistance   · Per [facility], patient needs rehab - pending [facility] referral   · Fall Risk Precautions    New onset atrial fibrillation ([facility] [address] )  Assessment & Plan  · Early morning 2/3 patient went into A  Fib on Telemetry  New onset  She is rate controlled  [id number] is 4   · Likely secondary to hypokalemia and age  · Discussed with daughter she wants to pursue anticoagulation   · Metoprolol 25 mg BID started  · Monitor response   · Continue to correct electrolytes   · Eliquis 5 mg BID started   · Follow up echo     Acute-on-chronic kidney injury [facility])  Assessment & Plan  Lab Results   Component Value Date    EGFR 53 02/03/2022    EGFR 40 02/02/2022    EGFR 36 02/01/2022    CREATININE 0 90 02/03/2022    CREATININE 1 14 02/02/2022    CREATININE 1 22 02/01/2022   · Baseline Cr 0 9   Elevated Cr on admission to  37, improved to 0 9, at baseline  · Nephrology consult appreciated   · No further need for IVF given 1 L completed  · Monitor I/O  · BMP in AM    Hyponatremia  Assessment & Plan  · Sodium upon admission was 124, corrected sodium 128 in the setting of hyperglycemia  · Stable at 130 without hyperglycemia   · IVF stopped per Nephrology recs  · Monitor BMP      Hypokalemia  Assessment & Plan  · Noted at 2 5 on admission  · Worse at 2 4 today   · 40 mEq PO twice today, 40 mEq IV today  · BMP in AM   · Continue telemetry for another 24 hours     Severe sepsis (HCC)  Assessment & Plan  · On admission WBC 17 91, tachycardia, lactic acid 2 1, and source of infection - suspected UTI  · Lactic acid was never repeated  · Leukocytosis improved   · Tachycardia resolved   · Blood cultures negative at 48 hours   · Urine Culture growing Morganella Morganii sensitive to Omnicef   · Change to Omnicef 300 mg PO BID to complete 7 days total      UTI (urinary tract infection)  Assessment & Plan  · UA: small amount of leukocytes and blood, 2+ protein and 250 glucose  · Urine microscopy: WBC 4-10 and clumped white blood cells  · Continue Omnicef as above     Uncomplicated senile dementia (HCC)  Assessment & Plan  · Stable   · Continue Donepezil 10 mg     Type 2 diabetes mellitus with diabetic chronic kidney disease [facility])  Assessment & Plan  Lab Results   Component Value Date    HGBA1C 13 0 (H) 12/28/2021       Recent Labs     02/02/22  1600 02/02/22  2140 02/03/22  0726 02/03/22  1053   POCGLU 424* 230* 126 245*       Blood Sugar Average: Last 72 hrs:  · (P) 302   · Glucose on BMP elevated on admission at 319  BG drastically above goal and labile   · Hold home medications, sitagliptin 100 mg, repaglinide 2mg, and glipizide 10mg  · Start on Lantus 10 U QHS with sliding scale as per last Endocrinology note  · Sliding scale coverage    · Monitor sugars via accu-check  · Ultimately, given advanced age and dementia no indication for tightly controlled blood sugars     Aortic stenosis  Assessment & Plan  Wt Readings from Last 3 Encounters:   02/02/22 71 4 kg (157 lb 6 5 oz)   01/04/22 71 1 kg (156 lb 11 2 oz)   05/03/21 78 kg (172 lb)     · Patient with history of severe aortic stenosis  · Monitor for volume overload with fluids  · Diuretics on hold          VTE Pharmacologic Prophylaxis: VTE Score: 5 High Risk (Score >/= 5) - Pharmacological DVT Prophylaxis Ordered: apixaban (Eliquis)  Sequential Compression Devices Ordered  Patient Centered Rounds: I performed bedside rounds with nursing staff today  Discussions with Specialists or Other Care Team Provider: Discussed with RN, DARWIN    Education and Discussions with Family / Patient: Updated  (daughter) via phone  Time Spent for Care: 30 minutes  More than 50% of total time spent on counseling and coordination of care as described above  Current Length of Stay: 2 day(s)  Current Patient Status: Inpatient   Certification Statement: The patient will continue to require additional inpatient hospital stay due to further repletion of potassium, completion of cardiology consult   Discharge Plan: Anticipate discharge in 24-48 hrs to rehab facility  Code Status: Level 3 - DNAR and DNI    Subjective:   Patient has no complaints today  Denies chest pain, palpitations, or shortness of breath  Denies pain anywhere  Denies fevers or chills  Objective:     Vitals:   Temp (24hrs), Av 8 °F (36 6 °C), Min:97 4 °F (36 3 °C), Max:98 7 °F (37 1 °C)    Temp:  [97 4 °F (36 3 °C)-98 7 °F (37 1 °C)] 98 7 °F (37 1 °C)  HR:  [70-98] 98  Resp:  [18-20] 18  BP: (116-137)/(56-83) 128/72  SpO2:  [95 %-96 %] 96 %  Body mass index is 31 26 kg/m²  Input and Output Summary (last 24 hours): Intake/Output Summary (Last 24 hours) at 2/3/2022 1237  Last data filed at 2/3/2022 0656  Gross per 24 hour   Intake 1023 17 ml   Output 500 ml   Net 523 17 ml       Physical Exam:   Physical Exam  Constitutional:       General: She is not in acute distress  Appearance: Normal appearance  She is normal weight  She is not ill-appearing or diaphoretic  HENT:      Head: Normocephalic and atraumatic        Mouth/Throat:      Mouth: Mucous membranes are moist    Eyes:      General: No scleral icterus  Pupils: Pupils are equal, round, and reactive to light  Cardiovascular:      Rate and Rhythm: Normal rate  Rhythm irregularly irregular  Pulses: Normal pulses  Heart sounds: Normal heart sounds, S1 normal and S2 normal  No murmur heard  No systolic murmur is present  No diastolic murmur is present  No gallop  No S3 or S4 sounds  Pulmonary:      Effort: Pulmonary effort is normal  No accessory muscle usage or respiratory distress  Breath sounds: Normal breath sounds  No stridor  No decreased breath sounds, wheezing, rhonchi or rales  Chest:      Chest wall: No tenderness  Abdominal:      General: Bowel sounds are normal  There is no distension  Palpations: Abdomen is soft  Tenderness: There is no abdominal tenderness  There is no guarding  Musculoskeletal:      Right lower leg: No edema  Left lower leg: No edema  Skin:     General: Skin is warm and dry  Coloration: Skin is not jaundiced  Neurological:      General: No focal deficit present  Mental Status: She is alert  Mental status is at baseline  Motor: No tremor or seizure activity  Psychiatric:         Behavior: Behavior is cooperative  Additional Data:     Labs:  Results from last 7 days   Lab Units 02/03/22  0516 02/02/22  0539 02/02/22  0539 02/01/22  0615 02/01/22  0615   WBC Thousand/uL 15 28*   < > 14 86*   < > 17 91*   HEMOGLOBIN g/dL 12 1   < > 12 1   < > 15 0   HEMATOCRIT % 34 6*   < > 34 6*   < > 42 3   PLATELETS Thousands/uL 260   < > 247   < > 324   BANDS PCT %  --   --   --   --  2   NEUTROS PCT %  --   --  63  --   --    LYMPHS PCT %  --   --  26  --   --    LYMPHO PCT %  --   --   --   --  23   MONOS PCT %  --   --  9  --   --    MONO PCT %  --   --   --   --  11   EOS PCT %  --   --  0  --  0    < > = values in this interval not displayed       Results from last 7 days   Lab Units 02/03/22  0516   SODIUM mmol/L 130*   POTASSIUM mmol/L 2 4*   CHLORIDE mmol/L 98*   CO2 mmol/L 22   BUN mg/dL 16   CREATININE mg/dL 0 90   ANION GAP mmol/L 10   CALCIUM mg/dL 8 3   ALBUMIN g/dL 2 3*   TOTAL BILIRUBIN mg/dL 0 45   ALK PHOS U/L 87   ALT U/L 18   AST U/L 24   GLUCOSE RANDOM mg/dL 104     Results from last 7 days   Lab Units 02/01/22  0615   INR  1 00     Results from last 7 days   Lab Units 02/03/22  1053 02/03/22  0726 02/02/22  2140 02/02/22  1600 02/02/22  1123 02/02/22  0714 02/01/22  2043 02/01/22  1630 02/01/22  1158   POC GLUCOSE mg/dl 245* 126 230* 424* 467* 248* 284* 304* 390*         Results from last 7 days   Lab Units 02/01/22  0801   LACTIC ACID mmol/L 2 1*       Lines/Drains:  Invasive Devices  Report    Peripheral Intravenous Line            Peripheral IV 02/02/22 Left Hand 1 day                  Telemetry:  Telemetry Orders (From admission, onward)             24 Hour Telemetry Monitoring  Continuous x 24 Hours (Telem)        References:    Telemetry Guidelines   Question:  Reason for 24 Hour Telemetry  Answer:  Metabolic/Electrolyte Disturbance with High Probability of Dysrhythmia (K level <3 or >6, or KCL infusion >=10mEq/hr)                 Telemetry Reviewed: Atrial fibrillation  HR averaging   Indication for Continued Telemetry Use: Arrthymias requiring medical therapy             Imaging: No pertinent imaging reviewed  Recent Cultures (last 7 days):   Results from last 7 days   Lab Units 02/01/22  0801 02/01/22  0706   BLOOD CULTURE  No Growth at 48 hrs  No Growth at 48 hrs    --    URINE CULTURE   --  <10,000 cfu/ml Morganella morganii*       Last 24 Hours Medication List:   Current Facility-Administered Medications   Medication Dose Route Frequency Provider Last Rate    acetaminophen  650 mg Oral Q6H PRN Negrita Markham PA-C      apixaban  5 mg Oral BID AME Hodge      [START ON 2/4/2022] cefdinir  300 mg Oral Q12H [address] Pan Kinsey PA-C      donepezil  10 mg Oral Daily Kenya Oliva PA-C      insulin glargine  10 Units Subcutaneous HS Kenya Oliva PA-C      insulin lispro  1-5 Units Subcutaneous TID [facility] [place] Kenya Oliva PA-C      insulin lispro  1-5 Units Subcutaneous HS Kenya Oliva PA-C      magnesium oxide  400 mg Oral Daily Kenya Oliva PA-C      metoprolol tartrate  25 mg Oral Q12H [address] [facility] LU[facility] Nava      nystatin   Topical BID Shermon Claude, CRNP      ondansetron  4 mg Intravenous Q6H PRN Kenya Oliav PA-C      potassium chloride  40 mEq Oral Daily [facility]      potassium chloride  40 mEq Oral BID [facility]      potassium chloride  20 mEq Intravenous Q2H Madonna Thomas PA-C 20 mEq (02/03/22 1002)        Today, Patient Was Seen By: Madonna Thomas PA-C    **Please Note: This note may have been constructed using a voice recognition system  **

## 2022-02-04 ENCOUNTER — APPOINTMENT (INPATIENT)
Dept: NON INVASIVE DIAGNOSTICS | Facility: HOSPITAL | Age: 87
DRG: 872 | End: 2022-02-04
Payer: MEDICARE

## 2022-02-04 LAB
ANION GAP SERPL CALCULATED.3IONS-SCNC: 8 MMOL/L (ref 4–13)
AORTIC ROOT: 3 CM
AORTIC VALVE MEAN VELOCITY: 30.9 M/S
APICAL FOUR CHAMBER EJECTION FRACTION: 50 %
AV AREA BY CONTINUOUS VTI: 0.4 CM2
AV AREA PEAK VELOCITY: 0.5 CM2
AV LVOT MEAN GRADIENT: 1 MMHG
AV LVOT PEAK GRADIENT: 2 MMHG
AV MEAN GRADIENT: 40 MMHG
AV PEAK GRADIENT: 55 MMHG
AV VALVE AREA: 0.42 CM2
AV VELOCITY RATIO: 0.2
BACTERIA UR CULT: ABNORMAL
BUN SERPL-MCNC: 12 MG/DL (ref 5–25)
CALCIUM SERPL-MCNC: 8.3 MG/DL (ref 8.3–10.1)
CHLORIDE SERPL-SCNC: 100 MMOL/L (ref 100–108)
CO2 SERPL-SCNC: 22 MMOL/L (ref 21–32)
CREAT SERPL-MCNC: 0.86 MG/DL (ref 0.6–1.3)
DOP CALC AO PEAK VEL: 3.71 M/S
DOP CALC AO VTI: 94.83 CM
DOP CALC LVOT AREA: 2.27 CM2
DOP CALC LVOT DIAMETER: 1.7 CM
DOP CALC LVOT PEAK VEL VTI: 17.56 CM
DOP CALC LVOT PEAK VEL: 0.76 M/S
DOP CALC LVOT STROKE INDEX: 24.4 ML/M2
DOP CALC LVOT STROKE VOLUME: 39.84 CM3
DOP CALC MV VTI: 43.69 CM
E WAVE DECELERATION TIME: 184 MS
ERYTHROCYTE [DISTWIDTH] IN BLOOD BY AUTOMATED COUNT: 13.7 % (ref 11.6–15.1)
FRACTIONAL SHORTENING: 26 % (ref 28–44)
GFR SERPL CREATININE-BSD FRML MDRD: 56 ML/MIN/1.73SQ M
GLUCOSE SERPL-MCNC: 119 MG/DL (ref 65–140)
GLUCOSE SERPL-MCNC: 166 MG/DL (ref 65–140)
GLUCOSE SERPL-MCNC: 224 MG/DL (ref 65–140)
GLUCOSE SERPL-MCNC: 260 MG/DL (ref 65–140)
GLUCOSE SERPL-MCNC: 282 MG/DL (ref 65–140)
GLUCOSE SERPL-MCNC: 297 MG/DL (ref 65–140)
HCT VFR BLD AUTO: 34.1 % (ref 34.8–46.1)
HGB BLD-MCNC: 11.7 G/DL (ref 11.5–15.4)
INTERVENTRICULAR SEPTUM IN DIASTOLE (PARASTERNAL SHORT AXIS VIEW): 1.4 CM (ref 0.51–0.95)
LEFT ATRIUM AREA SYSTOLE SINGLE PLANE A4C: 20.2 CM2
LEFT ATRIUM SIZE: 4.8 CM
LEFT INTERNAL DIMENSION IN SYSTOLE: 2.9 CM (ref 2.1–4)
LEFT VENTRICULAR INTERNAL DIMENSION IN DIASTOLE: 3.9 CM (ref 3.99–5.94)
LEFT VENTRICULAR POSTERIOR WALL IN END DIASTOLE: 1.1 CM (ref 0.49–0.94)
LEFT VENTRICULAR STROKE VOLUME: 32 ML
MCH RBC QN AUTO: 29.8 PG (ref 26.8–34.3)
MCHC RBC AUTO-ENTMCNC: 34.3 G/DL (ref 31.4–37.4)
MCV RBC AUTO: 87 FL (ref 82–98)
MITRAL REGURGITATION PEAK VELOCITY: 5.79 M/S
MITRAL VALVE MEAN INFLOW VELOCITY: 4.51 M/S
MITRAL VALVE REGURGITANT PEAK GRADIENT: 134 MMHG
MV E'TISSUE VEL-SEP: 5 CM/S
MV MEAN GRADIENT: 6 MMHG
MV PEAK A VEL: 1.86 M/S
MV PEAK E VEL: 101 CM/S
MV PEAK GRADIENT: 15 MMHG
MV STENOSIS PRESSURE HALF TIME: 0 MS
MV VALVE AREA BY CONTINUITY EQUATION: 0.91 CM2
PLATELET # BLD AUTO: 268 THOUSANDS/UL (ref 149–390)
PMV BLD AUTO: 11.4 FL (ref 8.9–12.7)
POTASSIUM SERPL-SCNC: 3.2 MMOL/L (ref 3.5–5.3)
RA PRESSURE ESTIMATED: 10 MMHG
RBC # BLD AUTO: 3.93 MILLION/UL (ref 3.81–5.12)
RIGHT ATRIUM AREA SYSTOLE A4C: 15.7 CM2
RIGHT VENTRICLE ID DIMENSION: 3.9 CM
RV PSP: 47 MMHG
SL CV DOP CALC MV VTI RETROGRADE: 205.7 CM
SL CV LV EF: 45
SL CV MV MEAN GRADIENT RETROGRADE: 90 MMHG
SL CV PED ECHO LEFT VENTRICLE DIASTOLIC VOLUME (MOD BIPLANE) 2D: 65 ML
SL CV PED ECHO LEFT VENTRICLE SYSTOLIC VOLUME (MOD BIPLANE) 2D: 33 ML
SODIUM SERPL-SCNC: 130 MMOL/L (ref 136–145)
TR MAX PG: 37 MMHG
TRICUSPID VALVE PEAK REGURGITATION VELOCITY: 3.06 M/S
WBC # BLD AUTO: 12.2 THOUSAND/UL (ref 4.31–10.16)
Z-SCORE OF INTERVENTRICULAR SEPTUM IN END DIASTOLE: 5.96
Z-SCORE OF LEFT VENTRICULAR DIMENSION IN END SYSTOLE: -2.21
Z-SCORE OF LEFT VENTRICULAR POSTERIOR WALL IN END DIASTOLE: 3.42

## 2022-02-04 PROCEDURE — 85027 COMPLETE CBC AUTOMATED: CPT | Performed by: PHYSICIAN ASSISTANT

## 2022-02-04 PROCEDURE — 80048 BASIC METABOLIC PNL TOTAL CA: CPT | Performed by: PHYSICIAN ASSISTANT

## 2022-02-04 PROCEDURE — 82948 REAGENT STRIP/BLOOD GLUCOSE: CPT

## 2022-02-04 PROCEDURE — 99232 SBSQ HOSP IP/OBS MODERATE 35: CPT | Performed by: INTERNAL MEDICINE

## 2022-02-04 PROCEDURE — 93306 TTE W/DOPPLER COMPLETE: CPT | Performed by: INTERNAL MEDICINE

## 2022-02-04 PROCEDURE — 97116 GAIT TRAINING THERAPY: CPT

## 2022-02-04 PROCEDURE — 97110 THERAPEUTIC EXERCISES: CPT

## 2022-02-04 PROCEDURE — 93306 TTE W/DOPPLER COMPLETE: CPT

## 2022-02-04 RX ORDER — CEFPODOXIME PROXETIL 200 MG/1
400 TABLET, FILM COATED ORAL EVERY 24 HOURS
Status: DISCONTINUED | OUTPATIENT
Start: 2022-02-04 | End: 2022-02-07 | Stop reason: HOSPADM

## 2022-02-04 RX ORDER — POTASSIUM CHLORIDE 20 MEQ/1
40 TABLET, EXTENDED RELEASE ORAL ONCE
Status: COMPLETED | OUTPATIENT
Start: 2022-02-04 | End: 2022-02-04

## 2022-02-04 RX ORDER — POTASSIUM CHLORIDE 20 MEQ/1
20 TABLET, EXTENDED RELEASE ORAL ONCE
Status: COMPLETED | OUTPATIENT
Start: 2022-02-04 | End: 2022-02-04

## 2022-02-04 RX ORDER — NITROFURANTOIN 25; 75 MG/1; MG/1
100 CAPSULE ORAL 2 TIMES DAILY WITH MEALS
Status: DISCONTINUED | OUTPATIENT
Start: 2022-02-04 | End: 2022-02-04

## 2022-02-04 RX ORDER — CEFPODOXIME PROXETIL 200 MG/1
400 TABLET, FILM COATED ORAL 2 TIMES DAILY WITH MEALS
Status: DISCONTINUED | OUTPATIENT
Start: 2022-02-04 | End: 2022-02-04

## 2022-02-04 RX ADMIN — DONEPEZIL HYDROCHLORIDE 10 MG: 5 TABLET, FILM COATED ORAL at 09:12

## 2022-02-04 RX ADMIN — INSULIN LISPRO 3 UNITS: 100 INJECTION, SOLUTION INTRAVENOUS; SUBCUTANEOUS at 12:17

## 2022-02-04 RX ADMIN — MAGNESIUM OXIDE TAB 400 MG (241.3 MG ELEMENTAL MG) 400 MG: 400 (241.3 MG) TAB at 09:12

## 2022-02-04 RX ADMIN — INSULIN LISPRO 1 UNITS: 100 INJECTION, SOLUTION INTRAVENOUS; SUBCUTANEOUS at 09:12

## 2022-02-04 RX ADMIN — INSULIN LISPRO 3 UNITS: 100 INJECTION, SOLUTION INTRAVENOUS; SUBCUTANEOUS at 19:03

## 2022-02-04 RX ADMIN — POTASSIUM CHLORIDE 20 MEQ: 1500 TABLET, EXTENDED RELEASE ORAL at 19:05

## 2022-02-04 RX ADMIN — AMILORIDE HYDROCLORIDE 5 MG: 5 TABLET ORAL at 09:15

## 2022-02-04 RX ADMIN — METOPROLOL TARTRATE 25 MG: 25 TABLET, FILM COATED ORAL at 09:12

## 2022-02-04 RX ADMIN — INSULIN GLARGINE 10 UNITS: 100 INJECTION, SOLUTION SUBCUTANEOUS at 22:35

## 2022-02-04 RX ADMIN — NYSTATIN: 100000 POWDER TOPICAL at 09:12

## 2022-02-04 RX ADMIN — CEFDINIR 300 MG: 300 CAPSULE ORAL at 09:12

## 2022-02-04 RX ADMIN — APIXABAN 5 MG: 5 TABLET, FILM COATED ORAL at 09:12

## 2022-02-04 RX ADMIN — NYSTATIN: 100000 POWDER TOPICAL at 19:04

## 2022-02-04 RX ADMIN — CEFPODOXIME PROXETIL 400 MG: 200 TABLET, FILM COATED ORAL at 19:04

## 2022-02-04 RX ADMIN — SODIUM CHLORIDE AND POTASSIUM CHLORIDE 70 ML/HR: .9; .15 SOLUTION INTRAVENOUS at 06:50

## 2022-02-04 RX ADMIN — INSULIN LISPRO 2 UNITS: 100 INJECTION, SOLUTION INTRAVENOUS; SUBCUTANEOUS at 22:35

## 2022-02-04 RX ADMIN — POTASSIUM CHLORIDE 40 MEQ: 1500 TABLET, EXTENDED RELEASE ORAL at 09:12

## 2022-02-04 RX ADMIN — SODIUM CHLORIDE AND POTASSIUM CHLORIDE 70 ML/HR: .9; .15 SOLUTION INTRAVENOUS at 22:34

## 2022-02-04 RX ADMIN — POTASSIUM CHLORIDE 40 MEQ: 1500 TABLET, EXTENDED RELEASE ORAL at 13:27

## 2022-02-04 RX ADMIN — APIXABAN 5 MG: 5 TABLET, FILM COATED ORAL at 19:05

## 2022-02-04 NOTE — ASSESSMENT & PLAN NOTE
· UA: small amount of leukocytes and blood, 2+ protein and 250 glucose  · Urine microscopy: WBC 4-10 and clumped white blood cells  · UC final polymicrobial with low CFU  Contamination? Given that pt improved on ceftriaxone will continue but switch to cefpodoxime for better penetration   Plan for 7 days total on Day 5/7

## 2022-02-04 NOTE — PLAN OF CARE
Problem: PHYSICAL THERAPY ADULT  Goal: Performs mobility at highest level of function for planned discharge setting  See evaluation for individualized goals  Description: Treatment/Interventions: Functional transfer training,LE strengthening/ROM,Therapeutic exercise,Endurance training,Cognitive reorientation,Patient/family training,Equipment eval/education,Bed mobility,Gait training,Spoke to nursing,Spoke to case management,OT  Equipment Recommended: [place]       See flowsheet documentation for full assessment, interventions and recommendations  Outcome: Not Progressing  Note: Prognosis: Fair  Problem List: Decreased strength,Decreased range of motion,Decreased endurance,Impaired balance,Decreased mobility,Decreased coordination,Decreased cognition,Impaired judgement,Decreased safety awareness,Obesity (gait deviations )  Assessment: pt began tx session seated OOB in the recliner and was agreeable to participate in PT intervention  pt was educated on safe strategies on how to perform all functional transfers to and from [facility] with verbal and visual demonstration  pt continues to require mod Ax1 to complete all fucntional transfers in todays tx session with VC's for hand placement while ascending to RW and descending back into recliner  pt continues to be functioning below baseline level and continues to remain with functional mobility including the inability to ambulate household distance and continues to demonstrate a decrease in activity tolerance as pt continues to require several therapeutic rest breaks throughout tx session  pt would benefit from continued focus on functional transfers and activity tolerance  continue to recommend post acute rehab services at the time of D/C in order to maximize pt functional independence and safety with all OOB mobility   post tx pt in recliner with call bell and all pt needs met     Barriers to Discharge: Inaccessible home environment,Decreased caregiver support        PT Discharge Recommendation: Post acute rehabilitation services          See flowsheet documentation for full assessment

## 2022-02-04 NOTE — ASSESSMENT & PLAN NOTE
· Early morning 2/3 patient went into A  Fib on Telemetry  New onset  She is rate controlled  [id number] is 4   · Likely secondary to hypokalemia and age  · Discussed with daughter she wants to pursue anticoagulation   · Metoprolol 25 mg BID started  · Monitor response   · Continue to correct electrolytes   · Eliquis 5 mg BID started   · Echo EF45% with mild global hypokinesis  Mild AR/severe AS/Mod MR/mod to severe MS/mild to moderate TR  Progression compared to prior Echo in 2021 when EF was 75%  Discussed with Pt's daughter about the new Echo findings  She would not pursue any further aggressive investigations and conservative measures only given her advanced age   D[facility] agree[facility]

## 2022-02-04 NOTE — PROGRESS NOTES
NEPHROLOGY PROGRESS NOTE    Brittni Antony 80 y o  female MRN: [id number]  Unit/Bed#: S -01 Encounter: [id number]  Reason for Consult:  Hypokalemia and hyponatremia    Patient was awake alert sitting in a chair by the window  Looks a little stronger today was in no distress and no other issues reported  ASSESSMENT/PLAN:  1  Hypokalemia    Patient has hypokalemia  She presented with hyperglycemia volume depletion was on excessive diuretics so likely has total body depletion of potassium  Once glucose was treated and corrected even when she was hypokalemic that would cause some potassium to shift back into the intracellular fluid  At this point potassium cell come up to around 3 2 mEq per L today  She was given a dose of amiloride yesterday but I do not want to continue this chronically because if she gets hypokalemic again we would want confirm that she had urinary wasting before using this so if she becomes hypokalemic again I will check urine potassium and if it confirms urinary potassium wasting that I would implement the medication  Patient order to get 60 mEq total p o  KCl today  Discontinue amiloride  BMP a m  2  Hyponatremia    At this point renal function is normalized she is on isotonic fluids sodium is corrected to 130 mEq per L continue to monitor on isotonic fluids  SUBJECTIVE:  Review of Systems   Constitutional: Negative for chills, diaphoresis, fever and night sweats  HENT: Negative  Eyes: Negative  Cardiovascular: Negative for chest pain, dyspnea on exertion, orthopnea and palpitations  Respiratory: Negative  Negative for cough, shortness of breath, sputum production and wheezing  Gastrointestinal: Negative for abdominal pain, diarrhea, nausea and vomiting  Genitourinary: Negative  Neurological: Negative for dizziness, focal weakness, headaches and light-headedness     Psychiatric/Behavioral: Negative for altered mental status, depression, hallucinations and hypervigilance  OBJECTIVE:  Current Weight: Weight - Scale: 71 7 kg (158 lb)  Vitals:Temp (24hrs), Av °F (37 2 °C), Min:98 6 °F (37 °C), Max:99 6 °F (37 6 °C)  Current: Temperature: 98 9 °F (37 2 °C)   Blood pressure 112/68, pulse 98, temperature 98 9 °F (37 2 °C), temperature source Oral, resp  rate 16, height 4' 11 5" (1 511 m), weight 71 7 kg (158 lb), SpO2 95 %  , Body mass index is 31 38 kg/m²  Intake/Output Summary (Last 24 hours) at 2022 1317  Last data filed at 2/3/2022 2050  Gross per 24 hour   Intake --   Output 800 ml   Net -800 ml       Physical Exam: /68   Pulse 98   Temp 98 9 °F (37 2 °C) (Oral)   Resp 16   Ht 4' 11 5" (1 511 m)   Wt 71 7 kg (158 lb)   SpO2 95%   BMI 31 38 kg/m²   Physical Exam  Constitutional:       General: She is not in acute distress  Appearance: She is not toxic-appearing or diaphoretic  HENT:      Head: Normocephalic and atraumatic  Mouth/Throat:      Mouth: Mucous membranes are dry  Eyes:      General: No scleral icterus  Extraocular Movements: Extraocular movements intact  Cardiovascular:      Rate and Rhythm: Normal rate and regular rhythm  Heart sounds: No friction rub  No gallop  Pulmonary:      Effort: Pulmonary effort is normal  No respiratory distress  Breath sounds: Normal breath sounds  No wheezing, rhonchi or rales  Abdominal:      General: Bowel sounds are normal  There is no distension  Palpations: Abdomen is soft  Tenderness: There is no abdominal tenderness  There is no rebound  Musculoskeletal:      Cervical back: Normal range of motion and neck supple  Neurological:      Mental Status: She is alert  Mental status is at baseline  Psychiatric:         Mood and Affect: Mood normal          Behavior: Behavior normal          Thought Content:  Thought content normal          Judgment: Judgment normal          Medications:    Current Facility-Administered Medications:     acetaminophen (TYLENOL) tablet 650 mg, 650 mg, Oral, Q6H PRN, Shae Pellet, PA-C    AMILoride tablet 5 mg, 5 mg, Oral, Daily, Trista Rojas, AME, 5 mg at 02/04/22 0915    apixaban (ELIQUIS) tablet 5 mg, 5 mg, Oral, BID, Arza Carnes, CRNP, 5 mg at 02/04/22 0912    cefdinir (OMNICEF) capsule 300 mg, 300 mg, Oral, Q12H [address], Pan Kinsey, PA-C, 300 mg at 02/04/22 0912    donepezil (ARICEPT) tablet 10 mg, 10 mg, Oral, Daily, Shae Pellet, PA-C, 10 mg at 02/04/22 0912    insulin glargine (LANTUS) subcutaneous injection 10 Units 0 1 mL, 10 Units, Subcutaneous, HS, Shae Pellet, PA-C, 10 Units at 02/03/22 2223    insulin lispro (HumaLOG) 100 units/mL subcutaneous injection 1-5 Units, 1-5 Units, Subcutaneous, TID AC, 3 Units at 02/04/22 1217 **AND** Fingerstick Glucose (POCT), , , TID AC, Shae Pellet, PA-C    insulin lispro (HumaLOG) 100 units/mL subcutaneous injection 1-5 Units, 1-5 Units, Subcutaneous, HS, Shae Pellet, PA-C, 1 Units at 02/03/22 2223    magnesium oxide (MAG-OX) tablet 400 mg, 400 mg, Oral, Daily, Shae Pellet, PA-C, 400 mg at 02/04/22 0912    metoprolol tartrate (LOPRESSOR) tablet 25 mg, 25 mg, Oral, Q12H [address], Pan Kinsey, PA-C, 25 mg at 02/04/22 0912    nystatin (MYCOSTATIN) powder, , Topical, BID, AME Price, Given at 02/04/22 0912    ondansetron (ZOFRAN) injection 4 mg, 4 mg, Intravenous, Q6H PRN, Shae Pellet, PA-C    potassium chloride (K-DUR,KLOR-CON) CR tablet 40 mEq, 40 mEq, Oral, Daily, Pan Kinsey PA-C, 40 mEq at 02/04/22 0912    potassium chloride (K-DUR,KLOR-CON) CR tablet 40 mEq, 40 mEq, Oral, Once, Haven Mulligan MD    sodium chloride 0 9 % with KCl 20 mEq/L infusion (premix), 70 mL/hr, Intravenous, Continuous, Abdulaziz Larson MD, Last Rate: 70 mL/hr at 02/04/22 0650, 70 mL/hr at 02/04/22 0650    Laboratory Results:  Lab Results   Component Value Date    WBC 12 20 (H) 02/04/2022    HGB 11 7 02/04/2022    HCT 34 1 (L) 02/04/2022    MCV 87 02/04/2022  02/04/2022     Lab Results   Component Value Date    SODIUM 130 (L) 02/04/2022    K 3 2 (L) 02/04/2022     02/04/2022    CO2 22 02/04/2022    BUN 12 02/04/2022    CREATININE 0 86 02/04/2022    GLUC 119 02/04/2022    CALCIUM 8 3 02/04/2022     Lab Results   Component Value Date    CALCIUM 8 3 02/04/2022    PHOS 2 9 01/03/2022     No results found for: LABPROT

## 2022-02-04 NOTE — QUICK NOTE
Pt was noted to have laceration over right thenar eminance area about 6 cm long and clean  Likely due to her recent fall  May need suturing   Will consult surgery

## 2022-02-04 NOTE — ASSESSMENT & PLAN NOTE
Lab Results   Component Value Date    HGBA1C 13 0 (H) 12/28/2021       Recent Labs     02/03/22  1609 02/03/22  2039 02/04/22  0632 02/04/22  1042   POCGLU 201* 180* 166* 282*       Blood Sugar Average: Last 72 hrs:  · (P) 406 [id number]   · Glucose on BMP elevated on admission at 319  BG drastically above goal and labile   · Hold home medications, sitagliptin 100 mg, repaglinide 2mg, and glipizide 10mg  · Started on Lantus 10 U QHS with sliding scale as per last Endocrinology note  · Sliding scale coverage    · Monitor sugars via accu-check  · Ultimately, given advanced age and dementia no indication for tightly controlled blood sugars

## 2022-02-04 NOTE — ASSESSMENT & PLAN NOTE
· On admission WBC 17 91, tachycardia, lactic acid 2 1, and source of infection - suspected UTI  · Lactic acid was never repeated  · Leukocytosis improved   · Tachycardia resolved   · Blood cultures negative at 48 hours   · 2/2 UTI   Abx plan see above

## 2022-02-04 NOTE — ASSESSMENT & PLAN NOTE
Wt Readings from Last 3 Encounters:   02/04/22 71 7 kg (158 lb)   01/04/22 71 1 kg (156 lb 11 2 oz)   05/03/21 78 kg (172 lb)     · Patient with history of severe aortic stenosis  · Monitor for volume overload with fluids  · Diuretics on hold

## 2022-02-04 NOTE — ASSESSMENT & PLAN NOTE
Lab Results   Component Value Date    EGFR 56 02/04/2022    EGFR 53 02/03/2022    EGFR 40 02/02/2022    CREATININE 0 86 02/04/2022    CREATININE 0 90 02/03/2022    CREATININE 1 14 02/02/2022   · Baseline Cr 0 9   Elevated Cr on admission to 1 37, improved to 0 9, at baseline  · Nephrology consult appreciated   · Off IVF  · BMP in AM

## 2022-02-04 NOTE — ASSESSMENT & PLAN NOTE
· Early morning 2/3 patient went into A  Fib on Telemetry  New onset  She is rate controlled  [id number] is 4   · Likely secondary to hypokalemia and age  · Discussed with daughter she wants to pursue anticoagulation   · Metoprolol 25 mg BID started  · Monitor response   · Continue to correct electrolytes   · Eliquis 5 mg BID started   · Echo EF45% with mild global hypokinesis  Mild AR/severe AS/Mod MR/mod to severe MS/mild to moderate TR  Progression compared to prior Echo in 2021 when EF was 75%  Discussed with Pt's daughter about the new Echo findings  She would not pursue any further aggressive investigations and conservative measures only given her advanced age   Daughter remains agreeable to [facility]

## 2022-02-04 NOTE — ASSESSMENT & PLAN NOTE
Lab Results   Component Value Date    HGBA1C 13 0 (H) 12/28/2021       Recent Labs     02/03/22  1609 02/03/22  2039 02/04/22  0632 02/04/22  1042   POCGLU 201* 180* 166* 282*       Blood Sugar Average: Last 72 hrs:  · (P) 514 [id number]   · Glucose on BMP elevated on admission at 319  BG drastically above goal and labile   · Hold home medications, sitagliptin 100 mg, repaglinide 2mg, and glipizide 10mg  · Started on Lantus 10 U QHS with sliding scale as per last Endocrinology note  · Sliding scale coverage    · Monitor sugars via accu-check  · Ultimately, given advanced age and dementia no indication for tightly controlled blood sugars

## 2022-02-04 NOTE — ASSESSMENT & PLAN NOTE
· Patient was found down between her bed and nightstand at her assisted living   Likely mechanical in nature  · She lives at assisted living  · No focal neurologic deficits  · Patient has history of falls while at this facility due to dementia  · CT head: no acute intracranial abnormalities  · PT/OT consult   · Waiting for rehab   Will have bed available on Monday   · Fall Risk Precautions

## 2022-02-04 NOTE — ASSESSMENT & PLAN NOTE
Lab Results   Component Value Date    EGFR 56 02/04/2022    EGFR 53 02/03/2022    EGFR 40 02/02/2022    CREATININE 0 86 02/04/2022    CREATININE 0 90 02/03/2022    CREATININE 1 14 02/02/2022   · Baseline Cr 0 9   Elevated Cr on admission to 1 37, improved to 0 9, at baseline  · Nephrology consult appreciated   · No further need for IVF given 1 L completed  · Monitor I/O  · BMP in AM

## 2022-02-04 NOTE — QUICK NOTE
General Surgery made aware of right thenar eminence laceration  Per patient/ patient's family, laceration was obtained when she fell on Monday/ Tuesday  Due to time from initial injury, sutures not recommended for laceration  Steri-strips placed to assist with closure of wound  4x4 taped over to provide comfort for patient

## 2022-02-04 NOTE — PHYSICAL THERAPY NOTE
PHYSICAL THERAPY NOTE          Patient Name: Alfred Spicer  TVBJN'P Date: 2/4/2022 02/04/22 1425   PT Last Visit   PT Visit Date 02/04/22   Note Type   Note Type Treatment   Pain Assessment   Pain Assessment Tool 0-10   Pain Score No Pain   Sanders-Baker FACES Pain Rating 0   Pain Location/Orientation Orientation: Left; Location: Abdomen   Patient's Stated Pain Goal No pain   Hospital Pain Intervention(s) Repositioned; Ambulation/increased activity; Rest   Multiple Pain Sites No   Restrictions/Precautions   Weight Bearing Precautions Per Order No   Other Precautions Cognitive; Chair Alarm; Bed Alarm;Multiple lines;Telemetry; Fall Risk   General   Chart Reviewed Yes   Response to Previous Treatment Patient with no complaints from previous session  Family/Caregiver Present No   Cognition   Overall Cognitive Status Impaired   Arousal/Participation Alert; Responsive; Cooperative   Attention Attends with cues to redirect   Orientation Level Oriented to person;Oriented to place;Oriented to situation   Memory Decreased recall of biographical information;Decreased short term memory;Decreased recall of precautions;Decreased recall of recent events   Following Commands Follows one step commands with increased time or repetition   Comments pt was pleasnat and cooperative throughout tx session    Subjective   Subjective pt was agreeable to participate in PT intervention   Bed Mobility   Rolling R Unable to assess   Rolling L Unable to assess   Supine to Sit Unable to assess   Sit to Supine Unable to assess   Additional Comments pt seated OOB in the recliner and was agreeable to partiicpate in PT intervention  Transfers   Sit to Stand 3  Moderate assistance   Additional items Assist x 1; Increased time required;Armrests; Verbal cues  (w/ rW)   Stand to Sit 3  Moderate assistance   Additional items Assist x 1; Armrests; Increased time required;Verbal cues  (w/ RW )   Additional Comments pt continues to require rW to complete all functional transfers in [facility] tx session safely with VC's for hand placement    Ambulation/Elevation   Gait pattern Forward Flexion; Excessively slow   Gait Assistance 3  Moderate assist   Additional items Assist x 1;Verbal cues; Tactile cues   Assistive Device Other (Comment); Rolling walker   Distance 15'x2 RW    Balance   Static Sitting Fair +   Dynamic Sitting Fair   Static Standing Fair -   Dynamic Standing Poor +   Ambulatory Poor   Endurance Deficit   Endurance Deficit Yes   Endurance Deficit Description pt continues to self report fatigue and decrease ambulation distance and tolerance    Activity Tolerance   Activity Tolerance Patient limited by fatigue   Nurse Made Aware Spoke to RN    Exercises   Hip Adduction Sitting;10 reps;AROM; Bilateral  (pillow squeezes)   Knee AROM Long Arc Quad Sitting;10 reps;AROM; Bilateral   Ankle Pumps Sitting;15 reps;AROM; Bilateral   Marching Sitting;10 reps;AROM; Bilateral   Assessment   Prognosis Fair   Problem List Decreased strength;Decreased range of motion;Decreased endurance; Impaired balance;Decreased mobility; Decreased coordination;Decreased cognition; Impaired judgement;Decreased safety awareness; Obesity  (gait deviations )   Assessment pt began tx session seated OOB in the recliner and was agreeable to participate in PT intervention  pt was educated on safe strategies on how to perform all functional transfers to and from [address] [place] [address] with verbal and visual demonstration  pt continues to require mod Ax1 to complete all fucntional transfers in [facility] tx session with VC's for hand placement while ascending to RW and descending back into recliner   pt continues to be functioning below baseline level and continues to remain with functional mobility including the inability to ambulate household distance and continues to demonstrate a decrease in activity tolerance as pt continues to require several therapeutic rest breaks throughout tx session  pt would benefit from continued focus on functional transfers and activity tolerance  continue to recommend post acute rehab services at the time of D/C in order to maximize pt functional independence and safety with all OOB mobility  post tx pt in recliner with call bell and all pt needs met      Barriers to Discharge Inaccessible home environment;Decreased caregiver support   Goals   Patient Goals none stated this tx session   STG Expiration Date 02/12/22   PT Treatment Day 2   Plan   Treatment/Interventions Functional transfer training;LE strengthening/ROM; Therapeutic exercise;Cognitive reorientation; Endurance training;Patient/family training;Equipment eval/education; Bed mobility;Gait training;Spoke to nursing   PT Frequency 3-5x/wk   Recommendation   PT Discharge Recommendation Post acute rehabilitation services   Equipment Recommended [address] [facility] Recommended Wheeled walker   Change/add to [employer]? Yes, Change Size   Walker Size Andrea (Ht <5'1")   AM-PAC Basic Mobility Inpatient   Turning in Bed Without Bedrails 2   Lying on Back to Sitting on Edge of Flat Bed 2   Moving Bed to Chair 2   Standing Up From Chair 2   Walk in Room 2   Climb 3-5 Stairs 1   Basic Mobility Inpatient Raw Score 11   Basic Mobility Standardized Score 30 25   Highest Level Of Mobility   JH-HLM Goal 4: Move to chair/commode   JH-HLM Highest Level of Mobility 6: Walk 10 steps or more   JH-HLM Goal Achieved Yes   Education   Education Provided Mobility training;Assistive device; Other  (functional transfers)   End of Consult   Patient Position at End of Consult Bedside chair;Bed/Chair alarm activated; All needs within reach   The patient's AM-PAC Basic Mobility Inpatient Short Form Raw Score is 11  A Raw score of less than or equal to 16 suggests the patient may benefit from discharge to post-acute rehabilitation services   Please also refer to the recommendation of the Physical Therapist for safe discharge planning          Maria C Lezama PTA

## 2022-02-05 PROBLEM — R19.7 DIARRHEA: Status: ACTIVE | Noted: 2022-02-05

## 2022-02-05 LAB
ANION GAP SERPL CALCULATED.3IONS-SCNC: 6 MMOL/L (ref 4–13)
BUN SERPL-MCNC: 10 MG/DL (ref 5–25)
CALCIUM SERPL-MCNC: 7.5 MG/DL (ref 8.3–10.1)
CHLORIDE SERPL-SCNC: 109 MMOL/L (ref 100–108)
CO2 SERPL-SCNC: 20 MMOL/L (ref 21–32)
CREAT SERPL-MCNC: 0.75 MG/DL (ref 0.6–1.3)
GFR SERPL CREATININE-BSD FRML MDRD: 66 ML/MIN/1.73SQ M
GLUCOSE SERPL-MCNC: 107 MG/DL (ref 65–140)
GLUCOSE SERPL-MCNC: 121 MG/DL (ref 65–140)
GLUCOSE SERPL-MCNC: 171 MG/DL (ref 65–140)
GLUCOSE SERPL-MCNC: 195 MG/DL (ref 65–140)
GLUCOSE SERPL-MCNC: 275 MG/DL (ref 65–140)
POTASSIUM SERPL-SCNC: 5.3 MMOL/L (ref 3.5–5.3)
SODIUM SERPL-SCNC: 135 MMOL/L (ref 136–145)

## 2022-02-05 PROCEDURE — 99232 SBSQ HOSP IP/OBS MODERATE 35: CPT | Performed by: INTERNAL MEDICINE

## 2022-02-05 PROCEDURE — 82948 REAGENT STRIP/BLOOD GLUCOSE: CPT

## 2022-02-05 PROCEDURE — 80048 BASIC METABOLIC PNL TOTAL CA: CPT | Performed by: INTERNAL MEDICINE

## 2022-02-05 RX ORDER — SACCHAROMYCES BOULARDII 250 MG
250 CAPSULE ORAL 2 TIMES DAILY
Status: DISCONTINUED | OUTPATIENT
Start: 2022-02-05 | End: 2022-02-07 | Stop reason: HOSPADM

## 2022-02-05 RX ADMIN — ACETAMINOPHEN 650 MG: 325 TABLET, FILM COATED ORAL at 21:19

## 2022-02-05 RX ADMIN — INSULIN LISPRO 1 UNITS: 100 INJECTION, SOLUTION INTRAVENOUS; SUBCUTANEOUS at 11:21

## 2022-02-05 RX ADMIN — INSULIN GLARGINE 10 UNITS: 100 INJECTION, SOLUTION SUBCUTANEOUS at 21:19

## 2022-02-05 RX ADMIN — METOPROLOL TARTRATE 25 MG: 25 TABLET, FILM COATED ORAL at 21:19

## 2022-02-05 RX ADMIN — Medication 250 MG: at 16:31

## 2022-02-05 RX ADMIN — CEFPODOXIME PROXETIL 400 MG: 200 TABLET, FILM COATED ORAL at 16:33

## 2022-02-05 RX ADMIN — INSULIN LISPRO 1 UNITS: 100 INJECTION, SOLUTION INTRAVENOUS; SUBCUTANEOUS at 21:19

## 2022-02-05 RX ADMIN — NYSTATIN: 100000 POWDER TOPICAL at 09:16

## 2022-02-05 RX ADMIN — NYSTATIN: 100000 POWDER TOPICAL at 16:36

## 2022-02-05 RX ADMIN — APIXABAN 5 MG: 5 TABLET, FILM COATED ORAL at 08:24

## 2022-02-05 RX ADMIN — METOPROLOL TARTRATE 25 MG: 25 TABLET, FILM COATED ORAL at 08:24

## 2022-02-05 RX ADMIN — Medication 125 MG: at 17:53

## 2022-02-05 RX ADMIN — POTASSIUM CHLORIDE 40 MEQ: 1500 TABLET, EXTENDED RELEASE ORAL at 08:24

## 2022-02-05 RX ADMIN — DONEPEZIL HYDROCHLORIDE 10 MG: 5 TABLET, FILM COATED ORAL at 08:24

## 2022-02-05 RX ADMIN — INSULIN LISPRO 3 UNITS: 100 INJECTION, SOLUTION INTRAVENOUS; SUBCUTANEOUS at 16:34

## 2022-02-05 RX ADMIN — APIXABAN 5 MG: 5 TABLET, FILM COATED ORAL at 16:32

## 2022-02-05 RX ADMIN — MAGNESIUM OXIDE TAB 400 MG (241.3 MG ELEMENTAL MG) 400 MG: 400 (241.3 MG) TAB at 08:24

## 2022-02-05 NOTE — PROGRESS NOTES
[facility]  Progress Note - Sienna Gilmore 11/22/1923, 80 y o  female MRN: [id number]  Unit/Bed#: S -01 Encounter: [id number]  Primary Care Provider: No primary care provider on file  Date and time admitted to hospital: 2/1/2022  5:59 AM    New onset atrial fibrillation (HCC)  Assessment & Plan  · Early morning 2/3 patient went into A  Fib on Telemetry  New onset  She is rate controlled  [id number] is 4   · Likely secondary to hypokalemia and age  · Discussed with daughter she wants to pursue anticoagulation   · Metoprolol 25 mg BID started  · Monitor response   · Continue to correct electrolytes   · Eliquis 5 mg BID started   · Echo EF45% with mild global hypokinesis  Mild AR/severe AS/Mod MR/mod to severe MS/mild to moderate TR  Progression compared to prior Echo in 2021 when EF was 75%  Discussed with Pt's daughter about the new Echo findings  She would not pursue any further aggressive investigations and conservative measures only given her advanced age  Daughter remains agreeable to [facility]     UTI (urinary tract infection)  Assessment & Plan  · UA: small amount of leukocytes and blood, 2+ protein and 250 glucose  · Urine microscopy: WBC 4-10 and clumped white blood cells  · UC final polymicrobial with low CFU  Contamination? Given that pt improved on ceftriaxone will continue but switch to cefpodoxime for better penetration  Plan for 7 days total on Day 5/7    * Ambulatory dysfunction  Assessment & Plan  · Patient was found down between her bed and nightstand at her assisted living   Likely mechanical in nature  · She lives at assisted living  · No focal neurologic deficits  · Patient has history of falls while at this facility due to dementia  · CT head: no acute intracranial abnormalities  · PT/OT consult   · Waiting for rehab   Will have bed available on Monday   · Fall Risk Precautions    Severe sepsis ([address] )  Assessment & Plan  · On admission WBC 17 91, tachycardia, lactic acid 2 1, and source of infection - suspected UTI  · Lactic acid was never repeated  · Leukocytosis improved   · Tachycardia resolved   · Blood cultures negative at 48 hours   · 2/2 UTI  Abx plan see above      Acute-on-chronic kidney injury [facility])  Assessment & Plan  Lab Results   Component Value Date    EGFR 56 02/04/2022    EGFR 53 02/03/2022    EGFR 40 02/02/2022    CREATININE 0 86 02/04/2022    CREATININE 0 90 02/03/2022    CREATININE 1 14 02/02/2022   · Baseline Cr 0 9  Elevated Cr on admission to 1 37, improved to 0 9, at baseline  · Nephrology consult appreciated   · Off IVF  · BMP in AM    Diarrhea  Assessment & Plan  Diarrhea x 1 today  Likely antibiotics associated  Will monitor  Will start probiotics and vancomycin 125 mg daily for C diff prophylaxis given her high risks (age, systemic abx, hospitalization)    Aortic stenosis  Assessment & Plan  Wt Readings from Last 3 Encounters:   02/04/22 71 7 kg (158 lb)   01/04/22 71 1 kg (156 lb 11 2 oz)   05/03/21 78 kg (172 lb)     · Patient with history of severe aortic stenosis  · Monitor for volume overload with fluids  · Diuretics on hold      Hypokalemia  Assessment & Plan  · Noted at 2 5 on admission>3 3  · Resolved  K 5 3  DC daily Kdur      Type 2 diabetes mellitus with diabetic chronic kidney disease [facility])  Assessment & Plan  Lab Results   Component Value Date    HGBA1C 13 0 (H) 12/28/2021       Recent Labs     02/03/22  1609 02/03/22  2039 02/04/22  0632 02/04/22  1042   POCGLU 201* 180* 166* 282*       Blood Sugar Average: Last 72 hrs:  · (P) 075 [id number]   · Glucose on BMP elevated on admission at 319  BG drastically above goal and labile   · Hold home medications, sitagliptin 100 mg, repaglinide 2mg, and glipizide 10mg  · Started on Lantus 10 U QHS with sliding scale as per last Endocrinology note  · Sliding scale coverage    · Monitor sugars via accu-check  · Ultimately, given advanced age and dementia no indication for tightly controlled blood sugars     VTE Pharmacologic Prophylaxis:   Pharmacologic: Apixaban (Eliquis)  Mechanical VTE Prophylaxis in Place: Yes    Patient Centered Rounds:     Discussions with Specialists or Other Care Team Provider:     Education and Discussions with Family / Patient: spoke with PT's daughter     Time Spent for Care: 20 minutes  More than 50% of total time spent on counseling and coordination of care as described above  Current Length of Stay: 4 day(s)    Current Patient Status: Inpatient   Certification Statement: The patient will continue to require additional inpatient hospital stay due to above/waiting for rehab bed    Discharge Plan: Waiting for rehab bed    Code Status: Level 3 - DNAR and DNI      Subjective:   No overnight events  Family visiting her reported one loose BM  Pt denies N/V/abd pain     Objective:     Vitals:   Temp (24hrs), Av 9 °F (36 6 °C), Min:97 9 °F (36 6 °C), Max:97 9 °F (36 6 °C)    Temp:  [97 9 °F (36 6 °C)] 97 9 °F (36 6 °C)  HR:  [64-73] 64  Resp:  [16-18] 16  BP: (110-133)/(58-94) 133/94  SpO2:  [96 %-97 %] 97 %  Body mass index is 31 52 kg/m²  Input and Output Summary (last 24 hours): Intake/Output Summary (Last 24 hours) at 2022 1712  Last data filed at 2022 0641  Gross per 24 hour   Intake --   Output 401 ml   Net -401 ml       Physical Exam:     Physical Exam  Constitutional:       General: She is not in acute distress  Appearance: She is not ill-appearing, toxic-appearing or diaphoretic  Eyes:      General:         Right eye: No discharge  Left eye: Discharge present  Cardiovascular:      Rate and Rhythm: Normal rate  Rhythm irregular  Heart sounds: Murmur heard  Pulmonary:      Effort: Pulmonary effort is normal  No respiratory distress  Breath sounds: No wheezing  Abdominal:      General: Bowel sounds are normal  There is no distension  Palpations: Abdomen is soft  Tenderness: There is no abdominal tenderness  Musculoskeletal:         General: No swelling  Skin:     General: Skin is warm  Neurological:      Mental Status: Mental status is at baseline  Psychiatric:         Mood and Affect: Mood normal          Behavior: Behavior normal          Thought Content: Thought content normal          Additional Data:     Labs:    Results from last 7 days   Lab Units 02/04/22  0515 02/03/22  0516 02/02/22  0539   WBC Thousand/uL 12 20*   < > 14 86*   HEMOGLOBIN g/dL 11 7   < > 12 1   HEMATOCRIT % 34 1*   < > 34 6*   PLATELETS Thousands/uL 268   < > 247   NEUTROS PCT %  --   --  63   LYMPHS PCT %  --   --  26   MONOS PCT %  --   --  9   EOS PCT %  --   --  0    < > = values in this interval not displayed  Results from last 7 days   Lab Units 02/05/22  0615 02/03/22  1448 02/03/22  0516   POTASSIUM mmol/L 5 3   < > 2 4*   CHLORIDE mmol/L 109*   < > 98*   CO2 mmol/L 20*   < > 22   BUN mg/dL 10   < > 16   CREATININE mg/dL 0 75   < > 0 90   CALCIUM mg/dL 7 5*   < > 8 3   ALK PHOS U/L  --   --  87   ALT U/L  --   --  18   AST U/L  --   --  24    < > = values in this interval not displayed  Results from last 7 days   Lab Units 02/01/22  0615   INR  1 00         Recent Cultures (last 7 days):     Results from last 7 days   Lab Units 02/01/22  0801 02/01/22  0706   BLOOD CULTURE  No Growth After 4 Days  No Growth After 4 Days    --    URINE CULTURE   --  <10,000 cfu/ml Morganella morganii*  10,000-19,000 cfu/ml Lactobacillus species*  <10,000 cfu/ml Candida albicans*  <10,000 cfu/ml Enterococcus faecalis*  <10,000 cfu/ml Staphylococcus simulans*       Last 24 Hours Medication List:   Current Facility-Administered Medications   Medication Dose Route Frequency Provider Last Rate    acetaminophen  650 mg Oral Q6H PRN Stephanie Carrington PA-C      apixaban  5 mg Oral BID AME Tillman      cefpodoxime  400 mg Oral Q24H Ezekiel Levin MD      donepezil  10 mg Oral Daily Stephanie Carrington PA-C      insulin glargine 10 Units Subcutaneous HS Jule Koyanagi, PA-C      insulin lispro  1-5 Units Subcutaneous TID [facility] Jule Koyanagi, PA-C      insulin lispro  1-5 Units Subcutaneous HS Jule Koyanagi, PA-C      magnesium oxide  400 mg Oral Daily Jule Koyanagi, PA-C      metoprolol tartrate  25 mg Oral Q12H [address] Kathryn Lerma PA-C      nystatin   Topical BID AME Judge      ondansetron  4 mg Intravenous Q6H PRN Jule Koyanagi, PA-C      saccharomyces boulardii  250 mg Oral BID Shelly Magana MD      vancomycin  125 mg Oral Daily Shelly Magana MD          Today, Patient Was Seen By: Shelly Magana MD    ** Please Note: Dictation voice to text software may have been used in the creation of this document   **

## 2022-02-05 NOTE — PLAN OF CARE
Problem: Potential for Falls  Goal: Patient will remain free of falls  Description: INTERVENTIONS:  - Educate patient/family on patient safety including physical limitations  - Instruct patient to call for assistance with activity   - Consult OT/PT to assist with strengthening/mobility   - Keep Call bell within reach  - Keep bed low and locked with side rails adjusted as appropriate  - Keep care items and personal belongings within reach  - Initiate and maintain comfort rounds  - Make Fall Risk Sign visible to staff  - Offer Toileting every  Hours, in advance of need  - Initiate/Maintain alarm  - Obtain necessary fall risk management equipment:   - Apply yellow socks and bracelet for high fall risk patients  - Consider moving patient to room near nurses station  Outcome: Progressing     Problem: PAIN - ADULT  Goal: Verbalizes/displays adequate comfort level or baseline comfort level  Description: Interventions:  - Encourage patient to monitor pain and request assistance  - Assess pain using appropriate pain scale  - Administer analgesics based on type and severity of pain and evaluate response  - Implement non-pharmacological measures as appropriate and evaluate response  - Consider cultural and social influences on pain and pain management  - Notify physician/advanced practitioner if interventions unsuccessful or patient reports new pain  Outcome: Progressing     Problem: INFECTION - ADULT  Goal: Absence or prevention of progression during hospitalization  Description: INTERVENTIONS:  - Assess and monitor for signs and symptoms of infection  - Monitor lab/diagnostic results  - Monitor all insertion sites, i e  indwelling lines, tubes, and drains  - Monitor endotracheal if appropriate and nasal secretions for changes in amount and color  - [place] appropriate cooling/warming therapies per order  - Administer medications as ordered  - Instruct and encourage patient and family to use good hand hygiene technique  - Identify and instruct in appropriate isolation precautions for identified infection/condition  Outcome: Progressing     Problem: SAFETY ADULT  Goal: Patient will remain free of falls  Description: INTERVENTIONS:  - Educate patient/family on patient safety including physical limitations  - Instruct patient to call for assistance with activity   - Consult OT/PT to assist with strengthening/mobility   - Keep Call bell within reach  - Keep bed low and locked with side rails adjusted as appropriate  - Keep care items and personal belongings within reach  - Initiate and maintain comfort rounds  - Make Fall Risk Sign visible to staff  - Offer Toileting every  Hours, in advance of need  - Initiate/Maintain alarm  - Obtain necessary fall risk management equipment:   - Apply yellow socks and bracelet for high fall risk patients  - Consider moving patient to room near nurses station  Outcome: Progressing  Goal: Maintain or return to baseline ADL function  Description: INTERVENTIONS:  -  Assess patient's ability to carry out ADLs; assess patient's baseline for ADL function and identify physical deficits which impact ability to perform ADLs (bathing, care of mouth/teeth, toileting, grooming, dressing, etc )  - Assess/evaluate cause of self-care deficits   - Assess range of motion  - Assess patient's mobility; develop plan if impaired  - Assess patient's need for assistive devices and provide as appropriate  - Encourage maximum independence but intervene and supervise when necessary  - Involve family in performance of ADLs  - Assess for home care needs following discharge   - Consider OT consult to assist with ADL evaluation and planning for discharge  - Provide patient education as appropriate  Outcome: Progressing  Goal: Maintains/Returns to pre admission functional level  Description: INTERVENTIONS:  - Perform BMAT or MOVE assessment daily    - Set and communicate daily mobility goal to care team and patient/family/caregiver  - Collaborate with rehabilitation services on mobility goals if consulted  - Perform Range of Motion  times a day  - Reposition patient every  hours  - Dangle patient  times a day  - Stand patient  times a day  - Ambulate patient  times a day  - Out of bed to chair  times a day   - Out of bed for meals  times a day  - Out of bed for toileting  - Record patient progress and toleration of activity level   Outcome: Progressing     Problem: DISCHARGE PLANNING  Goal: Discharge to home or other facility with appropriate resources  Description: INTERVENTIONS:  - Identify barriers to discharge w/patient and caregiver  - Arrange for needed discharge resources and transportation as appropriate  - Identify discharge learning needs (meds, wound care, etc )  - Arrange for interpretive services to assist at discharge as needed  - Refer to Case Management Department for coordinating discharge planning if the patient needs post-hospital services based on physician/advanced practitioner order or complex needs related to functional status, cognitive ability, or social support system  Outcome: Progressing     Problem: Knowledge Deficit  Goal: Patient/family/caregiver demonstrates understanding of disease process, treatment plan, medications, and discharge instructions  Description: Complete learning assessment and assess knowledge base    Interventions:  - Provide teaching at level of understanding  - Provide teaching via preferred learning methods  Outcome: Progressing     Problem: MOBILITY - ADULT  Goal: Maintain or return to baseline ADL function  Description: INTERVENTIONS:  -  Assess patient's ability to carry out ADLs; assess patient's baseline for ADL function and identify physical deficits which impact ability to perform ADLs (bathing, care of mouth/teeth, toileting, grooming, dressing, etc )  - Assess/evaluate cause of self-care deficits   - Assess range of motion  - Assess patient's mobility; develop plan if impaired  - Assess patient's need for assistive devices and provide as appropriate  - Encourage maximum independence but intervene and supervise when necessary  - Involve family in performance of ADLs  - Assess for home care needs following discharge   - Consider OT consult to assist with ADL evaluation and planning for discharge  - Provide patient education as appropriate  Outcome: Progressing  Goal: Maintains/Returns to pre admission functional level  Description: INTERVENTIONS:  - Perform BMAT or MOVE assessment daily    - Set and communicate daily mobility goal to care team and patient/family/caregiver  - Collaborate with rehabilitation services on mobility goals if consulted  - Perform Range of Motion  times a day  - Reposition patient every  hours    - Dangle patient  times a day  - Stand patient  times a day  - Ambulate patient  times a day  - Out of bed to chair  times a day   - Out of bed for meals imes a day  - Out of bed for toileting  - Record patient progress and toleration of activity level   Outcome: Progressing     Problem: Prexisting or High Potential for Compromised Skin Integrity  Goal: Skin integrity is maintained or improved  Description: INTERVENTIONS:  - Identify patients at risk for skin breakdown  - Assess and monitor skin integrity  - Assess and monitor nutrition and hydration status  - Monitor labs   - Assess for incontinence   - Turn and reposition patient  - Assist with mobility/ambulation  - Relieve pressure over bony prominences  - Avoid friction and shearing  - Provide appropriate hygiene as needed including keeping skin clean and dry  - Evaluate need for skin moisturizer/barrier cream  - Collaborate with interdisciplinary team   - Patient/family teaching  - Consider wound care consult   Outcome: Progressing

## 2022-02-05 NOTE — ASSESSMENT & PLAN NOTE
Diarrhea x 1 today   Likely antibiotics associated  Will monitor  Will start probiotics and vancomycin 125 mg daily for C diff prophylaxis given her high risks (age, systemic abx, hospitalization)

## 2022-02-05 NOTE — PROGRESS NOTES
NEPHROLOGY PROGRESS NOTE    Cristian Deal 80 y o  female MRN: [id number]  Unit/Bed#: S -01 Encounter: [id number]  Reason for Consult:  Hypokalemia and hyponatremia    Patient is awake and alert sitting in the chair offered no specific complaints for me  Was in no distress  At baseline  ASSESSMENT/PLAN:  1  Hypokalemia    Patient profound hypokalemia that was multifactorial due to excessive diuretic use at home she had hyperglycemia so may have had urinary losses due to osmotic diuresis  Then when glucose was corrected there was likely shift back to the intracellular space  At this point potassium now has normalized and this morning it was 5 3  Can discontinue oral daily supplementation ordered  Monitor on oral intake  Issue is resolved for now  2  Hyponatremia    Likely due to hypovolemia and low input of solute  She was given some isotonic fluids sodium is corrected from 130 to 135 mEq per L today  This issue appears resolved as well  Discontinue IV fluids  We will sign off please call if we can be of further assistance  SUBJECTIVE:  Review of Systems   Constitutional: Negative for chills, diaphoresis, fever and night sweats  HENT: Negative  Eyes: Negative  Cardiovascular: Negative for chest pain, dyspnea on exertion, orthopnea and palpitations  Respiratory: Negative for cough, shortness of breath, sputum production and wheezing  Gastrointestinal: Negative for abdominal pain, diarrhea, nausea and vomiting  Genitourinary: Negative  Neurological: Negative for dizziness, focal weakness, headaches and light-headedness  Psychiatric/Behavioral: Negative for altered mental status, depression, hallucinations and hypervigilance         OBJECTIVE:  Current Weight: Weight - Scale: 72 kg (158 lb 11 7 oz)  Vitals:Temp (24hrs), Av 9 °F (36 6 °C), Min:97 9 °F (36 6 °C), Max:98 °F (36 7 °C)  Current: Temperature: 97 9 °F (36 6 °C)   Blood pressure 126/61, pulse 71, temperature 97 9 °F (36 6 °C), temperature source Oral, resp  rate 16, height 4' 11 5" (1 511 m), weight 72 kg (158 lb 11 7 oz), SpO2 96 %  , Body mass index is 31 52 kg/m²        Intake/Output Summary (Last 24 hours) at 2/5/2022 1436  Last data filed at 2/5/2022 0641  Gross per 24 hour   Intake --   Output 401 ml   Net -401 ml       Physical Exam: /61 (BP Location: Right arm)   Pulse 71   Temp 97 9 °F (36 6 °C) (Oral)   Resp 16   Ht 4' 11 5" (1 511 m)   Wt 72 kg (158 lb 11 7 oz)   SpO2 96%   BMI 31 52 kg/m²   Physical Exam    Medications:    Current Facility-Administered Medications:     acetaminophen (TYLENOL) tablet 650 mg, 650 mg, Oral, Q6H PRN, MagdaCHELE Díaz-C    apixaban (ELIQUIS) tablet 5 mg, 5 mg, Oral, BID, AME Farris, 5 mg at 02/05/22 6577    cefpodoxime (VANTIN) tablet 400 mg, 400 mg, Oral, Q24H, Binh Acevedo MD, 400 mg at 02/04/22 1904    donepezil (ARICEPT) tablet 10 mg, 10 mg, Oral, Daily, Magdaline CHELE Nash-C, 10 mg at 02/05/22 0824    insulin glargine (LANTUS) subcutaneous injection 10 Units 0 1 mL, 10 Units, Subcutaneous, HS, CHELE Lopez-C, 10 Units at 02/04/22 2235    insulin lispro (HumaLOG) 100 units/mL subcutaneous injection 1-5 Units, 1-5 Units, Subcutaneous, TID AC, 1 Units at 02/05/22 1121 **AND** Fingerstick Glucose (POCT), , , TID AC, Magdaline Saad PA-C    insulin lispro (HumaLOG) 100 units/mL subcutaneous injection 1-5 Units, 1-5 Units, Subcutaneous, HS, Magdacarlos Nash PA-C, 2 Units at 02/04/22 2235    magnesium oxide (MAG-OX) tablet 400 mg, 400 mg, Oral, Daily, Magdaline CHELE Nash-C, 400 mg at 02/05/22 8528    metoprolol tartrate (LOPRESSOR) tablet 25 mg, 25 mg, Oral, Q12H [address], Pan Kinsey PA-C, 25 mg at 02/05/22 1868    nystatin (MYCOSTATIN) powder, , Topical, BID, Claudette Breaker, CRNP, Given at 02/05/22 0916    ondansetron (ZOFRAN) injection 4 mg, 4 mg, Intravenous, Q6H PRN, Yanet Nash PA-C    potassium chloride (K-DUR,KLOR-CON) CR tablet 40 mEq, 40 mEq, Oral, Daily, Pan Kinsey PA-C, 40 mEq at 02/05/22 6906    sodium chloride 0 9 % with KCl 20 mEq/L infusion (premix), 70 mL/hr, Intravenous, Continuous, Yoel Garcia MD, Last Rate: 70 mL/hr at 02/04/22 2234, 70 mL/hr at 02/04/22 2234    Laboratory Results:  Lab Results   Component Value Date    WBC 12 20 (H) 02/04/2022    HGB 11 7 02/04/2022    HCT 34 1 (L) 02/04/2022    MCV 87 02/04/2022     02/04/2022     Lab Results   Component Value Date    SODIUM 135 (L) 02/05/2022    K 5 3 02/05/2022     (H) 02/05/2022    CO2 20 (L) 02/05/2022    BUN 10 02/05/2022    CREATININE 0 75 02/05/2022    GLUC 107 02/05/2022    CALCIUM 7 5 (L) 02/05/2022     Lab Results   Component Value Date    CALCIUM 7 5 (L) 02/05/2022    PHOS 2 9 01/03/2022     No results found for: LABPROT

## 2022-02-06 LAB
ANION GAP SERPL CALCULATED.3IONS-SCNC: 7 MMOL/L (ref 4–13)
BACTERIA BLD CULT: NORMAL
BACTERIA BLD CULT: NORMAL
BASOPHILS # BLD MANUAL: 0 THOUSAND/UL (ref 0–0.1)
BASOPHILS NFR MAR MANUAL: 0 % (ref 0–1)
BUN SERPL-MCNC: 10 MG/DL (ref 5–25)
CALCIUM SERPL-MCNC: 8.6 MG/DL (ref 8.3–10.1)
CHLORIDE SERPL-SCNC: 104 MMOL/L (ref 100–108)
CO2 SERPL-SCNC: 22 MMOL/L (ref 21–32)
CREAT SERPL-MCNC: 0.93 MG/DL (ref 0.6–1.3)
EOSINOPHIL # BLD MANUAL: 0.22 THOUSAND/UL (ref 0–0.4)
EOSINOPHIL NFR BLD MANUAL: 2 % (ref 0–6)
ERYTHROCYTE [DISTWIDTH] IN BLOOD BY AUTOMATED COUNT: 13.8 % (ref 11.6–15.1)
GFR SERPL CREATININE-BSD FRML MDRD: 51 ML/MIN/1.73SQ M
GLUCOSE SERPL-MCNC: 130 MG/DL (ref 65–140)
GLUCOSE SERPL-MCNC: 144 MG/DL (ref 65–140)
GLUCOSE SERPL-MCNC: 227 MG/DL (ref 65–140)
GLUCOSE SERPL-MCNC: 228 MG/DL (ref 65–140)
GLUCOSE SERPL-MCNC: 274 MG/DL (ref 65–140)
HCT VFR BLD AUTO: 34.4 % (ref 34.8–46.1)
HGB BLD-MCNC: 11.5 G/DL (ref 11.5–15.4)
LYMPHOCYTES # BLD AUTO: 3.55 THOUSAND/UL (ref 0.6–4.47)
LYMPHOCYTES # BLD AUTO: 33 % (ref 14–44)
MCH RBC QN AUTO: 29.6 PG (ref 26.8–34.3)
MCHC RBC AUTO-ENTMCNC: 33.4 G/DL (ref 31.4–37.4)
MCV RBC AUTO: 89 FL (ref 82–98)
METAMYELOCYTES NFR BLD MANUAL: 1 % (ref 0–1)
MONOCYTES # BLD AUTO: 0.65 THOUSAND/UL (ref 0–1.22)
MONOCYTES NFR BLD: 6 % (ref 4–12)
MYELOCYTES NFR BLD MANUAL: 2 % (ref 0–1)
NEUTROPHILS # BLD MANUAL: 6.03 THOUSAND/UL (ref 1.85–7.62)
NEUTS BAND NFR BLD MANUAL: 1 % (ref 0–8)
NEUTS SEG NFR BLD AUTO: 55 % (ref 43–75)
PLATELET # BLD AUTO: 314 THOUSANDS/UL (ref 149–390)
PLATELET BLD QL SMEAR: ADEQUATE
PMV BLD AUTO: 10.9 FL (ref 8.9–12.7)
POTASSIUM SERPL-SCNC: 3.8 MMOL/L (ref 3.5–5.3)
RBC # BLD AUTO: 3.88 MILLION/UL (ref 3.81–5.12)
RBC MORPH BLD: NORMAL
SODIUM SERPL-SCNC: 133 MMOL/L (ref 136–145)
WBC # BLD AUTO: 10.76 THOUSAND/UL (ref 4.31–10.16)

## 2022-02-06 PROCEDURE — 85007 BL SMEAR W/DIFF WBC COUNT: CPT | Performed by: INTERNAL MEDICINE

## 2022-02-06 PROCEDURE — 99232 SBSQ HOSP IP/OBS MODERATE 35: CPT | Performed by: INTERNAL MEDICINE

## 2022-02-06 PROCEDURE — 85027 COMPLETE CBC AUTOMATED: CPT | Performed by: INTERNAL MEDICINE

## 2022-02-06 PROCEDURE — 82948 REAGENT STRIP/BLOOD GLUCOSE: CPT

## 2022-02-06 PROCEDURE — 80048 BASIC METABOLIC PNL TOTAL CA: CPT | Performed by: INTERNAL MEDICINE

## 2022-02-06 PROCEDURE — 92610 EVALUATE SWALLOWING FUNCTION: CPT

## 2022-02-06 RX ORDER — POTASSIUM CHLORIDE 20 MEQ/1
20 TABLET, EXTENDED RELEASE ORAL DAILY
Status: DISCONTINUED | OUTPATIENT
Start: 2022-02-06 | End: 2022-02-07 | Stop reason: HOSPADM

## 2022-02-06 RX ORDER — FUROSEMIDE 40 MG/1
40 TABLET ORAL ONCE
Status: COMPLETED | OUTPATIENT
Start: 2022-02-06 | End: 2022-02-06

## 2022-02-06 RX ORDER — FUROSEMIDE 40 MG/1
40 TABLET ORAL ONCE
Status: DISCONTINUED | OUTPATIENT
Start: 2022-02-06 | End: 2022-02-06

## 2022-02-06 RX ADMIN — FUROSEMIDE 40 MG: 40 TABLET ORAL at 13:38

## 2022-02-06 RX ADMIN — APIXABAN 5 MG: 5 TABLET, FILM COATED ORAL at 08:53

## 2022-02-06 RX ADMIN — Medication 250 MG: at 08:53

## 2022-02-06 RX ADMIN — INSULIN LISPRO 2 UNITS: 100 INJECTION, SOLUTION INTRAVENOUS; SUBCUTANEOUS at 21:54

## 2022-02-06 RX ADMIN — Medication 125 MG: at 08:54

## 2022-02-06 RX ADMIN — INSULIN GLARGINE 10 UNITS: 100 INJECTION, SOLUTION SUBCUTANEOUS at 21:53

## 2022-02-06 RX ADMIN — CEFPODOXIME PROXETIL 400 MG: 200 TABLET, FILM COATED ORAL at 16:31

## 2022-02-06 RX ADMIN — Medication 250 MG: at 17:50

## 2022-02-06 RX ADMIN — MAGNESIUM OXIDE TAB 400 MG (241.3 MG ELEMENTAL MG) 400 MG: 400 (241.3 MG) TAB at 08:52

## 2022-02-06 RX ADMIN — INSULIN LISPRO 3 UNITS: 100 INJECTION, SOLUTION INTRAVENOUS; SUBCUTANEOUS at 16:26

## 2022-02-06 RX ADMIN — APIXABAN 5 MG: 5 TABLET, FILM COATED ORAL at 16:31

## 2022-02-06 RX ADMIN — NYSTATIN: 100000 POWDER TOPICAL at 08:53

## 2022-02-06 RX ADMIN — INSULIN LISPRO 3 UNITS: 100 INJECTION, SOLUTION INTRAVENOUS; SUBCUTANEOUS at 11:08

## 2022-02-06 RX ADMIN — NYSTATIN: 100000 POWDER TOPICAL at 17:50

## 2022-02-06 RX ADMIN — POTASSIUM CHLORIDE 20 MEQ: 1500 TABLET, EXTENDED RELEASE ORAL at 17:50

## 2022-02-06 RX ADMIN — DONEPEZIL HYDROCHLORIDE 10 MG: 5 TABLET, FILM COATED ORAL at 08:52

## 2022-02-06 NOTE — PROGRESS NOTES
[facility]  Progress Note - Jesus Gut 11/22/1923, 80 y o  female MRN: [id number]  Unit/Bed#: S -01 Encounter: [id number]  Primary Care Provider: No primary care provider on file  Date and time admitted to hospital: 2/1/2022  5:59 AM    New onset atrial fibrillation (HCC)  Assessment & Plan  · Early morning 2/3 patient went into A  Fib on Telemetry  New onset  She is rate controlled  [id number] is 4   · Likely secondary to hypokalemia and age  · Discussed with daughter she wants to pursue anticoagulation   · Metoprolol 25 mg BID started  · Monitor response   · Continue to correct electrolytes   · Eliquis 5 mg BID started   · Echo EF45% with mild global hypokinesis  Mild AR/severe AS/Mod MR/mod to severe MS/mild to moderate TR  Progression compared to prior Echo in 2021 when EF was 75%  Discussed with Pt's daughter about the new Echo findings  She would not pursue any further aggressive investigations and conservative measures only given her advanced age  Daughter remains agreeable to [facility]     UTI (urinary tract infection)  Assessment & Plan  · UA: small amount of leukocytes and blood, 2+ protein and 250 glucose  · Urine microscopy: WBC 4-10 and clumped white blood cells  · UC final polymicrobial with low CFU  Contamination? Given that pt improved on ceftriaxone will continue but switch to cefpodoxime for better penetration  Plan for 7 days total on Day 6/7    * Ambulatory dysfunction  Assessment & Plan  · Patient was found down between her bed and nightstand at her assisted living   Likely mechanical in nature  · She lives at assisted living  · No focal neurologic deficits  · Patient has history of falls while at this facility due to dementia  · CT head: no acute intracranial abnormalities  · PT/OT consult   · Waiting for rehab   Will have bed available on Monday   · Fall Risk Precautions    Severe sepsis ([address] )  Assessment & Plan  · On admission WBC 17 91, tachycardia, lactic acid 2 1, and source of infection - suspected UTI  · Lactic acid was never repeated  · Leukocytosis improved   · Tachycardia resolved   · Blood cultures negative at 48 hours   · 2/2 UTI  Abx plan see above      Acute-on-chronic kidney injury [facility])  Assessment & Plan  Lab Results   Component Value Date    EGFR 51 02/06/2022    EGFR 66 02/05/2022    EGFR 56 02/04/2022    CREATININE 0 93 02/06/2022    CREATININE 0 75 02/05/2022    CREATININE 0 86 02/04/2022   · Baseline Cr 0 9  Elevated Cr on admission to 1 37, improved to 0 9, at baseline  · Nephrology consult appreciated   · Off IVF  · Resume PO lasix     Diarrhea  Assessment & Plan  Diarrhea x 1 2/5  Likely antibiotics associated  Started probiotics and vancomycin 125 mg daily for C diff prophylaxis given her high risks (age, systemic abx, hospitalization)  No further diarrhea    Aortic stenosis  Assessment & Plan  Wt Readings from Last 3 Encounters:   02/06/22 69 kg (152 lb 1 9 oz)   01/04/22 71 1 kg (156 lb 11 2 oz)   05/03/21 78 kg (172 lb)     · Patient with history of severe aortic stenosis  · Monitor for volume overload with fluids  · Resume PO lasix today       Type 2 diabetes mellitus with diabetic chronic kidney disease [facility])  Assessment & Plan  Lab Results   Component Value Date    HGBA1C 13 0 (H) 12/28/2021       Recent Labs     02/05/22  2042 02/06/22  0726 02/06/22  1048 02/06/22  1625   POCGLU 195* 130 274* 227*       Blood Sugar Average: Last 72 hrs:  · (P) 210 875   · Glucose on BMP elevated on admission at 319  BG drastically above goal and labile   · Hold home medications, sitagliptin 100 mg, repaglinide 2mg, and glipizide 10mg  · Started on Lantus 10 U QHS with sliding scale as per last Endocrinology note  · Sliding scale coverage    · Monitor sugars via accu-check  · Ultimately, given advanced age and dementia no indication for tightly controlled blood sugars         VTE Pharmacologic Prophylaxis:   Pharmacologic: Apixaban (Eliquis)  Mechanical VTE Prophylaxis in Place: Yes    Patient Centered Rounds: I have performed bedside rounds with nursing staff today  Discussions with Specialists or Other Care Team Provider:     Education and Discussions with Family / Patient: Spoke with pt's daughter     Time Spent for Care: 20 minutes  More than 50% of total time spent on counseling and coordination of care as described above  Current Length of Stay: 5 day(s)    Current Patient Status: Inpatient   Certification Statement: The patient will continue to require additional inpatient hospital stay due to Waiting for rehab     Discharge Plan:     Code Status: Level 3 - DNAR and DNI      Subjective:   Cough at times  Seen by speech and recommended to continue regular diet  Objective:     Vitals:   Temp (24hrs), Av 1 °F (36 7 °C), Min:97 9 °F (36 6 °C), Max:98 2 °F (36 8 °C)    Temp:  [97 9 °F (36 6 °C)-98 2 °F (36 8 °C)] 97 9 °F (36 6 °C)  HR:  [68-75] 73  Resp:  [16-18] 18  BP: ()/(55-78) 119/57  SpO2:  [96 %-97 %] 97 %  Body mass index is 30 21 kg/m²  Input and Output Summary (last 24 hours):     No intake or output data in the 24 hours ending 22 0785    Physical Exam:     Physical Exam  Constitutional:       General: She is not in acute distress  Appearance: She is not ill-appearing, toxic-appearing or diaphoretic  Eyes:      General:         Right eye: No discharge  Left eye: No discharge  Cardiovascular:      Rate and Rhythm: Normal rate  Rhythm irregular  Pulses: Normal pulses  Heart sounds: Murmur heard  Pulmonary:      Effort: Pulmonary effort is normal  No respiratory distress  Breath sounds: Normal breath sounds  No wheezing  Abdominal:      General: Abdomen is flat  Bowel sounds are normal       Palpations: Abdomen is soft  Skin:     General: Skin is warm     Psychiatric:         Mood and Affect: Mood normal          Behavior: Behavior normal          Additional Data:     Labs:    Results from last 7 days   Lab Units 02/06/22  0511 02/03/22  0516 02/02/22  0539   WBC Thousand/uL 10 76*   < > 14 86*   HEMOGLOBIN g/dL 11 5   < > 12 1   HEMATOCRIT % 34 4*   < > 34 6*   PLATELETS Thousands/uL 314   < > 247   NEUTROS PCT %  --   --  63   LYMPHS PCT %  --   --  26   LYMPHO PCT % 33  --   --    MONOS PCT %  --   --  9   MONO PCT % 6  --   --    EOS PCT % 2  --  0    < > = values in this interval not displayed  Results from last 7 days   Lab Units 02/06/22  0511 02/03/22  1448 02/03/22  0516   POTASSIUM mmol/L 3 8   < > 2 4*   CHLORIDE mmol/L 104   < > 98*   CO2 mmol/L 22   < > 22   BUN mg/dL 10   < > 16   CREATININE mg/dL 0 93   < > 0 90   CALCIUM mg/dL 8 6   < > 8 3   ALK PHOS U/L  --   --  87   ALT U/L  --   --  18   AST U/L  --   --  24    < > = values in this interval not displayed  Results from last 7 days   Lab Units 02/01/22  0615   INR  1 00         Recent Cultures (last 7 days):     Results from last 7 days   Lab Units 02/01/22  0801 02/01/22  0706   BLOOD CULTURE  No Growth After 5 Days  No Growth After 5 Days    --    URINE CULTURE   --  <10,000 cfu/ml Morganella morganii*  10,000-19,000 cfu/ml Lactobacillus species*  <10,000 cfu/ml Candida albicans*  <10,000 cfu/ml Enterococcus faecalis*  <10,000 cfu/ml Staphylococcus simulans*       Last 24 Hours Medication List:   Current Facility-Administered Medications   Medication Dose Route Frequency Provider Last Rate    acetaminophen  650 mg Oral Q6H PRN Jeremi Garzon PA-C      apixaban  5 mg Oral BID AME Epperson      cefpodoxime  400 mg Oral Q24H Randolph Mandujano MD      donepezil  10 mg Oral Daily Jeremi Garzon PA-C      insulin glargine  10 Units Subcutaneous HS Jeremi Garzon PA-C      insulin lispro  1-5 Units Subcutaneous TID [facility] Jeremi Garzon PA-C      insulin lispro  1-5 Units Subcutaneous HS Jeremi Garzon PA-C      magnesium oxide  400 mg Oral Daily Jeremi Garzon PA-C      metoprolol tartrate  25 mg Oral Q12H [address] Pan Hall PA-C      nystatin   Topical BID AME Hays      ondansetron  4 mg Intravenous Q6H PRN Dimitri Hood PA-C      saccharomyces boulardii  250 mg Oral BID Michael Frank MD      vancomycin  125 mg Oral Daily Michael Frank MD          Today, Patient Was Seen By: Michael Frank MD    ** Please Note: Dictation voice to text software may have been used in the creation of this document   **

## 2022-02-06 NOTE — ASSESSMENT & PLAN NOTE
Diarrhea x 1 2/5   Likely antibiotics associated  Started probiotics and vancomycin 125 mg daily for C diff prophylaxis given her high risks (age, systemic abx, hospitalization)  No further diarrhea

## 2022-02-06 NOTE — ASSESSMENT & PLAN NOTE
Lab Results   Component Value Date    EGFR 51 02/06/2022    EGFR 66 02/05/2022    EGFR 56 02/04/2022    CREATININE 0 93 02/06/2022    CREATININE 0 75 02/05/2022    CREATININE 0 86 02/04/2022   · Baseline Cr 0 9   Elevated Cr on admission to 1 37, improved to 0 9, at baseline  · Nephrology consult appreciated   · Off IVF  · Resume PO lasix

## 2022-02-06 NOTE — SPEECH THERAPY NOTE
Speech-Language Pathology Bedside Swallow Evaluation        Patient Name: Alfred Spicer    QRUFY'[id number] Date: 2/6/2022     Problem List  Principal Problem:    Ambulatory dysfunction  Active Problems:    Hypokalemia    Hyponatremia    Uncomplicated senile dementia ([facility] )    Type 2 diabetes mellitus with diabetic chronic kidney disease ([facility] )    Acute-on-chronic kidney injury ([facility] )    Severe sepsis ([facility] )    UTI (urinary tract infection)    Aortic stenosis    New onset atrial fibrillation ([facility] )    Diarrhea         Past Medical History  Past Medical History:   Diagnosis Date    Aortic stenosis     Bilateral edema of lower extremity     Dementia ([facility]     Diabetes ([facility]     Fall     Gait abnormality     Hyperlipidemia     Hypertension     Hypokalemia     Other ascites     Varicose veins of leg with edema        Past Surgical History  Past Surgical History:   Procedure Laterality Date    BREAST SURGERY      ELBOW SURGERY         Summary    Pt's oral and pharyngeal stages of swallowing appear WFL  Pt presents w/ slightly prolonged mastication/manipulation w/ food consistencies, however is able to successfully clear oral cavity w/ lingual sweep and sips of liquid  Delayed coughing noted throughout, however it does not appear to be related to aspiration/penetration  No follow up necessary at this time  Recommendations:   Diet: regular diet and thin liquids   Meds: whole with liquid   Frequent Oral care: 4x/day  Aspiration precautions and compensatory swallowing strategies: upright posture and alternating bites and sips  Other Recommendations/ considerations: None at this time  Current Medical Status  Pt is a 80 y o  female who presented to [address] [place] [address]  with fall and hyponatremia  Per patient's daughter, she was found down in between the bed and her nightstand this morning at her assisted living  Unable to assess events leading up to the fall   Daughter states when she got to the ED her mom was at her baseline, but was maybe a little off yesterday when talking to her on the phone  Patient was recently hospitalized for HHS and discharged on   Nurses state that pt appears to cough w/ PO intake, therefore question aspiration risk     Past medical history:   Please see H&P for details    Special Studies:  CT head without contrast 2022- No acute intracranial abnormality  XR chest 1 view portable 2022- The lungs are clear  No pneumothorax or pleural effusion  No acute cardiopulmonary disease  Social/Education/Vocational Hx:  and retired   Pt lives in [address] [place] [address]   Current Risks for Dysphagia & Aspiration: uncomplicated senile dementia   Current Symptoms/Concerns: coughing with po  Current Diet: regular diet and thin liquids   Baseline Diet: regular diet and thin liquids  Takes pills- whole w/ water     Baseline Assessment   Behavior/Cognition: alert  Speech/Language Status: able to participate in conversation and able to follow commands, however appeared quiet and did not want to engage in conversation much  Patient Positioning: upright in bed     Swallow Mechanism Exam   Facial: symmetrical  Labial: WFL  Lingual: WFL  Velum: unable to visualize  Mandible: adequate ROM  Dentition: adequate  Vocal quality:clear/adequate   Volitional Cough: strong/productive   Respiratory: Room air         Consistencies Assessed and Performance   Consistencies Administered: Pt was seen at lunch and rec'd meatloaf w/ gravy, mashed potatoes, peas, apple slices, and apple juice via cup and straw  Oral Stage: Prior to PO intake, audible crackle lung sounds were noted  Pt able to independently cough and crackle sounds   Pt was able to self feed and presented w/ adequate labial seal w/ cup, spoon, and straw  No labial leakage observed  Pt took single sips of apple juice via cup and straw and appeared w/ adequate oral control   Pt had adequate bolus retrieval from spoon w/ food consistencies  Pt presented w/ mild difficulty biting apple slice  Slightly prolonged mastication/manipulation observed w/ food consistencies and oral transfer appeared adequate  Mild oral residue noted, however pt was able to independently sweep oral cavity w/ tongue and take sips of liquid to successfully clear  Pharyngeal Stage: Swallow initiation appeared timely and complete  Frequent coughing observed throughout meal, not related to consistency or noted after swallowing  It did not appear to be related to aspiration  No throat clearing or wet vocal quality observed         Esophageal Concerns: none reported      Results Reviewed with: patient   Dysphagia Goals: none at this time

## 2022-02-06 NOTE — PLAN OF CARE
Problem: Potential for Falls  Goal: Patient will remain free of falls  Description: INTERVENTIONS:  - Educate patient/family on patient safety including physical limitations  - Instruct patient to call for assistance with activity   - Consult OT/PT to assist with strengthening/mobility   - Keep Call bell within reach  - Keep bed low and locked with side rails adjusted as appropriate  - Keep care items and personal belongings within reach  - Initiate and maintain comfort rounds  - Make Fall Risk Sign visible to staff  - Offer Toileting every  Hours, in advance of need  - Initiate/Maintain alarm  - Obtain necessary fall risk management equipment:   - Apply yellow socks and bracelet for high fall risk patients  - Consider moving patient to room near nurses station  Outcome: Progressing     Problem: PAIN - ADULT  Goal: Verbalizes/displays adequate comfort level or baseline comfort level  Description: Interventions:  - Encourage patient to monitor pain and request assistance  - Assess pain using appropriate pain scale  - Administer analgesics based on type and severity of pain and evaluate response  - Implement non-pharmacological measures as appropriate and evaluate response  - Consider cultural and social influences on pain and pain management  - Notify physician/advanced practitioner if interventions unsuccessful or patient reports new pain  Outcome: Progressing     Problem: INFECTION - ADULT  Goal: Absence or prevention of progression during hospitalization  Description: INTERVENTIONS:  - Assess and monitor for signs and symptoms of infection  - Monitor lab/diagnostic results  - Monitor all insertion sites, i e  indwelling lines, tubes, and drains  - Monitor endotracheal if appropriate and nasal secretions for changes in amount and color  - [place] appropriate cooling/warming therapies per order  - Administer medications as ordered  - Instruct and encourage patient and family to use good hand hygiene technique  - Identify and instruct in appropriate isolation precautions for identified infection/condition  Outcome: Progressing     Problem: SAFETY ADULT  Goal: Patient will remain free of falls  Description: INTERVENTIONS:  - Educate patient/family on patient safety including physical limitations  - Instruct patient to call for assistance with activity   - Consult OT/PT to assist with strengthening/mobility   - Keep Call bell within reach  - Keep bed low and locked with side rails adjusted as appropriate  - Keep care items and personal belongings within reach  - Initiate and maintain comfort rounds  - Make Fall Risk Sign visible to staff  - Offer Toileting every  Hours, in advance of need  - Initiate/Maintain alarm  - Obtain necessary fall risk management equipment:   - Apply yellow socks and bracelet for high fall risk patients  - Consider moving patient to room near nurses station  Outcome: Progressing  Goal: Maintain or return to baseline ADL function  Description: INTERVENTIONS:  -  Assess patient's ability to carry out ADLs; assess patient's baseline for ADL function and identify physical deficits which impact ability to perform ADLs (bathing, care of mouth/teeth, toileting, grooming, dressing, etc )  - Assess/evaluate cause of self-care deficits   - Assess range of motion  - Assess patient's mobility; develop plan if impaired  - Assess patient's need for assistive devices and provide as appropriate  - Encourage maximum independence but intervene and supervise when necessary  - Involve family in performance of ADLs  - Assess for home care needs following discharge   - Consider OT consult to assist with ADL evaluation and planning for discharge  - Provide patient education as appropriate  Outcome: Progressing  Goal: Maintains/Returns to pre admission functional level  Description: INTERVENTIONS:  - Perform BMAT or MOVE assessment daily    - Set and communicate daily mobility goal to care team and patient/family/caregiver  - Collaborate with rehabilitation services on mobility goals if consulted  - Perform Range of Motion  times a day  - Reposition patient every  hours  - Dangle patient  times a day  - Stand patient  times a day  - Ambulate patient  times a day  - Out of bed to chair  times a day   - Out of bed for meals  times a day  - Out of bed for toileting  - Record patient progress and toleration of activity level   Outcome: Progressing     Problem: DISCHARGE PLANNING  Goal: Discharge to home or other facility with appropriate resources  Description: INTERVENTIONS:  - Identify barriers to discharge w/patient and caregiver  - Arrange for needed discharge resources and transportation as appropriate  - Identify discharge learning needs (meds, wound care, etc )  - Arrange for interpretive services to assist at discharge as needed  - Refer to Case Management Department for coordinating discharge planning if the patient needs post-hospital services based on physician/advanced practitioner order or complex needs related to functional status, cognitive ability, or social support system  Outcome: Progressing     Problem: Knowledge Deficit  Goal: Patient/family/caregiver demonstrates understanding of disease process, treatment plan, medications, and discharge instructions  Description: Complete learning assessment and assess knowledge base    Interventions:  - Provide teaching at level of understanding  - Provide teaching via preferred learning methods  Outcome: Progressing     Problem: MOBILITY - ADULT  Goal: Maintain or return to baseline ADL function  Description: INTERVENTIONS:  -  Assess patient's ability to carry out ADLs; assess patient's baseline for ADL function and identify physical deficits which impact ability to perform ADLs (bathing, care of mouth/teeth, toileting, grooming, dressing, etc )  - Assess/evaluate cause of self-care deficits   - Assess range of motion  - Assess patient's mobility; develop plan if impaired  - Assess patient's need for assistive devices and provide as appropriate  - Encourage maximum independence but intervene and supervise when necessary  - Involve family in performance of ADLs  - Assess for home care needs following discharge   - Consider OT consult to assist with ADL evaluation and planning for discharge  - Provide patient education as appropriate  Outcome: Progressing  Goal: Maintains/Returns to pre admission functional level  Description: INTERVENTIONS:  - Perform BMAT or MOVE assessment daily    - Set and communicate daily mobility goal to care team and patient/family/caregiver  - Collaborate with rehabilitation services on mobility goals if consulted  - Perform Range of Motion  times a day  - Reposition patient every  hours    - Dangle patient  times a day  - Stand patient  times a day  - Ambulate patient  times a day  - Out of bed to chair  times a day   - Out of bed for meals times a day  - Out of bed for toileting  - Record patient progress and toleration of activity level   Outcome: Progressing     Problem: Prexisting or High Potential for Compromised Skin Integrity  Goal: Skin integrity is maintained or improved  Description: INTERVENTIONS:  - Identify patients at risk for skin breakdown  - Assess and monitor skin integrity  - Assess and monitor nutrition and hydration status  - Monitor labs   - Assess for incontinence   - Turn and reposition patient  - Assist with mobility/ambulation  - Relieve pressure over bony prominences  - Avoid friction and shearing  - Provide appropriate hygiene as needed including keeping skin clean and dry  - Evaluate need for skin moisturizer/barrier cream  - Collaborate with interdisciplinary team   - Patient/family teaching  - Consider wound care consult   Outcome: Progressing

## 2022-02-06 NOTE — ASSESSMENT & PLAN NOTE
Lab Results   Component Value Date    HGBA1C 13 0 (H) 12/28/2021       Recent Labs     02/05/22  2042 02/06/22  0726 02/06/22  1048 02/06/22  1625   POCGLU 195* 130 274* 227*       Blood Sugar Average: Last 72 hrs:  · (P) 210 875   · Glucose on BMP elevated on admission at 319  BG drastically above goal and labile   · Hold home medications, sitagliptin 100 mg, repaglinide 2mg, and glipizide 10mg  · Started on Lantus 10 U QHS with sliding scale as per last Endocrinology note  · Sliding scale coverage    · Monitor sugars via accu-check  · Ultimately, given advanced age and dementia no indication for tightly controlled blood sugars

## 2022-02-06 NOTE — ASSESSMENT & PLAN NOTE
· Early morning 2/3 patient went into A  Fib on Telemetry  New onset  She is rate controlled  [id number] is 4   · Likely secondary to hypokalemia and age  · Discussed with daughter she wants to pursue anticoagulation   · Metoprolol 25 mg BID started  · Monitor response   · Continue to correct electrolytes   · Eliquis 5 mg BID started   · Echo EF45% with mild global hypokinesis  Mild AR/severe AS/Mod MR/mod to severe MS/mild to moderate TR  Progression compared to prior Echo in 2021 when EF was 75%  Discussed with Pt's daughter about the new Echo findings  She would not pursue any further aggressive investigations and conservative measures only given her advanced age   Daughter remains agreeable to [facility]

## 2022-02-06 NOTE — PLAN OF CARE
Problem: Potential for Falls  Goal: Patient will remain free of falls  Description: INTERVENTIONS:  - Educate patient/family on patient safety including physical limitations  - Instruct patient to call for assistance with activity   - Consult OT/PT to assist with strengthening/mobility   - Keep Call bell within reach  - Keep bed low and locked with side rails adjusted as appropriate  - Keep care items and personal belongings within reach  - Initiate and maintain comfort rounds  - Make Fall Risk Sign visible to staff  - Offer Toileting every  Hours, in advance of need  - Initiate/Maintain alarm  - Obtain necessary fall risk management equipment:   - Apply yellow socks and bracelet for high fall risk patients  - Consider moving patient to room near nurses station  Outcome: Progressing     Problem: PAIN - ADULT  Goal: Verbalizes/displays adequate comfort level or baseline comfort level  Description: Interventions:  - Encourage patient to monitor pain and request assistance  - Assess pain using appropriate pain scale  - Administer analgesics based on type and severity of pain and evaluate response  - Implement non-pharmacological measures as appropriate and evaluate response  - Consider cultural and social influences on pain and pain management  - Notify physician/advanced practitioner if interventions unsuccessful or patient reports new pain  Outcome: Progressing     Problem: INFECTION - ADULT  Goal: Absence or prevention of progression during hospitalization  Description: INTERVENTIONS:  - Assess and monitor for signs and symptoms of infection  - Monitor lab/diagnostic results  - Monitor all insertion sites, i e  indwelling lines, tubes, and drains  - Monitor endotracheal if appropriate and nasal secretions for changes in amount and color  - [place] appropriate cooling/warming therapies per order  - Administer medications as ordered  - Instruct and encourage patient and family to use good hand hygiene technique  - Identify and instruct in appropriate isolation precautions for identified infection/condition  Outcome: Progressing     Problem: SAFETY ADULT  Goal: Patient will remain free of falls  Description: INTERVENTIONS:  - Educate patient/family on patient safety including physical limitations  - Instruct patient to call for assistance with activity   - Consult OT/PT to assist with strengthening/mobility   - Keep Call bell within reach  - Keep bed low and locked with side rails adjusted as appropriate  - Keep care items and personal belongings within reach  - Initiate and maintain comfort rounds  - Make Fall Risk Sign visible to staff  - Offer Toileting every  Hours, in advance of need  - Initiate/Maintain alarm  - Obtain necessary fall risk management equipment:   - Apply yellow socks and bracelet for high fall risk patients  - Consider moving patient to room near nurses station  Outcome: Progressing  Goal: Maintain or return to baseline ADL function  Description: INTERVENTIONS:  -  Assess patient's ability to carry out ADLs; assess patient's baseline for ADL function and identify physical deficits which impact ability to perform ADLs (bathing, care of mouth/teeth, toileting, grooming, dressing, etc )  - Assess/evaluate cause of self-care deficits   - Assess range of motion  - Assess patient's mobility; develop plan if impaired  - Assess patient's need for assistive devices and provide as appropriate  - Encourage maximum independence but intervene and supervise when necessary  - Involve family in performance of ADLs  - Assess for home care needs following discharge   - Consider OT consult to assist with ADL evaluation and planning for discharge  - Provide patient education as appropriate  Outcome: Progressing  Goal: Maintains/Returns to pre admission functional level  Description: INTERVENTIONS:  - Perform BMAT or MOVE assessment daily    - Set and communicate daily mobility goal to care team and patient/family/caregiver  - Collaborate with rehabilitation services on mobility goals if consulted  - Perform Range of Motion  times a day  - Reposition patient every  hours  - Dangle patient  times a day  - Stand patient  times a day  - Ambulate patient  times a day  - Out of bed to chair  times a day   - Out of bed for meals  times a day  - Out of bed for toileting  - Record patient progress and toleration of activity level   Outcome: Progressing     Problem: DISCHARGE PLANNING  Goal: Discharge to home or other facility with appropriate resources  Description: INTERVENTIONS:  - Identify barriers to discharge w/patient and caregiver  - Arrange for needed discharge resources and transportation as appropriate  - Identify discharge learning needs (meds, wound care, etc )  - Arrange for interpretive services to assist at discharge as needed  - Refer to Case Management Department for coordinating discharge planning if the patient needs post-hospital services based on physician/advanced practitioner order or complex needs related to functional status, cognitive ability, or social support system  Outcome: Progressing     Problem: Knowledge Deficit  Goal: Patient/family/caregiver demonstrates understanding of disease process, treatment plan, medications, and discharge instructions  Description: Complete learning assessment and assess knowledge base    Interventions:  - Provide teaching at level of understanding  - Provide teaching via preferred learning methods  Outcome: Progressing     Problem: MOBILITY - ADULT  Goal: Maintain or return to baseline ADL function  Description: INTERVENTIONS:  -  Assess patient's ability to carry out ADLs; assess patient's baseline for ADL function and identify physical deficits which impact ability to perform ADLs (bathing, care of mouth/teeth, toileting, grooming, dressing, etc )  - Assess/evaluate cause of self-care deficits   - Assess range of motion  - Assess patient's mobility; develop plan if impaired  - Assess patient's need for assistive devices and provide as appropriate  - Encourage maximum independence but intervene and supervise when necessary  - Involve family in performance of ADLs  - Assess for home care needs following discharge   - Consider OT consult to assist with ADL evaluation and planning for discharge  - Provide patient education as appropriate  Outcome: Progressing  Goal: Maintains/Returns to pre admission functional level  Description: INTERVENTIONS:  - Perform BMAT or MOVE assessment daily    - Set and communicate daily mobility goal to care team and patient/family/caregiver  - Collaborate with rehabilitation services on mobility goals if consulted  - Perform Range of Motion  times a day  - Reposition patient every  hours    - Dangle patient  times a day  - Stand patient  times a day  - Ambulate patient  times a day  - Out of bed to chair  times a day   - Out of bed for meals  times a day  - Out of bed for toileting  - Record patient progress and toleration of activity level   Outcome: Progressing     Problem: Prexisting or High Potential for Compromised Skin Integrity  Goal: Skin integrity is maintained or improved  Description: INTERVENTIONS:  - Identify patients at risk for skin breakdown  - Assess and monitor skin integrity  - Assess and monitor nutrition and hydration status  - Monitor labs   - Assess for incontinence   - Turn and reposition patient  - Assist with mobility/ambulation  - Relieve pressure over bony prominences  - Avoid friction and shearing  - Provide appropriate hygiene as needed including keeping skin clean and dry  - Evaluate need for skin moisturizer/barrier cream  - Collaborate with interdisciplinary team   - Patient/family teaching  - Consider wound care consult   Outcome: Progressing

## 2022-02-06 NOTE — ASSESSMENT & PLAN NOTE
Wt Readings from Last 3 Encounters:   02/06/22 69 kg (152 lb 1 9 oz)   01/04/22 71 1 kg (156 lb 11 2 oz)   05/03/21 78 kg (172 lb)     · Patient with history of severe aortic stenosis  · Monitor for volume overload with fluids  · Resume PO lasix today

## 2022-02-06 NOTE — ASSESSMENT & PLAN NOTE
· UA: small amount of leukocytes and blood, 2+ protein and 250 glucose  · Urine microscopy: WBC 4-10 and clumped white blood cells  · UC final polymicrobial with low CFU  Contamination? Given that pt improved on ceftriaxone will continue but switch to cefpodoxime for better penetration   Plan for 7 days total on Day 6/7

## 2022-02-07 VITALS
HEIGHT: 60 IN | BODY MASS INDEX: 29.95 KG/M2 | WEIGHT: 152.56 LBS | RESPIRATION RATE: 18 BRPM | OXYGEN SATURATION: 96 % | DIASTOLIC BLOOD PRESSURE: 63 MMHG | SYSTOLIC BLOOD PRESSURE: 123 MMHG | TEMPERATURE: 98.7 F | HEART RATE: 80 BPM

## 2022-02-07 LAB
ANION GAP SERPL CALCULATED.3IONS-SCNC: 6 MMOL/L (ref 4–13)
BASOPHILS # BLD MANUAL: 0 THOUSAND/UL (ref 0–0.1)
BASOPHILS NFR MAR MANUAL: 0 % (ref 0–1)
BUN SERPL-MCNC: 8 MG/DL (ref 5–25)
CALCIUM SERPL-MCNC: 8.5 MG/DL (ref 8.3–10.1)
CHLORIDE SERPL-SCNC: 103 MMOL/L (ref 100–108)
CO2 SERPL-SCNC: 25 MMOL/L (ref 21–32)
CREAT SERPL-MCNC: 0.83 MG/DL (ref 0.6–1.3)
EOSINOPHIL # BLD MANUAL: 0.35 THOUSAND/UL (ref 0–0.4)
EOSINOPHIL NFR BLD MANUAL: 3 % (ref 0–6)
ERYTHROCYTE [DISTWIDTH] IN BLOOD BY AUTOMATED COUNT: 13.8 % (ref 11.6–15.1)
FLUAV RNA RESP QL NAA+PROBE: NEGATIVE
FLUBV RNA RESP QL NAA+PROBE: NEGATIVE
GFR SERPL CREATININE-BSD FRML MDRD: 58 ML/MIN/1.73SQ M
GLUCOSE SERPL-MCNC: 132 MG/DL (ref 65–140)
GLUCOSE SERPL-MCNC: 146 MG/DL (ref 65–140)
GLUCOSE SERPL-MCNC: 219 MG/DL (ref 65–140)
HCT VFR BLD AUTO: 36.6 % (ref 34.8–46.1)
HGB BLD-MCNC: 12.3 G/DL (ref 11.5–15.4)
LYMPHOCYTES # BLD AUTO: 2.93 THOUSAND/UL (ref 0.6–4.47)
LYMPHOCYTES # BLD AUTO: 25 % (ref 14–44)
MCH RBC QN AUTO: 29.6 PG (ref 26.8–34.3)
MCHC RBC AUTO-ENTMCNC: 33.6 G/DL (ref 31.4–37.4)
MCV RBC AUTO: 88 FL (ref 82–98)
MONOCYTES # BLD AUTO: 1.41 THOUSAND/UL (ref 0–1.22)
MONOCYTES NFR BLD: 12 % (ref 4–12)
MYELOCYTES NFR BLD MANUAL: 5 % (ref 0–1)
NEUTROPHILS # BLD MANUAL: 6.44 THOUSAND/UL (ref 1.85–7.62)
NEUTS BAND NFR BLD MANUAL: 1 % (ref 0–8)
NEUTS SEG NFR BLD AUTO: 54 % (ref 43–75)
NRBC BLD AUTO-RTO: 1 /100 WBC (ref 0–2)
PLATELET # BLD AUTO: 331 THOUSANDS/UL (ref 149–390)
PLATELET BLD QL SMEAR: ADEQUATE
PMV BLD AUTO: 10.1 FL (ref 8.9–12.7)
POTASSIUM SERPL-SCNC: 3.5 MMOL/L (ref 3.5–5.3)
RBC # BLD AUTO: 4.15 MILLION/UL (ref 3.81–5.12)
RBC MORPH BLD: NORMAL
RSV RNA RESP QL NAA+PROBE: NEGATIVE
SARS-COV-2 RNA RESP QL NAA+PROBE: NEGATIVE
SODIUM SERPL-SCNC: 134 MMOL/L (ref 136–145)
WBC # BLD AUTO: 11.71 THOUSAND/UL (ref 4.31–10.16)

## 2022-02-07 PROCEDURE — 85007 BL SMEAR W/DIFF WBC COUNT: CPT | Performed by: INTERNAL MEDICINE

## 2022-02-07 PROCEDURE — 80048 BASIC METABOLIC PNL TOTAL CA: CPT | Performed by: INTERNAL MEDICINE

## 2022-02-07 PROCEDURE — 0241U HB NFCT DS VIR RESP RNA 4 TRGT: CPT | Performed by: INTERNAL MEDICINE

## 2022-02-07 PROCEDURE — 82948 REAGENT STRIP/BLOOD GLUCOSE: CPT

## 2022-02-07 PROCEDURE — 85027 COMPLETE CBC AUTOMATED: CPT | Performed by: INTERNAL MEDICINE

## 2022-02-07 PROCEDURE — 99239 HOSP IP/OBS DSCHRG MGMT >30: CPT | Performed by: INTERNAL MEDICINE

## 2022-02-07 RX ORDER — CEFPODOXIME PROXETIL 200 MG/1
400 TABLET, FILM COATED ORAL EVERY 24 HOURS
Qty: 1 TABLET | Refills: 0
Start: 2022-02-07 | End: 2022-02-08

## 2022-02-07 RX ORDER — VANCOMYCIN HYDROCHLORIDE 250 MG/1
250 CAPSULE ORAL DAILY
Refills: 0
Start: 2022-02-07 | End: 2022-02-10

## 2022-02-07 RX ORDER — POTASSIUM CHLORIDE 1500 MG/1
2 TABLET, FILM COATED, EXTENDED RELEASE ORAL 3 TIMES DAILY
Refills: 0
Start: 2022-02-07

## 2022-02-07 RX ORDER — CEFPODOXIME PROXETIL 200 MG/1
400 TABLET, FILM COATED ORAL EVERY 24 HOURS
Qty: 1 TABLET | Refills: 0 | Status: SHIPPED | OUTPATIENT
Start: 2022-02-07 | End: 2022-02-07

## 2022-02-07 RX ADMIN — METOPROLOL TARTRATE 25 MG: 25 TABLET, FILM COATED ORAL at 08:54

## 2022-02-07 RX ADMIN — DONEPEZIL HYDROCHLORIDE 10 MG: 5 TABLET, FILM COATED ORAL at 08:55

## 2022-02-07 RX ADMIN — NYSTATIN: 100000 POWDER TOPICAL at 08:53

## 2022-02-07 RX ADMIN — Medication 125 MG: at 08:55

## 2022-02-07 RX ADMIN — MAGNESIUM OXIDE TAB 400 MG (241.3 MG ELEMENTAL MG) 400 MG: 400 (241.3 MG) TAB at 08:54

## 2022-02-07 RX ADMIN — Medication 250 MG: at 08:55

## 2022-02-07 RX ADMIN — INSULIN LISPRO 2 UNITS: 100 INJECTION, SOLUTION INTRAVENOUS; SUBCUTANEOUS at 11:40

## 2022-02-07 RX ADMIN — APIXABAN 5 MG: 5 TABLET, FILM COATED ORAL at 08:54

## 2022-02-07 RX ADMIN — POTASSIUM CHLORIDE 20 MEQ: 1500 TABLET, EXTENDED RELEASE ORAL at 08:54

## 2022-02-07 RX ADMIN — CEFPODOXIME PROXETIL 400 MG: 200 TABLET, FILM COATED ORAL at 15:36

## 2022-02-07 NOTE — CASE MANAGEMENT
Case Management Discharge Planning Note    Patient name Jacqueline Randhawa  Location [address] 425/S -01 MRN [id number]  : 1923 Date 2022       Current Admission Date: 2022  Current Admission Diagnosis:Ambulatory dysfunction   Patient Active Problem List    Diagnosis Date Noted    Diarrhea 2022    New onset atrial fibrillation ([facility] ) 2022    Severe sepsis ([facility] ) 2022    UTI (urinary tract infection) 2022    Aortic stenosis 2022    Dysphagia 2022    Acute-on-chronic kidney injury ([facility] ) 2021    Hyperosmolar hyperglycemic state (HHS) ([facility] ) 2021    Urinary incontinence 2021    First degree AV block 2021    At risk for delirium 2021    Chest pain 2021    Syncope 2021    HTN (hypertension) 2021    SALTY (acute kidney injury) ([facility] ) 2021    Closed fracture of left proximal humerus 2020    Ambulatory dysfunction 2020    Atherosclerosis of aorta ([facility] ) 2019    Medicare annual wellness visit, subsequent 2018    Screening for depression 2018    Screening for genitourinary condition 2018    Screening for neurological condition 2018    Bilateral lower extremity edema 10/04/2017    Hypokalemia 2017    CKD (chronic kidney disease), stage III ([facility]) 2017    Hyponatremia 2017    Leukocytosis 2017    Moderate aortic stenosis 2017    Gallstone 2016    Anemia 2016    Pancreatic cyst     Uncomplicated senile dementia ([facility] ) 2015    Benign essential hypertension 2015    Hypercholesterolemia 2013    Osteopenia 2013    Type 2 diabetes mellitus with diabetic chronic kidney disease ([facility] ) 2013      LOS (days): 6  Geometric Mean LOS (GMLOS) (days): 3 50  Days to GMLOS:-2 7     OBJECTIVE:  Risk of Unplanned Readmission Score: 19         Current admission status: Inpatient   Preferred Pharmacy: [address], [address]  [place] [address]  Phone: [phone] Fax: [facility]  [address] [address]  Phone: [phone] Fax: [phone]    Primary Care Provider: No primary care provider on file  Primary Insurance: MEDICARE  Secondary Insurance: AARP    DISCHARGE DETAILS:    Discharge planning discussed with[de-identified] Patient, Shaylee-daughter and Ly Garcia     Comments - Freedom of Choice: Salinas Queen from [facility] reported the Pt has been accepted to their facility for an admission today  CM notified Pt and daughter Adi Call and both reported being agreeable to the admission to [facility]  CM made a referral to [facility] for a bls transport, and is currently awating an assigned time  Contacts  Patient Contacts: Adi Call  Relationship to Patient[de-identified] Family  Contact Method:  In Person  Reason/Outcome: Discharge Planning         IMM Given (Date):: 02/07/22  IMM Given to[de-identified] Family  Family notified[de-identified] CM reviewe the Pt's IMM rights with daughter Adi Call and obtained her [address] [facility] Name, [address] : [address] [place]  Receiving Facility/Agency Phone Number: [phone]  Facility/Agency Fax Number: [phone]

## 2022-02-07 NOTE — ASSESSMENT & PLAN NOTE
Diarrhea x 1 2/5  Likely antibiotics associated  Started probiotics and vancomycin 125 mg daily for C diff prophylaxis given her high risks (age, systemic abx, hospitalization)  No further diarrhea    Will continue prophylactic vancomycin for 72 hours after completion of antibiotics

## 2022-02-07 NOTE — CASE MANAGEMENT
Case Management Discharge Planning Note    Patient name Yobani Weber  [address] 425/S -01 MRN [id number]  : 1923 Date 2022       Current Admission Date: 2022  Current Admission Diagnosis:Ambulatory dysfunction   Patient Active Problem List    Diagnosis Date Noted    Diarrhea 2022    New onset atrial fibrillation ([address] ) 2022    Severe sepsis ([facility] ) 2022    UTI (urinary tract infection) 2022    Aortic stenosis 2022    Dysphagia 2022    Acute-on-chronic kidney injury ([facility] ) 2021    Hyperosmolar hyperglycemic state (HHS) ([facility] ) 2021    Urinary incontinence 2021    First degree AV block 2021    At risk for delirium 2021    Chest pain 2021    Syncope 2021    HTN (hypertension) 2021    SALTY (acute kidney injury) ([facility] ) 2021    Closed fracture of left proximal humerus 2020    Ambulatory dysfunction 2020    Atherosclerosis of aorta ([facility] ) 2019    Medicare annual wellness visit, subsequent 2018    Screening for depression 2018    Screening for genitourinary condition 2018    Screening for neurological condition 2018    Bilateral lower extremity edema 10/04/2017    Hypokalemia 2017    CKD (chronic kidney disease), stage III ([facility]) 2017    Hyponatremia 2017    Leukocytosis 2017    Moderate aortic stenosis 2017    Gallstone 2016    Anemia 2016    Pancreatic cyst     Uncomplicated senile dementia ([facility] ) 2015    Benign essential hypertension 2015    Hypercholesterolemia 2013    Osteopenia 2013    Type 2 diabetes mellitus with diabetic chronic kidney disease ([facility] ) 2013      LOS (days): 6  Geometric Mean LOS (GMLOS) (days): 3 50  Days to GMLOS:-2 7     OBJECTIVE:  Risk of Unplanned Readmission Score: 19         Current admission status: Inpatient   Preferred Pharmacy: [facility], [address]  [place] [address]  Phone: [phone] Fax: Michael [address]) [facility]  [address] [facility] [address]  Phone: [phone] Fax: [phone]    Primary Care Provider: No primary care provider on file  Primary Insurance: MEDICARE  Secondary Insurance: AARP    DISCHARGE DETAILS:    Discharge planning discussed with[de-identified] Patient, Shaylee-daughter and Ly Garcia     Comments - Freedom of Choice: Yajaira Horta from [facility] reported the Pt has been accepted to their facility for an admission today  CM notified Pt and daughter Tommy Partida and both reported being agreeable to the admission to [facility]  CM made a referral to [facility] for a bls transport, and is currently awating an assigned time  Contacts  Patient Contacts: Tommy Partida  Relationship to Patient[de-identified] Family  Contact Method:  In Person  Reason/Outcome: Discharge [address] [facility] Name, [address] : [address] [place]  Receiving Facility/Agency Phone Number: [phone]  Facility/Agency Fax Number: [phone]

## 2022-02-07 NOTE — ASSESSMENT & PLAN NOTE
· UA: small amount of leukocytes and blood, 2+ protein and 250 glucose  · Urine microscopy: WBC 4-10 and clumped white blood cells  · UC final polymicrobial with low CFU  Contamination? Given that pt improved on ceftriaxone will continue but switch to cefpodoxime for better penetration   Plan for 7 days total on Day 7/7

## 2022-02-07 NOTE — DISCHARGE SUMMARY
[facility]  Discharge- Tamia Colon 11/22/1923, 80 y o  female MRN: [id number]  Unit/Bed#: S -01 Encounter: [id number]  Primary Care Provider: No primary care provider on file  Date and time admitted to hospital: 2/1/2022  5:59 AM    New onset atrial fibrillation (HCC)  Assessment & Plan  · Early morning 2/3 patient went into A  Fib on Telemetry  New onset  She is rate controlled  [id number] is 4   · Likely secondary to hypokalemia and age  · Discussed with daughter she wants to pursue anticoagulation   · Metoprolol 25 mg BID started  · Eliquis 5 mg BID started   · Echo EF45% with mild global hypokinesis  Mild AR/severe AS/Mod MR/mod to severe MS/mild to moderate TR  Progression compared to prior Echo in 2021 when EF was 75%  Discussed with Pt's daughter about the new Echo findings  She would not pursue any further aggressive investigations and conservative measures only given her advanced age  Daughter remains agreeable to [facility]     UTI (urinary tract infection)  Assessment & Plan  · UA: small amount of leukocytes and blood, 2+ protein and 250 glucose  · Urine microscopy: WBC 4-10 and clumped white blood cells  · UC final polymicrobial with low CFU  Contamination? Given that pt improved on ceftriaxone will continue but switch to cefpodoxime for better penetration  Plan for 7 days total on Day 7/7    * Ambulatory dysfunction  Assessment & Plan  · Patient was found down between her bed and nightstand at her assisted living   Likely mechanical in nature  · She lives at assisted living  · No focal neurologic deficits  · Patient has history of falls while at this facility due to dementia  · CT head: no acute intracranial abnormalities  · PT/OT consult   · Waiting for rehab     · Fall Risk Precautions    Severe sepsis ([facility] )  Assessment & Plan  · On admission WBC 17 91, tachycardia, lactic acid 2 1, and source of infection - suspected UTI  · Lactic acid was never repeated  · Leukocytosis improved   · Tachycardia resolved   · Blood cultures negative at 48 hours   · 2/2 UTI  Abx plan see above      Acute-on-chronic kidney injury [facility])  Assessment & Plan  Lab Results   Component Value Date    EGFR 58 02/07/2022    EGFR 51 02/06/2022    EGFR 66 02/05/2022    CREATININE 0 83 02/07/2022    CREATININE 0 93 02/06/2022    CREATININE 0 75 02/05/2022   · Baseline Cr 0 9  Elevated Cr on admission to 1 37, improved to 0 9, at baseline  · Nephrology consult appreciated   · Off IVF  · Resume PO lasix     Diarrhea  Assessment & Plan  Diarrhea x 1 2/5  Likely antibiotics associated  Started probiotics and vancomycin 125 mg daily for C diff prophylaxis given her high risks (age, systemic abx, hospitalization)  No further diarrhea  Will continue prophylactic vancomycin for 72 hours after completion of antibiotics    Aortic stenosis  Assessment & Plan  Wt Readings from Last 3 Encounters:   02/07/22 69 2 kg (152 lb 8 9 oz)   01/04/22 71 1 kg (156 lb 11 2 oz)   05/03/21 78 kg (172 lb)     · Patient with history of severe aortic stenosis  · Monitor for volume overload with fluids  · Resumed PO lasix       Type 2 diabetes mellitus with diabetic chronic kidney disease [facility])  Assessment & Plan  Lab Results   Component Value Date    HGBA1C 13 0 (H) 12/28/2021       Recent Labs     02/06/22  1625 02/06/22 2001 02/07/22  0719 02/07/22  1054   POCGLU 227* 228* 146* 219*       Blood Sugar Average: Last 72 hrs:  · (P) 739 [id number]   · Glucose on BMP elevated on admission at 319  BG drastically above goal and labile   · Hold home medications, sitagliptin 100 mg, repaglinide 2mg, and glipizide 10mg  · Resume home meds after discharge     Hyponatremia  Assessment & Plan  · Sodium upon admission was 124, corrected sodium 128 in the setting of hyperglycemia  · Stable at 130 without hyperglycemia   · Due to dehydration  Resolved after IVF     · Lasix will be reduced from 40mg to 20mg upon discharge          Discharging Physician / Practitioner: Cindy Marte MD  PCP: No primary care provider on file  Admission Date:   Admission Orders (From admission, onward)     Ordered        02/01/22 0744  Inpatient Admission  Once                      Discharge Date: 02/07/22    Medical Problems             Resolved Problems  Date Reviewed: 2/3/2022    None                Significant Findings / Test Results:   ? Echo showed  EF45% with mild global hypokinesis  Mild AR/severe AS/Mod MR/mod to severe MS/mild to moderate TR  Reason for Admission: [facility] [place] Course:     Danielle Vogt is a 80 y o  female patient with PMHx of aortic stenosis, dementia, diabetes, ambulatory dysfunction, hypertension and dyslipidemia who originally presented to the hospital on 2/1/2022 due to fall  Patient was found down in between the bed and her nightstand at her assisted living facility  Her workup showed hyponatremia which is felt due to dehydration  This is improved after IV fluid  Potassium is replaced  Patient was also treated for urinary tract infection  She was found in new onset atrial fibrillation during the hospital stay  She was started on metoprolol with good rate control  After discussion with the family they are interested in stroke prevention for which patient is placed on Eliquis  Her echocardiogram did show worsening EF with global hypokinesis and progression of her known valvular heart disease  Family does not want to pursue further investigation given her advanced age  Please see above list of diagnoses and related plan for additional information       Discharge Day Visit / Exam:     Subjective:  No overnight issues  Vitals: Blood Pressure: 114/71 (02/07/22 0700)  Pulse: 74 (02/07/22 0700)  Temperature: 99 1 °F (37 3 °C) (02/07/22 0700)  Temp Source: Oral (02/07/22 0700)  Respirations: 16 (02/07/22 0700)  Height: 4' 11 5" (151 1 cm) (02/04/22 0800)  Weight - Scale: 69 2 kg (152 lb 8 9 oz) (02/07/22 0600)  SpO2: 96 % (02/07/22 0700)  Exam:   Physical Exam  Constitutional:       General: She is not in acute distress  Appearance: She is not ill-appearing, toxic-appearing or diaphoretic  Eyes:      General:         Right eye: No discharge  Left eye: No discharge  Cardiovascular:      Rate and Rhythm: Normal rate  Rhythm irregular  Pulses: Normal pulses  Pulmonary:      Effort: Pulmonary effort is normal  No respiratory distress  Breath sounds: No wheezing  Abdominal:      General: Abdomen is flat  Bowel sounds are normal  There is no distension  Palpations: Abdomen is soft  Musculoskeletal:         General: No swelling  Skin:     General: Skin is warm  Psychiatric:         Mood and Affect: Mood normal          Behavior: Behavior normal          Discharge instructions/Information to patient and family:   See after visit summary for information provided to patient and family  Provisions for Follow-Up Care:  See after visit summary for information related to follow-up care and any pertinent home health orders  Disposition:     Other: SNF rehab     For Discharges to [facility] SNF:   · Not Applicable to this Patient - Not Applicable to this Patient    Planned Readmission: No      Discharge Statement:  I spent 30 minutes discharging the patient  This time was spent on the day of discharge  I had direct contact with the patient on the day of discharge  Greater than 50% of the total time was spent examining patient, answering all patient questions, arranging and discussing plan of care with patient as well as directly providing post-discharge instructions  Additional time then spent on discharge activities  Discharge Medications:  See after visit summary for reconciled discharge medications provided to patient and family        ** Please Note: This note has been constructed using a voice recognition system **

## 2022-02-07 NOTE — ASSESSMENT & PLAN NOTE
· Patient was found down between her bed and nightstand at her assisted living   Likely mechanical in nature  · She lives at assisted living  · No focal neurologic deficits  · Patient has history of falls while at this facility due to dementia  · CT head: no acute intracranial abnormalities  · PT/OT consult   · Waiting for rehab     · Fall Risk Precautions

## 2022-02-07 NOTE — ASSESSMENT & PLAN NOTE
Wt Readings from Last 3 Encounters:   02/07/22 69 2 kg (152 lb 8 9 oz)   01/04/22 71 1 kg (156 lb 11 2 oz)   05/03/21 78 kg (172 lb)     · Patient with history of severe aortic stenosis  · Monitor for volume overload with fluids  · Resumed PO lasix

## 2022-02-07 NOTE — ASSESSMENT & PLAN NOTE
Lab Results   Component Value Date    EGFR 58 02/07/2022    EGFR 51 02/06/2022    EGFR 66 02/05/2022    CREATININE 0 83 02/07/2022    CREATININE 0 93 02/06/2022    CREATININE 0 75 02/05/2022   · Baseline Cr 0 9   Elevated Cr on admission to 1 37, improved to 0 9, at baseline  · Nephrology consult appreciated   · Off IVF  · Resume PO lasix

## 2022-02-07 NOTE — ASSESSMENT & PLAN NOTE
· Early morning 2/3 patient went into A  Fib on Telemetry  New onset  She is rate controlled  [id number] is 4   · Likely secondary to hypokalemia and age  · Discussed with daughter she wants to pursue anticoagulation   · Metoprolol 25 mg BID started  · Eliquis 5 mg BID started   · Echo EF45% with mild global hypokinesis  Mild AR/severe AS/Mod MR/mod to severe MS/mild to moderate TR  Progression compared to prior Echo in 2021 when EF was 75%  Discussed with Pt's daughter about the new Echo findings  She would not pursue any further aggressive investigations and conservative measures only given her advanced age   Daughter remains agreeable to [facility]

## 2022-02-07 NOTE — ASSESSMENT & PLAN NOTE
· Sodium upon admission was 124, corrected sodium 128 in the setting of hyperglycemia  · Stable at 130 without hyperglycemia   · Due to dehydration  Resolved after IVF     · Lasix will be reduced from 40mg to 20mg upon discharge

## 2022-02-07 NOTE — CASE MANAGEMENT
Case Management Discharge Planning Note    Patient name Simón Mackenzie [address] 425/S -01 MRN [id number]  : 1923 Date 2022       Current Admission Date: 2022  Current Admission Diagnosis:Ambulatory dysfunction   Patient Active Problem List    Diagnosis Date Noted    Diarrhea 2022    New onset atrial fibrillation ([address] ) 2022    Severe sepsis ([address] ) 2022    UTI (urinary tract infection) 2022    Aortic stenosis 2022    Dysphagia 2022    Acute-on-chronic kidney injury ([address] ) 2021    Hyperosmolar hyperglycemic state (HHS) [facility] ) 2021    Urinary incontinence 2021    First degree AV block 2021    At risk for delirium 2021    Chest pain 2021    Syncope 2021    HTN (hypertension) 2021    SALTY (acute kidney injury) ([facility] ) 2021    Closed fracture of left proximal humerus 2020    Ambulatory dysfunction 2020    Atherosclerosis of aorta ([facility] ) 2019    Medicare annual wellness visit, subsequent 2018    Screening for depression 2018    Screening for genitourinary condition 2018    Screening for neurological condition 2018    Bilateral lower extremity edema 10/04/2017    Hypokalemia 2017    CKD (chronic kidney disease), stage III (HCC) 2017    Hyponatremia 2017    Leukocytosis 2017    Moderate aortic stenosis 2017    Gallstone 2016    Anemia 2016    Pancreatic cyst     Uncomplicated senile dementia ([facility] ) 2015    Benign essential hypertension 2015    Hypercholesterolemia 2013    Osteopenia 2013    Type 2 diabetes mellitus with diabetic chronic kidney disease ([facility] ) 2013      LOS (days): 6  Geometric Mean LOS (GMLOS) (days): 3 50  Days to GMLOS:-2 7     OBJECTIVE:  Risk of Unplanned Readmission Score: 19         Current admission status: Inpatient   Preferred Pharmacy: [address], [address]  [place] [address]  Phone: [phone] Fax: [facility]  [address] [address]  Phone: [phone] Fax: [phone]    Primary Care Provider: No primary care provider on file  Primary Insurance: MEDICARE  Secondary Insurance: AARP    DISCHARGE DETAILS:          Transport at Discharge : [facility]  Dispatcher Contacted: Yes  Number/Name of Dispatcher: [id number]  Transported by [employer] and Unit #):  [facility]  ETA of Transport (Date): 02/07/22  ETA of Transport (Time): 8673

## 2022-02-07 NOTE — ASSESSMENT & PLAN NOTE
Lab Results   Component Value Date    HGBA1C 13 0 (H) 12/28/2021       Recent Labs     02/06/22  1625 02/06/22 2001 02/07/22  0719 02/07/22  1054   POCGLU 227* 228* 146* 219*       Blood Sugar Average: Last 72 hrs:  · (P) 998 [id number]   · Glucose on BMP elevated on admission at 319   BG drastically above goal and labile   · Hold home medications, sitagliptin 100 mg, repaglinide 2mg, and glipizide 10mg  · Resume home meds after discharge

## 2022-02-08 ENCOUNTER — NURSING HOME VISIT (OUTPATIENT)
Dept: GERIATRICS | Facility: OTHER | Age: 87
End: 2022-02-08
Payer: MEDICARE

## 2022-02-08 DIAGNOSIS — R65.20 SEVERE SEPSIS (HCC): ICD-10-CM

## 2022-02-08 DIAGNOSIS — R26.2 AMBULATORY DYSFUNCTION: ICD-10-CM

## 2022-02-08 DIAGNOSIS — I50.21 ACUTE SYSTOLIC CONGESTIVE HEART FAILURE (HCC): ICD-10-CM

## 2022-02-08 DIAGNOSIS — I48.91 NEW ONSET ATRIAL FIBRILLATION (HCC): Primary | ICD-10-CM

## 2022-02-08 DIAGNOSIS — A41.9 SEVERE SEPSIS (HCC): ICD-10-CM

## 2022-02-08 DIAGNOSIS — E87.6 HYPOKALEMIA: ICD-10-CM

## 2022-02-08 DIAGNOSIS — E11.22 TYPE 2 DIABETES MELLITUS WITH CHRONIC KIDNEY DISEASE, WITHOUT LONG-TERM CURRENT USE OF INSULIN, UNSPECIFIED CKD STAGE (HCC): ICD-10-CM

## 2022-02-08 DIAGNOSIS — I10 PRIMARY HYPERTENSION: ICD-10-CM

## 2022-02-08 DIAGNOSIS — N30.00 ACUTE CYSTITIS WITHOUT HEMATURIA: ICD-10-CM

## 2022-02-08 DIAGNOSIS — N17.9 AKI (ACUTE KIDNEY INJURY) (HCC): ICD-10-CM

## 2022-02-08 DIAGNOSIS — F03.90 UNCOMPLICATED SENILE DEMENTIA (HCC): ICD-10-CM

## 2022-02-08 PROCEDURE — 99306 1ST NF CARE HIGH MDM 50: CPT | Performed by: INTERNAL MEDICINE

## 2022-02-08 NOTE — ASSESSMENT & PLAN NOTE
Completing course of antibiotics today  Continues on vanco for cdiff prop for another few days  Continue monitoring for any side affects from antibiotics  Continue monitoring symptoms   Encourage fluids

## 2022-02-08 NOTE — ASSESSMENT & PLAN NOTE
Was on supplement at home   Daughter reports seemed to not her supplement on regular bases  Continue current K+ supplement  Repeat BMP

## 2022-02-08 NOTE — ASSESSMENT & PLAN NOTE
Lab Results   Component Value Date    HGBA1C 13 0 (H) 12/28/2021     Hb seems to have been not controlled before, hospital running in the 140 - 250  Fasting blood sugars seems to be better then others  Continue current meds  May need adjustment in meds but need to monitor for hypoglycemia  CCD diet

## 2022-02-08 NOTE — PROGRESS NOTES
[facility] notes  SHORT TERM REHAB       NAME: Danelle Judge  AGE: 80 y o   SEX: female    DATE OF ENCOUNTER: 2/8/2022    Assessment and Plan   New onset atrial fibrillation (HCC)  Started on eliquis and metoprolol  Continue current meds  Continue monitoring rate  [id number]    Ambulatory dysfunction  Multifactorial  Rehab consult   Continue with safety measures      UTI (urinary tract infection)  Completing course of antibiotics today  Continues on vanco for cdiff prop for another few days  Continue monitoring for any side affects from antibiotics  Continue monitoring symptoms   Encourage fluids     Severe sepsis ([address] )  Suspected secondary to UTI  WBC 17 9 lactate 2 1 on admission   Symptoms resolved  Blood cultures negative      Type 2 diabetes mellitus with diabetic chronic kidney disease (HCC)    Lab Results   Component Value Date    HGBA1C 13 0 (H) 12/28/2021     Hb seems to have been not controlled before, hospital running in the 140 - 250  Fasting blood sugars seems to be better then others  Continue current meds  May need adjustment in meds but need to monitor for hypoglycemia  CCD diet     Hypokalemia  Was on supplement at home   Daughter reports seemed to not her supplement on regular bases  Continue current K+ supplement  Repeat BMP     HTN (hypertension)  Continue current meds  Continue monitoring BP      Acute systolic congestive heart failure ([address] )  Wt Readings from Last 3 Encounters:   02/07/22 69 2 kg (152 lb 8 9 oz)   01/04/22 71 1 kg (156 lb 11 2 oz)   05/03/21 78 kg (172 lb)     EF reported to be done to 45% with mild global hypokinesis, mild AR/Severe AS/ moderate MR/ mod to severe MS/ mild to moderate TR  Daughter did not want any aggressive management or further evaluation for the ECHO findings   Continue lasix  Continue monitoring weight        SALTY (acute kidney injury) ([address] )  Improved with hydration  Will repeat BMP to monitor    Uncomplicated senile dementia [facility])  Continue current meds  Continue with supportive care            Chief Complaint     No new complaints    History of Present Illness     79 yo female seen for admission to [facility] after hospitalization  Patient cannot give history due to her dementia  History obtained from daughter  As per daughter she moved her mother to personal care because she was not able to take care of her  She became weak and was found on the ground and was taken to the hospital where she was found to be dehydrated and UTI  During the stay she was found to have afib and also echo showed decreased EF for which she was managed in the hospital  She was hen discharged to therapy  Reviewed labs and imaging reports from the hospital      PMHx     Past Medical History:   Diagnosis Date    Aortic stenosis     Bilateral edema of lower extremity     Dementia [facility]     Diabetes ([facility] )     Fall     Gait abnormality     Hyperlipidemia     Hypertension     Hypokalemia     Other ascites     Varicose veins of leg with edema      Past Surgical History:   Procedure Laterality Date    BREAST SURGERY      ELBOW SURGERY       Family History   Problem Relation Age of Onset    Dementia Sister      Social History     Socioeconomic History    Marital status:       Spouse name: None    Number of children: None    Years of education: None    Highest education level: None   Occupational History    Occupation: retired   Tobacco Use    Smoking status: Never Smoker    Smokeless tobacco: Never Used   Vaping Use    Vaping Use: Never used   Substance and Sexual Activity    Alcohol use: Not Currently    Drug use: No    Sexual activity: Not Currently   Other Topics Concern    None   Social History Narrative    Advance directive in chart     Social Determinants of Health     Financial Resource Strain: Not on file   Food Insecurity: No Food Insecurity    Worried About [address] in the Last Year: Never true   [employer] in the Last Year: Never true   Transportation Needs: No Transportation Needs    Lack of Transportation (Medical): No    Lack of Transportation (Non-Medical): No   Physical Activity: Not on file   Stress: Not on file   Social Connections: Not on file   Intimate Partner Violence: Not on file   Housing Stability: Unknown    Unable to Pay for Housing in the Last Year: No    Number of Places Lived in the Last Year: Not on file    Unstable Housing in the Last Year: No     No Known Allergies    Review of Systems     Denies any pain or shortness of breath  All other review of system negative        Objective   Vital signs:  BP: 120/77  HR: 85  RR: 20  TEMP: 97 5F  SAT : 93% on RA    Exam done with nursing staff present with patient   PHYSICAL EXAM:  GENERAL: no acute distress  SKIN: no rash, no cyanosis  HEENT: normocephalic, atraumatic  LUNGS: expanded equally, no chest tenderness   HEART: no JVD, bilateral lower extremity edema trace  ABDOMEN: soft non tender non distended   MUSCULOSKELETAL:  moves all extremities, ROM within normal  NEUROLOGY: awake, alert, Oriented to self, unable to recall 3 object  EOMI intact  PSYCH: cooperative, pleasant       Pertinent Laboratory/Diagnostic Studies:  Recent labs and diagnostic tests reviewed in nursing home EMR    Current Medications   Medications reviewed and signed off on nursing home chart

## 2022-02-08 NOTE — ASSESSMENT & PLAN NOTE
Wt Readings from Last 3 Encounters:   02/07/22 69 2 kg (152 lb 8 9 oz)   01/04/22 71 1 kg (156 lb 11 2 oz)   05/03/21 78 kg (172 lb)     EF reported to be done to 45% with mild global hypokinesis, mild AR/Severe AS/ moderate MR/ mod to severe MS/ mild to moderate TR  Daughter did not want any aggressive management or further evaluation for the ECHO findings   Continue lasix  Continue monitoring weight

## 2022-02-10 ENCOUNTER — NURSING HOME VISIT (OUTPATIENT)
Dept: GERIATRICS | Facility: OTHER | Age: 87
End: 2022-02-10
Payer: MEDICARE

## 2022-02-10 DIAGNOSIS — I48.91 NEW ONSET ATRIAL FIBRILLATION (HCC): ICD-10-CM

## 2022-02-10 DIAGNOSIS — F03.90 UNCOMPLICATED SENILE DEMENTIA (HCC): ICD-10-CM

## 2022-02-10 DIAGNOSIS — I10 PRIMARY HYPERTENSION: ICD-10-CM

## 2022-02-10 DIAGNOSIS — N30.00 ACUTE CYSTITIS WITHOUT HEMATURIA: ICD-10-CM

## 2022-02-10 DIAGNOSIS — K59.1 FUNCTIONAL DIARRHEA: Primary | ICD-10-CM

## 2022-02-10 PROCEDURE — 99309 SBSQ NF CARE MODERATE MDM 30: CPT | Performed by: NURSE PRACTITIONER

## 2022-02-10 NOTE — PROGRESS NOTES
[address]  [address] [facility]  [phone]  [address] [facility]   Progress Note  POS 31        NAME: Aaron Dave  AGE: 80 y o  SEX: female  :  1923  DATE OF ENCOUNTER: 2/10/2022     Chief Complaint   Patient seen and examined for follow up on chronic conditions  History of Present Illness      The patient is a 80year old female with multiple comorbidities including but not limited to dementia, HTN, DM, new onset atrial fibrillation, and ambulatory dysfunction  She is seen today at [facility] for rehabilitation following a hospitalization for a fall with a UTI and new onset atrial fibrillation  Upon assessment today, subjective information was limited given the patients dementia  Although, oriented to self only, the patient was able to state that she is feeling well and denies any pain  She did not have any complaints at this time  She denied any headaches, chest pain, SOB, or abdominal tenderness  It was reported that the patient had been having diarrhea but the patient denied any more episodes today  No BM was documented for today yet  The patient is a resident of [facility] who presents with a stage 1 sacral ulcer  She is requiring a one person assist with the walker for ADLs and ambulation  Plan to return the patient to her prior nursing home once rehab is complete  The following portions of the patient's history were reviewed and updated as appropriate: allergies, current medications, past family history, past medical history, past social history, past surgical history and problem list      Review of Systems      A review of systems was performed  All negative, except as per HPI       History      Past Medical History:   Diagnosis Date    Aortic stenosis      Bilateral edema of lower extremity      Dementia (HCC)      Diabetes ([facility]      Fall      Gait abnormality      Hyperlipidemia      Hypertension      Hypokalemia      Other ascites      Varicose veins of leg with edema        Past Surgical History:   Procedure Laterality Date    BREAST SURGERY        ELBOW SURGERY          Family History   Problem Relation Age of Onset    Dementia Sister              Social History      Socioeconomic History    Marital status:        Spouse name: Not on file    Number of children: Not on file    Years of education: Not on file    Highest education level: Not on file   Occupational History    Occupation: retired   Tobacco Use    Smoking status: Never Smoker    Smokeless tobacco: Never Used   Vaping Use    Vaping Use: Never used   Substance and Sexual Activity    Alcohol use: Not Currently    Drug use: No    Sexual activity: Not Currently   Other Topics Concern    Not on file   Social History Narrative     Advance directive in chart            Social Determinants of Health      Financial Resource Strain: Not on file   Food Insecurity: No Food Insecurity    Worried About [address] in the Last Year: Never true    [employer] in the Last Year: Never true   Transportation Needs: No Transportation Needs    Lack of Transportation (Medical): No    Lack of Transportation (Non-Medical):  No   Physical Activity: Not on file   Stress: Not on file   Social Connections: Not on file   Intimate Partner Violence: Not on file   Housing Stability: Unknown    Unable to Pay for Housing in the Last Year: No    Number of Places Lived in the Last Year: Not on file    Unstable Housing in the Last Year: No      No Known Allergies     Objective      Vital Signs  BP: 122/68   HR: 99   T: 97 5 F    RR: 18      O2Sat: 97% RA    W: 151 8lbs  General: NAD, Well Nourished, Well Developed  Oral: Oropharynx Moist and Clear  Neck: Supple, +ROM  CV: S1, S2, normal rate, regular rhythm, no murmur appreciated  Pulmonary: Lung sounds clear to air, no wheezing, rhonchi, rales  Abdominal:BS + x4 in all quadrants, soft, no mass, no tenderness  Extremities: +2 b/l LE edema, +ROM, +Strength  Skin: Warm, Dry, no lesions, no rash, no erythema present, no ecchymosis present, right hand skin tear  Neurological: CN 2-12 intact, PERRLA  Psych: Alert and oriented to self, no mood, no affect     Pertinent Laboratory/Diagnostic Studies:    CBC WITH DIFF     HEMOGLOBIN 12 2 g/dL 11 5-14 5  Final            HEMATOCRIT 36 0 % 35 0-43 0  Final            WBC 11 9 thou/cmm 4 0-10 0 H Final            RBC 4 07 mill/cmm 3 70-4 70  Final            PLATELET COUNT 763 thou/cmm 140-350  Final            MPV 9 3 fL 7 5-11 3  Final            MCV 88 fL   Final            MCH 29 9 pg 26 0-34 0  Final            MCHC 33 8 g/dL 32 0-37 0  Final            RDW 14 6 % 12 0-16 0  Final            DIFFERENTIAL TYPE MANUAL    Final            ABSOLUTE NEUT 8 5 thou/cmm 1 8-7 8 H Final            ABSOLUTE LYMPH 2 4 thou/cmm 1 0-3 0  Final            ABSOLUTE MONO 0 8 thou/cmm 0 3-1 0  Final            ABSOLUTE EOS 0 1 thou/cmm 0 0-0 5  Final            ABSOLUTE BASO 0 1 thou/cmm 0 0-0 1  Final            NEUTROPHILS 64 %   Final            LYMPHOCYTES 20 %   Final            MONOCYTES 7 %   Final            EOSINOPHILS 1 %   Final            BASOPHILS 1 %   Final            METAMYELOCYTES 7 % 0 H Final            HEMATOLOGY COMMENT       Final     RBC morphology normal  Peripheral smear reviewed by technologist      COMP METAB PANEL     GLUCOSE 252 mg/dL 65-99 H Final            BUN 11 mg/dL 7-25  Final            CREATININE 0 93 mg/dL 0 40-1 10  Final            SODIUM 136 mmol/L 135-145  Final            POTASSIUM 5 1 mmol/L 3 5-5 2  Final            CHLORIDE 107 mmol/L 100-109  Final            CARBON DIOXIDE 20 mmol/L 23-31 L Final            CALCIUM 9 2 mg/dL 8 5-10 1  Final            ALKALINE PHOSPHATASE 103 U/L   Final            ALBUMIN 2 3 g/dL 3 5-4 8 L Final            BILIRUBIN,TOTAL 0 4 mg/dL 0 2-1 0  Final     Use of this assay is not recommended for patients undergoing treatment  with eltrombopag due to the potential for falsely elevated results  PROTEIN, TOTAL 6 2 g/dL 6 3-8 3 L Final            AST 22 U/L <41  Final            ALT 21 U/L <56  Final            ANION GAP 9  3-11  Final            eGFR, NON AFRICAN AM (Note)  >60  Final     Not performed on patients less than 25years of age or greater than 80 years of age       eGFR,  AMER (Note)  >60  Final     Not performed on patients less than 25years of age or greater than 80 years of age       eGFR COMMENT Not applicable    Final           Current Medications      Current Medications Reviewed and updated in Nursing Home EMR  Assessment and Plan      Diarrhea   No BMs documented today and patient denies any more occurrence   Will consider checking for c  diff if diarrhea continues given recent antibiotic use for    UTI  UTI   Transitioned to PO antibiotics from hospital   Cefpodoxime and vancomycin complete   Continue to monitor for signs of infection and urinary symptoms   Positive for leukocytosis on CBC since hospitalization   Will monitor CBC   Encourage fluid intake    Atrial fibrillation   New onset while hospitalized   HR 99, stable and controlled   Evaluated by cardiology   Continue eliquis and metoprolol at current doses   Follow up with cardiology as an outpatient    Ambulatory dysfunction   S/p fall with hospitalization   Maintain fall precautions especially with patient on [facility]   Encourage assistance and assistive device with ambulation  Dementia   Patient oriented to self at baseline   Continue donepezil at current dose   Delirium precautions   Continue to encourage cognitive, social and physical activities as tolerate      HTN   /68, stable and controlled   Continue metoprolol and furosemide at current doses   Electrolytes and renal function within normal range   Monitor BPs as ordered    DM    Hemoglobin A1C 13 0 on 12/28/2021   Blood sugars running between 168 and 284 since admission   Continue glipizide, januvia, and repaglinide at current doses   Continue accuchecks bid    Will check routine labs   Encourage consistent carb diet and exercise as tolerated   Repeat A1C as outpatient    Rena Nix, [address] [place] [address]  02/10/2022

## 2022-02-13 PROBLEM — K59.1 FUNCTIONAL DIARRHEA: Status: ACTIVE | Noted: 2022-02-10

## 2022-02-13 LAB
ATRIAL RATE: 63 BPM
QRS AXIS: 23 DEGREES
QRSD INTERVAL: 150 MS
QT INTERVAL: 464 MS
QTC INTERVAL: 464 MS
T WAVE AXIS: 207 DEGREES
VENTRICULAR RATE: 60 BPM

## 2022-02-13 PROCEDURE — 93010 ELECTROCARDIOGRAM REPORT: CPT | Performed by: INTERNAL MEDICINE

## 2022-02-14 ENCOUNTER — NURSING HOME VISIT (OUTPATIENT)
Dept: GERIATRICS | Facility: OTHER | Age: 87
End: 2022-02-14
Payer: MEDICARE

## 2022-02-14 DIAGNOSIS — N30.00 ACUTE CYSTITIS WITHOUT HEMATURIA: ICD-10-CM

## 2022-02-14 DIAGNOSIS — I10 BENIGN ESSENTIAL HYPERTENSION: ICD-10-CM

## 2022-02-14 DIAGNOSIS — I48.91 NEW ONSET ATRIAL FIBRILLATION (HCC): ICD-10-CM

## 2022-02-14 DIAGNOSIS — E11.22 TYPE 2 DIABETES MELLITUS WITH CHRONIC KIDNEY DISEASE, WITHOUT LONG-TERM CURRENT USE OF INSULIN, UNSPECIFIED CKD STAGE (HCC): Primary | ICD-10-CM

## 2022-02-14 DIAGNOSIS — R26.2 AMBULATORY DYSFUNCTION: ICD-10-CM

## 2022-02-14 DIAGNOSIS — F03.90 UNCOMPLICATED SENILE DEMENTIA (HCC): ICD-10-CM

## 2022-02-14 PROCEDURE — 99309 SBSQ NF CARE MODERATE MDM 30: CPT | Performed by: NURSE PRACTITIONER

## 2022-02-14 NOTE — PROGRESS NOTES
[address]  [address] [facility]  [phone]  [facility]   Progress Note  POS [address]      NAME: Hollie Zamora  AGE: 80 y o  SEX: female  :  1923  DATE OF ENCOUNTER: 2022    Chief Complaint   Patient seen and examined for follow up on chronic conditions  History of Present Illness     The patient is a 80year old female with multiple comorbidities including but not limited to dementia, HTN, DM, new onset atrial fibrillation, and ambulatory dysfunction  She is seen today at [facility] for rehabilitation following a hospitalization for a fall with a UTI and new onset atrial fibrillation  Upon assessment today, subjective information was limited given the patients dementia  Although, oriented to self only, the patient was able to state that she is feeling well and denies any pain  She did not have any complaints at this time  The patient has been noted to have several elevated blood sugars but is asymptomatic  She denied any headaches, chest pain, SOB, or abdominal tenderness  The patient is a resident of [facility] who presents with a stage 1 sacral ulcer  She is requiring a one person assist with the walker for ADLs and ambulation  Plan to return the patient to her prior nursing home once rehab is complete  The following portions of the patient's history were reviewed and updated as appropriate: allergies, current medications, past family history, past medical history, past social history, past surgical history and problem list     Review of Systems     A review of systems was performed  All negative, except as per HPI      History     Past Medical History:   Diagnosis Date    Aortic stenosis     Bilateral edema of lower extremity     Dementia (HCC)     Diabetes ([facility] )     Fall     Gait abnormality     Hyperlipidemia     Hypertension     Hypokalemia     Other ascites     Varicose veins of leg with edema      Past Surgical History:   Procedure Laterality Date    BREAST SURGERY      ELBOW SURGERY       Family History   Problem Relation Age of Onset    Dementia Sister      Social History     Socioeconomic History    Marital status:      Spouse name: Not on file    Number of children: Not on file    Years of education: Not on file    Highest education level: Not on file   Occupational History    Occupation: retired   Tobacco Use    Smoking status: Never Smoker    Smokeless tobacco: Never Used   Vaping Use    Vaping Use: Never used   Substance and Sexual Activity    Alcohol use: Not Currently    Drug use: No    Sexual activity: Not Currently   Other Topics Concern    Not on file   Social History Narrative    Advance directive in chart     Social Determinants of Health     Financial Resource Strain: Not on file   Food Insecurity: No Food Insecurity    Worried About [employer] in the Last Year: Never true    [employer] in the Last Year: Never true   Transportation Needs: No Transportation Needs    Lack of Transportation (Medical): No    Lack of Transportation (Non-Medical):  No   Physical Activity: Not on file   Stress: Not on file   Social Connections: Not on file   Intimate Partner Violence: Not on file   Housing Stability: Unknown    Unable to Pay for Housing in the Last Year: No    Number of Places Lived in the Last Year: Not on file    Unstable Housing in the Last Year: No     No Known Allergies    Objective     Vital Signs  BP: 150/81      HR: 60 T: 96 9     RR: 18  O2Sat: 100% RA W: 153 4 lbs  General: NAD, Well Nourished, Well Developed  CV: S1, S2, normal rate, regular rhythm, no murmur appreciated  Pulmonary: Lung sounds clear to air, no wheezing, rhonchi, rales  Abdominal:BS + x4 in all quadrants, soft, no mass, no tenderness  Extremities: No edema, +ROM, +Strength  Skin: Warm, Dry, no lesions, no rash, no erythema present, no ecchymosis present  Neurological: No cranial nerve deficits  Psych: Alert and oriented times 3, no mood, no affect    Pertinent Laboratory/Diagnostic Studies:  02/09/2022   HEMOGLOBIN 12 2 g/dL 11 5-14 5  Final              HEMATOCRIT 36 0 % 35 0-43 0  Final             WBC 11 9 thou/cmm 4 0-10 0 H Final             RBC 4 07 mill/cmm 3 70-4 70  Final             PLATELET COUNT 156 thou/cmm 140-350  Final             MPV 9 3 fL 7 5-11 3  Final             MCV 88 fL   Final             MCH 29 9 pg 26 0-34 0  Final             MCHC 33 8 g/dL 32 0-37 0  Final             RDW 14 6 % 12 0-16 0  Final             DIFFERENTIAL TYPE MANUAL    Final             ABSOLUTE NEUT 8 5 thou/cmm 1 8-7 8 H Final             ABSOLUTE LYMPH 2 4 thou/cmm 1 0-3 0  Final             ABSOLUTE MONO 0 8 thou/cmm 0 3-1 0  Final             ABSOLUTE EOS 0 1 thou/cmm 0 0-0 5  Final             ABSOLUTE BASO 0 1 thou/cmm 0 0-0 1  Final             NEUTROPHILS 64 %   Final             LYMPHOCYTES 20 %   Final             MONOCYTES 7 %   Final             EOSINOPHILS 1 %   Final             BASOPHILS 1 %   Final             METAMYELOCYTES 7 % 0 H Final             HEMATOLOGY COMMENT       Final      RBC morphology normal   Peripheral smear reviewed by technologist      Ambika Barnes METAB PANEL               GLUCOSE 252 mg/dL 65-99 H Final              BUN 11 mg/dL 7-25  Final             CREATININE 0 93 mg/dL 0 40-1 10  Final             SODIUM 136 mmol/L 135-145  Final             POTASSIUM 5 1 mmol/L 3 5-5 2  Final             CHLORIDE 107 mmol/L 100-109  Final             CARBON DIOXIDE 20 mmol/L 23-31 L Final             CALCIUM 9 2 mg/dL 8 5-10 1  Final             ALKALINE PHOSPHATASE 103 U/L   Final             ALBUMIN 2 3 g/dL 3 5-4 8 L Final             BILIRUBIN,TOTAL 0 4 mg/dL 0 2-1 0  Final     Use of this assay is not recommended for patients undergoing treatment   with eltrombopag due to the potential for falsely elevated results         PROTEIN, TOTAL 6 2 g/dL 6 3-8 3 L Final             AST 22 U/L <41  Final             ALT 21 U/L <56  Final             ANION GAP 9  3-11  Final             eGFR, NON AFRICAN AM (Note)  >60  Final      Not performed on patients less than 25years of age or greater than 80  years of age        eGFR,  AMER (Note)  >60  Final      Not performed on patients less than 25years of age or greater than 80  years of age        eGFR COMMENT Not applicable    Final                       Current Medications     Current Medications Reviewed and updated in Nursing Home EMR  Assessment and Plan     UTI   S/P antibiotic therapy  No urinary complaints      Mild leukocytosis on CBC with no s/s of infection present   Cefpodoxime and vancomycin complete   Continue to monitor for signs of infection and urinary symptoms   Will monitor CBC   Encourage fluid intake    Atrial fibrillation   New onset while hospitalized   HR 60, stable and controlled, no irregular rhythm noted   Evaluated by cardiology   Recommended AC and rate control   Continue eliquis and metoprolol at current doses   Follow up with cardiology as an outpatient    Ambulatory dysfunction   S/p fall with hospitalization   Maintain fall precautions especially with patient on [facility]   Encourage assistance and assistive device with ambulation  Dementia   Stable, no acute behaviors reported   Patient oriented to self at baseline   Continue donepezil at current dose   Redirect, reorient and reassure   Delirium precautions    HTN   /81, overall trend review is controlled   Continue metoprolol and furosemide at current doses   Electrolytes and renal function within normal range   Monitor BPs as ordered    DM   HgbA1C 13 0 in December 2021   Continue glipizide, januvia, and repaglinide at current doses   Continue accuchecks   Will add sliding scale due to consistent elevated blood sugars   Encourage consistent carb diet and exercise as tolerated    [facility]  2/14/2022

## 2022-02-18 ENCOUNTER — NURSING HOME VISIT (OUTPATIENT)
Dept: GERIATRICS | Facility: OTHER | Age: 87
End: 2022-02-18
Payer: MEDICARE

## 2022-02-18 DIAGNOSIS — E87.6 HYPOKALEMIA: ICD-10-CM

## 2022-02-18 DIAGNOSIS — I48.91 NEW ONSET ATRIAL FIBRILLATION (HCC): Primary | ICD-10-CM

## 2022-02-18 DIAGNOSIS — E11.22 TYPE 2 DIABETES MELLITUS WITH CHRONIC KIDNEY DISEASE, WITHOUT LONG-TERM CURRENT USE OF INSULIN, UNSPECIFIED CKD STAGE (HCC): ICD-10-CM

## 2022-02-18 DIAGNOSIS — R65.20 SEVERE SEPSIS (HCC): ICD-10-CM

## 2022-02-18 DIAGNOSIS — N17.9 AKI (ACUTE KIDNEY INJURY) (HCC): ICD-10-CM

## 2022-02-18 DIAGNOSIS — R19.7 DIARRHEA, UNSPECIFIED TYPE: ICD-10-CM

## 2022-02-18 DIAGNOSIS — L89.150 PRESSURE INJURY OF SACRAL REGION, UNSTAGEABLE (HCC): ICD-10-CM

## 2022-02-18 DIAGNOSIS — E87.1 HYPONATREMIA: ICD-10-CM

## 2022-02-18 DIAGNOSIS — I10 PRIMARY HYPERTENSION: ICD-10-CM

## 2022-02-18 DIAGNOSIS — N30.00 ACUTE CYSTITIS WITHOUT HEMATURIA: ICD-10-CM

## 2022-02-18 DIAGNOSIS — R26.2 AMBULATORY DYSFUNCTION: ICD-10-CM

## 2022-02-18 DIAGNOSIS — A41.9 SEVERE SEPSIS (HCC): ICD-10-CM

## 2022-02-18 PROCEDURE — 99316 NF DSCHRG MGMT 30 MIN+: CPT | Performed by: NURSE PRACTITIONER

## 2022-02-18 NOTE — PROGRESS NOTES
[facility]  [address] [place] [facility]  [phone]  Marie Bolus   Discharge Summary  POS 31      NAME: Simón Stanley  AGE: 80 y o  SEX: female  :  1923  DATE OF ENCOUNTER: 2022    Chief Complaint   Patient seen and examined for discharge planning  History of Present Illness     Aaron Dave is a 80year old female patient with aortic stenosis, dementia, diabetes, ambulatory dysfunction, hypertension, dyslipidemia seen and examined today for discharge planning from sub-acute rehab at [facility]  Patient is status post hospitalization on 2022 after suffering a fall  She was found down in between her bed and the nightstand at her assisted living facility  Work-up showed hyponatremia which was felt to be due to dehydration and improved after receiving IV fluids  Her potassium was replaced and she was treated for a UTI with ceftriaxone and switched to PO cefpodoxime to complete a 7 day course  She was also found to be in new onset atrial fibrillation and was started on metoprolol with good rate control and Eliquis for anticoagulation  Her echocardiogram showed worsening EF with global hypokinesis and progression of her known valvular disease  It is reported that the family does not wish to pursue further investigation due to the patient's advanced age  Patient received comprehensive rehab services during her stay in sub-acute rehab with an overall improvement in her functional status  She is ambulating 150 feet with a roller walker with supervision  He is a mod assist for all ADLs  Her Heather Din Cognitive score is a 3 2/5 8 on 2022  Due to this score, it is recommended that she receive 24 hour care  She is going to be discharge to [facility] on 2022  Upon examination patient is calm, cooperative and in no acute distress    She denies chest pain, sob, abdominal pain, nausea, vomiting, diarrhea, headache, dizziness or visual disturbance  The following portions of the patient's history were reviewed and updated as appropriate: allergies, current medications, past family history, past medical history, past social history, past surgical history and problem list     Review of Systems     A review of systems was performed  All negative, except as per HPI  History     Past Medical History:   Diagnosis Date    Aortic stenosis     Bilateral edema of lower extremity     Dementia (HCC)     Diabetes ([address] )     Fall     Gait abnormality     Hyperlipidemia     Hypertension     Hypokalemia     Other ascites     Varicose veins of leg with edema      Past Surgical History:   Procedure Laterality Date    BREAST SURGERY      ELBOW SURGERY       Family History   Problem Relation Age of Onset    Dementia Sister      Social History     Socioeconomic History    Marital status:      Spouse name: Not on file    Number of children: Not on file    Years of education: Not on file    Highest education level: Not on file   Occupational History    Occupation: retired   Tobacco Use    Smoking status: Never Smoker    Smokeless tobacco: Never Used   Vaping Use    Vaping Use: Never used   Substance and Sexual Activity    Alcohol use: Not Currently    Drug use: No    Sexual activity: Not Currently   Other Topics Concern    Not on file   Social History Narrative    Advance directive in chart     Social Determinants of Health     Financial Resource Strain: Not on file   Food Insecurity: No Food Insecurity    Worried About [address] in the Last Year: Never true    Tramaine of Food in the Last Year: Never true   Transportation Needs: No Transportation Needs    Lack of Transportation (Medical): No    Lack of Transportation (Non-Medical):  No   Physical Activity: Not on file   Stress: Not on file   Social Connections: Not on file   Intimate Partner Violence: Not on file   Housing Stability: Unknown    Unable to Pay for Housing in the Last Year: No    Number of Places Lived in the Last Year: Not on file    Unstable Housing in the Last Year: No     No Known Allergies    Objective     Vital Signs  BP: 120/59      HR: 72  T: 97 0    RR: 18 O2Sat: 99% RA W: 153 4 lbs  General: NAD, Well Nourished, Well Developed  Oral: Oropharynx Moist and Clear  CV: S1, S2, normal rate, regular rhythm, no murmur appreciated  Pulmonary: Lung sounds clear to air, no wheezing rhonchi, rales  Abdominal:BS + x4 in all quadrants, soft, no mass, no tenderness  Extremities: 1+ bilateral lower extremity edema, +ROM, +Strength  Skin: Warm, Dry, sacral pressure ulcer reported to be 0 6 cm x 0 3 cm x 0 2 cm  Neurological: No cranial nerve deficits   Psych: Alert and oriented times self, disoriented to time and place, no mood, no affect    Pertinent Laboratory/Diagnostic Studies:  02/16/2022  CBC with diff  HEMOGLOBIN 11 6 g/dL 11 5-14 5  Final              HEMATOCRIT 34 2 % 35 0-43 0 L Final             WBC 9 9 thou/cmm 4 0-10 0  Final             RBC 3 87 mill/cmm 3 70-4 70  Final             PLATELET COUNT 851 thou/cmm 140-350 H Final             MPV 8 8 fL 7 5-11 3  Final             MCV 88 fL   Final             MCH 30 1 pg 26 0-34 0  Final             MCHC 34 0 g/dL 32 0-37 0  Final             RDW 14 7 % 12 0-16 0  Final             DIFFERENTIAL TYPE MANUAL    Final             ABSOLUTE NEUT 7 7 thou/cmm 1 8-7 8  Final             ABSOLUTE LYMPH 1 1 thou/cmm 1 0-3 0  Final             ABSOLUTE MONO 1 0 thou/cmm 0 3-1 0  Final             ABSOLUTE EOS 0 1 thou/cmm 0 0-0 5  Final             ABSOLUTE BASO 0 1 thou/cmm 0 0-0 1  Final             NEUTROPHILS 72 %   Final             LYMPHOCYTES 11 %   Final             MONOCYTES 10 %   Final             EOSINOPHILS 1 %   Final             BASOPHILS 1 %   Final             BAND 4 % 0-6  Final             METAMYELOCYTES 1 % 0 H Final             MACROCYTES Rare    Final             ADILSON CELLS Rare Final             POLYCHROMASIA Rare    Final             HEMATOLOGY COMMENT   Final      Peripheral smear reviewed by technologist        BMP  GLUCOSE 180 mg/dL 65-99 H Final              BUN 8 mg/dL 7-25  Final             CREATININE 0 77 mg/dL 0 40-1 10  Final             SODIUM 140 mmol/L 135-145  Final             POTASSIUM 4 6 mmol/L 3 5-5 2  Final             CHLORIDE 109 mmol/L 100-109  Final             CARBON DIOXIDE 24 mmol/L 23-31  Final             CALCIUM 8 7 mg/dL 8 5-10 1  Final             ANION GAP 7  3-11  Final             eGFR, NON AFRICAN AM (Note)  >60  Final      Not performed on patients less than 25years of age or greater than 80  years of age             Current Medications     Current Medications Reviewed and updated in Nursing Home EMR  Assessment and Plan     New onset atrial fibrillation (HCC)  · Rate controlled at 72 bpm  · Continue metoprolol 25 mg every 12 hours  and eliquis 5 mg two times daily      UTI (urinary tract infection)  · Patient completed a course of antibiotics  · Continue to encourage adequate PO fluid intake    Ambulatory dysfunction  · Multifactorial  · Patient is now ambulating 150 feet with RW s/p PT/OT services  · Continue fall precautions    Severe sepsis ([address] )  · Suspected cause related to UTI  · Symptoms resolved s/p antibiotic therapy  · Blood cultures negative  · Patient afebrile, WBC now 9 9    Diarrhea  · Patient with episodes of diarrhea that were attributed to antibiotic use  · Patient was started on probiotics and vancomycin 125 mg daily for C diff prophylaxis for 72 hours after completion of antibiotics  · Patient also started on Banatrol with meals with improvement    · Continue to monitor as outpatient    Hypokalemia  · Potassium level currently stable at 4 6  · Continue potassium supplementation    Hyponatremia  · Improved since hospitalization, sodium level now 140  · Continue to monitor BMP as outpatient    SALTY (acute kidney injury) ([facility] )  · Creatinine currently 0 77  Improvement noted with hydration  · Continue to avoid nephrotoxins and hypotension  · Monitor BMP    HTN (hypertension)  · Stable, bp currently 120/59  · Continue metoprolol 25 mg every 12 hours and furosemide 20 mg daily      Type 2 diabetes mellitus with diabetic chronic kidney disease ([facility] )    Lab Results   Component Value Date    HGBA1C 13 0 (H) 12/28/2021   · Poorly controlled, goal A1C 8 0% in the geriatric population per ADA and AGS guidelines  · Blood sugars have ranged form 133-321 during her rehab stay  · Patient started on SSI  · Continue glipizide, repaglinide and sitagliptin at current doses  · Continue diabetic diet and exercise as tolerated    Pressure injury of sacral region, unstageable ([facility] )  · Present upon admission to sub-acute rehab  · Continue local wound care        Discussion with patient/family and further instructions:  -Fall precautions  -Aspiration precautions  -Bleeding precautions  -Monitor for signs/symptoms of infection  -Medication list was reviewed and signed  -DME form was completed     Follow-up Recommendations: Please follow-up with your primary care physician within 7-10 days of discharge to review medication changes and current status  Problem List Follow-up Recommendations:  I have spent 40 minutes with Patient /Family today in which greater than 50% of this time was spent in counseling/coordination of care       [address] [place] [address]  Geriatric Medicine  02/18/2022

## 2022-02-19 PROBLEM — L89.150 PRESSURE INJURY OF SACRAL REGION, UNSTAGEABLE (HCC): Status: ACTIVE | Noted: 2022-02-18

## 2022-02-19 NOTE — ASSESSMENT & PLAN NOTE
· Patient with episodes of diarrhea that were attributed to antibiotic use  · Patient was started on probiotics and vancomycin 125 mg daily for C diff prophylaxis for 72 hours after completion of antibiotics  · Patient also started on Banatrol with meals with improvement    · Continue to monitor as outpatient

## 2022-02-19 NOTE — ASSESSMENT & PLAN NOTE
· Multifactorial  · Patient is now ambulating 150 feet with RW s/p PT/OT services  · Continue fall precautions

## 2022-02-19 NOTE — ASSESSMENT & PLAN NOTE
Lab Results   Component Value Date    HGBA1C 13 0 (H) 12/28/2021   · Poorly controlled, goal A1C 8 0% in the geriatric population per ADA and AGS guidelines  · Blood sugars have ranged form 133-321 during her rehab stay  · Patient started on SSI  · Continue glipizide, repaglinide and sitagliptin at current doses  · Continue diabetic diet and exercise as tolerated

## 2022-02-19 NOTE — ASSESSMENT & PLAN NOTE
· Creatinine currently 0 77   Improvement noted with hydration  · Continue to avoid nephrotoxins and hypotension  · Monitor BMP

## 2022-02-19 NOTE — ASSESSMENT & PLAN NOTE
· Rate controlled at 72 bpm  · Continue metoprolol 25 mg every 12 hours  and eliquis 5 mg two times daily

## 2022-02-19 NOTE — ASSESSMENT & PLAN NOTE
· Stable, bp currently 120/59  · Continue metoprolol 25 mg every 12 hours and furosemide 20 mg daily

## 2022-02-19 NOTE — ASSESSMENT & PLAN NOTE
· Suspected cause related to UTI  · Symptoms resolved s/p antibiotic therapy  · Blood cultures negative  · Patient afebrile, WBC now 9 9

## 2022-03-17 ENCOUNTER — HOSPITAL ENCOUNTER (INPATIENT)
Facility: HOSPITAL | Age: 87
LOS: 3 days | Discharge: NURSING, REHAB OR LONG-TERM CARE | DRG: 552 | End: 2022-03-21
Attending: EMERGENCY MEDICINE | Admitting: SURGERY
Payer: MEDICARE

## 2022-03-17 ENCOUNTER — APPOINTMENT (EMERGENCY)
Dept: RADIOLOGY | Facility: HOSPITAL | Age: 87
DRG: 552 | End: 2022-03-17
Payer: MEDICARE

## 2022-03-17 DIAGNOSIS — S32.020A CLOSED COMPRESSION FRACTURE OF L2 LUMBAR VERTEBRA, INITIAL ENCOUNTER (HCC): ICD-10-CM

## 2022-03-17 DIAGNOSIS — F03.90 DEMENTIA (HCC): ICD-10-CM

## 2022-03-17 DIAGNOSIS — S22.080A T12 COMPRESSION FRACTURE, INITIAL ENCOUNTER (HCC): ICD-10-CM

## 2022-03-17 DIAGNOSIS — S22.080A COMPRESSION FRACTURE OF T12 VERTEBRA, INITIAL ENCOUNTER (HCC): Primary | ICD-10-CM

## 2022-03-17 DIAGNOSIS — K29.70 GASTRITIS: ICD-10-CM

## 2022-03-17 DIAGNOSIS — Z91.89 AT RISK FOR DELIRIUM: ICD-10-CM

## 2022-03-17 LAB
ABO GROUP BLD: NORMAL
ALBUMIN SERPL BCP-MCNC: 3.2 G/DL (ref 3.5–5)
ALP SERPL-CCNC: 107 U/L (ref 46–116)
ALT SERPL W P-5'-P-CCNC: 20 U/L (ref 12–78)
ANION GAP SERPL CALCULATED.3IONS-SCNC: 6 MMOL/L (ref 4–13)
AST SERPL W P-5'-P-CCNC: 37 U/L (ref 5–45)
BACTERIA UR QL AUTO: ABNORMAL /HPF
BASOPHILS # BLD AUTO: 0.09 THOUSANDS/ΜL (ref 0–0.1)
BASOPHILS NFR BLD AUTO: 1 % (ref 0–1)
BILIRUB SERPL-MCNC: 0.66 MG/DL (ref 0.2–1)
BILIRUB UR QL STRIP: NEGATIVE
BLD GP AB SCN SERPL QL: NEGATIVE
BUDDING YEAST: PRESENT
BUN SERPL-MCNC: 17 MG/DL (ref 5–25)
CALCIUM ALBUM COR SERPL-MCNC: 10.3 MG/DL (ref 8.3–10.1)
CALCIUM SERPL-MCNC: 9.7 MG/DL (ref 8.3–10.1)
CHLORIDE SERPL-SCNC: 109 MMOL/L (ref 100–108)
CLARITY UR: CLEAR
CO2 SERPL-SCNC: 23 MMOL/L (ref 21–32)
COLOR UR: ABNORMAL
CREAT SERPL-MCNC: 1.2 MG/DL (ref 0.6–1.3)
EOSINOPHIL # BLD AUTO: 0.12 THOUSAND/ΜL (ref 0–0.61)
EOSINOPHIL NFR BLD AUTO: 1 % (ref 0–6)
ERYTHROCYTE [DISTWIDTH] IN BLOOD BY AUTOMATED COUNT: 15.1 % (ref 11.6–15.1)
GFR SERPL CREATININE-BSD FRML MDRD: 37 ML/MIN/1.73SQ M
GLUCOSE SERPL-MCNC: 123 MG/DL (ref 65–140)
GLUCOSE UR STRIP-MCNC: NEGATIVE MG/DL
HCT VFR BLD AUTO: 38.8 % (ref 34.8–46.1)
HGB BLD-MCNC: 12.2 G/DL (ref 11.5–15.4)
HGB UR QL STRIP.AUTO: ABNORMAL
IMM GRANULOCYTES # BLD AUTO: 0.05 THOUSAND/UL (ref 0–0.2)
IMM GRANULOCYTES NFR BLD AUTO: 1 % (ref 0–2)
KETONES UR STRIP-MCNC: NEGATIVE MG/DL
LEUKOCYTE ESTERASE UR QL STRIP: ABNORMAL
LIPASE SERPL-CCNC: 129 U/L (ref 73–393)
LYMPHOCYTES # BLD AUTO: 2.56 THOUSANDS/ΜL (ref 0.6–4.47)
LYMPHOCYTES NFR BLD AUTO: 27 % (ref 14–44)
MCH RBC QN AUTO: 29.5 PG (ref 26.8–34.3)
MCHC RBC AUTO-ENTMCNC: 31.4 G/DL (ref 31.4–37.4)
MCV RBC AUTO: 94 FL (ref 82–98)
MONOCYTES # BLD AUTO: 0.86 THOUSAND/ΜL (ref 0.17–1.22)
MONOCYTES NFR BLD AUTO: 9 % (ref 4–12)
NEUTROPHILS # BLD AUTO: 5.84 THOUSANDS/ΜL (ref 1.85–7.62)
NEUTS SEG NFR BLD AUTO: 61 % (ref 43–75)
NITRITE UR QL STRIP: NEGATIVE
NON-SQ EPI CELLS URNS QL MICRO: ABNORMAL /HPF
NRBC BLD AUTO-RTO: 0 /100 WBCS
PH UR STRIP.AUTO: 6.5 [PH]
PLATELET # BLD AUTO: 241 THOUSANDS/UL (ref 149–390)
PLATELET # BLD AUTO: 285 THOUSANDS/UL (ref 149–390)
PMV BLD AUTO: 11 FL (ref 8.9–12.7)
PMV BLD AUTO: 11.5 FL (ref 8.9–12.7)
POTASSIUM SERPL-SCNC: 4.6 MMOL/L (ref 3.5–5.3)
PROT SERPL-MCNC: 7.7 G/DL (ref 6.4–8.2)
PROT UR STRIP-MCNC: NEGATIVE MG/DL
RBC # BLD AUTO: 4.13 MILLION/UL (ref 3.81–5.12)
RBC #/AREA URNS AUTO: ABNORMAL /HPF
RH BLD: POSITIVE
SODIUM SERPL-SCNC: 138 MMOL/L (ref 136–145)
SP GR UR STRIP.AUTO: 1.01 (ref 1–1.03)
SPECIMEN EXPIRATION DATE: NORMAL
UROBILINOGEN UR STRIP-ACNC: <2 MG/DL
WBC # BLD AUTO: 9.52 THOUSAND/UL (ref 4.31–10.16)
WBC #/AREA URNS AUTO: ABNORMAL /HPF

## 2022-03-17 PROCEDURE — 87186 SC STD MICRODIL/AGAR DIL: CPT | Performed by: EMERGENCY MEDICINE

## 2022-03-17 PROCEDURE — 86901 BLOOD TYPING SEROLOGIC RH(D): CPT | Performed by: STUDENT IN AN ORGANIZED HEALTH CARE EDUCATION/TRAINING PROGRAM

## 2022-03-17 PROCEDURE — 85025 COMPLETE CBC W/AUTO DIFF WBC: CPT | Performed by: EMERGENCY MEDICINE

## 2022-03-17 PROCEDURE — 80053 COMPREHEN METABOLIC PANEL: CPT | Performed by: EMERGENCY MEDICINE

## 2022-03-17 PROCEDURE — 99285 EMERGENCY DEPT VISIT HI MDM: CPT | Performed by: EMERGENCY MEDICINE

## 2022-03-17 PROCEDURE — 87086 URINE CULTURE/COLONY COUNT: CPT | Performed by: EMERGENCY MEDICINE

## 2022-03-17 PROCEDURE — 36415 COLL VENOUS BLD VENIPUNCTURE: CPT | Performed by: EMERGENCY MEDICINE

## 2022-03-17 PROCEDURE — 86900 BLOOD TYPING SEROLOGIC ABO: CPT | Performed by: STUDENT IN AN ORGANIZED HEALTH CARE EDUCATION/TRAINING PROGRAM

## 2022-03-17 PROCEDURE — 83690 ASSAY OF LIPASE: CPT | Performed by: EMERGENCY MEDICINE

## 2022-03-17 PROCEDURE — 87077 CULTURE AEROBIC IDENTIFY: CPT | Performed by: EMERGENCY MEDICINE

## 2022-03-17 PROCEDURE — NC001 PR NO CHARGE: Performed by: SURGERY

## 2022-03-17 PROCEDURE — 99285 EMERGENCY DEPT VISIT HI MDM: CPT

## 2022-03-17 PROCEDURE — 87081 CULTURE SCREEN ONLY: CPT | Performed by: STUDENT IN AN ORGANIZED HEALTH CARE EDUCATION/TRAINING PROGRAM

## 2022-03-17 PROCEDURE — 86850 RBC ANTIBODY SCREEN: CPT | Performed by: STUDENT IN AN ORGANIZED HEALTH CARE EDUCATION/TRAINING PROGRAM

## 2022-03-17 PROCEDURE — 85049 AUTOMATED PLATELET COUNT: CPT | Performed by: STUDENT IN AN ORGANIZED HEALTH CARE EDUCATION/TRAINING PROGRAM

## 2022-03-17 PROCEDURE — 74177 CT ABD & PELVIS W/CONTRAST: CPT

## 2022-03-17 PROCEDURE — 96374 THER/PROPH/DIAG INJ IV PUSH: CPT

## 2022-03-17 PROCEDURE — 81001 URINALYSIS AUTO W/SCOPE: CPT | Performed by: EMERGENCY MEDICINE

## 2022-03-17 RX ORDER — FAMOTIDINE 20 MG/1
20 TABLET, FILM COATED ORAL DAILY
Status: DISCONTINUED | OUTPATIENT
Start: 2022-03-18 | End: 2022-03-21 | Stop reason: HOSPADM

## 2022-03-17 RX ORDER — FUROSEMIDE 20 MG/1
20 TABLET ORAL DAILY
Status: DISCONTINUED | OUTPATIENT
Start: 2022-03-18 | End: 2022-03-21 | Stop reason: HOSPADM

## 2022-03-17 RX ORDER — FENTANYL CITRATE 50 UG/ML
25 INJECTION, SOLUTION INTRAMUSCULAR; INTRAVENOUS ONCE
Status: COMPLETED | OUTPATIENT
Start: 2022-03-17 | End: 2022-03-17

## 2022-03-17 RX ORDER — HYDROMORPHONE HCL IN WATER/PF 6 MG/30 ML
0.2 PATIENT CONTROLLED ANALGESIA SYRINGE INTRAVENOUS EVERY 4 HOURS PRN
Status: DISCONTINUED | OUTPATIENT
Start: 2022-03-17 | End: 2022-03-18

## 2022-03-17 RX ORDER — ACETAMINOPHEN 325 MG/1
650 TABLET ORAL EVERY 6 HOURS PRN
Status: DISCONTINUED | OUTPATIENT
Start: 2022-03-17 | End: 2022-03-18

## 2022-03-17 RX ORDER — CEPHALEXIN 500 MG/1
500 CAPSULE ORAL EVERY 8 HOURS SCHEDULED
Status: DISCONTINUED | OUTPATIENT
Start: 2022-03-17 | End: 2022-03-19

## 2022-03-17 RX ORDER — OXYCODONE HYDROCHLORIDE 5 MG/1
2.5 TABLET ORAL EVERY 4 HOURS PRN
Status: DISCONTINUED | OUTPATIENT
Start: 2022-03-17 | End: 2022-03-21 | Stop reason: HOSPADM

## 2022-03-17 RX ORDER — DONEPEZIL HYDROCHLORIDE 10 MG/1
10 TABLET, FILM COATED ORAL DAILY
Status: DISCONTINUED | OUTPATIENT
Start: 2022-03-18 | End: 2022-03-21 | Stop reason: HOSPADM

## 2022-03-17 RX ORDER — CEPHALEXIN 500 MG/1
500 CAPSULE ORAL EVERY 6 HOURS SCHEDULED
Status: DISCONTINUED | OUTPATIENT
Start: 2022-03-17 | End: 2022-03-17

## 2022-03-17 RX ORDER — OXYCODONE HYDROCHLORIDE 5 MG/1
5 TABLET ORAL EVERY 4 HOURS PRN
Status: DISCONTINUED | OUTPATIENT
Start: 2022-03-17 | End: 2022-03-21 | Stop reason: HOSPADM

## 2022-03-17 RX ADMIN — IOHEXOL 100 ML: 350 INJECTION, SOLUTION INTRAVENOUS at 17:45

## 2022-03-17 RX ADMIN — METOPROLOL TARTRATE 25 MG: 25 TABLET, FILM COATED ORAL at 22:22

## 2022-03-17 RX ADMIN — CEPHALEXIN 500 MG: 500 CAPSULE ORAL at 22:22

## 2022-03-17 RX ADMIN — APIXABAN 5 MG: 5 TABLET, FILM COATED ORAL at 22:36

## 2022-03-17 RX ADMIN — FENTANYL CITRATE 25 MCG: 50 INJECTION INTRAMUSCULAR; INTRAVENOUS at 16:26

## 2022-03-17 NOTE — ED ATTENDING ATTESTATION
Amy Hollis, DO, saw and evaluated the patient  I have discussed the patient with the resident/non-physician practitioner and agree with the resident's/non-physician practitioner's findings, Plan of Care, and MDM as documented in the resident's/non-physician practitioner's note, except where noted  All available labs and Radiology studies were reviewed  At this point I agree with the current assessment done in the Emergency Department  I have conducted an independent evaluation of this patient including a focused history and a physical exam     ED Note - Cristian Deal 80 y o  female MRN: [id number]  Unit/Bed#: [facility] Encounter: [id number]    History of Present Illness   HPI  Cristian Deal is a 80 y o  female who presents for evaluation of low back pain  No focal neuro deficits  No obvious trauma reported  On exam, patient with tender abdomen  Poor historian due to advanced dementia  Patient well-appearing, no distress  REVIEW OF SYSTEMS  See HPI for further details  12 systems reviewed and otherwise negative except as noted  Historical Information     PAST MEDICAL HISTORY  Past Medical History:   Diagnosis Date    Aortic stenosis     Bilateral edema of lower extremity     Dementia [facility] )     Diabetes ([facility] )     Fall     Gait abnormality     Hyperlipidemia     Hypertension     Hypokalemia     Other ascites     Varicose veins of leg with edema        FAMILY HISTORY  Family History   Problem Relation Age of Onset    Dementia Sister        SOCIAL HISTORY  Social History     Socioeconomic History    Marital status:       Spouse name: None    Number of children: None    Years of education: None    Highest education level: None   Occupational History    Occupation: retired   Tobacco Use    Smoking status: Never Smoker    Smokeless tobacco: Never Used   Vaping Use    Vaping Use: Never used   Substance and Sexual Activity    Alcohol use: Not Currently    Drug use: No    Sexual activity: Not Currently   Other Topics Concern    None   Social History Narrative    Advance directive in chart     Social Determinants of Health     Financial Resource Strain: Not on file   Food Insecurity: No Food Insecurity    Worried About Running Out of Food in the Last Year: Never true    Tramaine of Food in the Last Year: Never true   Transportation Needs: No Transportation Needs    Lack of Transportation (Medical): No    Lack of Transportation (Non-Medical):  No   Physical Activity: Not on file   Stress: Not on file   Social Connections: Not on file   Intimate Partner Violence: Not on file   Housing Stability: Unknown    Unable to Pay for Housing in the Last Year: No    Number of Places Lived in the Last Year: Not on file    Unstable Housing in the Last Year: No       SURGICAL HISTORY  Past Surgical History:   Procedure Laterality Date    BREAST SURGERY      ELBOW SURGERY       Meds/Allergies     CURRENT MEDICATIONS    Current Facility-Administered Medications:     fentanyl citrate (PF) 100 MCG/2ML 25 mcg, 25 mcg, Intravenous, Once, Cecile Oviedo MD    Current Outpatient Medications:     acetaminophen (TYLENOL) 325 mg tablet, Take 650 mg by mouth every 6 (six) hours as needed for mild pain, Disp: , Rfl:     apixaban (ELIQUIS) 5 mg, Take 1 tablet (5 mg total) by mouth 2 (two) times a day, Disp: 60 tablet, Rfl: 0    Banana Flakes (BANATROL PLUS PO), Take by mouth 3 (three) times a day with meals, Disp: , Rfl:     Diclofenac Sodium (VOLTAREN) 1 %, Apply 2 g topically to affected area 4 times daily at 9a-1p-5p-9p (Patient not taking: Reported on 2/19/2022 ), Disp: 100 g, Rfl: 5    donepezil (ARICEPT) 10 mg tablet, Take 1 tablet (10 mg total) by mouth daily, Disp: 90 tablet, Rfl: 1    furosemide (LASIX) 20 mg tablet, Take 20 mg by mouth daily, Disp: , Rfl:     glipiZIDE (GLUCOTROL) 10 mg tablet, Take 10 mg by mouth in the morning, Disp: , Rfl:     insulin lispro (Admelog) 100 units/mL injection, Inject under the skin 4 (four) times a day Per Sliding Scale, Disp: , Rfl:     magnesium oxide (MAG-OX) 400 mg, Take 1 tablet (400 mg total) by mouth daily, Disp: 90 tablet, Rfl: 1    metoprolol tartrate (LOPRESSOR) 25 mg tablet, Take 1 tablet (25 mg total) by mouth every 12 (twelve) hours, Disp: , Rfl: 0    Potassium Chloride ER 20 MEQ TBCR, Take 2 tablets (40 mEq total) by mouth 3 (three) times a day, Disp: , Rfl: 0    repaglinide (PRANDIN) 2 mg tablet, Take 1 tablet (2 mg total) by mouth 3 (three) times a day before meals, Disp: 270 tablet, Rfl: 1    sitaGLIPtin (JANUVIA) 100 mg tablet, Take 1 tablet (100 mg total) by mouth daily, Disp: 90 tablet, Rfl: 1  (Not in a hospital admission)      ALLERGIES  No Known Allergies  Objective     PHYSICAL EXAM    VITAL SIGNS: Blood pressure 149/71, pulse 76, temperature 98 °F (36 7 °C), temperature source Temporal, resp  rate 16, SpO2 100 %      Constitutional:  Appears well developed and well nourished, no acute distress, non-toxic appearance   Eyes:  PERRL, EOMI, conjunctivae pink, sclerae non-icteric    HENT:  Normocephalic/Atraumatic, no rhinorrhea, mucous membranes moist   Neck: normal range of motion, no tenderness, supple   Respiratory:  No respiratory distress, normal breath sounds   Cardiovascular:  Normal rate, normal rhythm    GI:  Soft, diffuse abdominal tenderness, non-distended  :  No CVAT, no flank ecchymosis  Musculoskeletal:  No swelling or edema, midline lower thoracic tenderness, no deformities  Integument:  Pink, warm, dry, Well hydrated, no rash, no erythema, no bullae   Lymphatic:  No cervical/ tonsillar/ submandibular lymphadenopathy noted   Neurologic:  Awake, Alert & oriented x 3, CN 2-12 intact, no focal neurological deficits, motor function intact  Psychiatric:  Speech and behavior appropriate       ED COURSE and MDM:    Assessment/Plan   Assessment:  Yobani Weber is a 80 y o  female presents for evaluation of back pain/ abdominal pain    Plan:  Labs, CT a/p, IV fluids, symptom management, disposition as appropriate  CRITICAL CARE TIME:   0      Portions of the record may have been created with voice recognition software  Occasional wrong word or "sound a like" substitutions may have occurred due to the inherent limitations of voice recognition software       ED Provider  Electronically Signed by

## 2022-03-17 NOTE — ED PROVIDER NOTES
History  Chief Complaint   Patient presents with    Back Pain     c o middle back pain for last 4 days worse yesterday  from assisted living home  EMS found pt sitting in chair, denies any trauma or injury      [age]-year-old female history of dementia presents from assisted living with back pain  Accompanied by daughter who helps provide history  Daughter states that sister visited patient 3 days ago and she was overall in her usual state of health but did complain of some back pain at that time  She seemed somewhat uncomfortable with changing positions  Today however the patient seemed much more uncomfortable, severe pain with positional changes  Normally she ambulates with a walker but has been unable to do so secondary to pain  Denies any recent trauma although patient is not a reliable historian  Patient has history of AFib on Eliquis  History of diabetes on insulin  ROS  Constitutional: denies fevers, chills, sweats  Eyes: denies eye pain  ENT: denies ear, sinus, throat pain  CV: denies chest pain  Resp: denies cough, SOB  GI: denies abdominal pain, nausea, vomiting, diarrhea, bloody or dark stool  : denies difficulty urinating, flank pain  MSK: denies neck pain  Neuro: denies headache, new numbness, tingling, weakness  Allergy/Immuno: denies known drug allergies, recent illness, known sick contacts      Triage vital signs reviewed    Physical Exam  Const: alert, no acute distress, non-toxic appearing, no diaphoresis  Appears stated age, well-developed  Eyes: no conjunctival injection, discharge or icterus  Head: normocephalic  Ears: auricles normal   Nose: normal  Mouth/throat: clear, moist   Neck: normal ROM, supple and without rigidity   CV: normal rate  Extremities warm and well-perfused   Resp: breathing unlabored, non-stridulous   Abdomen: soft, non-distended  General abdominal ttp  MSK: no gross deformities appreciated   TTP T12, L5  Skin: warm and dry with rapid capillary refill  Neuro: CNs II-XII grossly intact  Oriented x 4  Sensation and motor function of extremities grossly intact  Psych: pleasant, cooperative          Prior to Admission Medications   Prescriptions Last Dose Informant Patient Reported? Taking?    Banana Flakes (BANATROL PLUS PO)   Yes No   Sig: Take by mouth 3 (three) times a day with meals   Diclofenac Sodium (VOLTAREN) 1 %   No No   Sig: Apply 2 g topically to affected area 4 times daily at 9a-1p-5p-9p   Patient not taking: Reported on 2/19/2022    Potassium Chloride ER 20 MEQ TBCR   No No   Sig: Take 2 tablets (40 mEq total) by mouth 3 (three) times a day   acetaminophen (TYLENOL) 325 mg tablet  Other (Specify) Yes No   Sig: Take 650 mg by mouth every 6 (six) hours as needed for mild pain   apixaban (ELIQUIS) 5 mg   No No   Sig: Take 1 tablet (5 mg total) by mouth 2 (two) times a day   donepezil (ARICEPT) 10 mg tablet   No No   Sig: Take 1 tablet (10 mg total) by mouth daily   furosemide (LASIX) 20 mg tablet   Yes No   Sig: Take 20 mg by mouth daily   glipiZIDE (GLUCOTROL) 10 mg tablet   Yes No   Sig: Take 10 mg by mouth in the morning   insulin lispro (Admelog) 100 units/mL injection   Yes No   Sig: Inject under the skin 4 (four) times a day Per Sliding Scale   magnesium oxide (MAG-OX) 400 mg   No No   Sig: Take 1 tablet (400 mg total) by mouth daily   metoprolol tartrate (LOPRESSOR) 25 mg tablet   No No   Sig: Take 1 tablet (25 mg total) by mouth every 12 (twelve) hours   repaglinide (PRANDIN) 2 mg tablet   No No   Sig: Take 1 tablet (2 mg total) by mouth 3 (three) times a day before meals   sitaGLIPtin (JANUVIA) 100 mg tablet   No No   Sig: Take 1 tablet (100 mg total) by mouth daily      Facility-Administered Medications: None       Past Medical History:   Diagnosis Date    Aortic stenosis     Bilateral edema of lower extremity     Dementia (HCC)     Diabetes (HCC)     Fall     Gait abnormality     Hyperlipidemia     Hypertension     Hypokalemia  Other ascites     Varicose veins of leg with edema        Past Surgical History:   Procedure Laterality Date    BREAST SURGERY      ELBOW SURGERY         Family History   Problem Relation Age of Onset    Dementia Sister      I have reviewed and agree with the history as documented      E-Cigarette/Vaping    E-Cigarette Use Never User      E-Cigarette/Vaping Substances     Social History     Tobacco Use    Smoking status: Never Smoker    Smokeless tobacco: Never Used   Vaping Use    Vaping Use: Never used   Substance Use Topics    Alcohol use: Not Currently    Drug use: No        Review of Systems    Physical Exam  ED Triage Vitals [03/17/22 1455]   Temperature Pulse Respirations Blood Pressure SpO2   98 °F (36 7 °C) 76 16 149/71 100 %      Temp Source Heart Rate Source Patient Position - Orthostatic VS BP Location FiO2 (%)   Temporal Monitor Sitting Right arm --      Pain Score       8             Orthostatic Vital Signs  Vitals:    03/18/22 1438 03/18/22 1852 03/18/22 2132 03/18/22 2149   BP: 138/75 138/78 132/74    Pulse: 88 87 77 74   Patient Position - Orthostatic VS:           Physical Exam    ED Medications  Medications   apixaban (ELIQUIS) tablet 5 mg (5 mg Oral Given 3/18/22 1805)   donepezil (ARICEPT) tablet 10 mg (10 mg Oral Given 3/18/22 0837)   furosemide (LASIX) tablet 20 mg (20 mg Oral Given 3/18/22 0837)   metoprolol tartrate (LOPRESSOR) tablet 25 mg (25 mg Oral Given 3/18/22 2133)   insulin lispro (HumaLOG) 100 units/mL subcutaneous injection 1-5 Units (1 Units Subcutaneous Given 3/18/22 1726)   famotidine (PEPCID) tablet 20 mg (20 mg Oral Given 3/18/22 0837)   oxyCODONE (ROXICODONE) IR tablet 2 5 mg (has no administration in time range)   oxyCODONE (ROXICODONE) IR tablet 5 mg (5 mg Oral Given 3/18/22 1229)   cephalexin (KEFLEX) capsule 500 mg (500 mg Oral Given 3/18/22 2133)   acetaminophen (TYLENOL) tablet 975 mg (975 mg Oral Given 3/18/22 2133)   docusate sodium (COLACE) capsule 100 mg (100 mg Oral Given 3/18/22 1805)   senna (SENOKOT) tablet 17 2 mg (17 2 mg Oral Given 3/18/22 2133)   insulin lispro (HumaLOG) 100 units/mL subcutaneous injection 3 Units (has no administration in time range)   insulin lispro (HumaLOG) 100 units/mL subcutaneous injection 3 Units (has no administration in time range)   insulin lispro (HumaLOG) 100 units/mL subcutaneous injection 3 Units (3 Units Subcutaneous Given 3/18/22 1726)   insulin detemir (LEVEMIR) subcutaneous injection 10 Units (10 Units Subcutaneous Given 3/18/22 2133)   fentanyl citrate (PF) 100 MCG/2ML 25 mcg (25 mcg Intravenous Given 3/17/22 1626)   iohexol (OMNIPAQUE) 350 MG/ML injection (SINGLE-DOSE) 100 mL (100 mL Intravenous Given 3/17/22 1745)       Diagnostic Studies  Results Reviewed     Procedure Component Value Units Date/Time    Fingerstick Glucose (POCT) [[id number]]  (Abnormal) Collected: 03/18/22 1146    Lab Status: Final result Updated: 03/18/22 1147     POC Glucose 328 mg/dl     Fingerstick Glucose (POCT) [[id number]]  (Normal) Collected: 03/18/22 0640    Lab Status: Final result Updated: 03/18/22 0641     POC Glucose 138 mg/dl     Platelet count [[id number]]  (Normal) Collected: 03/17/22 2219    Lab Status: Final result Specimen: Blood from Arm, Left Updated: 03/17/22 2233     Platelets 698 Thousands/uL      MPV 11 0 fL     MRSA culture [[id number]] Collected: 03/17/22 2226    Lab Status: In process Specimen: Nares from Nose Updated: 03/17/22 2231    Urine Microscopic [[id number]]  (Abnormal) Collected: 03/17/22 1709    Lab Status: Final result Specimen: Urine, Straight Cath Updated: 03/17/22 1905     RBC, UA 1-2 /hpf      WBC, UA Innumerable /hpf      Epithelial Cells Occasional /hpf      Bacteria, UA Occasional /hpf      Budding Yeast PRESENT    Urine culture [[id number]] Collected: 03/17/22 1709    Lab Status:  In process Specimen: Urine, Straight Cath Updated: 03/17/22 1905    UA w Reflex to Microscopic w Reflex to Culture [[id number]] (Abnormal) Collected: 03/17/22 1709    Lab Status: Final result Specimen: Urine, Straight Cath Updated: 03/17/22 1732     Color, UA Light Yellow     Clarity, UA Clear     Specific Gravity, UA 1 010     pH, UA 6 5     Leukocytes, UA Large     Nitrite, UA Negative     Protein, UA Negative mg/dl      Glucose, UA Negative mg/dl      Ketones, UA Negative mg/dl      Urobilinogen, UA <2 0 mg/dl      Bilirubin, UA Negative     Blood, UA Trace    CMP [[id number]]  (Abnormal) Collected: 03/17/22 1625    Lab Status: Final result Specimen: Blood from Arm, Right Updated: 03/17/22 1704     Sodium 138 mmol/L      Potassium 4 6 mmol/L      Chloride 109 mmol/L      CO2 23 mmol/L      ANION GAP 6 mmol/L      BUN 17 mg/dL      Creatinine 1 20 mg/dL      Glucose 123 mg/dL      Calcium 9 7 mg/dL      Corrected Calcium 10 3 mg/dL      AST 37 U/L      ALT 20 U/L      Alkaline Phosphatase 107 U/L      Total Protein 7 7 g/dL      Albumin 3 2 g/dL      Total Bilirubin 0 66 mg/dL      eGFR 37 ml/min/1 73sq m     Narrative:      [facility] guidelines for Chronic Kidney Disease (CKD):     Stage 1 with normal or high GFR (GFR > 90 mL/min/1 73 square meters)    Stage 2 Mild CKD (GFR = 60-89 mL/min/1 73 square meters)    Stage 3A Moderate CKD (GFR = 45-59 mL/min/1 73 square meters)    Stage 3B Moderate CKD (GFR = 30-44 mL/min/1 73 square meters)    Stage 4 Severe CKD (GFR = 15-29 mL/min/1 73 square meters)    Stage 5 End Stage CKD (GFR <15 mL/min/1 73 square meters)  Note: GFR calculation is accurate only with a steady state creatinine    Lipase [[id number]]  (Normal) Collected: 03/17/22 1625    Lab Status: Final result Specimen: Blood from Arm, Right Updated: 03/17/22 1704     Lipase 129 u/L     CBC and differential [[id number]] Collected: 03/17/22 1625    Lab Status: Final result Specimen: Blood from Arm, Right Updated: 03/17/22 1655     WBC 9 52 Thousand/uL      RBC 4 13 Million/uL      Hemoglobin 12 2 g/dL Hematocrit 38 8 %      MCV 94 fL      MCH 29 5 pg      MCHC 31 4 g/dL      RDW 15 1 %      MPV 11 5 fL      Platelets 736 Thousands/uL      nRBC 0 /100 WBCs      Neutrophils Relative 61 %      Immat GRANS % 1 %      Lymphocytes Relative 27 %      Monocytes Relative 9 %      Eosinophils Relative 1 %      Basophils Relative 1 %      Neutrophils Absolute 5 84 Thousands/µL      Immature Grans Absolute 0 05 Thousand/uL      Lymphocytes Absolute 2 56 Thousands/µL      Monocytes Absolute 0 86 Thousand/µL      Eosinophils Absolute 0 12 Thousand/µL      Basophils Absolute 0 09 Thousands/µL                  XR spine lumbar 2 or 3 views injury   Final Result by Andrew Ontiveros MD (03/18 1415)      Age-indeterminate compression deformity of L2  Old T12 compression deformity  Workstation performed: [id number]         CT Abdomen pelvis with contrast   Final Result by Patricia Frye MD (03/17 1822)      1  Gastric antral wall thickening suggestive of gastritis  Consider follow-up upper endoscopy  2   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, now 1 6 cm  Consider MRI with contrast    3   Cholelithiasis  The study was marked in [facility] for immediate notification  Workstation performed: [id number]         XR spine lumbar 2 or 3 views injury    (Results Pending)         Procedures  Procedures      ED Course  ED Course as of 03/19/22 0302   u Mar 17, 2022   2005 Spoke with Trauma regarding T12 compression fracture  SBIRT 20yo+      Most Recent Value   SBIRT (24 yo +)    In order to provide better care to our patients, we are screening all of our patients for alcohol and drug use  Would it be okay to ask you these screening questions?  Unable to answer at this time Filed at: 03/17/2022 1501                MDM  Number of Diagnoses or Management Options  At risk for delirium  Compression fracture of T12 vertebra, initial encounter ([facility] [address] )  Dementia ([address] )  Diagnosis management comments: Suspect compression fracture, however pt appears to also have abdominal ttp  Scan with con, likely admit for fx  Patient and daughter appreciative and in agreement with plan  Disposition  Final diagnoses:   Compression fracture of T12 vertebra, initial encounter ([facility] )   Dementia ([place] [id number] )   At risk for delirium     Time reflects when diagnosis was documented in both MDM as applicable and the Disposition within this note     Time User Action Codes Description Comment    3/17/2022  7:56 PM Jerobert Combe BISHOP Add [S22 080A] Compression fracture of T12 vertebra, initial encounter ([place] [address] )     3/17/2022  8:34 PM Cecily Purchase Add [F03 90] Dementia ([place] [address] )     3/17/2022  8:34 PM Cecily Purchase Add [Z91 89] At risk for delirium     3/17/2022  9:31 PM Yobany Clement Add [P93 410T] T12 compression fracture, initial encounter ([place] [address] )     3/18/2022  1:21 PM Caitlin Fox Add [S32 020A] Closed compression fracture of L2 lumbar vertebra, initial encounter [facility])       ED Disposition     ED Disposition Condition Date/Time Comment    Admit Stable Thu Mar 17, 2022  8:34 PM Case was discussed with Rosanna Sena and the patient's admission status was agreed to be Admission Status: observation status to the service of Dr Rosanna Sena           Follow-up Information    None         Current Discharge Medication List      CONTINUE these medications which have NOT CHANGED    Details   acetaminophen (TYLENOL) 325 mg tablet Take 650 mg by mouth every 6 (six) hours as needed for mild pain      apixaban (ELIQUIS) 5 mg Take 1 tablet (5 mg total) by mouth 2 (two) times a day  Qty: 60 tablet, Refills: 0    Associated Diagnoses: New onset atrial fibrillation (HCC)      Banana Flakes (BANATROL PLUS PO) Take by mouth 3 (three) times a day with meals      Diclofenac Sodium (VOLTAREN) 1 % Apply 2 g topically to affected area 4 times daily at 9a-1p-5p-9p  Qty: 100 g, Refills: 5    Associated Diagnoses: Musculoskeletal pain      donepezil (ARICEPT) 10 mg tablet Take 1 tablet (10 mg total) by mouth daily  Qty: 90 tablet, Refills: 1    Associated Diagnoses: Senile dementia without behavioral disturbance ([facility])      furosemide (LASIX) 20 mg tablet Take 20 mg by mouth daily      glipiZIDE (GLUCOTROL) 10 mg tablet Take 10 mg by mouth in the morning      insulin lispro (Admelog) 100 units/mL injection Inject under the skin 4 (four) times a day Per Sliding Scale      magnesium oxide (MAG-OX) 400 mg Take 1 tablet (400 mg total) by mouth daily  Qty: 90 tablet, Refills: 1    Associated Diagnoses: Hypokalemia; CKD (chronic kidney disease), stage III ([facility])      metoprolol tartrate (LOPRESSOR) 25 mg tablet Take 1 tablet (25 mg total) by mouth every 12 (twelve) hours  Refills: 0    Associated Diagnoses: New onset atrial fibrillation ([facility])      Potassium Chloride ER 20 MEQ TBCR Take 2 tablets (40 mEq total) by mouth 3 (three) times a day  Refills: 0    Associated Diagnoses: Hypokalemia; CKD (chronic kidney disease), stage III ([facility])      repaglinide (PRANDIN) 2 mg tablet Take 1 tablet (2 mg total) by mouth 3 (three) times a day before meals  Qty: 270 tablet, Refills: 1    Associated Diagnoses: Type 2 diabetes mellitus without complication, without long-term current use of insulin ([facility])      sitaGLIPtin (JANUVIA) 100 mg tablet Take 1 tablet (100 mg total) by mouth daily  Qty: 90 tablet, Refills: 1    Associated Diagnoses: Diabetes mellitus type 2, noninsulin dependent ([facility] [address] )           Outpatient Discharge Orders   XR spine lumbar 2 or 3 views injury   Standing Status: Future Standing Exp  Date: 03/18/26       PDMP Review     None           ED Provider  Attending physically available and evaluated Keene Cordial  I managed the patient along with the ED Attending      Electronically Signed by         Clarence Cordova MD  03/19/22 7452

## 2022-03-18 ENCOUNTER — APPOINTMENT (OUTPATIENT)
Dept: RADIOLOGY | Facility: HOSPITAL | Age: 87
DRG: 552 | End: 2022-03-18
Payer: MEDICARE
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
## 2022-03-18 PROBLEM — S32.020A CLOSED COMPRESSION FRACTURE OF L2 LUMBAR VERTEBRA, INITIAL ENCOUNTER (HCC): Status: ACTIVE | Noted: 2022-03-18

## 2022-03-18 PROBLEM — S32.020A CLOSED COMPRESSION FRACTURE OF L2 LUMBAR VERTEBRA, INITIAL ENCOUNTER (HCC): Status: RESOLVED | Noted: 2022-03-18 | Resolved: 2022-03-18

## 2022-03-18 LAB
GLUCOSE SERPL-MCNC: 123 MG/DL (ref 65–140)
GLUCOSE SERPL-MCNC: 138 MG/DL (ref 65–140)
GLUCOSE SERPL-MCNC: 141 MG/DL (ref 65–140)
GLUCOSE SERPL-MCNC: 150 MG/DL (ref 65–140)
GLUCOSE SERPL-MCNC: 328 MG/DL (ref 65–140)

## 2022-03-18 PROCEDURE — 99284 EMERGENCY DEPT VISIT MOD MDM: CPT | Performed by: INTERNAL MEDICINE

## 2022-03-18 PROCEDURE — 97167 OT EVAL HIGH COMPLEX 60 MIN: CPT

## 2022-03-18 PROCEDURE — 72100 X-RAY EXAM L-S SPINE 2/3 VWS: CPT

## 2022-03-18 PROCEDURE — 82948 REAGENT STRIP/BLOOD GLUCOSE: CPT

## 2022-03-18 PROCEDURE — 99223 1ST HOSP IP/OBS HIGH 75: CPT | Performed by: PHYSICIAN ASSISTANT

## 2022-03-18 PROCEDURE — 99232 SBSQ HOSP IP/OBS MODERATE 35: CPT | Performed by: SURGERY

## 2022-03-18 PROCEDURE — 97163 PT EVAL HIGH COMPLEX 45 MIN: CPT

## 2022-03-18 RX ORDER — SENNOSIDES 8.6 MG
2 TABLET ORAL
Status: DISCONTINUED | OUTPATIENT
Start: 2022-03-18 | End: 2022-03-21 | Stop reason: HOSPADM

## 2022-03-18 RX ORDER — ACETAMINOPHEN 325 MG/1
975 TABLET ORAL EVERY 8 HOURS SCHEDULED
Status: DISCONTINUED | OUTPATIENT
Start: 2022-03-18 | End: 2022-03-21 | Stop reason: HOSPADM

## 2022-03-18 RX ORDER — DOCUSATE SODIUM 100 MG/1
100 CAPSULE, LIQUID FILLED ORAL 2 TIMES DAILY
Status: DISCONTINUED | OUTPATIENT
Start: 2022-03-18 | End: 2022-03-21 | Stop reason: HOSPADM

## 2022-03-18 RX ADMIN — FUROSEMIDE 20 MG: 20 TABLET ORAL at 08:37

## 2022-03-18 RX ADMIN — METOPROLOL TARTRATE 25 MG: 25 TABLET, FILM COATED ORAL at 21:33

## 2022-03-18 RX ADMIN — INSULIN LISPRO 1 UNITS: 100 INJECTION, SOLUTION INTRAVENOUS; SUBCUTANEOUS at 17:26

## 2022-03-18 RX ADMIN — FAMOTIDINE 20 MG: 20 TABLET ORAL at 08:37

## 2022-03-18 RX ADMIN — ACETAMINOPHEN 975 MG: 325 TABLET ORAL at 21:33

## 2022-03-18 RX ADMIN — INSULIN LISPRO 3 UNITS: 100 INJECTION, SOLUTION INTRAVENOUS; SUBCUTANEOUS at 12:30

## 2022-03-18 RX ADMIN — CEPHALEXIN 500 MG: 500 CAPSULE ORAL at 14:41

## 2022-03-18 RX ADMIN — INSULIN DETEMIR 10 UNITS: 100 INJECTION, SOLUTION SUBCUTANEOUS at 21:33

## 2022-03-18 RX ADMIN — ACETAMINOPHEN 975 MG: 325 TABLET ORAL at 14:41

## 2022-03-18 RX ADMIN — DOCUSATE SODIUM 100 MG: 100 CAPSULE, LIQUID FILLED ORAL at 18:05

## 2022-03-18 RX ADMIN — OXYCODONE HYDROCHLORIDE 5 MG: 5 TABLET ORAL at 12:29

## 2022-03-18 RX ADMIN — DONEPEZIL HYDROCHLORIDE 10 MG: 10 TABLET ORAL at 08:37

## 2022-03-18 RX ADMIN — ACETAMINOPHEN 650 MG: 325 TABLET ORAL at 05:54

## 2022-03-18 RX ADMIN — INSULIN LISPRO 3 UNITS: 100 INJECTION, SOLUTION INTRAVENOUS; SUBCUTANEOUS at 17:26

## 2022-03-18 RX ADMIN — APIXABAN 5 MG: 5 TABLET, FILM COATED ORAL at 18:05

## 2022-03-18 RX ADMIN — CEPHALEXIN 500 MG: 500 CAPSULE ORAL at 05:54

## 2022-03-18 RX ADMIN — APIXABAN 5 MG: 5 TABLET, FILM COATED ORAL at 08:37

## 2022-03-18 RX ADMIN — STANDARDIZED SENNA CONCENTRATE 17.2 MG: 8.6 TABLET ORAL at 21:33

## 2022-03-18 RX ADMIN — METOPROLOL TARTRATE 25 MG: 25 TABLET, FILM COATED ORAL at 08:36

## 2022-03-18 RX ADMIN — CEPHALEXIN 500 MG: 500 CAPSULE ORAL at 21:33

## 2022-03-18 NOTE — H&P
[address] [facility] 11/22/1923, 80 y o  female MRN: [id number]  Unit/Bed#: ED 21 Encounter: [id number]  Primary Care Provider: No primary care provider on file  Date and time admitted to hospital: 3/17/2022  2:52 PM    Urinary incontinence  Assessment & Plan  Follow-up urine culture   Keflex prescribed     * T12 compression fracture, initial encounter [facility])  Assessment & Plan  Plan for pain control and brace   neurosurg consult placed         Trauma Alert: Evaluation; trauma team notified at tig text via text   Model of Arrival: Ambulance    Trauma Team: Attending Dr Rosanna Sena  Consultants:     Neurosurgery: routine consult; Epic consult order placed; History of Present Illness     Chief Complaint: Back pain  Mechanism:Other: unknown     HPI:    Wicho Bahena is a 80 y o  female with a history of dementia,  AFib, on Eliquis, and diabetes who "presents from assisted living with back pain  Accompanied by daughter who help provide history  Daughter states that sister visited patient 3 days ago and she was overall in her usual state of health but did complain of some back pain at that time  She seems somewhat uncomfortable with changing positions  Today however the patient seemed much more uncomfortable, severe pain with positional changes  Normally she ambulates with a walker but has been unable to do so secondary to pain  Denies any recent trauma although patient is not a reliable historian " Daughter also denies any recent trauma  Currently the pt has no complaints including fever, chills, nausea, vomiting, lateralizing weakness, or back pain on trauma exam       Review of Systems   Constitutional: Negative for chills and fever  HENT: Negative for mouth sores, sinus pressure and trouble swallowing  Eyes: Negative for photophobia  Respiratory: Negative for shortness of breath and wheezing  Cardiovascular: Negative for chest pain     Gastrointestinal: Negative for abdominal pain, diarrhea, nausea and vomiting  Genitourinary: Negative for flank pain, hematuria and menstrual problem  Musculoskeletal: Negative for back pain  Skin: Negative for pallor  Neurological: Negative for headaches  Psychiatric/Behavioral: Negative for agitation  All other systems reviewed and are negative  12-point, complete review of systems was reviewed and negative except as stated above  Historical Information     Past Medical History:   Diagnosis Date    Aortic stenosis     Bilateral edema of lower extremity     Dementia (HCC)     Diabetes ([facility] [address] )     Fall     Gait abnormality     Hyperlipidemia     Hypertension     Hypokalemia     Other ascites     Varicose veins of leg with edema      Past Surgical History:   Procedure Laterality Date    BREAST SURGERY      ELBOW SURGERY          Social History     Tobacco Use    Smoking status: Never Smoker    Smokeless tobacco: Never Used   Vaping Use    Vaping Use: Never used   Substance Use Topics    Alcohol use: Not Currently    Drug use: No     Immunization History   Administered Date(s) Administered    COVID-19 PFIZER VACCINE 0 3 ML IM 02/22/2021, 03/14/2021    Influenza, high dose seasonal 0 7 mL 10/19/2020    Tdap 11/27/2015     Family History: Non-contributory    1  Before the illness or injury that brought you to the Emergency, did you need someone to help you on a regular basis? 1=Yes   2  Since the illness or injury that brought you to the Emergency, have you needed more help than usual to take care of yourself? 1=Yes   3  Have you been hospitalized for one or more nights during the past 6 months (excluding a stay in the Emergency Department)? 0=No   4  In general, do you see well? 0=Yes   5  In general, do you have serious problems with your memory? 1=Yes   6  Do you take more than three different medications everyday?  1=Yes   TOTAL   4     Did you order a geriatric consult if the score was 2 or greater?: yes     Meds/Allergies   all current active meds have been reviewed   No Known Allergies    Objective   Initial Vitals:   Temperature: 98 °F (36 7 °C) (03/17/22 1455)  Pulse: 76 (03/17/22 1455)  Respirations: 16 (03/17/22 1455)  Blood Pressure: 149/71 (03/17/22 1455)    Primary Survey:   Airway:        Status: patent;        Pre-hospital Interventions: none        Hospital Interventions: none  Breathing:        Pre-hospital Interventions: none       Effort: normal       Right breath sounds: normal       Left breath sounds: normal  Circulation:        Rhythm: regular       Rate: regular   Right Pulses Left Pulses    R radial: 2+  R femoral: 2+  R pedal: 2+     L radial: 2+  L femoral: 2+  L pedal: 2+       Disability:        GCS: Eye: 4; Verbal: 4 Motor: 6 Total: 14       Right Pupil: round;  reactive         Left Pupil:  round;  reactive      R Motor Strength L Motor Strength    R : 5/5  R dorsiflex: 5/5  R plantarflex: 5/5 L : 5/5  L dorsiflex: 5/5  L plantarflex: 5/5        Sensory:  No sensory deficit  Exposure:           Secondary Survey:  Physical Exam  Vitals reviewed  Constitutional:       General: She is not in acute distress  Appearance: Normal appearance  She is not ill-appearing  HENT:      Head: Normocephalic and atraumatic  Right Ear: External ear normal       Left Ear: External ear normal       Nose: Nose normal  No congestion or rhinorrhea  Mouth/Throat:      Mouth: Mucous membranes are moist       Pharynx: No oropharyngeal exudate  Eyes:      Extraocular Movements: Extraocular movements intact  Conjunctiva/sclera: Conjunctivae normal       Pupils: Pupils are equal, round, and reactive to light  Cardiovascular:      Rate and Rhythm: Normal rate and regular rhythm  Pulses: Normal pulses  Heart sounds: Normal heart sounds  Pulmonary:      Effort: Pulmonary effort is normal  No respiratory distress  Abdominal:      General: Abdomen is flat  Tenderness: There is no abdominal tenderness  Musculoskeletal:         General: No tenderness  Normal range of motion  Cervical back: Neck supple  No tenderness  Comments: No C, T, L spine tenderness   Skin:     General: Skin is warm  Neurological:      General: No focal deficit present  Mental Status: She is alert  Mental status is at baseline  She is disoriented  Invasive Devices  Report    Peripheral Intravenous Line            Peripheral IV 03/17/22 Right Antecubital <1 day              Lab Results:   Urinalysis:   Lab Results   Component Value Date    COLORU Light Yellow 03/17/2022    CLARITYU Clear 03/17/2022    SPECGRAV 1 010 03/17/2022    PHUR 6 5 03/17/2022    LEUKOCYTESUR Large (A) 03/17/2022    NITRITE Negative 03/17/2022    GLUCOSEU Negative 03/17/2022    KETONESU Negative 03/17/2022    BILIRUBINUR Negative 03/17/2022    BLOODU Trace (A) 03/17/2022       Imaging Results: I have personally reviewed pertinent reports  Chest Xray(s): N/A, negative for acute findings   FAST exam(s): N/A   CT Scan(s): positive for acute findings: T12 compression fracture and gastritis   Additional Xray(s): N/A     Other Studies:     Code Status: Level 3 - DNAR and DNI  Advance Directive and Living Will: Yes    Power of :    POLST:    I have spent 10 minutes with Patient+ Family  today in which greater than 50% of this time was spent in counseling/coordination of care regarding Diagnostic results

## 2022-03-18 NOTE — PLAN OF CARE
Problem: Potential for Falls  Goal: Patient will remain free of falls  Description: INTERVENTIONS:  - Educate patient/family on patient safety including physical limitations  - Instruct patient to call for assistance with activity   - Consult OT/PT to assist with strengthening/mobility   - Keep Call bell within reach  - Keep bed low and locked with side rails adjusted as appropriate  - Keep care items and personal belongings within reach  - Initiate and maintain comfort rounds  - Make Fall Risk Sign visible to staff  - Offer Toileting everyHours, in advance of need  - Initiate/Maintain alarm  - Obtain necessary fall risk management equipment:   - Apply yellow socks and bracelet for high fall risk patients  - Consider moving patient to room near nurses station  Outcome: Progressing

## 2022-03-18 NOTE — ASSESSMENT & PLAN NOTE
-patient with urinary tract infection present on admission  -complete 7 day course of Keflex for UTI  -afebrile without systemic findings

## 2022-03-18 NOTE — OCCUPATIONAL THERAPY NOTE
Occupational Therapy Evaluation     Patient Name: Bharath RODRÍGUEZ Date: 3/18/2022  Problem List  Principal Problem:    T12 compression fracture, initial encounter ([facility] )  Active Problems:    Urinary incontinence    UTI (urinary tract infection)    Closed compression fracture of L2 lumbar vertebra, initial encounter [facility])    Past Medical History  Past Medical History:   Diagnosis Date    Aortic stenosis     Bilateral edema of lower extremity     Dementia ([facility] )     Diabetes ([facility] )     Fall     Gait abnormality     Hyperlipidemia     Hypertension     Hypokalemia     Other ascites     Varicose veins of leg with edema      Past Surgical History  Past Surgical History:   Procedure Laterality Date    BREAST SURGERY      ELBOW SURGERY             03/18/22 1308   OT Last Visit   OT Visit Date 03/18/22   Note Type   Note type Evaluation   Restrictions/Precautions   Weight Bearing Precautions Per Order No   Braces or Orthoses LSO  (for comfort)   Other Precautions Cognitive;Telemetry; Fall Risk;Pain   Pain Assessment   Pain Assessment Tool FLACC   Pain Rating: FLACC (Rest) - Face 1   Pain Rating: FLACC (Rest) - Legs 0   Pain Rating: FLACC (Rest) - Activity 0   Pain Rating: FLACC (Rest) - Cry 1   Pain Rating: FLACC (Rest) - Consolability 0   Score: FLACC (Rest) 2   Pain Rating: FLACC (Activity) - Face 1   Pain Rating: FLACC (Activity) - Legs 0   Pain Rating: FLACC (Activity) - Activity 0   Pain Rating: FLACC (Activity) - Cry 1   Pain Rating: FLACC (Activity) - Consolability 0   Score: FLACC (Activity) 2   Home Living   Type of Home Assisted living   Home Layout One level;Performs ADLs on one level; Able to live on main level with bedroom/bathroom   Bathroom Shower/Tub Walk-in shower   Bathroom Toilet Raised   Bathroom Equipment Grab bars in shower; Shower chair;Tub transfer [place]   Additional Comments Pt lives in an Assisted living facility with no YOANA      Prior Function   Level of [place] Needs assistance with ADLs and functional mobility; Needs assistance with IADLs   Lives With Alone   Receives Help From Family;Personal care attendant  (DAUGHTER)   ADL Assistance Needs assistance   IADLs Needs assistance   Falls in the last 6 months 1 to 4  (per EMR 3)   Vocational Retired   Comments Pt is a poor historian, info from [employer]  Lifestyle   Autonomy Pt was receiving A with ADLs, IADLs, and used a walker PTA  (-)   Reciprocal Relationships Pt lives alone at an assisted living facility with A from daughter and staff there  Service to Others Pt is retired  Intrinsic Gratification Pt enjoys watching tv  Psychosocial   Psychosocial (WDL) WDL   ADL   Where Assessed Supine, bed   Eating Assistance 5  Supervision/Setup   Grooming Assistance 4  Minimal Assistance   UB Bathing Assistance 3  Moderate Assistance   LB Bathing Assistance 2  Maximal Assistance   UB Dressing Assistance 3  Moderate Assistance   LB Dressing Assistance 1  Total Assistance   Toileting Assistance  2  Maximal Assistance   Functional Assistance 2  Maximal Assistance   Bed Mobility   Supine to Sit 3  Moderate assistance   Additional items Assist x 2   Sit to Supine 3  Moderate assistance   Additional items Assist x 2   Additional Comments Pt was laying supine in bed with all necessary items within reach  Transfers   Sit to Stand 3  Moderate assistance   Additional items Assist x 2   Stand to Sit 3  Moderate assistance   Additional items Assist x 2   Additional Comments Pt transfered with hand held A  Functional Mobility   Additional Comments unable to assess, pt limited 2* pain  Balance   Static Sitting Fair   Dynamic Sitting Fair -   Static Standing Poor +   Dynamic Standing Poor   Activity Tolerance   Activity Tolerance Patient limited by fatigue;Patient limited by pain   Medical Staff Made Aware Pt was seen with PT 2* medical complexity  Nurse Made Aware RN was made aware      Cognition   Overall Cognitive Status Impaired   Arousal/Participation Alert; Responsive;Arousable; Cooperative   Attention Attends with cues to redirect   Orientation Level Oriented to person;Disoriented to time   Memory Decreased short term memory;Decreased recall of recent events;Decreased recall of precautions;Decreased recall of biographical information   Following Commands Follows one step commands with increased time or repetition   Comments Pt was pleasantly confused but cooperative t/o session  Pt is a poor historian and prior living was gathered from [facility]  Assessment   Limitation Decreased ADL status; Decreased Safe judgement during ADL;Decreased cognition;Decreased endurance;Decreased self-care trans;Decreased high-level ADLs   Prognosis Fair   Assessment Pt is 80 y o  female admitted to [facility] on 3/17/2022 w/ back pain 2* T12 compression fracture  Pt  has a past medical history of Aortic stenosis, Bilateral edema of lower extremity, Dementia ([facility] Diabetes ([facility] ), Fall, Gait abnormality, Hyperlipidemia, Hypertension, Hypokalemia, Other ascites, and Varicose veins of leg with edema    Pt with active OT orders and activity orders  Pt resides in an assisted living facility alone, with no YOANA and ambulates using a RW  Pt was receiving A with ADLs and IADLs from staff and daughter, (-) drove  Pt is currently functioning at S for eating, min A for grooming, mod A for UB ADLs, max A for LB ADLs, and total A for toileting  Pt requires mod Ax2 for transfers and bed mobility  Pt is limited 2* pain, endurance, activity tolerance, functional mobility, functional standing tolerance, decreased I w/ ADLS/IADLS and cognitive impairments  The Areas of Occupational Performance Areas to address w/ pt include: eating, grooming, bathing/shower, toilet hygiene, dressing, health maintenance and functional mobility  OT recommendation for d/c includes post acute rehab vs return to facility   Pt would benefit from continued acute OT services for  3-5x/week to  w/in 10-14 days:  to address functional goals  Goals   LTG Time Frame 10-14   Long Term Goal see goals below   Plan   Treatment Interventions ADL retraining;Functional transfer training; Endurance training;Cognitive reorientation; Compensatory technique education;Continued evaluation; Energy conservation; Activityengagement   Goal Expiration Date 22   OT Frequency 3-5x/wk   Recommendation   OT Discharge Recommendation Post acute rehabilitation services  (vs return to facility with rehab)   OT - OK to Discharge Yes  (when medically appropriate )   AM-PAC Daily Activity Inpatient   Lower Body Dressing 2   Bathing 2   Toileting 2   Upper Body Dressing 2   Grooming 3   Eating 4   Daily Activity Raw Score 15   Daily Activity Standardized Score (Calc for Raw Score >=11) 34 69   AM-PAC Applied Cognition Inpatient   Following a Speech/Presentation 2   Understanding Ordinary Conversation 4   Taking Medications 4   Remembering Where Things Are Placed or Put Away 2   Remembering List of 4-5 Errands 2   Taking Care of Complicated Tasks 1   Applied Cognition Raw Score 15   Applied Cognition Standardized Score 33 54   Modified [place] Scale   Modified [place] Scale 4       Pt w/ S will complete daily grooming tasks w/ adaptive equipment as needed  Pt will increase standing tolerance to 10 mins w/ min A w/ adaptive equipment as needed  Pt will complete functional transfers w/ min A on and off all surfaces w/ equipment as needed  Pt will complete functional mobility w/ min A on and off all surfaces w/ equipment as needed  Pt will complete tolieting w/ min A while demonstrating safety techniques w/ DME  Pt will complete feeding tasks w/ mod I using adaptive devices  Pt will demonstrate G safety awareness w/ min A and vc during OT session  Pt will demonstrate LB ADLs w/ min A w/ adaptive equipment as needed  Pt will demonstrate UB ADLs w/ min A w/ adaptive equipment as needed       Pt will increase activity tolerance to G during a 30 min OT tx session  Pt will demonstrate bed mobility skills w/ min A  Pt will participate in a formal/functional cognitive assessment as needed to assist with safe discharge planning       ENDY Julien

## 2022-03-18 NOTE — ASSESSMENT & PLAN NOTE
· Pt presented with back pain  No reported recent falls or trauma  · Pt found to have mild age indeterminate L2 Superior Endplate Compression fracture, new since 2019  Also found to have acute UTI  · Pt also noted to have chronic and stable T12 and T10 compression fractures that were also noted on CT from 2019  · Imaging reviewed personally and by attending  Final results as below  · CT Abdomen pelvis w contrast 3/17/22: Pt with mild Superior endplate compression fx at L2 age indeterminate fx though it's new since the 2019 imaging  Pt noted to have chronic and stable T12 and T10 mild compression fractures  Plan  · Upright Xray of Lumbar spine ordered for baseline  · Continue regular neurologic checks  · Pain control per primary team   · Eval and mobilize per PT/OT when able to  · DVT PPX:SCDs  · Spine precautions  Pt should have safety precautions to avoid further injury to their back  · On exam, pt with mild TTP of the lumbar spine midline and the L2 fx is age indeterminate  Given that pt has back pain, will recommend LSO brace PRN for comfort  · Will obtain baseline xrays  · It is unclear if pt has back pain secondary to mild age indeterminate L2 fx, UTI or other cause (pt with dementia, thus poor historian)  Pt will have virtual visit follow up in 1 month with repeat Xrays to check for stability and follow up on pt's sx  If pt's pain is resolved at that time, may consider flex/ext xrays and further follow up on PRN basis  · Neurosurgery will see pt PRN during the remainder of her hospitalization  Pt will have virtual visit/follow up with neurosurgery in 1 month  Please call with any questions or concerns

## 2022-03-18 NOTE — PLAN OF CARE
Problem: OCCUPATIONAL THERAPY ADULT  Goal: Performs self-care activities at highest level of function for planned discharge setting  See evaluation for individualized goals  Description:   Treatment Interventions: ADL retraining,Functional transfer training,Endurance training,Cognitive reorientation,Compensatory technique education,Continued evaluation,Energy conservation,Activityengagement          See flowsheet documentation for full assessment, interventions and recommendations  Note: Limitation: Decreased ADL status,Decreased Safe judgement during ADL,Decreased cognition,Decreased endurance,Decreased self-care trans,Decreased high-level ADLs  Prognosis: Fair  Assessment: Pt is 80 y o  female admitted to [facility] on 3/17/2022 w/ back pain 2* T12 compression fracture  Pt  has a past medical history of Aortic stenosis, Bilateral edema of lower extremity, Dementia ([facility] ), Diabetes ([facility] ), Fall, Gait abnormality, Hyperlipidemia, Hypertension, Hypokalemia, Other ascites, and Varicose veins of leg with edema    Pt with active OT orders and activity orders  Pt resides in an assisted living facility alone, with no YOANA and ambulates using a RW  Pt was receiving A with ADLs and IADLs from staff and daughter, (-) drove  Pt is currently functioning at S for eating, min A for grooming, mod A for UB ADLs, max A for LB ADLs, and total A for toileting  Pt requires mod Ax2 for transfers and bed mobility  Pt is limited 2* pain, endurance, activity tolerance, functional mobility, functional standing tolerance, decreased I w/ ADLS/IADLS and cognitive impairments  The Areas of Occupational Performance Areas to address w/ pt include: eating, grooming, bathing/shower, toilet hygiene, dressing, health maintenance and functional mobility  OT recommendation for d/c includes post acute rehab vs return to facility  Pt would benefit from continued acute OT services for  3-5x/week to  w/in 10-14 days:  to address functional goals        OT Discharge Recommendation: Post acute rehabilitation services (vs return to facility with rehab)  OT - OK to Discharge: Yes (when medically appropriate )

## 2022-03-18 NOTE — PROGRESS NOTES
[address] [facility]  Progress Note - Bharath Burrows 11/22/1923, 80 y o  female MRN: [id number]  [facility]counter: [id number]  Primary Care Provider: No primary care provider on file     Date and time admitted to hospital: 3/17/2022  2:52 PM    * T12 compression fracture, initial encounter [facility])  Assessment & Plan  -patient found to have a T12 fracture  -patient has midline tenderness over her thoracic spine  -TLSO brace and conservative management recommended by Neurosurgery  -upright x-rays completed and shows stable alignment  -patient is neurologically intact  -PT OT evaluated patient and recommending inpatient rehab  -outpatient follow-up with Neurosurgery in 2 weeks with repeat upright x-rays at that time      UTI (urinary tract infection)  Assessment & Plan  -patient with urinary tract infection present on admission  -complete 7 day course of Keflex for UTI  -afebrile without systemic findings    Urinary incontinence  Assessment & Plan  -chronic  -urinary retention protocol    Type 2 diabetes mellitus with diabetic chronic kidney disease ([facility] )  Assessment & Plan  Lab Results   Component Value Date    HGBA1C 13 0 (H) 12/28/2021       Recent Labs     03/18/22  0640 03/18/22  1146   POCGLU 138 328*       Blood Sugar Average: Last 72 hrs:  (P) 233     -Sliding scale insulin in place  -add basal bolus regimen today, 3/18 due to hyperglycemia  -home Prandin, Januvia, glipizide on hold  -diabetic diet ordered  -goal blood sugar 140-180    Benign essential hypertension  Assessment & Plan  -continue home medications        TERTIARY TRAUMA SURVEY NOTE    Prophylaxis: Sequential compression device (Venodyne)  and Reason for no pharmacologic prophylaxis Apixaban    Disposition:  Continue med surg status, rehab placement pending  Code status:  Level 3 - DNAR and DNI    Consultants:  Geriatrics, neurosurgery      SUBJECTIVE:     Transfer from:  Not applicable  Mechanism of Injury:Other:  Unknown traumatic mechanism    Chief Complaint:  I am feeling okay    HPI/Last 24 hour events:  Patient denies significant pain at this time  She notes minimal back discomfort  She does not recall any recent falls      Active medications:           Current Facility-Administered Medications:     acetaminophen (TYLENOL) tablet 975 mg, 975 mg, Oral, Q8H MATT, 975 mg at 03/18/22 1441    apixaban (ELIQUIS) tablet 5 mg, 5 mg, Oral, BID, 5 mg at 03/18/22 0837    cephalexin (KEFLEX) capsule 500 mg, 500 mg, Oral, Q8H [address], 500 mg at 03/18/22 1441    docusate sodium (COLACE) capsule 100 mg, 100 mg, Oral, BID    donepezil (ARICEPT) tablet 10 mg, 10 mg, Oral, Daily, 10 mg at 03/18/22 0837    famotidine (PEPCID) tablet 20 mg, 20 mg, Oral, Daily, 20 mg at 03/18/22 0837    furosemide (LASIX) tablet 20 mg, 20 mg, Oral, Daily, 20 mg at 03/18/22 0837    insulin detemir (LEVEMIR) subcutaneous injection 10 Units, 10 Units, Subcutaneous, HS    insulin lispro (HumaLOG) 100 units/mL subcutaneous injection 1-5 Units, 1-5 Units, Subcutaneous, TID AC, 3 Units at 03/18/22 1230 **AND** Fingerstick Glucose (POCT), , , TID AC    [START ON 3/19/2022] insulin lispro (HumaLOG) 100 units/mL subcutaneous injection 3 Units, 3 Units, Subcutaneous, Daily With Breakfast    [START ON 3/19/2022] insulin lispro (HumaLOG) 100 units/mL subcutaneous injection 3 Units, 3 Units, Subcutaneous, Daily With Lunch    insulin lispro (HumaLOG) 100 units/mL subcutaneous injection 3 Units, 3 Units, Subcutaneous, Daily With Dinner    metoprolol tartrate (LOPRESSOR) tablet 25 mg, 25 mg, Oral, Q12H MATT, 25 mg at 03/18/22 0836    oxyCODONE (ROXICODONE) IR tablet 2 5 mg, 2 5 mg, Oral, Q4H PRN    oxyCODONE (ROXICODONE) IR tablet 5 mg, 5 mg, Oral, Q4H PRN, 5 mg at 03/18/22 1229    senna (SENOKOT) tablet 17 2 mg, 2 tablet, Oral, HS      OBJECTIVE:     Vitals:   Vitals:    03/18/22 1438   BP: 138/75   Pulse: 88   Resp: 16   Temp: 98 1 °F (36 7 °C) SpO2: 100%       Physical Exam:   GENERAL APPEARANCE:  No acute distress  NEURO:  GCS 15, nonfocal exam  HEENT:  Normocephalic, atraumatic  CV:  Regular rate and rhythm, no murmurs gallops or rubs  LUNGS:  Clear to auscultation bilaterally  GI:  Soft, nontender, nondistended  :  Voiding  MSK:  No edema, contusions or deformity; +lower thoracic tenderness to palpation over midline  SKIN:  Pink, warm, dry        I/O:   I/O       03/16 0701  03/17 0700 03/17 0701 03/18 0700 03/18 0701  03/19 0700    Urine  1000     Total Output  1000     Net  -1000            Unmeasured Urine Occurrence   1 x    Unmeasured Stool Occurrence   1 x          Invasive Devices: Invasive Devices  Report    Peripheral Intravenous Line            Peripheral IV 03/17/22 Right Antecubital <1 day                  Imaging:   XR spine lumbar 2 or 3 views injury    Result Date: 3/18/2022  Impression: Age-indeterminate compression deformity of L2  Old T12 compression deformity  Workstation performed: [id number]     CT Abdomen pelvis with contrast    Result Date: 3/17/2022  Impression: 1  Gastric antral wall thickening suggestive of gastritis  Consider follow-up upper endoscopy  2   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, now 1 6 cm  Consider MRI with contrast  3   Cholelithiasis  The study was marked in [facility] for immediate notification   Workstation performed: [id number]       Labs:   CBC:   Lab Results   Component Value Date    WBC 9 52 03/17/2022    HGB 12 2 03/17/2022    HCT 38 8 03/17/2022    MCV 94 03/17/2022     03/17/2022    MCH 29 5 03/17/2022    MCHC 31 4 03/17/2022    RDW 15 1 03/17/2022    MPV 11 0 03/17/2022    NRBC 0 03/17/2022     CMP:   Lab Results   Component Value Date     (H) 03/17/2022    CO2 23 03/17/2022    BUN 17 03/17/2022    CREATININE 1 20 03/17/2022    CALCIUM 9 7 03/17/2022    AST 37 03/17/2022    ALT 20 03/17/2022    ALKPHOS 107 03/17/2022    EGFR 37 03/17/2022

## 2022-03-18 NOTE — ED NOTES
Admitting at the [facility] for patient evaluation at this time     Dagmar Solano RN  03/18/22 3216

## 2022-03-18 NOTE — ASSESSMENT & PLAN NOTE
Lab Results   Component Value Date    HGBA1C 13 0 (H) 12/28/2021       Recent Labs     03/18/22  0640 03/18/22  1146   POCGLU 138 328*       Blood Sugar Average: Last 72 hrs:  (P) 233     -Sliding scale insulin in place  -add basal bolus regimen today, 3/18 due to hyperglycemia  -home Prandin, Januvia, glipizide on hold  -diabetic diet ordered  -goal blood sugar 140-180

## 2022-03-18 NOTE — CASE MANAGEMENT
Case Management Assessment & Discharge Planning Note    Patient name Laney Nicholson  Location ED /ED 21 MRN [id number]  : 1923 Date 3/18/2022       Current Admission Date: 3/17/2022  Current Admission Diagnosis:T12 compression fracture, initial encounter [facility])   Patient Active Problem List    Diagnosis Date Noted    Closed compression fracture of L2 lumbar vertebra, initial encounter ([facility] ) 2022    T12 compression fracture, initial encounter ([facility] ) 2022    Pressure injury of sacral region, unstageable ([facility] ) 2022    Functional diarrhea     Acute systolic congestive heart failure ([facility] ) 2022    Diarrhea 2022    New onset atrial fibrillation ([facility] ) 2022    Severe sepsis ([facility] ) 2022    UTI (urinary tract infection) 2022    Aortic stenosis 2022    Dysphagia 2022    Acute-on-chronic kidney injury ([facility] ) 2021    Hyperosmolar hyperglycemic state (HHS) ([facility] ) 2021    Urinary incontinence 2021    First degree AV block 2021    At risk for delirium 2021    Chest pain 2021    Syncope 2021    HTN (hypertension) 2021    SALTY (acute kidney injury) ([facility] ) 2021    Closed fracture of left proximal humerus 2020    Ambulatory dysfunction 2020    Atherosclerosis of aorta ([facility] ) 2019    Medicare annual wellness visit, subsequent 2018    Screening for depression 2018    Screening for genitourinary condition 2018    Screening for neurological condition 2018    Bilateral lower extremity edema 10/04/2017    Hypokalemia 2017    CKD (chronic kidney disease), stage III (HCC) 2017    Hyponatremia 2017    Leukocytosis 2017    Moderate aortic stenosis 2017    Gallstone 2016    Anemia 2016    Pancreatic cyst     Uncomplicated senile dementia ([address] ) 2015    Benign essential hypertension 07/07/2015    Hypercholesterolemia 11/11/2013    Osteopenia 11/11/2013    Type 2 diabetes mellitus with diabetic chronic kidney disease ([facility] ) 11/11/2013      LOS (days): 0  Geometric Mean LOS (GMLOS) (days):   Days to GMLOS:     OBJECTIVE:              Current admission status: Observation       Preferred Pharmacy:   [facility] Annemarie Pérez[address]  [address] [facility] [address]  Phone: [phone] Fax: Michael Angel [place], [place] - [address]  [address] [facility] [address]  Phone: [phone] Fax: [phone]    Primary Care Provider: No primary care provider on file  Primary Insurance: MEDICARE  Secondary Insurance: AARP    ASSESSMENT:  [address] - Daughter   Primary Phone: [phone] (Mobile)  Home Phone: [phone]                         Readmission Root Cause  30 Day Readmission: No    Patient Information  Admitted from[de-identified] Facility [facility])  Mental Status: Alert,Confused  During Assessment patient was accompanied by: Not accompanied during assessment  Assessment information provided by[de-identified] Daughter  Primary Caregiver: Other (Comment)  Caregiver's Name<[facility]> [facility] Relationship to Patient[de-identified] Other (Specify)  Support Systems: Self,Daughter,Family [facility] Financial care staff  [place] of Residence: [address] [place] [address] do you live in?: [address] entry access options   Select all that apply : No steps to enter home  Type of Current Residence: [facility])  Upon entering residence, is there a bedroom on the main floor (no further steps)?: Yes  Upon entering residence, is there a bathroom on the main floor (no further steps)?: Yes  In the last 12 months, was there a time when you were not able to pay the mortgage or rent on time?: No  In the last 12 months, how many places have you lived?: 1  In the last 12 months, was there a time when you did not have a steady place to sleep or slept in a shelter (including now)?: No  Living Arrangements: Lives Alone  Is patient a ?: No    Activities of Daily Living Prior to Admission  Functional Status: Assistance  Completes ADLs independently?: No  Level of ADL dependence: Assistance  Ambulates independently?: Yes  Does patient use assisted devices?: Yes  Assisted Devices (DME) used: Mireya Innocent  Does patient currently own DME?: Yes  What DME does the patient currently own?: Mireya Innocent  Does patient have a history of Outpatient Therapy (PT/OT)?: No  Does the patient have a history of Short-Term Rehab?: No  Does patient have a history of HHC?: No  Does patient currently have [facility]?: No         Patient Information Continued  Income Source: Pension/MC[employer]  Does patient have prescription coverage?: Yes  Within the past 12 months, you worried that your food would run out before you got the money to buy more : Never true  Within the past 12 months, the food you bought just didnt last and you didnt have money to get more : Never true  Food insecurity resource given?: N/A  Does patient receive dialysis treatments?: No  Does patient have a history of substance abuse?: No         Means of Transportation  Means of Transport to Appts[de-identified] Family transport  In the past 12 months, has lack of transportation kept you from medical appointments or from getting medications?: No  In the past 12 months, has lack of transportation kept you from meetings, work, or from getting things needed for daily living?: No  Was application for public transport provided?: N/A        DISCHARGE DETAILS:    Discharge planning discussed with[de-identified] pt's dtr  Freedom of Choice: Yes     CM contacted family/caregiver?: Yes  Were Treatment Team discharge recommendations reviewed with patient/caregiver?: Yes  Did patient/caregiver verbalize understanding of patient care needs?: Yes  Were patient/caregiver advised of the risks associated with not following Treatment Team discharge recommendations?: Yes    Contacts  Patient Contacts: Ezekiel Dallas (Daughter) [phone] ([phone]  Relationship to Patient[de-identified] Family  Contact Method: Phone  Phone Number: [phone] Lawson Clark  Reason/Outcome: Continuity of Care,Emergency Contact,Discharge Planning    CM spoke to pt's dtr to discuss the role of CM  Pt resides at [facility] in their dementia unit  Pt has a walk-in shower, grab bars, bench, and raised toilet there  Pt doesn't drive, is a former homemaker, and reports requiring and having assistance for all ADL/IADLs  Pt ambulates MOD/I with a walker  Pt's had 2-3 falls in the last 6 months  Pt enjoys the activities at [facility] and TV  Pt has no hx of mental health, substance abuse, or VNA  CM will appreciate therapy recommendations when appropriate  Pt's dtr would like a return to [facility] if possible  CM reviewed d/c planning process including the following: identifying help at home, patient preference for d/c planning needs, Discharge Lounge, [facility] Meds to Bed program, availability of treatment team to discuss questions or concerns patient and/or family may have regarding understanding medications and recognizing signs and symptoms once discharged  CM also encouraged patient to follow up with all recommended appointments after discharge  Patient advised of importance for patient and family to participate in managing patients medical well being

## 2022-03-18 NOTE — CONSULTS
[address] [place] [address] is she still numbness and needed he since 20/2 she does use the CPAP your wound  Consult- Cristian Deal 11/22/1923, 80 y o  female MRN: [id number]  Unit/Bed#: ED 21 Encounter: [id number]  Primary Care Provider: No primary care provider on file  Date and time admitted to hospital: 3/17/2022  2:52 PM    Inpatient consult to Neurosurgery  Consult performed by: Elias Davila PA-C  Consult ordered by: Milo Lorenzo,           Closed compression fracture of L2 lumbar vertebra, initial encounter [facility])  Assessment & Plan  · So she she she has Pt presented with back pain  No reported recent falls or trauma  · Pt found to have mild age indeterminate L2 Superior Endplate Compression fracture, new since 2019  Also found to have acute UTI  · Pt also noted to have chronic and stable T12 and T10 compression fractures that were also noted on CT from 2019  · Imaging reviewed personally and by attending  Final results as below  · CT Abdomen pelvis w contrast 3/17/22: Pt with mild Superior endplate compression fx at L2 age indeterminate fx though it's new since the 2019 imaging  Pt noted to have chronic and stable T12 and T10 mild compression fractures  Plan  · Upright Xray of Lumbar spine ordered for baseline  · Continue regular neurologic checks  · Pain control per primary team   · Eval and mobilize per PT/OT when able to  · DVT PPX:SCDs  · Spine precautions  Pt should have safety precautions to avoid further injury to their back  · On exam, pt with mild TTP of the lumbar spine midline and the L2 fx is age indeterminate  Given that pt has back pain, will recommend LSO brace PRN for comfort  · Will obtain baseline xrays  · It is unclear if pt has back pain secondary to mild age indeterminate L2 fx, UTI or other cause (pt with dementia, thus poor historian)   Pt will have virtual visit follow up in 1 month with repeat Xrays to check for stability and follow up on pt's sx  If pt's pain is resolved at that time, may consider flex/ext xrays/ further follow up on PRN basis  · Neurosurgery will see pt PRN during the remainder of her hospitalization  Pt will have virtual visit/follow up with neurosurgery in 1 month  Please call with any questions or concerns  UTI (urinary tract infection)  Assessment & Plan  · Pt found to have acute UTI on this admission  Started on Keflex  The wanted go the knee  Attending discussed patient and reviewed images during morning rounds on 3/18/22 at 7 am  Patient personally accessed and examined on 3/18/22 at 7 am    History of Present Illness   History, ROS and PFSH obtained from patient and chart review  HPI: Fernie Wren is a [place] 46 y o  female with PMH including dementia, DM, Gait instability, HTN, HLD, Aortic stenosis who presented to the hospital with back pain  Pt was noted to have stable T12 compression fx (stable from 2019) for which neurosurgery was consulted  Upon further evaluation pt was noted to have new mild superior endplate fx at L2 that is age indeterminate for which pt is seen  Per report, pt's daughter visited patient 3 days ago and pt was overall in her usual health except she complained of mild back pain  Anibal Hugo, however patient seemed more uncomfortable with severe pain with positional changes  Per report, she typically ambulated with a walker but was unable to do so secondary to pain and was thus admitted to the hospital for evaluation  Patient with dementia and appears to have short term memory issues, however she follows all motor command appropriately and mostly provides appropriate answers  Today when pt seen while laying in hospital bed she reports mild back pain  She denies radicular pain down her legs  She denies any new numbness/tingling or weakness in her legs  Review of Systems   Constitutional: Negative for chills and fever  HENT: Negative for hearing loss      Eyes: Negative for pain and visual disturbance  Respiratory: Negative for chest tightness and shortness of breath  Cardiovascular: Negative for chest pain and palpitations  Gastrointestinal: Negative for abdominal pain, nausea and vomiting  Genitourinary: Negative for dysuria  Musculoskeletal: Positive for back pain and gait problem  Negative for neck pain and neck stiffness  Skin: Negative for pallor and rash  Neurological: Negative for dizziness, light-headedness and numbness  Psychiatric/Behavioral: Positive for confusion  Historical Information   Past Medical History:   Diagnosis Date    Aortic stenosis     Bilateral edema of lower extremity     Dementia (HCC)     [facility] )     Fall     Gait abnormality     Hyperlipidemia     Hypertension     Hypokalemia     Other ascites     Varicose veins of leg with edema      Past Surgical History:   Procedure Laterality Date    BREAST SURGERY      ELBOW SURGERY       Social History     Substance and Sexual Activity   Alcohol Use Not Currently     Social History     Substance and Sexual Activity   Drug Use No     Social History     Tobacco Use   Smoking Status Never Smoker   Smokeless Tobacco Never Used     Family History   Problem Relation Age of Onset    Dementia Sister        Meds/Allergies   all current active meds have been reviewed  No Known Allergies    Objective   I/O       03/16 0701  03/17 0700 03/17 0701  03/18 0700 03/18 0701  03/19 0700    Urine  1000     Total Output  1000     Net  -1000                  Physical Exam  Constitutional:       General: She is not in acute distress  Appearance: She is well-developed  HENT:      Head: Normocephalic  Eyes:      Pupils: Pupils are equal, round, and reactive to light  Neck:      Trachea: No tracheal deviation  Cardiovascular:      Rate and Rhythm: Normal rate  Pulmonary:      Effort: Pulmonary effort is normal    Abdominal:      Palpations: Abdomen is soft  Tenderness:  There is no abdominal tenderness  There is no guarding  Musculoskeletal:      Cervical back: Neck supple  Comments: Mild TTP of the thoracolumbar junction midline  Skin:     General: Skin is warm and dry  Coloration: Skin is not pale  Findings: No rash  Neurological:      Mental Status: She is alert and oriented to person, place, and time  Comments: GCS 14 (- 1 confusion/dementia), Awake, Alert, Oriented to person and being in the hospital only  Motor: VÁZQUEZ, strength 4+/5 throughout    Sensation:  intact to LT X 4, good proprioception    Coordination: no drift bilateral upper extremities   Psychiatric:         Behavior: Behavior normal        Neurologic Exam     Mental Status   Oriented to person, place, and time  Cranial Nerves     CN III, IV, VI   Pupils are equal, round, and reactive to light  Vitals:Blood pressure 143/65, pulse 84, temperature 98 °F (36 7 °C), resp  rate 18, SpO2 100 %  ,There is no height or weight on file to calculate BMI  Lab Results:   Results from last 7 days   Lab Units 03/17/22  2219 03/17/22  1625   WBC Thousand/uL  --  9 52   HEMOGLOBIN g/dL  --  12 2   HEMATOCRIT %  --  38 8   PLATELETS Thousands/uL 241 285   NEUTROS PCT %  --  61   MONOS PCT %  --  9     Results from last 7 days   Lab Units 03/17/22  1625   POTASSIUM mmol/L 4 6   CHLORIDE mmol/L 109*   CO2 mmol/L 23   BUN mg/dL 17   CREATININE mg/dL 1 20   CALCIUM mg/dL 9 7   ALK PHOS U/L 107   ALT U/L 20   AST U/L 37                   Imaging Studies: I have personally reviewed pertinent reports  and I have personally reviewed pertinent films in PACS    EKG, Pathology, and Other Studies: I have personally reviewed pertinent reports  VTE Prophylaxis: Sequential compression device Dayna Rotunda)     Code Status: Level 3 - DNAR and DNI  Advance Directive and Living Will: Yes    Power of :    POLST:      Counseling / Coordination of Care  I spent 45 minutes with the patient      PLEASE NOTE:  This encounter may have been completed utilizing the [employer] Direct Speech Voice Recognition Software  Grammatical errors, random word insertions, pronoun errors and incomplete sentences are occasional consequences of the system due to software limitations, ambient noise and hardware issues  These may be missed even after proof reading prior to affixing electronic signature  Please do not hesitate to contact me directly if you have any questions or concerns about the content, text or information contained within the body of this dictation

## 2022-03-18 NOTE — CONSULTS
Consultation - Geriatrics   Ronnell Jerry 80 y o  female MRN: [id number]  Unit/Bed#: ED 21 Encounter: [id number]      Assessment/Plan  Ambulatory dysfunction  Fall precautions  PT/OT  Rehab post hospitalization    Acute pain due to trauma  Geriatric pain protocol  Scheduled acetaminophen 975 mg po Q8  Oxycodone 2 5 mg po Q 4 prn moderate pain  Oxycodone 5 mg po Q 4 prn severe pain   Monitor for constipation  Lidoderm patch    Dementia  Progressing moderate vascular dementia  Needs assistance with IADLs and ADL  Vitamin B 12 573 on 12/29/21  Continue supportive care    Frailty  Clinical Frail Scale: 6- Moderately Frail  Needs bathing and dressing  Albumin 3 2, recommend protein in supplementation  PT/OT  Rehab post hospitalization    Delirium precautions  Alert and oriented x 2  Provide frequent redirection, reorientation, distraction techniques  Avoid deliriogenic medications such as tramadol, benzodiazepines, anticholinergics,  Benadryl  Treat pain, See geriatric pain protocol  Monitor for constipation and urinary retention  Encourage early and frequent moblization, OOB  Encourage Hydration/ Nutrition  Implement sleep hygiene, limit night time interuptions, group activities    T12 compression fracture  Neurosurgery consulted  TSLO  Trauma following    [address]  DNR/ DNI    Home medication review  Per assisted living [facility]) [facility]  aricept 10 mg po daily  Glipizide 10 mg po daily  Januvia 100 mg po daily  Lasix 20 mg po daily  Desitin Topically BID  Mag Ox 400 mg po daily  KCL Er 40 meq po TID  Prandin 2 mg po TID        History of Present Illness   Physician Requesting Consult: Noreen Ellsworth DO  Reason for Consult / Principal Problem: fall, ISAR 4  Hx and PE limited by: dementia  HPI: Ronnell Jerry is a 80y o  year old female who presents with back pain  Her pain started 3 days ago with complaint of mild pain  It has progressively gotten worse  She is having difficulty walking   She is brought to the ED for further evaluation  She has dementia, Afib, DM  Prior to arrival she lives at [facility] dementia unit  She needs assistance with IADLs and ADLs  She ambulates with a roller walker  She has a hx of frequent falls  She does not have hearings or glasses  She has dentures  Upon exam pt is lying in bed  She is alert and oriented x 2  She is calm and cooperative           Inpatient consult to Gerontology  Consult performed by: AME Pena  Consult ordered by: Oneida Parra DO          Review of Systems   Unable to perform ROS: Dementia       Historical Information   Past Medical History:   Diagnosis Date    Aortic stenosis     Bilateral edema of lower extremity     Dementia ([facility] )     Diabetes ([facility] )     Fall     Gait abnormality     Hyperlipidemia     Hypertension     Hypokalemia     Other ascites     Varicose veins of leg with edema      Past Surgical History:   Procedure Laterality Date    BREAST SURGERY      ELBOW SURGERY       Social History   Social History     Substance and Sexual Activity   Alcohol Use Not Currently     Social History     Substance and Sexual Activity   Drug Use No     Social History     Tobacco Use   Smoking Status Never Smoker   Smokeless Tobacco Never Used         Family History:   Family History   Problem Relation Age of Onset    Dementia Sister        Meds/Allergies   Current meds:   Current Facility-Administered Medications   Medication Dose Route Frequency    acetaminophen (TYLENOL) tablet 975 mg  975 mg Oral Q8H [facility] & half-way    apixaban (ELIQUIS) tablet 5 mg  5 mg Oral BID    cephalexin (KEFLEX) capsule 500 mg  500 mg Oral Q8H [facility] & half-way    docusate sodium (COLACE) capsule 100 mg  100 mg Oral BID    donepezil (ARICEPT) tablet 10 mg  10 mg Oral Daily    famotidine (PEPCID) tablet 20 mg  20 mg Oral Daily    furosemide (LASIX) tablet 20 mg  20 mg Oral Daily    HYDROmorphone HCl (DILAUDID) injection 0 2 mg  0 2 mg Intravenous Q4H PRN    insulin lispro (HumaLOG) 100 units/mL subcutaneous injection 1-5 Units  1-5 Units Subcutaneous TID AC    metoprolol tartrate (LOPRESSOR) tablet 25 mg  25 mg Oral Q12H [address]    oxyCODONE (ROXICODONE) IR tablet 2 5 mg  2 5 mg Oral Q4H PRN    oxyCODONE (ROXICODONE) IR tablet 5 mg  5 mg Oral Q4H PRN    senna (SENOKOT) tablet 17 2 mg  2 tablet Oral HS        No Known Allergies    Objective   Vitals: Blood pressure 143/65, pulse 84, temperature 98 °F (36 7 °C), resp  rate 18, SpO2 100 %  ,There is no height or weight on file to calculate BMI  Physical Exam  Vitals and nursing note reviewed  HENT:      Head: Normocephalic  Nose: No congestion  Mouth/Throat:      Mouth: Mucous membranes are moist    Eyes:      General:         Right eye: No discharge  Left eye: No discharge  Cardiovascular:      Rate and Rhythm: Normal rate and regular rhythm  Pulmonary:      Effort: Pulmonary effort is normal       Breath sounds: Normal breath sounds  Abdominal:      General: Bowel sounds are normal       Palpations: Abdomen is soft  Musculoskeletal:         General: Normal range of motion  Cervical back: Normal range of motion  Skin:     General: Skin is warm and dry  Neurological:      Mental Status: She is alert  Mental status is at baseline  Comments: Oriented x 2   Psychiatric:         Mood and Affect: Mood normal          Lab Results:   Results from last 7 days   Lab Units 03/17/22  2219 03/17/22  1625 03/17/22  1625   WBC Thousand/uL  --   --  9 52   HEMOGLOBIN g/dL  --   --  12 2   HEMATOCRIT %  --   --  38 8   PLATELETS Thousands/uL 241   < > 285    < > = values in this interval not displayed  Results from last 7 days   Lab Units 03/17/22  1625   POTASSIUM mmol/L 4 6   CHLORIDE mmol/L 109*   CO2 mmol/L 23   BUN mg/dL 17   CREATININE mg/dL 1 20   CALCIUM mg/dL 9 7   ALK PHOS U/L 107   ALT U/L 20   AST U/L 37       Imaging Studies: I have personally reviewed pertinent reports      EKG, Pathology, and Other Studies: I have personally reviewed pertinent reports      VTE Prophylaxis: Sequential compression device (Venodyne)     Code Status: Level 3 - DNAR and DNI

## 2022-03-18 NOTE — TREATMENT PLAN
25-year-old female found to have back pain  Denied no recent falls or trauma  Patient found to have a mild age indeterminate L2 superior endplate fracture, new since 2019  Patient also found to have an UTI  Imaging:    Lumbar x-ray 3/18/22: Age-indeterminate compression deformity of L2  Old T12 compression deformity  Plan:   · Continue to monitor exam  · Spine precautions to avoid any further injury  · Due to pain patient will wear LSO brace to be worn standing, sitting, or sitting at angle greater than 45 degrees  · We will follow up with this patient in 1 month or sooner if symptoms worsen or change with repeat x-rays  Neurosurgery will sign off  Please call with questions in the interim  Plan of care discussed with Trauma Enedelia MARES

## 2022-03-18 NOTE — DISCHARGE INSTRUCTIONS
Neurosurgery discharge instructions following spine fracture:      LSO brace to be worn when out of bed of head of bed greater than 45 degrees  May place brace on while sitting on edge of bed  May be removed for showering   No bending, twisting or heavy lifting  No strenuous activities  No Driving   Follow-up as scheduled in two weeks with repeat spine x-rays to be completed 2-3 days prior to visit  Prescription has been entered electronically  **Please notify MD immediately if you have increased back or leg pain   New numbness, tingling, weakness in your legs and/or bowel/bladder incontinence**

## 2022-03-18 NOTE — ASSESSMENT & PLAN NOTE
· So she she she has Pt presented with back pain  No reported recent falls or trauma  · Pt found to have mild age indeterminate L2 Superior Endplate Compression fracture, new since 2019  Also found to have acute UTI  · Pt also noted to have chronic and stable T12 and T10 compression fractures that were also noted on CT from 2019  · Imaging reviewed personally and by attending  Final results as below  · CT Abdomen pelvis w contrast 3/17/22: Pt with mild Superior endplate compression fx at L2 age indeterminate fx though it's new since the 2019 imaging  Pt noted to have chronic and stable T12 and T10 mild compression fractures  Plan  · Upright Xray of Lumbar spine ordered for baseline  · Continue regular neurologic checks  · Pain control per primary team   · Eval and mobilize per PT/OT when able to  · DVT PPX:SCDs  · Spine precautions  Pt should have safety precautions to avoid further injury to their back  · On exam, pt with mild TTP of the lumbar spine midline and the L2 fx is age indeterminate  Given that pt has back pain, will recommend LSO brace PRN for comfort  · Will obtain baseline xrays  · It is unclear if pt has back pain secondary to mild age indeterminate L2 fx, UTI or other cause (pt with dementia, thus poor historian)  Pt will have virtual visit follow up in 1 month with repeat Xrays to check for stability and follow up on pt's sx  If pt's pain is resolved at that time, may consider flex/ext xrays/ further follow up on PRN basis  · Neurosurgery will see pt PRN during the remainder of her hospitalization  Pt will have virtual visit/follow up with neurosurgery in 1 month  Please call with any questions or concerns

## 2022-03-18 NOTE — ASSESSMENT & PLAN NOTE
-patient found to have a T12 fracture  -patient has midline tenderness over her thoracic spine  -TLSO brace and conservative management recommended by Neurosurgery  -upright x-rays completed and shows stable alignment  -patient is neurologically intact  -PT OT evaluated patient and recommending inpatient rehab  -outpatient follow-up with Neurosurgery in 2 weeks with repeat upright x-rays at that time

## 2022-03-18 NOTE — RESTORATIVE TECHNICIAN NOTE
Restorative Technician Note      Patient Name: Bharath Groves Tech Visit Date: 03/18/22  Note Type: Bracing, Initial consult  Patient Position Upon Consult: Supine  Brace Applied: [place] LSO  Additional Brace Ordered: No  Patient Position When Brace Applied: Seated  Bracing Recommendations: None  Education Provided: Yes  Patient Position at End of Consult: Seated edge of bed; All needs within reach; Other (comment) (left with PT/OT)  Nurse Communication: Nurse aware of consult, application of brace    Please call Mobility Coordinator at ext  [phone] in regards to bracing instruction and/or adjustment    Byron DIANE

## 2022-03-18 NOTE — PLAN OF CARE
Problem: PHYSICAL THERAPY ADULT  Goal: Performs mobility at highest level of function for planned discharge setting  See evaluation for individualized goals  Description: Treatment/Interventions: Functional transfer training,LE strengthening/ROM,Therapeutic exercise,Endurance training,Equipment eval/education,Bed mobility,Gait training,Spoke to nursing,OT  Equipment Recommended: Other (Comment) (MOLLY COLINDRES)       See flowsheet documentation for full assessment, interventions and recommendations  Note: Prognosis: Fair  Problem List: Decreased strength,Decreased endurance,Impaired balance,Decreased mobility,Decreased cognition,Impaired judgement,Decreased safety awareness,Pain  Assessment: Pt is a 80 y o  female seen for PT evaluation s/p admit to [facility] on 3/17/2022 with c/o back pain  Pt was admitted with a primary dx of: T12 compression fx  PT now consulted for assessment of mobility and d/c needs  Pt with active PT eval and treat and ambulate orders  Pts current comorbidities effecting treatment include: hx closed compression fx of L2, aortic stenosis, hx dementia, diabetes, gait dysfunction, HTN, hyperlipidemia, B/L edema of lower extremities  Pts current clinical presentation is Unstable/ Unpredictable (high complexity) due to Ongoing medical management for primary dx, Increased reliance on more restrictive AD compared to baseline, Decreased activity tolerance compared to baseline, Fall risk, Increased assistance needed from caregiver at current time, Cog status, Spinal precautions at current time, Continuous pulse oximetry monitoring   Pt is a poor historian, home set-up and PLOF obtained from previous notes  Pt resides at [facility] in a locked dementia unit  Prior to admission, pt was requiring assistance for ADLs and IADLs, but was independent w/ functional mobility w/ RW  Upon evaluation, pt currently is [address] [place] x2 for bed mobility; [address] [place] x2 for transfers w/ B/L HHA   Pt presents at PT eval functioning below baseline and currently w/ overall mobility deficits 2* to: BLE weakness, impaired balance, decreased endurance, gait deviations, pain, decreased activity tolerance compared to baseline, decreased functional mobility tolerance compared to baseline, decreased safety awareness, impaired judgement, fall risk, orthopedic restrictions, decreased cognition  Pt currently at a fall risk 2* to impairments listed above  Pt will continue to benefit from skilled acute PT interventions to address stated impairments; to maximize functional mobility; for ongoing pt/ family training; and DME needs  At conclusion of PT session pt returned supine in bed w/ all needs within reach  Pt denies any further questions at this time  The patient's AM-PAC Basic Mobility Inpatient Short Form Raw Score is 7  A Raw score of less than or equal to 16 suggests the patient may benefit from discharge to post-acute rehabilitation services  Please also refer to the recommendation of the Physical Therapist for safe discharge planning  PT recommending post-acute rehab vs return to correction (pending available support) upon d/c from hospital    Barriers to Discharge: Decreased caregiver support        PT Discharge Recommendation: Post acute rehabilitation services (vs return to correction (pending avilable support))          See flowsheet documentation for full assessment

## 2022-03-18 NOTE — PHYSICAL THERAPY NOTE
Physical Therapy Evaluation    Patient's Name: Phoenix Benson    Admitting Diagnosis  Back pain [M54 9]  Dementia ([place] [address] ) [F03 90]  T12 compression fracture, initial encounter ([place] [address] ) [K90 244Q]  Compression fracture of T12 vertebra, initial encounter ([place] [address] ) [L36 823I]  Closed compression fracture of L2 lumbar vertebra, initial encounter ([place] [address] ) Claude Ray  At risk for delirium [Z91 89]    Problem List  Patient Active Problem List   Diagnosis    Anemia    Benign essential hypertension    CKD (chronic kidney disease), stage III ([place] [address] )    Bilateral lower extremity edema    Gallstone    Hypercholesterolemia    Hypokalemia    Hyponatremia    Leukocytosis    Moderate aortic stenosis    Osteopenia    Pancreatic cyst    Uncomplicated senile dementia ([place] [address] )    Type 2 diabetes mellitus with diabetic chronic kidney disease ([place] [address] )    Medicare annual wellness visit, subsequent    Screening for depression    Screening for genitourinary condition    Screening for neurological condition    Atherosclerosis of aorta ([place] [address] )    Closed fracture of left proximal humerus    Ambulatory dysfunction    Chest pain    Syncope    HTN (hypertension)    SALTY (acute kidney injury) ([facility] )    Acute-on-chronic kidney injury ([facility] )    Hyperosmolar hyperglycemic state (HHS) ([facility]  )    Urinary incontinence    First degree AV block    At risk for delirium    Dysphagia    Severe sepsis (HCC)    UTI (urinary tract infection)    Aortic stenosis    New onset atrial fibrillation (HCC)    Diarrhea    Acute systolic congestive heart failure (HCC)    Functional diarrhea    Pressure injury of sacral region, unstageable ([facility] )    T12 compression fracture, initial encounter ([place] [address] )    Closed compression fracture of L2 lumbar vertebra, initial encounter [facility])       Past Medical History  Past Medical History:   Diagnosis Date    Aortic stenosis     Bilateral edema of lower extremity     Dementia ([facility] )     Diabetes ([facility] )     Fall     Gait abnormality     Hyperlipidemia     Hypertension     Hypokalemia     Other ascites     Varicose veins of leg with edema        Past Surgical History  Past Surgical History:   Procedure Laterality Date    BREAST SURGERY      ELBOW SURGERY          03/18/22 1309   PT Last Visit   PT Visit Date 03/18/22   Note Type   Note type Evaluation   Pain Assessment   Pain Assessment Tool FLACC   Pain Rating: FLACC (Rest) - Face 1   Pain Rating: FLACC (Rest) - Legs 0   Pain Rating: FLACC (Rest) - Activity 0   Pain Rating: FLACC (Rest) - Cry 1   Pain Rating: FLACC (Rest) - Consolability 0   Score: FLACC (Rest) 2   Pain Rating: FLACC (Activity) - Face 1   Pain Rating: FLACC (Activity) - Legs 0   Pain Rating: FLACC (Activity) - Activity 0   Pain Rating: FLACC (Activity) - Cry 1   Pain Rating: FLACC (Activity) - Consolability 0   Score: FLACC (Activity) 2   Restrictions/Precautions   Weight Bearing Precautions Per Order No   Braces or Orthoses LSO  ([place])   Other Precautions Cognitive;Telemetry; Fall Risk;Pain;Spinal precautions   Home Living   Type of Home Assisted living   Home Layout One level;Performs ADLs on one level; Able to live on main level with bedroom/bathroom   [address] [facility]   Additional Comments Pt is a poor historian, home set-up obtained from previous notes; pt lives at [place] [address] in a locked dementia unit    Prior Function   Level of [place] Independent with ADLs and functional mobility; Needs assistance with IADLs   Lives With Facility staff   Receives Help From Family;Personal care attendant  (daughter)   ADL Assistance Needs assistance   IADLs Needs assistance   Falls in the last 6 months 1 to 4  (3)   Vocational Retired   Comments pt is a poor historian, PLOF obtained from previous notes; pt requires assistance for ADLs and IADLs but was independent w/ functional mobility w/ RW   General   Family/Caregiver Present No   Cognition   Overall Cognitive Status Impaired Arousal/Participation Responsive   Attention Attends with cues to redirect   Orientation Level Oriented to person;Disoriented to place; Disoriented to time   Memory Decreased short term memory;Decreased recall of recent events;Decreased recall of precautions   Following Commands Follows one step commands with increased time or repetition   Comments Pt presenting as pleasantly confused; pt requiring frequent cuing to follow commands    RLE Assessment   RLE Assessment WFL  (~3-/5)   LLE Assessment   LLE Assessment WFL  (~3-/5)   Bed Mobility   Supine to Sit 3  Moderate assistance   Additional items Assist x 2; Increased time required   Sit to Supine 3  Moderate assistance   Additional items Assist x 2; Increased time required   Additional Comments Bed mobility peformed at [facility] x2 level   Transfers   Sit to Stand 3  Moderate assistance   Additional items Assist x 2; Increased time required   Stand to Sit 3  Moderate assistance   Additional items Assist x 2; Increased time required   Additional Comments w/ B/L HHA   Ambulation/Elevation   Gait pattern Not appropriate   Ambulation/Elevation Additional Comments Pt w/ decreased standing tolerance; unable to initiate gait at this time 2* pain   Balance   Static Sitting Fair   Dynamic Sitting Fair -   Static Standing Poor +   Dynamic Standing Poor   Endurance Deficit   Endurance Deficit Yes   Endurance Deficit Description fatigue, pain, LE weakness   Activity Tolerance   Activity Tolerance Patient limited by pain; Patient limited by fatigue   Medical Staff Made Aware Pt seen w/ OT 2* medical complexity; PT focus on bed mobility, funcitonal transfers, and standing tolerance   Nurse [address] to see per RN   Assessment   Prognosis Fair   Problem List Decreased strength;Decreased endurance; Impaired balance;Decreased mobility; Decreased cognition; Impaired judgement;Decreased safety awareness;Pain   Assessment Pt is a 80 y o  female seen for PT evaluation s/p admit to [facility] on 3/17/2022 with c/o back pain  Pt was admitted with a primary dx of: T12 compression fx  PT now consulted for assessment of mobility and d/c needs  Pt with active PT eval and treat and ambulate orders  Pts current comorbidities effecting treatment include: hx closed compression fx of L2, aortic stenosis, hx dementia, diabetes, gait dysfunction, HTN, hyperlipidemia, B/L edema of lower extremities  Pts current clinical presentation is Unstable/ Unpredictable (high complexity) due to Ongoing medical management for primary dx, Increased reliance on more restrictive AD compared to baseline, Decreased activity tolerance compared to baseline, Fall risk, Increased assistance needed from caregiver at current time, Cog status, Spinal precautions at current time, Continuous pulse oximetry monitoring   Pt is a poor historian, home set-up and PLOF obtained from previous notes  Pt resides at [facility] in a locked dementia unit  Prior to admission, pt was requiring assistance for ADLs and IADLs, but was independent w/ functional mobility w/ RW  Upon evaluation, pt currently is [address] [place] x2 for bed mobility; [address] [place] x2 for transfers w/ B/L HHA  Pt presents at PT eval functioning below baseline and currently w/ overall mobility deficits 2* to: BLE weakness, impaired balance, decreased endurance, gait deviations, pain, decreased activity tolerance compared to baseline, decreased functional mobility tolerance compared to baseline, decreased safety awareness, impaired judgement, fall risk, orthopedic restrictions, decreased cognition  Pt currently at a fall risk 2* to impairments listed above  Pt will continue to benefit from skilled acute PT interventions to address stated impairments; to maximize functional mobility; for ongoing pt/ family training; and DME needs  At conclusion of PT session pt returned supine in bed w/ all needs within reach  Pt denies any further questions at this time   The patient's AM-PAC Basic Mobility Inpatient Short Form Raw Score is 7  A Raw score of less than or equal to 16 suggests the patient may benefit from discharge to post-acute rehabilitation services  Please also refer to the recommendation of the Physical Therapist for safe discharge planning  PT recommending post-acute rehab vs return to [facility] (pending available support) upon d/c from hospital     Barriers to Discharge Decreased caregiver support   Goals   Patient Goals Unable to report at this time   STG Expiration Date 04/01/22   Short Term Goal #1 In 10-14 days pt will be able to: 1  Demonstrate ability to perform all aspects of bed mobility at supervision level to increase functional independence  2  Perform functional transfers at supervision level to facilitate safe return to previous living environment  3  Maintain sitting balance ~10 minutes while working w/ therapy at supervision level to improve ability to complete ADLs  4  Improve LE strength grades by 1 to increase ease of functional mobility with transfers and gait  5  Pt will demonstrate improved balance by one grade order to decrease risk of falls  Short Term Goal #2 PT to see for gait goals   PT Treatment Day 0   Plan   Treatment/Interventions Functional transfer training;LE strengthening/ROM; Therapeutic exercise; Endurance training;Equipment eval/education; Bed mobility;Gait training;Spoke to nursing;OT   PT Frequency 3-5x/wk   Recommendation   PT Discharge Recommendation Post acute rehabilitation services  (vs return to [facility] (pending avilable support))   Equipment Recommended Other (Comment)  (DME TBD)   AM-PAC Basic Mobility Inpatient   Turning in Bed Without Bedrails 2   Lying on Back to Sitting on Edge of Flat Bed 1   Moving Bed to Chair 1   Standing Up From Chair 1   Walk in Room 1   Climb 3-5 Stairs 1   Basic Mobility Inpatient Raw Score 7   Highest Level Of Mobility   JH-HLM Goal 2: Bed activities/Dependent transfer   JH-HLM Highest Level of Mobility 5: Stand (1 or more minutes)   -M Goal Achieved Yes   Modified [place] Scale   Modified [place] Scale 4   End of Consult   Patient Position at End of Consult Supine; All needs within reach       Fanny Weir, SPT

## 2022-03-19 LAB
ANION GAP SERPL CALCULATED.3IONS-SCNC: 7 MMOL/L (ref 4–13)
BASOPHILS # BLD AUTO: 0.09 THOUSANDS/ΜL (ref 0–0.1)
BASOPHILS NFR BLD AUTO: 1 % (ref 0–1)
BUN SERPL-MCNC: 18 MG/DL (ref 5–25)
CALCIUM SERPL-MCNC: 9.6 MG/DL (ref 8.3–10.1)
CHLORIDE SERPL-SCNC: 109 MMOL/L (ref 100–108)
CO2 SERPL-SCNC: 22 MMOL/L (ref 21–32)
CREAT SERPL-MCNC: 1.06 MG/DL (ref 0.6–1.3)
EOSINOPHIL # BLD AUTO: 0.09 THOUSAND/ΜL (ref 0–0.61)
EOSINOPHIL NFR BLD AUTO: 1 % (ref 0–6)
ERYTHROCYTE [DISTWIDTH] IN BLOOD BY AUTOMATED COUNT: 15.2 % (ref 11.6–15.1)
GFR SERPL CREATININE-BSD FRML MDRD: 43 ML/MIN/1.73SQ M
GLUCOSE SERPL-MCNC: 132 MG/DL (ref 65–140)
GLUCOSE SERPL-MCNC: 151 MG/DL (ref 65–140)
GLUCOSE SERPL-MCNC: 155 MG/DL (ref 65–140)
GLUCOSE SERPL-MCNC: 198 MG/DL (ref 65–140)
GLUCOSE SERPL-MCNC: 225 MG/DL (ref 65–140)
HCT VFR BLD AUTO: 36.9 % (ref 34.8–46.1)
HGB BLD-MCNC: 12.1 G/DL (ref 11.5–15.4)
IMM GRANULOCYTES # BLD AUTO: 0.09 THOUSAND/UL (ref 0–0.2)
IMM GRANULOCYTES NFR BLD AUTO: 1 % (ref 0–2)
LYMPHOCYTES # BLD AUTO: 2.67 THOUSANDS/ΜL (ref 0.6–4.47)
LYMPHOCYTES NFR BLD AUTO: 18 % (ref 14–44)
MCH RBC QN AUTO: 30.3 PG (ref 26.8–34.3)
MCHC RBC AUTO-ENTMCNC: 32.8 G/DL (ref 31.4–37.4)
MCV RBC AUTO: 93 FL (ref 82–98)
MONOCYTES # BLD AUTO: 1.43 THOUSAND/ΜL (ref 0.17–1.22)
MONOCYTES NFR BLD AUTO: 10 % (ref 4–12)
MRSA NOSE QL CULT: NORMAL
NEUTROPHILS # BLD AUTO: 10.44 THOUSANDS/ΜL (ref 1.85–7.62)
NEUTS SEG NFR BLD AUTO: 69 % (ref 43–75)
NRBC BLD AUTO-RTO: 0 /100 WBCS
PLATELET # BLD AUTO: 245 THOUSANDS/UL (ref 149–390)
PMV BLD AUTO: 11.4 FL (ref 8.9–12.7)
POTASSIUM SERPL-SCNC: 3.5 MMOL/L (ref 3.5–5.3)
RBC # BLD AUTO: 3.99 MILLION/UL (ref 3.81–5.12)
SODIUM SERPL-SCNC: 138 MMOL/L (ref 136–145)
WBC # BLD AUTO: 14.81 THOUSAND/UL (ref 4.31–10.16)

## 2022-03-19 PROCEDURE — 85025 COMPLETE CBC W/AUTO DIFF WBC: CPT | Performed by: PHYSICIAN ASSISTANT

## 2022-03-19 PROCEDURE — 99232 SBSQ HOSP IP/OBS MODERATE 35: CPT | Performed by: SURGERY

## 2022-03-19 PROCEDURE — 82948 REAGENT STRIP/BLOOD GLUCOSE: CPT

## 2022-03-19 PROCEDURE — 80048 BASIC METABOLIC PNL TOTAL CA: CPT | Performed by: PHYSICIAN ASSISTANT

## 2022-03-19 RX ADMIN — ACETAMINOPHEN 975 MG: 325 TABLET ORAL at 21:27

## 2022-03-19 RX ADMIN — ACETAMINOPHEN 975 MG: 325 TABLET ORAL at 14:13

## 2022-03-19 RX ADMIN — METOPROLOL TARTRATE 25 MG: 25 TABLET, FILM COATED ORAL at 08:13

## 2022-03-19 RX ADMIN — OXYCODONE HYDROCHLORIDE 5 MG: 5 TABLET ORAL at 05:19

## 2022-03-19 RX ADMIN — INSULIN LISPRO 3 UNITS: 100 INJECTION, SOLUTION INTRAVENOUS; SUBCUTANEOUS at 08:14

## 2022-03-19 RX ADMIN — INSULIN LISPRO 1 UNITS: 100 INJECTION, SOLUTION INTRAVENOUS; SUBCUTANEOUS at 08:13

## 2022-03-19 RX ADMIN — INSULIN LISPRO 3 UNITS: 100 INJECTION, SOLUTION INTRAVENOUS; SUBCUTANEOUS at 17:25

## 2022-03-19 RX ADMIN — DOCUSATE SODIUM 100 MG: 100 CAPSULE, LIQUID FILLED ORAL at 08:13

## 2022-03-19 RX ADMIN — INSULIN DETEMIR 10 UNITS: 100 INJECTION, SOLUTION SUBCUTANEOUS at 21:27

## 2022-03-19 RX ADMIN — INSULIN LISPRO 3 UNITS: 100 INJECTION, SOLUTION INTRAVENOUS; SUBCUTANEOUS at 13:00

## 2022-03-19 RX ADMIN — DONEPEZIL HYDROCHLORIDE 10 MG: 10 TABLET ORAL at 08:13

## 2022-03-19 RX ADMIN — DOCUSATE SODIUM 100 MG: 100 CAPSULE, LIQUID FILLED ORAL at 17:25

## 2022-03-19 RX ADMIN — STANDARDIZED SENNA CONCENTRATE 17.2 MG: 8.6 TABLET ORAL at 21:27

## 2022-03-19 RX ADMIN — INSULIN LISPRO 2 UNITS: 100 INJECTION, SOLUTION INTRAVENOUS; SUBCUTANEOUS at 17:25

## 2022-03-19 RX ADMIN — APIXABAN 5 MG: 5 TABLET, FILM COATED ORAL at 08:13

## 2022-03-19 RX ADMIN — FUROSEMIDE 20 MG: 20 TABLET ORAL at 08:13

## 2022-03-19 RX ADMIN — INSULIN LISPRO 1 UNITS: 100 INJECTION, SOLUTION INTRAVENOUS; SUBCUTANEOUS at 13:00

## 2022-03-19 RX ADMIN — CEPHALEXIN 500 MG: 500 CAPSULE ORAL at 05:17

## 2022-03-19 RX ADMIN — APIXABAN 5 MG: 5 TABLET, FILM COATED ORAL at 17:25

## 2022-03-19 RX ADMIN — FAMOTIDINE 20 MG: 20 TABLET ORAL at 08:13

## 2022-03-19 RX ADMIN — SODIUM CHLORIDE 3 G: 9 INJECTION, SOLUTION INTRAVENOUS at 14:29

## 2022-03-19 RX ADMIN — ACETAMINOPHEN 975 MG: 325 TABLET ORAL at 05:17

## 2022-03-19 RX ADMIN — METOPROLOL TARTRATE 25 MG: 25 TABLET, FILM COATED ORAL at 21:28

## 2022-03-19 NOTE — ASSESSMENT & PLAN NOTE
-patient with urinary tract infection present on admission  -started on 7 day course of Keflex for UTI  Now with increasing leukocytosis today, 3/19 with WBC count of 14  Urine culture growing > 100K Enterococcus facealis and 70-90K E  Coli  Looking at recent urine culture from one month ago, Enterococcus was sensitive to Unasyn  Will change antibiotic coverage to Unasyn today, pending sensitivities to the new urine culture from 3/17  Suspect new leukocytosis may be due to inadequate coverage of UTI     -remains afebrile without systemic findings

## 2022-03-19 NOTE — ASSESSMENT & PLAN NOTE
- this appears to be a recent diagnosis appx one month ago  - continue eliquis and metoprolol  Rate controlled

## 2022-03-19 NOTE — ASSESSMENT & PLAN NOTE
Lab Results   Component Value Date    HGBA1C 13 0 (H) 12/28/2021       Recent Labs     03/18/22 2129 03/18/22  2147 03/19/22  0809 03/19/22  1143   POCGLU 123 141* 151* 198*       Blood Sugar Average: Last 72 hrs:  (P) 175 [id number]     -Sliding scale insulin in place  -Continue basal bolus regimen - blood glucose control improved on this  -home Prandin, Januvia, glipizide on hold  -diabetic diet ordered  -goal blood sugar 140-180

## 2022-03-19 NOTE — PROGRESS NOTES
[address] [facility]  Progress Note - Jacqueline Randhawa 11/22/1923, 80 y o  female MRN: [id number]  [facility]counter: [id number]  Primary Care Provider: No primary care provider on file  Date and time admitted to hospital: 3/17/2022  2:52 PM    * T12 compression fracture, initial encounter [facility])  Assessment & Plan  -patient found to have a T12 fracture  -patient has midline tenderness over her thoracic spine  -TLSO brace and conservative management recommended by Neurosurgery  -upright x-rays completed and shows stable alignment  -patient is neurologically intact  -PT OT evaluated patient and recommending inpatient rehab  -outpatient follow-up with Neurosurgery in 2 weeks with repeat upright x-rays at that time      UTI (urinary tract infection)  Assessment & Plan  -patient with urinary tract infection present on admission  -started on 7 day course of Keflex for UTI  Now with increasing leukocytosis today, 3/19 with WBC count of 14  Urine culture growing > 100K Enterococcus facealis and 70-90K E  Coli  Looking at recent urine culture from one month ago, Enterococcus was sensitive to Unasyn  Will change antibiotic coverage to Unasyn today, pending sensitivities to the new urine culture from 3/17  Suspect new leukocytosis may be due to inadequate coverage of UTI  -remains afebrile without systemic findings    Urinary incontinence  Assessment & Plan  -chronic  -urinary retention protocol    New onset atrial fibrillation [facility])  Assessment & Plan  - this appears to be a recent diagnosis appx one month ago  - continue eliquis and metoprolol  Rate controlled       Type 2 diabetes mellitus with diabetic chronic kidney disease [facility])  Assessment & Plan  Lab Results   Component Value Date    HGBA1C 13 0 (H) 12/28/2021       Recent Labs     03/18/22 2129 03/18/22  2147 03/19/22  0809 03/19/22  1143   POCGLU 123 141* 151* 198*       Blood Sugar Average: Last 72 hrs:  (P) 485 [id number]     -Sliding scale insulin in place  -Continue basal bolus regimen - blood glucose control improved on this  -home Prandin, Januvia, glipizide on hold  -diabetic diet ordered  -goal blood sugar 140-180    Benign essential hypertension  Assessment & Plan  -continue home medications        Disposition: continue med-surg status, rehab placement pending at [facility] prior to returning to [address] [place] [address] placed today  Admissions there not open over the weekend  F/u Urine culture speciation  Continue antibiotics  I spoke with the patient's daughter Arlene Aguirre and gave her a medical update  She is aware of her UTI and plan for discharge to rehab with the brace  She is appreciative of the update and all questions answered  SUBJECTIVE:  Chief Complaint: "I'm feeling good"    Subjective: patient denies pain and states she is tolerating a diet and sleeping well  She has no new complaints  Understands we are waiting for discharge to rehab or back to her facility with increased support  OBJECTIVE:   Vitals:   Temp:  [98 1 °F (36 7 °C)-99 5 °F (37 5 °C)] 98 8 °F (37 1 °C)  HR:  [60-88] 60  Resp:  [16] 16  BP: (126-142)/(58-78) 141/58    Intake/Output:  I/O       03/17 0701  03/18 0700 03/18 0701  03/19 0700 03/19 0701  03/20 0700    P  O   240     Total Intake  240     Urine 1000      Total Output 1000      Net -1000 +240            Unmeasured Urine Occurrence  3 x 2 x    Unmeasured Stool Occurrence  2 x          Nutrition: Diet Matt/CHO Controlled; Consistent Carbohydrate Diet Level 1 (4 carb servings/60 grams CHO/meal)  GI Proph/Bowel Reg: colace, senna  VTE Prophylaxis:Sequential compression device (Venodyne)  and Reason for no pharmacologic prophylaxis Apixiban     Physical Exam:   GENERAL APPEARANCE: NAD  NEURO: GCS 15,non-focal  HEENT: NCAT  CV: RRR, no MGR  LUNGS: CTA bilaterally  GI: soft,non-tender,non-distended  : voiding  MSK: chronic bilateral LE edema, L > R is present  Non-tender and no contusions to any extremities  SKIN: pink, warm, dry    Invasive Devices  Report    Peripheral Intravenous Line            Peripheral IV 03/17/22 Right Antecubital 1 day                      Lab Results:   Results: I have personally reviewed all pertinent laboratory/tests results, BMP/CMP:   Lab Results   Component Value Date    SODIUM 138 03/19/2022    K 3 5 03/19/2022     (H) 03/19/2022    CO2 22 03/19/2022    BUN 18 03/19/2022    CREATININE 1 06 03/19/2022    CALCIUM 9 6 03/19/2022    EGFR 43 03/19/2022    and CBC:   Lab Results   Component Value Date    WBC 14 81 (H) 03/19/2022    HGB 12 1 03/19/2022    HCT 36 9 03/19/2022    MCV 93 03/19/2022     03/19/2022    MCH 30 3 03/19/2022    MCHC 32 8 03/19/2022    RDW 15 2 (H) 03/19/2022    MPV 11 4 03/19/2022    NRBC 0 03/19/2022     Imaging/EKG Studies: I have personally reviewed pertinent reports       Other Studies: no new

## 2022-03-19 NOTE — CASE MANAGEMENT
Case Management Discharge Planning Note    Patient name Wicho Bahena  Location PPHP 608/PPHP [id number] MRN [id number]  : 1923 Date 3/19/2022       Current Admission Date: 3/17/2022  Current Admission Diagnosis:T12 compression fracture, initial encounter [facility])   Patient Active Problem List    Diagnosis Date Noted    T12 compression fracture, initial encounter ([facility] ) 2022    Pressure injury of sacral region, unstageable ([facility] ) 2022    Functional diarrhea     Acute systolic congestive heart failure ([facility] ) 2022    Diarrhea 2022    New onset atrial fibrillation ([facility] ) 2022    Severe sepsis ([facility] ) 2022    UTI (urinary tract infection) 2022    Aortic stenosis 2022    Dysphagia 2022    Acute-on-chronic kidney injury ([facility] ) 2021    Hyperosmolar hyperglycemic state (HHS) ([facility] ) 2021    Urinary incontinence 2021    First degree AV block 2021    At risk for delirium 2021    Chest pain 2021    Syncope 2021    HTN (hypertension) 2021    SALTY (acute kidney injury) ([facility] ) 2021    Closed fracture of left proximal humerus 2020    Ambulatory dysfunction 2020    Atherosclerosis of aorta ([facility] ) 2019    Medicare annual wellness visit, subsequent 2018    Screening for depression 2018    Screening for genitourinary condition 2018    Screening for neurological condition 2018    Bilateral lower extremity edema 10/04/2017    Hypokalemia 2017    CKD (chronic kidney disease), stage III [facility] ) 2017    Hyponatremia 2017    Leukocytosis 2017    Moderate aortic stenosis 2017    Gallstone 2016    Anemia 2016    Pancreatic cyst 7926    Uncomplicated senile dementia ([facility] ) 2015    Benign essential hypertension 2015    Hypercholesterolemia 2013    Osteopenia 2013    Type 2 diabetes mellitus with diabetic chronic kidney disease ([facility] ) 11/11/2013      LOS (days): 1  Geometric Mean LOS (GMLOS) (days): 2 90  Days to GMLOS:2     OBJECTIVE:  Risk of Unplanned Readmission Score: 21         Current admission status: Inpatient   Preferred Pharmacy:   Reese Pérez [address] [place] [address] Rd  [address] [facility] [address]  Phone: [phone] [facility]ax: [facility]  [address] [facility] [address]  Phone: [phone] Fax: [phone]    Primary Care Provider: No primary care provider on file  Primary Insurance: MEDICARE  Secondary Insurance: [facility]    DISCHARGE DETAILS:    Discharge planning discussed with[de-identified] pts daughter and [address] [place] [address] Avenue regarding therapy recommendation's  Freedom of Choice: Yes  Comments - Freedom of Choice: PT/OT notes faxed to [facility] as requested  F: [phone]  [facility] rec IP STR prior to return to [facility]  Pts riley intrested in same and is requesting placement at [facility]  Were Treatment Team discharge recommendations reviewed with patient/caregiver?: Yes  Did patient/caregiver verbalize understanding of patient care needs?: Yes  Were patient/caregiver advised of the risks associated with not following Treatment Team discharge recommendations?: Yes    Contacts  Patient Contacts: Chip Mayen (Daughter)  Relationship to Patient[de-identified] Family  Contact Method: Phone  Phone Number: [phone]  Reason/Outcome: Discharge Planning,Continuity of Care    Other Referral/Resources/Interventions Provided:  Interventions: Short Term Rehab  Referral Comments: Referral placed to [facility] on 3/18 as requested by pt's daughter  Referral remains pending at this time  CM advised upon discussion with [facility], admission's is not in over the weekend and advised to f/u on Monday  Update provided to pts daughter and trauma team on same   CM team will follow        Treatment Team Recommendation: Short Term Rehab  Discharge Destination Plan[de-identified] Short Term Rehab  Transport at Discharge : [facility]

## 2022-03-19 NOTE — NURSING NOTE
AAOx2  Pt Complains of lower abd discomfort  Bladder was distended  Bladder scan show 954  MD made aware  Attempted to straight cath pt x2 with two different RNs  Placement of catheter was unsuccessful  Pt assisted to [facility]  Pt urinated 950ml out  No bladder distention noted post void  Pt OOB to the chair  No complaints of further pain

## 2022-03-20 LAB
ANION GAP SERPL CALCULATED.3IONS-SCNC: 8 MMOL/L (ref 4–13)
BACTERIA UR CULT: ABNORMAL
BACTERIA UR CULT: ABNORMAL
BASOPHILS # BLD AUTO: 0.07 THOUSANDS/ΜL (ref 0–0.1)
BASOPHILS NFR BLD AUTO: 1 % (ref 0–1)
BUN SERPL-MCNC: 21 MG/DL (ref 5–25)
CALCIUM SERPL-MCNC: 9.1 MG/DL (ref 8.3–10.1)
CHLORIDE SERPL-SCNC: 110 MMOL/L (ref 100–108)
CO2 SERPL-SCNC: 22 MMOL/L (ref 21–32)
CREAT SERPL-MCNC: 1.03 MG/DL (ref 0.6–1.3)
EOSINOPHIL # BLD AUTO: 0.39 THOUSAND/ΜL (ref 0–0.61)
EOSINOPHIL NFR BLD AUTO: 3 % (ref 0–6)
ERYTHROCYTE [DISTWIDTH] IN BLOOD BY AUTOMATED COUNT: 15.1 % (ref 11.6–15.1)
GFR SERPL CREATININE-BSD FRML MDRD: 45 ML/MIN/1.73SQ M
GLUCOSE SERPL-MCNC: 121 MG/DL (ref 65–140)
GLUCOSE SERPL-MCNC: 136 MG/DL (ref 65–140)
GLUCOSE SERPL-MCNC: 138 MG/DL (ref 65–140)
GLUCOSE SERPL-MCNC: 199 MG/DL (ref 65–140)
GLUCOSE SERPL-MCNC: 210 MG/DL (ref 65–140)
HCT VFR BLD AUTO: 33 % (ref 34.8–46.1)
HGB BLD-MCNC: 10.6 G/DL (ref 11.5–15.4)
IMM GRANULOCYTES # BLD AUTO: 0.1 THOUSAND/UL (ref 0–0.2)
IMM GRANULOCYTES NFR BLD AUTO: 1 % (ref 0–2)
LYMPHOCYTES # BLD AUTO: 2.77 THOUSANDS/ΜL (ref 0.6–4.47)
LYMPHOCYTES NFR BLD AUTO: 22 % (ref 14–44)
MCH RBC QN AUTO: 30.1 PG (ref 26.8–34.3)
MCHC RBC AUTO-ENTMCNC: 32.1 G/DL (ref 31.4–37.4)
MCV RBC AUTO: 94 FL (ref 82–98)
MONOCYTES # BLD AUTO: 1.11 THOUSAND/ΜL (ref 0.17–1.22)
MONOCYTES NFR BLD AUTO: 9 % (ref 4–12)
NEUTROPHILS # BLD AUTO: 8.34 THOUSANDS/ΜL (ref 1.85–7.62)
NEUTS SEG NFR BLD AUTO: 64 % (ref 43–75)
NRBC BLD AUTO-RTO: 0 /100 WBCS
PLATELET # BLD AUTO: 230 THOUSANDS/UL (ref 149–390)
PMV BLD AUTO: 11.4 FL (ref 8.9–12.7)
POTASSIUM SERPL-SCNC: 3 MMOL/L (ref 3.5–5.3)
RBC # BLD AUTO: 3.52 MILLION/UL (ref 3.81–5.12)
SODIUM SERPL-SCNC: 140 MMOL/L (ref 136–145)
WBC # BLD AUTO: 12.78 THOUSAND/UL (ref 4.31–10.16)

## 2022-03-20 PROCEDURE — 85025 COMPLETE CBC W/AUTO DIFF WBC: CPT | Performed by: PHYSICIAN ASSISTANT

## 2022-03-20 PROCEDURE — 80048 BASIC METABOLIC PNL TOTAL CA: CPT | Performed by: PHYSICIAN ASSISTANT

## 2022-03-20 PROCEDURE — 99232 SBSQ HOSP IP/OBS MODERATE 35: CPT | Performed by: SURGERY

## 2022-03-20 PROCEDURE — 82948 REAGENT STRIP/BLOOD GLUCOSE: CPT

## 2022-03-20 RX ORDER — POTASSIUM CHLORIDE 20 MEQ/1
20 TABLET, EXTENDED RELEASE ORAL ONCE
Status: COMPLETED | OUTPATIENT
Start: 2022-03-20 | End: 2022-03-20

## 2022-03-20 RX ORDER — POTASSIUM CHLORIDE 20 MEQ/1
40 TABLET, EXTENDED RELEASE ORAL ONCE
Status: COMPLETED | OUTPATIENT
Start: 2022-03-20 | End: 2022-03-20

## 2022-03-20 RX ADMIN — SODIUM CHLORIDE 3 G: 9 INJECTION, SOLUTION INTRAVENOUS at 14:12

## 2022-03-20 RX ADMIN — INSULIN LISPRO 3 UNITS: 100 INJECTION, SOLUTION INTRAVENOUS; SUBCUTANEOUS at 12:22

## 2022-03-20 RX ADMIN — METOPROLOL TARTRATE 25 MG: 25 TABLET, FILM COATED ORAL at 09:18

## 2022-03-20 RX ADMIN — POTASSIUM CHLORIDE 40 MEQ: 1500 TABLET, EXTENDED RELEASE ORAL at 09:23

## 2022-03-20 RX ADMIN — INSULIN LISPRO 2 UNITS: 100 INJECTION, SOLUTION INTRAVENOUS; SUBCUTANEOUS at 12:21

## 2022-03-20 RX ADMIN — INSULIN LISPRO 3 UNITS: 100 INJECTION, SOLUTION INTRAVENOUS; SUBCUTANEOUS at 09:18

## 2022-03-20 RX ADMIN — STANDARDIZED SENNA CONCENTRATE 17.2 MG: 8.6 TABLET ORAL at 21:49

## 2022-03-20 RX ADMIN — ACETAMINOPHEN 975 MG: 325 TABLET ORAL at 14:12

## 2022-03-20 RX ADMIN — APIXABAN 5 MG: 5 TABLET, FILM COATED ORAL at 18:02

## 2022-03-20 RX ADMIN — DONEPEZIL HYDROCHLORIDE 10 MG: 10 TABLET ORAL at 09:18

## 2022-03-20 RX ADMIN — APIXABAN 5 MG: 5 TABLET, FILM COATED ORAL at 09:18

## 2022-03-20 RX ADMIN — INSULIN LISPRO 3 UNITS: 100 INJECTION, SOLUTION INTRAVENOUS; SUBCUTANEOUS at 18:02

## 2022-03-20 RX ADMIN — DOCUSATE SODIUM 100 MG: 100 CAPSULE, LIQUID FILLED ORAL at 18:02

## 2022-03-20 RX ADMIN — METOPROLOL TARTRATE 25 MG: 25 TABLET, FILM COATED ORAL at 21:49

## 2022-03-20 RX ADMIN — ACETAMINOPHEN 975 MG: 325 TABLET ORAL at 21:48

## 2022-03-20 RX ADMIN — POTASSIUM CHLORIDE 20 MEQ: 1500 TABLET, EXTENDED RELEASE ORAL at 09:23

## 2022-03-20 RX ADMIN — FAMOTIDINE 20 MG: 20 TABLET ORAL at 09:18

## 2022-03-20 RX ADMIN — INSULIN DETEMIR 10 UNITS: 100 INJECTION, SOLUTION SUBCUTANEOUS at 21:48

## 2022-03-20 RX ADMIN — FUROSEMIDE 20 MG: 20 TABLET ORAL at 09:18

## 2022-03-20 RX ADMIN — DOCUSATE SODIUM 100 MG: 100 CAPSULE, LIQUID FILLED ORAL at 09:18

## 2022-03-20 RX ADMIN — SODIUM CHLORIDE 3 G: 9 INJECTION, SOLUTION INTRAVENOUS at 01:55

## 2022-03-20 NOTE — ASSESSMENT & PLAN NOTE
-chronic  -urinary retention protocol - unable to [facility] yesterday x2 but was able to void completely on commode

## 2022-03-20 NOTE — PROGRESS NOTES
[facility]  Progress Note - Bharath Burrows 11/22/1923, 80 y o  female MRN: [id number]  Unit/Bed#: [facility] 608-01 Encounter: [id number]  Primary Care Provider: No primary care provider on file  Date and time admitted to hospital: 3/17/2022  2:52 PM    * T12 compression fracture, initial encounter [facility])  Assessment & Plan  -patient found to have a T12 fracture  -patient has midline tenderness over her thoracic spine  -TLSO brace and conservative management recommended by Neurosurgery  -upright x-rays completed and shows stable alignment  -patient is neurologically intact  -PT OT evaluated patient and recommending inpatient rehab  -outpatient follow-up with Neurosurgery in 2 weeks with repeat upright x-rays at that time      UTI (urinary tract infection)  Assessment & Plan  -patient with urinary tract infection present on admission  -started on 7 day course of Keflex for UTI on admission  Had increasing leukocytosis on 3/19 to WBC of 14  Urine culture growing > 100K Enterococcus facealis and 70-90K E  Coli which is now shown to be sensitive to Ampicilling  Continue 3-day course of Unasyn, currently day #2/3  Leukocytosis improving today, to 12    -remains afebrile without systemic findings    Urinary incontinence  Assessment & Plan  -chronic  -urinary retention protocol - unable to [facility] yesterday x2 but was able to void completely on commode  New onset atrial fibrillation [facility])  Assessment & Plan  - this appears to be a recent diagnosis appx one month ago  - continue eliquis and metoprolol  Rate controlled       Type 2 diabetes mellitus with diabetic chronic kidney disease [facility])  Assessment & Plan  Lab Results   Component Value Date    HGBA1C 13 0 (H) 12/28/2021       Recent Labs     03/19/22  1143 03/19/22  1650 03/19/22  2121 03/20/22  0809   POCGLU 198* 225* 155* 136       Blood Sugar Average: Last 72 hrs:  (P) 174 5     -Sliding scale insulin in place  -Continue basal bolus regimen - blood glucose control improved on this  -home Prandin, Januvia, glipizide on hold - will resume on discharge    -diabetic diet ordered  -goal blood sugar 140-180    Benign essential hypertension  Assessment & Plan  -continue home medications        Disposition: continue med-surg status, rehab placement pending at [facility], possibly Monday  SUBJECTIVE:  Chief Complaint: "I"m feeling fine"    Subjective:  Patient denies pain  She reports being able to completely void yesterday on the commode  She denies any lower abdominal pain or feelings of retention this morning  She slept well last night and is ready for breakfast   She has no new complaints  OBJECTIVE:   Vitals:   Temp:  [97 2 °F (36 2 °C)-99 6 °F (37 6 °C)] 98 5 °F (36 9 °C)  HR:  [59-74] 74  Resp:  [16-18] 18  BP: (119-141)/(52-64) 124/64    Intake/Output:  I/O       03/18 0701  03/19 0700 03/19 0701  03/20 0700 03/20 0701  03/21 0700    P  O  240      Total Intake 240      Urine  950     Stool  0     Total Output  950     Net +240 -950            Unmeasured Urine Occurrence 3 x 3 x     Unmeasured Stool Occurrence 2 x 2 x          Nutrition: Diet Matt/CHO Controlled; Consistent Carbohydrate Diet Level 1 (4 carb servings/60 grams CHO/meal)  GI Proph/Bowel Reg:  Colace, senna  VTE Prophylaxis:Reason for no pharmacologic prophylaxis Apixaban, SCDs     Physical Exam:   GENERAL APPEARANCE:  No acute distress  NEURO:  GCS 15, nonfocal exam  HEENT:  Normocephalic, atraumatic  CV: RRR, no MGR  LUNGS: CTA bilaterally  GI: soft,non-tender,non-distended  : voiding  MSK: no edema, contusions or deformities  SKIN: pink, warm, dry    Invasive Devices  Report    Peripheral Intravenous Line            Peripheral IV 03/19/22 Right;Ventral (anterior) Forearm <1 day                      Lab Results:   Results: I have personally reviewed all pertinent laboratory/tests results, BMP/CMP:   Lab Results   Component Value Date    SODIUM 140 03/20/2022    K 3 0 (L) 03/20/2022     (H) 03/20/2022    CO2 22 03/20/2022    BUN 21 03/20/2022    CREATININE 1 03 03/20/2022    CALCIUM 9 1 03/20/2022    EGFR 45 03/20/2022    and CBC:   Lab Results   Component Value Date    WBC 12 78 (H) 03/20/2022    HGB 10 6 (L) 03/20/2022    HCT 33 0 (L) 03/20/2022    MCV 94 03/20/2022     03/20/2022    MCH 30 1 03/20/2022    MCHC 32 1 03/20/2022    RDW 15 1 03/20/2022    MPV 11 4 03/20/2022    NRBC 0 03/20/2022     Imaging/EKG Studies: I have personally reviewed pertinent reports       Other Studies: no new

## 2022-03-20 NOTE — ASSESSMENT & PLAN NOTE
Lab Results   Component Value Date    HGBA1C 13 0 (H) 12/28/2021       Recent Labs     03/19/22  1143 03/19/22  1650 03/19/22  2121 03/20/22  0809   POCGLU 198* 225* 155* 136       Blood Sugar Average: Last 72 hrs:  (P) 174 5     -Sliding scale insulin in place  -Continue basal bolus regimen - blood glucose control improved on this  -home Prandin, Januvia, glipizide on hold - will resume on discharge    -diabetic diet ordered  -goal blood sugar 140-180

## 2022-03-20 NOTE — ASSESSMENT & PLAN NOTE
-chronic  -urinary retention protocol - unable to [facility] yesterday x2 but was able to void completely on commode

## 2022-03-20 NOTE — PLAN OF CARE
Problem: Potential for Falls  Goal: Patient will remain free of falls  Description: INTERVENTIONS:  - Educate patient/family on patient safety including physical limitations  - Instruct patient to call for assistance with activity   - Consult OT/PT to assist with strengthening/mobility   - Keep Call bell within reach  - Keep bed low and locked with side rails adjusted as appropriate  - Keep care items and personal belongings within reach  - Initiate and maintain comfort rounds  - Make Fall Risk Sign visible to staff  - Offer Toileting every Hours, in advance of need  - Initiate/Maintainalarm  - Obtain necessary fall risk management equipment:   - Apply yellow socks and bracelet for high fall risk patients  - Consider moving patient to room near nurses station  Outcome: Progressing     Problem: MOBILITY - ADULT  Goal: Maintain or return to baseline ADL function  Description: INTERVENTIONS:  -  Assess patient's ability to carry out ADLs; assess patient's baseline for ADL function and identify physical deficits which impact ability to perform ADLs (bathing, care of mouth/teeth, toileting, grooming, dressing, etc )  - Assess/evaluate cause of self-care deficits   - Assess range of motion  - Assess patient's mobility; develop plan if impaired  - Assess patient's need for assistive devices and provide as appropriate  - Encourage maximum independence but intervene and supervise when necessary  - Involve family in performance of ADLs  - Assess for home care needs following discharge   - Consider OT consult to assist with ADL evaluation and planning for discharge  - Provide patient education as appropriate  Outcome: Progressing  Goal: Maintains/Returns to pre admission functional level  Description: INTERVENTIONS:  - Perform BMAT or MOVE assessment daily    - Set and communicate daily mobility goal to care team and patient/family/caregiver     - Collaborate with rehabilitation services on mobility goals if consulted  - Perform Range of Motion times a day  - Reposition patient every  hours    - Dangle patient  times a day  - Stand patient times a day  - Ambulate patient times a day  - Out of bed to chair  times a day   - Out of bed for meal times a day  - Out of bed for toileting  - Record patient progress and toleration of activity level   Outcome: Progressing     Problem: Prexisting or High Potential for Compromised Skin Integrity  Goal: Skin integrity is maintained or improved  Description: INTERVENTIONS:  - Identify patients at risk for skin breakdown  - Assess and monitor skin integrity  - Assess and monitor nutrition and hydration status  - Monitor labs   - Assess for incontinence   - Turn and reposition patient  - Assist with mobility/ambulation  - Relieve pressure over bony prominences  - Avoid friction and shearing  - Provide appropriate hygiene as needed including keeping skin clean and dry  - Evaluate need for skin moisturizer/barrier cream  - Collaborate with interdisciplinary team   - Patient/family teaching  - Consider wound care consult   Outcome: Progressing

## 2022-03-20 NOTE — ASSESSMENT & PLAN NOTE
-patient with urinary tract infection present on admission  -started on 7 day course of Keflex for UTI on admission  Had increasing leukocytosis on 3/19 to WBC of 14  Urine culture growing > 100K Enterococcus facealis and 70-90K E  Coli which is now shown to be sensitive to Ampicilling  Continue 3-day course of Unasyn, currently day #2/3   Leukocytosis improving today, to 12    -remains afebrile without systemic findings

## 2022-03-21 ENCOUNTER — TELEPHONE (OUTPATIENT)
Dept: NEUROSURGERY | Facility: CLINIC | Age: 87
End: 2022-03-21

## 2022-03-21 VITALS
BODY MASS INDEX: 30.81 KG/M2 | HEIGHT: 59 IN | RESPIRATION RATE: 19 BRPM | HEART RATE: 67 BPM | SYSTOLIC BLOOD PRESSURE: 151 MMHG | TEMPERATURE: 98 F | OXYGEN SATURATION: 100 % | DIASTOLIC BLOOD PRESSURE: 70 MMHG

## 2022-03-21 LAB
ANION GAP SERPL CALCULATED.3IONS-SCNC: 6 MMOL/L (ref 4–13)
BASOPHILS # BLD AUTO: 0.06 THOUSANDS/ΜL (ref 0–0.1)
BASOPHILS NFR BLD AUTO: 1 % (ref 0–1)
BUN SERPL-MCNC: 21 MG/DL (ref 5–25)
CALCIUM SERPL-MCNC: 9.1 MG/DL (ref 8.3–10.1)
CHLORIDE SERPL-SCNC: 110 MMOL/L (ref 100–108)
CO2 SERPL-SCNC: 24 MMOL/L (ref 21–32)
CREAT SERPL-MCNC: 1.09 MG/DL (ref 0.6–1.3)
EOSINOPHIL # BLD AUTO: 0.46 THOUSAND/ΜL (ref 0–0.61)
EOSINOPHIL NFR BLD AUTO: 6 % (ref 0–6)
ERYTHROCYTE [DISTWIDTH] IN BLOOD BY AUTOMATED COUNT: 14.9 % (ref 11.6–15.1)
FLUAV RNA RESP QL NAA+PROBE: NEGATIVE
FLUBV RNA RESP QL NAA+PROBE: NEGATIVE
GFR SERPL CREATININE-BSD FRML MDRD: 42 ML/MIN/1.73SQ M
GLUCOSE SERPL-MCNC: 128 MG/DL (ref 65–140)
GLUCOSE SERPL-MCNC: 138 MG/DL (ref 65–140)
GLUCOSE SERPL-MCNC: 234 MG/DL (ref 65–140)
HCT VFR BLD AUTO: 34.8 % (ref 34.8–46.1)
HGB BLD-MCNC: 11 G/DL (ref 11.5–15.4)
IMM GRANULOCYTES # BLD AUTO: 0.12 THOUSAND/UL (ref 0–0.2)
IMM GRANULOCYTES NFR BLD AUTO: 1 % (ref 0–2)
LYMPHOCYTES # BLD AUTO: 2.4 THOUSANDS/ΜL (ref 0.6–4.47)
LYMPHOCYTES NFR BLD AUTO: 29 % (ref 14–44)
MCH RBC QN AUTO: 29.6 PG (ref 26.8–34.3)
MCHC RBC AUTO-ENTMCNC: 31.6 G/DL (ref 31.4–37.4)
MCV RBC AUTO: 94 FL (ref 82–98)
MONOCYTES # BLD AUTO: 0.78 THOUSAND/ΜL (ref 0.17–1.22)
MONOCYTES NFR BLD AUTO: 9 % (ref 4–12)
NEUTROPHILS # BLD AUTO: 4.61 THOUSANDS/ΜL (ref 1.85–7.62)
NEUTS SEG NFR BLD AUTO: 54 % (ref 43–75)
NRBC BLD AUTO-RTO: 0 /100 WBCS
PLATELET # BLD AUTO: 232 THOUSANDS/UL (ref 149–390)
PMV BLD AUTO: 10.6 FL (ref 8.9–12.7)
POTASSIUM SERPL-SCNC: 3 MMOL/L (ref 3.5–5.3)
RBC # BLD AUTO: 3.72 MILLION/UL (ref 3.81–5.12)
RSV RNA RESP QL NAA+PROBE: NEGATIVE
SARS-COV-2 RNA RESP QL NAA+PROBE: NEGATIVE
SODIUM SERPL-SCNC: 140 MMOL/L (ref 136–145)
WBC # BLD AUTO: 8.43 THOUSAND/UL (ref 4.31–10.16)

## 2022-03-21 PROCEDURE — 80048 BASIC METABOLIC PNL TOTAL CA: CPT | Performed by: SURGERY

## 2022-03-21 PROCEDURE — 97110 THERAPEUTIC EXERCISES: CPT

## 2022-03-21 PROCEDURE — 97530 THERAPEUTIC ACTIVITIES: CPT

## 2022-03-21 PROCEDURE — 99238 HOSP IP/OBS DSCHRG MGMT 30/<: CPT | Performed by: STUDENT IN AN ORGANIZED HEALTH CARE EDUCATION/TRAINING PROGRAM

## 2022-03-21 PROCEDURE — 85025 COMPLETE CBC W/AUTO DIFF WBC: CPT | Performed by: SURGERY

## 2022-03-21 PROCEDURE — 0241U HB NFCT DS VIR RESP RNA 4 TRGT: CPT | Performed by: PHYSICIAN ASSISTANT

## 2022-03-21 PROCEDURE — 97112 NEUROMUSCULAR REEDUCATION: CPT

## 2022-03-21 PROCEDURE — 82948 REAGENT STRIP/BLOOD GLUCOSE: CPT

## 2022-03-21 RX ORDER — MAGNESIUM SULFATE HEPTAHYDRATE 40 MG/ML
2 INJECTION, SOLUTION INTRAVENOUS ONCE
Status: COMPLETED | OUTPATIENT
Start: 2022-03-21 | End: 2022-03-21

## 2022-03-21 RX ORDER — FAMOTIDINE 20 MG/1
20 TABLET, FILM COATED ORAL DAILY
Refills: 0
Start: 2022-03-22

## 2022-03-21 RX ORDER — POTASSIUM CHLORIDE 20 MEQ/1
40 TABLET, EXTENDED RELEASE ORAL 2 TIMES DAILY
Status: DISCONTINUED | OUTPATIENT
Start: 2022-03-21 | End: 2022-03-21 | Stop reason: HOSPADM

## 2022-03-21 RX ADMIN — MAGNESIUM SULFATE HEPTAHYDRATE 2 G: 2 INJECTION, SOLUTION INTRAVENOUS at 10:58

## 2022-03-21 RX ADMIN — POTASSIUM CHLORIDE 40 MEQ: 1500 TABLET, EXTENDED RELEASE ORAL at 10:48

## 2022-03-21 RX ADMIN — DOCUSATE SODIUM 100 MG: 100 CAPSULE, LIQUID FILLED ORAL at 10:51

## 2022-03-21 RX ADMIN — SODIUM CHLORIDE 3 G: 9 INJECTION, SOLUTION INTRAVENOUS at 12:49

## 2022-03-21 RX ADMIN — INSULIN LISPRO 3 UNITS: 100 INJECTION, SOLUTION INTRAVENOUS; SUBCUTANEOUS at 12:15

## 2022-03-21 RX ADMIN — INSULIN LISPRO 2 UNITS: 100 INJECTION, SOLUTION INTRAVENOUS; SUBCUTANEOUS at 12:14

## 2022-03-21 RX ADMIN — INSULIN LISPRO 3 UNITS: 100 INJECTION, SOLUTION INTRAVENOUS; SUBCUTANEOUS at 10:45

## 2022-03-21 RX ADMIN — ACETAMINOPHEN 975 MG: 325 TABLET ORAL at 05:21

## 2022-03-21 RX ADMIN — FAMOTIDINE 20 MG: 20 TABLET ORAL at 10:51

## 2022-03-21 RX ADMIN — SODIUM CHLORIDE 3 G: 9 INJECTION, SOLUTION INTRAVENOUS at 02:00

## 2022-03-21 RX ADMIN — DONEPEZIL HYDROCHLORIDE 10 MG: 10 TABLET ORAL at 10:50

## 2022-03-21 RX ADMIN — FUROSEMIDE 20 MG: 20 TABLET ORAL at 10:51

## 2022-03-21 RX ADMIN — APIXABAN 5 MG: 5 TABLET, FILM COATED ORAL at 10:51

## 2022-03-21 RX ADMIN — METOPROLOL TARTRATE 25 MG: 25 TABLET, FILM COATED ORAL at 10:50

## 2022-03-21 RX ADMIN — ACETAMINOPHEN 975 MG: 325 TABLET ORAL at 13:30

## 2022-03-21 NOTE — TELEPHONE ENCOUNTER
3/22/22- PT DISCHARGED TO [facility]   SPOKE TO KAYLA AND CONFIRMED F/U APT AND XRAYS 4-5 DAYS PRIOR  IMAGES WILL BE LOADED ON DISC  VIRTUAL APPT WAS CHANGED TO [facility] PER KAYLA AT FACILITY  NURSES ARE NO LONGER DOING VIRTUAL VISITS     3/21/22-  [facility] F/U SCHEDULED 22  PT WILL NEED Ericka Han      ----- Message from Payton Hashimoto, PA-C sent at 3/18/2022  1:12 PM EDT -----  Regardin week follow up  Hello,     Pt has an L2  fracture that it's not clear how old it is but it is not acute  She has back pain so we would like to have virtual follow up for her in 4 weeks with repeat upright xrays to check for stability of this fracture and her sx  Please arrange virtual visit for her in 1 month with xrays of lumbar spine with AP  Thanks

## 2022-03-21 NOTE — PHYSICAL THERAPY NOTE
Physical Therapy Progress Note     03/21/22 1320   PT Last Visit   PT Visit Date 03/21/22   Note Type   Note Type Treatment   Pain Assessment   Pain Assessment Tool 0-10   Pain Score No Pain   Restrictions/Precautions   Braces or Orthoses LSO   Other Precautions Cognitive; Chair Alarm; Bed Alarm; Fall Risk;Pain;Spinal precautions   Subjective   Subjective The pt  denies any pain, and she is areeable to work with therapy  She declined sitting out in the chair  Bed Mobility   Supine to Sit 2  Maximal assistance   Additional items Assist x 1; Increased time required;Verbal cues;LE management   Sit to Supine 2  Maximal assistance   Additional items Assist x 1; Increased time required;Verbal cues;LE management   Transfers   Sit to Stand 2  Maximal assistance   Additional items Assist x 1; Increased time required;Verbal cues   Stand to Sit 2  Maximal assistance   Additional items Assist x 1; Increased time required;Verbal cues   Balance   Static Sitting Fair   Dynamic Sitting Fair -   Static Standing Poor +   Activity Tolerance   Activity Tolerance Patient tolerated treatment well;Patient limited by fatigue   Exercises   TKR Sitting;Bilateral;AROM;15 reps   Assessment   Prognosis Fair   Problem List Decreased strength;Decreased endurance; Impaired balance;Decreased mobility; Decreased cognition; Impaired judgement;Decreased safety awareness;Pain   Assessment The pt  continues to require significant assistance of one person for mobility at this time  She readily followed all commands with increased time  She was able to maintain her balance once seated at the edge of the bed  She did have difficulty with any attempts to reach outside of her base of support  She performed therapeutic exercise with instruction as well     Barriers to Discharge Inaccessible home environment;Decreased caregiver support   Goals   Patient Goals To rest    STG Expiration Date 04/01/22   PT Treatment Day 1   Plan   Treatment/Interventions Functional transfer training;LE strengthening/ROM; Therapeutic exercise; Endurance training;Cognitive reorientation;Patient/family training;Bed mobility   Progress Progressing toward goals   PT Frequency 3-5x/wk   Recommendation   PT Discharge Recommendation Post acute rehabilitation services   AM-PAC Basic Mobility Inpatient   Turning in Bed Without Bedrails 2   Lying on Back to Sitting on Edge of Flat Bed 2   Moving Bed to Chair 2   Standing Up From Chair 2   Walk in Room 1   Climb 3-5 Stairs 1   Basic Mobility Inpatient Raw Score 10   Turning Head Towards Sound 4   Follow Simple Instructions 3   Low Function Basic Mobility Raw Score 17   Low Function Basic Mobility Standardized Score 27 46   Highest Level Of Mobility   JH-HLM Goal 4: Move to chair/commode   JH-HLM Highest Level of Mobility 5: Stand (1 or more minutes)   JH-HLM Goal Achieved Yes     An AM-PAC Basic Mobility standardized score less than 40 78 suggests the patient may benefit from discharge to post-acute rehab services      Bob Vegas, PTA

## 2022-03-21 NOTE — CASE MANAGEMENT
Case Management Discharge Planning Note    Patient name Will Palin  Location PPHP 608/PPHP 213-86 MRN [id number]  : 1923 Date 3/21/2022       Current Admission Date: 3/17/2022  Current Admission Diagnosis:T12 compression fracture, initial encounter [facility])   Patient Active Problem List    Diagnosis Date Noted    T12 compression fracture, initial encounter ([facility] ) 2022    Pressure [facility] of sacral region, unstageable [facility] 2022    Functional diarrhea     Acute systolic congestive heart failure ([facility] ) 2022    Diarrhea 2022    New onset atrial fibrillation ([facility] ) 2022    Severe sepsis ([facility] ) 2022    UTI (urinary tract infection) 2022    Aortic stenosis 2022    Dysphagia 2022    Acute-on-chronic kidney injury ([facility] ) 2021    Hyperosmolar hyperglycemic state (HHS) ([facility] ) 2021    Urinary incontinence 2021    First degree AV block 2021    At risk for delirium 2021    Chest pain 2021    Syncope 2021    HTN (hypertension) 2021    SALTY (acute kidney injury) ([facility] ) 2021    Closed fracture of left proximal humerus 2020    Ambulatory dysfunction 2020    Atherosclerosis of aorta ([facility] ) 2019    Medicare annual wellness visit, subsequent 2018    Screening for depression 2018    Screening for genitourinary condition 2018    Screening for neurological condition 2018    Bilateral lower extremity edema 10/04/2017    Hypokalemia 2017    CKD (chronic kidney disease), stage III ([facility] ) 2017    Hyponatremia 2017    Leukocytosis 2017    Moderate aortic stenosis 2017    Gallstone 2016    Anemia 2016    Pancreatic cyst     Uncomplicated senile dementia ([facility] ) 2015    Benign essential hypertension 2015    Hypercholesterolemia 2013    Osteopenia 2013    Type 2 diabetes mellitus with diabetic chronic kidney disease ([facility] ) 11/11/2013      LOS (days): 3  Geometric Mean LOS (GMLOS) (days): 2 90  Days to GMLOS:0     OBJECTIVE:  Risk of Unplanned Readmission Score: 23         Current admission status: Inpatient   Preferred Pharmacy:   [facility] Annemarie Pérez[address]  [facility] [place] [address]  Phone: [phone] Fax: Michael Goncalves [facility] 11  [address] [place] [address]  Phone: [phone] Fax: [phone]    Primary Care Provider: No primary care provider on file  Primary Insurance: MEDICARE  Secondary Insurance: [employer]    DISCHARGE DETAILS:    Pt will d/c to [facility] today @ 1400 via [facility]  Pt's dtr made aware and in agreement

## 2022-03-21 NOTE — DISCHARGE SUMMARY
[address] [facility]  Discharge- Tamia Colon 11/22/1923, 80 y o  female MRN: [id number]  [facility] Encounter: [id number]  Primary Care Provider: No primary care provider on file  Date and time admitted to hospital: 3/17/2022  2:52 PM    * T12 compression fracture, initial encounter [facility])  Assessment & Plan  -patient found to have a T12 fracture  -patient has midline tenderness over her thoracic spine  -TLSO brace and conservative management recommended by Neurosurgery  -upright x-rays completed and shows stable alignment  -patient is neurologically intact  -PT OT evaluated patient and recommending inpatient rehab  -outpatient follow-up with Neurosurgery in 2 weeks with repeat upright x-rays at that time      UTI (urinary tract infection)  Assessment & Plan  -patient with urinary tract infection present on admission  -started on 7 day course of Keflex for UTI on admission  Had increasing leukocytosis on 3/19 to WBC of 14  Urine culture growing > 100K Enterococcus facealis and 70-90K E  Coli which is now shown to be sensitive to Ampicilling  Continue 3-day course of Unasyn, currently day #2/3  Leukocytosis improving today, to 12    -remains afebrile without systemic findings    Urinary incontinence  Assessment & Plan  -chronic  -urinary retention protocol - unable to [facility] yesterday x2 but was able to void completely on commode                 Medical Problems             Resolved Problems  Date Reviewed: 3/21/2022          Resolved    Closed compression fracture of L2 lumbar vertebra, initial encounter ([facility] ) 3/18/2022     Resolved by  Yudi Peralta PA-C    Fall 3/18/2022     Resolved by  Yudi Peralta PA-C                Admission Date:   Admission Orders (From admission, onward)     Ordered        03/18/22 1440  Inpatient Admission  Once            03/17/22 2108  Place in Observation  Once                        Admitting Diagnosis: Back pain [M54 9]  Dementia ([place] [address] ) [F03 90]  T12 compression fracture, initial encounter ([place] [address] ) [S22 080A]  Compression fracture of T12 vertebra, initial encounter ([place] [address] ) [S22 080A]  Closed compression fracture of L2 lumbar vertebra, initial encounter ([place] [address] ) [S32 020A]  At risk for delirium [Z91 89]    HPI: As documented by Dr Nghia Wick who evaluated the patient on admission, "Alfred Spicer is a 80 y o  female with a history of dementia,  AFib, on Eliquis, and diabetes who "presents from assisted living with back pain   Accompanied by daughter who help provide history  Larchrissie Klein states that sister visited patient 3 days ago and she was overall in her usual state of health but did complain of some back pain at that time  Tracie Neumann seems somewhat uncomfortable with changing positions  Bev Back however the patient seemed much more uncomfortable, severe pain with positional changes   Normally she ambulates with a walker but has been unable to do so secondary to pain   Denies any recent trauma although patient is not a reliable historian " Daughter also denies any recent trauma  Currently the pt has no complaints including fever, chills, nausea, vomiting, lateralizing weakness, or back pain on trauma exam   "    Procedures Performed: No orders of the defined types were placed in this encounter  Summary of Hospital Course:  Patient was admitted to the trauma service due to a T12 compression fracture  She was seen by Neurosurgery and recommended conservative management with a TLSO brace  She had upright x-rays completed which showed stability  She was found have a urinary tract infection on admission  She was initially placed on Keflex but then had a leukocytosis that was worsening  The cultures P she did Enterococcus which had previously been sensitive to Unasyn so she was started on that on 3/19 on 03/19     This is to complete a 3 day course, which was completed on 03/21 prior to discharge    Her leukocytosis had resolved and she was afebrile without symptoms  She is medically stable for discharge today  PT and OT recommended inpatient rehab so she is being discharged to [facility] today  She will follow up with Neurosurgery in 2 weeks with repeat upright x-rays at that time  She should follow up with her primary care doctor in 2 weeks for continuity of care  Today she offers no complaints  She denies pain or dysuria  She slept well last night  She is tolerating a diet  She is motivated to go to rehab today  Exam:  Vitals:    03/21/22 1010   BP: 151/70   Pulse: 67   Resp: 19   Temp: 98 °F (36 7 °C)   SpO2: 100%     GEN:  No acute distress  HEENT:  Normocephalic, atraumatic  NEURO:  GCS 15, nonfocal exam  CV:  Regular rate and rhythm, no murmurs gallops or rubs  PULM:  Clear to auscultation bilaterally  GI:  Soft, nontender, nondistended  :  Voiding  MSK:  +chronic bilateral lower extremity edema, unchanged, no contusions or deformity  SKIN:  Pink, warm, dry      Significant Findings, Care, Treatment and Services Provided:   XR spine lumbar 2 or 3 views injury    Result Date: 3/18/2022  Impression: Age-indeterminate compression deformity of L2  Old T12 compression deformity  Workstation performed: [id number]     CT Abdomen pelvis with contrast    Result Date: 3/17/2022  Impression: 1  Gastric antral wall thickening suggestive of gastritis  Consider follow-up upper endoscopy  2   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, now 1 6 cm  Consider MRI with contrast  3   Cholelithiasis  The study was marked in [facility] for immediate notification  Workstation performed: [id number]       Complications: none    Condition at Discharge: good         Discharge instructions/Information to patient and family:   See after visit summary for information provided to patient and family  Provisions for Follow-Up Care:  See after visit summary for information related to follow-up care and any pertinent home health orders        PCP: No primary care provider on file  Disposition: Short-term rehab at [facility]    Planned Readmission: No    Discharge Statement   I spent 25 minutes discharging the patient  This time was spent on the day of discharge  I had direct contact with the patient on the day of discharge  Additional documentation is required if more than 30 minutes were spent on discharge  Discharge Medications:  See after visit summary for reconciled discharge medications provided to patient and family

## 2022-03-21 NOTE — PLAN OF CARE
Problem: Potential for Falls  Goal: Patient will remain free of falls  Description: INTERVENTIONS:  - Educate patient/family on patient safety including physical limitations  - Instruct patient to call for assistance with activity   - Consult OT/PT to assist with strengthening/mobility   - Keep Call bell within reach  - Keep bed low and locked with side rails adjusted as appropriate  - Keep care items and personal belongings within reach  - Initiate and maintain comfort rounds  - Make Fall Risk Sign visible to staff  - Offer Toileting every 1 Hours, in advance of need  - Initiate/Maintain alarm  - Obtain necessary fall risk management equipment  - Apply yellow socks and bracelet for high fall risk patients  - Consider moving patient to room near nurses station  3/21/2022 1340 by Chriss Paget, RN  Outcome: Adequate for Discharge  3/21/2022 1340 by Chriss Paget, RN  Outcome: Progressing     Problem: PHYSICAL THERAPY ADULT  Goal: Performs mobility at highest level of function for planned discharge setting  See evaluation for individualized goals  Description: Treatment/Interventions: Functional transfer training,LE strengthening/ROM,Therapeutic exercise,Endurance training,Equipment eval/education,Bed mobility,Gait training,Spoke to nursing,OT  Equipment Recommended: Other (Comment) (DME TBD)       See flowsheet documentation for full assessment, interventions and recommendations    Outcome: Adequate for Discharge     Problem: MOBILITY - ADULT  Goal: Maintain or return to baseline ADL function  Description: INTERVENTIONS:  -  Assess patient's ability to carry out ADLs; assess patient's baseline for ADL function and identify physical deficits which impact ability to perform ADLs (bathing, care of mouth/teeth, toileting, grooming, dressing, etc )  - Assess/evaluate cause of self-care deficits   - Assess range of motion  - Assess patient's mobility; develop plan if impaired  - Assess patient's need for assistive devices and provide as appropriate  - Encourage maximum independence but intervene and supervise when necessary  - Involve family in performance of ADLs  - Assess for home care needs following discharge   - Consider OT consult to assist with ADL evaluation and planning for discharge  - Provide patient education as appropriate  3/21/2022 1340 by Alla Bravo RN  Outcome: Adequate for Discharge  3/21/2022 1340 by Alla Bravo RN  Outcome: Progressing  Goal: Maintains/Returns to pre admission functional level  Description: INTERVENTIONS:  - Perform BMAT or MOVE assessment daily    - Set and communicate daily mobility goal to care team and patient/family/caregiver  - Collaborate with rehabilitation services on mobility goals if consulted  - Perform Range of Motion 3 times a day  - Reposition patient every 2 hours  - Dangle patient 3 times a day  - Stand patient 3 times a day  - Ambulate patient 3 times a day  - Out of bed to chair 3 times a day   - Out of bed for meals 3 times a day  - Out of bed for toileting  - Record patient progress and toleration of activity level   3/21/2022 1340 by Alla Bravo RN  Outcome: Adequate for Discharge  3/21/2022 1340 by Alla Bravo RN  Outcome: Progressing     Problem: OCCUPATIONAL THERAPY ADULT  Goal: Performs self-care activities at highest level of function for planned discharge setting  See evaluation for individualized goals  Description:   Treatment Interventions: ADL retraining,Functional transfer training,Endurance training,Cognitive reorientation,Compensatory technique education,Continued evaluation,Energy conservation,Activityengagement          See flowsheet documentation for full assessment, interventions and recommendations     Outcome: Adequate for Discharge     Problem: Prexisting or High Potential for Compromised Skin Integrity  Goal: Skin integrity is maintained or improved  Description: INTERVENTIONS:  - Identify patients at risk for skin breakdown  - Assess and monitor skin integrity  - Assess and monitor nutrition and hydration status  - Monitor labs   - Assess for incontinence   - Turn and reposition patient  - Assist with mobility/ambulation  - Relieve pressure over bony prominences  - Avoid friction and shearing  - Provide appropriate hygiene as needed including keeping skin clean and dry  - Evaluate need for skin moisturizer/barrier cream  - Collaborate with interdisciplinary team   - Patient/family teaching  - Consider wound care consult   3/21/2022 1340 by Angelika Mcneal RN  Outcome: Adequate for Discharge  3/21/2022 1340 by Angelika Mcneal RN  Outcome: Progressing     Problem: Nutrition/Hydration-ADULT  Goal: Nutrient/Hydration intake appropriate for improving, restoring or maintaining nutritional needs  Description: Monitor and assess patient's nutrition/hydration status for malnutrition  Collaborate with interdisciplinary team and initiate plan and interventions as ordered  Monitor patient's weight and dietary intake as ordered or per policy  Utilize nutrition screening tool and intervene as necessary  Determine patient's food preferences and provide high-protein, high-caloric foods as appropriate       INTERVENTIONS:  - Monitor oral intake, urinary output, labs, and treatment plans  - Assess nutrition and hydration status and recommend course of action  - Evaluate amount of meals eaten  - Assist patient with eating if necessary   - Allow adequate time for meals  - Recommend/ encourage appropriate diets, oral nutritional supplements, and vitamin/mineral supplements  - Order, calculate, and assess calorie counts as needed  - Recommend, monitor, and adjust tube feedings and TPN/PPN based on assessed needs  - Assess need for intravenous fluids  - Provide specific nutrition/hydration education as appropriate  - Include patient/family/caregiver in decisions related to nutrition  3/21/2022 1340 by Angelika Mcneal RN  Outcome: Adequate for Discharge  3/21/2022 1340 by Julisa LATOYA Ham  Outcome: Progressing

## 2022-03-21 NOTE — CASE MANAGEMENT
Case Management Discharge Planning Note    Patient name Kevin Fields  Location [facility] 628-33 MRN [id number]  : 1923 Date 3/21/2022       Current Admission Date: 3/17/2022  Current Admission Diagnosis:T12 compression fracture, initial encounter [facility])   Patient Active Problem List    Diagnosis Date Noted    T12 compression fracture, initial encounter ([facility] ) 2022    Pressure injury of sacral region, unstageable [facility] ) 2022    Functional diarrhea     Acute systolic congestive heart failure ([facility] ) 2022    Diarrhea 2022    New onset atrial fibrillation ([facility] ) 2022    Severe sepsis ([facility] ) 2022    UTI (urinary tract infection) 2022    Aortic stenosis 2022    Dysphagia 2022    Acute-on-chronic kidney injury ([facility] ) 2021    Hyperosmolar hyperglycemic state (HHS) ([facility] ) 2021    Urinary incontinence 2021    First degree AV block 2021    At risk for delirium 2021    Chest pain 2021    Syncope 2021    HTN (hypertension) 2021    SALTY (acute kidney injury) ([facility] ) 2021    Closed fracture of left proximal humerus 2020    Ambulatory dysfunction 2020    Atherosclerosis of aorta ([facility] ) 2019    Medicare annual wellness visit, subsequent 2018    Screening for depression 2018    Screening for genitourinary condition 2018    Screening for neurological condition 2018    Bilateral lower extremity edema 10/04/2017    Hypokalemia 2017    CKD (chronic kidney disease), stage III ([facility] ) 2017    Hyponatremia 2017    Leukocytosis 2017    Moderate aortic stenosis 2017    Gallstone 2016    Anemia 2016    Pancreatic cyst     Uncomplicated senile dementia ([facility] ) 2015    Benign essential hypertension 2015    Hypercholesterolemia 2013    Osteopenia 2013    Type 2 diabetes mellitus with diabetic chronic kidney disease ([facility] ) 11/11/2013      LOS (days): 3  Geometric Mean LOS (GMLOS) (days): 2 90  Days to GMLOS:0 1     OBJECTIVE:  Risk of Unplanned Readmission Score: 24         Current admission status: Inpatient   Preferred Pharmacy:   [facility]  [address] [place] [address]  Phone: [phone] Fax: [facility] - [place] [address]  Phone: [phone] Fax: [phone]    Primary Care Provider: No primary care provider on file  Primary Insurance: MEDICARE  Secondary Insurance: AARP    DISCHARGE DETAILS:    Pt accepted to [facility] for SNF and can d/c there today  CM submitted for transportation via [facility] for any time after 1300   Pt will need a covid swab prior to d/c

## 2022-03-21 NOTE — INCIDENTAL FINDINGS
The following findings require follow up:  Radiographic finding   Findin  Gastric antral wall thickening suggestive of gastritis  Consider follow-up upper endoscopy  2   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, now 1 6 cm  Consider MRI with contrast   3   Cholelithiasis   Follow up required: family doctor, MRI abdomen with contrast   Follow up should be done within 2 week(s)    Patient and her daughter, Luke Rebollarr were notified of incidental findings and recommended f/u  She verbalizes understanding

## 2022-03-21 NOTE — PLAN OF CARE
Problem: PHYSICAL THERAPY ADULT  Goal: Performs mobility at highest level of function for planned discharge setting  See evaluation for individualized goals  Description: Treatment/Interventions: Functional transfer training,LE strengthening/ROM,Therapeutic exercise,Endurance training,Equipment eval/education,Bed mobility,Gait training,Spoke to nursing,OT  Equipment Recommended: Other (Comment) (MOLLY COLINDRES)       See flowsheet documentation for full assessment, interventions and recommendations  3/21/2022 1727 by Jeane Reed PTA  Outcome: Progressing  Note: Prognosis: Fair  Problem List: Decreased strength,Decreased endurance,Impaired balance,Decreased mobility,Decreased cognition,Impaired judgement,Decreased safety awareness,Pain  Assessment: The pt  continues to require significant assistance of one person for mobility at this time  She readily followed all commands with increased time  She was able to maintain her balance once seated at the edge of the bed  She did have difficulty with any attempts to reach outside of her base of support  She performed therapeutic exercise with instruction as well  Barriers to Discharge: Inaccessible home environment,Decreased caregiver support        PT Discharge Recommendation: Post acute rehabilitation services          See flowsheet documentation for full assessment       3/21/2022 1727 by Jeane Reed PTA  Reactivated

## 2022-03-22 ENCOUNTER — NURSING HOME VISIT (OUTPATIENT)
Dept: GERIATRICS | Facility: OTHER | Age: 87
End: 2022-03-22
Payer: MEDICARE

## 2022-03-22 DIAGNOSIS — R26.2 AMBULATORY DYSFUNCTION: Primary | ICD-10-CM

## 2022-03-22 DIAGNOSIS — N30.00 ACUTE CYSTITIS WITHOUT HEMATURIA: ICD-10-CM

## 2022-03-22 DIAGNOSIS — S22.080D COMPRESSION FRACTURE OF T12 VERTEBRA WITH ROUTINE HEALING, SUBSEQUENT ENCOUNTER: ICD-10-CM

## 2022-03-22 DIAGNOSIS — F03.90 UNCOMPLICATED SENILE DEMENTIA (HCC): ICD-10-CM

## 2022-03-22 DIAGNOSIS — I48.91 NEW ONSET ATRIAL FIBRILLATION (HCC): ICD-10-CM

## 2022-03-22 DIAGNOSIS — E11.22 TYPE 2 DIABETES MELLITUS WITH CHRONIC KIDNEY DISEASE, WITHOUT LONG-TERM CURRENT USE OF INSULIN, UNSPECIFIED CKD STAGE (HCC): ICD-10-CM

## 2022-03-22 DIAGNOSIS — I10 PRIMARY HYPERTENSION: ICD-10-CM

## 2022-03-22 PROCEDURE — 99306 1ST NF CARE HIGH MDM 50: CPT | Performed by: FAMILY MEDICINE

## 2022-03-22 NOTE — PROGRESS NOTES
[address]  [address] [facility] 31  History and Physical    NAME: Danielle Vogt  AGE: 80 y o  SEX: female [id number]    DATE OF ENCOUNTER: 3/22/2022    Code status:  No CPR    Assessment and Plan     1  Ambulatory dysfunction  - PT/OT ordered  - fall precautions in place    2  Compression fracture of T12 vertebra with routine healing, subsequent encounter  - wearing TLSO brace  - continue pain meds as ordered  - avoid constipation  - continue calcium plus vitamin-D    3  Acute cystitis without hematuria  - s/p antibiotics  - encouraged p o  hydration  - encouraged frequent voiding    4  Uncomplicated senile dementia ([facility] [address] )  - redirection, reorientation  - assistance with ADLs  - continue donepezil 10 mg p o  q h s     5  Type 2 diabetes mellitus with chronic kidney disease, without long-term current use of insulin, unspecified CKD stage (HCC)  - continue repaglinide 2 mg p o  before meals  - continue Januvia 100 mg p o  daily  - continue glipizide 10 mg p o  daily    6  Primary hypertension/CHF  - continue metoprolol tartrate 25 mg p o  b i d   - continue potassium supplements  - continue furosemide 20 mg p o  daily    7   atrial fibrillation (HCC)  - continue Eliquis 5 mg p o  b i d   - continue metoprolol tartrate 25 mg p o  b i d  Follow-up CBC, BMP ordered  All medications and routine orders were reviewed and updated as needed  Plan discussed with:  Patient and staff    Chief Complaint     Seen for admission at [facility], a 81 y/o female with past medical history of HTN, DM2, A  Fib and dementia was sent to ED from [facility] with back pain due to T12 comp #, seen by [facility], recommended TLSO brace  She also found to have UTI, treated with unasyn x 3 days  Her overall condition improved, got discharged back to the facility  She was seen and examined, stable    She found sitting in the dining dimas, finishing up her breakfast   She is confused not able to give good history  Denies any complaints at this time  She is eating and sleeping well  She needs mod assistance with ADLs  Staff have no concerns at this time  HISTORY:  Past Medical History:   Diagnosis Date    Aortic stenosis     Bilateral edema of lower extremity     Dementia (HCC)     Diabetes ([address] )     Fall     Gait abnormality     Hyperlipidemia     Hypertension     Hypokalemia     Other ascites     Varicose veins of leg with edema      Family History   Problem Relation Age of Onset    Dementia Sister      Social History     Socioeconomic History    Marital status:      Spouse name: Not on file    Number of children: Not on file    Years of education: Not on file    Highest education level: Not on file   Occupational History    Occupation: retired   Tobacco Use    Smoking status: Never Smoker    Smokeless tobacco: Never Used   Vaping Use    Vaping Use: Never used   Substance and Sexual Activity    Alcohol use: Not Currently    Drug use: No    Sexual activity: Not Currently   Other Topics Concern    Not on file   Social History Narrative    Advance directive in chart     Social Determinants of Health     Financial Resource Strain: Not on file   Food Insecurity: No Food Insecurity    Worried About [facility] in the Last Year: Never true    Tramaine of Food in the Last Year: Never true   Transportation Needs: No Transportation Needs    Lack of Transportation (Medical): No    Lack of Transportation (Non-Medical):  No   Physical Activity: Not on file   Stress: Not on file   Social Connections: Not on file   Intimate Partner Violence: Not on file   Housing Stability: Low Risk     Unable to Pay for Housing in the Last Year: No    Number of Places Lived in the Last Year: 1    Unstable Housing in the Last Year: No       Allergies:  No Known Allergies    Review of Systems     Review of Systems   Unable to perform ROS: Dementia   Constitutional: Positive for activity change and fatigue  Negative for fever  HENT: Positive for hearing loss  Negative for dental problem and trouble swallowing  Eyes: Negative for photophobia and visual disturbance  Respiratory: Negative for cough and shortness of breath  Cardiovascular: Negative for chest pain, palpitations and leg swelling  Gastrointestinal: Negative for abdominal pain, constipation, diarrhea, nausea and vomiting  Genitourinary: Negative for difficulty urinating and dysuria  Musculoskeletal: Positive for gait problem  Negative for arthralgias  Neurological: Positive for weakness  Negative for dizziness and headaches  As in HPI  Medications and orders     All medications reviewed and updated in Nursing Home EMR  Objective     Vitals: T: 97 3, P: 81, R: 16, BP: 133/72, 96% on RA, Wt: 153 8 lbs    Physical Exam  Vitals and nursing note reviewed  Constitutional:       General: She is not in acute distress  Appearance: Normal appearance  She is well-developed  She is not diaphoretic  HENT:      Head: Normocephalic and atraumatic  Nose: Nose normal       Mouth/Throat:      Mouth: Mucous membranes are moist       Pharynx: Oropharynx is clear  No oropharyngeal exudate  Eyes:      General: No scleral icterus  Right eye: No discharge  Left eye: No discharge  Extraocular Movements: Extraocular movements intact  Conjunctiva/sclera: Conjunctivae normal    Cardiovascular:      Rate and Rhythm: Normal rate and regular rhythm  Heart sounds: Normal heart sounds  No murmur heard  Pulmonary:      Effort: Pulmonary effort is normal  No respiratory distress  Breath sounds: Normal breath sounds  No wheezing  Chest:      Chest wall: No tenderness  Abdominal:      General: Bowel sounds are normal       Palpations: Abdomen is soft  Tenderness: There is no abdominal tenderness   There is no guarding or Calm rebound  Musculoskeletal:         General: No tenderness or deformity  Normal range of motion  Cervical back: Normal range of motion and neck supple  Right lower leg: No edema  Left lower leg: No edema  Skin:     General: Skin is warm and dry  Neurological:      Mental Status: She is alert  Cranial Nerves: No cranial nerve deficit  Comments: Oriented to self  Verbal, able to follow simple commands  Psychiatric:         Mood and Affect: Mood normal          Behavior: Behavior normal       Comments: Confused and forgetful at times  Pertinent Laboratory/Diagnostic Studies: The following labs/studies were reviewed please see chart or hospital paperwork for details    Ref Range & Units 3/21/22 0445 3/20/22 0452 3/19/22 0512 3/17/22 2219 3/17/22 1625 2/7/22 0444 2/7/22 0444    WBC 4 31 - 10 16 Thousand/uL 8 43  12 78 High   14 81 High    9 52   11 71 High     RBC 3 81 - 5 12 Million/uL 3 72 Low   3 52 Low   3 99   4 13   4 15    Hemoglobin 11 5 - 15 4 g/dL 11 0 Low   10 6 Low   12 1   12 2   12 3    Hematocrit 34 8 - 46 1 % 34 8  33 0 Low   36 9   38 8   36 6    MCV 82 - 98 fL 94  94  93   94   88    MCH 26 8 - 34 3 pg 29 6  30 1  30 3   29 5   29 6    MCHC 31 4 - 37 4 g/dL 31 6  32 1  32 8   31 4   33 6    RDW 11 6 - 15 1 % 14 9  15 1  15 2 High    15 1   13 8    MPV 8 9 - 12 7 fL 10 6  11 4  11 4  11 0  11 5   10 1    Platelets 246 - 024 Thousands/uL 232  230  245  241  285   331    nRBC /100 WBCs 0  0  0   0      Neutrophils Relative 43 - 75 % 54  64  69   61      Immat GRANS % 0 - 2 % 1  1  1   1      Lymphocytes Relative 14 - 44 % 29  22  18   27      Monocytes Relative 4 - 12 % 9  9  10   9      Eosinophils Relative 0 - 6 % 6  3  1   1  3     Basophils Relative 0 - 1 % 1  1  1   1  0     Neutrophils Absolute 1 85 - 7 62 Thousands/µL 4 61  8 34 High   10 44 High    5 84      Immature Grans Absolute 0 00 - 0 20 Thousand/uL 0 12  0 10  0 09   0 05      Lymphocytes Absolute 0 60 - 4 47 Thousands/µL 2 40  2 77  2 67   2 56      Monocytes Absolute 0 17 - 1 22 Thousand/µL 0 78  1 11  1 43 High    0 86      Eosinophils Absolute 0 00 - 0 61 Thousand/µL 0 46  0 39  0 09   0 12  0 35 R     Basophils Absolute 0 00 - 0 10 Thousands/µL 0 06  0 07           Ref Range & Units 3/21/22 0445 3/20/22 0452 3/19/22 0512 3/17/22 1625 2/7/22 0444 2/6/22 0511 2/5/22 0615    Sodium 136 - 145 mmol/L 140  140  138  138  134 Low   133 Low   135 Low     Potassium 3 5 - 5 3 mmol/L 3 0 Low   3 0 Low   3 5 CM  4 6 CM  3 5  3 8 CM  5 3 CM    Chloride 100 - 108 mmol/L 110 High   110 High   109 High   109 High   103  104  109 High     CO2 21 - 32 mmol/L 24  22  22  23  25  22  20 Low     ANION GAP 4 - 13 mmol/L 6  8  7  6  6  7  6    BUN 5 - 25 mg/dL 21  21  18  17  8  10  10    Creatinine 0 60 - 1 30 mg/dL 1 09  1 03 CM  1 06 CM  1 20 CM  0 83 CM  0 93 CM  0 75 CM    Comment: Standardized to IDMS reference method   Glucose 65 - 140 mg/dL 128  138 CM  132 CM  123 CM  132 CM  144 High  CM  107 CM       Calcium 8 3 - 10 1 mg/dL 9 1  9 1  9 6  9 7  8 5  8 6  7 5 Low     eGFR ml/min/1 73sq m 42  45  43            - Counseling Documentation: patient was counseled regarding: prognosis

## 2022-03-24 ENCOUNTER — NURSING HOME VISIT (OUTPATIENT)
Dept: GERIATRICS | Facility: OTHER | Age: 87
End: 2022-03-24
Payer: MEDICARE

## 2022-03-24 DIAGNOSIS — S22.080S COMPRESSION FRACTURE OF T12 VERTEBRA, SEQUELA: Primary | ICD-10-CM

## 2022-03-24 DIAGNOSIS — R26.2 AMBULATORY DYSFUNCTION: ICD-10-CM

## 2022-03-24 DIAGNOSIS — E11.22 TYPE 2 DIABETES MELLITUS WITH CHRONIC KIDNEY DISEASE, WITHOUT LONG-TERM CURRENT USE OF INSULIN, UNSPECIFIED CKD STAGE (HCC): ICD-10-CM

## 2022-03-24 DIAGNOSIS — I10 BENIGN ESSENTIAL HYPERTENSION: ICD-10-CM

## 2022-03-24 PROCEDURE — 99309 SBSQ NF CARE MODERATE MDM 30: CPT | Performed by: NURSE PRACTITIONER

## 2022-03-24 NOTE — ASSESSMENT & PLAN NOTE
· BP today 118/71  · Continue metoprolol 25 mg b i d , furosemide 20 mg p o   Daily  · Avoid hypotension  · Will continue to monitor BMP

## 2022-03-24 NOTE — ASSESSMENT & PLAN NOTE
Lab Results   Component Value Date    HGBA1C 13 0 (H) 12/28/2021   · Bgs well controlled  · Continue regaglinide 2 mg p o , [id number] mg p o , and glipizide 10 mg  · Continue Accu-chcks

## 2022-03-24 NOTE — PROGRESS NOTES
[address]  [phone]  300 [facility]   Pos 31  Progress Note        NAME: Simón Stanley  AGE: 80 y o  SEX: female  :  1923  DATE OF ENCOUNTER: 3/24/2022    Chief Complaint   Patient seen and examined for follow up on chronic conditions  History of Present Illness     Aaron Dave is a 51-year-old female of [facility] rehab with acute and chronic medical conditions of dysphagia, type 2 DM, CHF, aortic stenosis, hypertension, atrial fibrillation, uncomplicated senile dementia, closed fracture of left proximal humerus, CKD stage 3, and ambulatory dysfunction  The patient is being seen and examined today for follow-up on acute and chronic medical conditions  Upon examination, the patient is sitting in her wheelchair, alert, cooperative, and in no acute distress  She denies  sob, chest pain, abdominal pain, fever, chills, nausea/vomiting, diarrhea/constipation, or dysuria  The patient reports she has a good appetite and is drinking an adequate amount of fluids  The patient's only complaint today is pain in her left hip  PT and OT have evaluated and began treatment for the restorative services  The following portions of the patient's history were reviewed and updated as appropriate: allergies, current medications, past family history, past medical history, past social history, past surgical history and problem list     Review of Systems     A review of systems was performed  All negative, except as per HPI      History     Past Medical History:   Diagnosis Date    Aortic stenosis     Bilateral edema of lower extremity     Dementia (HCC)     Diabetes ([address] )     Fall     Gait abnormality     Hyperlipidemia     Hypertension     Hypokalemia     Other ascites     Varicose veins of leg with edema      Past Surgical History:   Procedure Laterality Date    BREAST SURGERY      ELBOW SURGERY       Family History   Problem Relation Age of Onset  Dementia Sister      Social History     Socioeconomic History    Marital status:      Spouse name: Not on file    Number of children: Not on file    Years of education: Not on file    Highest education level: Not on file   Occupational History    Occupation: retired   Tobacco Use    Smoking status: Never Smoker    Smokeless tobacco: Never Used   Vaping Use    Vaping Use: Never used   Substance and Sexual Activity    Alcohol use: Not Currently    Drug use: No    Sexual activity: Not Currently   Other Topics Concern    Not on file   Social History Narrative    Advance directive in chart     Social Determinants of Health     Financial Resource Strain: Not on file   Food Insecurity: No Food Insecurity    Worried About [employer] in the Last Year: Never true    Tramaine of Food in the Last Year: Never true   Transportation Needs: No Transportation Needs    Lack of Transportation (Medical): No    Lack of Transportation (Non-Medical):  No   Physical Activity: Not on file   Stress: Not on file   Social Connections: Not on file   Intimate Partner Violence: Not on file   Housing Stability: Low Risk     Unable to Pay for Housing in the Last Year: No    Number of Places Lived in the Last Year: 1    Unstable Housing in the Last Year: No     No Known Allergies    Objective     Vital Signs  BP: 118/71      HR:70 T:97 6    RR:18 O2Sat:95% W:159  General: NAD, Well Nourished, Well Developed  Oral: Oropharynx Moist and Clear  Neck: Supple, +ROM  CV: S1, S2, normal rate, regular rhythm, no murmur appreciated  Pulmonary: Lung sounds clear to air, no wheezing, rhonchi, rales  Abdominal:BS + x4 in all quadrants, soft, no mass, no tenderness  Extremities:LLE +2 pitting edema, +ROM, +Weakness  Skin: Warm, Dry, no lesions, no rash, no erythema present, no ecchymosis present  Neurological: CN 2-12 intact, PERRLA  Psych: Alert and oriented times 3, pleasant mood, no affect, fair judgement    Pertinent Laboratory/Diagnostic Studies:  CBC WITH DIFF  HEMOGLOBIN 10 8 g/dL 11 5-14 5 L Final  HEMATOCRIT 32 4 % 35 0-43 0 L Final  WBC 7 0 thou/cmm 4 0-10 0 Final  RBC 3 57 mill/cmm 3 70-4 70 L Final  PLATELET COUNT 309 thou/cmm 140-350 Final  MPV 9 1 fL 7 5-11 3 Final  MCV 91 fL  Final  MCH 30 3 pg 26 0-34 0 Final  MCHC 33 4 g/dL 32 0-37 0 Final  RDW 15 1 % 12 0-16 0 Final  DIFFERENTIAL TYPE MANUAL Final  ABSOLUTE NEUT 3 1 thou/cmm 1 8-7 8 Final  ABSOLUTE LYMPH 2 7 thou/cmm 1 0-3 0 Final  ABSOLUTE MONO 0 6 thou/cmm 0 3-1 0 Final  ABSOLUTE EOS 0 3 thou/cmm 0 0-0 5 Final  ABSOLUTE BASO 0 1 thou/cmm 0 0-0 1 Final  NEUTROPHILS 44 % Final  LYMPHOCYTES 38 % Final  MONOCYTES 9 % Final  EOSINOPHILS 4 % Final  BASOPHILS 1 % Final  METAMYELOCYTES 1 % 0 H Final  MYELOCYTES 3 % 0 H Final  ACANTHOCYTES Rare Final  ADILSON CELLS Slight Final  OVALOCYTES Slight Final  POLYCHROMASIA Rare Final  SPHEROCYTES Slight Final  TOXIC GRANULATION Rare Final  HEMATOLOGY COMMENT   Final   Peripheral smear reviewed by technologist   BASIC METABOLIC PNL  GLUCOSE 074 mg/dL 65-99 H Final  BUN 18 mg/dL 7-25 Final  CREATININE 0 99 mg/dL 0 40-1 10 Final  SODIUM 140 mmol/L 135-145 Final  POTASSIUM 4 7 mmol/L 3 5-5 2 Final  CHLORIDE 112 mmol/L 100-109 H Final  CARBON DIOXIDE 17 mmol/L 23-31 L Final  CALCIUM 9 0 mg/dL 8 5-10 1 Final  ANION GAP 11 3-11 Final  eGFRcr (Note) >59 Final      Current Medications     Current Medications Reviewed and updated in Nursing Home EMR      Assessment and Plan     Compression fracture of T12 vertebra (HCC)  · Continue wearing TLSO  Brace  · Continue all pain medications  · Continue PT/OT services    Ambulatory dysfunction   Maintain fall and safety precautions   Encourage use of call bell   Continue PT/OT services   Assist with transfers, mobility, and ADLs      Type 2 diabetes mellitus with diabetic chronic kidney disease ([facility] [address] )    Lab Results   Component Value Date    HGBA1C 13 0 (H) 12/28/2021   · Bgs well controlled  · Continue regaglinide 2 mg p o , [id number] mg p o , and glipizide 10 mg  · Continue Accu-chcks       Benign essential hypertension  · BP today 118/71  · Continue metoprolol 25 mg b i d , furosemide 20 mg p o   Daily  · Avoid hypotension  · Will continue to monitor BMP        Darrel Barnes [address]  3/24/2022

## 2022-03-29 ENCOUNTER — NURSING HOME VISIT (OUTPATIENT)
Dept: GERIATRICS | Facility: OTHER | Age: 87
End: 2022-03-29
Payer: MEDICARE

## 2022-03-29 DIAGNOSIS — I10 BENIGN ESSENTIAL HYPERTENSION: ICD-10-CM

## 2022-03-29 DIAGNOSIS — N18.32 STAGE 3B CHRONIC KIDNEY DISEASE (HCC): ICD-10-CM

## 2022-03-29 DIAGNOSIS — R26.2 AMBULATORY DYSFUNCTION: ICD-10-CM

## 2022-03-29 DIAGNOSIS — S22.080S COMPRESSION FRACTURE OF T12 VERTEBRA, SEQUELA: Primary | ICD-10-CM

## 2022-03-29 PROCEDURE — 99309 SBSQ NF CARE MODERATE MDM 30: CPT | Performed by: NURSE PRACTITIONER

## 2022-03-29 NOTE — ASSESSMENT & PLAN NOTE
· Last Creatinine 0 84 and Gfr 59  · Will continue to monitor BMP  · Avoid nephrotoxins and NSAIDS  · Avoid hypotension  · Encourage PO fluid intake

## 2022-03-29 NOTE — PROGRESS NOTES
[address]  [phone]  300 [facility]   Pos 31  Progress Note        NAME: Danielle Vogt  AGE: 80 y o  SEX: female  :  1923  DATE OF ENCOUNTER: 3/29/2022    Chief Complaint   Patient seen and examined for follow up on chronic conditions  History of Present Illness     Shawnee Gottron is a 80-year-old female of [facility] rehab with acute and chronic medical conditions of dysphagia, type 2 DM, CHF, aortic stenosis, hypertension, atrial fibrillation, uncomplicated senile dementia, closed fracture of left proximal humerus, CKD stage 3, and ambulatory dysfunction      The patient is being seen and examined today for follow-up on acute and chronic medical conditions  Upon examination, the patient is sitting in her wheelchair, alert, cooperative, and in no acute distress  She denies pain, sob, chest pain, abdominal pain, fever, chills, nausea/vomiting, diarrhea/constipation, or dysuria  The patient reports she has a good appetite and is drinking an adequate amount of fluids  The patient has no complaints today  Per PT/OT the patient's ambulatory status is 150 feet with RW with  supervision  The patient's mobility status is transfers require supervision/contact guard  The patient's ADL status is upper body moderate assist, lower body mod/max assistance, and toileting mod assistance       The following portions of the patient's history were reviewed and updated as appropriate: allergies, current medications, past family history, past medical history, past social history, past surgical history and problem list     Review of Systems     A review of systems was performed  All negative, except as per HPI      History     Past Medical History:   Diagnosis Date    Aortic stenosis     Bilateral edema of lower extremity     Dementia (HCC)     Diabetes ([facility] [address] [facility]     Fall     Gait abnormality     Hyperlipidemia     Hypertension     Hypokalemia     Other ascites  Varicose veins of leg with edema      Past Surgical History:   Procedure Laterality Date    BREAST SURGERY      ELBOW SURGERY       Family History   Problem Relation Age of Onset    Dementia Sister      Social History     Socioeconomic History    Marital status:      Spouse name: Not on file    Number of children: Not on file    Years of education: Not on file    Highest education level: Not on file   Occupational History    Occupation: retired   Tobacco Use    Smoking status: Never Smoker    Smokeless tobacco: Never Used   Vaping Use    Vaping Use: Never used   Substance and Sexual Activity    Alcohol use: Not Currently    Drug use: No    Sexual activity: Not Currently   Other Topics Concern    Not on file   Social History Narrative    Advance directive in chart     Social Determinants of Health     Financial Resource Strain: Not on file   Food Insecurity: No Food Insecurity    Worried About [employer] [employer] in the Last Year: Never true    Tramaine of Food in the Last Year: Never true   Transportation Needs: No Transportation Needs    Lack of Transportation (Medical): No    Lack of Transportation (Non-Medical):  No   Physical Activity: Not on file   Stress: Not on file   Social Connections: Not on file   Intimate Partner Violence: Not on file   Housing Stability: Low Risk     Unable to Pay for Housing in the Last Year: No    Number of Places Lived in the Last Year: 1    Unstable Housing in the Last Year: No     No Known Allergies    Objective     Vital Signs  BP: 126/63      HR:65  T:97 1     RR:18           O2Sat:98%      W:165 8  General: NAD, Well Nourished, Well Developed  Oral: Oropharynx Moist and Clear  Neck: Supple, +ROM  CV: S1, S2, normal rate, regular rhythm, no murmur appreciated  Pulmonary: Lung sounds clear to air, no wheezing, rhonchi, rales  Abdominal:BS + x4 in all quadrants, soft, no mass, no tenderness  Extremities:LLE +2 pitting edema, +ROM, +Weakness  Skin: Warm, Dry, no lesions, no rash, no erythema present, no ecchymosis present  Neurological: CN 2-12 intact, PERRLA  Psych: Alert and oriented times 3, pleasant mood, no affect, fair judgement    Pertinent Laboratory/Diagnostic Studies:  CBC WITH DIFF  HEMOGLOBIN 11 6 g/dL 11 5-14 5 Final  HEMATOCRIT 34 0 % 35 0-43 0 L Final  WBC 7 9 thou/cmm 4 0-10 0 Final  RBC 3 83 mill/cmm 3 70-4 70 Final  PLATELET COUNT 618 thou/cmm 140-350 Final  MPV 9 1 fL 7 5-11 3 Final  MCV 89 fL  Final  MCH 30 3 pg 26 0-34 0 Final  MCHC 34 1 g/dL 32 0-37 0 Final  RDW 15 0 % 12 0-16 0 Final  DIFFERENTIAL TYPE Pending Pending  ABSOLUTE NEUT Pending thou/cmm 1 8-7 8 Pending  ABSOLUTE LYMPH Pending thou/cmm 1 0-3 0 Pending  ABSOLUTE MONO Pending thou/cmm 0 3-1 0 Pending  ABSOLUTE EOS Pending thou/cmm 0 0-0 5 Pending  ABSOLUTE BASO Pending thou/cmm 0 0-0 1 Pending  NEUTROPHILS Pending % Pending  LYMPHOCYTES Pending % Pending  MONOCYTES Pending % Pending  EOSINOPHILS Pending % Pending  BASOPHILS Pending % Pending  BASIC METABOLIC PNL  GLUCOSE 050 mg/dL 65-99 H Final  BUN 16 mg/dL 7-25 Final  CREATININE 0 84 mg/dL 0 40-1 10 Final  SODIUM 138 mmol/L 135-145 Final  POTASSIUM 4 8 mmol/L 3 5-5 2 Final  CHLORIDE 109 mmol/L 100-109 Final  CARBON DIOXIDE 22 mmol/L 23-31 L Final  CALCIUM 9 4 mg/dL 8 5-10 1 Final  ANION GAP 7 3-11 Final  eGFRcr (Note) >59 Final      Current Medications     Current Medications Reviewed and updated in Nursing Home EMR      Assessment and Plan     Compression fracture of T12 vertebra (HCC)  · Continue wearing TLSO brace  · Continue pain medications  · Continue PT/OT services    Ambulatory dysfunction   Maintain fall and safety precautions   Encourage use of call bell   Continue PT/OT services   Assist with transfers, mobility, and ADLs      CKD (chronic kidney disease), stage III ([facility] [address] )  · Last Creatinine 0 84 and Gfr 59  · Will continue to monitor BMP  · Avoid nephrotoxins and NSAIDS  · Avoid hypotension  · Encourage PO fluid intake      Benign essential hypertension  · BP today 126/63  · Continue metoprolol and furosemide  · Avoid hypotension  · Will continue to monitor [facility]        [address] [facility]  3/29/2022

## 2022-03-29 NOTE — ASSESSMENT & PLAN NOTE
· BP today 126/63  · Continue metoprolol and furosemide  · Avoid hypotension  · Will continue to monitor BMP

## 2022-04-04 ENCOUNTER — NURSING HOME VISIT (OUTPATIENT)
Dept: GERIATRICS | Facility: OTHER | Age: 87
End: 2022-04-04
Payer: MEDICARE

## 2022-04-04 DIAGNOSIS — E11.22 TYPE 2 DIABETES MELLITUS WITH CHRONIC KIDNEY DISEASE, WITHOUT LONG-TERM CURRENT USE OF INSULIN, UNSPECIFIED CKD STAGE (HCC): ICD-10-CM

## 2022-04-04 DIAGNOSIS — I48.91 NEW ONSET ATRIAL FIBRILLATION (HCC): ICD-10-CM

## 2022-04-04 DIAGNOSIS — I50.21 ACUTE SYSTOLIC CONGESTIVE HEART FAILURE (HCC): ICD-10-CM

## 2022-04-04 DIAGNOSIS — R26.2 AMBULATORY DYSFUNCTION: Primary | ICD-10-CM

## 2022-04-04 DIAGNOSIS — I10 PRIMARY HYPERTENSION: ICD-10-CM

## 2022-04-04 DIAGNOSIS — F03.90 UNCOMPLICATED SENILE DEMENTIA (HCC): ICD-10-CM

## 2022-04-04 DIAGNOSIS — S22.080S COMPRESSION FRACTURE OF T12 VERTEBRA, SEQUELA: ICD-10-CM

## 2022-04-04 PROCEDURE — 99316 NF DSCHRG MGMT 30 MIN+: CPT | Performed by: NURSE PRACTITIONER

## 2022-04-04 NOTE — ASSESSMENT & PLAN NOTE
· Current wt 159 6  · Continue to monitor weight daily  · Continue Lasix 20 mg daily  · Continue MANOLO diet  · Follow-up with cardiology

## 2022-04-04 NOTE — ASSESSMENT & PLAN NOTE
· Continue wearing TLSO  Brace  · Continue Tylenol for pain  · Continue PT/OT services with home health  · Follow-up with neurosurgery as scheduled

## 2022-04-04 NOTE — ASSESSMENT & PLAN NOTE
· Resolved status post antibiotic therapy  · Continue to encourage p o   Fluid intake  · Encourage toileting q 2 hours  · Educate on proper perineal hygiene

## 2022-04-04 NOTE — ASSESSMENT & PLAN NOTE
· Continue Metoprolol 25  mg po Q 12 hr rate control  · Continue Eliquis 5 mg BID for anticoag  · Follow-up with Cardiology outpatient

## 2022-04-04 NOTE — ASSESSMENT & PLAN NOTE
 Maintain fall and safety precautions   Encourage use of assistive devices   Continue PT/OT services with home health   Assist with transfers, mobility, and ADLs

## 2022-04-04 NOTE — ASSESSMENT & PLAN NOTE
Lab Results   Component Value Date    HGBA1C 13 0 (H) 12/28/2021   · Bgs well controlled  · Continue current med regimen of Repaglinide 2 mg po before meals, Januvia 100 mg po daily, and Glipizide 10 mg po daily  · Continue Accu-cheks   · Follow-up with endocrinology/PCP

## 2022-04-04 NOTE — PROGRESS NOTES
[address]  [address] [facility]  [phone]  [address] [facility] [address]  Discharge Summary        NAME: Fletcher Mendiola  AGE: 80 y o  SEX: female  :  1923  DATE OF ENCOUNTER: 2022    Chief Complaint   Patient seen and examined for follow up on chronic conditions  History of Present Illness     Nguyen Garcia is a 70-year-old female of [facility] with acute and chronic medical conditions of dysphagia, type 2 DM, CHF, aortic stenosis, hypertension, atrial fibrillation, uncomplicated senile dementia, closed fracture of left proximal humerus, CKD stage 3, and ambulatory dysfunction      The patient is being seen and examined today for discharge  Upon examination, the patient is sitting in her wheelchair, alert, cooperative, and in no acute distress  She denies pain, sob, chest pain, abdominal pain, fever, chills, nausea/vomiting, diarrhea/constipation, or dysuria  The patient reports she has a good appetite and is drinking an adequate amount of fluids  The patient offers no complaints or concerns today      Per PT/OT the patient's ambulatory status is 150 feet with RW with  supervision  The patient's mobility status is transfers require supervision/contact guard  The patient's ADL status is upper body moderate assist, lower body mod/max assistance, and toileting mod assistance  The following portions of the patient's history were reviewed and updated as appropriate: allergies, current medications, past family history, past medical history, past social history, past surgical history and problem list     Review of Systems     A review of systems was performed  All negative, except as per HPI      History     Past Medical History:   Diagnosis Date    Aortic stenosis     Bilateral edema of lower extremity     Dementia (HCC)     Diabetes ([facility] [address] )     Fall     Gait abnormality     Hyperlipidemia     Hypertension     Hypokalemia     Other ascites     Varicose veins of leg with edema      Past Surgical History:   Procedure Laterality Date    BREAST SURGERY      ELBOW SURGERY       Family History   Problem Relation Age of Onset    Dementia Sister      Social History     Socioeconomic History    Marital status:      Spouse name: Not on file    Number of children: Not on file    Years of education: Not on file    Highest education level: Not on file   Occupational History    Occupation: retired   Tobacco Use    Smoking status: Never Smoker    Smokeless tobacco: Never Used   Vaping Use    Vaping Use: Never used   Substance and Sexual Activity    Alcohol use: Not Currently    Drug use: No    Sexual activity: Not Currently   Other Topics Concern    Not on file   Social History Narrative    Advance directive in chart     Social Determinants of Health     Financial Resource Strain: Not on file   Food Insecurity: No Food Insecurity    Worried About [employer] in the Last Year: Never true    [place]am[place] of Food in the Last Year: Never true   Transportation Needs: No Transportation Needs    Lack of Transportation (Medical): No    Lack of Transportation (Non-Medical):  No   Physical Activity: Not on file   Stress: Not on file   Social Connections: Not on file   Intimate Partner Violence: Not on file   Housing Stability: Low Risk     Unable to Pay for Housing in the Last Year: No    Number of Places Lived in the Last Year: 1    Unstable Housing in the Last Year: No     No Known Allergies    Objective     Vital Signs  BP: 129/62      HR:63  T:96 8     RR:18           O2Sat:94%      W:159 6  General: NAD, Well Nourished, Well Developed  Oral: Oropharynx Moist and Clear  Neck: Supple, +ROM  CV: S1, S2, normal rate, regular rhythm, no murmur appreciated  Pulmonary: Lung sounds clear to air, no wheezing, rhonchi, rales  Abdominal:BS + x4 in all quadrants, soft, no mass, no tenderness  Extremities:LLE +2 pitting edema, +ROM, +Weakness  Skin: Warm, Dry, no lesions, no rash, no erythema present, no ecchymosis present  Neurological: CN 2-12 intact, PERRLA  Psych: Alert and oriented times 3, pleasant mood, no affect, fair judgement     Pertinent Laboratory/Diagnostic Studies:  CBC WITH DIFF  HEMOGLOBIN 11 6 g/dL 11 5-14 5 Final  HEMATOCRIT 34 0 % 35 0-43 0 L Final  WBC 7 9 thou/cmm 4 0-10 0 Final  RBC 3 83 mill/cmm 3 70-4 70 Final  PLATELET COUNT 977 thou/cmm 140-350 Final  MPV 9 1 fL 7 5-11 3 Final  MCV 89 fL  Final  MCH 30 3 pg 26 0-34 0 Final  MCHC 34 1 g/dL 32 0-37 0 Final  RDW 15 0 % 12 0-16 0 Final  DIFFERENTIAL TYPE Pending Pending  ABSOLUTE NEUT Pending thou/cmm 1 8-7 8 Pending  ABSOLUTE LYMPH Pending thou/cmm 1 0-3 0 Pending  ABSOLUTE MONO Pending thou/cmm 0 3-1 0 Pending  ABSOLUTE EOS Pending thou/cmm 0 0-0 5 Pending  ABSOLUTE BASO Pending thou/cmm 0 0-0 1 Pending  NEUTROPHILS Pending % Pending  LYMPHOCYTES Pending % Pending  MONOCYTES Pending % Pending  EOSINOPHILS Pending % Pending  BASOPHILS Pending % Pending  BASIC METABOLIC PNL  GLUCOSE 901 mg/dL 65-99 H Final  BUN 16 mg/dL 7-25 Final  CREATININE 0 84 mg/dL 0 40-1 10 Final  SODIUM 138 mmol/L 135-145 Final  POTASSIUM 4 8 mmol/L 3 5-5 2 Final  CHLORIDE 109 mmol/L 100-109 Final  CARBON DIOXIDE 22 mmol/L 23-31 L Final  CALCIUM 9 4 mg/dL 8 5-10 1 Final  ANION GAP 7 3-11 Final  eGFRcr (Note) >59 Final    Current Medications     Current Medications Reviewed and updated in Nursing Home EMR  Assessment and Plan     Ambulatory dysfunction   Maintain fall and safety precautions   Encourage use of assistive devices   Continue PT/OT services with home health   Assist with transfers, mobility, and ADLs      Compression fracture of T12 vertebra (HCC)  · Continue wearing TLSO  Brace  · Continue Tylenol for pain  · Continue PT/OT services with home health  · Follow-up with neurosurgery as scheduled    UTI (urinary tract infection)  · Resolved status post antibiotic therapy  · Continue to encourage p o   Fluid intake  · Encourage toileting q 2 hours  · Educate on proper perineal hygiene    Uncomplicated senile dementia ([facility] )  · Continue supportive measures  · Continue to reorient, redirect, and reassure patient  · Continue donepezil 10 mg p o  HS    Type 2 diabetes mellitus with diabetic chronic kidney disease ([facility] )    Lab Results   Component Value Date    HGBA1C 13 0 (H) 12/28/2021   · Bgs well controlled  · Continue current med regimen of Repaglinide 2 mg po before meals, Januvia 100 mg po daily, and Glipizide 10 mg po daily  · Continue Accu-cheks   · Follow-up with endocrinology/PCP      HTN (hypertension)  · BP today 129/62  · Continue metoprolol 25 mg p o  B i d , Lasix 20 mg p o  Daily, and potassium  · Avoid hypotension  · Follow-up with cardiology      Acute systolic congestive heart failure (HCC)  · Current wt 159 6  · Continue to monitor weight daily  · Continue Lasix 20 mg daily  · Continue MANOLO diet  · Follow-up with cardiology            New onset atrial fibrillation (HCC)  · Continue Metoprolol 25  mg po Q 12 hr rate control  · Continue Eliquis 5 mg BID for anticoag  · Follow-up with Cardiology outpatient        Discussion with patient/family and further instructions:  -Fall precautions  -Aspiration precautions  -Bleeding precautions  -Monitor for signs/symptoms of infection  -Medication list was reviewed and signed  -DME form was completed     Follow-up Recommendations: Please follow-up with your primary care physician within 7-10 days of discharge to review medication changes and current status  Problem List Follow-up Recommendations:  I have spent 40 minutes with Patient /Family today in which greater than 50% of this time was spent in counseling/coordination of care      [address] [place] [address]  Geriatric Medicine  4/4/2022

## 2022-04-04 NOTE — ASSESSMENT & PLAN NOTE
· BP today 129/62  · Continue metoprolol 25 mg p o  B i d , Lasix 20 mg p o   Daily, and potassium  · Avoid hypotension  · Follow-up with cardiology

## 2022-04-04 NOTE — ASSESSMENT & PLAN NOTE
· Continue supportive measures  · Continue to reorient, redirect, and reassure patient  · Continue donepezil 10 mg p o  HS

## 2022-04-14 ENCOUNTER — TELEPHONE (OUTPATIENT)
Dept: NEUROSURGERY | Facility: CLINIC | Age: 87
End: 2022-04-14

## 2022-04-14 NOTE — TELEPHONE ENCOUNTER
Received a message from Ashley Chopra requesting callback to clarify the reason for her follow-up visit on 4/19  Contacted her back to discuss  Left a detailed message indicating she should follow-up with xray to monitor her fracture and eventually clear her from her brace  Encouraged her to call back with questions

## 2022-04-18 ENCOUNTER — HOSPITAL ENCOUNTER (OUTPATIENT)
Dept: RADIOLOGY | Facility: HOSPITAL | Age: 87
Discharge: HOME | End: 2022-04-18
Payer: MEDICARE

## 2022-04-18 DIAGNOSIS — S32.020A CLOSED COMPRESSION FRACTURE OF L2 LUMBAR VERTEBRA, INITIAL ENCOUNTER (HCC): ICD-10-CM

## 2022-04-18 PROCEDURE — 72100 X-RAY EXAM L-S SPINE 2/3 VWS: CPT

## 2022-04-19 ENCOUNTER — OFFICE VISIT (OUTPATIENT)
Dept: NEUROSURGERY | Facility: CLINIC | Age: 87
End: 2022-04-19
Payer: MEDICARE

## 2022-04-19 VITALS
SYSTOLIC BLOOD PRESSURE: 126 MMHG | HEART RATE: 78 BPM | RESPIRATION RATE: 18 BRPM | HEIGHT: 59 IN | BODY MASS INDEX: 30.81 KG/M2 | TEMPERATURE: 98.3 F | DIASTOLIC BLOOD PRESSURE: 72 MMHG

## 2022-04-19 DIAGNOSIS — S32.029A L2 VERTEBRAL FRACTURE (HCC): Primary | ICD-10-CM

## 2022-04-19 PROCEDURE — 99214 OFFICE O/P EST MOD 30 MIN: CPT | Performed by: NURSE PRACTITIONER

## 2022-04-19 NOTE — PROGRESS NOTES
Neurosurgery Office Note  Ailyn Coates 80 y o  female MRN: [id number]      Assessment/Plan     Compression fracture of T12 vertebra (HCC)  Chronic appearing  See above  L2 vertebral fracture (HCC)  Presents for 2-week post-hospital follow up for back pain  · Found to have mild age indeterminate L2 SEP fracture, new since 2019  Also found to have acute UTI  · Pt also noted to have chronic and stable T12 and T10 compression fractures that were also noted on CT from 2019  · Managed conservatively with LSO brace  · Currently complains of mild low back pain  Ambulates with walker at baseline  Exam non-focal     Imaging:  · Lumbar x-ray, 4/18/2022: Read pending  Per my review  L2 compression deformity unchanged from prior  Chronic T12 fracture  Plan:  · Imaging reviewed extensively with patient and daughter, Tracy Oliva  · Discussed that since she continues to experience very little pain, it is reasonable to obtain lumbar flexion/extension x-rays in 2 weeks and then wean from brace at that time if stable  · Daughter would like to do telemedicine visit since it is difficult logistically to drive patient here  · Follow up as scheduled in 2 weeks  Diagnoses and all orders for this visit:    L2 vertebral fracture (HCC)  -     XR spine lumbar complete w bending minimum 6 views; Future            CHIEF COMPLAINT    Chief Complaint   Patient presents with    Follow-up    Back Pain       HISTORY    History of Present Illness     80y o  year old female with past medical history of DM II, HTN, aortic stenosis, CHF, dementia, who presented to the hospital on 3/17/2022 with complaint of low back pain  She was noted with UTI as well as chronic T10 and T12 fractures and age-indeterminate L2 fracture  She was managed conservatively in a brace and her UTI was treated  She was discharged from the hospital to [facility] for rehab on 3/21/2022   She was then discharged from rehab 2 weeks ago back to [facility]  She states she has been doing well  Per her daughter, she is not complaining of pain  The patient endorses back pain as a 6/10 but it is unclear if she really understands the question secondary to dementia  She is ambulating without issue (with walker at baseline)  She is chronically incontinent of urine  She denies any pain radiating down her legs  She denies any numbness or weakness  HPI    See Discussion    REVIEW OF SYSTEMS    Review of Systems   Constitutional: Negative  HENT: Negative  Eyes: Negative  Respiratory: Negative  Cardiovascular: Negative  H/o HTN   Gastrointestinal: Negative  Endocrine: Negative  H/o Type 2 Diabetes   Genitourinary: Negative  Musculoskeletal: Positive for back pain (b/l lbp non radiaiting) and gait problem (uses walker for assistance)  T12 fx    Patient is at [address] [place] [address]: [phone]   Skin: Negative  Allergic/Immunologic: Negative  Hematological: Bruises/bleeds easily  On Eliquis   Psychiatric/Behavioral: Negative  All other systems reviewed and are negative  ROS reviewed and edited as needed      Meds/Allergies     Current Outpatient Medications   Medication Sig Dispense Refill    acetaminophen (TYLENOL) 325 mg tablet Take 650 mg by mouth every 6 (six) hours as needed for mild pain      apixaban (ELIQUIS) 5 mg Take 1 tablet (5 mg total) by mouth 2 (two) times a day 60 tablet 0    Banana Flakes (BANATROL PLUS PO) Take by mouth 3 (three) times a day with meals      donepezil (ARICEPT) 10 mg tablet Take 1 tablet (10 mg total) by mouth daily 90 tablet 1    famotidine (PEPCID) 20 mg tablet Take 1 tablet (20 mg total) by mouth daily  0    furosemide (LASIX) 20 mg tablet Take 20 mg by mouth daily      glipiZIDE (GLUCOTROL) 10 mg tablet Take 10 mg by mouth in the morning      insulin lispro (Admelog) 100 units/mL injection Inject under the skin 4 (four) times a day Per Sliding Scale      magnesium oxide (MAG-OX) 400 mg Take 1 tablet (400 mg total) by mouth daily 90 tablet 1    metoprolol tartrate (LOPRESSOR) 25 mg tablet Take 1 tablet (25 mg total) by mouth every 12 (twelve) hours  0    Potassium Chloride ER 20 MEQ TBCR Take 2 tablets (40 mEq total) by mouth 3 (three) times a day  0    repaglinide (PRANDIN) 2 mg tablet Take 1 tablet (2 mg total) by mouth 3 (three) times a day before meals 270 tablet 1    sitaGLIPtin (JANUVIA) 100 mg tablet Take 1 tablet (100 mg total) by mouth daily 90 tablet 1     No current facility-administered medications for this visit  No Known Allergies    PAST HISTORY    Past Medical History:   Diagnosis Date    Aortic stenosis     Bilateral edema of lower extremity     Dementia (HCC)     Diabetes ([address] )     Fall     Gait abnormality     Hyperlipidemia     Hypertension     Hypokalemia     Other ascites     Varicose veins of leg with edema        Past Surgical History:   Procedure Laterality Date    BREAST SURGERY      ELBOW SURGERY         Social History     Tobacco Use    Smoking status: Never Smoker    Smokeless tobacco: Never Used   Vaping Use    Vaping Use: Never used   Substance Use Topics    Alcohol use: Not Currently    Drug use: No       Family History   Problem Relation Age of Onset    Dementia Sister          Above history personally reviewed  EXAM    Vitals:Blood pressure 126/72, pulse 78, temperature 98 3 °F (36 8 °C), temperature source Temporal, resp  rate 18, height 4' 11" (1 499 m)  ,Body mass index is 30 81 kg/m²  Physical Exam  Constitutional:       Appearance: She is well-developed  HENT:      Head: Normocephalic and atraumatic  Eyes:      Extraocular Movements: EOM normal       Pupils: Pupils are equal, round, and reactive to light  Pulmonary:      Effort: Pulmonary effort is normal    Abdominal:      Palpations: Abdomen is soft  Musculoskeletal:         General: Normal range of motion        Cervical back: Normal range of motion and neck supple  Skin:     General: Skin is warm and dry  Neurological:      General: No focal deficit present  Mental Status: She is alert  Mental status is at baseline  Cranial Nerves: No cranial nerve deficit  Sensory: No sensory deficit  Motor: No weakness  Coordination: Coordination normal  Finger-Nose-Finger Test normal       Gait: Gait normal       Deep Tendon Reflexes:      Reflex Scores:       Patellar reflexes are 1+ on the right side and 1+ on the left side  Achilles reflexes are 1+ on the right side and 1+ on the left side  Psychiatric:         Speech: Speech normal          Neurologic Exam     Mental Status   Oriented to person  Oriented to place  Oriented to time  Disoriented to year, month and date  Registration: recalls 3 of 3 objects  Attention: normal  Concentration: normal    Speech: speech is normal   Level of consciousness: alert  Knowledge: good and consistent with education  Able to name object  Cranial Nerves     CN III, IV, VI   Pupils are equal, round, and reactive to light  Extraocular motions are normal    Right pupil: Size: 3 mm  Shape: regular  Reactivity: brisk  Consensual response: intact  Accommodation: intact  Left pupil: Size: 3 mm  Shape: regular  Reactivity: brisk  Consensual response: intact  Accommodation: intact  Nystagmus: none   Diplopia: none  Conjugate gaze: present    CN V   Right facial sensation deficit: none  Left facial sensation deficit: none    CN VII   Facial expression full, symmetric       CN VIII   Hearing: intact    CN IX, X   Palate: symmetric    CN XI   Right sternocleidomastoid strength: normal  Left sternocleidomastoid strength: normal  Right trapezius strength: normal  Left trapezius strength: normal    CN XII   Tongue: not atrophic  Fasciculations: absent  Tongue deviation: none    Motor Exam   Muscle bulk: normal  Overall muscle tone: normal  Right arm pronator drift: absent  Left arm pronator drift: absent    Strength   Right deltoid: 5/5  Left deltoid: 5/5  Right biceps: 5/5  Left biceps: 5/5  Right triceps: 5/5  Left triceps: 5/5  Right quadriceps: 5/5  Left quadriceps: 5/5  Right hamstrin/5  Left hamstrin/5  Right glutei: 5/5  Left glutei: 5/5  Right anterior tibial: 5/5  Left anterior tibial: 5/5  Right posterior tibial: 5/5  Left posterior tibial: 5/5  Right peroneal: 5/5  Left peroneal: 5/5  Right gastroc: 5/5  Left gastroc: 5/5    Sensory Exam   Light touch normal    Proprioception normal      Gait, Coordination, and Reflexes     Coordination   Finger to nose coordination: normal    Tremor   Resting tremor: absent  Intention tremor: absent  Action tremor: absent    Reflexes   Right patellar: 1+  Left patellar: 1+  Right achilles: 1+  Left achilles: 1+        MEDICAL DECISION MAKING    Imaging Studies:     XR spine lumbar 2 or 3 views injury    Result Date: 2022  Narrative: LUMBAR SPINE INDICATION:   S32 020A: Wedge compression fracture of second lumbar vertebra, initial encounter for closed fracture  A COMPARISON:  3/18/2022   2004  VIEWS:  XR SPINE LUMBAR 2 OR 3 VIEWS INJURY FINDINGS: There are 5 non rib bearing lumbar vertebral bodies  L2 compression fracture unchanged  Remote T12 compression deformity  External brace present  L4-L5 degenerative spondylolisthesis  Anatomic alignment otherwise  L4-L5, L5-S1 posterior facet arthrosis  Marked L5-S1 degenerative disc change  The pedicles appear intact  Soft tissues are unremarkable  Impression: No change L2 compression fracture  Workstation performed: [id number]       I have personally reviewed pertinent reports     and I have personally reviewed pertinent films in PACS

## 2022-04-19 NOTE — ASSESSMENT & PLAN NOTE
Presents for 2-week post-hospital follow up for back pain  · Found to have mild age indeterminate L2 SEP fracture, new since 2019  Also found to have acute UTI  · Pt also noted to have chronic and stable T12 and T10 compression fractures that were also noted on CT from 2019  · Managed conservatively with LSO brace  · Currently complains of mild low back pain  Ambulates with walker at baseline  Exam non-focal     Imaging:  · Lumbar x-ray, 4/18/2022: Read pending  Per my review  L2 compression deformity unchanged from prior  Chronic T12 fracture  Plan:  · Imaging reviewed extensively with patient and daughterAna Cristina  · Discussed that since she continues to experience very little pain, it is reasonable to obtain lumbar flexion/extension x-rays in 2 weeks and then wean from brace at that time if stable  · Daughter would like to do telemedicine visit since it is difficult logistically to drive patient here  · Follow up as scheduled in 2 weeks

## 2022-05-03 ENCOUNTER — HOSPITAL ENCOUNTER (OUTPATIENT)
Dept: RADIOLOGY | Facility: HOSPITAL | Age: 87
Discharge: HOME | End: 2022-05-03
Payer: MEDICARE

## 2022-05-03 DIAGNOSIS — S32.029A L2 VERTEBRAL FRACTURE (HCC): ICD-10-CM

## 2022-05-03 PROCEDURE — 72114 X-RAY EXAM L-S SPINE BENDING: CPT

## 2022-05-04 ENCOUNTER — TELEMEDICINE (OUTPATIENT)
Dept: NEUROSURGERY | Facility: CLINIC | Age: 87
End: 2022-05-04
Payer: MEDICARE

## 2022-05-04 ENCOUNTER — TELEPHONE (OUTPATIENT)
Dept: NEUROSURGERY | Facility: CLINIC | Age: 87
End: 2022-05-04

## 2022-05-04 DIAGNOSIS — S32.029A L2 VERTEBRAL FRACTURE (HCC): Primary | ICD-10-CM

## 2022-05-04 PROCEDURE — 99213 OFFICE O/P EST LOW 20 MIN: CPT | Performed by: PHYSICIAN ASSISTANT

## 2022-05-04 NOTE — ASSESSMENT & PLAN NOTE
Presents for hospital follow up for back pain and lumbar fracture  · Found to have mild age indeterminate L2 SEP fracture, new since 2019  Also found to have acute UTI  · Pt also noted to have chronic and stable T12 and T10 compression fractures that were also noted on CT from 2019  · Managed conservatively with LSO brace  · Currently complains of significant low back pain worse with movement  Imaging:  · Lumbar x-ray flexion-extension 5/4/22: Final report pending  No significant change in fracture height or alignment  Plan:  · Continue to monitor for any progressive pain or radicular symptoms  · Imaging results reviewed with patient and daughter, Kiley Book  · Given patient's back pain at this time described as mechanical, will plan to continue brace though no significant changes noted on x-ray  · Will plan virtual visit/follow-up Friday afternoon to assess if patient's pain is any better  · Discussed if pain improves quickly it may be musculoskeletal but if patient with ongoing pain will require repeat upright AP and lateral x-rays  · If pain completely resolved, may consider weaning from brace  · Patient daughter aware to call the office with any questions concerns including any new/progressive pain or lower extremity complaints

## 2022-05-04 NOTE — PROGRESS NOTES
Virtual Regular Visit    Verification of patient location:    Patient is located in the following state in which I hold an active license [place]      Assessment/Plan:      Assessment/Plan     L2 vertebral fracture ([address] )  Presents for hospital follow up for back pain and lumbar fracture  · Found to have mild age indeterminate L2 SEP fracture, new since 2019  Also found to have acute UTI  · Pt also noted to have chronic and stable T12 and T10 compression fractures that were also noted on CT from 2019  · Managed conservatively with LSO brace  · Currently complains of significant low back pain worse with movement  Imaging:  · Lumbar x-ray flexion-extension 5/4/22: Final report pending  No significant change in fracture height or alignment  Plan:  · Continue to monitor for any progressive pain or radicular symptoms  · Imaging results reviewed with patient and daughter, Afua Bullock  · Given patient's back pain at this time described as mechanical, will plan to continue brace though no significant changes noted on x-ray  · Will plan virtual visit/follow-up Friday afternoon to assess if patient's pain is any better  · Discussed if pain improves quickly it may be musculoskeletal but if patient with ongoing pain will require repeat upright AP and lateral x-rays  · If pain completely resolved, may consider weaning from brace  · Patient daughter aware to call the office with any questions concerns including any new/progressive pain or lower extremity complaints  There are no diagnoses linked to this encounter  Reason for visit is   Chief Complaint   Patient presents with    Virtual Regular Visit        Encounter provider Alvina Hood PA-C    Provider located at [address] [facility]  [address] [facility] [address] [facility] [id number]      Recent Visits  No visits were found meeting these conditions    Showing recent visits within past 7 days and Essential hypertension     2. Hospital discharge follow-up             Plan:      I think it may have been a bit premature to treat a 28 yo for bp off one elevated reading. Was given 2 bp medications for a bp of 140s/80.   D/C metorprolol  Patient states he has had history of some elevated pressures, ok to keep on lisinopri  Advised of side effects to watch with lisinopril  f/u 3 months htn and cpe meeting all other requirements  Today's Visits  Date Type Provider Dept   05/04/22 Telemedicine Gibson Starks PA-C [facility]   Showing today's visits and meeting all other requirements  Future Appointments  No visits were found meeting these conditions  Showing future appointments within next 150 days and meeting all other requirements       The patient was identified by name and date of birth  Ailyn Coates was informed that this is a telemedicine visit and that the visit is being conducted through [address] [facility] and patient was informed that this is a secure, HIPAA-compliant platform  She agrees to proceed     My office door was closed  No one else was in the room  She acknowledged consent and understanding of privacy and security of the video platform  The patient has agreed to participate and understands they can discontinue the visit at any time  Patient is aware this is a billable service  Subjective    This is a 75-year-old female past medical history significant for dementia, diabetes, hypertension, hyperlipidemia, varicose veins and aortic stenosis who presents today for follow-up of an L2 fracture  Patient originally presented to the hospital on March 17, 2022 with complaints of low back pain  She was noted to have a urinary tract infection at that time as well as chronic T10 and T12 fractures with an age indeterminate L2 fracture  She was managed conservatively in a brace was treated with antibiotics for her urinary tract infection  Patient was ultimately discharged from the hospital [facility] in [facility] on March 21, 2022  Where she spent two weeks and has since returned [place]  Patient was doing well at her last office visit on April 19th there was recommended follow-up in two weeks with lumbar spine flexion-extension x-rays for which he completed yesterday  Today, patient complains of significant mechanical low back pain    Patient states this has been present for several days but per the daughter, patient was not complaining of this pain yesterday when she went for her x-rays and was mobilizing very well  Pain is described to the left low lumbar spine with mild radiation toward the left hip  Denies any shooting pain numbness or weakness of her legs  No bowel bladder changes         Past Medical History:   Diagnosis Date    Aortic stenosis     Bilateral edema of lower extremity     D[facility]     Diabetes ([facility] )     Fall     Gait abnormality     Hyperlipidemia     Hypertension     Hypokalemia     Other ascites     Varicose veins of leg with edema        Past Surgical History:   Procedure Laterality Date    BREAST SURGERY      ELBOW SURGERY         Current Outpatient Medications   Medication Sig Dispense Refill    acetaminophen (TYLENOL) 325 mg tablet Take 650 mg by mouth every 6 (six) hours as needed for mild pain      apixaban (ELIQUIS) 5 mg Take 1 tablet (5 mg total) by mouth 2 (two) times a day 60 tablet 0    Banana Flakes (BANATROL PLUS PO) Take by mouth 3 (three) times a day with meals      donepezil (ARICEPT) 10 mg tablet Take 1 tablet (10 mg total) by mouth daily 90 tablet 1    famotidine (PEPCID) 20 mg tablet Take 1 tablet (20 mg total) by mouth daily  0    furosemide (LASIX) 20 mg tablet Take 20 mg by mouth daily      glipiZIDE (GLUCOTROL) 10 mg tablet Take 10 mg by mouth in the morning      insulin lispro (Admelog) 100 units/mL injection Inject under the skin 4 (four) times a day Per Sliding Scale      magnesium oxide (MAG-OX) 400 mg Take 1 tablet (400 mg total) by mouth daily 90 tablet 1    metoprolol tartrate (LOPRESSOR) 25 mg tablet Take 1 tablet (25 mg total) by mouth every 12 (twelve) hours  0    Potassium Chloride ER 20 MEQ TBCR Take 2 tablets (40 mEq total) by mouth 3 (three) times a day  0    repaglinide (PRANDIN) 2 mg tablet Take 1 tablet (2 mg total) by mouth 3 (three) times a day before meals 270 tablet 1  sitaGLIPtin (JANUVIA) 100 mg tablet Take 1 tablet (100 mg total) by mouth daily 90 tablet 1     No current facility-administered medications for this visit  No Known Allergies    Review of Systems   Constitutional: Negative  HENT: Negative  Eyes: Negative  Respiratory: Negative  Cardiovascular: Negative  H/o HTN   Gastrointestinal: Negative  Endocrine: Negative  H/o Type 2 Diabetes   Genitourinary: Negative  Musculoskeletal: Positive for back pain (b/l lbp non radiaiting/  8/10 pain) and gait problem (uses walker for assistance)  Wearing brace---T12 fx    Patient is at [address] [place], [place] [address]way: [phone]   Skin: Negative  Allergic/Immunologic: Negative  Hematological: Bruises/bleeds easily  On Eliquis   Psychiatric/Behavioral: Negative  Obtain by MA    Video Exam    There were no vitals filed for this visit  Physical Exam  Constitutional:       General: She is not in acute distress  Appearance: Normal appearance  Comments: Intermittent grimace   HENT:      Head: Normocephalic and atraumatic  Right Ear: External ear normal       Left Ear: External ear normal       Nose: Nose normal    Eyes:      General: No scleral icterus  Right eye: No discharge  Left eye: No discharge  Conjunctiva/sclera: Conjunctivae normal    Skin:     Coloration: Skin is not jaundiced  Neurological:      Mental Status: She is alert  Mental status is at baseline  Motor: No weakness  Comments: Noted to be moving bilateral lower extremities equally  Psychiatric:         Mood and Affect: Mood normal          Behavior: Behavior normal               VIRTUAL VISIT [address] verbally agrees to participate in [employer]   Pt is aware that [employer] could be limited without vital signs or the ability to perform a full hands-on physical Angie Haynes understands she or the provider may request at any time to terminate the video visit and request the patient to seek care or treatment in person

## 2022-05-04 NOTE — TELEPHONE ENCOUNTER
Spoke with daughter Sheryle Handing, her mother Ricardo Rose had virtual visit today with GB  Due to a short staff she will receive a call from our office regarding a F/U virtual visit to see how patient in doing regarding pain  Possible virtual with DB on 5/6/22 @ 1017 due to daughter Sheryle Handing traveling to Facility for the virtual after work     If DB is not able to per   GB she will take care of the virtual visit if no one is available at that time

## 2022-05-05 ENCOUNTER — TELEPHONE (OUTPATIENT)
Dept: NEUROSURGERY | Facility: CLINIC | Age: 87
End: 2022-05-05

## 2022-05-05 ENCOUNTER — TRANSCRIBE ORDERS (OUTPATIENT)
Dept: NEUROSURGERY | Facility: CLINIC | Age: 87
End: 2022-05-05

## 2022-05-05 DIAGNOSIS — S32.029A L2 VERTEBRAL FRACTURE (HCC): Primary | ICD-10-CM

## 2022-05-05 DIAGNOSIS — S32.040S CLOSED COMPRESSION FRACTURE OF L4 LUMBAR VERTEBRA, SEQUELA: Primary | ICD-10-CM

## 2022-05-05 NOTE — TELEPHONE ENCOUNTER
Spoke with patient's daughter Jessi Boland  She informed me that there has been a COVID case on her mom's floor so they are not allowing visitors  She said that her mom has still been experiencing back pain since her incident with the chair at the facility the other day (which happened after she had her x-rays completed)  They are giving her tylenol which has been helping with her pain in the meantime  Advised her, will speak with CHELE Turcios and call her back with her recommendations  Riana Turcios stated that we can order l-spine x-rays upright in brace AP/Lateral and the facility can have patient complete these as soon as they are able to with the covid situation  She also asked that I relay to the facility that patient should remain in brace when out of bed and that she should limit her bending and twisting at the waist   I will call the daughter and facility back to inform them of her recommendations

## 2022-05-05 NOTE — TELEPHONE ENCOUNTER
Daughter was appreciative and verbalized understanding  She is going to take the patient for x-rays as soon as she is able to  She will give me a call to let me know once x-rays are completed so that I can have Valerie Maxwell or another AP review them  Provided her with my direct number        She informed me that the facility is aware that she must remain in the brace when OOB and that she should limit bending/twisting at the waist

## 2022-05-05 NOTE — TELEPHONE ENCOUNTER
----- Message from Gibson Starks PA-C sent at 5/4/2022  4:13 PM EDT -----  Needs virtual visit Friday afternoon 2:45 or later  I can see if no one else can

## 2022-05-09 ENCOUNTER — HOSPITAL ENCOUNTER (OUTPATIENT)
Dept: RADIOLOGY | Facility: HOSPITAL | Age: 87
Discharge: HOME | End: 2022-05-09
Payer: MEDICARE

## 2022-05-09 DIAGNOSIS — S32.029A L2 VERTEBRAL FRACTURE (HCC): ICD-10-CM

## 2022-05-09 PROCEDURE — 72100 X-RAY EXAM L-S SPINE 2/3 VWS: CPT

## 2022-05-11 NOTE — TELEPHONE ENCOUNTER
Patient's daughter Emilia Powell called and left me a message informing me that Candy Braden had completed the x-rays that Fernando Silva had ordered for her  I sent a TT to Fernando Silva making her aware  They are not yet read by radiology  I called Emilia Powell back and made her aware that they have no yet been read but that I did make Fernando Silva aware that they were completed  She was appreciative for the follow up

## 2022-05-13 NOTE — TELEPHONE ENCOUNTER
Per TT from CHELE LAIRD, results from patient's XR L-spine are back and they read "Acute L4 mild/moderate compression fracture  No change chronic T10, T12, L2 compression fractures  The study was marked in EPIC for immediate notification "      She would like patient to come back into the office in 2 weeks with repeat L-spine x-rays 2-3 views upright AP/Lateral in brace  She would like patient to continue wearing her brace when she is out of bed  Will call daughter and facility to arrange for appointments and imaging

## 2022-05-13 NOTE — TELEPHONE ENCOUNTER
Received a vm from the patient's daughter, Eugenia Jaimes, requesting for xrays results  This RN called radiology and spoke with Gertrudis Spencer  Requested for radiology to expedite the read  He said the xray will be read shortly  Called Eugenia Jaimes and notified her of the above  She expressed understanding  She is aware this RN will send a message to the provider once the xrays are officially read  She was appreciative  Xrays are now final and were marked for immediate notification   Routing message to [facility]

## 2022-05-13 NOTE — TELEPHONE ENCOUNTER
Spoke with daughter sonia and told her about the results  She said they have been keeping her in the brace when she is out of bed and will continue to do so- patient doesn't mind wearing the brace  Scheduled follow up with Maximus Bonilla at [place] for 5/25 @ 230pm and sonia will take her to get x-rays done Monday the 23rd or that weekend beforehand  She was appreciative for the coordination

## 2022-05-23 ENCOUNTER — HOSPITAL ENCOUNTER (OUTPATIENT)
Dept: RADIOLOGY | Facility: HOSPITAL | Age: 87
Discharge: HOME | End: 2022-05-23
Payer: MEDICARE

## 2022-05-23 DIAGNOSIS — S32.040S CLOSED COMPRESSION FRACTURE OF L4 LUMBAR VERTEBRA, SEQUELA: ICD-10-CM

## 2022-05-23 PROCEDURE — 72100 X-RAY EXAM L-S SPINE 2/3 VWS: CPT

## 2022-05-25 ENCOUNTER — OFFICE VISIT (OUTPATIENT)
Dept: NEUROSURGERY | Facility: CLINIC | Age: 87
End: 2022-05-25
Payer: MEDICARE

## 2022-05-25 VITALS
HEART RATE: 68 BPM | TEMPERATURE: 98.7 F | HEIGHT: 59 IN | SYSTOLIC BLOOD PRESSURE: 110 MMHG | DIASTOLIC BLOOD PRESSURE: 88 MMHG | BODY MASS INDEX: 32.66 KG/M2 | WEIGHT: 162 LBS | RESPIRATION RATE: 18 BRPM

## 2022-05-25 DIAGNOSIS — S32.041A CLOSED STABLE BURST FRACTURE OF FOURTH LUMBAR VERTEBRA, INITIAL ENCOUNTER (HCC): Primary | ICD-10-CM

## 2022-05-25 DIAGNOSIS — S32.021D CLOSED STABLE BURST FRACTURE OF SECOND LUMBAR VERTEBRA WITH ROUTINE HEALING, SUBSEQUENT ENCOUNTER: ICD-10-CM

## 2022-05-25 DIAGNOSIS — S22.080D COMPRESSION FRACTURE OF T12 VERTEBRA WITH ROUTINE HEALING, SUBSEQUENT ENCOUNTER: ICD-10-CM

## 2022-05-25 PROBLEM — S32.049A L4 VERTEBRAL FRACTURE (HCC): Status: ACTIVE | Noted: 2022-05-25

## 2022-05-25 PROCEDURE — 99213 OFFICE O/P EST LOW 20 MIN: CPT | Performed by: PHYSICIAN ASSISTANT

## 2022-05-25 NOTE — PROGRESS NOTES
Neurosurgery Office Note  Evelin Moss 80 y o  female MRN: [id number]      Assessment/Plan     L2 vertebral fracture [facility])  Presents for hospital follow up for back pain and lumbar fracture  · Found to have mild age indeterminate L2 SEP fracture, new since 2019  Also found to have acute UTI  · Pt also noted to have chronic and stable T12 and T10 compression fractures that were also noted on CT from 2019  · Continues complains of significant low back pain worse with movement  ·     Imaging:  · Lumbar x-ray flexion-extension 5/4/22: Final report pending  No significant change in fracture height or alignment  · Lumbar x-ray 5/9/22:  Acute L4 mild moderate compression fracture  No change chronic T10, T12 and L2 fractures  Plan:  · Continue to monitor for any progressive pain or radicular symptoms  · Imaging results reviewed with patient and daughter, Sheryle Handing  · No further follow-up for L2 fracture required  Will have ongoing follow-up for L4 fracture  · Please see other diagnosis  L4 vertebral fracture (HCC)  Acute L4 superior endplate compression deformity   Acute onset severe low back pain    Imaging:    X-rays lumbar spine 5/9/22:  Acute L4 mild-to-moderate compression fracture  No change chronic T10, T12, L2 compression fracture   X-rays lumbar spine 5/23/2022:  No change in mild to moderate compression fracture of L4 vertebral body since prior study  Stable chronic compression fractures  Plan:    Continue monitor neurological symptoms   Xrays imaging reviewed with patient and daughter    Continue brace when out of bed    o Please angle front Velcro brace to a "V" to help keep brace down under breast and on hips  Pull brace tight to provide good support  Adjust frequently if needed to ensure proper fitting   Pain control   o Tylenol 975mg PO Q8H scheduled ot Tylenol 650mg Po Q 6H scheduled for 2 weeks then transition to as needed   Ordered DEXA scan to be done     o Recommend calcium supplement and vitamin D supplement   Given patients pain and acute fracture a few weeks ago, discussed consideration for Kyphoplasty but given patient's age family would prefer to defer with the understanding that the procedure is generally not an option once the fracture becomes chronic   Strict fall precautions and lumbar precautions   Follow-up in 4 weeks with repeat Lumbar Xrays as virtual visit         Diagnoses and all orders for this visit:    Closed stable burst fracture of fourth lumbar vertebra, initial encounter ([facility] [address] [address] )  -     DXA bone density spine hip and pelvis; Future  -     XR spine lumbar 2 or 3 views injury; Future    Closed stable burst fracture of second lumbar vertebra with routine healing, subsequent encounter    Compression fracture of T12 vertebra with routine healing, subsequent encounter          I spent 20 minutes with the patient today in which >50% of the time was spent counseling/coordination of care regarding diagnosis, imaging review, symptoms and treatment plan  CHIEF COMPLAINT    Chief Complaint   Patient presents with    Follow-up     2 week hospital follow up        HISTORY    History of Present Illness     This is a 70-year-old female past medical history significant for dementia, diabetes, hypertension, hyperlipidemia, varicose veins and aortic stenosis who presents today for follow-up of an L4 fracture  Patient originally presented to the hospital on March 17, 2022 with complaints of low back pain  She was noted to have a urinary tract infection at that time as well as chronic T10 and T12 fractures with an age indeterminate L2 fracture  She was managed conservatively in a brace was treated with antibiotics for her urinary tract infection  Patient was ultimately discharged from the hospital to [facility] on March 21, 2022 where she spent two weeks and has since returned to [facility]    At her last visit, she was complaining of significant increased low back pain for which repeat Xrays wee completed demonstrating a new L4 compression fracture  She presents today for follow-up with new upright xrays  Today, patient continues with significant severe focal non radiating low back pain but did not take any medication today  Per her daughter, she forgets to ask for medication  Patient states if feels worse at this time even sitting in the chair and with movement  Denies any pain, numbness or new weakness in her legs  No change in bowel/bladder function though does have some incontinence  She does admits to some help with the brace which she is wearing today  REVIEW OF SYSTEMS    Review of Systems   Constitutional: Negative  HENT: Negative  Eyes: Negative  Respiratory: Negative  Cardiovascular: Negative  H/o HTN   Gastrointestinal: Negative  Endocrine: Negative  H/o Type 2 Diabetes   Genitourinary: Positive for enuresis, frequency and urgency  Musculoskeletal: Positive for back pain (b/l lbp non radiaiting/  8/10 pain) and gait problem (uses walker for assistance)  Wearing TLSO brace    Pain  9/10   Skin: Negative  Allergic/Immunologic: Negative  Hematological: Bruises/bleeds easily  On Eliquis   Psychiatric/Behavioral: Negative  All other systems reviewed and are negative  ROS obtained by MA   See HPI    Meds/Allergies     Current Outpatient Medications   Medication Sig Dispense Refill    acetaminophen (TYLENOL) 325 mg tablet Take 650 mg by mouth every 6 (six) hours as needed for mild pain      apixaban (ELIQUIS) 5 mg Take 1 tablet (5 mg total) by mouth 2 (two) times a day 60 tablet 0    Banana Flakes (BANATROL PLUS PO) Take by mouth 3 (three) times a day with meals      donepezil (ARICEPT) 10 mg tablet Take 1 tablet (10 mg total) by mouth daily 90 tablet 1    furosemide (LASIX) 20 mg tablet Take 20 mg by mouth daily      glipiZIDE (GLUCOTROL) 10 mg tablet Take 10 mg by mouth in the morning      insulin lispro (insulin lispro) 100 units/mL injection Inject under the skin 4 (four) times a day Per Sliding Scale      magnesium oxide (MAG-OX) 400 mg Take 1 tablet (400 mg total) by mouth daily 90 tablet 1    metoprolol tartrate (LOPRESSOR) 25 mg tablet Take 1 tablet (25 mg total) by mouth every 12 (twelve) hours  0    Potassium Chloride ER 20 MEQ TBCR Take 2 tablets (40 mEq total) by mouth 3 (three) times a day  0    repaglinide (PRANDIN) 2 mg tablet Take 1 tablet (2 mg total) by mouth 3 (three) times a day before meals 270 tablet 1    sitaGLIPtin (JANUVIA) 100 mg tablet Take 1 tablet (100 mg total) by mouth daily 90 tablet 1    famotidine (PEPCID) 20 mg tablet Take 1 tablet (20 mg total) by mouth daily (Patient not taking: Reported on 5/25/2022)  0     No current facility-administered medications for this visit  No Known Allergies    PAST HISTORY    Past Medical History:   Diagnosis Date    Aortic stenosis     Bilateral edema of lower extremity     Dementia (HCC)     [facility] [address]     Fall     Gait abnormality     Hyperlipidemia     Hypertension     Hypokalemia     Other ascites     Varicose veins of leg with edema        Past Surgical History:   Procedure Laterality Date    BREAST SURGERY      ELBOW SURGERY         Social History     Tobacco Use    Smoking status: Never Smoker    Smokeless tobacco: Never Used   Vaping Use    Vaping Use: Never used   Substance Use Topics    Alcohol use: Not Currently    Drug use: No       Family History   Problem Relation Age of Onset    Dementia Sister          Above history personally reviewed  EXAM    Vitals:Blood pressure 110/88, pulse 68, temperature 98 7 °F (37 1 °C), temperature source Temporal, resp  rate 18, height 4' 11" (1 499 m), weight 73 5 kg (162 lb)  ,Body mass index is 32 72 kg/m²  Physical Exam  Constitutional:       General: She is not in acute distress  Appearance: Normal appearance   She is well-developed  She is not ill-appearing  HENT:      Head: Normocephalic and atraumatic  Right Ear: External ear normal       Left Ear: External ear normal       Nose: Nose normal    Eyes:      General: No scleral icterus  Right eye: No discharge  Left eye: No discharge  Conjunctiva/sclera: Conjunctivae normal    Cardiovascular:      Rate and Rhythm: Normal rate  Pulmonary:      Effort: Pulmonary effort is normal  No respiratory distress  Abdominal:      General: There is no distension  Musculoskeletal:         General: Tenderness (mildine L4 and right paraspinal) present  Skin:     General: Skin is warm and dry  Findings: No rash  Neurological:      Mental Status: She is alert  Psychiatric:         Mood and Affect: Mood normal          Speech: Speech normal          Behavior: Behavior normal          Thought Content: Thought content normal          Judgment: Judgment normal          Neurologic Exam     Mental Status   Follows 2 step commands  Attention: decreased  Concentration: decreased  Speech: speech is normal   Level of consciousness: alert  Knowledge: good  Normal comprehension  Cranial Nerves     CN III, IV, VI   Conjugate gaze: present    CN VII   Facial expression full, symmetric  CN VIII   Hearing: impaired    Motor Exam   Muscle bulk: normal  Overall muscle tone: normalMAE 4-4+ without mild HF weakness   Good distal strength     Sensory Exam   Light touch normal      Gait, Coordination, and Reflexes     Gait  Gait: (antalgic)    Tremor   Resting tremor: absent  Intention tremor: absent  Action tremor: absent    Reflexes   Right Driscoll: absent  Left Driscoll: absent  Right ankle clonus: absent  Left ankle clonus: absent        MEDICAL DECISION MAKING    Imaging Studies:     XR spine lumbar 2 or 3 views injury    Result Date: 5/13/2022  Narrative: LUMBAR SPINE INDICATION:   S32 029A: Unspecified fracture of second lumbar vertebra, initial encounter for closed fracture  COMPARISON:  5/3/2022   4/18/2022  CT 3/17/2022 VIEWS:  XR SPINE LUMBAR 2 OR 3 VIEWS INJURY FINDINGS: There are 5 non rib bearing lumbar vertebral bodies  Acute L4 mild/moderate compression fracture  Chronic T10, T12, L2 compression fractures External brace present  L3-L4, L4-5 mild degenerative spondylolisthesis  Anatomic alignment otherwise  Marked L5-S1 disc space narrowing again noted  The pedicles appear intact  Soft tissues are unremarkable  Impression: Acute L4 mild/moderate compression fracture  No change chronic T10, T12, L2 compression fractures The study was marked in EPIC for immediate notification  Workstation performed: [id number]     XR spine lumbar complete w bending minimum 6 views    Result Date: 5/8/2022  Narrative: LUMBAR SPINE INDICATION:   S32 029A: Unspecified fracture of second lumbar vertebra, initial encounter for closed fracture  COMPARISON:  April 18, 2022  VIEWS:  XR SPINE LUMBAR COMPLETE W BENDING MINIMUM 6 VIEWS FINDINGS: There are 5 non rib bearing lumbar vertebral bodies  Stable alignment of mild superior endplate compression deformity of L2 and old compression deformity of T12 vertebral bodies  No retropulsion  Stable grade 1 anterolisthesis of L4 on L5  Marked degenerative disc disease at L5-S1 with marked disc space narrowing  Multilevel facet joint arthropathy  The pedicles appear intact  Soft tissues are unremarkable  Impression: Stable alignment of mild superior endplate compression deformity of L2 vertebral body  Workstation performed: [id number]       I have personally reviewed pertinent reports

## 2022-05-25 NOTE — PATIENT INSTRUCTIONS
Assessment/Plan:  Continue brace when out of bed  Please angle front Velcro brace to a "V" to help keep brace down under breast and on hips  Pull brace tight to provide good support  Adjust frequently if needed to ensure proper fitting  Pain control   Tylenol 975mg PO Q8H scheduled ot Tylenol 650mg Po Q 6H scheduled for 2 weeks then transition to as needed  Ordered DEXA scan to be done  Recommend calcium supplement and vitamin D supplement  Strict fall precautions and lumbar precautions  Follow-up in 4 weeks with repeat Lumbar Xrays as virtual visit      Neurosurgery discharge instructions following spine fracture:     LSO brace to be worn when out of bed of head of bed greater than 45 degrees  May place brace on while sitting on edge of bed  May be removed for showering  No bending, twisting or heavy lifting  No strenuous activities  No Driving  Follow-up as scheduled in four weeks with repeat spine x-rays to be completed 2-3 days prior to visit  Prescription has been entered electronically  **Please notify MD immediately if you have increased back or leg pain   New numbness, tingling, weakness in your legs and/or bowel/bladder incontinence**

## 2022-05-25 NOTE — ASSESSMENT & PLAN NOTE
Presents for hospital follow up for back pain and lumbar fracture  · Found to have mild age indeterminate L2 SEP fracture, new since 2019  Also found to have acute UTI  · Pt also noted to have chronic and stable T12 and T10 compression fractures that were also noted on CT from 2019  · Continues complains of significant low back pain worse with movement  ·     Imaging:  · Lumbar x-ray flexion-extension 5/4/22: Final report pending  No significant change in fracture height or alignment  · Lumbar x-ray 5/9/22:  Acute L4 mild moderate compression fracture  No change chronic T10, T12 and L2 fractures  Plan:  · Continue to monitor for any progressive pain or radicular symptoms  · Imaging results reviewed with patient and daughter, Ashley Chopra  · No further follow-up for L2 fracture required  Will have ongoing follow-up for L4 fracture  · Please see other diagnosis

## 2022-06-20 ENCOUNTER — HOSPITAL ENCOUNTER (OUTPATIENT)
Dept: RADIOLOGY | Facility: HOSPITAL | Age: 87
Discharge: HOME | End: 2022-06-20
Payer: MEDICARE

## 2022-06-20 DIAGNOSIS — S32.041A CLOSED STABLE BURST FRACTURE OF FOURTH LUMBAR VERTEBRA, INITIAL ENCOUNTER (HCC): ICD-10-CM

## 2022-06-20 PROCEDURE — 72100 X-RAY EXAM L-S SPINE 2/3 VWS: CPT

## 2022-06-23 ENCOUNTER — TELEMEDICINE (OUTPATIENT)
Dept: NEUROSURGERY | Facility: CLINIC | Age: 87
End: 2022-06-23
Payer: MEDICARE

## 2022-06-23 DIAGNOSIS — S32.029A L2 VERTEBRAL FRACTURE (HCC): Primary | ICD-10-CM

## 2022-06-23 PROCEDURE — 99212 OFFICE O/P EST SF 10 MIN: CPT | Performed by: PHYSICIAN ASSISTANT

## 2022-06-23 NOTE — PROGRESS NOTES
Virtual Regular Visit    Verification of patient location: It was my intent to perform this visit via video technology but the patient was not able to do a video connection so the visit was completed via audio telephone only  Patient is located in the following state in which I hold an active license [place]      Assessment:    Patient is a 80 yrs old woman, with Hx of multiple chronic TL VB compression fractures  Patient residing in [address]  Patient had 2 months follow up Lumbar spine xrays , reported as stable T12, L2 and L4 compression deformity  I called [facility], but the staff looks busy and wants to call them back  I discussed with her daughter the results  Her daughter says her mom complains back pain, but Tylenol helped her very much, and she is taking scheduled  She has also occasional radicular symptoms  Patient with gait issues, but strong and be able to walk, uses walker for ambulation  Wearing brace  Exam limited-Telephone     Hx, and images  reviewed and Mx discussed with her daughter  I would recommend 4 weeks follow up upright Lumbar spine  X-rays in brace  Advised continue wearing brace when upright and at 45 degree head of bed  Continue Tylenol for pain, scheduled  Fall precaution, avoid lifting heavy objects , excessive bending or twisting  Questions and concerns were answered  Daughter understands the instructions, but stated we need to call and discuss with the staff at [facility] assisted Living  We tried to reach out to the staff there, but unable-looks like they are busy  Will try  Plan:  1  Lumbar spine x-rays in brace/4 weeks  2  Continue wearing TLSO brace when upright and at 45 degree head of bed  3  Fall precaution, avoid lifting heavy objects, excessive bending or twisting  4  Continue pain pills, as prescribed  5   F/U in 4 weeks    Problem List Items Addressed This Visit    None              Reason for visit is   Chief Complaint   Patient presents with    Virtual Regular Visit        Encounter provider Jessy Corral PA-C    Provider located at [address] [facility]  [address] [id number]      Recent Visits  No visits were found meeting these conditions  Showing recent visits within past 7 days and meeting all other requirements  Future Appointments  No visits were found meeting these conditions  Showing future appointments within next 150 days and meeting all other requirements       The patient was identified by name and date of birth  Paramjit Patino was informed that this is a telemedicine visit and that the visit is being conducted through Telephone  My office door was closed  No one else was in the room  She acknowledged consent and understanding of privacy and security of the video platform  The patient has agreed to participate and understands they can discontinue the visit at any time  Patient is aware this is a billable service  Subjective    C/C: " multiple chronic  lumbar fracture with back pain"/ 8 weeks F/U      Paramjit Patino is a 80 y o  female with past medical history of hypertension, 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, congestive heart failure, senile dementia, aortic stenosis, diabetes mellitus, need for 8 weeks follow-up of multiple chronic compression deformity of thoracolumbar vertebra including T10, T12, L2 and L4  Patient had follow-up upright lumbar spine x-rays in brace which demonstrates more or less stable multiple thoracolumbar fractures when compared to previous x-rays  I went over the findings of the x-rays with her daughter Lorraine Ambriz  She says her mother complains back pain when she is not on Tylenol, with associated occasional  radicular pain  But her mother is able to walk, using walker  She is wearing brace  Dexa scan ordered  Results pending            Past Medical History:   Diagnosis Date    Aortic stenosis     Bilateral edema of lower extremity  [facility] [address] [facility]     [facility]     Fall     Gait abnormality     Hyperlipidemia     Hypertension     Hypokalemia     Other ascites     Varicose veins of leg with edema        Past Surgical History:   Procedure Laterality Date    BREAST SURGERY      ELBOW SURGERY         Current Outpatient Medications   Medication Sig Dispense Refill    acetaminophen (TYLENOL) 325 mg tablet Take 650 mg by mouth every 6 (six) hours as needed for mild pain      apixaban (ELIQUIS) 5 mg Take 1 tablet (5 mg total) by mouth 2 (two) times a day 60 tablet 0    Banana Flakes (BANATROL PLUS PO) Take by mouth 3 (three) times a day with meals      donepezil (ARICEPT) 10 mg tablet Take 1 tablet (10 mg total) by mouth daily 90 tablet 1    famotidine (PEPCID) 20 mg tablet Take 1 tablet (20 mg total) by mouth daily (Patient not taking: Reported on 5/25/2022)  0    furosemide (LASIX) 20 mg tablet Take 20 mg by mouth daily      glipiZIDE (GLUCOTROL) 10 mg tablet Take 10 mg by mouth in the morning      insulin lispro (insulin lispro) 100 units/mL injection Inject under the skin 4 (four) times a day Per Sliding Scale      magnesium oxide (MAG-OX) 400 mg Take 1 tablet (400 mg total) by mouth daily 90 tablet 1    metoprolol tartrate (LOPRESSOR) 25 mg tablet Take 1 tablet (25 mg total) by mouth every 12 (twelve) hours  0    Potassium Chloride ER 20 MEQ TBCR Take 2 tablets (40 mEq total) by mouth 3 (three) times a day  0    repaglinide (PRANDIN) 2 mg tablet Take 1 tablet (2 mg total) by mouth 3 (three) times a day before meals 270 tablet 1    sitaGLIPtin (JANUVIA) 100 mg tablet Take 1 tablet (100 mg total) by mouth daily 90 tablet 1     No current facility-administered medications for this visit          No Known Allergies    Review of Systems   Unable to perform ROS: Dementia     It was my intent to perform this visit via video technology but the patient was not able to do a video connection so the visit was completed via audio telephone only  Video Exam    There were no vitals filed for this visit  Physical Exam     I spent 20 minutes with patient today in which greater than 50% of the time was spent in counseling/coordination of care regarding Mx plan    VIRTUAL VISIT [address] verbally agrees to participate in [employer]  Pt is aware that [employer] could be limited without vital signs or the ability to perform a full hands-on physical Stephanie Mendoza understands she or the provider may request at any time to terminate the video visit and request the patient to seek care or treatment in person

## 2022-07-18 ENCOUNTER — HOSPITAL ENCOUNTER (OUTPATIENT)
Dept: RADIOLOGY | Facility: HOSPITAL | Age: 87
Discharge: HOME | End: 2022-07-18
Payer: MEDICARE

## 2022-07-18 DIAGNOSIS — S32.029A L2 VERTEBRAL FRACTURE (HCC): ICD-10-CM

## 2022-07-18 PROCEDURE — 72100 X-RAY EXAM L-S SPINE 2/3 VWS: CPT

## 2022-07-21 ENCOUNTER — OFFICE VISIT (OUTPATIENT)
Dept: NEUROSURGERY | Facility: CLINIC | Age: 87
End: 2022-07-21
Payer: MEDICARE

## 2022-07-21 VITALS
BODY MASS INDEX: 32.66 KG/M2 | DIASTOLIC BLOOD PRESSURE: 76 MMHG | TEMPERATURE: 97.9 F | SYSTOLIC BLOOD PRESSURE: 124 MMHG | HEART RATE: 80 BPM | HEIGHT: 59 IN | RESPIRATION RATE: 18 BRPM | WEIGHT: 162 LBS

## 2022-07-21 DIAGNOSIS — S32.000A LUMBAR COMPRESSION FRACTURE (HCC): Primary | ICD-10-CM

## 2022-07-21 PROCEDURE — 99213 OFFICE O/P EST LOW 20 MIN: CPT | Performed by: PHYSICIAN ASSISTANT

## 2022-07-21 NOTE — PROGRESS NOTES
Neurosurgery Office Note  Tamia Colon 80 y o  female MRN: [id number]      Assessment/Plan    Patient is a 80 yrs old pleasant woman with PMHx of multiple TL compression deformities, including T12, L2, and L4  Had Hx of fall  Patient is here for 3 months follow up with Upright Lumbar spine xrays  She is wearing TLSO brace  Reports improvemnt with pain, sometime breakthrough pain 7/10 yesterday, today none  No radicular symptoms, numbness, paresthesia or weakness  No B/B dysfunction related to the fractures nor saddle anaesthesia  Baseline gait instability, uses walker for ambulation  Patient taking Tylenol for pain  Exam-Alert, communicates well  Kiley  Strength 5/5 and sensation to LT intact bilaterally  DTR 2+   No clonus bilaterally  Non tender on palpation of TL spine  Wearing TLSO   Hx, PEx, and images reviewed with the patient and her daughter  Mx plan discussed  I think patient's pain is improving  Her back pain is multifactorial including DJD, Spondylolisthesis, fractures, and additionally muscular  Over all, feeling better, I would recommend Flexion Extension Lumbar spine x-rays in 2-3  weeks  Consider weaning of the brace  Fall precaution, avoid excessive bending or lifting heavy objects  All questions and concerns were  answered to patient's and family satisfaction  Family and patient expressed their understandings and agreed with the plan  Plan:  1  Flexion-Extension Lumbar spine X-Rays/2 weeks  2  Consider weaning off the brace  3  Continue Tylenol for pain  4  Fall precaution, avoid lifting heavy objects, excessive bending or twisting  5  Follow up in 4 weeks  6  Call with question or concern    I spent 30 minutes with the patient today in which >50% of the time was spent counseling/coordination of care regarding diagnosis, imaging review, symptoms and treatment plan           Chief Complaint   Patient presents with    Follow-up    Back Pain       C/C: " 3 mo follow up for Multiple TL VBs #"    History of Present Illness      All patients medical histories were reviewed and updated as appropriate: Allergies, current medication list, past medical history, past surgical history, family history, social history, and current medical lists  Patient is here today with her daughter for 3 months follow up of multiple TL VBs compression deformities  Had follow up X-rays, which demonstrates more or less stable T10, T12, L2 and L4 compressions with mild spondylolisthesis of L3/4, and L4/5  Patient reports feeling better  But sometimes pain is moderate to severe  Today with out pain  She is wearing brace  Denies any radicular pain, numbness, paresthesia or weakness in the lower  Limbs  Baseline gait issues, uses walker for ambulation  No B/B dysfunction nor saddle anaesthesia  REVIEW OF SYSTEMS  ROS personally reviewed and updated  Review of Systems   Constitutional: Negative  HENT: Negative  Eyes: Negative  Respiratory: Negative  Cardiovascular: Negative  Gastrointestinal: Negative  Endocrine: Negative  Genitourinary: Negative  Musculoskeletal: Positive for back pain and gait problem (uses walker for assistance)  T12 Comp Fx & L2 Fx   Skin: Negative  Allergic/Immunologic: Negative  Hematological: Negative  Psychiatric/Behavioral: Negative  All other systems reviewed and are negative          Meds/Allergies     Current Outpatient Medications   Medication Sig Dispense Refill    acetaminophen (TYLENOL) 325 mg tablet Take 650 mg by mouth every 6 (six) hours as needed for mild pain      apixaban (ELIQUIS) 5 mg Take 1 tablet (5 mg total) by mouth 2 (two) times a day 60 tablet 0    Banana Flakes (BANATROL PLUS PO) Take by mouth 3 (three) times a day with meals      donepezil (ARICEPT) 10 mg tablet Take 1 tablet (10 mg total) by mouth daily 90 tablet 1    famotidine (PEPCID) 20 mg tablet Take 1 tablet (20 mg total) by mouth daily (Patient not taking: Reported on 5/25/2022)  0    furosemide (LASIX) 20 mg tablet Take 20 mg by mouth daily      glipiZIDE (GLUCOTROL) 10 mg tablet Take 10 mg by mouth in the morning      insulin lispro (insulin lispro) 100 units/mL injection Inject under the skin 4 (four) times a day Per Sliding Scale      magnesium oxide (MAG-OX) 400 mg Take 1 tablet (400 mg total) by mouth daily 90 tablet 1    metoprolol tartrate (LOPRESSOR) 25 mg tablet Take 1 tablet (25 mg total) by mouth every 12 (twelve) hours  0    Potassium Chloride ER 20 MEQ TBCR Take 2 tablets (40 mEq total) by mouth 3 (three) times a day  0    repaglinide (PRANDIN) 2 mg tablet Take 1 tablet (2 mg total) by mouth 3 (three) times a day before meals 270 tablet 1    sitaGLIPtin (JANUVIA) 100 mg tablet Take 1 tablet (100 mg total) by mouth daily 90 tablet 1     No current facility-administered medications for this visit  No Known Allergies    PAST HISTORY    Past Medical History:   Diagnosis Date    Aortic stenosis     Bilateral edema of lower extremity     Dementia (HCC)     [facility] [address] [facility]     Fall     Gait abnormality     Hyperlipidemia     Hypertension     Hypokalemia     Other ascites     Varicose veins of leg with edema        Past Surgical History:   Procedure Laterality Date    BREAST SURGERY      ELBOW SURGERY         Social History     Tobacco Use    Smoking status: Never Smoker    Smokeless tobacco: Never Used   Vaping Use    Vaping Use: Never used   Substance Use Topics    Alcohol use: Not Currently    Drug use: No       Family History   Problem Relation Age of Onset    Dementia Sister          Above history personally reviewed  EXAM    Vitals: There were no vitals taken for this visit  ,There is no height or weight on file to calculate BMI  Physical Exam  Constitutional:       Appearance: She is obese  HENT:      Head: Normocephalic and atraumatic  Eyes:      Extraocular Movements: Extraocular movements intact     Cardiovascular: Rate and Rhythm: Normal rate  Pulses: Normal pulses  Pulmonary:      Effort: Pulmonary effort is normal    Musculoskeletal:         General: Normal range of motion  Cervical back: Normal range of motion  Neurological:      General: No focal deficit present  Mental Status: She is alert  GCS: GCS eye subscore is 4  GCS verbal subscore is 5  GCS motor subscore is 6  Cranial Nerves: Cranial nerves are intact  Sensory: Sensation is intact  Motor: Motor function is intact  Deep Tendon Reflexes: Reflexes are normal and symmetric  Reflex Scores:       Tricep reflexes are 2+ on the right side and 2+ on the left side  Bicep reflexes are 2+ on the right side and 2+ on the left side  Brachioradialis reflexes are 2+ on the right side and 2+ on the left side  Patellar reflexes are 2+ on the right side and 2+ on the left side  Achilles reflexes are 2+ on the right side and 2+ on the left side          Neurologic Exam     Mental Status   Level of consciousness: alert    Cranial Nerves     CN XI   CN XI normal      Motor Exam   Muscle bulk: normal  Overall muscle tone: normal  Right arm tone: normal  Left arm tone: normal  Right arm pronator drift: absent  Left arm pronator drift: absent  Right leg tone: normal  Left leg tone: normal    Sensory Exam   Light touch normal      Gait, Coordination, and Reflexes     Reflexes   Right brachioradialis: 2+  Left brachioradialis: 2+  Right biceps: 2+  Left biceps: 2+  Right triceps: 2+  Left triceps: 2+  Right patellar: 2+  Left patellar: 2+  Right achilles: 2+  Left achilles: 2+  Right : 2+  Left : 2+  Right Driscoll: absent  Left Driscoll: absent  Right ankle clonus: absent  Left pendular knee jerk: absent        MEDICAL DECISION MAKING    Imaging Studies:     XR spine lumbar 2 or 3 views injury    Result Date: 7/20/2022  Narrative: LUMBAR SPINE INDICATION:   S32 029A: Unspecified fracture of second lumbar vertebra, initial encounter for closed fracture  COMPARISON:  6/20/2022 5/23/2022  CT 3/17/2022  VIEWS:  XR SPINE LUMBAR 2 OR 3 VIEWS INJURY FINDINGS: There are 5 non rib bearing lumbar vertebral bodies  T10, T12, L2, L4 varying degrees compression fractures/deformities are unchanged  External brace present  There is no evidence of new acute fracture or destructive osseous lesion  L3-L4, L4-L5 mild spondylolisthesis again noted  Anatomic alignment otherwise  L1-L2, L3-L4 through L4-L5 degenerative disc changes  The pedicles appear intact  Soft tissues are unremarkable  Impression: No change lower thoracic and lumbar compression fractures/deformities   Workstation performed: [id number]       I have personally reviewed pertinent reports   , I have personally reviewed pertinent films in PACS and I have personally reviewed pertinent films in PACS with a Radiologist

## 2022-07-26 ENCOUNTER — HOSPITAL ENCOUNTER (INPATIENT)
Facility: HOSPITAL | Age: 87
LOS: 6 days | Discharge: NURSING, REHAB OR LONG-TERM CARE | DRG: 552 | End: 2022-08-02
Attending: EMERGENCY MEDICINE | Admitting: INTERNAL MEDICINE
Payer: MEDICARE

## 2022-07-26 ENCOUNTER — APPOINTMENT (EMERGENCY)
Dept: RADIOLOGY | Facility: HOSPITAL | Age: 87
DRG: 552 | End: 2022-07-26
Payer: MEDICARE

## 2022-07-26 DIAGNOSIS — N30.00 ACUTE CYSTITIS WITHOUT HEMATURIA: ICD-10-CM

## 2022-07-26 DIAGNOSIS — R41.82 ALTERED MENTAL STATUS, UNSPECIFIED: Primary | ICD-10-CM

## 2022-07-26 DIAGNOSIS — F03.918 DEMENTIA WITH BEHAVIORAL DISTURBANCE: ICD-10-CM

## 2022-07-26 DIAGNOSIS — S32.000A LUMBAR COMPRESSION FRACTURE (HCC): ICD-10-CM

## 2022-07-26 PROBLEM — I50.22 CHRONIC SYSTOLIC CHF (CONGESTIVE HEART FAILURE) (HCC): Status: ACTIVE | Noted: 2022-07-26

## 2022-07-26 PROBLEM — I10 HTN (HYPERTENSION): Status: RESOLVED | Noted: 2021-04-25 | Resolved: 2022-07-26

## 2022-07-26 PROBLEM — F03.91 DEMENTIA WITH BEHAVIORAL DISTURBANCE (HCC): Status: ACTIVE | Noted: 2022-07-26

## 2022-07-26 PROBLEM — S32.030A COMPRESSION FRACTURE OF L3 VERTEBRA (HCC): Status: ACTIVE | Noted: 2022-07-26

## 2022-07-26 LAB
ALBUMIN SERPL BCP-MCNC: 3.6 G/DL (ref 3.5–5)
ALP SERPL-CCNC: 111 U/L (ref 46–116)
ALT SERPL W P-5'-P-CCNC: 18 U/L (ref 12–78)
ANION GAP SERPL CALCULATED.3IONS-SCNC: 7 MMOL/L (ref 4–13)
AST SERPL W P-5'-P-CCNC: 24 U/L (ref 5–45)
ATRIAL RATE: 73 BPM
ATRIAL RATE: 75 BPM
BACTERIA UR QL AUTO: ABNORMAL /HPF
BASOPHILS # BLD AUTO: 0.07 THOUSANDS/ΜL (ref 0–0.1)
BASOPHILS NFR BLD AUTO: 1 % (ref 0–1)
BILIRUB SERPL-MCNC: 0.62 MG/DL (ref 0.2–1)
BILIRUB UR QL STRIP: NEGATIVE
BUN SERPL-MCNC: 22 MG/DL (ref 5–25)
CALCIUM SERPL-MCNC: 9.5 MG/DL (ref 8.3–10.1)
CHLORIDE SERPL-SCNC: 108 MMOL/L (ref 96–108)
CLARITY UR: CLEAR
CO2 SERPL-SCNC: 23 MMOL/L (ref 21–32)
COLOR UR: ABNORMAL
CREAT SERPL-MCNC: 1.26 MG/DL (ref 0.6–1.3)
EOSINOPHIL # BLD AUTO: 0.02 THOUSAND/ΜL (ref 0–0.61)
EOSINOPHIL NFR BLD AUTO: 0 % (ref 0–6)
ERYTHROCYTE [DISTWIDTH] IN BLOOD BY AUTOMATED COUNT: 14.1 % (ref 11.6–15.1)
GFR SERPL CREATININE-BSD FRML MDRD: 35 ML/MIN/1.73SQ M
GLUCOSE SERPL-MCNC: 104 MG/DL (ref 65–140)
GLUCOSE SERPL-MCNC: 94 MG/DL (ref 65–140)
GLUCOSE UR STRIP-MCNC: NEGATIVE MG/DL
HCT VFR BLD AUTO: 37.4 % (ref 34.8–46.1)
HGB BLD-MCNC: 12.1 G/DL (ref 11.5–15.4)
HGB UR QL STRIP.AUTO: NEGATIVE
HYALINE CASTS #/AREA URNS LPF: ABNORMAL /LPF
IMM GRANULOCYTES # BLD AUTO: 0.09 THOUSAND/UL (ref 0–0.2)
IMM GRANULOCYTES NFR BLD AUTO: 1 % (ref 0–2)
KETONES UR STRIP-MCNC: NEGATIVE MG/DL
LEUKOCYTE ESTERASE UR QL STRIP: NEGATIVE
LIPASE SERPL-CCNC: 104 U/L (ref 73–393)
LYMPHOCYTES # BLD AUTO: 1.92 THOUSANDS/ΜL (ref 0.6–4.47)
LYMPHOCYTES NFR BLD AUTO: 16 % (ref 14–44)
MCH RBC QN AUTO: 29.1 PG (ref 26.8–34.3)
MCHC RBC AUTO-ENTMCNC: 32.4 G/DL (ref 31.4–37.4)
MCV RBC AUTO: 90 FL (ref 82–98)
MONOCYTES # BLD AUTO: 1.11 THOUSAND/ΜL (ref 0.17–1.22)
MONOCYTES NFR BLD AUTO: 10 % (ref 4–12)
NEUTROPHILS # BLD AUTO: 8.48 THOUSANDS/ΜL (ref 1.85–7.62)
NEUTS SEG NFR BLD AUTO: 72 % (ref 43–75)
NITRITE UR QL STRIP: NEGATIVE
NON-SQ EPI CELLS URNS QL MICRO: ABNORMAL /HPF
NRBC BLD AUTO-RTO: 0 /100 WBCS
P AXIS: 66 DEGREES
P AXIS: 80 DEGREES
PH UR STRIP.AUTO: 7 [PH]
PLATELET # BLD AUTO: 273 THOUSANDS/UL (ref 149–390)
PMV BLD AUTO: 10.3 FL (ref 8.9–12.7)
POTASSIUM SERPL-SCNC: 4.1 MMOL/L (ref 3.5–5.3)
PR INTERVAL: 254 MS
PR INTERVAL: 254 MS
PROT SERPL-MCNC: 7.8 G/DL (ref 6.4–8.4)
PROT UR STRIP-MCNC: ABNORMAL MG/DL
QRS AXIS: -28 DEGREES
QRS AXIS: -29 DEGREES
QRSD INTERVAL: 138 MS
QRSD INTERVAL: 140 MS
QT INTERVAL: 456 MS
QT INTERVAL: 472 MS
QTC INTERVAL: 509 MS
QTC INTERVAL: 519 MS
RBC # BLD AUTO: 4.16 MILLION/UL (ref 3.81–5.12)
RBC #/AREA URNS AUTO: ABNORMAL /HPF
SODIUM SERPL-SCNC: 138 MMOL/L (ref 135–147)
SP GR UR STRIP.AUTO: 1.01 (ref 1–1.03)
T WAVE AXIS: 113 DEGREES
T WAVE AXIS: 119 DEGREES
UROBILINOGEN UR STRIP-ACNC: <2 MG/DL
VENTRICULAR RATE: 73 BPM
VENTRICULAR RATE: 75 BPM
WBC # BLD AUTO: 11.69 THOUSAND/UL (ref 4.31–10.16)
WBC #/AREA URNS AUTO: ABNORMAL /HPF

## 2022-07-26 PROCEDURE — G1004 CDSM NDSC: HCPCS

## 2022-07-26 PROCEDURE — 83036 HEMOGLOBIN GLYCOSYLATED A1C: CPT | Performed by: PHYSICIAN ASSISTANT

## 2022-07-26 PROCEDURE — 99285 EMERGENCY DEPT VISIT HI MDM: CPT | Performed by: EMERGENCY MEDICINE

## 2022-07-26 PROCEDURE — 85025 COMPLETE CBC W/AUTO DIFF WBC: CPT

## 2022-07-26 PROCEDURE — 70450 CT HEAD/BRAIN W/O DYE: CPT

## 2022-07-26 PROCEDURE — 82948 REAGENT STRIP/BLOOD GLUCOSE: CPT

## 2022-07-26 PROCEDURE — 80053 COMPREHEN METABOLIC PANEL: CPT

## 2022-07-26 PROCEDURE — 99285 EMERGENCY DEPT VISIT HI MDM: CPT

## 2022-07-26 PROCEDURE — 93005 ELECTROCARDIOGRAM TRACING: CPT

## 2022-07-26 PROCEDURE — 81001 URINALYSIS AUTO W/SCOPE: CPT

## 2022-07-26 PROCEDURE — 36415 COLL VENOUS BLD VENIPUNCTURE: CPT

## 2022-07-26 PROCEDURE — 74176 CT ABD & PELVIS W/O CONTRAST: CPT

## 2022-07-26 PROCEDURE — 83690 ASSAY OF LIPASE: CPT

## 2022-07-26 PROCEDURE — 99220 PR INITIAL OBSERVATION CARE/DAY 70 MINUTES: CPT | Performed by: INTERNAL MEDICINE

## 2022-07-26 PROCEDURE — 71045 X-RAY EXAM CHEST 1 VIEW: CPT

## 2022-07-26 PROCEDURE — 84443 ASSAY THYROID STIM HORMONE: CPT | Performed by: INTERNAL MEDICINE

## 2022-07-26 PROCEDURE — 93010 ELECTROCARDIOGRAM REPORT: CPT | Performed by: INTERNAL MEDICINE

## 2022-07-26 RX ORDER — LIDOCAINE 50 MG/G
1 PATCH TOPICAL DAILY
Status: DISCONTINUED | OUTPATIENT
Start: 2022-07-27 | End: 2022-08-02 | Stop reason: HOSPADM

## 2022-07-26 RX ORDER — INSULIN LISPRO 100 [IU]/ML
1-5 INJECTION, SOLUTION INTRAVENOUS; SUBCUTANEOUS
Status: DISCONTINUED | OUTPATIENT
Start: 2022-07-27 | End: 2022-08-02 | Stop reason: HOSPADM

## 2022-07-26 RX ORDER — POTASSIUM CHLORIDE 20 MEQ/1
40 TABLET, EXTENDED RELEASE ORAL 3 TIMES DAILY
Status: DISCONTINUED | OUTPATIENT
Start: 2022-07-26 | End: 2022-08-02 | Stop reason: HOSPADM

## 2022-07-26 RX ORDER — INSULIN LISPRO 100 [IU]/ML
1-5 INJECTION, SOLUTION INTRAVENOUS; SUBCUTANEOUS
Status: DISCONTINUED | OUTPATIENT
Start: 2022-07-26 | End: 2022-08-02 | Stop reason: HOSPADM

## 2022-07-26 RX ORDER — ACETAMINOPHEN 325 MG/1
975 TABLET ORAL EVERY 8 HOURS SCHEDULED
Status: DISCONTINUED | OUTPATIENT
Start: 2022-07-26 | End: 2022-08-02 | Stop reason: HOSPADM

## 2022-07-26 RX ORDER — CALCITONIN SALMON 200 [IU]/.09ML
1 SPRAY, METERED NASAL DAILY
Status: DISCONTINUED | OUTPATIENT
Start: 2022-07-27 | End: 2022-08-01

## 2022-07-26 RX ORDER — OXYCODONE HYDROCHLORIDE 5 MG/1
5 TABLET ORAL EVERY 4 HOURS PRN
Status: DISCONTINUED | OUTPATIENT
Start: 2022-07-26 | End: 2022-08-01

## 2022-07-26 RX ORDER — OXYCODONE HYDROCHLORIDE 5 MG/1
2.5 TABLET ORAL EVERY 4 HOURS PRN
Status: DISCONTINUED | OUTPATIENT
Start: 2022-07-26 | End: 2022-08-01

## 2022-07-26 RX ORDER — DONEPEZIL HYDROCHLORIDE 10 MG/1
10 TABLET, FILM COATED ORAL DAILY
Status: DISCONTINUED | OUTPATIENT
Start: 2022-07-27 | End: 2022-08-02 | Stop reason: HOSPADM

## 2022-07-26 RX ORDER — FUROSEMIDE 20 MG/1
20 TABLET ORAL DAILY
Status: DISCONTINUED | OUTPATIENT
Start: 2022-07-27 | End: 2022-08-02 | Stop reason: HOSPADM

## 2022-07-26 RX ORDER — INSULIN GLARGINE 100 [IU]/ML
10 INJECTION, SOLUTION SUBCUTANEOUS
Status: DISCONTINUED | OUTPATIENT
Start: 2022-07-26 | End: 2022-08-01

## 2022-07-26 RX ORDER — ACETAMINOPHEN 325 MG/1
650 TABLET ORAL EVERY 6 HOURS PRN
Status: DISCONTINUED | OUTPATIENT
Start: 2022-07-26 | End: 2022-08-02 | Stop reason: HOSPADM

## 2022-07-26 RX ADMIN — METOPROLOL TARTRATE 25 MG: 25 TABLET, FILM COATED ORAL at 21:20

## 2022-07-26 RX ADMIN — ACETAMINOPHEN 975 MG: 325 TABLET ORAL at 21:21

## 2022-07-26 RX ADMIN — ACETAMINOPHEN 650 MG: 325 TABLET ORAL at 21:20

## 2022-07-26 RX ADMIN — INSULIN GLARGINE 10 UNITS: 100 INJECTION, SOLUTION SUBCUTANEOUS at 21:21

## 2022-07-26 RX ADMIN — POTASSIUM CHLORIDE 40 MEQ: 1500 TABLET, EXTENDED RELEASE ORAL at 21:20

## 2022-07-26 RX ADMIN — APIXABAN 5 MG: 5 TABLET, FILM COATED ORAL at 22:11

## 2022-07-26 NOTE — ED NOTES
Daughter at bedside, pt reported seeing ants on the wall, now reporting "they're gone now"     Eunice Burch  07/26/22 1918

## 2022-07-26 NOTE — ED PROVIDER NOTES
History  Chief Complaint   Patient presents with    Back Pain     Pt brought in by EMS for agitation at [facility], pt complained of back pain in ED, surgery 3 months ago L2 L4 T12 currently in brace     80year old female with PMH of HTN, HLD, DM, multiple compression fractures, dementia presents to the ED via EMS for evaluation of altered mental status noticed this morning by nursing home staff  The following history was provided by nursing staff at [facility]  She was reportedly noncompliant with her care this morning, which is unusual for her  She was also found by staff to be arranging her furniture  She is seeing bugs on the walls here in the ED  She has a history of falls and ambulatory dysfunction  Per chart review, she is on Eliquis  She endorses back pain, which staff states is chronic for her 2/2 compression fractures and degenerative changes  She denies any paresthesia and weakness to lower extremities  Denies any recent trauma to her back  History provided by:  Nursing home  History limited by:  Mental status change   used: No    Back Pain  Associated symptoms: no abdominal pain, no chest pain, no dysuria, no fever, no headaches, no numbness and no weakness        Prior to Admission Medications   Prescriptions Last Dose Informant Patient Reported? Taking?    Banana Flakes (BANATROL PLUS PO)   Yes No   Sig: Take by mouth 3 (three) times a day with meals   Potassium Chloride ER 20 MEQ TBCR   No No   Sig: Take 2 tablets (40 mEq total) by mouth 3 (three) times a day   acetaminophen (TYLENOL) 325 mg tablet  Other (Specify) Yes No   Sig: Take 650 mg by mouth every 6 (six) hours as needed for mild pain   apixaban (ELIQUIS) 5 mg   No No   Sig: Take 1 tablet (5 mg total) by mouth 2 (two) times a day   donepezil (ARICEPT) 10 mg tablet   No No   Sig: Take 1 tablet (10 mg total) by mouth daily   famotidine (PEPCID) 20 mg tablet   No No   Sig: Take 1 tablet (20 mg total) by mouth daily   Patient not taking: Reported on 5/25/2022   furosemide (LASIX) 20 mg tablet   Yes No   Sig: Take 20 mg by mouth daily   glipiZIDE (GLUCOTROL) 10 mg tablet   Yes No   Sig: Take 10 mg by mouth in the morning   insulin lispro (insulin lispro) 100 units/mL injection   Yes No   Sig: Inject under the skin 4 (four) times a day Per Sliding Scale   magnesium oxide (MAG-OX) 400 mg   No No   Sig: Take 1 tablet (400 mg total) by mouth daily   metoprolol tartrate (LOPRESSOR) 25 mg tablet   No No   Sig: Take 1 tablet (25 mg total) by mouth every 12 (twelve) hours   repaglinide (PRANDIN) 2 mg tablet   No No   Sig: Take 1 tablet (2 mg total) by mouth 3 (three) times a day before meals   sitaGLIPtin (JANUVIA) 100 mg tablet   No No   Sig: Take 1 tablet (100 mg total) by mouth daily      Facility-Administered Medications: None       Past Medical History:   Diagnosis Date    Aortic stenosis     Bilateral edema of lower extremity     Dementia (HCC)     Diabetes ([facility] [address] )     Fall     Gait abnormality     Hyperlipidemia     Hypertension     Hypokalemia     Other ascites     Varicose veins of leg with edema        Past Surgical History:   Procedure Laterality Date    BREAST SURGERY      ELBOW SURGERY         Family History   Problem Relation Age of Onset    Dementia Sister      I have reviewed and agree with the history as documented  E-Cigarette/Vaping    E-Cigarette Use Never User      E-Cigarette/Vaping Substances     Social History     Tobacco Use    Smoking status: Never Smoker    Smokeless tobacco: Never Used   Vaping Use    Vaping Use: Never used   Substance Use Topics    Alcohol use: Not Currently    Drug use: No        Review of Systems   Constitutional: Negative for chills and fever  HENT: Negative for congestion, rhinorrhea and sore throat  Respiratory: Negative for cough and shortness of breath  Cardiovascular: Negative for chest pain     Gastrointestinal: Negative for abdominal pain, nausea and vomiting  Genitourinary: Negative for dysuria, frequency, hematuria and urgency  Musculoskeletal: Positive for back pain  Neurological: Negative for dizziness, weakness, light-headedness, numbness and headaches  Psychiatric/Behavioral: Positive for confusion  Physical Exam  ED Triage Vitals   Temperature Pulse Respirations Blood Pressure SpO2   07/26/22 1415 07/26/22 1415 07/26/22 1415 07/26/22 1415 07/26/22 1415   97 5 °F (36 4 °C) 86 22 138/62 98 %      Temp Source Heart Rate Source Patient Position - Orthostatic VS BP Location FiO2 (%)   07/26/22 1415 07/26/22 1415 07/26/22 1657 07/26/22 1415 --   Oral Monitor Lying Left arm       Pain Score       07/26/22 1415       10 - Worst Possible Pain             Orthostatic Vital Signs  Vitals:    07/26/22 1415 07/26/22 1657 07/26/22 1843   BP: 138/62 112/77 113/69   Pulse: 86 77 75   Patient Position - Orthostatic VS:  Lying Sitting       Physical Exam  Vitals and nursing note reviewed  Constitutional:       General: She is not in acute distress  Appearance: Normal appearance  She is not ill-appearing or toxic-appearing  Comments: Pleasantly confused   HENT:      Head: Normocephalic and atraumatic  Eyes:      Conjunctiva/sclera: Conjunctivae normal    Cardiovascular:      Rate and Rhythm: Normal rate and regular rhythm  Pulses: Normal pulses  Heart sounds: Normal heart sounds  Comments: Systolic murmur noted  Pulmonary:      Effort: Pulmonary effort is normal  No respiratory distress  Breath sounds: No wheezing  Abdominal:      General: There is no distension  Palpations: Abdomen is soft  Tenderness: There is no guarding or rebound  Comments: Generalized tenderness to palpation    Musculoskeletal:         General: Normal range of motion  Cervical back: Normal range of motion  Comments: Tenderness to palpation over thoracic and lumbar spine   Brace in place   Skin:     General: Skin is warm and dry  Capillary Refill: Capillary refill takes less than 2 seconds  Neurological:      General: No focal deficit present  Mental Status: She is alert  She is disoriented  Cranial Nerves: No cranial nerve deficit  Sensory: No sensory deficit  Motor: No weakness           ED Medications  Medications - No data to display    Diagnostic Studies  Results Reviewed     Procedure Component Value Units Date/Time    Urine Microscopic [[id number]]  (Abnormal) Collected: 07/26/22 1804    Lab Status: Final result Specimen: Urine, Straight Cath Updated: 07/26/22 1836     RBC, UA None Seen /hpf      WBC, UA None Seen /hpf      Epithelial Cells None Seen /hpf      Bacteria, UA None Seen /hpf      Hyaline Casts, UA 5-10 /lpf     UA w Reflex to Microscopic w Reflex to Culture [[id number]]  (Abnormal) Collected: 07/26/22 1804    Lab Status: Final result Specimen: Urine, Straight Cath Updated: 07/26/22 1831     Color, UA Light Yellow     Clarity, UA Clear     Specific Gravity, UA 1 013     pH, UA 7 0     Leukocytes, UA Negative     Nitrite, UA Negative     Protein, UA Trace mg/dl      Glucose, UA Negative mg/dl      Ketones, UA Negative mg/dl      Urobilinogen, UA <2 0 mg/dl      Bilirubin, UA Negative     Occult Blood, UA Negative    Comprehensive metabolic panel [[id number]] Collected: 07/26/22 1623    Lab Status: Final result Specimen: Blood from Arm, Left Updated: 07/26/22 1707     Sodium 138 mmol/L      Potassium 4 1 mmol/L      Chloride 108 mmol/L      CO2 23 mmol/L      ANION GAP 7 mmol/L      BUN 22 mg/dL      Creatinine 1 26 mg/dL      Glucose 94 mg/dL      Calcium 9 5 mg/dL      AST 24 U/L      ALT 18 U/L      Alkaline Phosphatase 111 U/L      Total Protein 7 8 g/dL      Albumin 3 6 g/dL      Total Bilirubin 0 62 mg/dL      eGFR 35 ml/min/1 73sq m     Narrative:      [place] guidelines for Chronic Kidney Disease (CKD):     Stage 1 with normal or high GFR (GFR > 90 mL/min/1 73 square meters)    Stage 2 Mild CKD (GFR = 60-89 mL/min/1 73 square meters)    Stage 3A Moderate CKD (GFR = 45-59 mL/min/1 73 square meters)    Stage 3B Moderate CKD (GFR = 30-44 mL/min/1 73 square meters)    Stage 4 Severe CKD (GFR = 15-29 mL/min/1 73 square meters)    Stage 5 End Stage CKD (GFR <15 mL/min/1 73 square meters)  Note: GFR calculation is accurate only with a steady state creatinine    Lipase [[id number]]  (Normal) Collected: 07/26/22 1623    Lab Status: Final result Specimen: Blood from Arm, Left Updated: 07/26/22 1707     Lipase 104 u/L     CBC and differential [[id number]]  (Abnormal) Collected: 07/26/22 1623    Lab Status: Final result Specimen: Blood from Arm, Left Updated: 07/26/22 1631     WBC 11 69 Thousand/uL      RBC 4 16 Million/uL      Hemoglobin 12 1 g/dL      Hematocrit 37 4 %      MCV 90 fL      MCH 29 1 pg      MCHC 32 4 g/dL      RDW 14 1 %      MPV 10 3 fL      Platelets 485 Thousands/uL      nRBC 0 /100 WBCs      Neutrophils Relative 72 %      Immat GRANS % 1 %      Lymphocytes Relative 16 %      Monocytes Relative 10 %      Eosinophils Relative 0 %      Basophils Relative 1 %      Neutrophils Absolute 8 48 Thousands/µL      Immature Grans Absolute 0 09 Thousand/uL      Lymphocytes Absolute 1 92 Thousands/µL      Monocytes Absolute 1 11 Thousand/µL      Eosinophils Absolute 0 02 Thousand/µL      Basophils Absolute 0 07 Thousands/µL                  CT head without contrast   Final Result by Jacqueline Joshua MD (07/26 1642)      No acute intracranial abnormality  Workstation performed: [id number]         CT abdomen pelvis wo contrast   Final Result by Jacqueline Joshua MD (07/26 1648)      Acute nondisplaced fracture of the inferior endplate of L3 without loss of vertebral body height  The study was marked in [facility] for immediate notification              Workstation performed: [id number]         XR chest 1 view portable   Final Result by Edgardo Essex, MD (07/26 1638)      No acute disease in the chest                   Workstation performed: [id number]               Procedures  Procedures      ED Course  ED Course as of 07/26/22 1927   Tue Jul 26, 2022   1728 CT abd/pelvis wo contrast: acute nondisplaced fracture of the inferior endplate of L3 without loss of vertebral body height  SBIRT 20yo+    Flowsheet Row Most Recent Value   SBIRT (25 yo +)    In order to provide better care to our patients, we are screening all of our patients for alcohol and drug use  Would it be okay to ask you these screening questions? No Filed at: 07/26/2022 1633                MDM  Number of Diagnoses or Management Options  Altered mental status, unspecified  Lumbar compression fracture [facility])  Diagnosis management comments: 80year old female with PMH of HTN, HLD, DM, dementia, and multiple compression fractures presents with altered mental status  CT head wo contrast negative  CT abdomen/pelvis wo contrast shows acute nondisplaced fracture of the inferior endplate of L3 without loss of vertebral body height  CXR negative  CBC, CMP, lipase, U/A are within normal limits  Unclear cause of mental status change  Reached out to [facility] who will admit the patient  Discussed the plan with daughter and she is agreeable  Disposition  Final diagnoses:    Altered mental status, unspecified   Lumbar compression fracture ([address] )     Time reflects when diagnosis was documented in both MDM as applicable and the Disposition within this note     Time User Action Codes Description Comment    7/26/2022  7:14 PM Donah Old T Add [R41 82] Altered mental status     7/26/2022  7:14 PM Donah Old T Remove [R41 82] Altered mental status     7/26/2022  7:14 PM Donah Old T Add [R41 82] Altered mental status, unspecified     7/26/2022  7:15 PM Donah Old T Add [S32 000A] Lumbar compression fracture [facility])       ED Disposition     ED Disposition   Admit    Condition   Stable    Date/Time   Tue Jul 26, 2022  7:16 PM    Comment   Case was discussed with Dr Ariel Mac and the patient's admission status was agreed to be Admission Status: observation status to the service of Dr Ariel Mac  Follow-up Information    None         Patient's Medications   Discharge Prescriptions    No medications on file     No discharge procedures on file  PDMP Review     None           ED Provider  Attending physically available and evaluated Lito Merrill  CHRISTI managed the patient along with the ED Attending      Electronically Signed by         Maki Vallecillo MD  07/26/22 8371

## 2022-07-27 LAB
ANION GAP SERPL CALCULATED.3IONS-SCNC: 4 MMOL/L (ref 4–13)
BUN SERPL-MCNC: 18 MG/DL (ref 5–25)
CALCIUM SERPL-MCNC: 9.4 MG/DL (ref 8.3–10.1)
CHLORIDE SERPL-SCNC: 111 MMOL/L (ref 96–108)
CO2 SERPL-SCNC: 24 MMOL/L (ref 21–32)
CREAT SERPL-MCNC: 1.14 MG/DL (ref 0.6–1.3)
ERYTHROCYTE [DISTWIDTH] IN BLOOD BY AUTOMATED COUNT: 14.3 % (ref 11.6–15.1)
EST. AVERAGE GLUCOSE BLD GHB EST-MCNC: 163 MG/DL
GFR SERPL CREATININE-BSD FRML MDRD: 40 ML/MIN/1.73SQ M
GLUCOSE P FAST SERPL-MCNC: 134 MG/DL (ref 65–99)
GLUCOSE SERPL-MCNC: 119 MG/DL (ref 65–140)
GLUCOSE SERPL-MCNC: 120 MG/DL (ref 65–140)
GLUCOSE SERPL-MCNC: 134 MG/DL (ref 65–140)
GLUCOSE SERPL-MCNC: 134 MG/DL (ref 65–140)
GLUCOSE SERPL-MCNC: 149 MG/DL (ref 65–140)
GLUCOSE SERPL-MCNC: 172 MG/DL (ref 65–140)
HBA1C MFR BLD: 7.3 %
HCT VFR BLD AUTO: 35.5 % (ref 34.8–46.1)
HGB BLD-MCNC: 11.8 G/DL (ref 11.5–15.4)
MCH RBC QN AUTO: 29.8 PG (ref 26.8–34.3)
MCHC RBC AUTO-ENTMCNC: 33.2 G/DL (ref 31.4–37.4)
MCV RBC AUTO: 90 FL (ref 82–98)
PLATELET # BLD AUTO: 254 THOUSANDS/UL (ref 149–390)
PMV BLD AUTO: 10.3 FL (ref 8.9–12.7)
POTASSIUM SERPL-SCNC: 4.2 MMOL/L (ref 3.5–5.3)
RBC # BLD AUTO: 3.96 MILLION/UL (ref 3.81–5.12)
SODIUM SERPL-SCNC: 139 MMOL/L (ref 135–147)
TSH SERPL DL<=0.05 MIU/L-ACNC: 2.29 UIU/ML (ref 0.45–4.5)
VIT B12 SERPL-MCNC: 413 PG/ML (ref 100–900)
WBC # BLD AUTO: 7.84 THOUSAND/UL (ref 4.31–10.16)

## 2022-07-27 PROCEDURE — 99225 PR SBSQ OBSERVATION CARE/DAY 25 MINUTES: CPT | Performed by: PHYSICIAN ASSISTANT

## 2022-07-27 PROCEDURE — 82948 REAGENT STRIP/BLOOD GLUCOSE: CPT

## 2022-07-27 PROCEDURE — 82607 VITAMIN B-12: CPT | Performed by: INTERNAL MEDICINE

## 2022-07-27 PROCEDURE — 85027 COMPLETE CBC AUTOMATED: CPT | Performed by: INTERNAL MEDICINE

## 2022-07-27 PROCEDURE — 97167 OT EVAL HIGH COMPLEX 60 MIN: CPT

## 2022-07-27 PROCEDURE — 80048 BASIC METABOLIC PNL TOTAL CA: CPT | Performed by: INTERNAL MEDICINE

## 2022-07-27 PROCEDURE — 99222 1ST HOSP IP/OBS MODERATE 55: CPT | Performed by: NURSE PRACTITIONER

## 2022-07-27 RX ADMIN — ACETAMINOPHEN 975 MG: 325 TABLET ORAL at 15:01

## 2022-07-27 RX ADMIN — FUROSEMIDE 20 MG: 20 TABLET ORAL at 12:10

## 2022-07-27 RX ADMIN — METOPROLOL TARTRATE 25 MG: 25 TABLET, FILM COATED ORAL at 22:02

## 2022-07-27 RX ADMIN — CALCITONIN SALMON 1 SPRAY: 200 SPRAY, METERED NASAL at 12:37

## 2022-07-27 RX ADMIN — ACETAMINOPHEN 975 MG: 325 TABLET ORAL at 22:01

## 2022-07-27 RX ADMIN — APIXABAN 5 MG: 5 TABLET, FILM COATED ORAL at 12:10

## 2022-07-27 RX ADMIN — INSULIN LISPRO 1 UNITS: 100 INJECTION, SOLUTION INTRAVENOUS; SUBCUTANEOUS at 17:45

## 2022-07-27 RX ADMIN — POTASSIUM CHLORIDE 40 MEQ: 1500 TABLET, EXTENDED RELEASE ORAL at 17:41

## 2022-07-27 RX ADMIN — METOPROLOL TARTRATE 25 MG: 25 TABLET, FILM COATED ORAL at 12:09

## 2022-07-27 RX ADMIN — INSULIN GLARGINE 10 UNITS: 100 INJECTION, SOLUTION SUBCUTANEOUS at 22:02

## 2022-07-27 RX ADMIN — LIDOCAINE 5% 1 PATCH: 700 PATCH TOPICAL at 12:10

## 2022-07-27 RX ADMIN — APIXABAN 5 MG: 5 TABLET, FILM COATED ORAL at 17:41

## 2022-07-27 RX ADMIN — POTASSIUM CHLORIDE 40 MEQ: 1500 TABLET, EXTENDED RELEASE ORAL at 12:10

## 2022-07-27 RX ADMIN — POTASSIUM CHLORIDE 40 MEQ: 1500 TABLET, EXTENDED RELEASE ORAL at 22:01

## 2022-07-27 RX ADMIN — MAGNESIUM OXIDE TAB 400 MG (241.3 MG ELEMENTAL MG) 400 MG: 400 (241.3 MG) TAB at 12:10

## 2022-07-27 RX ADMIN — DONEPEZIL HYDROCHLORIDE 10 MG: 10 TABLET ORAL at 15:02

## 2022-07-27 NOTE — CASE MANAGEMENT
Case Management Assessment & Discharge Planning Note    Patient name Peng Valentine  Location [facility] 805-63 MRN [id number]  : 1923 Date 2022       Current Admission Date: 2022  Current Admission Diagnosis:Dementia with behavioral disturbance [facility])   Patient Active Problem List    Diagnosis Date Noted    Compression fracture of L3 vertebra ([address] ) 2022    Dementia with behavioral disturbance ([address] ) 2022    Chronic systolic CHF (congestive heart failure) ([address] ) 2022    L4 vertebral fracture ([address] ) 2022    L2 vertebral fracture ([address] ) 2022    Compression fracture of T12 vertebra ([address] ) 2022    Pressure injury of sacral region, unstageable ([address] ) 2022    Functional diarrhea     Acute systolic congestive heart failure ([address] ) 2022    Diarrhea 2022    Atrial fibrillation ([address] ) 2022    Severe sepsis ([address] ) 2022    UTI (urinary tract infection) 2022    Aortic stenosis 2022    Dysphagia 2022    Acute-on-chronic kidney injury ([address] ) 2021    Hyperosmolar hyperglycemic state (HHS) ([address] ) 2021    Urinary incontinence 2021    First degree AV block 2021    At risk for delirium 2021    Chest pain 2021    Syncope 2021    SALTY (acute kidney injury) ([address] ) 2021    Closed fracture of left proximal humerus 2020    Ambulatory dysfunction 2020    Atherosclerosis of aorta ([address] ) 2019    Medicare annual wellness visit, subsequent 2018    Screening for depression 2018    Screening for genitourinary condition 2018    Screening for neurological condition 2018    Bilateral lower extremity edema 10/04/2017    Hypokalemia 2017    CKD (chronic kidney disease), stage III ([address] ) 2017    Hyponatremia 2017    Leukocytosis 2017    Moderate aortic stenosis 2017    Gallstone 2016    Anemia 09/12/2016    Pancreatic cyst 61/80/9150    Uncomplicated senile dementia ([facility] [address] ) 11/03/2015    Benign essential hypertension 07/07/2015    Hypercholesterolemia 11/11/2013    Osteopenia 11/11/2013    Type 2 diabetes mellitus with diabetic chronic kidney disease ([facility] [address] ) 11/11/2013      LOS (days): 0  Geometric Mean LOS (GMLOS) (days): 2 90  Days to GMLOS:2 7     OBJECTIVE:    Risk of Unplanned Readmission Score: 20 35         Current admission status: Inpatient       Preferred Pharmacy:   [address], [facility]  [address] [facility]  [place] [address] [address]  Phone: [phone] Fax: Lilianeisabell [facility]) - [place], [address] - [facility] [address]  [address] [place] [address] [address]  Phone: [phone] Fax: [phone]    Primary Care Provider: Kady Jarrett MD    Primary Insurance: MEDICARE  Secondary Insurance: [facility]    ASSESSMENT:  [address], [address] - Daughter   Primary Phone: [phone] (Mobile)  Home Phone: [phone]                         Readmission Root Cause  30 Day Readmission: No    Patient Information  Admitted from[de-identified] Facility  Mental Status: Confused  During Assessment patient was accompanied by: Not accompanied during assessment  Assessment information provided by[de-identified] Daughter, Other - please comment (facility staff)  Primary Caregiver:  Other (Comment) (facility staff)  Caregiver's Name[de-identified] [address] [facility] Telephone Number[de-identified] [phone]  Support Systems: Self, Daughter, Other (Comment) (facility staff)  [place] of Residence: [address] [facility] [address] do you live in?: [place]  Type of Current Residence: Facility [facility])  In the last 12 months, was there a time when you were not able to pay the mortgage or rent on time?: No  In the last 12 months, was there a time when you did not have a steady place to sleep or slept in a shelter (including now)?: No  Homeless/housing insecurity resource given?: N/A  Living Arrangements: Other (Comment) (resident at [facility])    Activities of Daily Living Prior to Admission  Functional Status: Assistance  Completes ADLs independently?: No  Level of ADL dependence: Assistance  Ambulates independently?: Yes  Does patient use assisted devices?: Yes  Assisted Devices (DME) used: [employer]  Does patient currently own DME?: Yes  What DME does the patient currently own?: [employer]  Does patient have a history of Outpatient Therapy (PT/OT)?: Yes         Patient Information Continued  Income Source: Pension/alf  Does patient have prescription coverage?: Yes  Within the past 12 months, you worried that your food would run out before you got the money to buy more : Never true  Within the past 12 months, the food you bought just didn't last and you didn't have money to get more : Never true  Food insecurity resource given?: N/A  Does patient receive dialysis treatments?: No  Does patient have a history of substance abuse?: No  Does patient have a history of Mental Health Diagnosis?: No         Means of Transportation  Means of Transport to Appts[de-identified] Other (Comment) (facility transport)  In the past 12 months, has lack of transportation kept you from medical appointments or from getting medications?: No  In the past 12 months, has lack of transportation kept you from meetings, work, or from getting things needed for daily living?: No  Was application for public transport provided?: N/A        DISCHARGE DETAILS:    Discharge planning discussed with[de-identified] patient's daughter Ben Patel, facility staff  Freedom of Choice: Yes  Comments - Freedom of Choice: as per [facility], if rehab if necessary they use [address] contacted family/caregiver?: Yes  Were Treatment Team discharge recommendations reviewed with patient/caregiver?: Yes  Did patient/caregiver verbalize understanding of patient care needs?: Yes  Were patient/caregiver advised of the risks associated with not following Treatment Team discharge recommendations?: Yes    Contacts  Patient Contacts: Isaak Gutierrez, daughter  Relationship to Patient[de-identified] Family  Contact Method: Phone  Phone Number: [phone]  Reason/Outcome: Continuity of Care, Emergency Contact, Discharge [address]         Is the patient interested in [facility] at discharge?: No    DME Referral Provided  Referral made for DME?: No              Treatment Team Recommendation: Other (TBD)  Discharge Destination Plan[de-identified] Facility Return  Transport at Discharge : BLS Ambulance                Patient/caregiver received discharge checklist   Content reviewed  Patient/caregiver encouraged to participate in discharge plan of care prior to discharge home  CM reviewed d/c planning process including the following: identifying help at home, patient preference for d/c planning needs, Discharge Lounge, [facility] Meds to Bed program, availability of treatment team to discuss questions or concerns patient and/or family may have regarding understanding medications and recognizing signs and symptoms once discharged  CM also encouraged patient to follow up with all recommended appointments after discharge  Patient advised of importance for patient and family to participate in managing patients medical well being  Additional Comments: Patient is a resident at [facility], where she ambulates with a rolling walker independently  Patient receives assistance and cuing with ADLs  Her cognitive function is described as "fair" and "calm" at baseline  Awaiting full workup included PT/OT evaluation, which will need to be reviewed with [facility] prior to discharge  As per facility staff, patient may be asked to participate in rehab prior to return to facility  Contact persons are Mackenzie Cifuentes or Wes at [phone], FAX [phone]  CM to follow

## 2022-07-27 NOTE — ASSESSMENT & PLAN NOTE
Lab Results   Component Value Date    EGFR 35 07/26/2022    EGFR 42 03/21/2022    EGFR 45 03/20/2022    CREATININE 1 26 07/26/2022    CREATININE 1 09 03/21/2022    CREATININE 1 03 03/20/2022   · renal function is near baseline  · Avoid NSAIDS and nephrotoxins

## 2022-07-27 NOTE — PROGRESS NOTES
[address] [facility]  Progress Note - Laney Nicholson 11/22/1923, 80 y o  female MRN: [id number]  [facility] Encounter: [id number]  Primary Care Provider: Malka Oviedo MD   Date and time admitted to hospital: 7/26/2022  2:09 PM    * Dementia with behavioral disturbance [facility])  Assessment & Plan  · Patient with documented underlying dementia  · Per ED note, staff at [facility] noted patient to be noncompliant with her care on morning of admission, rearranging her furniture, and was seeing bugs on the walls in the ED  · Suspect due to new lumbar compression fracture with pain  · Pain control as below   · Geriatrics consulted  · On Aricept     Compression fracture of L3 vertebra ([facility] )  Assessment & Plan  · Acute nondisplaced fracture of the inferior endplate of L3 without loss of vertebral height noted on CT  · Patient follows with Neurosurgery as outpatient for multiple TL compression deformities  · Pain control     Type 2 diabetes mellitus with diabetic chronic kidney disease [facility])  Assessment & Plan  Lab Results   Component Value Date    HGBA1C 13 0 (H) 12/28/2021       Recent Labs     07/26/22 2051   POCGLU 104       Blood Sugar Average: Last 72 hrs:  · (P) 104   · Uncontrolled as evidenced by A1c as above   Will update A1c  · Orals on hold while hospitalized  · Was started on HS Lantus on admission, continue to monitor and adjust as indicated  · Continue SSI coverage TID AC and QHS  · Hypoglycemia protocol     Atrial fibrillation ([facility] )  Assessment & Plan  · Rate controlled on metoprolol tartrate 25mg Q12hr  · On Eliquis for [facility]    CKD (chronic kidney disease), stage III [facility])  Assessment & Plan  Lab Results   Component Value Date    EGFR 35 07/26/2022    EGFR 42 03/21/2022    EGFR 45 03/20/2022    CREATININE 1 26 07/26/2022    CREATININE 1 09 03/21/2022    CREATININE 1 03 03/20/2022   · Renal function is near baseline  · Avoid NSAIDS and nephrotoxins    Chronic systolic CHF (congestive heart failure) ([facility] )  Assessment & Plan  Wt Readings from Last 3 Encounters:   22 73 5 kg (162 lb)   22 73 5 kg (162 lb)   22 69 2 kg (152 lb 8 9 oz)     · Volume status appears euvolemic  · Low Na diet  · Monitor intake and output  · Monitor daily weights  · Continue PO lasix  · Echo from 2022 with LVEF 45%        Aortic stenosis  Assessment & Plan  · Severe AS noted on echo from 2022    Benign essential hypertension  Assessment & Plan  · BP is acceptable, continue lasix and metoprolol        VTE Pharmacologic Prophylaxis:   Eliquis    Patient Centered Rounds: I performed bedside rounds with nursing staff today  Pete Conn  Discussions with Specialists or Other Care Team Provider: [facility]     Education and Discussions with Family / Patient: Updated  (daughter) via phone  Suzy     Time Spent for Care: 30 minutes  More than 50% of total time spent on counseling and coordination of care as described above  Current Length of Stay: 0 day(s)  Current Patient Status: Observation   Certification Statement: The patient, admitted on an observation basis, will now require > 2 midnight hospital stay due to AMS, Geriatrics consult, PT/OT, safe discharge planning  Discharge Plan: Anticipate discharge tomorrow to discharge location to be determined pending rehab evaluations  Code Status: Level 1 - Full Code    Subjective:   Ms Magdalene Romberg denies any complaint  She denies any pain whatsoever  Per my discussion with her daughter, Brian Middleton, over the phone, she was with the patient in the hospital yesterday and reported the patient was saying things she never said before and wouldn't stop talking   Patient reported seeing bugs and ants on the walls per daughter    Objective:     Vitals:   Temp (24hrs), Av 6 °F (36 4 °C), Min:97 5 °F (36 4 °C), Max:97 7 °F (36 5 °C)    Temp:  [97 5 °F (36 4 °C)-97 7 °F (36 5 °C)] 97 7 °F (36 5 °C)  HR:  [71-86] 71  Resp:  [18-22] 18  BP: (112-138)/(54-77) 112/54  SpO2:  [96 %-98 %] 97 %  There is no height or weight on file to calculate BMI  Input and Output Summary (last 24 hours):   No intake or output data in the 24 hours ending 07/27/22 4331    Physical Exam:   Physical Exam  Vitals and nursing note reviewed  Constitutional:       Comments: Patient seen lying in bed, NAD   Cardiovascular:      Rate and Rhythm: Normal rate and regular rhythm  Heart sounds: Murmur heard  Pulmonary:      Effort: Pulmonary effort is normal       Breath sounds: Normal breath sounds  Abdominal:      General: Bowel sounds are normal       Palpations: Abdomen is soft  Tenderness: There is no abdominal tenderness  Musculoskeletal:      Right lower leg: No edema  Left lower leg: No edema  Skin:     General: Skin is warm  Neurological:      Mental Status: She is alert        Comments: Oriented to person and place but says the year is 1998 and did not know the current [place] president   Psychiatric:      Comments: Not anxious or agitated appearing          Additional Data:     Labs:  Results from last 7 days   Lab Units 07/26/22  1623   WBC Thousand/uL 11 69*   HEMOGLOBIN g/dL 12 1   HEMATOCRIT % 37 4   PLATELETS Thousands/uL 273   NEUTROS PCT % 72   LYMPHS PCT % 16   MONOS PCT % 10   EOS PCT % 0     Results from last 7 days   Lab Units 07/26/22  1623   SODIUM mmol/L 138   POTASSIUM mmol/L 4 1   CHLORIDE mmol/L 108   CO2 mmol/L 23   BUN mg/dL 22   CREATININE mg/dL 1 26   ANION GAP mmol/L 7   CALCIUM mg/dL 9 5   ALBUMIN g/dL 3 6   TOTAL BILIRUBIN mg/dL 0 62   ALK PHOS U/L 111   ALT U/L 18   AST U/L 24   GLUCOSE RANDOM mg/dL 94         Results from last 7 days   Lab Units 07/26/22  2051   POC GLUCOSE mg/dl 104               Lines/Drains:  Invasive Devices  Report    Peripheral Intravenous Line  Duration           Peripheral IV 07/26/22 Left Antecubital <1 day                      Imaging: Reviewed radiology reports from this admission including: chest xray, abdominal/pelvic CT, CT head and ECHO    Recent Cultures (last 7 days):         Last 24 Hours Medication List:   Current Facility-Administered Medications   Medication Dose Route Frequency Provider Last Rate    acetaminophen  650 mg Oral Q6H PRN Cleveland Bradley, MD      acetaminophen  975 mg Oral Q8H [facility] Cleveland Bradley, MD      apixaban  5 mg Oral BID Cleveland Bradley, MD      calcitonin (salmon)  1 spray Alternating Nares Daily Rahulo Cords, MD      donepezil  10 mg Oral Daily Rico Cords, MD      furosemide  20 mg Oral Daily Rahulo Cords, MD      insulin glargine  10 Units Subcutaneous HS Rico Cords, MD      insulin lispro  1-5 Units Subcutaneous TID AC Cleveland Bradley, MD      insulin lispro  1-5 Units Subcutaneous HS Rico Cords, MD      lidocaine  1 patch Topical Daily Rico Kirk, MD      magnesium oxide  400 mg Oral Daily Rico Kirk, MD      metoprolol tartrate  25 mg Oral Q12H [facility] Rahul MD Kirk      oxyCODONE  2 5 mg Oral Q4H PRN Ricjanet Cords, MD      oxyCODONE  5 mg Oral Q4H PRN Rico Kirk, MD      potassium chloride  40 mEq Oral TID Cleveland Bradley MD          Today, Patient Was Seen By: Judge Saurabh PA-C    **Please Note: This note may have been constructed using a voice recognition system  **

## 2022-07-27 NOTE — PLAN OF CARE
Problem: OCCUPATIONAL THERAPY ADULT  Goal: Performs self-care activities at highest level of function for planned discharge setting  See evaluation for individualized goals  Description: Treatment Interventions: ADL retraining, Functional transfer training, Endurance training, Patient/family training, Cognitive reorientation, Equipment evaluation/education, Compensatory technique education, Energy conservation          See flowsheet documentation for full assessment, interventions and recommendations  Note: Limitation: Decreased ADL status, Decreased cognition, Decreased endurance, Decreased self-care trans, Decreased high-level ADLs  Prognosis: Good  Assessment: Pt is a 80 y o  female who was admitted to [facility] on 7/26/2022 with Dementia with behavioral disturbance ([facility] )  She was admitted from her [facility] with AMS  Pt  has a past medical history of Aortic stenosis, Bilateral edema of lower extremity, Dementia ([facility] ), Diabetes ([facility] ), Fall, Gait abnormality, Hyperlipidemia, Hypertension, Hypokalemia, Other ascites, and Varicose veins of leg with edema  At baseline pt was mostly I w/ ADLs and uses a RW for mobility  The facility staff assists with IADLs  Pt lives at an [facility]  Currently pt requires min A for UB ADLs, Max A for LB ADLs, and mod Ax1 for functional mobility/transfers w/ RW  Pt currently presents with impairments in the following categories -limited home support, difficulty performing ADLS, difficulty performing IADLs, limited insight into deficits, activity tolerance, endurance, standing balance/tolerance, memory, insight and safety   These impairments, as well as pt's fatigue  limit pt's ability to safely engage in all baseline areas of occupation, including grooming, bathing, dressing, toileting and functional mobility/transfers  From OT standpoint, recommend inpatient rehab if facility staff cannot assist pt at currently level of function upon D/C   OT will continue to follow to address the below stated goals       OT Discharge Recommendation: Post acute rehabilitation services

## 2022-07-27 NOTE — OCCUPATIONAL THERAPY NOTE
Occupational Therapy Evaluation     Patient Name: Raj Appiah  LNURS'P Date: 7/27/2022  Problem List  Principal Problem:    Dementia with behavioral disturbance ([facility] )  Active Problems:    Benign essential hypertension    CKD (chronic kidney disease), stage III ([facility] )    Type 2 diabetes mellitus with diabetic chronic kidney disease ([facility] )    Aortic stenosis    Atrial fibrillation (H[facility])    Compression fracture of L3 vertebra (H[facility])    Chronic systolic CHF (congestive heart failure) ([facility] )    Past Medical History  Past Medical History:   Diagnosis Date    Aortic stenosis     Bilateral edema of lower extremity     Dementia [facility]     Diabetes [facility]     Fall     Gait abnormality     Hyperlipidemia     Hypertension     Hypokalemia     Other ascites     Varicose veins of leg with edema      Past Surgical History  Past Surgical History:   Procedure Laterality Date    BREAST SURGERY      ELBOW SURGERY               07/27/22 1525   OT Last Visit   OT Visit Date 07/27/22   Note Type   Note type Evaluation   Restrictions/Precautions   Weight Bearing Precautions Per Order No  LSO brace w/ activity per Melanie Armstrong PA-C   Other Precautions Cognitive;Multiple lines;Telemetry; Fall Risk;Bed Alarm   Pain Assessment   Pain Assessment Tool 0-10   Pain Score No Pain   Home Living   Type of Home Assisted living   Home Layout One level   Additional Comments Pt resides at [facility] assist living  Prior Function   Level of [place] Needs assistance with IADLs   ADL Assistance Independent   IADLs Needs assistance   Comments per chart, pt is mostly I w/ ADLs but needs assist with IADLS  Lifestyle   Autonomy per chart, pt is mostly I w/ ADLs but needs assist with IADLS  She uses a RW for mobility     Reciprocal Relationships Supportive dtr; facility staff   Service to Others retired   Intrinsic Gratification did not express   Psychosocial   Psychosocial (WDL) WDL   ADL   Where Assessed Edge of bed   Eating Assistance 5  Supervision/Setup   Eating Deficit Setup   Grooming Assistance 4  Minimal Assistance   UB Bathing Assistance 4  Minimal Assistance   LB Bathing Assistance 2  Maximal Assistance   UB Dressing Assistance 4  Minimal Assistance   LB Dressing Assistance 2  Maximal Assistance   Toileting Assistance  2  Maximal Assistance   Functional Assistance 2  Maximal Assistance   Bed Mobility   Supine to Sit 3  Moderate assistance   Additional items Assist x 1   Sit to Supine 3  Moderate assistance   Additional items Assist x 1   Transfers   Sit to Stand 3  Moderate assistance   Additional items Assist x 1   Stand to Sit 3  Moderate assistance   Additional items Assist x 1   Functional Mobility   Additional items Rolling walker   Balance   Static Sitting Fair   Dynamic Sitting Fair   Static Standing Fair -   Dynamic Standing Poor   Activity Tolerance   Activity Tolerance Patient tolerated treatment well;Patient limited by fatigue   Medical Staff Made Aware rehab assist- marlys   Nurse Made Aware Cleared to work with per RN   RUE Assessment   RUE Assessment WFL   LUE Assessment   LUE Assessment WFL   Hand Function   Gross Motor Coordination Functional   Fine Motor Coordination Functional   Cognition   Overall Cognitive Status Impaired   Arousal/Participation Responsive; Cooperative   Attention Attends with cues to redirect   Orientation Level Oriented to person   Memory Decreased recall of precautions;Decreased recall of biographical information   Following Commands Follows one step commands with increased time or repetition   Comments Pt pleasant but confused  She reports being 70 y/o and the month and year being Nov of 1918  Pt has baseline dementia  She was oriented x4 earlier today per RN  Assessment   Limitation Decreased ADL status; Decreased cognition;Decreased endurance;Decreased self-care trans;Decreased high-level ADLs   Prognosis Good   Assessment · Pt is a 80 y o  female who was admitted to [facility] 7/26/2022 with Dementia with behavioral disturbance ([facility] )  Pt found to have Acute nondisplaced fracture of the inferior endplate of L3 without loss of vertebral height noted on CT  She is able to participate in therapy with LSO brace  She was admitted from her [facility] with AMS  Pt  has a past medical history of Aortic stenosis, Bilateral edema of lower extremity, Dementia ([facility] ), Diabetes ([facility] ), Fall, Gait abnormality, Hyperlipidemia, Hypertension, Hypokalemia, Other ascites, and Varicose veins of leg with edema  At baseline pt was mostly I w/ ADLs and uses a RW for mobility  The facility staff assists with IADLs  Pt lives at an [facility]  Currently pt requires min A for UB ADLs, Max A for LB ADLs, and mod Ax1 for functional mobility/transfers w/ RW  Pt currently presents with impairments in the following categories -limited home support, difficulty performing ADLS, difficulty performing IADLs, limited insight into deficits, activity tolerance, endurance, standing balance/tolerance, memory, insight and safety   These impairments, as well as pt's fatigue  limit pt's ability to safely engage in all baseline areas of occupation, including grooming, bathing, dressing, toileting and functional mobility/transfers  From OT standpoint, recommend inpatient rehab if facility staff cannot assist pt at currently level of function upon D/C  OT will continue to follow to address the below stated goals  Goals   Patient Goals to rest   LTG Time Frame 10-14   Long Term Goal see below goals   Plan   Treatment Interventions ADL retraining;Functional transfer training; Endurance training;Patient/family training;Cognitive reorientation;Equipment evaluation/education; Compensatory technique education; Energy conservation   Goal Expiration Date 08/10/22   OT Frequency 3-5x/wk   Recommendation   OT Discharge Recommendation Post acute rehabilitation services   AM-PAC Daily Activity Inpatient   Lower Body Dressing 2   Bathing 2   Toileting 2   Upper Body Dressing 3   Grooming 3   Eating 3   Daily Activity Raw Score 15   Daily Activity Standardized Score (Calc for Raw Score >=11) 34 69     LTG (10-14 DAYS)  Pt will improve activity tolerance to G for min 30-45 min txment sessions to increase participation in ADLs    Pt will complete ADLs/self care w/ S using adaptive device and DME as needed    Pt will complete toileting w/ S w/ G hygiene/thoroughness using DME as needed    Pt will improve functional transfers on/off all surfaces to S using DME as needed w/ G balance/safety  Pt will improve functional mobility during ADL/IADL/leisure tasks to S using DME as needed w/ G balance/safety  Pt will improve standing balance to G for 8-10 minutes of purposeful activity w/ G endurance       Pt will engage in ongoing cognitive assessment (as needed) w/ G participation w/ mod I to A w/ safe d/c planning/recommendations        Fernando Gonzalez, OTR/L

## 2022-07-27 NOTE — ASSESSMENT & PLAN NOTE
· Patient with documented underlying dementia  · Per ED note, staff at [facility] noted patient to be noncompliant with her care on morning of admission, rearranging her furniture, and was seeing bugs on the walls in the ED  · Suspect due to new lumbar compression fracture with pain  · Pain control as below   · Geriatrics consulted  · On Aricept

## 2022-07-27 NOTE — ASSESSMENT & PLAN NOTE
· Acute nondisplaced fracture of the inferior endplate of L3 without loss of vertebral height noted on CT  · Patient follows with Neurosurgery as outpatient for multiple TL compression deformities  · Pain control

## 2022-07-27 NOTE — ASSESSMENT & PLAN NOTE
Wt Readings from Last 3 Encounters:   07/21/22 73 5 kg (162 lb)   05/25/22 73 5 kg (162 lb)   02/07/22 69 2 kg (152 lb 8 9 oz)     · Volume status appears euvolemic  · Low Na diet  · Monitor intake and output  · Monitor daily weights  · Continue PO lasix  · Echo from February 2022 with LVEF 45%

## 2022-07-27 NOTE — ASSESSMENT & PLAN NOTE
· New L3 fracture noted on CT scan  · Pt denies any complaints but history gathering is limited due to dementia  · She seems uncomfortable lying in bed and is frqeuently repositioning, looking for a comfortable position    · Start miacalcin, tylenol around the clock, low dose oxycodone  · Monitor for response to the above treatment

## 2022-07-27 NOTE — PLAN OF CARE
Problem: Potential for Falls  Goal: Patient will remain free of falls  Description: INTERVENTIONS:  - Educate patient/family on patient safety including physical limitations  - Instruct patient to call for assistance with activity   - Consult OT/PT to assist with strengthening/mobility   - Keep Call bell within reach  - Keep bed low and locked with side rails adjusted as appropriate  - Keep care items and personal belongings within reach  - Initiate and maintain comfort rounds  - Make Fall Risk Sign visible to staff  - Offer Toileting every  Hours, in advance of need  - Initiate/Maintain alarm  - Obtain necessary fall risk management equipment:   - Apply yellow socks and bracelet for high fall risk patients  - Consider moving patient to room near nurses station  7/27/2022 1718 by Carlita Yin RN  Outcome: Progressing  7/27/2022 1716 by Carlita Yin RN  Outcome: Progressing     Problem: MOBILITY - ADULT  Goal: Maintain or return to baseline ADL function  Description: INTERVENTIONS:  -  Assess patient's ability to carry out ADLs; assess patient's baseline for ADL function and identify physical deficits which impact ability to perform ADLs (bathing, care of mouth/teeth, toileting, grooming, dressing, etc )  - Assess/evaluate cause of self-care deficits   - Assess range of motion  - Assess patient's mobility; develop plan if impaired  - Assess patient's need for assistive devices and provide as appropriate  - Encourage maximum independence but intervene and supervise when necessary  - Involve family in performance of ADLs  - Assess for home care needs following discharge   - Consider OT consult to assist with ADL evaluation and planning for discharge  - Provide patient education as appropriate  7/27/2022 1718 by Carlita Yin RN  Outcome: Progressing  7/27/2022 1716 by Carlita Yin RN  Outcome: Progressing  Goal: Maintains/Returns to pre admission functional level  Description: INTERVENTIONS:  - Perform BMAT or MOVE assessment daily    - Set and communicate daily mobility goal to care team and patient/family/caregiver  - Collaborate with rehabilitation services on mobility goals if consulted  - Perform Range of Motion  times a day  - Reposition patient every  hours    - Dangle patient times a day  - Stand patient  times a day  - Ambulate patient  times a day  - Out of bed to chair  times a day   - Out of bed for meals  times a day  - Out of bed for toileting  - Record patient progress and toleration of activity level   7/27/2022 1718 by Darío Rodriguez RN  Outcome: Progressing  7/27/2022 1716 by Darío Rodriguez RN  Outcome: Progressing     Problem: Prexisting or High Potential for Compromised Skin Integrity  Goal: Skin integrity is maintained or improved  Description: INTERVENTIONS:  - Identify patients at risk for skin breakdown  - Assess and monitor skin integrity  - Assess and monitor nutrition and hydration status  - Monitor labs   - Assess for incontinence   - Turn and reposition patient  - Assist with mobility/ambulation  - Relieve pressure over bony prominences  - Avoid friction and shearing  - Provide appropriate hygiene as needed including keeping skin clean and dry  - Evaluate need for skin moisturizer/barrier cream  - Collaborate with interdisciplinary team   - Patient/family teaching  - Consider wound care consult   7/27/2022 1718 by Darío Rodriguez RN  Outcome: Progressing  7/27/2022 1716 by Darío Rodriguez RN  Outcome: Progressing     Problem: PAIN - ADULT  Goal: Verbalizes/displays adequate comfort level or baseline comfort level  Description: Interventions:  - Encourage patient to monitor pain and request assistance  - Assess pain using appropriate pain scale  - Administer analgesics based on type and severity of pain and evaluate response  - Implement non-pharmacological measures as appropriate and evaluate response  - Consider cultural and social influences on pain and pain management  - Notify physician/advanced practitioner if interventions unsuccessful or patient reports new pain  Outcome: Progressing     Problem: INFECTION - ADULT  Goal: Absence or prevention of progression during hospitalization  Description: INTERVENTIONS:  - Assess and monitor for signs and symptoms of infection  - Monitor lab/diagnostic results  - Monitor all insertion sites, i e  indwelling lines, tubes, and drains  - Monitor endotracheal if appropriate and nasal secretions for changes in amount and color  - [place] appropriate cooling/warming therapies per order  - Administer medications as ordered  - Instruct and encourage patient and family to use good hand hygiene technique  - Identify and instruct in appropriate isolation precautions for identified infection/condition  Outcome: Progressing  Goal: Absence of fever/infection during neutropenic period  Description: INTERVENTIONS:  - Monitor WBC    Outcome: Progressing     Problem: SAFETY ADULT  Goal: Patient will remain free of falls  Description: INTERVENTIONS:  - Educate patient/family on patient safety including physical limitations  - Instruct patient to call for assistance with activity   - Consult OT/PT to assist with strengthening/mobility   - Keep Call bell within reach  - Keep bed low and locked with side rails adjusted as appropriate  - Keep care items and personal belongings within reach  - Initiate and maintain comfort rounds  - Make Fall Risk Sign visible to staff  - Offer Toileting every  Hours, in advance of need  - Initiate/Maintain alarm  - Obtain necessary fall risk management equipment:   - Apply yellow socks and bracelet for high fall risk patients  - Consider moving patient to room near nurses station  7/27/2022 1718 by Madisyn Montanez RN  Outcome: Progressing  7/27/2022 1716 by Madisyn Montanez RN  Outcome: Progressing  Goal: Maintain or return to baseline ADL function  Description: INTERVENTIONS:  -  Assess patient's ability to carry out ADLs; assess patient's baseline for ADL function and identify physical deficits which impact ability to perform ADLs (bathing, care of mouth/teeth, toileting, grooming, dressing, etc )  - Assess/evaluate cause of self-care deficits   - Assess range of motion  - Assess patient's mobility; develop plan if impaired  - Assess patient's need for assistive devices and provide as appropriate  - Encourage maximum independence but intervene and supervise when necessary  - Involve family in performance of ADLs  - Assess for home care needs following discharge   - Consider OT consult to assist with ADL evaluation and planning for discharge  - Provide patient education as appropriate  7/27/2022 1718 by Braxton Bauman RN  Outcome: Progressing  7/27/2022 1716 by Braxton Bauman RN  Outcome: Progressing  Goal: Maintains/Returns to pre admission functional level  Description: INTERVENTIONS:  - Perform BMAT or MOVE assessment daily    - Set and communicate daily mobility goal to care team and patient/family/caregiver  - Collaborate with rehabilitation services on mobility goals if consulted  - Perform Range of Motion  times a day  - Reposition patient every  hours    - Dangle patient  times a day  - Stand patient  times a day  - Ambulate patient  times a day  - Out of bed to chair  times a day   - Out of bed for meals  times a day  - Out of bed for toileting  - Record patient progress and toleration of activity level   7/27/2022 1718 by Braxton Bauman RN  Outcome: Progressing  7/27/2022 1716 by Braxton Bauman RN  Outcome: Progressing     Problem: DISCHARGE PLANNING  Goal: Discharge to home or other facility with appropriate resources  Description: INTERVENTIONS:  - Identify barriers to discharge w/patient and caregiver  - Arrange for needed discharge resources and transportation as appropriate  - Identify discharge learning needs (meds, wound care, etc )  - Arrange for interpretive services to assist at discharge as needed  - Refer to Case Management Department for coordinating discharge planning if the patient needs post-hospital services based on physician/advanced practitioner order or complex needs related to functional status, cognitive ability, or social support system  Outcome: Progressing     Problem: Knowledge Deficit  Goal: Patient/family/caregiver demonstrates understanding of disease process, treatment plan, medications, and discharge instructions  Description: Complete learning assessment and assess knowledge base    Interventions:  - Provide teaching at level of understanding  - Provide teaching via preferred learning methods  Outcome: Progressing Burow's Graft Text: The defect edges were debeveled with a #15 scalpel blade.  Given the location of the defect, shape of the defect, the proximity to free margins and the presence of a standing cone deformity a Burow's skin graft was deemed most appropriate. The standing cone was removed and this tissue was then trimmed to the shape of the primary defect. The adipose tissue was also removed until only dermis and epidermis were left.  The skin margins of the secondary defect were undermined to an appropriate distance in all directions utilizing iris scissors.  The secondary defect was closed with interrupted buried subcutaneous sutures.  The skin edges were then re-apposed with running  sutures.  The skin graft was then placed in the primary defect and oriented appropriately.

## 2022-07-27 NOTE — CONSULTS
Consultation - Geriatrics   Lito Merrill 80 y o  female MRN: [id number]  Unit/Bed#: [facility] 201-01 Encounter: [id number]      Assessment/Plan  Hallucinations  Observed to be seeing bugs that are not there, resistant to AM care  Pt was started on imodium scheduled 3 x week and prn for diarrhea  Hallucinations can be side effect of imodium, now discontinued  Recommend observe patient off imodium    Maintain delirium precautions  Provide frequent redirection, reorientation, distraction techniques  Avoid deliriogenic medications such as tramadol, benzodiazepines, anticholinergics,  Benadryl  Treat pain, See geriatric pain protocol  Monitor for constipation and urinary retention  Encourage early and frequent moblization, OOB  Encourage Hydration/ Nutrition  Implement sleep hygiene, limit night time interuptions, group activities    Dementia  progressing moderate- severe  Needs assistance with IADLs and ADLs  Encourage po hydration/ nutrition  Continue Aricept 10 mg po QHS, pt has been on Aricept for a few years, would be unlikely that new onset of diarrhea is side effect, continue to monitor     Ambulatory dysfunction  At risk for falls secondary to age, medications, hx of prior falls  Fall precautions  PT/OT  Rehab post hospitalization    Frailty  Clinical Frail Scale: 7- severely frail  Completely dependent for personal care  Albumin 3 6, maintain protein in diet  PT/OT  Rehab post hospitalization    Diarrhea  Started on imodium as outpatient 6/22/22 for diarrhea  Was previously on banatrol started 2/2022 in conjunction with course of vancomycin for Cdiff diarrhea  Uncertain of recent work up  Pt presently with out diarrhea, or BM , continue to monitor     Lumbar compression fracture  Neurosurgery on consult  Geriatric pain control  PT/OT    Acute pain due to fracture  Geriatric pain protocol  Scheduled acetaminophen 975 mg po Q8  Oxycodone 2 5 mg po Q 4 prn moderate pain  Oxycodone 5 mg po Q 4 prn severe pain   Monitor for constipation  Lidoderm patch    Advanced Care Planning  Full code, recommend discussion of goals of care    Home medication review  Per facility [facility]  Glipizide ER 10 mg po daily  Imodium 2 mg po 3x weekly, new start 6/11/22  Januvia 100 mg po daily  Lasix 20 mg po daily  Magnesium 400 mg po daily  Pepcid 20 mg po daily  Eliquis 5 mg po Q12   Metoprolol 25 mg po Q12  KCL ER 20 meq po daily  Prandin 2 mg po TID AC  Acetaminophen 500 mg po TID        History of Present Illness   Physician Requesting Consult: Berlin Hickman DO  Reason for Consult / Principal Problem: Hallucinations  Hx and PE limited by: NA  HPI: Katelin Jerry is a 80y o  year old female who presents with aletered mental status  She started to hallucinate at her facility and was transferred to [facility] ED for further evaluation  She has DM, dementia, Afib, CKD, HF, HTN hx of Cdiff  Prior to arrival pt lives at [facility]  She needs assistance with IADLs  She ambulates with a roller walker  Upon exam pt is lying in bed  She is sleeping, awakens to name       Attempted to call [facility], staff unavailable to talk  Attempted to call daughter, no answer    Inpatient consult to Gerontology  Consult performed by: Tuesday AME Taylor  Consult ordered by: Chelsea Sherwood PA-C          Review of Systems   Unable to perform ROS: Dementia       Historical Information   Past Medical History:   Diagnosis Date    Aortic stenosis     Bilateral edema of lower extremity     Dementia ([address]     Diabetes ([address]     Fall     Gait abnormality     Hyperlipidemia     Hypertension     Hypokalemia     Other ascites     Varicose veins of leg with edema      Past Surgical History:   Procedure Laterality Date    BREAST SURGERY      ELBOW SURGERY       Social History   Social History     Substance and Sexual Activity   Alcohol Use Not Currently     Social History     Substance and Sexual Activity   Drug Use No     Social History     Tobacco Use   Smoking Status Never Smoker   Smokeless Tobacco Never Used         Family History:   Family History   Problem Relation Age of Onset    Dementia Sister        Meds/Allergies   Current meds:   Current Facility-Administered Medications   Medication Dose Route Frequency    acetaminophen (TYLENOL) tablet 650 mg  650 mg Oral Q6H PRN    acetaminophen (TYLENOL) tablet 975 mg  975 mg Oral Q8H [address]    apixaban (ELIQUIS) tablet 5 mg  5 mg Oral BID    calcitonin (salmon) (MIACALCIN) 200 units/act nasal spray 1 spray  1 spray Alternating Nares Daily    donepezil (ARICEPT) tablet 10 mg  10 mg Oral Daily    furosemide (LASIX) tablet 20 mg  20 mg Oral Daily    insulin glargine (LANTUS) subcutaneous injection 10 Units 0 1 mL  10 Units Subcutaneous HS    insulin lispro (HumaLOG) 100 units/mL subcutaneous injection 1-5 Units  1-5 Units Subcutaneous TID AC    insulin lispro (HumaLOG) 100 units/mL subcutaneous injection 1-5 Units  1-5 Units Subcutaneous HS    lidocaine (LIDODERM) 5 % patch 1 patch  1 patch Topical Daily    magnesium oxide (MAG-OX) tablet 400 mg  400 mg Oral Daily    metoprolol tartrate (LOPRESSOR) tablet 25 mg  25 mg Oral Q12H MATT    oxyCODONE (ROXICODONE) IR tablet 2 5 mg  2 5 mg Oral Q4H PRN    oxyCODONE (ROXICODONE) IR tablet 5 mg  5 mg Oral Q4H PRN    potassium chloride (K-DUR,KLOR-CON) CR tablet 40 mEq  40 mEq Oral TID        No Known Allergies    Objective   Vitals: Blood pressure 99/64, pulse 63, temperature 98 5 °F (36 9 °C), temperature source Oral, resp  rate 18, SpO2 97 %  ,There is no height or weight on file to calculate BMI  Physical Exam  Vitals and nursing note reviewed  HENT:      Head: Normocephalic  Nose: No congestion  Mouth/Throat:      Mouth: Mucous membranes are moist    Eyes:      General:         Right eye: No discharge  Left eye: No discharge  Cardiovascular:      Rate and Rhythm: Normal rate  Pulses: Normal pulses     Pulmonary:      Effort: Pulmonary effort is normal       Breath sounds: Normal breath sounds  Abdominal:      General: Bowel sounds are normal       Palpations: Abdomen is soft  Musculoskeletal:         General: Normal range of motion  Cervical back: Normal range of motion  Skin:     General: Skin is warm and dry  Neurological:      Mental Status: She is alert  Mental status is at baseline  Psychiatric:         Mood and Affect: Mood normal          Lab Results:   Results from last 7 days   Lab Units 07/27/22  0922   WBC Thousand/uL 7 84   HEMOGLOBIN g/dL 11 8   HEMATOCRIT % 35 5   PLATELETS Thousands/uL 254        Results from last 7 days   Lab Units 07/26/22  1623   POTASSIUM mmol/L 4 1   CHLORIDE mmol/L 108   CO2 mmol/L 23   BUN mg/dL 22   CREATININE mg/dL 1 26   CALCIUM mg/dL 9 5   ALK PHOS U/L 111   ALT U/L 18   AST U/L 24       Imaging Studies: I have personally reviewed pertinent reports  EKG, Pathology, and Other Studies: I have personally reviewed pertinent reports      VTE Prophylaxis: Sequential compression device (Venodyne)     Code Status: Level 1 - Full Code

## 2022-07-27 NOTE — ASSESSMENT & PLAN NOTE
Lab Results   Component Value Date    HGBA1C 13 0 (H) 12/28/2021       Recent Labs     07/26/22 2051   POCGLU 104       Blood Sugar Average: Last 72 hrs:  · (P) 104   · Uncontrolled as evidenced by A1c as above   Will update A1c  · Orals on hold while hospitalized  · Was started on HS Lantus on admission, continue to monitor and adjust as indicated  · Continue SSI coverage TID AC and QHS  · Hypoglycemia protocol

## 2022-07-27 NOTE — H&P
[facility] [facility]  H&P- Bharath Burrows 11/22/1923, 80 y o  female MRN: [id number]  Unit/Bed#: Q0IF Encounter: [id number]  Primary Care Provider: Jolly Sosa MD   Date and time admitted to hospital: 7/26/2022  2:09 PM    * Dementia with behavioral disturbance [facility])  Assessment & Plan  Suspect due to new lumbar compression fracture with pain  Admit to inpatient for management  Continue donepezil   Treatment for compression fracture as below    Compression fracture of L3 vertebra (HCC)  Assessment & Plan  · New L3 fracture noted on CT scan  · Pt denies any complaints but history gathering is limited due to dementia  · She seems uncomfortable lying in bed and is frqeuently repositioning, looking for a comfortable position  · Start miacalcin, tylenol around the clock, low dose oxycodone  · Monitor for response to the above treatment    Chronic systolic CHF (congestive heart failure) ([facility])  Assessment & Plan  Wt Readings from Last 3 Encounters:   07/21/22 73 5 kg (162 lb)   05/25/22 73 5 kg (162 lb)   02/07/22 69 2 kg (152 lb 8 9 oz)     · Volume status appears euvolemic  · Low Na diet  · Monitor intake and output  · Monitor daily weights  · Continue PO lasix        Type 2 diabetes mellitus with diabetic chronic kidney disease ([facility] )  Assessment & Plan  Lab Results   Component Value Date    HGBA1C 13 0 (H) 12/28/2021       No results for input(s): POCGLU in the last 72 hours  Blood Sugar Average: Last 72 hrs:  · hold oral medications while inpatient  · Diabetic diet  · fingerstick QID with sliding scale coverage  · Start lantus at HS  · Add prandial insulin if needed      CKD (chronic kidney disease), stage III [facility])  Assessment & Plan  Lab Results   Component Value Date    EGFR 35 07/26/2022    EGFR 42 03/21/2022    EGFR 45 03/20/2022    CREATININE 1 26 07/26/2022    CREATININE 1 09 03/21/2022    CREATININE 1 03 03/20/2022   · renal function is near baseline  · Avoid NSAIDS and nephrotoxins    Benign essential hypertension  Assessment & Plan  · BP is acceptable, continue lasix and metoprolol    HTN (hypertension)-resolved as of 7/26/2022  Assessment & Plan  · BP is acceptable  · contineu       VTE Pharmacologic Prophylaxis:   Moderate Risk (Score 3-4) - Pharmacological DVT Prophylaxis Ordered: apixaban (Eliquis)  Code Status: Level 1 - Full Code   Discussion with family: Patient declined call to   Anticipated Length of Stay: Patient will be admitted on an observation basis with an anticipated length of stay of less than 2 midnights secondary to AMS  Total Time for Visit, including Counseling / Coordination of Care: 45 minutes Greater than 50% of this total time spent on direct patient counseling and coordination of care  Chief Complaint: altered mental status    History of Present Illness:  Cristian Deal is a 80 y o  female with a PMH of dementia who presents with altered mental status  History gathering is limited due to her dementia  Per ED records she was noted to be hallucinating at her facility and transferred to [facility] for evaluation  Here her ED evaluation was only remarkable for a new compression fracture  Review of Systems:  Review of Systems   All other systems reviewed and are negative  Past Medical and Surgical History:   Past Medical History:   Diagnosis Date    Aortic stenosis     Bilateral edema of lower extremity     Dementia ([address] )     Diabetes ([facility] )     Fall     Gait abnormality     Hyperlipidemia     Hypertension     Hypokalemia     Other ascites     Varicose veins of leg with edema        Past Surgical History:   Procedure Laterality Date    BREAST SURGERY      ELBOW SURGERY         Meds/Allergies:  Prior to Admission medications    Medication Sig Start Date End Date Taking?  Authorizing Provider   acetaminophen (TYLENOL) 325 mg tablet Take 650 mg by mouth every 6 (six) hours as needed for mild pain    Historical Provider, MD apixaban (ELIQUIS) 5 mg Take 1 tablet (5 mg total) by mouth 2 (two) times a day 2/3/22   Pan Kinsey PA-C   Banana Flakes (BANATROL PLUS PO) Take by mouth 3 (three) times a day with meals    Historical Provider, MD   donepezil (ARICEPT) 10 mg tablet Take 1 tablet (10 mg total) by mouth daily 8/3/21   Fran Dandy, MD   famotidine (PEPCID) 20 mg tablet Take 1 tablet (20 mg total) by mouth daily  Patient not taking: Reported on 5/25/2022 3/22/22   Katlin Mcdaniels PA-C   furosemide (LASIX) 20 mg tablet Take 20 mg by mouth daily    Historical Provider, MD   glipiZIDE (GLUCOTROL) 10 mg tablet Take 10 mg by mouth in the morning    Historical Provider, MD   insulin lispro (insulin lispro) 100 units/mL injection Inject under the skin 4 (four) times a day Per Sliding Scale    Historical Provider, MD   magnesium oxide (MAG-OX) 400 mg Take 1 tablet (400 mg total) by mouth daily 8/3/21   AME Roche   metoprolol tartrate (LOPRESSOR) 25 mg tablet Take 1 tablet (25 mg total) by mouth every 12 (twelve) hours 2/7/22   Carlita Padilla MD   Potassium Chloride ER 20 MEQ TBCR Take 2 tablets (40 mEq total) by mouth 3 (three) times a day 2/7/22   Carlita Padilla MD   repaglinide (PRANDIN) 2 mg tablet Take 1 tablet (2 mg total) by mouth 3 (three) times a day before meals 7/2/21   AME Lundberg   sitaGLIPtin (JANUVIA) 100 mg tablet Take 1 tablet (100 mg total) by mouth daily 8/3/21   AME Roche     I have reveiwed home medications using records provided by [facility]  Allergies: No Known Allergies    Social History:  Marital Status:     Occupation: reited  Patient Pre-hospital Living Situation: Assisted Living  Patient Pre-hospital Level of Mobility: walks  Patient Pre-hospital Diet Restrictions: diabetic  Substance Use History:   Social History     Substance and Sexual Activity   Alcohol Use Not Currently     Social History     Tobacco Use   Smoking Status Never Smoker   Smokeless Tobacco Never Used     Social History     Substance and Sexual Activity   Drug Use No       Family History:  Family History   Problem Relation Age of Onset    Dementia Sister        Physical Exam:     Vitals:   Blood Pressure: 113/69 (07/26/22 1843)  Pulse: 75 (07/26/22 1843)  Temperature: 97 7 °F (36 5 °C) (07/26/22 1843)  Temp Source: Oral (07/26/22 1843)  Respirations: 20 (07/26/22 1657)  SpO2: 96 % (07/26/22 1843)    Physical Exam  Constitutional:       General: She is not in acute distress  Appearance: She is obese  She is not ill-appearing or toxic-appearing  HENT:      Head: Normocephalic and atraumatic  Mouth/Throat:      Mouth: Mucous membranes are moist       Pharynx: Oropharynx is clear  Eyes:      Extraocular Movements: Extraocular movements intact  Cardiovascular:      Rate and Rhythm: Normal rate and regular rhythm  Pulmonary:      Effort: Pulmonary effort is normal       Breath sounds: No wheezing or rales  Abdominal:      General: Abdomen is flat  Palpations: Abdomen is soft  Musculoskeletal:         General: Tenderness present  Skin:     General: Skin is warm and dry  Neurological:      Mental Status: She is alert  She is disoriented     Psychiatric:         Mood and Affect: Mood normal          Behavior: Behavior normal         Additional Data:     Lab Results:  Results from last 7 days   Lab Units 07/26/22  1623   WBC Thousand/uL 11 69*   HEMOGLOBIN g/dL 12 1   HEMATOCRIT % 37 4   PLATELETS Thousands/uL 273   NEUTROS PCT % 72   LYMPHS PCT % 16   MONOS PCT % 10   EOS PCT % 0     Results from last 7 days   Lab Units 07/26/22  1623   SODIUM mmol/L 138   POTASSIUM mmol/L 4 1   CHLORIDE mmol/L 108   CO2 mmol/L 23   BUN mg/dL 22   CREATININE mg/dL 1 26   ANION GAP mmol/L 7   CALCIUM mg/dL 9 5   ALBUMIN g/dL 3 6   TOTAL BILIRUBIN mg/dL 0 62   ALK PHOS U/L 111   ALT U/L 18   AST U/L 24   GLUCOSE RANDOM mg/dL 94                       Imaging: Reviewed radiology reports from this admission including: abdominal/pelvic CT and No pertinent imaging reviewed  CT head without contrast   Final Result by Mariela Nicole MD (07/26 1642)      No acute intracranial abnormality  Workstation performed: [id number]         CT abdomen pelvis wo contrast   Final Result by Mariela Nicole MD (07/26 1648)      Acute nondisplaced fracture of the inferior endplate of L3 without loss of vertebral body height  The study was marked in [facility] for immediate notification  Workstation performed: [id number]         XR chest 1 view portable   Final Result by Alfred Min MD (07/26 1638)      No acute disease in the chest                   Workstation performed: [id number]             EKG and Other Studies Reviewed on Admission:   · EKG: NSR  HR 73     ** Please Note: This note has been constructed using a voice recognition system   **

## 2022-07-27 NOTE — ED ATTENDING ATTESTATION
7/26/2022  IPepito DO, saw and evaluated the patient  I have discussed the patient with the resident/non-physician practitioner and agree with the resident's/non-physician practitioner's findings, Plan of Care, and MDM as documented in the resident's/non-physician practitioner's note, except where noted  All available labs and Radiology studies were reviewed  I was present for key portions of any procedure(s) performed by the resident/non-physician practitioner and I was immediately available to provide assistance  At this point I agree with the current assessment done in the Emergency Department  I have conducted an independent evaluation of this patient a history and physical is as follows:    59-year-old female presents for agitation  Patient sent in from [facility]  She reportedly has been refusing care at her facility rearranging furniture fusing to eat  Recent back surgery for compression fractures currently wearing a brace  Patient cannot provide any history  Plan is to check CT head infectious workup including urinalysis he CT abdomen pelvis with lumbar spine as well for evaluation fractures    May require admission due to inability to participate with care at her nursing facility    ED Course         Critical Care Time  Procedures

## 2022-07-27 NOTE — ASSESSMENT & PLAN NOTE
Lab Results   Component Value Date    HGBA1C 13 0 (H) 12/28/2021       No results for input(s): POCGLU in the last 72 hours  Blood Sugar Average: Last 72 hrs:  · hold oral medications while inpatient  · Diabetic diet  · fingerstick QID with sliding scale coverage  · Start lantus at HS  · Add prandial insulin if needed

## 2022-07-27 NOTE — ASSESSMENT & PLAN NOTE
Suspect due to new lumbar compression fracture with pain    Admit to inpatient for management  Continue donepezil   Treatment for compression fracture as below

## 2022-07-27 NOTE — ASSESSMENT & PLAN NOTE
Wt Readings from Last 3 Encounters:   07/21/22 73 5 kg (162 lb)   05/25/22 73 5 kg (162 lb)   02/07/22 69 2 kg (152 lb 8 9 oz)     · Volume status appears euvolemic  · Low Na diet  · Monitor intake and output  · Monitor daily weights  · Continue PO lasix

## 2022-07-28 LAB
GLUCOSE SERPL-MCNC: 126 MG/DL (ref 65–140)
GLUCOSE SERPL-MCNC: 170 MG/DL (ref 65–140)
GLUCOSE SERPL-MCNC: 189 MG/DL (ref 65–140)
GLUCOSE SERPL-MCNC: 238 MG/DL (ref 65–140)

## 2022-07-28 PROCEDURE — 99232 SBSQ HOSP IP/OBS MODERATE 35: CPT | Performed by: NURSE PRACTITIONER

## 2022-07-28 PROCEDURE — 82948 REAGENT STRIP/BLOOD GLUCOSE: CPT

## 2022-07-28 PROCEDURE — 99232 SBSQ HOSP IP/OBS MODERATE 35: CPT | Performed by: HOSPITALIST

## 2022-07-28 PROCEDURE — 97163 PT EVAL HIGH COMPLEX 45 MIN: CPT

## 2022-07-28 RX ADMIN — LIDOCAINE 5% 1 PATCH: 700 PATCH TOPICAL at 08:22

## 2022-07-28 RX ADMIN — POTASSIUM CHLORIDE 40 MEQ: 1500 TABLET, EXTENDED RELEASE ORAL at 08:22

## 2022-07-28 RX ADMIN — CALCITONIN SALMON 1 SPRAY: 200 SPRAY, METERED NASAL at 08:23

## 2022-07-28 RX ADMIN — POTASSIUM CHLORIDE 40 MEQ: 1500 TABLET, EXTENDED RELEASE ORAL at 21:58

## 2022-07-28 RX ADMIN — INSULIN LISPRO 1 UNITS: 100 INJECTION, SOLUTION INTRAVENOUS; SUBCUTANEOUS at 21:56

## 2022-07-28 RX ADMIN — FUROSEMIDE 20 MG: 20 TABLET ORAL at 08:22

## 2022-07-28 RX ADMIN — ACETAMINOPHEN 975 MG: 325 TABLET ORAL at 21:57

## 2022-07-28 RX ADMIN — INSULIN GLARGINE 10 UNITS: 100 INJECTION, SOLUTION SUBCUTANEOUS at 21:58

## 2022-07-28 RX ADMIN — ACETAMINOPHEN 975 MG: 325 TABLET ORAL at 06:20

## 2022-07-28 RX ADMIN — METOPROLOL TARTRATE 25 MG: 25 TABLET, FILM COATED ORAL at 08:22

## 2022-07-28 RX ADMIN — DONEPEZIL HYDROCHLORIDE 10 MG: 10 TABLET ORAL at 08:23

## 2022-07-28 RX ADMIN — ACETAMINOPHEN 975 MG: 325 TABLET ORAL at 13:35

## 2022-07-28 RX ADMIN — POTASSIUM CHLORIDE 40 MEQ: 1500 TABLET, EXTENDED RELEASE ORAL at 17:42

## 2022-07-28 RX ADMIN — MAGNESIUM OXIDE TAB 400 MG (241.3 MG ELEMENTAL MG) 400 MG: 400 (241.3 MG) TAB at 08:22

## 2022-07-28 RX ADMIN — INSULIN LISPRO 1 UNITS: 100 INJECTION, SOLUTION INTRAVENOUS; SUBCUTANEOUS at 17:43

## 2022-07-28 RX ADMIN — APIXABAN 5 MG: 5 TABLET, FILM COATED ORAL at 17:42

## 2022-07-28 RX ADMIN — APIXABAN 5 MG: 5 TABLET, FILM COATED ORAL at 08:22

## 2022-07-28 NOTE — PLAN OF CARE
Problem: Potential for Falls  Goal: Patient will remain free of falls  Description: INTERVENTIONS:  - Educate patient/family on patient safety including physical limitations  - Instruct patient to call for assistance with activity   - Consult OT/PT to assist with strengthening/mobility   - Keep Call bell within reach  - Keep bed low and locked with side rails adjusted as appropriate  - Keep care items and personal belongings within reach  - Initiate and maintain comfort rounds  - Make Fall Risk Sign visible to staff  - Offer Toileting every  Hours, in advance of need  - Initiate/Maintain alarm  - Obtain necessary fall risk management equipment:   - Apply yellow socks and bracelet for high fall risk patients  - Consider moving patient to room near nurses station  Outcome: Progressing     Problem: MOBILITY - ADULT  Goal: Maintain or return to baseline ADL function  Description: INTERVENTIONS:  -  Assess patient's ability to carry out ADLs; assess patient's baseline for ADL function and identify physical deficits which impact ability to perform ADLs (bathing, care of mouth/teeth, toileting, grooming, dressing, etc )  - Assess/evaluate cause of self-care deficits   - Assess range of motion  - Assess patient's mobility; develop plan if impaired  - Assess patient's need for assistive devices and provide as appropriate  - Encourage maximum independence but intervene and supervise when necessary  - Involve family in performance of ADLs  - Assess for home care needs following discharge   - Consider OT consult to assist with ADL evaluation and planning for discharge  - Provide patient education as appropriate  Outcome: Progressing  Goal: Maintains/Returns to pre admission functional level  Description: INTERVENTIONS:  - Perform BMAT or MOVE assessment daily    - Set and communicate daily mobility goal to care team and patient/family/caregiver     - Collaborate with rehabilitation services on mobility goals if consulted  - Perform Range of Motion  times a day  - Reposition patient every  hours    - Dangle patient  times a day  - Stand patient  times a day  - Ambulate patient  times a day  - Out of bed to chair  times a day   - Out of bed for meals  times a day  - Out of bed for toileting  - Record patient progress and toleration of activity level   Outcome: Progressing     Problem: Prexisting or High Potential for Compromised Skin Integrity  Goal: Skin integrity is maintained or improved  Description: INTERVENTIONS:  - Identify patients at risk for skin breakdown  - Assess and monitor skin integrity  - Assess and monitor nutrition and hydration status  - Monitor labs   - Assess for incontinence   - Turn and reposition patient  - Assist with mobility/ambulation  - Relieve pressure over bony prominences  - Avoid friction and shearing  - Provide appropriate hygiene as needed including keeping skin clean and dry  - Evaluate need for skin moisturizer/barrier cream  - Collaborate with interdisciplinary team   - Patient/family teaching  - Consider wound care consult   Outcome: Progressing     Problem: PAIN - ADULT  Goal: Verbalizes/displays adequate comfort level or baseline comfort level  Description: Interventions:  - Encourage patient to monitor pain and request assistance  - Assess pain using appropriate pain scale  - Administer analgesics based on type and severity of pain and evaluate response  - Implement non-pharmacological measures as appropriate and evaluate response  - Consider cultural and social influences on pain and pain management  - Notify physician/advanced practitioner if interventions unsuccessful or patient reports new pain  Outcome: Progressing     Problem: INFECTION - ADULT  Goal: Absence or prevention of progression during hospitalization  Description: INTERVENTIONS:  - Assess and monitor for signs and symptoms of infection  - Monitor lab/diagnostic results  - Monitor all insertion sites, i e  indwelling lines, tubes, and drains  - Monitor endotracheal if appropriate and nasal secretions for changes in amount and color  - [place] appropriate cooling/warming therapies per order  - Administer medications as ordered  - Instruct and encourage patient and family to use good hand hygiene technique  - Identify and instruct in appropriate isolation precautions for identified infection/condition  Outcome: Progressing  Goal: Absence of fever/infection during neutropenic period  Description: INTERVENTIONS:  - Monitor WBC    Outcome: Progressing     Problem: SAFETY ADULT  Goal: Patient will remain free of falls  Description: INTERVENTIONS:  - Educate patient/family on patient safety including physical limitations  - Instruct patient to call for assistance with activity   - Consult OT/PT to assist with strengthening/mobility   - Keep Call bell within reach  - Keep bed low and locked with side rails adjusted as appropriate  - Keep care items and personal belongings within reach  - Initiate and maintain comfort rounds  - Make Fall Risk Sign visible to staff  - Offer Toileting every  Hours, in advance of need  - Initiate/Maintain alarm  - Obtain necessary fall risk management equipment:   - Apply yellow socks and bracelet for high fall risk patients  - Consider moving patient to room near nurses station  Outcome: Progressing  Goal: Maintain or return to baseline ADL function  Description: INTERVENTIONS:  -  Assess patient's ability to carry out ADLs; assess patient's baseline for ADL function and identify physical deficits which impact ability to perform ADLs (bathing, care of mouth/teeth, toileting, grooming, dressing, etc )  - Assess/evaluate cause of self-care deficits   - Assess range of motion  - Assess patient's mobility; develop plan if impaired  - Assess patient's need for assistive devices and provide as appropriate  - Encourage maximum independence but intervene and supervise when necessary  - Involve family in performance of ADLs  - Assess for home care needs following discharge   - Consider OT consult to assist with ADL evaluation and planning for discharge  - Provide patient education as appropriate  Outcome: Progressing  Goal: Maintains/Returns to pre admission functional level  Description: INTERVENTIONS:  - Perform BMAT or MOVE assessment daily    - Set and communicate daily mobility goal to care team and patient/family/caregiver  - Collaborate with rehabilitation services on mobility goals if consulted  - Perform Range of Motion  times a day  - Reposition patient every  hours  - Dangle patient  times a day  - Stand patient  times a day  - Ambulate patient  times a day  - Out of bed to chair  times a day   - Out of bed for meal times a day  - Out of bed for toileting  - Record patient progress and toleration of activity level   Outcome: Progressing     Problem: DISCHARGE PLANNING  Goal: Discharge to home or other facility with appropriate resources  Description: INTERVENTIONS:  - Identify barriers to discharge w/patient and caregiver  - Arrange for needed discharge resources and transportation as appropriate  - Identify discharge learning needs (meds, wound care, etc )  - Arrange for interpretive services to assist at discharge as needed  - Refer to Case Management Department for coordinating discharge planning if the patient needs post-hospital services based on physician/advanced practitioner order or complex needs related to functional status, cognitive ability, or social support system  Outcome: Progressing     Problem: Knowledge Deficit  Goal: Patient/family/caregiver demonstrates understanding of disease process, treatment plan, medications, and discharge instructions  Description: Complete learning assessment and assess knowledge base    Interventions:  - Provide teaching at level of understanding  - Provide teaching via preferred learning methods  Outcome: Progressing

## 2022-07-28 NOTE — ASSESSMENT & PLAN NOTE
Lab Results   Component Value Date    EGFR 40 07/27/2022    EGFR 35 07/26/2022    EGFR 42 03/21/2022    CREATININE 1 14 07/27/2022    CREATININE 1 26 07/26/2022    CREATININE 1 09 03/21/2022   · Renal function is near baseline  · Avoid NSAIDS and nephrotoxins

## 2022-07-28 NOTE — ASSESSMENT & PLAN NOTE
Lab Results   Component Value Date    HGBA1C 7 3 (H) 07/26/2022       Recent Labs     07/27/22  2157 07/28/22  0520 07/28/22  1057 07/28/22  1555   POCGLU 134 126 238* 170*       Blood Sugar Average: Last 72 hrs:  · (P) 148   · ADA diet   · Was started on HS Lantus on admission, continue to monitor and adjust as indicated  · Continue SSI coverage TID AC and QHS  · Hypoglycemia protocol

## 2022-07-28 NOTE — PROGRESS NOTES
Progress Note - Geriatric Medicine   Jacqueline Randhawa 80 y o  female MRN: [id number]  Unit/Bed#: [facility] 201-01 Encounter: [id number]      Assessment/Plan:   Dementia with hallucinations    progressive dementia   Is currently on Aricept 10 mg q h s , could trial holding medication to see if this    currently lives at [facility],  Was having hallucinations where she was seeing bugs on the walls    could be related to Imodium which has since been discontinued  Patient is alert oriented to person only   No further hallucinations reported by nursing   Per nursing patient has been calm and cooperative   At risk for delirium due to dementia  Recommend delirium precautions  Maintain sleep-wake cycle, avoid nighttime interruptions  Provide adequate pain control  Avoid urinary retention and constipation  Provide frequent and early mobilization  Provide frequent redirection and reorientation as needed  Avoid medications that may worsen or precipitate delirium such as tramadol, benzodiazepines, anticholinergics, and Benadryl  Redirect unwanted behaviors as first-line therapy, avoid physical restraints as able to  Continue to monitor     Frailty   Clinical Frail Scale:  7 severely frail  Total protein 7 8 and albumin 3 6 on 7/26/22  Encourage adequate hydration and nutrition, including protein in meals  OOB as tolerated  PT/OT consulted  Encourage early and frequent moblization      Lumbar compression fracture   CT showed: Acute nondisplaced fracture of the inferior endplate of L3 without loss of vertebral body height   patient follows outpatient with Neurosurgery for multiple fractures   neurosurgery currently not on consult  Recommend geriatric pain protocol   Is currently on scheduled Tylenol and lidocaine patch, and p r n  oxycodone   Wean off narcotics as able to   management per primary team    Ambulatory dysfunction  At a baseline ambulates with RW  PT/OT following  Fall precautions  Out of bed as tolerated  Encourage early and frequent mobilization  Encourage adequate hydration and nutrition  Provide adequate pain management   Goal is to return to [address] with PT/OT for continued strength and balance training       Subjective:   Patient is being seen for a geriatrics follow up  Upon examination patient was out of bed in a chair, resting  She was alert to person only  She was calm and cooperative throughout exam   She was eating lunch during exam   Denies any pain  Per nursing no other acute concerns or issues at this time  Review of Systems   Reason unable to perform ROS: limited by dementia  Respiratory: Negative for cough and shortness of breath  Cardiovascular: Negative for chest pain and palpitations  Gastrointestinal: Positive for diarrhea  Negative for abdominal distention and constipation  Genitourinary: Negative for difficulty urinating  Musculoskeletal: Positive for arthralgias, back pain and gait problem  Neurological: Positive for weakness  Negative for dizziness and headaches  Psychiatric/Behavioral: Positive for confusion  Negative for hallucinations  Objective:     Vitals: Blood pressure 122/78, pulse (!) 53, temperature 97 7 °F (36 5 °C), temperature source Oral, resp  rate 15, weight 72 3 kg (159 lb 4 8 oz), SpO2 98 %  ,Body mass index is 32 17 kg/m²  Intake/Output Summary (Last 24 hours) at 7/28/2022 1043  Last data filed at 7/28/2022 0900  Gross per 24 hour   Intake 360 ml   Output 1500 ml   Net -1140 ml       Current Medications: Reviewed    Physical Exam:   Physical Exam  Vitals reviewed  Constitutional:       General: She is not in acute distress  Appearance: She is well-developed  She is not ill-appearing  Comments: Frail appearing    HENT:      Head: Normocephalic and atraumatic  Cardiovascular:      Rate and Rhythm: Normal rate and regular rhythm  Heart sounds: No murmur heard    Pulmonary:      Effort: Pulmonary effort is normal  No respiratory distress  Breath sounds: Normal breath sounds  Abdominal:      General: Abdomen is flat  There is no distension  Palpations: Abdomen is soft  Tenderness: There is no abdominal tenderness  Musculoskeletal:      Right lower leg: No edema  Left lower leg: No edema  Comments: LSO brace in place   Skin:     General: Skin is warm and dry  Coloration: Skin is pale  Neurological:      General: No focal deficit present  Mental Status: She is alert  Mental status is at baseline  Cranial Nerves: No cranial nerve deficit  Motor: Weakness present  Gait: Gait abnormal       Comments: Alert to person only          Invasive Devices  Report    Peripheral Intravenous Line  Duration           Peripheral IV 07/26/22 Left Antecubital 1 day                Lab, Imaging and other studies: I have personally reviewed pertinent reports  Please note:  Voice-recognition software may have been used in the preparation of this document  Occasional wrong word or "sound-alike" substitutions may have occurred due to the inherent limitations of voice recognition software  Interpretation should be guided by context

## 2022-07-28 NOTE — ASSESSMENT & PLAN NOTE
· Severe AS noted on echo from Feb 2022  · Medically managed and may not be a surgical candidate 2/2 to her age

## 2022-07-28 NOTE — ASSESSMENT & PLAN NOTE
Wt Readings from Last 3 Encounters:   07/28/22 72 3 kg (159 lb 4 8 oz)   07/21/22 73 5 kg (162 lb)   05/25/22 73 5 kg (162 lb)     · Volume status appears euvolemic  · Low Na diet  · Monitor intake and output  · Monitor daily weights  · Continue PO lasix  · Echo from February 2022 with LVEF 45%

## 2022-07-28 NOTE — ASSESSMENT & PLAN NOTE
· Patient with documented underlying dementia  · Per ED note, staff at [facility] noted patient to be noncompliant with her care on morning of admission, rearranging her furniture, and was seeing bugs on the walls in the ED  · Suspect due to new lumbar compression fracture with pain  · Pain control as below   · Geriatrics consulted and they recommended   · Continuing Aricept 10 mg q h s  Recommend delirium precautions  Maintain sleep-wake cycle, avoid nighttime interruptions  Provide adequate pain control  Avoid urinary retention and constipation  Provide frequent and early mobilization   Provide frequent redirection and reorientation as needed  Avoid medications that may worsen or precipitate delirium such as tramadol, benzodiazepines, anticholinergics, and Benadryl  Redirect unwanted behaviors as first-line therapy, avoid physical restraints as able to  Continue to monitor  Will follow their recs  WiIl obtain TSH, B12, Folate level, neuro checks and will consult neurology

## 2022-07-28 NOTE — PLAN OF CARE
Problem: PHYSICAL THERAPY ADULT  Goal: Performs mobility at highest level of function for planned discharge setting  See evaluation for individualized goals  Description: Treatment/Interventions: Functional transfer training, LE strengthening/ROM, Therapeutic exercise, Endurance training, Cognitive reorientation, Bed mobility, Gait training, OT          See flowsheet documentation for full assessment, interventions and recommendations  Note: Prognosis: Fair  Problem List: Decreased strength, Decreased endurance, Impaired balance, Decreased mobility, Decreased cognition, Decreased safety awareness (dementia)  Assessment: Pt is a 81 y/o female admitted to [address] [place] [address] on 7/26/2022 for back pain from [facility]  Pt's Dx and personal factors are dementia with behavioral disturbance, compression fx of L3 vertebra, T2DM, afib, CKD3, chronic systolic CHF, aortic stenosis, HTN and h/o multiple T/L compression deformities  Pt is a poor historian due to dementia  Per EMR, Pt lives in [facility] assisted living  Pt requires assistance with IADLs and showering but fairly independent with ADLs  Pt ambulates with RW  During the examination, pt demonstrated decrease BLE strength, impaired sitting and standing balance and tolerance, decrease activity tolerance, impaired endurance, impaired memory, confusion, hard of hearing, gait abnormalities, and fall risk which limits pt to perform ADLs independently and increases risks for falls  Pt performed mobility with LSO on   Reviewed spinal precautions to pt with pt's showing understanding  Pt will benefit with skilled PT interventions while in the hospital to address the impairments stated above  Pt is recommended to rehab upon D/C  At the end of the session, pt was OOB in chair with call bell within reach, LSO on, and chair alarm on    Barriers to Discharge: None     PT Discharge Recommendation: Post acute rehabilitation services    See flowsheet documentation for full assessment

## 2022-07-28 NOTE — PROGRESS NOTES
[facility]  Progress Note - Laney Nicholson 1923, 80 y o  female MRN: [id number]    VTE Pharmacologic Prophylaxis:   Moderate Risk (Score 3-4) - Pharmacological DVT Prophylaxis Ordered: apixaban (Eliquis)  Patient Centered Rounds: I evaluated the patient without nursing staff present due to Nurse updated  Discussions with Specialists or Other Care Team Provider: DARWIN    Education and Discussions with Family / Patient: Updated  (daughter) via phone  Time Spent for Care: 30 minutes  More than 50% of total time spent on counseling and coordination of care as described above  Current Length of Stay: 1 day(s)  Current Patient Status: Inpatient   Certification Statement: The patient will continue to require additional inpatient hospital stay due to Acute and chronic mental status change   Discharge Plan: Anticipate discharge in >72 hrs to rehab facility  Code Status: Level 1 - Full Code    Subjective:   Pleasantly confused but oriented to place     Objective:     Vitals:   Temp (24hrs), Av 7 °F (36 5 °C), Min:97 1 °F (36 2 °C), Max:98 2 °F (36 8 °C)    Temp:  [97 1 °F (36 2 °C)-98 2 °F (36 8 °C)] 98 2 °F (36 8 °C)  HR:  [53-65] 60  Resp:  [15] 15  BP: (115-161)/(66-78) 115/66  SpO2:  [97 %-100 %] 97 %  Body mass index is 32 17 kg/m²  Input and Output Summary (last 24 hours): Intake/Output Summary (Last 24 hours) at 2022  Last data filed at 2022 1900  Gross per 24 hour   Intake 360 ml   Output 1363 ml   Net -1003 ml       Physical Exam:   Vitals and nursing note reviewed  Constitutional:       Comments: Patient seen lying in bed, NAD   Cardiovascular:      Rate and Rhythm: Normal rate and regular rhythm  Heart sounds: Murmur heard  Pulmonary:      Effort: Pulmonary effort is normal       Breath sounds: Normal breath sounds  Abdominal:      General: Bowel sounds are normal       Palpations: Abdomen is soft        Tenderness: There is no abdominal tenderness  Musculoskeletal:      Right lower leg: No edema  Left lower leg: No edema  Skin:     General: Skin is warm  Neurological:      Mental Status: She is alert  Comments: Oriented  place but  But not to time, year and very forgetful of her dinner which was about 3 hours ago   Psychiatric:      Comments: Not anxious or agitated appearing    Additional Data:     Labs:  Results from last 7 days   Lab Units 07/27/22  0922 07/26/22  1623   WBC Thousand/uL 7 84 11 69*   HEMOGLOBIN g/dL 11 8 12 1   HEMATOCRIT % 35 5 37 4   PLATELETS Thousands/uL 254 273   NEUTROS PCT %  --  72   LYMPHS PCT %  --  16   MONOS PCT %  --  10   EOS PCT %  --  0     Results from last 7 days   Lab Units 07/27/22  0922 07/26/22  1623   SODIUM mmol/L 139 138   POTASSIUM mmol/L 4 2 4 1   CHLORIDE mmol/L 111* 108   CO2 mmol/L 24 23   BUN mg/dL 18 22   CREATININE mg/dL 1 14 1 26   ANION GAP mmol/L 4 7   CALCIUM mg/dL 9 4 9 5   ALBUMIN g/dL  --  3 6   TOTAL BILIRUBIN mg/dL  --  0 62   ALK PHOS U/L  --  111   ALT U/L  --  18   AST U/L  --  24   GLUCOSE RANDOM mg/dL 134 94         Results from last 7 days   Lab Units 07/28/22  1555 07/28/22  1057 07/28/22  0520 07/27/22  2157 07/27/22  2100 07/27/22  1556 07/27/22  1034 07/27/22  0907 07/26/22  2051   POC GLUCOSE mg/dl 170* 238* 126 134 149* 172* 119 120 104     Results from last 7 days   Lab Units 07/26/22  1623   HEMOGLOBIN A1C % 7 3*           Lines/Drains:  Invasive Devices  Report    Peripheral Intravenous Line  Duration           Peripheral IV 07/26/22 Left Antecubital 2 days                      Imaging: No pertinent imaging reviewed      Recent Cultures (last 7 days):         Last 24 Hours Medication List:   Current Facility-Administered Medications   Medication Dose Route Frequency Provider Last Rate    acetaminophen  650 mg Oral Q6H PRN Mariel Layne MD      acetaminophen  975 mg Oral [facility] Mariel Layne MD      apixaban  5 mg Oral BID Donelda Filler Florian Molina MD      calcitonin (salmon)  1 spray Alternating Nares Daily Michael Turcios MD      donepezil  10 mg Oral Daily Michael Turcios MD      furosemide  20 mg Oral Daily Michael Turcios MD      insulin glargine  10 Units Subcutaneous HS Michael Turcios MD      insulin lispro  1-5 Units Subcutaneous TID AC Michael Turcios MD      insulin lispro  1-5 Units Subcutaneous HS Michael Turcios MD      lidocaine  1 patch Topical Daily Michael Turcios MD      magnesium oxide  400 mg Oral Daily Michael Turcios MD      metoprolol tartrate  25 mg Oral Q12H Richy Mills MD      oxyCODONE  2 5 mg Oral Q4H PRN Michael Turcios MD      oxyCODONE  5 mg Oral Q4H PRN Michael Turcios MD      potassium chloride  40 mEq Oral TID Michael Turcios MD          Today, Patient Was Seen By: Kirstin Starr MD    **Please Note: This note may have been constructed using a voice recognition system  **Unit/Bed#: [facility] 201-01 Encounter: [id number]  Primary Care Provider: Jolly Sosa MD   Date and time admitted to hospital: 7/26/2022  2:09 PM    * Dementia with behavioral disturbance [facility])  Assessment & Plan  · Patient with documented underlying dementia  · Per ED note, staff at [facility] noted patient to be noncompliant with her care on morning of admission, rearranging her furniture, and was seeing bugs on the walls in the ED  · Suspect due to new lumbar compression fracture with pain  · Pain control as below   · Geriatrics consulted and they recommended   · Continuing Aricept 10 mg q h s  Recommend delirium precautions  Maintain sleep-wake cycle, avoid nighttime interruptions  Provide adequate pain control  Avoid urinary retention and constipation  Provide frequent and early mobilization   Provide frequent redirection and reorientation as needed  Avoid medications that may worsen or precipitate delirium such as tramadol, benzodiazepines, anticholinergics, and Benadryl  Redirect unwanted behaviors as first-line therapy, avoid physical restraints as able to  Continue to monitor  Will follow their recs  WiIl obtain TSH, B12, Folate level, neuro checks and will consult neurology           Compression fracture of L3 vertebra (HCC)  Assessment & Plan  · Acute nondisplaced fracture of the inferior endplate of L3 without loss of vertebral height noted on CT  · Patient follows with Neurosurgery as outpatient for multiple TL compression deformities  · Pain control     CKD (chronic kidney disease), stage III [facility])  Assessment & Plan  Lab Results   Component Value Date    EGFR 40 07/27/2022    EGFR 35 07/26/2022    EGFR 42 03/21/2022    CREATININE 1 14 07/27/2022    CREATININE 1 26 07/26/2022    CREATININE 1 09 03/21/2022   · Renal function is near baseline  · Avoid NSAIDS and nephrotoxins    Chronic systolic CHF (congestive heart failure) ([facility] [address] )  Assessment & Plan  Wt Readings from Last 3 Encounters:   07/28/22 72 3 kg (159 lb 4 8 oz)   07/21/22 73 5 kg (162 lb)   05/25/22 73 5 kg (162 lb)     · Volume status appears euvolemic  · Low Na diet  · Monitor intake and output  · Monitor daily weights  · Continue PO lasix  · Echo from February 2022 with LVEF 45%        Benign essential hypertension  Assessment & Plan  · BP is acceptable, continue lasix and metoprolol    Atrial fibrillation (HCC)  Assessment & Plan  · Rate controlled on metoprolol tartrate 25mg Q12hr  · On Eliquis for [facility]    Aortic stenosis  Assessment & Plan  · Severe AS noted on echo from Feb 2022  · Medically managed and may not be a surgical candidate 2/2 to her age    Type 2 diabetes mellitus with diabetic chronic kidney disease [facility])  Assessment & Plan  Lab Results   Component Value Date    HGBA1C 7 3 (H) 07/26/2022       Recent Labs     07/27/22  2157 07/28/22  0520 07/28/22  1057 07/28/22  1555   POCGLU 134 126 238* 170*       Blood Sugar Average: Last 72 hrs:  · (P) 148   · ADA diet   · Was started on HS Lantus on admission, continue to monitor and adjust as indicated  · Continue SSI coverage TID AC and QHS  · Hypoglycemia protocol

## 2022-07-28 NOTE — PHYSICAL THERAPY NOTE
Physical Therapy Evaluation    Patient Name: Katelin Jerry    MMSVX'R Date: 7/28/2022     Problem List  Principal Problem:    Dementia with behavioral disturbance ([facility] )  Active Problems:    Benign essential hypertension    CKD (chronic kidney disease), stage III (HCC)    Type 2 diabetes mellitus with diabetic chronic kidney disease (HCC)    Aortic stenosis    Atrial fibrillation (HCC)    Compression fracture of L3 vertebra (HCC)    Chronic systolic CHF (congestive heart failure) ([facility] )       Past Medical History  Past Medical History:   Diagnosis Date    Aortic stenosis     Bilateral edema of lower extremity     Dementia (HCC)     Diabetes ([facility] )     Fall     Gait abnormality     Hyperlipidemia     Hypertension     Hypokalemia     Other ascites     Varicose veins of leg with edema         Past Surgical History  Past Surgical History:   Procedure Laterality Date    BREAST SURGERY      ELBOW SURGERY             07/28/22 1200   PT Last Visit   PT Visit Date 07/28/22   Note Type   Note type Evaluation   Pain Assessment   Pain Assessment Tool FLACC   Pain Rating: FLACC (Rest) - Face 0   Pain Rating: FLACC (Rest) - Legs 0   Pain Rating: FLACC (Rest) - Activity 0   Pain Rating: FLACC (Rest) - Cry 0   Pain Rating: FLACC (Rest) - Consolability 0   Score: FLACC (Rest) 0   Pain Rating: FLACC (Activity) - Face 0   Pain Rating: FLACC (Activity) - Legs 0   Pain Rating: FLACC (Activity) - Activity 0   Pain Rating: FLACC (Activity) - Cry 0   Pain Rating: FLACC (Activity) - Consolability 0   Score: FLACC (Activity) 0   Restrictions/Precautions   Weight Bearing Precautions Per Order No   Braces or Orthoses (S)  LSO   Other Precautions Cognitive;Multiple lines;Telemetry; Fall Risk;Bed Alarm; Chair Alarm;Spinal precautions;Hard of hearing  (dementia)   Home Living   Type of Home Assisted living  [facility])   Home Layout One level   Home Equipment Walker   Additional Comments Pt is a poor historian due to dementia  Per EMR, pt lives at [facility] assisted living  Prior Function   Level of Dick[place] Needs assistance with IADLs   Receives Help From Family; Other (Comment)  (facility staff)   ADL Assistance Independent   IADLs Needs assistance   Comments Per EMR, pt requires assistance with IADLs and when showering but mostly independent with ADLs  Pt ambulates with RW  General   Family/Caregiver Present No   Cognition   Overall Cognitive Status Impaired   Arousal/Participation Responsive   Attention Attends with cues to redirect   Orientation Level Oriented to person;Disoriented to time;Disoriented to situation;Disoriented to place  (pt able to get place with multiple choice options)   Memory Decreased long term memory;Decreased short term memory;Decreased recall of biographical information;Decreased recall of recent events;Decreased recall of precautions   Following Commands Follows one step commands with increased time or repetition   Subjective   Subjective Pt was supine in bed upon PT arrival   Pt reports "I am good"   RLE Assessment   RLE Assessment X  (Pt had difficult following directions, but grossly ~3/5 during ambulation)   Strength RLE   R Hip Flexion 3-/5   R Knee Extension 3-/5   R Ankle Dorsiflexion 3/5   R Ankle Plantar Flexion 3/5   LLE Assessment   LLE Assessment X  (Pt had difficult following directions, but grossly ~3/5 during ambulation)   Strength LLE   L Hip Flexion 3-/5   L Knee Extension 3-/5   L Ankle Dorsiflexion 3/5   L Ankle Plantar Flexion 3/5   Light Touch   RLE Light Touch Grossly intact  (pt responses unreliable d/t confusion)   LLE Light Touch Grossly intact  (pt responses unreliable d/t confusion)   Bed Mobility   Supine to Sit 4  Minimal assistance   Additional items Assist x 1;HOB elevated; Increased time required;Verbal cues   Sit to Supine Unable to assess   Additional Comments Pt sat EOB w/ S fluctuating to Armaan with posterior lean when performing LE mobility  Elder Burn pt's LSO in sitting  Transfers   Sit to Stand 4  Minimal assistance   Additional items Assist x 1; Increased time required;Verbal cues   Stand to Sit 4  Minimal assistance   Additional items Assist x 1; Increased time required;Verbal cues   Additional Comments RW for support; VCs for hand placement and proper technique and posture  Ambulation/Elevation   Gait pattern Narrow EVENS; Foward flexed; Short stride; Step through pattern   Gait Assistance 4  Minimal assist   Additional items Assist x 1;Verbal cues   Assistive Device Rolling walker   Distance 5ft forward + 5ft backwards   Ambulation/Elevation Additional Comments RW for support; required multiple VCs for proper step sequencing and safety  Pt able to perform standing marches prior to ambulation   Balance   Static Sitting Fair -   Dynamic Sitting Poor +   Static Standing Poor +   Dynamic Standing Poor +   Ambulatory Poor +   Endurance Deficit   Endurance Deficit Yes   Endurance Deficit Description fatigue, weakness   Activity Tolerance   Activity Tolerance Patient tolerated treatment well;Patient limited by fatigue   Nurse Made Aware RN cleared pt for therapy   Assessment   Prognosis Fair   Problem List Decreased strength;Decreased endurance; Impaired balance;Decreased mobility; Decreased cognition;Decreased safety awareness  (dementia)   Assessment Pt is a 79 y/o female admitted to [address] [place] [address] on 7/26/2022 for back pain from [facility]  Pt's Dx and personal factors are dementia with behavioral disturbance, compression fx of L3 vertebra, T2DM, afib, CKD3, chronic systolic CHF, aortic stenosis, HTN and h/o multiple T/L compression deformities  Pt is a poor historian due to dementia  Per EMR, Pt lives in [facility] assisted living  Pt requires assistance with IADLs and showering but fairly independent with ADLs  Pt ambulates with RW    During the examination, pt demonstrated decrease BLE strength, impaired sitting and standing balance and tolerance, decrease activity tolerance, impaired endurance, impaired memory, confusion, hard of hearing, gait abnormalities, and fall risk which limits pt to perform ADLs independently and increases risks for falls  Pt performed mobility with LSO on   Reviewed spinal precautions to pt with pt's showing understanding  Pt will benefit with skilled PT interventions while in the hospital to address the impairments stated above  Pt is recommended to rehab upon D/C  At the end of the session, pt was OOB in chair with call bell within reach, LSO on, and chair alarm on  Barriers to Discharge None   Goals   STG Expiration Date 08/11/22   Short Term Goal #1 STG 1: Pt will perform supine <-> sit at S to demonstrate improved bed mobility and decrease any risk for skin breakdown  STG 2: Pt will perform sit <-> stand at S with RW to demonstrate improved transfer mobility so the pt can get in/out of bed safely  STG 3: Pt will ambulate 150ft at S with RW to improve walking tolerance and endurance so pt can safely interact with their environment  STG 4: Pt will sit EOB at mod I for 20 min while performing dynamic sitting balances exercises without any LOB to improve sitting balance and tolerance  STG 5: Pt will stand at S with RW for 5 min while performing dynamic standing balance exercises without any LOB to improve standing balance and tolerance  PT Treatment Day 0   Plan   Treatment/Interventions Functional transfer training;LE strengthening/ROM; Therapeutic exercise; Endurance training;Cognitive reorientation; Bed mobility;Gait training;OT   PT Frequency 3-5x/wk   Recommendation   PT Discharge Recommendation Post acute rehabilitation services   AM-PAC Basic Mobility Inpatient   Turning in Bed Without Bedrails 3   Lying on Back to Sitting on Edge of Flat Bed 3   Moving Bed to Chair 3   Standing Up From Chair 3   Walk in Room 3   Climb 3-5 Stairs 1   Basic Mobility Inpatient Raw Score 16 Basic Mobility Standardized Score 38 32   Highest Level Of Mobility   -HLM Goal 5: Stand one or more mins   -HLM Achieved 5: Stand (1 or more minutes)     Simon Martinez, SPT

## 2022-07-29 PROBLEM — T14.8XXA ACUTE FRACTURE: Status: ACTIVE | Noted: 2022-07-29

## 2022-07-29 LAB
AMORPH URATE CRY URNS QL MICRO: ABNORMAL
BACTERIA UR QL AUTO: ABNORMAL /HPF
BILIRUB UR QL STRIP: NEGATIVE
CLARITY UR: CLEAR
COLOR UR: ABNORMAL
GLUCOSE SERPL-MCNC: 125 MG/DL (ref 65–140)
GLUCOSE SERPL-MCNC: 174 MG/DL (ref 65–140)
GLUCOSE SERPL-MCNC: 212 MG/DL (ref 65–140)
GLUCOSE SERPL-MCNC: 232 MG/DL (ref 65–140)
GLUCOSE UR STRIP-MCNC: NEGATIVE MG/DL
HGB UR QL STRIP.AUTO: NEGATIVE
KETONES UR STRIP-MCNC: NEGATIVE MG/DL
LEUKOCYTE ESTERASE UR QL STRIP: ABNORMAL
NITRITE UR QL STRIP: POSITIVE
NON-SQ EPI CELLS URNS QL MICRO: ABNORMAL /HPF
PH UR STRIP.AUTO: 7 [PH]
PROCALCITONIN SERPL-MCNC: 0.08 NG/ML
PROT UR STRIP-MCNC: NEGATIVE MG/DL
RBC #/AREA URNS AUTO: ABNORMAL /HPF
SARS-COV-2 RNA RESP QL NAA+PROBE: NEGATIVE
SP GR UR STRIP.AUTO: 1.01 (ref 1–1.03)
TSH SERPL DL<=0.05 MIU/L-ACNC: 2.5 UIU/ML (ref 0.45–4.5)
UROBILINOGEN UR STRIP-ACNC: <2 MG/DL
WBC #/AREA URNS AUTO: ABNORMAL /HPF

## 2022-07-29 PROCEDURE — 87081 CULTURE SCREEN ONLY: CPT | Performed by: INTERNAL MEDICINE

## 2022-07-29 PROCEDURE — 82746 ASSAY OF FOLIC ACID SERUM: CPT | Performed by: HOSPITALIST

## 2022-07-29 PROCEDURE — 99232 SBSQ HOSP IP/OBS MODERATE 35: CPT | Performed by: NURSE PRACTITIONER

## 2022-07-29 PROCEDURE — 82948 REAGENT STRIP/BLOOD GLUCOSE: CPT

## 2022-07-29 PROCEDURE — U0005 INFEC AGEN DETEC AMPLI PROBE: HCPCS | Performed by: FAMILY MEDICINE

## 2022-07-29 PROCEDURE — 87086 URINE CULTURE/COLONY COUNT: CPT | Performed by: HOSPITALIST

## 2022-07-29 PROCEDURE — 99232 SBSQ HOSP IP/OBS MODERATE 35: CPT | Performed by: HOSPITALIST

## 2022-07-29 PROCEDURE — 82607 VITAMIN B-12: CPT | Performed by: HOSPITALIST

## 2022-07-29 PROCEDURE — 84145 PROCALCITONIN (PCT): CPT | Performed by: HOSPITALIST

## 2022-07-29 PROCEDURE — 81001 URINALYSIS AUTO W/SCOPE: CPT | Performed by: HOSPITALIST

## 2022-07-29 PROCEDURE — 87077 CULTURE AEROBIC IDENTIFY: CPT | Performed by: HOSPITALIST

## 2022-07-29 PROCEDURE — 99223 1ST HOSP IP/OBS HIGH 75: CPT | Performed by: STUDENT IN AN ORGANIZED HEALTH CARE EDUCATION/TRAINING PROGRAM

## 2022-07-29 PROCEDURE — 84443 ASSAY THYROID STIM HORMONE: CPT | Performed by: HOSPITALIST

## 2022-07-29 PROCEDURE — 87186 SC STD MICRODIL/AGAR DIL: CPT | Performed by: HOSPITALIST

## 2022-07-29 PROCEDURE — U0003 INFECTIOUS AGENT DETECTION BY NUCLEIC ACID (DNA OR RNA); SEVERE ACUTE RESPIRATORY SYNDROME CORONAVIRUS 2 (SARS-COV-2) (CORONAVIRUS DISEASE [COVID-19]), AMPLIFIED PROBE TECHNIQUE, MAKING USE OF HIGH THROUGHPUT TECHNOLOGIES AS DESCRIBED BY CMS-2020-01-R: HCPCS | Performed by: FAMILY MEDICINE

## 2022-07-29 RX ADMIN — METOPROLOL TARTRATE 25 MG: 25 TABLET, FILM COATED ORAL at 22:20

## 2022-07-29 RX ADMIN — MAGNESIUM OXIDE TAB 400 MG (241.3 MG ELEMENTAL MG) 400 MG: 400 (241.3 MG) TAB at 08:47

## 2022-07-29 RX ADMIN — ACETAMINOPHEN 975 MG: 325 TABLET ORAL at 06:05

## 2022-07-29 RX ADMIN — METOPROLOL TARTRATE 25 MG: 25 TABLET, FILM COATED ORAL at 08:47

## 2022-07-29 RX ADMIN — APIXABAN 5 MG: 5 TABLET, FILM COATED ORAL at 18:16

## 2022-07-29 RX ADMIN — POTASSIUM CHLORIDE 40 MEQ: 1500 TABLET, EXTENDED RELEASE ORAL at 18:16

## 2022-07-29 RX ADMIN — APIXABAN 5 MG: 5 TABLET, FILM COATED ORAL at 08:47

## 2022-07-29 RX ADMIN — INSULIN LISPRO 2 UNITS: 100 INJECTION, SOLUTION INTRAVENOUS; SUBCUTANEOUS at 12:47

## 2022-07-29 RX ADMIN — INSULIN LISPRO 2 UNITS: 100 INJECTION, SOLUTION INTRAVENOUS; SUBCUTANEOUS at 22:22

## 2022-07-29 RX ADMIN — CEFTRIAXONE SODIUM 1000 MG: 10 INJECTION, POWDER, FOR SOLUTION INTRAVENOUS at 15:49

## 2022-07-29 RX ADMIN — POTASSIUM CHLORIDE 40 MEQ: 1500 TABLET, EXTENDED RELEASE ORAL at 08:47

## 2022-07-29 RX ADMIN — POTASSIUM CHLORIDE 40 MEQ: 1500 TABLET, EXTENDED RELEASE ORAL at 22:19

## 2022-07-29 RX ADMIN — ACETAMINOPHEN 975 MG: 325 TABLET ORAL at 15:59

## 2022-07-29 RX ADMIN — FUROSEMIDE 20 MG: 20 TABLET ORAL at 08:47

## 2022-07-29 RX ADMIN — LIDOCAINE 5% 1 PATCH: 700 PATCH TOPICAL at 08:47

## 2022-07-29 RX ADMIN — INSULIN LISPRO 1 UNITS: 100 INJECTION, SOLUTION INTRAVENOUS; SUBCUTANEOUS at 18:17

## 2022-07-29 RX ADMIN — DONEPEZIL HYDROCHLORIDE 10 MG: 10 TABLET ORAL at 08:57

## 2022-07-29 RX ADMIN — CALCITONIN SALMON 1 SPRAY: 200 SPRAY, METERED NASAL at 08:56

## 2022-07-29 NOTE — ASSESSMENT & PLAN NOTE
· Patient with documented underlying dementia  · Per ED note, staff at [facility] noted patient to be noncompliant with her care on morning of admission, rearranging her furniture, and was seeing bugs on the walls in the ED  · Suspect due to new lumbar compression fracture with pain  · Pain control as below   · Geriatrics consulted and they recommended   · Continuing Aricept 10 mg q h s  Recommend delirium precautions  Maintain sleep-wake cycle, avoid nighttime interruptions  Provide adequate pain control  Avoid urinary retention and constipation  Provide frequent and early mobilization  Provide frequent redirection and reorientation as needed  Avoid medications that may worsen or precipitate delirium such as tramadol, benzodiazepines, anticholinergics, and Benadryl  Redirect unwanted behaviors as first-line therapy, avoid physical restraints as able to  Continue to monitor  Will follow their recs  Normal TSH and  B12   Elevated folate level  Most likely worsening mentation from UTI  Blood cxs and MRSA are negative  Neuro appreciated

## 2022-07-29 NOTE — ASSESSMENT & PLAN NOTE
Wt Readings from Last 3 Encounters:   07/29/22 74 kg (163 lb 2 3 oz)   07/21/22 73 5 kg (162 lb)   05/25/22 73 5 kg (162 lb)     · Volume status appears euvolemic  · Low Na diet  · Monitor intake and output  · Monitor daily weights  · Continue PO lasix  · Echo from February 2022 with LVEF 45%

## 2022-07-29 NOTE — ASSESSMENT & PLAN NOTE
· Rate controlled on metoprolol tartrate 25mg Q12hr  · On Eliquis for [facility]

## 2022-07-29 NOTE — CONSULTS
NEUROLOGY RESIDENCY CONSULT NOTE     Name: Laney Nicholson   Age & Sex: 80 y o  female   MRN: [id number]  Unit/Bed#: [facility] 201-01   Encounter: [id number]  Length of Stay: 2    ASSESSMENT & PLAN     * Dementia with behavioral disturbance [facility])  Assessment & Plan  Assessment:  81 yo w baseline dementia lives in assisted living and AOx1-2 normally  Neurology consulted for rapid worsening of baseline mental status reportedly hallucinating and out of character behaviors at home  Labs mostly benign and imaging unremarkable however per nursing urine suspicious for UTI this morning despite normal UA on admission  Pt also has not had repeat of abnormal behaviors or hallucinations since admission and reportedly at her baseline  On my exam she was pleasant and appropriate she denied current or previous hallucinations, she reports she feels well and at her baseline  She has no concerns or complaints  At this point mental status changes on chronic dementia likely in the setting of new UTI and not primary neurological process  Plan:   - No further imaging or tests required from neurological perspective  - No further neurological recommendations  - Continue to follow with geriatrics  - Treatment and diagnosis of any metabolic derangements per primary team          Laney Nicholson will not need outpatient follow up with Neurology  She will not require outpatient neurological testing  SUBJECTIVE     Reason for Consult / Principal Problem: dementia with worsening mental status acutely  Hx and PE limited by: nothing    Laney Nicholson is a 80 y o  female admitted for acute change in mental status and new finding of compression of L3 on 7/26  Neurology consulted for acute change in mental status  Patient has a past medical history pertinent for dementia, AS, CKD3, DM, afib on eliquis, CHF  She lives in assisted living and was brought to the hospital on 7/26 after staff raised concerns for odd and different behaviors for [facility]   She was noted to be moving furniture in her apartment and then once in the ED reported bugs crawling on the walls  Vitals and labs were unrevealing including initial UA  CTH was unremarkable  CT L spine was significant for new L3 compression fracture, she is already wearing a back brace for previous recent fixation of other compression fractures in the L spine  Since admission she has been reported to be consistently at her baseline which is alert and oriented x 1-2 with waxing and waning throughout the day  There was suggestion that her out of character behaviors were secondary to pain from the lumbar fracture however there was concern from primary about her acute change and neuro was consulted  On my exam at bedside this morning she was found to be pleasant and oriented to self and place  She denies hallucinations  Her exam overall is non focal and non concerning  Per discussion with her nurse she appears to be in her normal state with waxing and waning mental status consistent with her known and documented history of dementia  Per nursing she does have urine suspicious for UTI today which could definitely contribute to an acute change in mental status  At this time, no other concerns or recommendations from neurology  She should continue to follow with geriatrics and metabolic workup and treatment per primary team is recommended       Inpatient consult to Neurology  Consult performed by: Hilda Malcolm MD  Consult ordered by: Meredith Hi MD          Historical Information   Past Medical History:   Diagnosis Date    Aortic stenosis     Bilateral edema of lower extremity     Dementia ([facility] )     Diabetes ([facility] )     Fall     Gait abnormality     Hyperlipidemia     Hypertension     Hypokalemia     Other ascites     Varicose veins of leg with edema      Past Surgical History:   Procedure Laterality Date    BREAST SURGERY      ELBOW SURGERY       Social History   Social History Substance and Sexual Activity   Alcohol Use Not Currently     Social History     Substance and Sexual Activity   Drug Use No     E-Cigarette/Vaping    E-Cigarette Use Never User      E-Cigarette/Vaping Substances     Social History     Tobacco Use   Smoking Status Never Smoker   Smokeless Tobacco Never Used     Family History: non-contributory  Meds/Allergies   all current active meds have been reviewed  No Known Allergies    Review of previous medical records was completed  Review of Systems   Constitutional: Negative for activity change  HENT: Negative for trouble swallowing and voice change  Eyes: Negative for visual disturbance  Respiratory: Negative for chest tightness and wheezing  Cardiovascular: Negative for chest pain and palpitations  Gastrointestinal: Negative for nausea and vomiting  Musculoskeletal: Negative for arthralgias  Skin: Negative for color change  Neurological: Negative for dizziness, tremors, seizures, syncope, facial asymmetry, speech difficulty, weakness, light-headedness, numbness and headaches  Psychiatric/Behavioral: Positive for confusion  Negative for agitation  All other systems reviewed and are negative  OBJECTIVE     Patient ID: Laney Nicholson is a 80 y o  female  Vitals:   Vitals:    07/29/22 0600 07/29/22 0809 07/29/22 0815 07/29/22 0900   BP:   118/77    BP Location:       Pulse:  70 66    Resp:       Temp:  98 2 °F (36 8 °C)     TempSrc:       SpO2:   97% 97%   Weight: 74 kg (163 lb 2 3 oz)         Body mass index is 32 95 kg/m²  Intake/Output Summary (Last 24 hours) at 7/29/2022 1500  Last data filed at 7/29/2022 1309  Gross per 24 hour   Intake 720 ml   Output 1111 ml   Net -391 ml       Physical Exam  Vitals and nursing note reviewed  Constitutional:       General: She is not in acute distress  Appearance: She is well-developed     Eyes:      Extraocular Movements: EOM normal       Pupils: Pupils are equal, round, and reactive to light  Cardiovascular:      Rate and Rhythm: Normal rate  Pulmonary:      Effort: Pulmonary effort is normal    Musculoskeletal:         General: Normal range of motion  Cervical back: Normal range of motion  Skin:     General: Skin is warm and dry  Neurological:      Mental Status: She is alert  Coordination: Finger-Nose-Finger Test normal    Psychiatric:         Mood and Affect: Mood normal          Behavior: Behavior normal          Neurologic Exam     Mental Status   Alert and pleasant affect  Knew her name and that she was in a hospital but not which one or where  Unsure of month or year  Unsure of her age the first time thought she "might be in my 46s" and was pleasant surprised to find she had achieved the age of 80     Cranial Nerves     CN III, IV, VI   Pupils are equal, round, and reactive to light  Extraocular motions are normal      CN V   Facial sensation intact  CN VII   Facial expression full, symmetric  CN VIII   Hearing: impaired    CN IX, X   CN IX normal    CN X normal      CN XI   CN XI normal      CN XII   CN XII normal      Motor Exam Appropriate for age     Sensory Exam   Light touch normal      Gait, Coordination, and Reflexes     Coordination   Finger to nose coordination: normal        LABORATORY DATA     Labs: I have personally reviewed pertinent reports      Results from last 7 days   Lab Units 07/27/22  0922 07/26/22  1623   WBC Thousand/uL 7 84 11 69*   HEMOGLOBIN g/dL 11 8 12 1   HEMATOCRIT % 35 5 37 4   PLATELETS Thousands/uL 254 273   NEUTROS PCT %  --  72   MONOS PCT %  --  10      Results from last 7 days   Lab Units 07/27/22  0922 07/26/22  1623   POTASSIUM mmol/L 4 2 4 1   CHLORIDE mmol/L 111* 108   CO2 mmol/L 24 23   BUN mg/dL 18 22   CREATININE mg/dL 1 14 1 26   CALCIUM mg/dL 9 4 9 5   ALK PHOS U/L  --  111   ALT U/L  --  18   AST U/L  --  24               IMAGING & DIAGNOSTIC TESTING     Radiology Results: I have personally reviewed pertinent films in PACS  CT head without contrast   Final Result by Lin Payne MD (07/26 1642)      No acute intracranial abnormality  Workstation performed: [id number]         CT abdomen pelvis wo contrast   Final Result by Lin Payne MD (07/26 1648)      Acute nondisplaced fracture of the inferior endplate of L3 without loss of vertebral body height  The study was marked in [facility] for immediate notification  Workstation performed: [id number]         XR chest 1 view portable   Final Result by Thuan Veras MD (07/26 1638)      No acute disease in the chest                   Workstation performed: [id number]             Other Diagnostic Testing: I have personally reviewed pertinent reports        ACTIVE MEDICATIONS     Current Facility-Administered Medications   Medication Dose Route Frequency    acetaminophen (TYLENOL) tablet 650 mg  650 mg Oral Q6H PRN    acetaminophen (TYLENOL) tablet 975 mg  975 mg Oral Q8H [address]    apixaban (ELIQUIS) tablet 5 mg  5 mg Oral BID    calcitonin (salmon) (MIACALCIN) 200 units/act nasal spray 1 spray  1 spray Alternating Nares Daily    cefTRIAXone (ROCEPHIN) 1,000 mg in dextrose 5 % 50 mL IVPB  1,000 mg Intravenous Q24H    donepezil (ARICEPT) tablet 10 mg  10 mg Oral Daily    furosemide (LASIX) tablet 20 mg  20 mg Oral Daily    insulin glargine (LANTUS) subcutaneous injection 10 Units 0 1 mL  10 Units Subcutaneous HS    insulin lispro (HumaLOG) 100 units/mL subcutaneous injection 1-5 Units  1-5 Units Subcutaneous TID AC    insulin lispro (HumaLOG) 100 units/mL subcutaneous injection 1-5 Units  1-5 Units Subcutaneous HS    lidocaine (LIDODERM) 5 % patch 1 patch  1 patch Topical Daily    magnesium oxide (MAG-OX) tablet 400 mg  400 mg Oral Daily    metoprolol tartrate (LOPRESSOR) tablet 25 mg  25 mg Oral Q12H MATT    oxyCODONE (ROXICODONE) IR tablet 2 5 mg  2 5 mg Oral Q4H PRN    oxyCODONE (ROXICODONE) IR tablet 5 mg 5 mg Oral Q4H PRN    potassium chloride (K-DUR,KLOR-CON) CR tablet 40 mEq  40 mEq Oral TID       Prior to Admission medications    Medication Sig Start Date End Date Taking?  Authorizing Provider   acetaminophen (TYLENOL) 325 mg tablet Take 650 mg by mouth every 6 (six) hours as needed for mild pain    Historical Provider, MD   apixaban (ELIQUIS) 5 mg Take 1 tablet (5 mg total) by mouth 2 (two) times a day 2/3/22   Pan Kinsey PA-C   Banana Flakes (BANATROL PLUS PO) Take by mouth 3 (three) times a day with meals    Historical Provider, MD   donepezil (ARICEPT) 10 mg tablet Take 1 tablet (10 mg total) by mouth daily 8/3/21   Orlando Hein MD   famotidine (PEPCID) 20 mg tablet Take 1 tablet (20 mg total) by mouth daily  Patient not taking: Reported on 5/25/2022 3/22/22   Bette Mcdaniels PA-C   furosemide (LASIX) 20 mg tablet Take 20 mg by mouth daily    Historical Provider, MD   glipiZIDE (GLUCOTROL) 10 mg tablet Take 10 mg by mouth in the morning    Historical Provider, MD   insulin lispro (insulin lispro) 100 units/mL injection Inject under the skin 4 (four) times a day Per Sliding Scale    Historical Provider, MD   magnesium oxide (MAG-OX) 400 mg Take 1 tablet (400 mg total) by mouth daily 8/3/21   AME Osorio   metoprolol tartrate (LOPRESSOR) 25 mg tablet Take 1 tablet (25 mg total) by mouth every 12 (twelve) hours 2/7/22   Anne Santos MD   Potassium Chloride ER 20 MEQ TBCR Take 2 tablets (40 mEq total) by mouth 3 (three) times a day 2/7/22   Anne Santos MD   repaglinide (PRANDIN) 2 mg tablet Take 1 tablet (2 mg total) by mouth 3 (three) times a day before meals 7/2/21   AME Rivera   sitaGLIPtin (JANUVIA) 100 mg tablet Take 1 tablet (100 mg total) by mouth daily 8/3/21   AME Osorio       CODE STATUS & ADVANCED DIRECTIVES     Code Status: Level 1 - Full Code  Advance Directive and Living Will: Yes    Power of :    POLST:      ==  Ravindra Bee MD [facility]'s Neurology Residency, PGY-IV

## 2022-07-29 NOTE — PROGRESS NOTES
[address] [facility]  Progress Note - Palomo Lange 11/22/1923, 80 y o  female MRN: [id number]  [facility] Encounter: [id number]  Primary Care Provider: Kasey Ho MD   Date and time admitted to hospital: 7/26/2022  2:09 PM    Urinary tract infection without hematuria  Assessment & Plan  UA Positive  Urine culture pending  Will start on V Rocephin while awaiting urine culture result  Patient is hydrating well thus will avoid iv fluid     * Dementia with behavioral disturbance [facility])  Assessment & Plan  · Patient with documented underlying dementia  · Per ED note, staff at [facility] noted patient to be noncompliant with her care on morning of admission, rearranging her furniture, and was seeing bugs on the walls in the ED  · Suspect due to new lumbar compression fracture with pain  · Pain control as below   · Geriatrics consulted and they recommended   · Continuing Aricept 10 mg q h s  Recommend delirium precautions  Maintain sleep-wake cycle, avoid nighttime interruptions  Provide adequate pain control  Avoid urinary retention and constipation  Provide frequent and early mobilization   Provide frequent redirection and reorientation as needed  Avoid medications that may worsen or precipitate delirium such as tramadol, benzodiazepines, anticholinergics, and Benadryl  Redirect unwanted behaviors as first-line therapy, avoid physical restraints as able to  Continue to monitor  Will follow their recs  Results are pending for ordered TSH, B12, Folate level  Awaiting  neurology consult to see if there is any added information that needs to gbe embarked on  Will consult PT for placement eval            Acute fracture  Assessment & Plan  On CT abd/pelvis of 7/26/22  Previous lumbar X-rays show compression deformities of T 12, L2 and L4 but not L3  PT recommended rehab thus CM on board for placement to rehab  Pain control and brace application   Fal precautio top be exercised    Compression fracture of L3 vertebra (HCC)  Assessment & Plan  · Acute nondisplaced fracture of the inferior endplate of L3 without loss of vertebral height noted on CT  · Patient follows with Neurosurgery as outpatient for multiple TL compression deformities  · Pain control     CKD (chronic kidney disease), stage III [facility])  Assessment & Plan  Lab Results   Component Value Date    EGFR 40 07/27/2022    EGFR 35 07/26/2022    EGFR 42 03/21/2022    CREATININE 1 14 07/27/2022    CREATININE 1 26 07/26/2022    CREATININE 1 09 03/21/2022   · Renal function is near baseline  · Avoid NSAIDS and nephrotoxins    Chronic systolic CHF (congestive heart failure) ([facility] )  Assessment & Plan  Wt Readings from Last 3 Encounters:   07/29/22 74 kg (163 lb 2 3 oz)   07/21/22 73 5 kg (162 lb)   05/25/22 73 5 kg (162 lb)     · Volume status appears euvolemic  · Low Na diet  · Monitor intake and output  · Monitor daily weights  · Continue PO lasix  · Echo from February 2022 with LVEF 45%        Benign essential hypertension  Assessment & Plan  · BP is acceptable, continue lasix and metoprolol    Atrial fibrillation (HCC)  Assessment & Plan  · Rate controlled on metoprolol tartrate 25mg Q12hr  · On Eliquis for [facility] [place]    Aortic stenosis  Assessment & Plan  · Severe AS noted on echo from Feb 2022  · Medically managed and may not be a surgical candidate 2/2 to her age    Type 2 diabetes mellitus with diabetic chronic kidney disease [facility])  Assessment & Plan  Lab Results   Component Value Date    HGBA1C 7 3 (H) 07/26/2022       Recent Labs     07/28/22  1555 07/28/22  2110 07/29/22  0604 07/29/22  1034   POCGLU 170* 189* 125 232*       Blood Sugar Average: Last 72 hrs:  · (P) 156 5   · ADA diet   · Was started on HS Lantus on admission, continue to monitor and adjust as indicated  · Continue SSI coverage TID AC and QHS  · Hypoglycemia protocol         VTE Pharmacologic Prophylaxis:   Moderate Risk (Score 3-4) - Pharmacological DVT Prophylaxis Ordered: apixaban (Eliquis)  Patient Centered Rounds: I evaluated the patient without nursing staff present due to Nurse updated  Discussions with Specialists or Other Care Team Provider: M    Education and Discussions with Family / Patient: Updated  (daughter) via phone  Time Spent for Care: 30 minutes  More than 50% of total time spent on counseling and coordination of care as described above  Current Length of Stay: 2 day(s)  Current Patient Status: Inpatient   Certification Statement: The patient will continue to require additional inpatient hospital stay due to Neuro consult  Discharge Plan: Anticipate discharge tomorrow to home with home services  Discharge is pending Repeat UA today,  neurology consult and on discharge planning to the assisted living  PT recommended assist of 1-2    Code Status: Level 1 - Full Code    Subjective:   No complaint     Objective:     Vitals:   Temp (24hrs), Av 3 °F (36 8 °C), Min:98 2 °F (36 8 °C), Max:98 4 °F (36 9 °C)    Temp:  [98 2 °F (36 8 °C)-98 4 °F (36 9 °C)] 98 2 °F (36 8 °C)  HR:  [66-70] 66  Resp:  [18] 18  BP: (107-118)/(62-77) 118/77  SpO2:  [97 %] 97 %  Body mass index is 32 95 kg/m²  Input and Output Summary (last 24 hours): Intake/Output Summary (Last 24 hours) at 2022 1406  Last data filed at 2022 1309  Gross per 24 hour   Intake 720 ml   Output 1111 ml   Net -391 ml       Physical Exam:     Vitals and nursing note reviewed  Constitutional:       Comments: Patient seen lying in bed, NAD   Cardiovascular:      Rate and Rhythm: Normal rate and regular rhythm       Heart sounds: Murmur heard  Pulmonary:      Effort: Pulmonary effort is normal       Breath sounds: Normal breath sounds  Abdominal:      General: Bowel sounds are normal       Palpations: Abdomen is soft       Tenderness: There is no abdominal tenderness  Musculoskeletal:      Right lower leg: No edema       Left lower leg: No edema  Skin:     General: Skin is warm  Neurological:      Mental Status: She is alert       Comments: Oriented  place but  But not to time, year and very forgetful of her dinner which was about 3 hours ago   Psychiatric:      Comments: Not anxious or agitated appearing    Additional Data:     Labs:  Results from last 7 days   Lab Units 07/27/22  0922 07/26/22  1623   WBC Thousand/uL 7 84 11 69*   HEMOGLOBIN g/dL 11 8 12 1   HEMATOCRIT % 35 5 37 4   PLATELETS Thousands/uL 254 273   NEUTROS PCT %  --  72   LYMPHS PCT %  --  16   MONOS PCT %  --  10   EOS PCT %  --  0     Results from last 7 days   Lab Units 07/27/22  0922 07/26/22  1623   SODIUM mmol/L 139 138   POTASSIUM mmol/L 4 2 4 1   CHLORIDE mmol/L 111* 108   CO2 mmol/L 24 23   BUN mg/dL 18 22   CREATININE mg/dL 1 14 1 26   ANION GAP mmol/L 4 7   CALCIUM mg/dL 9 4 9 5   ALBUMIN g/dL  --  3 6   TOTAL BILIRUBIN mg/dL  --  0 62   ALK PHOS U/L  --  111   ALT U/L  --  18   AST U/L  --  24   GLUCOSE RANDOM mg/dL 134 94         Results from last 7 days   Lab Units 07/29/22  1034 07/29/22  0604 07/28/22  2110 07/28/22  1555 07/28/22  1057 07/28/22  0520 07/27/22  2157 07/27/22  2100 07/27/22  1556 07/27/22  1034 07/27/22  0907 07/26/22  2051   POC GLUCOSE mg/dl 232* 125 189* 170* 238* 126 134 149* 172* 119 120 104     Results from last 7 days   Lab Units 07/26/22  1623   HEMOGLOBIN A1C % 7 3*     Results from last 7 days   Lab Units 07/29/22  0517   PROCALCITONIN ng/ml 0 08       Lines/Drains:  Invasive Devices  Report    Peripheral Intravenous Line  Duration           Peripheral IV 07/26/22 Left Antecubital 2 days          Drain  Duration           External Urinary Catheter <1 day                      Imaging: No pertinent imaging reviewed      Recent Cultures (last 7 days):         Last 24 Hours Medication List:   Current Facility-Administered Medications   Medication Dose Route Frequency Provider Last Rate    acetaminophen  650 mg Oral Q6H PRN MD Oanh Li acetaminophen  975 mg Oral Q8H Nyla Mendoza MD      apixaban  5 mg Oral BID Tony Tomlin MD      calcitonin (salmon)  1 spray Alternating Nares Daily Tony Tomlin MD      cefTRIAXone  1,000 mg Intravenous Q24H Fariba Phelps MD      donepezil  10 mg Oral Daily Tony Tomlin MD      furosemide  20 mg Oral Daily Tony Tomlin MD      insulin glargine  10 Units Subcutaneous HS Tony Tomlin MD      insulin lispro  1-5 Units Subcutaneous TID AC Tony Tomlin MD      insulin lispro  1-5 Units Subcutaneous HS Tony Tomlin MD      lidocaine  1 patch Topical Daily Tony Tomlin MD      magnesium oxide  400 mg Oral Daily Tony Tomlin MD      metoprolol tartrate  25 mg Oral Q12H Nyla Mendoza MD      oxyCODONE  2 5 mg Oral Q4H PRN Tony Tomlin MD      oxyCODONE  5 mg Oral Q4H PRN Tony Tomlin MD      potassium chloride  40 mEq Oral TID Tony Tomlin MD          Today, Patient Was Seen By: Leigh Ann Jorge MD    **Please Note: This note may have been constructed using a voice recognition system  **

## 2022-07-29 NOTE — PLAN OF CARE
Problem: Potential for Falls  Goal: Patient will remain free of falls  Description: INTERVENTIONS:  - Educate patient/family on patient safety including physical limitations  - Instruct patient to call for assistance with activity   - Consult OT/PT to assist with strengthening/mobility   - Keep Call bell within reach  - Keep bed low and locked with side rails adjusted as appropriate  - Keep care items and personal belongings within reach  - Initiate and maintain comfort rounds  - Make Fall Risk Sign visible to staff  - Offer Toileting every  Hours, in advance of need  - Initiate/Maintain alarm  - Obtain necessary fall risk management equipment:   - Apply yellow socks and bracelet for high fall risk patients  - Consider moving patient to room near nurses station  Outcome: Progressing     Problem: MOBILITY - ADULT  Goal: Maintain or return to baseline ADL function  Description: INTERVENTIONS:  -  Assess patient's ability to carry out ADLs; assess patient's baseline for ADL function and identify physical deficits which impact ability to perform ADLs (bathing, care of mouth/teeth, toileting, grooming, dressing, etc )  - Assess/evaluate cause of self-care deficits   - Assess range of motion  - Assess patient's mobility; develop plan if impaired  - Assess patient's need for assistive devices and provide as appropriate  - Encourage maximum independence but intervene and supervise when necessary  - Involve family in performance of ADLs  - Assess for home care needs following discharge   - Consider OT consult to assist with ADL evaluation and planning for discharge  - Provide patient education as appropriate  Outcome: Progressing  Goal: Maintains/Returns to pre admission functional level  Description: INTERVENTIONS:  - Perform BMAT or MOVE assessment daily    - Set and communicate daily mobility goal to care team and patient/family/caregiver     - Collaborate with rehabilitation services on mobility goals if consulted  - Perform Range of Motion  times a day  - Reposition patient every  hours    - Dangle patient  times a day  - Stand patient  times a day  - Ambulate patient  times a day  - Out of bed to chair  times a day   - Out of bed for meals  times a day  - Out of bed for toileting  - Record patient progress and toleration of activity level   Outcome: Progressing     Problem: Prexisting or High Potential for Compromised Skin Integrity  Goal: Skin integrity is maintained or improved  Description: INTERVENTIONS:  - Identify patients at risk for skin breakdown  - Assess and monitor skin integrity  - Assess and monitor nutrition and hydration status  - Monitor labs   - Assess for incontinence   - Turn and reposition patient  - Assist with mobility/ambulation  - Relieve pressure over bony prominences  - Avoid friction and shearing  - Provide appropriate hygiene as needed including keeping skin clean and dry  - Evaluate need for skin moisturizer/barrier cream  - Collaborate with interdisciplinary team   - Patient/family teaching  - Consider wound care consult   Outcome: Progressing     Problem: PAIN - ADULT  Goal: Verbalizes/displays adequate comfort level or baseline comfort level  Description: Interventions:  - Encourage patient to monitor pain and request assistance  - Assess pain using appropriate pain scale  - Administer analgesics based on type and severity of pain and evaluate response  - Implement non-pharmacological measures as appropriate and evaluate response  - Consider cultural and social influences on pain and pain management  - Notify physician/advanced practitioner if interventions unsuccessful or patient reports new pain  Outcome: Progressing     Problem: INFECTION - ADULT  Goal: Absence or prevention of progression during hospitalization  Description: INTERVENTIONS:  - Assess and monitor for signs and symptoms of infection  - Monitor lab/diagnostic results  - Monitor all insertion sites, i e  indwelling lines, tubes, and drains  - Monitor endotracheal if appropriate and nasal secretions for changes in amount and color  - [place] appropriate cooling/warming therapies per order  - Administer medications as ordered  - Instruct and encourage patient and family to use good hand hygiene technique  - Identify and instruct in appropriate isolation precautions for identified infection/condition  Outcome: Progressing  Goal: Absence of fever/infection during neutropenic period  Description: INTERVENTIONS:  - Monitor WBC    Outcome: Progressing     Problem: SAFETY ADULT  Goal: Patient will remain free of falls  Description: INTERVENTIONS:  - Educate patient/family on patient safety including physical limitations  - Instruct patient to call for assistance with activity   - Consult OT/PT to assist with strengthening/mobility   - Keep Call bell within reach  - Keep bed low and locked with side rails adjusted as appropriate  - Keep care items and personal belongings within reach  - Initiate and maintain comfort rounds  - Make Fall Risk Sign visible to staff  - Offer Toileting every  Hours, in advance of need  - Initiate/Maintain alarm  - Obtain necessary fall risk management equipment:   - Apply yellow socks and bracelet for high fall risk patients  - Consider moving patient to room near nurses station  Outcome: Progressing  Goal: Maintain or return to baseline ADL function  Description: INTERVENTIONS:  -  Assess patient's ability to carry out ADLs; assess patient's baseline for ADL function and identify physical deficits which impact ability to perform ADLs (bathing, care of mouth/teeth, toileting, grooming, dressing, etc )  - Assess/evaluate cause of self-care deficits   - Assess range of motion  - Assess patient's mobility; develop plan if impaired  - Assess patient's need for assistive devices and provide as appropriate  - Encourage maximum independence but intervene and supervise when necessary  - Involve family in performance of ADLs  - Assess for home care needs following discharge   - Consider OT consult to assist with ADL evaluation and planning for discharge  - Provide patient education as appropriate  Outcome: Progressing  Goal: Maintains/Returns to pre admission functional level  Description: INTERVENTIONS:  - Perform BMAT or MOVE assessment daily    - Set and communicate daily mobility goal to care team and patient/family/caregiver  - Collaborate with rehabilitation services on mobility goals if consulted  - Perform Range of Motion  times a day  - Reposition patient every  hours  - Dangle patient  times a day  - Stand patient  times a day  - Ambulate patient  times a day  - Out of bed to chair  times a day   - Out of bed for me times a day  - Out of bed for toileting  - Record patient progress and toleration of activity level   Outcome: Progressing     Problem: DISCHARGE PLANNING  Goal: Discharge to home or other facility with appropriate resources  Description: INTERVENTIONS:  - Identify barriers to discharge w/patient and caregiver  - Arrange for needed discharge resources and transportation as appropriate  - Identify discharge learning needs (meds, wound care, etc )  - Arrange for interpretive services to assist at discharge as needed  - Refer to Case Management Department for coordinating discharge planning if the patient needs post-hospital services based on physician/advanced practitioner order or complex needs related to functional status, cognitive ability, or social support system  Outcome: Progressing     Problem: Knowledge Deficit  Goal: Patient/family/caregiver demonstrates understanding of disease process, treatment plan, medications, and discharge instructions  Description: Complete learning assessment and assess knowledge base    Interventions:  - Provide teaching at level of understanding  - Provide teaching via preferred learning methods  Outcome: Progressing

## 2022-07-29 NOTE — ASSESSMENT & PLAN NOTE
On CT abd/pelvis of 7/26/22 for L3  She has compression deformities of T12, L2 and L4 as seen on previous XR lumbar for which she has been on braces   Per daughter, patient was yanked either in the NH or en route to the hospital by EMS  PT recommended rehab thus CM on board for placement to rehab  Pain control and brace application   Fal precaution to be exercised  Family informed

## 2022-07-29 NOTE — ASSESSMENT & PLAN NOTE
Lab Results   Component Value Date    HGBA1C 7 3 (H) 07/26/2022       Recent Labs     07/28/22  1057 07/28/22  1555 07/28/22  2110 07/29/22  0604   POCGLU 238* 170* 189* 125       Blood Sugar Average: Last 72 hrs:  · (P) 881 [id number]   · ADA diet   · Was started on HS Lantus on admission, continue to monitor and adjust as indicated  · Continue SSI coverage TID AC and QHS  · Hypoglycemia protocol

## 2022-07-29 NOTE — ASSESSMENT & PLAN NOTE
· Patient with documented underlying dementia  · Per ED note, staff at [facility] noted patient to be noncompliant with her care on morning of admission, rearranging her furniture, and was seeing bugs on the walls in the ED  · Suspect due to new lumbar compression fracture with pain  · Pain control as below   · Geriatrics consulted and they recommended   · Continuing Aricept 10 mg q h s  Recommend delirium precautions  Maintain sleep-wake cycle, avoid nighttime interruptions  Provide adequate pain control  Avoid urinary retention and constipation  Provide frequent and early mobilization   Provide frequent redirection and reorientation as needed  Avoid medications that may worsen or precipitate delirium such as tramadol, benzodiazepines, anticholinergics, and Benadryl  Redirect unwanted behaviors as first-line therapy, avoid physical restraints as able to  Continue to monitor  Will follow their recs  Results are pending for ordered TSH, B12, Folate level  Awaiting  neurology consult to see if there is any added information that needs to gbe embarked on  Will consult PT for placement eval

## 2022-07-29 NOTE — CASE MANAGEMENT
Case Management Discharge Planning Note    Patient name Alfred Spicer  Location [facility] 201/[facility] 704-14 MRN [id number]  : 1923 Date 2022       Current Admission Date: 2022  Current Admission Diagnosis:Dementia with behavioral disturbance [facility])   Patient Active Problem List    Diagnosis Date Noted    Acute fracture 2022    Compression fracture of L3 vertebra ([address] ) 2022    Dementia with behavioral disturbance ([facility] ) 2022    Chronic systolic CHF (congestive heart failure) ([address] ) 2022    L4 vertebral fracture ([address] ) 2022    L2 vertebral fracture ([facility] ) 2022    Compression fracture of T12 vertebra ([facility] ) 2022    Pressure injury of sacral region, unstageable ([facility] ) 2022    Functional diarrhea     Acute systolic congestive heart failure ([address] ) 2022    Diarrhea 2022    Atrial fibrillation ([address] ) 2022    Severe sepsis ([facility] ) 2022    Urinary tract infection without hematuria 2022    Aortic stenosis 2022    Dysphagia 2022    Acute-on-chronic kidney injury ([facility] ) 2021    Hyperosmolar hyperglycemic state (HHS) ([facility] ) 2021    Urinary incontinence 2021    First degree AV block 2021    At risk for delirium 2021    Chest pain 2021    Syncope 2021    SALTY (acute kidney injury) ([facility] ) 2021    Closed fracture of left proximal humerus 2020    Ambulatory dysfunction 2020    Atherosclerosis of aorta ([facility] ) 2019    Medicare annual wellness visit, subsequent 2018    Screening for depression 2018    Screening for genitourinary condition 2018    Screening for neurological condition 2018    Bilateral lower extremity edema 10/04/2017    Hypokalemia 2017    CKD (chronic kidney disease), stage III ([facility] ) 2017    Hyponatremia 2017    Leukocytosis 2017    Moderate aortic stenosis 2017    Gallstone 09/28/2016    Anemia 09/12/2016    Pancreatic cyst 03/26/1892    Uncomplicated senile dementia ([facility] ) 11/03/2015    Benign essential hypertension 07/07/2015    Hypercholesterolemia 11/11/2013    Osteopenia 11/11/2013    Type 2 diabetes mellitus with diabetic chronic kidney disease ([facility] ) 11/11/2013      LOS (days): 2  Geometric Mean LOS (GMLOS) (days): 2 90  Days to GMLOS:0 8     OBJECTIVE:  Risk of Unplanned Readmission Score: 20 92         Current admission status: Inpatient   Preferred Pharmacy:   [address] [place] [address] Rd  Counts include 234 beds at the [facility] [place] [address]  Phone: [phone] Fax: [facility]  [address]  Phone: [phone] Fax: [phone]    Primary Care Provider: Nury Marie MD    Primary Insurance: MEDICARE  Secondary Insurance: [facility]    DISCHARGE DETAILS:              Additional Comments: Patient will need rehab prior to return to [facility] due to new L3 fracture  Spoke to "[facility] who agreed to referral to [facility]  Referral placed through [address]  Patient will likely be ready for d/c on Monday 8/1

## 2022-07-29 NOTE — ASSESSMENT & PLAN NOTE
Assessment:  79 yo w baseline dementia lives in assisted living and AOx1-2 normally  Neurology consulted for rapid worsening of baseline mental status reportedly hallucinating and out of character behaviors at home  Labs mostly benign and imaging unremarkable however per nursing urine suspicious for UTI this morning despite normal UA on admission  Pt also has not had repeat of abnormal behaviors or hallucinations since admission and reportedly at her baseline  On my exam she was pleasant and appropriate she denied current or previous hallucinations, she reports she feels well and at her baseline  She has no concerns or complaints  At this point mental status changes on chronic dementia likely in the setting of new UTI and not primary neurological process       Plan:   - No further imaging or tests required from neurological perspective  - No further neurological recommendations  - Continue to follow with geriatrics  - Treatment and diagnosis of any metabolic derangements per primary team Nursing Note by Liana Jasso RN at 04/22/17 04:57 PM     Author:  Liana Jasso RN Service:  (none) Author Type:  Registered Nurse     Filed:  04/22/17 04:57 PM Encounter Date:  4/22/2017 Status:  Signed     :  Liana Jasso RN (Registered Nurse)            4:57 PM    Chief Complaint     Patient presents with     • Ear Problem      Patient complains of R ear pain and was seen at [facility] 3/30 and 4/10/17 with ringing.     Patient brought to exam room in stable condition.  Electronically Signed by:    Liana Jasso RN , 4/22/2017  Patient's name, date of birth and allergies verified with[MP1.1T] patient[MP1.1M].  Last visit with MORGAN LIMON was on No match found  Last visit with [facility] was on 04/10/2017 at 11:40 AM in [facility] SE[facility]  Match done based on reference date of today 4/22/17  Rosemary Reyes was offered treatment for pain control:[MP1.1T] No.   Pain rated as 0 (zero)[MP1.1M].  Awaiting doctor evaluation.[MP1.1T]      Revision History        User Key Date/Time User Provider Type Action    > MP1.1 04/22/17 04:57 PM Liana Jasso, RN Registered Nurse Sign    M - Manual, T - Template

## 2022-07-29 NOTE — ASSESSMENT & PLAN NOTE
UA Positive   Urine culture pending  On V Rocephin while awaiting urine culture result  Patient is hydrating well thus will avoid iv fluid

## 2022-07-29 NOTE — ASSESSMENT & PLAN NOTE
Lab Results   Component Value Date    EGFR 40 07/27/2022    EGFR 35 07/26/2022    EGFR 42 03/21/2022    CREATININE 1 14 07/27/2022    CREATININE 1 26 07/26/2022    CREATININE 1 09 03/21/2022   · Normalized     · Creatinine of 1 04 with GFR of 44

## 2022-07-29 NOTE — ASSESSMENT & PLAN NOTE
Lab Results   Component Value Date    HGBA1C 7 3 (H) 07/26/2022       Recent Labs     07/28/22  1555 07/28/22  2110 07/29/22  0604 07/29/22  1034   POCGLU 170* 189* 125 232*       Blood Sugar Average: Last 72 hrs:  · (P) 156 5   · ADA diet   · Was started on HS Lantus on admission, continue to monitor and adjust as indicated  · Continue SSI coverage TID AC and QHS  · Hypoglycemia protocol

## 2022-07-29 NOTE — PROGRESS NOTES
Progress Note - Wicho Bahena 80 y o  female MRN: [id number]    Unit/Bed#: [facility] 201-01 Encounter: [id number]      AssessmentPlan:  Hallucinations  No further hallucinations reported  At facility, Observed to be seeing bugs that are not there, resistant to AM care  Pt was started on imodium scheduled 3 x week and prn for diarrhea  Hallucinations can be side effect of imodium, now discontinued    Maintain delirium precautions  Provide frequent redirection, reorientation, distraction techniques  Avoid deliriogenic medications such as tramadol, benzodiazepines, anticholinergics,  Benadryl  Treat pain, See geriatric pain protocol  Monitor for constipation and urinary retention  Encourage early and frequent moblization, OOB  Encourage Hydration/ Nutrition  Implement sleep hygiene, limit night time interuptions, group activities     Dementia  progressing moderate- severe  Needs assistance with IADLs and ADLs  Encourage po hydration/ nutrition  Continue Aricept 10 mg po QHS, pt has been on Aricept for a few years, would be unlikely that new onset of diarrhea is side effect, continue to monitor      Ambulatory dysfunction  At risk for falls secondary to age, medications, hx of prior falls  Fall precautions  PT/OT  Rehab post hospitalization     Frailty  Clinical Frail Scale: 7- severely frail  Completely dependent for personal care  Albumin 3 6, maintain protein in diet  PT/OT  Rehab post hospitalization     Diarrhea  Started on imodium as outpatient 6/22/22 for diarrhea  Was previously on banatrol started 2/2022 in conjunction with course of vancomycin for Cdiff diarrhea  Uncertain of recent work up  Pt presently with out diarrhea, or BM , continue to monitor      Lumbar compression fracture  Neurosurgery on consult  Geriatric pain control  PT/OT     Acute pain due to fracture  Geriatric pain protocol  Scheduled acetaminophen 975 mg po Q8  Oxycodone 2 5 mg po Q 4 prn moderate pain  Oxycodone 5 mg po Q 4 prn severe pain Monitor for constipation  Lidoderm patch    Subjective:   Upon exam pt is lying in bed  She is alert and oriented x 2, calm and cooperative  She reports she is doing well, slept well  Denies diarrhea  Objective:     Vitals: Blood pressure 118/77, pulse 66, temperature 98 2 °F (36 8 °C), resp  rate 18, weight 74 kg (163 lb 2 3 oz), SpO2 97 %  ,Body mass index is 32 95 kg/m²  Intake/Output Summary (Last 24 hours) at 7/29/2022 1228  Last data filed at 7/29/2022 1144  Gross per 24 hour   Intake 240 ml   Output 1111 ml   Net -871 ml       Physical Exam:   General : NAD  HEENT : MMM   Heart : Normal rate, no murmur rub or gallop  Lungs : CTA no wheezes, rales or rhonchi  Abdomen : Soft, NT/ND, BS auscultated in all 4 quads  Ext :  no edema  Skin : Pink, warm, dry, age appropriate turgor and mobility  Neuro : Nonfocal  Psych : Alert and O x 2       Invasive Devices  Report    Peripheral Intravenous Line  Duration           Peripheral IV 07/26/22 Left Antecubital 2 days          Drain  Duration           External Urinary Catheter <1 day                Lab, Imaging and other studies: I have personally reviewed pertinent reports      VTE Pharmacologic Prophylaxis: Sequential compression device (Venodyne)   VTE Mechanical Prophylaxis: sequential compression device

## 2022-07-30 LAB
ALBUMIN SERPL BCP-MCNC: 3.3 G/DL (ref 3.5–5)
ALP SERPL-CCNC: 100 U/L (ref 46–116)
ALT SERPL W P-5'-P-CCNC: 17 U/L (ref 12–78)
ANION GAP SERPL CALCULATED.3IONS-SCNC: 9 MMOL/L (ref 4–13)
AST SERPL W P-5'-P-CCNC: 21 U/L (ref 5–45)
BASOPHILS # BLD AUTO: 0.08 THOUSANDS/ΜL (ref 0–0.1)
BASOPHILS NFR BLD AUTO: 1 % (ref 0–1)
BILIRUB SERPL-MCNC: 0.49 MG/DL (ref 0.2–1)
BUN SERPL-MCNC: 20 MG/DL (ref 5–25)
CALCIUM ALBUM COR SERPL-MCNC: 10.1 MG/DL (ref 8.3–10.1)
CALCIUM SERPL-MCNC: 9.5 MG/DL (ref 8.3–10.1)
CHLORIDE SERPL-SCNC: 108 MMOL/L (ref 96–108)
CO2 SERPL-SCNC: 21 MMOL/L (ref 21–32)
CREAT SERPL-MCNC: 1.04 MG/DL (ref 0.6–1.3)
EOSINOPHIL # BLD AUTO: 0.27 THOUSAND/ΜL (ref 0–0.61)
EOSINOPHIL NFR BLD AUTO: 3 % (ref 0–6)
ERYTHROCYTE [DISTWIDTH] IN BLOOD BY AUTOMATED COUNT: 14.2 % (ref 11.6–15.1)
FOLATE SERPL-MCNC: 11.2 NG/ML (ref 3.1–17.5)
GFR SERPL CREATININE-BSD FRML MDRD: 44 ML/MIN/1.73SQ M
GLUCOSE SERPL-MCNC: 120 MG/DL (ref 65–140)
GLUCOSE SERPL-MCNC: 140 MG/DL (ref 65–140)
GLUCOSE SERPL-MCNC: 146 MG/DL (ref 65–140)
GLUCOSE SERPL-MCNC: 160 MG/DL (ref 65–140)
GLUCOSE SERPL-MCNC: 280 MG/DL (ref 65–140)
HCT VFR BLD AUTO: 38 % (ref 34.8–46.1)
HGB BLD-MCNC: 12.2 G/DL (ref 11.5–15.4)
IMM GRANULOCYTES # BLD AUTO: 0.09 THOUSAND/UL (ref 0–0.2)
IMM GRANULOCYTES NFR BLD AUTO: 1 % (ref 0–2)
LYMPHOCYTES # BLD AUTO: 2.69 THOUSANDS/ΜL (ref 0.6–4.47)
LYMPHOCYTES NFR BLD AUTO: 28 % (ref 14–44)
MCH RBC QN AUTO: 29.1 PG (ref 26.8–34.3)
MCHC RBC AUTO-ENTMCNC: 32.1 G/DL (ref 31.4–37.4)
MCV RBC AUTO: 91 FL (ref 82–98)
MONOCYTES # BLD AUTO: 1.12 THOUSAND/ΜL (ref 0.17–1.22)
MONOCYTES NFR BLD AUTO: 12 % (ref 4–12)
MRSA NOSE QL CULT: NORMAL
NEUTROPHILS # BLD AUTO: 5.53 THOUSANDS/ΜL (ref 1.85–7.62)
NEUTS SEG NFR BLD AUTO: 55 % (ref 43–75)
NRBC BLD AUTO-RTO: 0 /100 WBCS
PLATELET # BLD AUTO: 269 THOUSANDS/UL (ref 149–390)
PMV BLD AUTO: 10.7 FL (ref 8.9–12.7)
POTASSIUM SERPL-SCNC: 3.9 MMOL/L (ref 3.5–5.3)
PROT SERPL-MCNC: 7.3 G/DL (ref 6.4–8.4)
RBC # BLD AUTO: 4.19 MILLION/UL (ref 3.81–5.12)
SODIUM SERPL-SCNC: 138 MMOL/L (ref 135–147)
VIT B12 SERPL-MCNC: 339 PG/ML (ref 100–900)
WBC # BLD AUTO: 9.78 THOUSAND/UL (ref 4.31–10.16)

## 2022-07-30 PROCEDURE — 99232 SBSQ HOSP IP/OBS MODERATE 35: CPT | Performed by: HOSPITALIST

## 2022-07-30 PROCEDURE — 82948 REAGENT STRIP/BLOOD GLUCOSE: CPT

## 2022-07-30 PROCEDURE — 85025 COMPLETE CBC W/AUTO DIFF WBC: CPT | Performed by: HOSPITALIST

## 2022-07-30 PROCEDURE — 80053 COMPREHEN METABOLIC PANEL: CPT | Performed by: HOSPITALIST

## 2022-07-30 RX ADMIN — INSULIN LISPRO 3 UNITS: 100 INJECTION, SOLUTION INTRAVENOUS; SUBCUTANEOUS at 11:14

## 2022-07-30 RX ADMIN — FUROSEMIDE 20 MG: 20 TABLET ORAL at 09:25

## 2022-07-30 RX ADMIN — METOPROLOL TARTRATE 25 MG: 25 TABLET, FILM COATED ORAL at 21:02

## 2022-07-30 RX ADMIN — INSULIN LISPRO 1 UNITS: 100 INJECTION, SOLUTION INTRAVENOUS; SUBCUTANEOUS at 16:47

## 2022-07-30 RX ADMIN — ACETAMINOPHEN 975 MG: 325 TABLET ORAL at 13:02

## 2022-07-30 RX ADMIN — POTASSIUM CHLORIDE 40 MEQ: 1500 TABLET, EXTENDED RELEASE ORAL at 21:02

## 2022-07-30 RX ADMIN — ACETAMINOPHEN 975 MG: 325 TABLET ORAL at 21:02

## 2022-07-30 RX ADMIN — POTASSIUM CHLORIDE 40 MEQ: 1500 TABLET, EXTENDED RELEASE ORAL at 09:23

## 2022-07-30 RX ADMIN — POTASSIUM CHLORIDE 40 MEQ: 1500 TABLET, EXTENDED RELEASE ORAL at 16:46

## 2022-07-30 RX ADMIN — APIXABAN 5 MG: 5 TABLET, FILM COATED ORAL at 16:47

## 2022-07-30 RX ADMIN — MAGNESIUM OXIDE TAB 400 MG (241.3 MG ELEMENTAL MG) 400 MG: 400 (241.3 MG) TAB at 09:25

## 2022-07-30 RX ADMIN — METOPROLOL TARTRATE 25 MG: 25 TABLET, FILM COATED ORAL at 09:22

## 2022-07-30 RX ADMIN — APIXABAN 5 MG: 5 TABLET, FILM COATED ORAL at 09:23

## 2022-07-30 RX ADMIN — DONEPEZIL HYDROCHLORIDE 10 MG: 10 TABLET ORAL at 09:23

## 2022-07-30 NOTE — PROGRESS NOTES
[facility]  Progress Note - Jesus Gut 11/22/1923, 80 y o  female MRN: [id number]  Unit/Bed#: [facility]2 210-01 Encounter: [id number]  Primary Care Provider: Marcia Byrd MD   Date and time admitted to hospital: 7/26/2022  2:09 PM    Urinary tract infection without hematuria  Assessment & Plan  UA Positive  Urine culture pending  On V Rocephin while awaiting urine culture result  Patient is hydrating well thus will avoid iv fluid     * Dementia with behavioral disturbance [facility])  Assessment & Plan  · Patient with documented underlying dementia  · Per ED note, staff at [facility] noted patient to be noncompliant with her care on morning of admission, rearranging her furniture, and was seeing bugs on the walls in the ED  · Suspect due to new lumbar compression fracture with pain  · Pain control as below   · Geriatrics consulted and they recommended   · Continuing Aricept 10 mg q h s  Recommend delirium precautions  Maintain sleep-wake cycle, avoid nighttime interruptions  Provide adequate pain control  Avoid urinary retention and constipation  Provide frequent and early mobilization  Provide frequent redirection and reorientation as needed  Avoid medications that may worsen or precipitate delirium such as tramadol, benzodiazepines, anticholinergics, and Benadryl  Redirect unwanted behaviors as first-line therapy, avoid physical restraints as able to  Continue to monitor  Will follow their recs  Normal TSH and  B12   Elevated folate level  Most likely worsening mentation from UTI  Blood cxs and MRSA are negative  Neuro appreciated          Acute fracture  Assessment & Plan  On CT abd/pelvis of 7/26/22 for L3  She has compression deformities of T12, L2 and L4 as seen on previous XR lumbar for which she has been on braces   Per daughter, patient was yanked either in the NH or en route to the hospital by EMS  PT recommended rehab thus CM on board for placement to rehab  Pain control and brace application   Fal precaution to be exercised  Family informed    CKD (chronic kidney disease), stage III [facility])  Assessment & Plan  Lab Results   Component Value Date    EGFR 40 07/27/2022    EGFR 35 07/26/2022    EGFR 42 03/21/2022    CREATININE 1 14 07/27/2022    CREATININE 1 26 07/26/2022    CREATININE 1 09 03/21/2022   · Normalized   · Creatinine of 1 04 with GFR of 44    Chronic systolic CHF (congestive heart failure) (HCC)  Assessment & Plan  Wt Readings from Last 3 Encounters:   07/29/22 74 kg (163 lb 2 3 oz)   07/21/22 73 5 kg (162 lb)   05/25/22 73 5 kg (162 lb)     · Volume status appears euvolemic  · Low Na diet  · Monitor intake and output  · Monitor daily weights  · Continue PO lasix  · Echo from February 2022 with LVEF 45%        Benign essential hypertension  Assessment & Plan  · BP is acceptable, continue lasix and metoprolol    Atrial fibrillation (HCC)  Assessment & Plan  · Rate controlled on metoprolol tartrate 25mg Q12hr  · On Eliquis for [facility]    Aortic stenosis  Assessment & Plan  · Severe AS noted on echo from Feb 2022  · Medically managed and may not be a surgical candidate 2/2 to her age    Type 2 diabetes mellitus with diabetic chronic kidney disease [facility])  Assessment & Plan  Lab Results   Component Value Date    HGBA1C 7 3 (H) 07/26/2022       Recent Labs     07/28/22  1555 07/28/22  2110 07/29/22  0604 07/29/22  1034   POCGLU 170* 189* 125 232*       Blood Sugar Average: Last 72 hrs:  · (P) 156 5   · ADA diet   · Was started on HS Lantus on admission, continue to monitor and adjust as indicated  · Continue SSI coverage TID AC and QHS  · Hypoglycemia protocol           VTE Pharmacologic Prophylaxis:   Moderate Risk (Score 3-4) - Pharmacological DVT Prophylaxis Ordered: apixaban (Eliquis)  Patient Centered Rounds: I performed bedside rounds with nursing staff today    Discussions with Specialists or Other Care Team Provider: DARWIN    Education and Discussions with Family / Patient: Updated  (daughter) via phone  Time Spent for Care: 30 minutes  More than 50% of total time spent on counseling and coordination of care as described above  Current Length of Stay: 3 day(s)  Current Patient Status: Inpatient   Certification Statement: The patient will continue to require additional inpatient hospital stay due to UTI and placement to rehab for acute fracture  Discharge Plan: Anticipate discharge in >72 hrs to rehab facility  Code Status: Level 1 - Full Code    Subjective:   Patient has no complaint    Objective:     Vitals:   No data recorded  HR:  [66-69] 66  Resp:  [17-18] 17  BP: (135-139)/(69-75) 139/69  SpO2:  [93 %-97 %] 93 %  Body mass index is 32 44 kg/m²  Input and Output Summary (last 24 hours): Intake/Output Summary (Last 24 hours) at 7/30/2022 0826  Last data filed at 7/29/2022 1309  Gross per 24 hour   Intake 720 ml   Output 235 ml   Net 485 ml       Physical Exam:     Vitals and nursing note reviewed  Constitutional:       Comments: Patient seen lying in bed, NAD   Cardiovascular:      Rate and Rhythm: Normal rate and regular rhythm       Heart sounds: Murmur heard  Pulmonary:      Effort: Pulmonary effort is normal       Breath sounds: Normal breath sounds  Abdominal:      General: Bowel sounds are normal       Palpations: Abdomen is soft       Tenderness: There is no abdominal tenderness  Musculoskeletal:      Right lower leg: No edema       Left lower leg: No edema  Skin:     General: Skin is warm     Neurological:      Mental Status: She is alert       Comments: Oriented  place but  But not to time, year and very forgetful of her dinner which was about 3 hours ago   Psychiatric:      Comments: Not anxious or agitated appearing      Additional Data:     Labs:  Results from last 7 days   Lab Units 07/30/22  0431   WBC Thousand/uL 9 78   HEMOGLOBIN g/dL 12 2   HEMATOCRIT % 38 0   PLATELETS Thousands/uL 269   NEUTROS PCT % 55   LYMPHS PCT % 28 MONOS PCT % 12   EOS PCT % 3     Results from last 7 days   Lab Units 07/30/22  0431   SODIUM mmol/L 138   POTASSIUM mmol/L 3 9   CHLORIDE mmol/L 108   CO2 mmol/L 21   BUN mg/dL 20   CREATININE mg/dL 1 04   ANION GAP mmol/L 9   CALCIUM mg/dL 9 5   ALBUMIN g/dL 3 3*   TOTAL BILIRUBIN mg/dL 0 49   ALK PHOS U/L 100   ALT U/L 17   AST U/L 21   GLUCOSE RANDOM mg/dL 120         Results from last 7 days   Lab Units 07/30/22  0535 07/29/22  2134 07/29/22  1707 07/29/22  1034 07/29/22  0604 07/28/22  2110 07/28/22  1555 07/28/22  1057 07/28/22  0520 07/27/22  2157 07/27/22  2100 07/27/22  1556   POC GLUCOSE mg/dl 140 212* 174* 232* 125 189* 170* 238* 126 134 149* 172*     Results from last 7 days   Lab Units 07/26/22  1623   HEMOGLOBIN A1C % 7 3*     Results from last 7 days   Lab Units 07/29/22  0517   PROCALCITONIN ng/ml 0 08       Lines/Drains:  Invasive Devices  Report    Peripheral Intravenous Line  Duration           Peripheral IV 07/26/22 Left Antecubital 3 days          Drain  Duration           External Urinary Catheter <1 day                      Imaging: No pertinent imaging reviewed      Recent Cultures (last 7 days):         Last 24 Hours Medication List:   Current Facility-Administered Medications   Medication Dose Route Frequency Provider Last Rate    acetaminophen  650 mg Oral Q6H PRN Yenni Moss MD      acetaminophen  975 mg Oral Q8H [address] Yenni Moss MD      apixaban  5 mg Oral BID Yenni Moss MD      [facility]] calcitonin (salmon)  1 spray Alternating Nares Daily Yenni Moss MD      [facility]] cefTRIAXone  1,000 mg Intravenous Q24H Fariba Bush MD 1,000 mg (07/29/22 1549)    donepezil  10 mg Oral Daily Yenni Moss MD      furosemide  20 mg Oral Daily Yenni Moss MD      [facility]] insulin glargine  10 Units Subcutaneous HS Yenni Moss MD      insulin lispro  1-5 Units Subcutaneous TID AC Yenni Moss MD      insulin lispro  1-5 Units Subcutaneous HS Horacio Cantor Kumar Hernandez MD     [facility]] lidocaine  1 patch Topical Daily Bobby Pedersen MD      magnesium oxide  400 mg Oral Daily Bobby Pedersen MD      metoprolol tartrate  25 mg Oral Q12H Tamia Shay MD      [facility]] oxyCODONE  2 5 mg Oral Q4H PRN Bobby Pedersen MD     [facility]] oxyCODONE  5 mg Oral Q4H PRN Bobby Pedersen MD      potassium chloride  40 mEq Oral TID Bobby Pedersen MD          Today, Patient Was Seen By: Karon Crisostomo MD    **Please Note: This note may have been constructed using a voice recognition system  **

## 2022-07-31 LAB
ALBUMIN SERPL BCP-MCNC: 3.2 G/DL (ref 3.5–5)
ALP SERPL-CCNC: 103 U/L (ref 46–116)
ALT SERPL W P-5'-P-CCNC: 17 U/L (ref 12–78)
ANION GAP SERPL CALCULATED.3IONS-SCNC: 3 MMOL/L (ref 4–13)
AST SERPL W P-5'-P-CCNC: 21 U/L (ref 5–45)
BASOPHILS # BLD AUTO: 0.08 THOUSANDS/ΜL (ref 0–0.1)
BASOPHILS NFR BLD AUTO: 1 % (ref 0–1)
BILIRUB SERPL-MCNC: 0.43 MG/DL (ref 0.2–1)
BUN SERPL-MCNC: 21 MG/DL (ref 5–25)
CALCIUM ALBUM COR SERPL-MCNC: 9.9 MG/DL (ref 8.3–10.1)
CALCIUM SERPL-MCNC: 9.3 MG/DL (ref 8.3–10.1)
CHLORIDE SERPL-SCNC: 110 MMOL/L (ref 96–108)
CO2 SERPL-SCNC: 23 MMOL/L (ref 21–32)
CREAT SERPL-MCNC: 1.14 MG/DL (ref 0.6–1.3)
EOSINOPHIL # BLD AUTO: 0.31 THOUSAND/ΜL (ref 0–0.61)
EOSINOPHIL NFR BLD AUTO: 3 % (ref 0–6)
ERYTHROCYTE [DISTWIDTH] IN BLOOD BY AUTOMATED COUNT: 14.4 % (ref 11.6–15.1)
GFR SERPL CREATININE-BSD FRML MDRD: 40 ML/MIN/1.73SQ M
GLUCOSE SERPL-MCNC: 152 MG/DL (ref 65–140)
GLUCOSE SERPL-MCNC: 162 MG/DL (ref 65–140)
GLUCOSE SERPL-MCNC: 162 MG/DL (ref 65–140)
GLUCOSE SERPL-MCNC: 166 MG/DL (ref 65–140)
GLUCOSE SERPL-MCNC: 286 MG/DL (ref 65–140)
HCT VFR BLD AUTO: 38.9 % (ref 34.8–46.1)
HGB BLD-MCNC: 12.4 G/DL (ref 11.5–15.4)
IMM GRANULOCYTES # BLD AUTO: 0.1 THOUSAND/UL (ref 0–0.2)
IMM GRANULOCYTES NFR BLD AUTO: 1 % (ref 0–2)
LYMPHOCYTES # BLD AUTO: 3.3 THOUSANDS/ΜL (ref 0.6–4.47)
LYMPHOCYTES NFR BLD AUTO: 36 % (ref 14–44)
MCH RBC QN AUTO: 29 PG (ref 26.8–34.3)
MCHC RBC AUTO-ENTMCNC: 31.9 G/DL (ref 31.4–37.4)
MCV RBC AUTO: 91 FL (ref 82–98)
MONOCYTES # BLD AUTO: 1.07 THOUSAND/ΜL (ref 0.17–1.22)
MONOCYTES NFR BLD AUTO: 12 % (ref 4–12)
NEUTROPHILS # BLD AUTO: 4.42 THOUSANDS/ΜL (ref 1.85–7.62)
NEUTS SEG NFR BLD AUTO: 47 % (ref 43–75)
NRBC BLD AUTO-RTO: 0 /100 WBCS
PLATELET # BLD AUTO: 270 THOUSANDS/UL (ref 149–390)
PMV BLD AUTO: 10.6 FL (ref 8.9–12.7)
POTASSIUM SERPL-SCNC: 4.8 MMOL/L (ref 3.5–5.3)
PROT SERPL-MCNC: 7.6 G/DL (ref 6.4–8.4)
RBC # BLD AUTO: 4.28 MILLION/UL (ref 3.81–5.12)
SODIUM SERPL-SCNC: 136 MMOL/L (ref 135–147)
WBC # BLD AUTO: 9.28 THOUSAND/UL (ref 4.31–10.16)

## 2022-07-31 PROCEDURE — 85025 COMPLETE CBC W/AUTO DIFF WBC: CPT | Performed by: HOSPITALIST

## 2022-07-31 PROCEDURE — 82948 REAGENT STRIP/BLOOD GLUCOSE: CPT

## 2022-07-31 PROCEDURE — 99232 SBSQ HOSP IP/OBS MODERATE 35: CPT | Performed by: HOSPITALIST

## 2022-07-31 PROCEDURE — 80053 COMPREHEN METABOLIC PANEL: CPT | Performed by: HOSPITALIST

## 2022-07-31 RX ADMIN — METOPROLOL TARTRATE 25 MG: 25 TABLET, FILM COATED ORAL at 08:43

## 2022-07-31 RX ADMIN — APIXABAN 5 MG: 5 TABLET, FILM COATED ORAL at 17:52

## 2022-07-31 RX ADMIN — INSULIN LISPRO 1 UNITS: 100 INJECTION, SOLUTION INTRAVENOUS; SUBCUTANEOUS at 17:55

## 2022-07-31 RX ADMIN — METOPROLOL TARTRATE 25 MG: 25 TABLET, FILM COATED ORAL at 21:02

## 2022-07-31 RX ADMIN — DONEPEZIL HYDROCHLORIDE 10 MG: 10 TABLET ORAL at 08:43

## 2022-07-31 RX ADMIN — MAGNESIUM OXIDE TAB 400 MG (241.3 MG ELEMENTAL MG) 400 MG: 400 (241.3 MG) TAB at 08:43

## 2022-07-31 RX ADMIN — INSULIN LISPRO 1 UNITS: 100 INJECTION, SOLUTION INTRAVENOUS; SUBCUTANEOUS at 06:10

## 2022-07-31 RX ADMIN — APIXABAN 5 MG: 5 TABLET, FILM COATED ORAL at 08:43

## 2022-07-31 RX ADMIN — POTASSIUM CHLORIDE 40 MEQ: 1500 TABLET, EXTENDED RELEASE ORAL at 17:52

## 2022-07-31 RX ADMIN — ACETAMINOPHEN 975 MG: 325 TABLET ORAL at 05:13

## 2022-07-31 RX ADMIN — FUROSEMIDE 20 MG: 20 TABLET ORAL at 08:43

## 2022-07-31 RX ADMIN — INSULIN LISPRO 3 UNITS: 100 INJECTION, SOLUTION INTRAVENOUS; SUBCUTANEOUS at 21:02

## 2022-07-31 RX ADMIN — ACETAMINOPHEN 975 MG: 325 TABLET ORAL at 17:51

## 2022-07-31 RX ADMIN — POTASSIUM CHLORIDE 40 MEQ: 1500 TABLET, EXTENDED RELEASE ORAL at 08:43

## 2022-07-31 RX ADMIN — INSULIN LISPRO 1 UNITS: 100 INJECTION, SOLUTION INTRAVENOUS; SUBCUTANEOUS at 12:52

## 2022-07-31 RX ADMIN — POTASSIUM CHLORIDE 40 MEQ: 1500 TABLET, EXTENDED RELEASE ORAL at 21:03

## 2022-07-31 RX ADMIN — ACETAMINOPHEN 975 MG: 325 TABLET ORAL at 21:03

## 2022-07-31 NOTE — ASSESSMENT & PLAN NOTE
Lab Results   Component Value Date    HGBA1C 7 3 (H) 07/26/2022       Recent Labs     07/30/22  1040 07/30/22  1529 07/30/22 2049 07/31/22  0512   POCGLU 280* 160* 146* 152*       Blood Sugar Average: Last 72 hrs:  · (P) 105 [id number]   · ADA diet   · Was started on HS Lantus on admission, continue to monitor and adjust as indicated  · Continue SSI coverage TID AC and QHS  · Hypoglycemia protocol

## 2022-07-31 NOTE — ASSESSMENT & PLAN NOTE
Lab Results   Component Value Date    EGFR 40 07/31/2022    EGFR 44 07/30/2022    EGFR 40 07/27/2022    CREATININE 1 14 07/31/2022    CREATININE 1 04 07/30/2022    CREATININE 1 14 07/27/2022   · Normalized     · Creatinine of 1 04 with GFR of 44

## 2022-07-31 NOTE — ASSESSMENT & PLAN NOTE
· Patient with documented underlying dementia  · Per ED note, staff at [facility] noted patient to be noncompliant with her care on morning of admission, rearranging her furniture, and was seeing bugs on the walls in the ED  · Suspect due to new lumbar compression fracture with pain  · Pain control as below   · Geriatrics consulted and they recommended   · Continuing Aricept 10 mg q h s  Recommend delirium precautions  Maintain sleep-wake cycle, avoid nighttime interruptions  Provide adequate pain control  Avoid urinary retention and constipation  Provide frequent and early mobilization  Provide frequent redirection and reorientation as needed  Avoid medications that may worsen or precipitate delirium such as tramadol, benzodiazepines, anticholinergics, and Benadryl  Redirect unwanted behaviors as first-line therapy, avoid physical restraints as able to  Continue to monitor  Will follow their recs  Normal TSH and  B12   Elevated folate level  Most likely worsening mentation from UTI  Blood cxs and MRSA are negative  Neuro appreciated

## 2022-07-31 NOTE — ASSESSMENT & PLAN NOTE
· Rate controlled on metoprolol tartrate 25mg Q12hr  · On Eliquis for [facility]

## 2022-07-31 NOTE — ASSESSMENT & PLAN NOTE
Wt Readings from Last 3 Encounters:   07/31/22 73 kg (160 lb 14 4 oz)   07/21/22 73 5 kg (162 lb)   05/25/22 73 5 kg (162 lb)     · Volume status appears euvolemic  · Low Na diet  · Monitor intake and output  · Monitor daily weights  · Continue PO lasix  · Echo from February 2022 with LVEF 45%

## 2022-07-31 NOTE — PROGRESS NOTES
[address] [facility]  Progress Note - Eugene Aguirre 1923, 80 y o  female MRN: [id number]  Unit/Bed#: CW2 210-01 Encounter: [id number]    VTE Pharmacologic Prophylaxis:   Moderate Risk (Score 3-4) - Pharmacological DVT Prophylaxis Ordered: apixaban (Eliquis)  Patient Centered Rounds: I performed bedside rounds with nursing staff today  Discussions with Specialists or Other Care Team Provider: DARWIN    Education and Discussions with Family / Patient: Updated  (daughter and brother) via phone  Time Spent for Care: 30 minutes  More than 50% of total time spent on counseling and coordination of care as described above  Current Length of Stay: 4 day(s)  Current Patient Status: Inpatient   Certification Statement: The patient will continue to require additional inpatient hospital stay due to Pending placement [facility] acute L3 nondisplaced fracture  Discharge Plan: Anticipate discharge in 48-72 hrs to discharge location to be determined pending rehab evaluations  Code Status: Level 1 - Full Code    Subjective:   No complaint    Objective:     Vitals:   Temp (24hrs), Av 2 °F (36 8 °C), Min:98 2 °F (36 8 °C), Max:98 2 °F (36 8 °C)    Temp:  [98 2 °F (36 8 °C)] 98 2 °F (36 8 °C)  HR:  [63-78] 63  Resp:  [16] 16  BP: (124-137)/(68-71) 130/68  SpO2:  [93 %-95 %] 95 %  Body mass index is 32 5 kg/m²  Input and Output Summary (last 24 hours): Intake/Output Summary (Last 24 hours) at 2022 0826  Last data filed at 2022 1301  Gross per 24 hour   Intake 540 ml   Output --   Net 540 ml       Physical Exam:        Vitals and nursing note reviewed  Constitutional:       Comments: Patient seen lying in bed, NAD   Cardiovascular:      Rate and Rhythm: Normal rate and regular rhythm       Heart sounds: Murmur heard  Pulmonary:      Effort: Pulmonary effort is normal       Breath sounds: Normal breath sounds     Abdominal:      General: Bowel sounds are normal       Palpations: Abdomen is soft       Tenderness: There is no abdominal tenderness  Musculoskeletal:      Right lower leg: No edema       Left lower leg: No edema  Skin:     General: Skin is warm  Neurological:      Mental Status: She is alert       Comments: Oriented  place but  But not to time, year and very forgetful of her dinner which was about 3 hours ago   Psychiatric:      Comments: Not anxious or agitated appearing       Additional Data:     Labs:  Results from last 7 days   Lab Units 07/31/22  0445   WBC Thousand/uL 9 28   HEMOGLOBIN g/dL 12 4   HEMATOCRIT % 38 9   PLATELETS Thousands/uL 270   NEUTROS PCT % 47   LYMPHS PCT % 36   MONOS PCT % 12   EOS PCT % 3     Results from last 7 days   Lab Units 07/31/22  0445   SODIUM mmol/L 136   POTASSIUM mmol/L 4 8   CHLORIDE mmol/L 110*   CO2 mmol/L 23   BUN mg/dL 21   CREATININE mg/dL 1 14   ANION GAP mmol/L 3*   CALCIUM mg/dL 9 3   ALBUMIN g/dL 3 2*   TOTAL BILIRUBIN mg/dL 0 43   ALK PHOS U/L 103   ALT U/L 17   AST U/L 21   GLUCOSE RANDOM mg/dL 162*         Results from last 7 days   Lab Units 07/31/22  0512 07/30/22  2049 07/30/22  1529 07/30/22  1040 07/30/22  0535 07/29/22  2134 07/29/22  1707 07/29/22  1034 07/29/22  0604 07/28/22  2110 07/28/22  1555 07/28/22  1057   POC GLUCOSE mg/dl 152* 146* 160* 280* 140 212* 174* 232* 125 189* 170* 238*     Results from last 7 days   Lab Units 07/26/22  1623   HEMOGLOBIN A1C % 7 3*     Results from last 7 days   Lab Units 07/29/22  0517   PROCALCITONIN ng/ml 0 08       Lines/Drains:  Invasive Devices  Report    Peripheral Intravenous Line  Duration           Peripheral IV 07/31/22 Right Antecubital <1 day          Drain  Duration           External Urinary Catheter 1 day                      Imaging: No pertinent imaging reviewed      Recent Cultures (last 7 days):   Results from last 7 days   Lab Units 07/29/22  1037   URINE CULTURE  >100,000 cfu/ml Escherichia coli*  >100,000 cfu/ml Proteus mirabilis* Last 24 Hours Medication List:   Current Facility-Administered Medications   Medication Dose Route Frequency Provider Last Rate    acetaminophen  650 mg Oral Q6H PRN Cayetano Light MD      acetaminophen  975 mg Oral Q8H [address] Cayetano Light MD      apixaban  5 mg Oral BID Cayetano Light MD      [facility]] calcitonin (salmon)  1 spray Alternating Nares Daily Cayetano Light MD      [facility]] cefTRIAXone  1,000 mg Intravenous Q24H Fariba Allen MD 1,000 mg (07/29/22 4120)    donepezil  10 mg Oral Daily Cayetano Light MD      furosemide  20 mg Oral Daily Cayetano Light MD      [facility]] insulin glargine  10 Units Subcutaneous HS Fariba Allen MD      insulin lispro  1-5 Units Subcutaneous TID AC Cayetano Light MD      insulin lispro  1-5 Units Subcutaneous HS Cayetano Light MD     [facility]] lidocaine  1 patch Topical Daily Cayetano Light MD      magnesium oxide  400 mg Oral Daily Cayetano Light MD      metoprolol tartrate  25 mg Oral Q12H Mira Mora MD      [facility]] oxyCODONE  2 5 mg Oral Q4H PRN MD [facility]] oxyCODONE  5 mg Oral Q4H PRN Cayetano Light MD      potassium chloride  40 mEq Oral TID Cayetano Light MD          Today, Patient Was Seen By: Christiano Allen MD    **Please Note: This note may have been constructed using a voice recognition system  **Primary Care Provider: Carolynn Hamilton MD   Date and time admitted to hospital: 7/26/2022  2:09 PM    Urinary tract infection without hematuria  Assessment & Plan  UA Positive   Urine culture positive with E Coli and Proteus Mirabilis  O IV Rocephin while waiting sensitivity result  Patient is hydrating well thus will avoid iv fluid     * Dementia with behavioral disturbance [facility])  Assessment & Plan  · Patient with documented underlying dementia  · Per ED note, staff at [facility] noted patient to be noncompliant with her care on morning of admission, rearranging her furniture, and was seeing bugs on the walls in the ED  · Suspect due to new lumbar compression fracture with pain  · Pain control as below   · Geriatrics consulted and they recommended   · Continuing Aricept 10 mg q h s  Recommend delirium precautions  Maintain sleep-wake cycle, avoid nighttime interruptions  Provide adequate pain control  Avoid urinary retention and constipation  Provide frequent and early mobilization  Provide frequent redirection and reorientation as needed  Avoid medications that may worsen or precipitate delirium such as tramadol, benzodiazepines, anticholinergics, and Benadryl  Redirect unwanted behaviors as first-line therapy, avoid physical restraints as able to  Continue to monitor  Will follow their recs  Normal TSH and  B12  Elevated folate level  Most likely worsening mentation from UTI  Blood cxs and MRSA are negative  Neuro appreciated          Acute fracture  Assessment & Plan  On CT abd/pelvis of 7/26/22 for L3  She has compression deformities of T12, L2 and L4 as seen on previous XR lumbar for which she has been on braces   Per daughter, patient was yanked either in the NH or en route to the hospital by EMS  PT recommended rehab thus CM on board for placement to rehab  Pain control and brace application   Fal precaution to be exercised  Family informed    Compression fracture of L3 vertebra ([address] )  Assessment & Plan  · Acute nondisplaced fracture of the inferior endplate of L3 without loss of vertebral height noted on CT  · Patient follows with Neurosurgery as outpatient for multiple TL compression deformities  · Pain control     CKD (chronic kidney disease), stage III [facility])  Assessment & Plan  Lab Results   Component Value Date    EGFR 40 07/31/2022    EGFR 44 07/30/2022    EGFR 40 07/27/2022    CREATININE 1 14 07/31/2022    CREATININE 1 04 07/30/2022    CREATININE 1 14 07/27/2022   · Normalized     · Creatinine of 1 04 with GFR of 44    Chronic systolic CHF (congestive heart failure) [facility])  Assessment & Plan  Wt Readings from Last 3 Encounters:   07/31/22 73 kg (160 lb 14 4 oz)   07/21/22 73 5 kg (162 lb)   05/25/22 73 5 kg (162 lb)     · Volume status appears euvolemic  · Low Na diet  · Monitor intake and output  · Monitor daily weights  · Continue PO lasix  · Echo from February 2022 with LVEF 45%        Benign essential hypertension  Assessment & Plan  · BP is acceptable, continue lasix and metoprolol    Atrial fibrillation (HCC)  Assessment & Plan  · Rate controlled on metoprolol tartrate 25mg Q12hr  · On Eliquis for [facility]    Aortic stenosis  Assessment & Plan  · Severe AS noted on echo from Feb 2022  · Medically managed and may not be a surgical candidate 2/2 to her age    Type 2 diabetes mellitus with diabetic chronic kidney disease [facility])  Assessment & Plan  Lab Results   Component Value Date    HGBA1C 7 3 (H) 07/26/2022       Recent Labs     07/30/22  1040 07/30/22  1529 07/30/22  2049 07/31/22  0512   POCGLU 280* 160* 146* 152*       Blood Sugar Average: Last 72 hrs:  · (P) 180 [id number]   · ADA diet   · Was started on HS Lantus on admission, continue to monitor and adjust as indicated  · Continue SSI coverage TID AC and QHS  · Hypoglycemia protocol

## 2022-08-01 PROBLEM — G93.40 ACUTE ENCEPHALOPATHY: Status: ACTIVE | Noted: 2022-08-01

## 2022-08-01 LAB
BACTERIA UR CULT: ABNORMAL
GLUCOSE SERPL-MCNC: 155 MG/DL (ref 65–140)
GLUCOSE SERPL-MCNC: 164 MG/DL (ref 65–140)
GLUCOSE SERPL-MCNC: 165 MG/DL (ref 65–140)
GLUCOSE SERPL-MCNC: 190 MG/DL (ref 65–140)

## 2022-08-01 PROCEDURE — 82948 REAGENT STRIP/BLOOD GLUCOSE: CPT

## 2022-08-01 PROCEDURE — 99232 SBSQ HOSP IP/OBS MODERATE 35: CPT | Performed by: HOSPITALIST

## 2022-08-01 RX ORDER — CALCITONIN SALMON 200 [IU]/.09ML
1 SPRAY, METERED NASAL DAILY
Status: DISCONTINUED | OUTPATIENT
Start: 2022-08-02 | End: 2022-08-02 | Stop reason: HOSPADM

## 2022-08-01 RX ORDER — INSULIN GLARGINE 100 [IU]/ML
10 INJECTION, SOLUTION SUBCUTANEOUS
Status: DISCONTINUED | OUTPATIENT
Start: 2022-08-01 | End: 2022-08-02 | Stop reason: HOSPADM

## 2022-08-01 RX ORDER — OXYCODONE HYDROCHLORIDE 5 MG/1
5 TABLET ORAL EVERY 4 HOURS PRN
Status: DISCONTINUED | OUTPATIENT
Start: 2022-08-01 | End: 2022-08-02 | Stop reason: HOSPADM

## 2022-08-01 RX ORDER — OXYCODONE HYDROCHLORIDE 5 MG/1
2.5 TABLET ORAL EVERY 4 HOURS PRN
Status: DISCONTINUED | OUTPATIENT
Start: 2022-08-01 | End: 2022-08-02 | Stop reason: HOSPADM

## 2022-08-01 RX ADMIN — METOPROLOL TARTRATE 25 MG: 25 TABLET, FILM COATED ORAL at 21:33

## 2022-08-01 RX ADMIN — INSULIN LISPRO 1 UNITS: 100 INJECTION, SOLUTION INTRAVENOUS; SUBCUTANEOUS at 22:14

## 2022-08-01 RX ADMIN — ACETAMINOPHEN 975 MG: 325 TABLET ORAL at 06:31

## 2022-08-01 RX ADMIN — ACETAMINOPHEN 975 MG: 325 TABLET ORAL at 13:12

## 2022-08-01 RX ADMIN — ACETAMINOPHEN 975 MG: 325 TABLET ORAL at 21:33

## 2022-08-01 RX ADMIN — DONEPEZIL HYDROCHLORIDE 10 MG: 10 TABLET ORAL at 08:49

## 2022-08-01 RX ADMIN — POTASSIUM CHLORIDE 40 MEQ: 1500 TABLET, EXTENDED RELEASE ORAL at 21:40

## 2022-08-01 RX ADMIN — APIXABAN 5 MG: 5 TABLET, FILM COATED ORAL at 08:49

## 2022-08-01 RX ADMIN — METOPROLOL TARTRATE 25 MG: 25 TABLET, FILM COATED ORAL at 08:49

## 2022-08-01 RX ADMIN — APIXABAN 5 MG: 5 TABLET, FILM COATED ORAL at 17:25

## 2022-08-01 RX ADMIN — POTASSIUM CHLORIDE 40 MEQ: 1500 TABLET, EXTENDED RELEASE ORAL at 08:49

## 2022-08-01 RX ADMIN — INSULIN LISPRO 1 UNITS: 100 INJECTION, SOLUTION INTRAVENOUS; SUBCUTANEOUS at 06:31

## 2022-08-01 RX ADMIN — MAGNESIUM OXIDE TAB 400 MG (241.3 MG ELEMENTAL MG) 400 MG: 400 (241.3 MG) TAB at 08:49

## 2022-08-01 RX ADMIN — INSULIN LISPRO 1 UNITS: 100 INJECTION, SOLUTION INTRAVENOUS; SUBCUTANEOUS at 12:29

## 2022-08-01 RX ADMIN — FUROSEMIDE 20 MG: 20 TABLET ORAL at 08:48

## 2022-08-01 RX ADMIN — INSULIN GLARGINE 10 UNITS: 100 INJECTION, SOLUTION SUBCUTANEOUS at 21:33

## 2022-08-01 RX ADMIN — CEFTRIAXONE SODIUM 1000 MG: 10 INJECTION, POWDER, FOR SOLUTION INTRAVENOUS at 17:25

## 2022-08-01 RX ADMIN — INSULIN LISPRO 1 UNITS: 100 INJECTION, SOLUTION INTRAVENOUS; SUBCUTANEOUS at 17:26

## 2022-08-01 RX ADMIN — POTASSIUM CHLORIDE 40 MEQ: 1500 TABLET, EXTENDED RELEASE ORAL at 17:25

## 2022-08-01 NOTE — ASSESSMENT & PLAN NOTE
Wt Readings from Last 3 Encounters:   08/01/22 72 6 kg (160 lb)   07/21/22 73 5 kg (162 lb)   05/25/22 73 5 kg (162 lb)     · Volume status appears euvolemic  · Low Na diet  · Monitor intake and output  · Monitor daily weights  · Continue PO lasix  · Echo from February 2022 with LVEF 45%

## 2022-08-01 NOTE — CASE MANAGEMENT
Case Management Discharge Planning Note    Patient name Simin Lobe  Location 2 210/CW2 097-55 MRN [id number]  : 1923 Date 2022       Current Admission Date: 2022  Current Admission Diagnosis:Dementia with behavioral disturbance [facility])   Patient Active Problem List    Diagnosis Date Noted    Acute fracture 2022    Compression fracture of L3 vertebra ([address] ) 2022    Dementia with behavioral disturbance ([facility] ) 2022    Chronic systolic CHF (congestive heart failure) ([address] ) 2022    L4 vertebral fracture ([facility] ) 2022    L2 vertebral fracture ([facility] ) 2022    Compression fracture of T12 vertebra ([facility] ) 2022    Pressure injury of sacral region, unstageable ([facility] ) 2022    Functional diarrhea     Acute systolic congestive heart failure ([address] ) 2022    Diarrhea 2022    Atrial fibrillation ([facility] ) 2022    Severe sepsis ([facility] ) 2022    Urinary tract infection without hematuria 2022    Aortic stenosis 2022    Dysphagia 2022    Acute-on-chronic kidney injury ([facility] ) 2021    Hyperosmolar hyperglycemic state (HHS) ([facility] ) 2021    Urinary incontinence 2021    First degree AV block 2021    At risk for delirium 2021    Chest pain 2021    Syncope 2021    SALTY (acute kidney injury) ([facility] ) 2021    Closed fracture of left proximal humerus 2020    Ambulatory dysfunction 2020    Atherosclerosis of aorta ([facility] ) 2019    Medicare annual wellness visit, subsequent 2018    Screening for depression 2018    Screening for genitourinary condition 2018    Screening for neurological condition 2018    Bilateral lower extremity edema 10/04/2017    Hypokalemia 2017    CKD (chronic kidney disease), stage III [facility] ) 2017    Hyponatremia 2017    Leukocytosis 2017    Moderate aortic stenosis 2017    Gallstone 09/28/2016    Anemia 09/12/2016    Pancreatic cyst 47/68/6918    Uncomplicated senile dementia ([facility] ) 11/03/2015    Benign essential hypertension 07/07/2015    Hypercholesterolemia 11/11/2013    Osteopenia 11/11/2013    Type 2 diabetes mellitus with diabetic chronic kidney disease ([facility] ) 11/11/2013      LOS (days): 5  Geometric Mean LOS (GMLOS) (days): 2 90  Days to GMLOS:-2 3     OBJECTIVE:  Risk of Unplanned Readmission Score: 22 07         Current admission status: Inpatient   Preferred Pharmacy:   [address] [place] [address]  Phone: [phone] Fax: Michael [address]  Phone: [phone] Fax: [phone]    Primary Care Provider: Mathew Simon MD    Primary Insurance: MEDICARE  Secondary Insurance: [facility]    DISCHARGE DETAILS:                           Additional Comments: Patient expected to be cleared for discharge tomorrow  [facility] unable to offer patient a bed; spoke with [facility] Fausto Marcus [facility]  BLS transportation requested for 1300  Daughter Fausto Marcus updated

## 2022-08-01 NOTE — ASSESSMENT & PLAN NOTE
Admitted with acute encephalopathy with a baseline pt dementia  UA initially was negative on admission thus no indication for abx at that time  CT head (-)  CT abd/pelvis with acute fracture  Geriatric and neurology were rather consulted  UA repeat was re-ordered after her urine was noted to be malodorous and then it was found juli be positived   For DCI documentation , abx was not started  initially since there no source of infection then   It was started when UA repeat became positive on 7/29/22

## 2022-08-01 NOTE — ASSESSMENT & PLAN NOTE
Lab Results   Component Value Date    HGBA1C 7 3 (H) 07/26/2022       Recent Labs     07/31/22 2032 08/01/22  0628 08/01/22  1054 08/01/22  1603   POCGLU 286* 164* 165* 190*       Blood Sugar Average: Last 72 hrs:  · (P) 183 6   · ADA diet   · Was started on HS Lantus on admission, continue to monitor and adjust as indicated  · Continue SSI coverage TID AC and QHS  · Hypoglycemia protocol

## 2022-08-01 NOTE — ASSESSMENT & PLAN NOTE
· Patient with documented underlying dementia  · Per ED note, staff at [facility] noted patient to be noncompliant with her care on morning of admission, rearranging her furniture, and was seeing bugs on the walls in the ED  · Suspect due to new lumbar compression fracture with pain  · Pain control as below   · Geriatrics consulted and they recommended   · Continuing Aricept 10 mg q h s  Recommend delirium precautions  Maintain sleep-wake cycle, avoid nighttime interruptions  Provide adequate pain control  Avoid urinary retention and constipation  Provide frequent and early mobilization  Provide frequent redirection and reorientation as needed  Avoid medications that may worsen or precipitate delirium such as tramadol, benzodiazepines, anticholinergics, and Benadryl  Redirect unwanted behaviors as first-line therapy, avoid physical restraints as able to  Continue to monitor  Will follow their recs  Normal TSH and  B12   Elevated folate level  Most likely worsening mentation from UTI  Blood cxs and MRSA are negative  Neuro appreciated

## 2022-08-01 NOTE — PROGRESS NOTES
[facility]  Progress Note - Wicho Bahena 11/22/1923, 80 y o  female MRN: [id number]  Unit/Bed#: CW2 210-01 Encounter: [id number]  Primary Care Provider: Nury Marie MD   Date and time admitted to hospital: 7/26/2022  2:09 PM    Urinary tract infection without hematuria  Assessment & Plan  UA Positive  Urine culture positive with E Coli and Proteus Mirabilis  O IV Rocephin while waiting sensitivity result  Patient is hydrating well thus will avoid iv fluid     * Dementia with behavioral disturbance [facility])  Assessment & Plan  · Patient with documented underlying dementia  · Per ED note, staff at [facility] noted patient to be noncompliant with her care on morning of admission, rearranging her furniture, and was seeing bugs on the walls in the ED  · Suspect due to new lumbar compression fracture with pain  · Pain control as below   · Geriatrics consulted and they recommended   · Continuing Aricept 10 mg q h s  Recommend delirium precautions  Maintain sleep-wake cycle, avoid nighttime interruptions  Provide adequate pain control  Avoid urinary retention and constipation  Provide frequent and early mobilization  Provide frequent redirection and reorientation as needed  Avoid medications that may worsen or precipitate delirium such as tramadol, benzodiazepines, anticholinergics, and Benadryl  Redirect unwanted behaviors as first-line therapy, avoid physical restraints as able to  Continue to monitor  Will follow their recs  Normal TSH and  B12   Elevated folate level  Most likely worsening mentation from UTI  Blood cxs and MRSA are negative  Neuro appreciated          Acute fracture  Assessment & Plan  On CT abd/pelvis of 7/26/22 for L3  She has compression deformities of T12, L2 and L4 as seen on previous XR lumbar for which she has been on braces   Per daughter, patient was yanked either in the NH or en route to the hospital by EMS  PT recommended rehab thus CM on board for placement to rehab  Pain control and brace application   Fal precaution to be exercised  Family informed    Compression fracture of L3 vertebra ([address] )  Assessment & Plan  · Acute nondisplaced fracture of the inferior endplate of L3 without loss of vertebral height noted on CT  · Patient follows with Neurosurgery as outpatient for multiple TL compression deformities  · Pain control     CKD (chronic kidney disease), stage III [facility])  Assessment & Plan  Lab Results   Component Value Date    EGFR 40 07/31/2022    EGFR 44 07/30/2022    EGFR 40 07/27/2022    CREATININE 1 14 07/31/2022    CREATININE 1 04 07/30/2022    CREATININE 1 14 07/27/2022   · Normalized     · Creatinine of 1 04 with GFR of 44    Chronic systolic CHF (congestive heart failure) (HCC)  Assessment & Plan  Wt Readings from Last 3 Encounters:   08/01/22 72 6 kg (160 lb)   07/21/22 73 5 kg (162 lb)   05/25/22 73 5 kg (162 lb)     · Volume status appears euvolemic  · Low Na diet  · Monitor intake and output  · Monitor daily weights  · Continue PO lasix  · Echo from February 2022 with LVEF 45%        Benign essential hypertension  Assessment & Plan  · BP is acceptable, continue lasix and metoprolol    Atrial fibrillation (HCC)  Assessment & Plan  · Rate controlled on metoprolol tartrate 25mg Q12hr  · On Eliquis for [facility]    Aortic stenosis  Assessment & Plan  · Severe AS noted on echo from Feb 2022  · Medically managed and may not be a surgical candidate 2/2 to her age    Type 2 diabetes mellitus with diabetic chronic kidney disease [facility])  Assessment & Plan  Lab Results   Component Value Date    HGBA1C 7 3 (H) 07/26/2022       Recent Labs     07/31/22  2032 08/01/22  0628 08/01/22  1054 08/01/22  1603   POCGLU 286* 164* 165* 190*       Blood Sugar Average: Last 72 hrs:  · (P) 183 6   · ADA diet   · Was started on HS Lantus on admission, continue to monitor and adjust as indicated  · Continue SSI coverage TID AC and QHS  · Hypoglycemia protocol     Acute encephalopathy  Assessment & Plan  Admitted with acute encephalopathy with a baseline pt dementia  UA initially was negative on admission thus no indication for abx at that time  CT head (-)  CT abd/pelvis with acute fracture  Geriatric and neurology were rather consulted  UA repeat was re-ordered after her urine was noted to be malodorous and then it was found juli be positived   For DCI documentation , abx was not started  initially since there no source of infection then  It was started when UA repeat became positive on 22          VTE Pharmacologic Prophylaxis:   Moderate Risk (Score 3-4) - Pharmacological DVT Prophylaxis Ordered: apixaban (Eliquis)  Patient Centered Rounds: I evaluated the patient without nursing staff present due to Nurse updated  Discussions with Specialists or Other Care Team Provider: CM    Education and Discussions with Family / Patient: Updated  (daughter) via phone  Time Spent for Care: 30 minutes  More than 50% of total time spent on counseling and coordination of care as described above  Current Length of Stay: 5 day(s)  Current Patient Status: Inpatient   Certification Statement: The patient will continue to require additional inpatient hospital stay due to Placement  Discharge Plan: Anticipate discharge tomorrow to rehab facility  Code Status: Level 1 - Full Code    Subjective:   No complaint    Objective:     Vitals:   Temp (24hrs), Av 5 °F (36 4 °C), Min:97 2 °F (36 2 °C), Max:98 1 °F (36 7 °C)    Temp:  [97 2 °F (36 2 °C)-98 1 °F (36 7 °C)] 97 2 °F (36 2 °C)  HR:  [60-75] 75  Resp:  [18] 18  BP: (124-140)/(53-65) 124/64  SpO2:  [96 %-97 %] 97 %  Body mass index is 32 32 kg/m²  Input and Output Summary (last 24 hours): Intake/Output Summary (Last 24 hours) at 2022 1715  Last data filed at 2022 1100  Gross per 24 hour   Intake 800 ml   Output --   Net 800 ml       Physical Exam:     Vitals and nursing note reviewed     Constitutional:       Comments: Patient seen lying in bed, NAD   Cardiovascular:      Rate and Rhythm: Normal rate and regular rhythm       Heart sounds: Murmur heard  Pulmonary:      Effort: Pulmonary effort is normal       Breath sounds: Normal breath sounds  Abdominal:      General: Bowel sounds are normal       Palpations: Abdomen is soft       Tenderness: There is no abdominal tenderness  Musculoskeletal:      Right lower leg: No edema       Left lower leg: No edema  Skin:     General: Skin is warm     Neurological:      Mental Status: She is alert       Comments: Oriented  place but  But not to time, year and very forgetful of her dinner which was about 3 hours ago   Psychiatric:      Comments: Not anxious or agitated appearing       Additional Data:     Labs:  Results from last 7 days   Lab Units 07/31/22  0445   WBC Thousand/uL 9 28   HEMOGLOBIN g/dL 12 4   HEMATOCRIT % 38 9   PLATELETS Thousands/uL 270   NEUTROS PCT % 47   LYMPHS PCT % 36   MONOS PCT % 12   EOS PCT % 3     Results from last 7 days   Lab Units 07/31/22  0445   SODIUM mmol/L 136   POTASSIUM mmol/L 4 8   CHLORIDE mmol/L 110*   CO2 mmol/L 23   BUN mg/dL 21   CREATININE mg/dL 1 14   ANION GAP mmol/L 3*   CALCIUM mg/dL 9 3   ALBUMIN g/dL 3 2*   TOTAL BILIRUBIN mg/dL 0 43   ALK PHOS U/L 103   ALT U/L 17   AST U/L 21   GLUCOSE RANDOM mg/dL 162*         Results from last 7 days   Lab Units 08/01/22  1603 08/01/22  1054 08/01/22  0628 07/31/22  2032 07/31/22  1713 07/31/22  1136 07/31/22  0512 07/30/22  2049 07/30/22  1529 07/30/22  1040 07/30/22  0535 07/29/22  2134   POC GLUCOSE mg/dl 190* 165* 164* 286* 162* 166* 152* 146* 160* 280* 140 212*     Results from last 7 days   Lab Units 07/26/22  1623   HEMOGLOBIN A1C % 7 3*     Results from last 7 days   Lab Units 07/29/22  0517   PROCALCITONIN ng/ml 0 08       Lines/Drains:  Invasive Devices  Report    Peripheral Intravenous Line  Duration           Peripheral IV 07/31/22 Right Antecubital 1 day Drain  Duration           External Urinary Catheter 3 days                      Imaging: No pertinent imaging reviewed  Recent Cultures (last 7 days):   Results from last 7 days   Lab Units 07/29/22  1037   URINE CULTURE  >100,000 cfu/ml Escherichia coli*  >100,000 cfu/ml Proteus mirabilis*  20,000-29,000 cfu/ml Aerococcus urinae*       Last 24 Hours Medication List:   Current Facility-Administered Medications   Medication Dose Route Frequency Provider Last Rate    acetaminophen  650 mg Oral Q6H PRN Lamin Han MD      acetaminophen  975 mg Oral Q8H [address] Lamin Han MD      apixaban  5 mg Oral BID MD Curry Peña Rosetta Lay ON 8/2/2022] calcitonin (salmon)  1 spray Alternating Nares Daily Fariba Nixon MD      cefTRIAXone  1,000 mg Intravenous Q24H Fariba Nixon MD      donepezil  10 mg Oral Daily Lamin Han MD      furosemide  20 mg Oral Daily Lamin Han MD      insulin glargine  10 Units Subcutaneous HS Fariba Nixon MD      insulin lispro  1-5 Units Subcutaneous TID AC Lamin Han MD      insulin lispro  1-5 Units Subcutaneous HS Lamin Han MD     [facility] Hold] lidocaine  1 patch Topical Daily Lamin Han MD      magnesium oxide  400 mg Oral Daily Lamin Han MD      metoprolol tartrate  25 mg Oral Q12H Pepito Carrillo MD      oxyCODONE  2 5 mg Oral Q4H PRN Fariba Nixon MD      oxyCODONE  5 mg Oral Q4H PRN Fariba Nixon MD      potassium chloride  40 mEq Oral TID Lamin Han MD          Today, Patient Was Seen By: Ashwin Garcia MD    **Please Note: This note may have been constructed using a voice recognition system  **

## 2022-08-01 NOTE — ASSESSMENT & PLAN NOTE
UA Positive   Urine culture positive with E Coli and Proteus Mirabilis  O IV Rocephin while waiting sensitivity result  Patient is hydrating well thus will avoid iv fluid

## 2022-08-02 VITALS
HEART RATE: 72 BPM | OXYGEN SATURATION: 96 % | RESPIRATION RATE: 16 BRPM | TEMPERATURE: 97.6 F | DIASTOLIC BLOOD PRESSURE: 76 MMHG | BODY MASS INDEX: 31.85 KG/M2 | WEIGHT: 158 LBS | SYSTOLIC BLOOD PRESSURE: 135 MMHG | HEIGHT: 59 IN

## 2022-08-02 PROBLEM — N39.0 URINARY TRACT INFECTION WITHOUT HEMATURIA: Status: RESOLVED | Noted: 2022-02-01 | Resolved: 2022-08-02

## 2022-08-02 PROBLEM — G93.40 ACUTE ENCEPHALOPATHY: Status: RESOLVED | Noted: 2022-08-01 | Resolved: 2022-08-02

## 2022-08-02 LAB
ALBUMIN SERPL BCP-MCNC: 3.3 G/DL (ref 3.5–5)
ALP SERPL-CCNC: 101 U/L (ref 46–116)
ALT SERPL W P-5'-P-CCNC: 17 U/L (ref 12–78)
ANION GAP SERPL CALCULATED.3IONS-SCNC: 7 MMOL/L (ref 4–13)
AST SERPL W P-5'-P-CCNC: 25 U/L (ref 5–45)
BASOPHILS # BLD AUTO: 0.11 THOUSANDS/ΜL (ref 0–0.1)
BASOPHILS NFR BLD AUTO: 1 % (ref 0–1)
BILIRUB SERPL-MCNC: 0.61 MG/DL (ref 0.2–1)
BUN SERPL-MCNC: 27 MG/DL (ref 5–25)
CALCIUM ALBUM COR SERPL-MCNC: 10.1 MG/DL (ref 8.3–10.1)
CALCIUM SERPL-MCNC: 9.5 MG/DL (ref 8.3–10.1)
CHLORIDE SERPL-SCNC: 110 MMOL/L (ref 96–108)
CO2 SERPL-SCNC: 20 MMOL/L (ref 21–32)
CREAT SERPL-MCNC: 1.13 MG/DL (ref 0.6–1.3)
EOSINOPHIL # BLD AUTO: 0.24 THOUSAND/ΜL (ref 0–0.61)
EOSINOPHIL NFR BLD AUTO: 2 % (ref 0–6)
ERYTHROCYTE [DISTWIDTH] IN BLOOD BY AUTOMATED COUNT: 14.3 % (ref 11.6–15.1)
GFR SERPL CREATININE-BSD FRML MDRD: 40 ML/MIN/1.73SQ M
GLUCOSE SERPL-MCNC: 162 MG/DL (ref 65–140)
GLUCOSE SERPL-MCNC: 162 MG/DL (ref 65–140)
GLUCOSE SERPL-MCNC: 202 MG/DL (ref 65–140)
HCT VFR BLD AUTO: 42 % (ref 34.8–46.1)
HGB BLD-MCNC: 12.7 G/DL (ref 11.5–15.4)
IMM GRANULOCYTES # BLD AUTO: 0.08 THOUSAND/UL (ref 0–0.2)
IMM GRANULOCYTES NFR BLD AUTO: 1 % (ref 0–2)
LYMPHOCYTES # BLD AUTO: 3 THOUSANDS/ΜL (ref 0.6–4.47)
LYMPHOCYTES NFR BLD AUTO: 30 % (ref 14–44)
MCH RBC QN AUTO: 28.5 PG (ref 26.8–34.3)
MCHC RBC AUTO-ENTMCNC: 30.2 G/DL (ref 31.4–37.4)
MCV RBC AUTO: 94 FL (ref 82–98)
MONOCYTES # BLD AUTO: 1.07 THOUSAND/ΜL (ref 0.17–1.22)
MONOCYTES NFR BLD AUTO: 11 % (ref 4–12)
NEUTROPHILS # BLD AUTO: 5.59 THOUSANDS/ΜL (ref 1.85–7.62)
NEUTS SEG NFR BLD AUTO: 55 % (ref 43–75)
NRBC BLD AUTO-RTO: 0 /100 WBCS
PLATELET # BLD AUTO: 261 THOUSANDS/UL (ref 149–390)
PMV BLD AUTO: 10.3 FL (ref 8.9–12.7)
POTASSIUM SERPL-SCNC: 4.6 MMOL/L (ref 3.5–5.3)
PROT SERPL-MCNC: 7.6 G/DL (ref 6.4–8.4)
RBC # BLD AUTO: 4.45 MILLION/UL (ref 3.81–5.12)
SARS-COV-2 RNA RESP QL NAA+PROBE: NEGATIVE
SODIUM SERPL-SCNC: 137 MMOL/L (ref 135–147)
WBC # BLD AUTO: 10.09 THOUSAND/UL (ref 4.31–10.16)

## 2022-08-02 PROCEDURE — 97116 GAIT TRAINING THERAPY: CPT

## 2022-08-02 PROCEDURE — 82948 REAGENT STRIP/BLOOD GLUCOSE: CPT

## 2022-08-02 PROCEDURE — 85025 COMPLETE CBC W/AUTO DIFF WBC: CPT | Performed by: HOSPITALIST

## 2022-08-02 PROCEDURE — U0003 INFECTIOUS AGENT DETECTION BY NUCLEIC ACID (DNA OR RNA); SEVERE ACUTE RESPIRATORY SYNDROME CORONAVIRUS 2 (SARS-COV-2) (CORONAVIRUS DISEASE [COVID-19]), AMPLIFIED PROBE TECHNIQUE, MAKING USE OF HIGH THROUGHPUT TECHNOLOGIES AS DESCRIBED BY CMS-2020-01-R: HCPCS | Performed by: HOSPITALIST

## 2022-08-02 PROCEDURE — 99238 HOSP IP/OBS DSCHRG MGMT 30/<: CPT | Performed by: HOSPITALIST

## 2022-08-02 PROCEDURE — U0005 INFEC AGEN DETEC AMPLI PROBE: HCPCS | Performed by: HOSPITALIST

## 2022-08-02 PROCEDURE — 97530 THERAPEUTIC ACTIVITIES: CPT

## 2022-08-02 PROCEDURE — 80053 COMPREHEN METABOLIC PANEL: CPT | Performed by: HOSPITALIST

## 2022-08-02 RX ORDER — CEPHALEXIN 250 MG/1
500 CAPSULE ORAL EVERY 12 HOURS SCHEDULED
Qty: 16 CAPSULE | Refills: 0
Start: 2022-08-02 | End: 2022-08-10

## 2022-08-02 RX ADMIN — APIXABAN 5 MG: 5 TABLET, FILM COATED ORAL at 09:19

## 2022-08-02 RX ADMIN — METOPROLOL TARTRATE 25 MG: 25 TABLET, FILM COATED ORAL at 09:19

## 2022-08-02 RX ADMIN — ACETAMINOPHEN 975 MG: 325 TABLET ORAL at 05:45

## 2022-08-02 RX ADMIN — POTASSIUM CHLORIDE 40 MEQ: 1500 TABLET, EXTENDED RELEASE ORAL at 09:19

## 2022-08-02 RX ADMIN — DONEPEZIL HYDROCHLORIDE 10 MG: 10 TABLET ORAL at 09:19

## 2022-08-02 RX ADMIN — MAGNESIUM OXIDE TAB 400 MG (241.3 MG ELEMENTAL MG) 400 MG: 400 (241.3 MG) TAB at 09:19

## 2022-08-02 RX ADMIN — FUROSEMIDE 20 MG: 20 TABLET ORAL at 09:19

## 2022-08-02 RX ADMIN — INSULIN LISPRO 1 UNITS: 100 INJECTION, SOLUTION INTRAVENOUS; SUBCUTANEOUS at 06:06

## 2022-08-02 NOTE — DISCHARGE SUMMARY
[facility] [facility]  Discharge Summary  Medical Problems             Resolved Problems  Date Reviewed: 8/2/2022          Resolved    HTN (hypertension) 7/26/2022     Resolved by  Rachel Humphries MD              Discharging Physician / Practitioner: Meredith Hi MD  PCP: Leif Turner MD  Admission Date:   Admission Orders (From admission, onward)     Ordered        07/27/22 1036  Inpatient Admission  Once            07/26/22 1916  Place in Observation  Once                      Discharge Date: 08/02/22    Consultations During Hospital Stay:  · Neurology   · Geriatrics    Procedures Performed:   · None    Significant Findings / Test Results:   · Positive UA    Incidental Findings:   · UTI     Test Results Pending at Discharge (will require follow up): · None     Outpatient Tests Requested:  · None    Complications:  Nine    Reason for Admission: Acute encephalopathy with a baseline mild dementia    Hospital Course:     80 y o  female with a PMH of dementia, D, A  Fib on Eliquis, Chronic T12 and L4 fracture admitted with  Acute encephalopathy for which CT head done on admission was negative  Geriatric was consulted as well as neurology  Patient at the time had a negative UA thus no abx was started  Two after admission, her urine was found to be malodorous soheila which UA was repeated and was found to be infected  She as started on IV Rocephin which she received 2 doses of  She clinically is back to baseline  Tolerating diet well, labs normal and vss  She will be discharged to [facility] facility for post acute rehab (with 8 days of Keflex for  The UTI) 2/2 to an acute L3 fracture seen on CT abd/pelvis seen on this admission  She is to follow up with her pcp in 1 week for a repeat ua   Family updated    Condition at Discharge: good    Discharge Day Visit / Exam:   Subjective:  o complaint  Vitals: Blood Pressure: 135/76 (08/02/22 0717)  Pulse: 72 (08/02/22 5384)  Temperature: 97 6 °F (36 4 °C) (08/02/22 0717)  Temp Source: Oral (08/01/22 0853)  Respirations: 16 (08/02/22 0717)  Height: 4' 11" (149 9 cm) (08/01/22 0100)  Weight - Scale: 71 7 kg (158 lb) (08/02/22 0717)  SpO2: 96 % (08/02/22 0717)  Exam:     Vitals and nursing note reviewed  Constitutional:       Comments: Patient seen lying in bed, NAD   Cardiovascular:      Rate and Rhythm: Normal rate and regular rhythm       Heart sounds: Murmur heard  Pulmonary:      Effort: Pulmonary effort is normal       Breath sounds: Normal breath sounds  Abdominal:      General: Bowel sounds are normal       Palpations: Abdomen is soft       Tenderness: There is no abdominal tenderness  Musculoskeletal:      Right lower leg: No edema       Left lower leg: No edema  Skin:     General: Skin is warm  Neurological:      Mental Status: She is alert       Comments: Oriented  place but  But not to time, year and very forgetful of her dinner which was about 3 hours ago   Psychiatric:      Comments: Not anxious or agitated appearing    Discussion with Family: Updated  (daughter) via phone  Discharge instructions/Information to patient and family:   See after visit summary for information provided to patient and family  Provisions for Follow-Up Care:  See after visit summary for information related to follow-up care and any pertinent home health orders  Disposition:   Other: [facility]    Planned Readmission: no     Discharge Statement:  I spent <35 minutes discharging the patient  This time was spent on the day of discharge  I had direct contact with the patient on the day of discharge  Greater than 50% of the total time was spent examining patient, answering all patient questions, arranging and discussing plan of care with patient as well as directly providing post-discharge instructions  Additional time then spent on discharge activities      Discharge Medications:  See after visit summary for reconciled discharge medications provided to patient and/or family  **Please Note: This note may have been constructed using a voice recognition system** - Konstantin Rocha 11/22/1923, 80 y o  female MRN: [id number]  Unit/Bed#: CW2 210-01 Encounter: [id number]  Primary Care Provider: Flor Benitez MD   Date and time admitted to hospital: 7/26/2022  2:09 PM    Urinary tract infection without hematuria  Assessment & Plan  UA Positive  Urine culture positive with E Coli and Proteus Mirabilis  S/P 2 doses of  IV Rocephin   Urine c ulture with E coli and proteus  Will dc on 8 days of Keflex for a total of 10 days abx regimen      * Dementia with behavioral disturbance ([facility] [address] )  Assessment & Plan  · Patient with documented underlying dementia  · Per ED note, staff at [facility] noted patient to be noncompliant with her care on morning of admission, rearranging her furniture, and was seeing bugs on the walls in the ED  · Suspect due to new lumbar compression fracture with pain  · Pain control as below   · Geriatrics consulted and they recommended   · Continuing Aricept 10 mg q h s  Recommend delirium precautions  Maintain sleep-wake cycle, avoid nighttime interruptions  Provide adequate pain control  Avoid urinary retention and constipation  Provide frequent and early mobilization  Provide frequent redirection and reorientation as needed  Avoid medications that may worsen or precipitate delirium such as tramadol, benzodiazepines, anticholinergics, and Benadryl  Redirect unwanted behaviors as first-line therapy, avoid physical restraints as able to  Continue to monitor  Will follow their recs  Normal TSH and  B12   Elevated folate level  Most likely worsening mentation from UTI  Blood cxs and MRSA are negative  Neuro appreciated          Acute fracture  Assessment & Plan  On CT abd/pelvis of 7/26/22 for L3  She has compression deformities of T12, L2 and L4 as seen on previous XR lumbar for which she has been on braces   Per daughter, patient was yanked either in the NH or en route to the hospital by EMS  PT recommended rehab thus CM on board for placement to [facility] rehab  Pain control and brace application   Fall precaution to be exercised  Family informed    Compression fracture of L3 vertebra ([address] )  Assessment & Plan  · Acute nondisplaced fracture of the inferior endplate of L3 without loss of vertebral height noted on CT  · Patient follows with Neurosurgery as outpatient for multiple TL compression deformities  · Pain control     CKD (chronic kidney disease), stage III [facility])  Assessment & Plan  Lab Results   Component Value Date    EGFR 40 08/02/2022    EGFR 40 07/31/2022    EGFR 44 07/30/2022    CREATININE 1 13 08/02/2022    CREATININE 1 14 07/31/2022    CREATININE 1 04 07/30/2022   · Normalized     · Creatinine of 1 04 with GFR of 44    Chronic systolic CHF (congestive heart failure) (HCC)  Assessment & Plan  Wt Readings from Last 3 Encounters:   08/02/22 71 7 kg (158 lb)   07/21/22 73 5 kg (162 lb)   05/25/22 73 5 kg (162 lb)     · Volume status appears euvolemic  · Low Na diet  · Monitor intake and output  · Monitor daily weights  · Continue PO lasix  · Echo from February 2022 with LVEF 45%        Benign essential hypertension  Assessment & Plan  · BP is acceptable, continue lasix and metoprolol    Atrial fibrillation (HCC)  Assessment & Plan  · Rate controlled on metoprolol tartrate 25mg Q12hr  · On Eliquis for [facility]    Aortic stenosis  Assessment & Plan  · Severe AS noted on echo from Feb 2022  · Medically managed and may not be a surgical candidate 2/2 to her age    Type 2 diabetes mellitus with diabetic chronic kidney disease [facility])  Assessment & Plan  Lab Results   Component Value Date    HGBA1C 7 3 (H) 07/26/2022       Recent Labs     08/01/22  1603 08/01/22  2050 08/02/22  0553 08/02/22  1116   POCGLU 190* 155* 162* 202*       Blood Sugar Average: Last 72 hrs:  · (P) 576 [id number]   · ADA diet · Resume home regimen    Acute encephalopathy  Assessment & Plan  Admitted with acute encephalopathy with a baseline pt dementia  UA initially was negative on admission thus no indication for abx at that time  CT head (-)  CT abd/pelvis with acute fracture  Geriatric and neurology were rather consulted  UA repeat was re-ordered after her urine was noted to be malodorous and then it was found juli be positived   For DCI documentation , abx was not started  initially since there no source of infection then   It was started when UA repeat became positive on 7/29/22

## 2022-08-02 NOTE — PLAN OF CARE
Problem: PHYSICAL THERAPY ADULT  Goal: Performs mobility at highest level of function for planned discharge setting  See evaluation for individualized goals  Description: Treatment/Interventions: Functional transfer training, LE strengthening/ROM, Therapeutic exercise, Endurance training, Cognitive reorientation, Bed mobility, Gait training, OT          See flowsheet documentation for full assessment, interventions and recommendations  Note: Prognosis: Fair  Problem List: Decreased strength, Decreased endurance, Impaired balance, Decreased mobility, Pain, Decreased coordination, Decreased cognition, Impaired judgement, Decreased safety awareness  Assessment: Pt seen for session for setup, bed mob, time spent EOB, transfers, gait w/ time to use BR, standing time to clean, repositioning  Pt cooperative, notes less pain  continued confusion  able to ambulate a bit further but needs increased assist for mobility tasks  continue to recommend rehab at d/c  Barriers to Discharge: None     PT Discharge Recommendation: Post acute rehabilitation services    See flowsheet documentation for full assessment

## 2022-08-02 NOTE — ASSESSMENT & PLAN NOTE
UA Positive  Urine culture positive with E Coli and Proteus Mirabilis  S/P 2 doses of  IV Rocephin    Urine c ulture with E coli and proteus  Will dc on 8 days of Keflex for a total of 10 days abx regimen

## 2022-08-02 NOTE — RESTORATIVE TECHNICIAN NOTE
Restorative Technician Note      Patient Name: Raj Appiah     Note Type: Bracing, Initial consult  Patient Position Upon Consult: Bedside chair  Brace Applied: Other (Comment) ([employer])  Additional Brace Ordered: No  Patient Position When Brace Applied: Seated  Bracing Recommendations: None  Education Provided: Yes  Patient Position at End of Consult: Bedside chair; Bed/Chair alarm activated; All needs within reach  Nurse Communication: Nurse aware of consult, application of brace    Please call Mobility Coordinator at ext  [phone] or on Tiger text " [email or web address]-PT-Restorative Tech" role in regards to bracing instruction and/or adjustment    Abraham Martines Restorative Technician, BENEDICTO

## 2022-08-02 NOTE — ASSESSMENT & PLAN NOTE
· Patient with documented underlying dementia  · Per ED note, staff at [facility] noted patient to be noncompliant with her care on morning of admission, rearranging her furniture, and was seeing bugs on the walls in the ED  · Suspect due to new lumbar compression fracture with pain  · Pain control as below   · Geriatrics consulted and they recommended   · Continuing Aricept 10 mg q h s  Recommend delirium precautions  Maintain sleep-wake cycle, avoid nighttime interruptions  Provide adequate pain control  Avoid urinary retention and constipation  Provide frequent and early mobilization  Provide frequent redirection and reorientation as needed  Avoid medications that may worsen or precipitate delirium such as tramadol, benzodiazepines, anticholinergics, and Benadryl  Redirect unwanted behaviors as first-line therapy, avoid physical restraints as able to  Continue to monitor  Will follow their recs  Normal TSH and  B12   Elevated folate level  Most likely worsening mentation from UTI  Blood cxs and MRSA are negative  Neuro appreciated

## 2022-08-02 NOTE — ASSESSMENT & PLAN NOTE
On CT abd/pelvis of 7/26/22 for L3  She has compression deformities of T12, L2 and L4 as seen on previous XR lumbar for which she has been on braces   Per daughter, patient was yanked either in the NH or en route to the hospital by EMS  PT recommended rehab thus CM on board for placement to [place] rehab  Pain control and brace application   Fall precaution to be exercised  Family informed

## 2022-08-02 NOTE — ASSESSMENT & PLAN NOTE
Lab Results   Component Value Date    HGBA1C 7 3 (H) 07/26/2022       Recent Labs     08/01/22  1603 08/01/22 2050 08/02/22  0553 08/02/22  1116   POCGLU 190* 155* 162* 202*       Blood Sugar Average: Last 72 hrs:  · (P) 781 [id number]   · ADA diet   · Resume home regimen

## 2022-08-02 NOTE — ASSESSMENT & PLAN NOTE
· Rate controlled on metoprolol tartrate 25mg Q12hr  · On Eliquis for [facility]

## 2022-08-02 NOTE — CASE MANAGEMENT
Case Management Discharge Planning Note    Patient name Adonay Robles  Location [facility] 210/[facility] 931-47 MRN [id number]  : 1923 Date 2022       Current Admission Date: 2022  Current Admission Diagnosis:Dementia with behavioral disturbance [facility])   Patient Active Problem List    Diagnosis Date Noted    Acute encephalopathy 2022    Acute fracture 2022    Compression fracture of L3 vertebra ([address] ) 2022    Dementia with behavioral disturbance ([address] ) 2022    Chronic systolic CHF (congestive heart failure) ([address] ) 2022    L4 vertebral fracture ([address] ) 2022    L2 vertebral fracture ([address] ) 2022    Compression fracture of T12 vertebra ([address] ) 2022    Pressure injury of sacral region, unstageable ([address] ) 2022    Functional diarrhea     Acute systolic congestive heart failure ([address] ) 2022    Diarrhea 2022    Atrial fibrillation ([address] ) 2022    Severe sepsis ([address] ) 2022    Urinary tract infection without hematuria 2022    Aortic stenosis 2022    Dysphagia 2022    Acute-on-chronic kidney injury ([address] ) 2021    Hyperosmolar hyperglycemic state (HHS) ([address] ) 2021    Urinary incontinence 2021    First degree AV block 2021    At risk for delirium 2021    Chest pain 2021    Syncope 2021    SALTY (acute kidney injury) ([address] ) 2021    Closed fracture of left proximal humerus 2020    Ambulatory dysfunction 2020    Atherosclerosis of aorta ([address] ) 2019    Medicare annual wellness visit, subsequent 2018    Screening for depression 2018    Screening for genitourinary condition 2018    Screening for neurological condition 2018    Bilateral lower extremity edema 10/04/2017    Hypokalemia 2017    CKD (chronic kidney disease), stage III (HCC) 2017    Hyponatremia 2017    Leukocytosis 2017    Moderate aortic stenosis 02/17/2017    Gallstone 09/28/2016    Anemia 09/12/2016    Pancreatic cyst 82/45/4655    Uncomplicated senile dementia ([facility] ) 11/03/2015    Benign essential hypertension 07/07/2015    Hypercholesterolemia 11/11/2013    Osteopenia 11/11/2013    Type 2 diabetes mellitus with diabetic chronic kidney disease ([facility] ) 11/11/2013      LOS (days): 6  Geometric Mean LOS (GMLOS) (days): 4 30  Days to GMLOS:-1 7     OBJECTIVE:  Risk of Unplanned Readmission Score: 24 85         Current admission status: Inpatient   Preferred Pharmacy:   [address] [place] [address] Rd  [facility] [place] [address]  Phone: [phone] Fax: Michael Goncalves [facility] 63  [address] [place] [address] Drive [facility] [address]  Phone: [phone] Fax: [phone]    Primary Care Provider: Yumi Clifford MD    Primary Insurance: MEDICARE  Secondary Insurance: Susannah Peña DETAILS:               Treatment Team Recommendation: Short Term Rehab  Discharge Destination Plan[de-identified] Short Term Rehab  Transport at Discharge : [facility]  Dispatcher Contacted: Yes  Number/Name of Dispatcher: [phone]  Transported by [employer] and Unit #): Jong  ETA of Transport (Date): 08/02/22  ETA of Transport (Time): 4137              IMM Given (Date):: 08/02/22  IMM Given to[de-identified] Family  Family notified[de-identified] daughter Donnie Adler  Additional Comments: Patient cleared for discharge today to [address] transport scheduled with [facility] at [address] [facility] [place] [place]  Patient, daughter Ginette Bob, facility aware      Accepting Facility Name, [address] : [place]  Receiving Facility/Agency Phone Number: [phone]  Facility/Agency Fax Number: [phone]

## 2022-08-02 NOTE — ASSESSMENT & PLAN NOTE
Lab Results   Component Value Date    EGFR 40 08/02/2022    EGFR 40 07/31/2022    EGFR 44 07/30/2022    CREATININE 1 13 08/02/2022    CREATININE 1 14 07/31/2022    CREATININE 1 04 07/30/2022   · Normalized     · Creatinine of 1 04 with GFR of 44

## 2022-08-02 NOTE — PHYSICAL THERAPY NOTE
Physical Therapy Treatment Note       08/02/22 1105   PT Last Visit   PT Visit Date 08/02/22   Note Type   Note Type Treatment   Pain Assessment   Pain Assessment Tool 0-10   Pain Score No Pain   Pain Location/Orientation Location: Back   Restrictions/Precautions   Weight Bearing Precautions Per Order No   Braces or Orthoses (S)  LSO  (pts LSO missing- bracing team aware and fit for new LSO)   Other Precautions Cognitive; Chair Alarm; Bed Alarm;Multiple lines;Telemetry; Fall Risk;Pain; Impulsive   General   Chart Reviewed Yes   Family/Caregiver Present No   Cognition   Overall Cognitive Status Impaired   Arousal/Participation Responsive   Attention Attends with cues to redirect   Orientation Level Oriented to person   Memory Unable to assess   Following Commands Follows one step commands inconsistently   Subjective   Subjective states she feels OK   minimal back pain  confused, impulsive   Bed Mobility   Supine to Sit 4  Minimal assistance   Additional items Assist x 1; Increased time required   Additional Comments sat EOB x 10 min prior to gait   Transfers   Sit to Stand 4  Minimal assistance   Additional items Assist x 1   Stand to Sit 4  Minimal assistance   Additional items Assist x 1   Toilet transfer 3  Moderate assistance   Additional items Assist x 2   Ambulation/Elevation   Gait pattern   (slow, antalgic, short step length)   Gait Assistance 4  Minimal assist   Additional items Assist x 1;Verbal cues   Assistive Device Rolling walker   Distance 12'x2 to and from BR   seated time to use same w/ S, standing time to help clean pt after use   Balance   Static Sitting Fair   Dynamic Sitting Poor +   Static Standing Poor +   Dynamic Standing Poor +   Ambulatory Poor +   Endurance Deficit   Endurance Deficit Yes   Activity Tolerance   Activity Tolerance Patient limited by fatigue;Patient limited by pain;Treatment limited secondary to medical complications (Comment)   Nurse Made Aware yes   Assessment   Prognosis Fair   Problem List Decreased strength;Decreased endurance; Impaired balance;Decreased mobility;Pain;Decreased coordination;Decreased cognition; Impaired judgement;Decreased safety awareness   Assessment Pt seen for session for setup, bed mob, time spent EOB, transfers, gait w/ time to use BR, standing time to clean, repositioning  Pt cooperative, notes less pain  continued confusion  able to ambulate a bit further but needs increased assist for mobility tasks  continue to recommend rehab at d/c   Goals   Patient Goals none stated due to MS   STG Expiration Date 08/11/22   PT Treatment Day 1   Plan   Treatment/Interventions Functional transfer training;LE strengthening/ROM; Therapeutic exercise; Endurance training;Patient/family training;Bed mobility;Gait training;Equipment eval/education   PT Frequency 3-5x/wk   Recommendation   PT Discharge Recommendation Post acute rehabilitation services   AM-PAC Basic Mobility Inpatient   Turning in Bed Without Bedrails 3   Lying on Back to Sitting on Edge of Flat Bed 3   Moving Bed to Chair 3   Standing Up From Chair 3   Walk in Room 2   Climb 3-5 Stairs 1   Basic Mobility Inpatient Raw Score 15   Basic Mobility Standardized Score 36 97   Highest Level Of Mobility   JH-HLM Goal 4: Move to chair/commode   JH-HLM Achieved 6: Walk 10 steps or more   Godfrey Banerjee PT, DPT CSRS

## 2022-08-02 NOTE — ASSESSMENT & PLAN NOTE
Wt Readings from Last 3 Encounters:   08/02/22 71 7 kg (158 lb)   07/21/22 73 5 kg (162 lb)   05/25/22 73 5 kg (162 lb)     · Volume status appears euvolemic  · Low Na diet  · Monitor intake and output  · Monitor daily weights  · Continue PO lasix  · Echo from February 2022 with LVEF 45%

## 2022-08-11 ENCOUNTER — HOSPITAL ENCOUNTER (OUTPATIENT)
Dept: RADIOLOGY | Facility: HOSPITAL | Age: 87
Discharge: HOME | End: 2022-08-11
Payer: MEDICARE

## 2022-08-11 DIAGNOSIS — S32.000A LUMBAR COMPRESSION FRACTURE (HCC): ICD-10-CM

## 2022-08-11 PROCEDURE — 72114 X-RAY EXAM L-S SPINE BENDING: CPT

## 2022-08-17 ENCOUNTER — OFFICE VISIT (OUTPATIENT)
Dept: NEUROSURGERY | Facility: CLINIC | Age: 87
End: 2022-08-17
Payer: MEDICARE

## 2022-08-17 VITALS
BODY MASS INDEX: 31.91 KG/M2 | HEIGHT: 59 IN | DIASTOLIC BLOOD PRESSURE: 78 MMHG | TEMPERATURE: 98.1 F | SYSTOLIC BLOOD PRESSURE: 126 MMHG | HEART RATE: 78 BPM | RESPIRATION RATE: 18 BRPM

## 2022-08-17 DIAGNOSIS — S32.010A COMPRESSION FRACTURE OF THORACOLUMBAR VERTEBRA (HCC): Primary | ICD-10-CM

## 2022-08-17 DIAGNOSIS — S22.080A COMPRESSION FRACTURE OF THORACOLUMBAR VERTEBRA (HCC): Primary | ICD-10-CM

## 2022-08-17 PROCEDURE — 99213 OFFICE O/P EST LOW 20 MIN: CPT | Performed by: PHYSICIAN ASSISTANT

## 2022-08-17 NOTE — PROGRESS NOTES
Neurosurgery Office Note  Lito Merrill 80 y o  female MRN: [id number]      Assessment/Plan      Patient is a 80years old pleasant lady with history of dementia and comorbid medical issues with her daughter for 3 months follow-up of multiple thoracolumbar compression deformities  Had  Flexion-extension lumbar spine x-rays which demonstrates more or less stable chronic T10, T12, L2, and L4 vertebral bodies correction deformity and grade 1 anterolisthesis of L3 on L4 and L4-L5  Acutely looking L3 compression deformity  Per her daughter patient is feeling better with her pain  Will to get up and walk around using walker and doing physical therapy  Takes Tylenol scheduled  No Radicular pain complaints  Her daughter states patient had UTI recently with some mental changes, feeling better now    Exam-A&OX3, Kiley, strength is 5/5 bilaterally  Sensation to light touch intact throughout  DTR 2+ no clonus  Wearing LSO brace, slight tenderness on deep palpation of lower lumbar region  Patient in wheel chair  Hx, PEx, and images reviewed with the patient and her daughter  Management plan discussed  Patient's back pain improving and images demonstrate stable multiple thoracolumbar compression deformities  Given her advanced age, and sitting for many hours in wheelchair could also be a contributing factors for her lower back discomfort and pain  From Neurosurgery perspective, no procedure or regular follow-up is indicated at this juncture  Follow-up on p r n  basis  Consider weaning the brace only to put on when doing physical therapy  Questions and concerns were answered to patient's satisfaction  Her daughter expressed her understandings and agreed with the plan  Plan:  1  Patient feeling better with back pain, images stable multiple lumbar and some thoracic fracture  2  Wean off the brace, only to put on when doing PT  3  Continue p r n  or scheduled Tylenol  4   Call with question or concern  5  Otherwise follow-up on p r n  basis      I spent  30 minutes with the patient today in which >50% of the time was spent counseling/coordination of care regarding diagnosis, imaging review, symptoms and treatment plan  Chief Complaint   Patient presents with    Follow-up    Back Pain       C/C: " Multiple TL compression deformities" 3 months f/u      History of Present Illness      All patient's medical histories were reviewed and updated as appropriate: Allergies, current medication lists, past medical history, past surgical history, family history, social history, and current medical lists    Patient dementia, recently had UTI complicated with slight altered mental status,  is brought in wheelchair today by her daughter for 3 months follow-up of multiple thoracolumbar compression deformities  Patient had follow-up flexion-extension lumbar spine x-rays-findings as described in the assessment section above  Patient is disoriented so her daughter days patient's pain is improving  Denies any radicular symptoms  Of fever, chills, rigors, cough or chest pain  REVIEW OF SYSTEMS  Review of system personally reviewed and updated  Review of Systems   Unable to perform ROS: Dementia   Constitutional: Negative  HENT: Negative  Eyes: Negative  Respiratory: Negative  Cardiovascular: Negative  H/o Hypertension   Gastrointestinal: Negative  Endocrine: Negative  H/o Diabetes   Genitourinary: Negative  Musculoskeletal: Positive for gait problem (uses walker for assistance)  ED in March 2022 for LBP, was found to have a T12 Fx--  H/o Falls   Skin: Negative  Allergic/Immunologic: Negative  Hematological: Negative  On Eliquis   Psychiatric/Behavioral: Negative  All other systems reviewed and are negative          Meds/Allergies     Current Outpatient Medications   Medication Sig Dispense Refill    acetaminophen (TYLENOL) 325 mg tablet Take 650 mg by mouth every 6 (six) hours as needed for mild pain      apixaban (ELIQUIS) 5 mg Take 1 tablet (5 mg total) by mouth 2 (two) times a day 60 tablet 0    Banana Flakes (BANATROL PLUS PO) Take by mouth 3 (three) times a day with meals      donepezil (ARICEPT) 10 mg tablet Take 1 tablet (10 mg total) by mouth daily 90 tablet 1    famotidine (PEPCID) 20 mg tablet Take 1 tablet (20 mg total) by mouth daily (Patient not taking: Reported on 5/25/2022)  0    furosemide (LASIX) 20 mg tablet Take 20 mg by mouth daily      glipiZIDE (GLUCOTROL) 10 mg tablet Take 10 mg by mouth in the morning      insulin lispro (insulin lispro) 100 units/mL injection Inject under the skin 4 (four) times a day Per Sliding Scale      magnesium oxide (MAG-OX) 400 mg Take 1 tablet (400 mg total) by mouth daily 90 tablet 1    metoprolol tartrate (LOPRESSOR) 25 mg tablet Take 1 tablet (25 mg total) by mouth every 12 (twelve) hours  0    Potassium Chloride ER 20 MEQ TBCR Take 2 tablets (40 mEq total) by mouth 3 (three) times a day  0    repaglinide (PRANDIN) 2 mg tablet Take 1 tablet (2 mg total) by mouth 3 (three) times a day before meals 270 tablet 1    sitaGLIPtin (JANUVIA) 100 mg tablet Take 1 tablet (100 mg total) by mouth daily 90 tablet 1     No current facility-administered medications for this visit         No Known Allergies    PAST HISTORY    Past Medical History:   Diagnosis Date    Aortic stenosis     Bilateral edema of lower extremity     Dementia (HCC)     [facility] )     Fall     Gait abnormality     Hyperlipidemia     Hypertension     Hypokalemia     Other ascites     Varicose veins of leg with edema        Past Surgical History:   Procedure Laterality Date    BREAST SURGERY      ELBOW SURGERY         Social History     Tobacco Use    Smoking status: Never Smoker    Smokeless tobacco: Never Used   Vaping Use    Vaping Use: Never used   Substance Use Topics    Alcohol use: Not Currently    Drug use: No       Family History   Problem Relation Age of Onset    Dementia Sister          Above history personally reviewed  EXAM    Vitals: There were no vitals taken for this visit  ,There is no height or weight on file to calculate BMI  Physical Exam  Constitutional:       Appearance: She is obese  Comments: Patient slightly sleepy   HENT:      Head: Normocephalic and atraumatic  Eyes:      Extraocular Movements: Extraocular movements intact  Pulmonary:      Effort: Pulmonary effort is normal    Musculoskeletal:      Cervical back: Normal range of motion  Comments: Tenderness/Discomfort on palpation of lower lumbar region   Neurological:      General: No focal deficit present  Mental Status: She is oriented to person, place, and time  GCS: GCS eye subscore is 4  GCS verbal subscore is 4  GCS motor subscore is 6  Cranial Nerves: Cranial nerves are intact  Sensory: Sensation is intact  Motor: Motor function is intact  Deep Tendon Reflexes: Reflexes are normal and symmetric  Reflex Scores:       Tricep reflexes are 2+ on the right side and 2+ on the left side  Bicep reflexes are 2+ on the right side and 2+ on the left side  Brachioradialis reflexes are 2+ on the right side and 2+ on the left side  Patellar reflexes are 2+ on the right side and 2+ on the left side  Achilles reflexes are 2+ on the right side and 2+ on the left side  Psychiatric:         Speech: Speech normal          Neurologic Exam     Mental Status   Oriented to person, place, and time     Speech: speech is normal     Cranial Nerves     CN XI   CN XI normal      Motor Exam   Muscle bulk: normal  Overall muscle tone: normal  Right arm tone: normal  Left arm tone: normal  Right arm pronator drift: absent  Left arm pronator drift: absent  Right leg tone: normal  Left leg tone: normal    Sensory Exam   Light touch normal      Gait, Coordination, and Reflexes     Reflexes   Right brachioradialis: 2+  Left brachioradialis: 2+  Right biceps: 2+  Left biceps: 2+  Right triceps: 2+  Left triceps: 2+  Right patellar: 2+  Left patellar: 2+  Right achilles: 2+  Left achilles: 2+  Right : 2+  Left : 2+  Right Driscoll: absent  Left Driscoll: absent  Right ankle clonus: absent  Left pendular knee jerk: absent        MEDICAL DECISION MAKING    Imaging Studies:     CT abdomen pelvis wo contrast    Result Date: 7/26/2022  Narrative: CT ABDOMEN AND PELVIS WITHOUT IV CONTRAST INDICATION:   Abdominal pain, acute, nonlocalized abdominal pain  COMPARISON:  CT abdomen pelvis 3/17/2022  Lumbar radiographs 7/18/2022  TECHNIQUE:  CT examination of the abdomen and pelvis was performed without intravenous contrast  Axial, sagittal, and coronal 2D reformatted images were created from the source data and submitted for interpretation  Radiation dose length product (DLP) for this visit:  658 mGy-cm   This examination, like all CT scans performed in the [facility], was performed utilizing techniques to minimize radiation dose exposure, including the use of iterative reconstruction and automated exposure control  Enteric contrast was administered  FINDINGS: ABDOMEN LOWER CHEST:  Small hiatal hernia  Aortic and mitral annulus calcifications  LIVER/BILIARY TREE:  Unremarkable  GALLBLADDER:  There are gallstone(s) within the gallbladder, without pericholecystic inflammatory changes  SPLEEN:  Unremarkable  PANCREAS:  Unremarkable  ADRENAL GLANDS:  Unremarkable  KIDNEYS/URETERS:  Simple left renal cyst  STOMACH AND BOWEL:  Unremarkable  APPENDIX:  No findings to suggest appendicitis  ABDOMINOPELVIC CAVITY:  No ascites  No pneumoperitoneum  No lymphadenopathy  VESSELS:  Unremarkable for patient's age  PELVIS REPRODUCTIVE ORGANS:  Unremarkable for patient's age  URINARY BLADDER:  Unremarkable  ABDOMINAL WALL/INGUINAL REGIONS:  Unremarkable  OSSEOUS STRUCTURES:  No destructive osseous lesion    Mild T12, L2 and L4 compression deformities unchanged from the recent radiographs  Acute nondisplaced fracture of the inferior endplate of L3 without loss of vertebral body height  No paraspinal hematoma  Chronic grade 1 anterolisthesis L4 on L5  Impression: Acute nondisplaced fracture of the inferior endplate of L3 without loss of vertebral body height  The study was marked in [facility] for immediate notification  Workstation performed: [id number]     XR chest 1 view portable    Result Date: 7/26/2022  Narrative: CHEST INDICATION:   infectious workup  COMPARISON:  None EXAM PERFORMED/VIEWS:  XR CHEST PORTABLE FINDINGS:  A back brace and a bra are superimposed on portions of the chest  Cardiomediastinal silhouette appears unremarkable  The lungs are clear  No pneumothorax or pleural effusion  Degenerative changes are present in both shoulders  Impression: No acute disease in the chest  Workstation performed: [id number]     XR spine lumbar 2 or 3 views injury    Result Date: 7/20/2022  Narrative: LUMBAR SPINE INDICATION:   S32 029A: Unspecified fracture of second lumbar vertebra, initial encounter for closed fracture  COMPARISON:  6/20/2022 5/23/2022  CT 3/17/2022  VIEWS:  XR SPINE LUMBAR 2 OR 3 VIEWS INJURY FINDINGS: There are 5 non rib bearing lumbar vertebral bodies  T10, T12, L2, L4 varying degrees compression fractures/deformities are unchanged  External brace present  There is no evidence of new acute fracture or destructive osseous lesion  L3-L4, L4-L5 mild spondylolisthesis again noted  Anatomic alignment otherwise  L1-L2, L3-L4 through L4-L5 degenerative disc changes  The pedicles appear intact  Soft tissues are unremarkable  Impression: No change lower thoracic and lumbar compression fractures/deformities   Workstation performed: [id number]     XR spine lumbar complete w bending minimum 6 views    Result Date: 8/14/2022  Narrative: LUMBAR SPINE INDICATION:   S32 000A: Wedge compression fracture of unspecified lumbar vertebra, initial encounter for closed fracture  COMPARISON:  L-spine x-ray 7/18/2022, CT abdomen pelvis 7/26/2022 VIEWS:  XR SPINE LUMBAR COMPLETE W BENDING MINIMUM 6 VIEWS Images: 8 FINDINGS: There are 5 non rib bearing lumbar vertebral bodies  Known L3 acute compression fracture shows stable vertebral body height  Stable mild compression deformities of T10, T12, L2, and L4 vertebral bodies  Grade 1 anterolisthesis of L3 on L4 and L4 on L5  The pedicles appear intact  No instability on flexion/extension views  There are atherosclerotic calcifications  Impression: Stable acute L3 compression fracture  Stable chronic compression fractures of T10, T12, L2, and L4  Workstation performed: [id number]     CT head without contrast    Result Date: 7/26/2022  Narrative: CT BRAIN - WITHOUT CONTRAST INDICATION:   Mental status change, unknown cause altered mental status  on eliquis  h/o falls and ambulatory dysfunction  COMPARISON: CT head 2/1/2022  TECHNIQUE:  CT examination of the brain was performed  In addition to axial images, sagittal and coronal 2D reformatted images were created and submitted for interpretation  Radiation dose length product (DLP) for this visit:  886 mGy-cm   This examination, like all CT scans performed in the [facility], was performed utilizing techniques to minimize radiation dose exposure, including the use of iterative reconstruction and automated exposure control  IMAGE QUALITY:  Diagnostic  FINDINGS: PARENCHYMA: Decreased attenuation is noted in periventricular and subcortical white matter demonstrating an appearance that is statistically most likely to represent mild microangiopathic change; this appearance is similar when compared to most recent prior examination  No CT signs of acute infarction  No intracranial mass, mass effect or midline shift  No acute parenchymal hemorrhage   VENTRICLES AND EXTRA-AXIAL SPACES:  Normal for the patient's age  VISUALIZED ORBITS AND PARANASAL SINUSES:  Unremarkable  CALVARIUM AND EXTRACRANIAL SOFT TISSUES:  Normal      Impression: No acute intracranial abnormality   Workstation performed: [id number]       I have personally reviewed pertinent reports   , I have personally reviewed pertinent films in PACS and I have personally reviewed pertinent films in PACS with a Radiologist

## 2022-10-12 PROBLEM — R65.20 SEVERE SEPSIS (HCC): Status: RESOLVED | Noted: 2022-02-01 | Resolved: 2022-10-12

## 2022-10-12 PROBLEM — A41.9 SEVERE SEPSIS (HCC): Status: RESOLVED | Noted: 2022-02-01 | Resolved: 2022-10-12

## 2023-05-07 NOTE — PROGRESS NOTES
NEPHROLOGY PROGRESS NOTE    Jacqueline Randhawa 80 y o  female MRN: [id number]  Unit/Bed#: S -01 Encounter: [id number]  Reason for Consult:  Hypokalemia and hyponatremia    Patient was sleeping when I went into the room I awakened her she just is weak debilitated not eating that much but was in no distress    ASSESSMENT/PLAN:  1  Hypokalemia    Patient still has significant hypokalemia  I suspect that she has significant total body depletion and again when she had hyperglycemia corrected that will shift potassium intracellular  Blood sugars now normal this morning  Still with profound hypokalemia  IV and oral supplementation ordered and for repeat potassium and will continue to supplement  Will run timed IV fluids with potassium    2  Hyponatremia    Patient had hyperglycemia which now has normalized so there is no effect on the sodium concentration  Thiazide diuretic has been discontinued  Likely has volume depletion potentially low solute intake  Sodium concentration did improve this morning to 130 mEq per L after L of fluids  Will run saline with 20 mEq KCL per L    SUBJECTIVE:  Review of Systems   Constitutional: Positive for malaise/fatigue  Negative for chills, fever and night sweats  HENT: Negative  Eyes: Negative  Cardiovascular: Negative for chest pain, dyspnea on exertion, orthopnea and palpitations  Respiratory: Negative  Negative for cough, shortness of breath, sputum production and wheezing  Gastrointestinal: Negative for abdominal pain, diarrhea, nausea and vomiting  Genitourinary: Negative for dysuria, flank pain, hematuria and incomplete emptying  Neurological: Positive for weakness  Negative for dizziness, focal weakness and headaches         OBJECTIVE:  Current Weight: Weight - Scale: 71 4 kg (157 lb 6 5 oz)  Vitals:Temp (24hrs), Av 8 °F (36 6 °C), Min:97 4 °F (36 3 °C), Max:98 7 °F (37 1 °C)  Current: Temperature: 98 7 °F (37 1 °C)   Blood pressure 128/72, pulse 98, RN walked pt back to room.  As RN was walking pt back pt kept saying.  \"If I see them, I am running, I am dashing away.\"  RN asked pt who he was talking about.  Pt stated \"My parents better not be back there if they are back here I am running away.\"  RN assured pt that his parents were not going to be back in the ED at this time.   temperature 98 7 °F (37 1 °C), temperature source Oral, resp  rate 18, height 4' 11 5" (1 511 m), weight 71 4 kg (157 lb 6 5 oz), SpO2 96 %  , Body mass index is 31 26 kg/m²  Intake/Output Summary (Last 24 hours) at 2/3/2022 1243  Last data filed at 2/3/2022 0656  Gross per 24 hour   Intake 1023 17 ml   Output 500 ml   Net 523 17 ml       Physical Exam: /72   Pulse 98   Temp 98 7 °F (37 1 °C) (Oral)   Resp 18   Ht 4' 11 5" (1 511 m)   Wt 71 4 kg (157 lb 6 5 oz)   SpO2 96%   BMI 31 26 kg/m²   Physical Exam  Constitutional:       General: She is not in acute distress  Appearance: She is not toxic-appearing or diaphoretic  HENT:      Head: Normocephalic and atraumatic  Mouth/Throat:      Mouth: Mucous membranes are dry  Eyes:      General: No scleral icterus  Extraocular Movements: Extraocular movements intact  Cardiovascular:      Rate and Rhythm: Normal rate and regular rhythm  Heart sounds: No friction rub  No gallop  Pulmonary:      Effort: Pulmonary effort is normal  No respiratory distress  Breath sounds: Normal breath sounds  No wheezing, rhonchi or rales  Abdominal:      General: Bowel sounds are normal  There is no distension  Palpations: Abdomen is soft  Tenderness: There is no abdominal tenderness  There is no rebound  Musculoskeletal:      Cervical back: Normal range of motion and neck supple  Neurological:      Mental Status: She is alert           Medications:    Current Facility-Administered Medications:     acetaminophen (TYLENOL) tablet 650 mg, 650 mg, Oral, Q6H PRN, Negrita Markham PA-C    apixaban (ELIQUIS) tablet 5 mg, 5 mg, Oral, BID, AME Farris, 5 mg at 02/03/22 0819    [START ON 2/4/2022] cefdinir (OMNICEF) capsule 300 mg, 300 mg, Oral, Q12H [address], Pan Kinsey PA-C    donepezil (ARICEPT) tablet 10 mg, 10 mg, Oral, Daily, Negrita Markham PA-C, 10 mg at 02/03/22 0819    insulin glargine (LANTUS) subcutaneous injection 10 Units 0 1 mL, 10 Units, Subcutaneous, HS, Carmen Angst, PA-C, 10 Units at 02/02/22 2214    insulin lispro (HumaLOG) 100 units/mL subcutaneous injection 1-5 Units, 1-5 Units, Subcutaneous, TID AC, 2 Units at 02/03/22 1200 **AND** Fingerstick Glucose (POCT), , , TID AC, Pearholden Angst, PA-C    insulin lispro (HumaLOG) 100 units/mL subcutaneous injection 1-5 Units, 1-5 Units, Subcutaneous, HS, Carmen Coughlinst, PA-C, 2 Units at 02/02/22 2215    magnesium oxide (MAG-OX) tablet 400 mg, 400 mg, Oral, Daily, Carmen Alston PA-C, 400 mg at 02/03/22 0083    metoprolol tartrate (LOPRESSOR) tablet 25 mg, 25 mg, Oral, Q12H [address], Pan Kinsey PA-C, 25 mg at 02/03/22 5977    nystatin (MYCOSTATIN) powder, , Topical, BID, AME Lowry, Given at 02/03/22 1002    ondansetron (ZOFRAN) injection 4 mg, 4 mg, Intravenous, Q6H PRN, CHELE Henderson-C    potassium chloride (K-DUR,KLOR-CON) CR tablet 40 mEq, 40 mEq, Oral, Daily, Pan Kinsey PA-C, 40 mEq at 02/03/22 0819    potassium chloride (K-DUR,KLOR-CON) CR tablet 40 mEq, 40 mEq, Oral, BID, Pan Kinsey, PA-C    potassium chloride 20 mEq IVPB (premix), 20 mEq, Intravenous, Q2H, Pan Kinsey, PA-C, Last Rate: 50 mL/hr at 02/03/22 1240, 20 mEq at 02/03/22 1240    Laboratory Results:  Lab Results   Component Value Date    WBC 15 28 (H) 02/03/2022    HGB 12 1 02/03/2022    HCT 34 6 (L) 02/03/2022    MCV 87 02/03/2022     02/03/2022     Lab Results   Component Value Date    SODIUM 130 (L) 02/03/2022    K 2 4 (LL) 02/03/2022    CL 98 (L) 02/03/2022    CO2 22 02/03/2022    BUN 16 02/03/2022    CREATININE 0 90 02/03/2022    GLUC 104 02/03/2022    CALCIUM 8 3 02/03/2022     Lab Results   Component Value Date    CALCIUM 8 3 02/03/2022    PHOS 2 9 01/03/2022     No results found for: LABPROT

## 2024-07-26 NOTE — ASSESSMENT & PLAN NOTE
· UA: small amount of leukocytes and blood, 2+ protein and 250 glucose  · Urine microscopy: WBC 4-10 and clumped white blood cells  · UC final polymicrobial  Continue Omnicef   Add Macrobid 7 days total normal... Well appearing, awake, alert, oriented to person, place, time/situation and in no apparent distress.

## 2024-08-02 NOTE — ASSESSMENT & PLAN NOTE
DARSHANW left message for pt related to appointment. [facility] provided contact information if pt would like to reschedule.   Acute L4 superior endplate compression deformity   Acute onset severe low back pain    Imaging:    X-rays lumbar spine 5/9/22:  Acute L4 mild-to-moderate compression fracture  No change chronic T10, T12, L2 compression fracture   X-rays lumbar spine 5/23/2022:  No change in mild to moderate compression fracture of L4 vertebral body since prior study  Stable chronic compression fractures  Plan:    Continue monitor neurological symptoms   Xrays imaging reviewed with patient and daughter    Continue brace when out of bed    o Please angle front Velcro brace to a "V" to help keep brace down under breast and on hips  Pull brace tight to provide good support  Adjust frequently if needed to ensure proper fitting   Pain control   o Tylenol 975mg PO Q8H scheduled ot Tylenol 650mg Po Q 6H scheduled for 2 weeks then transition to as needed   Ordered DEXA scan to be done  o Recommend calcium supplement and vitamin D supplement   Given patients pain and acute fracture a few weeks ago, discussed consideration for Kyphoplasty but given patient's age family would prefer to defer with the understanding that the procedure is generally not an option once the fracture becomes chronic      Strict fall precautions and lumbar precautions   Follow-up in 4 weeks with repeat Lumbar Xrays as virtual visit